# Patient Record
Sex: FEMALE | Race: WHITE | NOT HISPANIC OR LATINO | Employment: OTHER | URBAN - METROPOLITAN AREA
[De-identification: names, ages, dates, MRNs, and addresses within clinical notes are randomized per-mention and may not be internally consistent; named-entity substitution may affect disease eponyms.]

---

## 2017-01-11 ENCOUNTER — ALLSCRIPTS OFFICE VISIT (OUTPATIENT)
Dept: OTHER | Facility: OTHER | Age: 57
End: 2017-01-11

## 2017-02-08 ENCOUNTER — ALLSCRIPTS OFFICE VISIT (OUTPATIENT)
Dept: OTHER | Facility: OTHER | Age: 57
End: 2017-02-08

## 2017-02-24 ENCOUNTER — ALLSCRIPTS OFFICE VISIT (OUTPATIENT)
Dept: OTHER | Facility: OTHER | Age: 57
End: 2017-02-24

## 2017-02-24 DIAGNOSIS — K91.2 POSTSURGICAL MALABSORPTION, NOT ELSEWHERE CLASSIFIED: ICD-10-CM

## 2017-02-24 DIAGNOSIS — I10 ESSENTIAL (PRIMARY) HYPERTENSION: ICD-10-CM

## 2017-03-03 ENCOUNTER — GENERIC CONVERSION - ENCOUNTER (OUTPATIENT)
Dept: OTHER | Facility: OTHER | Age: 57
End: 2017-03-03

## 2017-03-10 ENCOUNTER — ALLSCRIPTS OFFICE VISIT (OUTPATIENT)
Dept: OTHER | Facility: OTHER | Age: 57
End: 2017-03-10

## 2017-04-07 ENCOUNTER — ALLSCRIPTS OFFICE VISIT (OUTPATIENT)
Dept: OTHER | Facility: OTHER | Age: 57
End: 2017-04-07

## 2017-04-28 ENCOUNTER — ALLSCRIPTS OFFICE VISIT (OUTPATIENT)
Dept: OTHER | Facility: OTHER | Age: 57
End: 2017-04-28

## 2017-04-28 ENCOUNTER — APPOINTMENT (OUTPATIENT)
Dept: LAB | Facility: HOSPITAL | Age: 57
End: 2017-04-28
Payer: COMMERCIAL

## 2017-04-28 DIAGNOSIS — K91.2 POSTSURGICAL MALABSORPTION, NOT ELSEWHERE CLASSIFIED: ICD-10-CM

## 2017-04-28 DIAGNOSIS — R31.9 HEMATURIA: ICD-10-CM

## 2017-04-28 DIAGNOSIS — I10 ESSENTIAL (PRIMARY) HYPERTENSION: ICD-10-CM

## 2017-04-28 LAB
BILIRUB UR QL STRIP: NEGATIVE
BILIRUB UR QL STRIP: NORMAL
CLARITY UR: ABNORMAL
CLARITY UR: NORMAL
COLOR UR: ABNORMAL
COLOR UR: NORMAL
GLUCOSE UR STRIP-MCNC: NEGATIVE MG/DL
HGB UR QL STRIP.AUTO: ABNORMAL
HGB UR QL STRIP.AUTO: NORMAL
KETONES UR STRIP-MCNC: ABNORMAL MG/DL
KETONES UR STRIP-MCNC: NORMAL MG/DL
LEUKOCYTE ESTERASE UR QL STRIP: NEGATIVE
LEUKOCYTE ESTERASE UR QL STRIP: NORMAL
NITRITE UR QL STRIP: NEGATIVE
NITRITE UR QL STRIP: NORMAL
PH UR STRIP.AUTO: 5 [PH]
PH UR STRIP.AUTO: 6 [PH] (ref 4.5–8)
PROT UR STRIP-MCNC: NEGATIVE MG/DL
PROT UR STRIP-MCNC: NORMAL MG/DL
SP GR UR STRIP.AUTO: 1.01
SP GR UR STRIP.AUTO: 1.03 (ref 1–1.03)
UROBILINOGEN UR QL STRIP.AUTO: 1 E.U./DL
UROBILINOGEN UR QL STRIP.AUTO: NORMAL

## 2017-04-28 PROCEDURE — 87086 URINE CULTURE/COLONY COUNT: CPT

## 2017-04-28 PROCEDURE — 81001 URINALYSIS AUTO W/SCOPE: CPT

## 2017-04-29 LAB
BACTERIA UR QL AUTO: ABNORMAL /HPF
MUCOUS THREADS UR QL AUTO: ABNORMAL
NON-SQ EPI CELLS URNS QL MICRO: ABNORMAL /HPF
RBC #/AREA URNS AUTO: ABNORMAL /HPF
WBC #/AREA URNS AUTO: ABNORMAL /HPF

## 2017-04-30 LAB — BACTERIA UR CULT: NORMAL

## 2017-05-01 ENCOUNTER — GENERIC CONVERSION - ENCOUNTER (OUTPATIENT)
Dept: OTHER | Facility: OTHER | Age: 57
End: 2017-05-01

## 2017-06-02 ENCOUNTER — ALLSCRIPTS OFFICE VISIT (OUTPATIENT)
Dept: OTHER | Facility: OTHER | Age: 57
End: 2017-06-02

## 2017-06-08 ENCOUNTER — ALLSCRIPTS OFFICE VISIT (OUTPATIENT)
Dept: RADIOLOGY | Facility: CLINIC | Age: 57
End: 2017-06-08
Payer: COMMERCIAL

## 2017-06-14 ENCOUNTER — GENERIC CONVERSION - ENCOUNTER (OUTPATIENT)
Dept: OTHER | Facility: OTHER | Age: 57
End: 2017-06-14

## 2017-06-20 ENCOUNTER — ALLSCRIPTS OFFICE VISIT (OUTPATIENT)
Dept: RADIOLOGY | Facility: CLINIC | Age: 57
End: 2017-06-20
Payer: COMMERCIAL

## 2017-06-23 ENCOUNTER — ALLSCRIPTS OFFICE VISIT (OUTPATIENT)
Dept: OTHER | Facility: OTHER | Age: 57
End: 2017-06-23

## 2017-06-23 DIAGNOSIS — Z12.11 ENCOUNTER FOR SCREENING FOR MALIGNANT NEOPLASM OF COLON: ICD-10-CM

## 2017-06-28 ENCOUNTER — GENERIC CONVERSION - ENCOUNTER (OUTPATIENT)
Dept: OTHER | Facility: OTHER | Age: 57
End: 2017-06-28

## 2017-07-27 ENCOUNTER — ALLSCRIPTS OFFICE VISIT (OUTPATIENT)
Dept: RADIOLOGY | Facility: CLINIC | Age: 57
End: 2017-07-27
Payer: COMMERCIAL

## 2017-07-28 ENCOUNTER — ALLSCRIPTS OFFICE VISIT (OUTPATIENT)
Dept: OTHER | Facility: OTHER | Age: 57
End: 2017-07-28

## 2017-08-01 ENCOUNTER — GENERIC CONVERSION - ENCOUNTER (OUTPATIENT)
Dept: OTHER | Facility: OTHER | Age: 57
End: 2017-08-01

## 2017-08-07 ENCOUNTER — ALLSCRIPTS OFFICE VISIT (OUTPATIENT)
Dept: OTHER | Facility: OTHER | Age: 57
End: 2017-08-07

## 2017-09-20 ENCOUNTER — ALLSCRIPTS OFFICE VISIT (OUTPATIENT)
Dept: OTHER | Facility: OTHER | Age: 57
End: 2017-09-20

## 2017-10-24 ENCOUNTER — ALLSCRIPTS OFFICE VISIT (OUTPATIENT)
Dept: OTHER | Facility: OTHER | Age: 57
End: 2017-10-24

## 2017-10-26 NOTE — PROGRESS NOTES
Assessment  1  Chronic prescription opiate use (V58 69) (Z79 891)   2  Chronic pain syndrome (338 4) (G89 4)    Discussion/Summary    This is a 68-year-old female who presents today for follow up of chronic lumbosacral pain, pudendal neuralgia, spondylolisthesis, left hip and groin pain  She recently had a left L3-L5 rhizotomy and she said flared up her pain symptoms  Patient has a permanent spinal cord stimulator which helps with her neuropathic symptoms  advised her that typically it takes at least 6-8 weeks to realize full benefit from Rhizotomy  I informed her that she should give the procedure another 2 weeks   I also advised her that to help with acute flare up of pain, we can do a medrol dose javier, taper dose  She is interested  I advised her that she should avoid the use of NSAIDS while on steroids  She is alos instructed to take the medication with food, and not on an empty stomach  She verbalizes understanding  manage her other pain symptoms, I will continue nucynta at 150mg po q12hrs ER and continue oxycodone 5mg po q8hrs for coverage of breakthrough pain  She was given 2 month supply  Opiates relieve her pain by greater than 70%  A urine drug screen was consistent  Opiate agreement was reviewed  [place] prescription drug monitoring database was checked and is appropriate  I will see her back in 8 weeks for re-assessment  I advised her to call us if the low back pain gets worse  adjuncts, I encouraged patient to continue with Lyrica 100 mg by mouth 3 times a day and Cymbalta 60 mg by mouth daily at bedtime  She verbalizes understanding  The patient has the current Goals: Treat acute flare of low back pain with medrol dose javier  Patient is able to Self-Care  Possible side effects of new medications were reviewed with the patient/guardian today  The treatment plan was reviewed with the patient/guardian   The patient/guardian understands and agrees with the treatment plan   The patient was counseled regarding instructions for management,-- risk factor reductions,-- prognosis,-- patient and family education,-- impressions,-- risks and benefits of treatment options-- and-- importance of compliance with treatment  There are risks associated with opiod medications, including dependence, addiction and tolerance  The patient understands and agrees to use these medications only as prescribed  Potential side effects of the medications include, but are not limited to, constipation, drowsiness, addiction, impaired judgment and risk of fatal overdose if not taken as prescribed  Sharing medications is a felony  At this point and time, the patient is showing no signs of addiction, abuse, diversion or suicidal ideation  Chief Complaint  1  Back Pain    History of Present Illness  Patient is a 49-year-old female who presents today for follow up visit for management of chronic low back pain and left sided hip and groin pain  She has a h/o SCS placement which she states she rarely uses, except during severe episodes of pain  Most recently, we performed a left L3-L5 lumbar radiofrequency ablation  Patient states that she is not doing so well  She states that she still her pain in the low back and she feels that the procedure might have aggravated her pudendal neuralgia  She states that she is in so much pain that she cannot sleep at night  She rates her pain 6 out of 10    her last visit, Vene 87 was continued at 150 mg by mouth ER every 12 hours and she was also continued on oxycodone 5 mg by mouth 3 times a day, #90 pills for breakthrough pain; and lyrica 100mg po 4x/day  Patient states that it this does not get her through the day  She also takes Cymbalta 60mg po qhs which helps with neuropathic pain  Other adjuncts include lidocaine ointments  She denies any side effects from the medications  Patient is here for medication refill        Referring physician is  Dr Danny Aaron physician is  Ritchie 77 presents with complaints of gradual onset of constant episodes of moderate left lower back pain, described as sharp, dull, aching, burning and throbbing, radiating to the left buttock and left thigh  On a scale of 1 to 10, the patient rates the pain as 6  Symptoms are improved by opioid analgesics  Symptoms are made worse by prolonged standing, prolonged sitting and bending  Symptoms are worsening  Review of Systems    Constitutional: no fever,-- no recent weight gain-- and-- no recent weight loss  Eyes: no double vision-- and-- no blurry vision  Cardiovascular: no chest pain,-- no palpitations-- and-- no lower extremity edema  Respiratory: no complaints of shortness of breath-- and-- no wheezing  Musculoskeletal: pain in extremity lt leg, but-- no difficulty walking,-- no muscle weakness,-- no joint stiffness,-- no joint swelling,-- no limb swelling-- and-- no decreased range of motion  Neurological: no dizziness,-- no difficulty swallowing,-- no memory loss,-- no loss of consciousness-- and-- no seizures  Gastrointestinal: no nausea,-- no vomiting,-- no constipation-- and-- no diarrhea  Genitourinary: no difficulty initiating urine stream,-- no genital pain-- and-- no frequent urination  Integumentary: no complaints of skin rash  Psychiatric: no depression  Endocrine: no excessive thirst,-- no adrenal disease,-- no hypothyroidism-- and-- no hyperthyroidism  Hematologic/Lymphatic: no tendency for easy bruising-- and-- no tendency for easy bleeding  ROS reviewed  Active Problems  1  Chronic pain syndrome (338 4) (G89 4)   2  Chronic prescription opiate use (V58 69) (Z79 891)   3  Dysesthesia of multiple sites (782 0) (R20 8)   4  Encounter for screening colonoscopy (V76 51) (Z12 11)   5  Encounter for screening mammogram for breast cancer (V76 12) (Z12 31)   6  Hematuria (599 70) (R31 9)   7  Hypertension (401 9) (I10)   8   Left shoulder pain (719 41) (M25 512)   9  Limb pain (729 5) (M79 609)   10  Lower back pain (724 2) (M54 5)   11  Lumbar facet arthropathy (721 3) (M12 88)   12  Memory difficulties (780 93) (R41 3)   13  Memory impairment (780 93) (R41 3)   14  Need for influenza vaccination (V04 81) (Z23)   15  Numbness (782 0) (R20 0)   16  Obstructive sleep apnea (327 23) (G47 33)   17  CATHRYN (obstructive sleep apnea) (327 23) (G47 33)   18  Postgastrectomy malabsorption (579 3) (K91 2,Z90 3)   19  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)   20  Rash (782 1) (R21)   21  Screening for malignant neoplasm of colon (V76 51) (Z12 11)   22  Sleep disturbances (780 50) (G47 9)   23  Snoring (786 09) (R06 83)   24  Twin birth (V27 9) (Z38 5)    Past Medical History  1  History of Anxiety (300 00) (F41 9)   2  History of Arthritis (V13 4)   3  History of Attention and concentration deficit (799 51) (R41 840)   4  History of Currently Wearing Eyeglasses   5  History of Depression (311) (F32 9)   6  History of Dyspepsia (536 8) (K30)   7  History of Gastric ulcer (531 90) (K25 9)   8  History of abdominal pain (V13 89) (Z87 898)   9  History of blurred vision (V12 49) (Z86 69)   10  History of diplopia (V12 49) (Z86 69)   11  History of fatigue (V13 89) (Z87 898)   12  History of gastritis (V12 79) (Z87 19)   13  History of heartburn (V12 79) (Z87 898)   14  History of nausea (V12 79) (Z87 898)   15  History of osteopenia (V13 59) (Z87 39)   16  History of shortness of breath (V13 89) (Z87 898)   17  History of urinary incontinence (V13 09) (Z87 898)   18  History of Memory Lapses Or Loss (780 93)   19  History of Urinary Stream Starts And Stops (788 61)    The active problems and past medical history were reviewed and updated today  Surgical History  1  History of  Section   2  History of Gallbladder Surgery   3  History of Gastric Surgery   4  History of Hysterectomy   5  History of Nerve Ablation   6   History of Spinal Neurostimulator Pulse Generator   7  History of Uterine Surgery   8  History of Wrist Arthroscopy With Internal Fixation    The surgical history was reviewed and updated today  Family History  Mother    1  Family history of Back disorder   2  Family history of Cirrhosis   3  Family history of Crohn's disease (V18 59) (Z83 79)   4  Family history of systemic lupus erythematosus (V19 4) (Z82 69)  Father    5  Family history of hypertension (V17 49) (Z82 49)   6  Family history of Heart Disease (V17 49)  Brother    7  Family history of Liver Transplant    The family history was reviewed and updated today  Social History   · Denied: History of Alcohol Use (History)   · Denied: History of Current Smoker   · Denied: History of Drug Use   · Marital History - Currently    · Never A Smoker   · Sexually Active   · Denied: History of Sexually Active With Persons At Risk For HIV-related Disease  The social history was reviewed and updated today  The social history was reviewed and is unchanged  Current Meds   1  D3-1000 CAPS; Take 1 capsule twice daily; Therapy: (Recorded:12Rji3663) to Recorded   2  DULoxetine HCl - 60 MG Oral Capsule Delayed Release Particles; take 1 capsule daily    Requested for: 02Jun2017; Last Rx:02Jun2017 Ordered   3  Iron TABS; TAKE TABLET Daily; Therapy: (Recorded:24Jun2016) to Recorded   4  Lidocaine 5 % External Ointment; apply to affected area BID PRN; Therapy: 60GTZ2101 to (Last Rx:20Jan2016) Ordered   5  Lisinopril 10 MG Oral Tablet; Take 1 tablet daily as directed; Therapy: 23Aug2017 to (Last Terrilee Curd)  Requested for: 23Aug2017 Ordered   6  Lyrica 100 MG Oral Capsule; take 1 capsule 4 times daily; Last Rx:02Jun2017 Ordered   7  Metoprolol Tartrate 25 MG Oral Tablet; TAKE ONE-HALF (1/2) TABLET TWICE A DAY; Therapy: 23Aug2017 to (Last Terrilee Curd)  Requested for: 23Aug2017 Ordered   8  Multi-Vitamin TABS; TAKE 1 TABLET DAILY; Therapy: (Recorded:07Apr2017) to Recorded   9  Nucynta  MG Oral Tablet Extended Release 12 Hour; take 1 tab twice a day; Therapy: 77DCL7796 to (Evaluate:08Oct2017); Last Rx:[id number] Ordered   10  OxyCODONE HCl - 5 MG Oral Tablet; take 1 tablet every 8 hours as needed for pain; Therapy: 64OYH0286 to (Evaluate:06Oct2017); Last Rx:[id number] Ordered   11  Pantoprazole Sodium 40 MG Oral Tablet Delayed Release; take 1 tablet twice a day; Therapy: 31PXV2624 to (Last Jimmy Baez)  Requested for: 62Uwc0318 Ordered    The medication list was reviewed and updated today  Allergies  1  Anesthesia IV Set MISC  2  Animal dander - Cats   3  Shellfish    Vitals  Vital Signs    Recorded: 17IOT4107 01:45PM   Heart Rate 92   Systolic 348   Diastolic 62   Height 5 ft 4 in   Weight 205 lb    BMI Calculated 35 19   BSA Calculated 1 98   Pain Scale 6     Physical Exam    Constitutional   General appearance: Well developed, well nourished, alert, in no distress, non-toxic and no overt pain behavior  Eyes   Sclera: anicteric   HEENT   Hearing grossly intact  Neck   Neck: Supple, symmetric, trachea midline, no masses  Pulmonary   Respiratory effort: Even and unlabored  Cardiovascular   Examination of extremities: No edema or pitting edema present  Skin   Skin and subcutaneous tissue: Normal without rashes or lesions, well hydrated  Psychiatric   Mood and affect: Mood and affect appropriate  Neurologic   Cranial nerves: Cranial nerves II-XII grossly intact  the muscle tone was normal   Musculoskeletal   Gait and station: Normal     Lumbar/Sacral Spine examination demonstrates Lumbosacral Spine:   Appearance: Normal  Spinal alignment exhibits normal lordosis  Tenderness: at lumbar spine,-- at the sacral spine,-- right paraspinal-- and-- left paraspinal  Palpatory Findings include left-sided muscle spasms     Lumbosacral Spine Sensory:     Lumbosacral sensory exam of the left side demonstrates L4 decreased sensation -- and-- L5 decreased sensation   ROM Lumbosacral Spine: Full  Flexion was restricted-- and-- was painful  Extension was restricted-- and-- was painful  ROM Hips: Full   Foot and ankle strength was normal bilaterally  Knee strength was normal bilaterally  Hip strength was normal bilaterally  Evaluation of Muscle Stretch Reflexes on the right side demonstrates muscle stretch reflexes equal and symmetric right lower limbs  Evaluation of Muscle Stretch Reflexes on the left side demonstrates muscle stretch reflexes equal and symmetric right lower limbs  Special Tests: Negative except as noted  Results/Data  Results Free Text Form [facility]:   Results     Other  last dose of oxycodone 2---26-17        Future Appointments    Date/Time Provider Specialty Site   12/19/2017 01:15 PM Daphney Lefort, [facility]     Signatures   Electronically signed by : Zoila Marinelli MD; Sep 20 2017  2:20PM EST                       (Author)

## 2017-10-30 ENCOUNTER — GENERIC CONVERSION - ENCOUNTER (OUTPATIENT)
Dept: OTHER | Facility: OTHER | Age: 57
End: 2017-10-30

## 2017-11-09 DIAGNOSIS — F11.20 UNCOMPLICATED OPIOID DEPENDENCE (HCC): ICD-10-CM

## 2017-11-09 DIAGNOSIS — Z79.899 OTHER LONG TERM (CURRENT) DRUG THERAPY: ICD-10-CM

## 2017-11-09 DIAGNOSIS — G89.4 CHRONIC PAIN SYNDROME: ICD-10-CM

## 2017-11-09 DIAGNOSIS — Z79.891 LONG TERM CURRENT USE OF OPIATE ANALGESIC: ICD-10-CM

## 2017-11-10 ENCOUNTER — APPOINTMENT (OUTPATIENT)
Dept: LAB | Facility: CLINIC | Age: 57
End: 2017-11-10
Payer: COMMERCIAL

## 2017-11-10 DIAGNOSIS — Z79.899 OTHER LONG TERM (CURRENT) DRUG THERAPY: ICD-10-CM

## 2017-11-10 LAB
ALBUMIN SERPL BCP-MCNC: 3.4 G/DL (ref 3.5–5)
ALP SERPL-CCNC: 114 U/L (ref 46–116)
ALT SERPL W P-5'-P-CCNC: 24 U/L (ref 12–78)
ANION GAP SERPL CALCULATED.3IONS-SCNC: 6 MMOL/L (ref 4–13)
AST SERPL W P-5'-P-CCNC: 21 U/L (ref 5–45)
BACTERIA UR QL AUTO: ABNORMAL /HPF
BILIRUB SERPL-MCNC: 0.29 MG/DL (ref 0.2–1)
BILIRUB UR QL STRIP: NEGATIVE
BUN SERPL-MCNC: 14 MG/DL (ref 5–25)
CALCIUM SERPL-MCNC: 9.1 MG/DL (ref 8.3–10.1)
CHLORIDE SERPL-SCNC: 102 MMOL/L (ref 100–108)
CLARITY UR: CLEAR
CO2 SERPL-SCNC: 31 MMOL/L (ref 21–32)
COLOR UR: YELLOW
CREAT SERPL-MCNC: 0.77 MG/DL (ref 0.6–1.3)
GFR SERPL CREATININE-BSD FRML MDRD: 85 ML/MIN/1.73SQ M
GLUCOSE P FAST SERPL-MCNC: 89 MG/DL (ref 65–99)
GLUCOSE UR STRIP-MCNC: NEGATIVE MG/DL
HGB UR QL STRIP.AUTO: ABNORMAL
HYALINE CASTS #/AREA URNS LPF: ABNORMAL /LPF
KETONES UR STRIP-MCNC: NEGATIVE MG/DL
LEUKOCYTE ESTERASE UR QL STRIP: ABNORMAL
NITRITE UR QL STRIP: POSITIVE
NON-SQ EPI CELLS URNS QL MICRO: ABNORMAL /HPF
PH UR STRIP.AUTO: 7.5 [PH] (ref 4.5–8)
POTASSIUM SERPL-SCNC: 4.6 MMOL/L (ref 3.5–5.3)
PROT SERPL-MCNC: 7.2 G/DL (ref 6.4–8.2)
PROT UR STRIP-MCNC: NEGATIVE MG/DL
RBC #/AREA URNS AUTO: ABNORMAL /HPF
SODIUM SERPL-SCNC: 139 MMOL/L (ref 136–145)
SP GR UR STRIP.AUTO: 1.01 (ref 1–1.03)
UROBILINOGEN UR QL STRIP.AUTO: 0.2 E.U./DL
WBC #/AREA URNS AUTO: ABNORMAL /HPF

## 2017-11-10 PROCEDURE — 87186 SC STD MICRODIL/AGAR DIL: CPT

## 2017-11-10 PROCEDURE — 81001 URINALYSIS AUTO W/SCOPE: CPT

## 2017-11-10 PROCEDURE — 87086 URINE CULTURE/COLONY COUNT: CPT

## 2017-11-10 PROCEDURE — 36415 COLL VENOUS BLD VENIPUNCTURE: CPT

## 2017-11-10 PROCEDURE — 87077 CULTURE AEROBIC IDENTIFY: CPT

## 2017-11-10 PROCEDURE — 80053 COMPREHEN METABOLIC PANEL: CPT

## 2017-11-12 ENCOUNTER — GENERIC CONVERSION - ENCOUNTER (OUTPATIENT)
Dept: OTHER | Facility: OTHER | Age: 57
End: 2017-11-12

## 2017-11-12 LAB — BACTERIA UR CULT: ABNORMAL

## 2017-11-13 ENCOUNTER — ALLSCRIPTS OFFICE VISIT (OUTPATIENT)
Dept: OTHER | Facility: OTHER | Age: 57
End: 2017-11-13

## 2017-11-14 NOTE — PROGRESS NOTES
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)    Plan  Chronic pain syndrome, Chronic prescription opiate use, Lumbar facet arthropathy    · Renew: OxyCODONE HCl - 5 MG Oral Tablet; take 1 tablet every 8 hours as needed forpain; Do Not Fill Before: [id number]  Rx By: Mary Snell; Dispense: 30 Days ; #:90 Tablet; Refill: 0;For: Chronic pain syndrome, Chronic prescription opiate use, Lumbar facet arthropathy; ELIAZAR = N; Print Rx  Lumbar facet arthropathy    · Renew: Nucynta  MG Oral Tablet Extended Release 12 Hour; take 1 tab twice aday; Do Not Fill Before: [id number]  Rx By: Mary Snell; Dispense: 30 Days ; #:60 Tablet Extended Release 12 Hour; Refill: 0;For: Lumbar facet arthropathy; ELIAZAR = N; Print Rx    Discussion/Summary    This is a 69-year-old female who presents today for follow up of chronic lumbosacral pain, pudendal neuralgia, spondylolisthesis, left hip and groin pain w/ SCS in place  She had a left L3-L5 rhizotomy and she said flared up some of her pain symptoms  manage her other pain symptoms, I will continue nucynta at 150mg po q12hrs ER and continue oxycodone 5mg po q8hrs for coverage of breakthrough pain  She was given 2 month supply  Opiates relieve her pain by greater than 50%  A urine drug screen will be collected today  Opiate agreement was reviewed  [place] prescription drug monitoring database was checked and is appropriate  I will see her back in 8 weeks for re-assessment  I advised her to call us if the low back pain gets worse  adjuncts, I encouraged patient to continue with Lyrica 100 mg by mouth 4 times a day and Cymbalta 60 mg by mouth daily at bedtime  She verbalizes understanding  urine drug screen was performed in the office today, as part of the medication management protocol   Drug screens are performed to evaluate for the presence or absence of described, non prescribed, and/or illicit substances  point of care results were appropriate for what is being prescribed  The specimen will be sent to the lab for confirmatory testing  The drug screen is medically necessary, because the patient is dependent upon, or being considered for opiate medication  1    The patient has the current Goals: Continue to manage pain using multimodal approach  The patent has the current Barriers: None  Patient is able to Self-Care  Possible side effects of new medications were reviewed with the patient/guardian today  The treatment plan was reviewed with the patient/guardian  The patient/guardian understands and agrees with the treatment plan   The patient was counseled regarding instructions for management,-- risk factor reductions,-- prognosis,-- patient and family education,-- impressions,-- risks and benefits of treatment options-- and-- importance of compliance with treatment  There are risks associated with opiod medications, including dependence, addiction and tolerance  The patient understands and agrees to use these medications only as prescribed  Potential side effects of the medications include, but are not limited to, constipation, drowsiness, addiction, impaired judgment and risk of fatal overdose if not taken as prescribed  Sharing medications is a felony  At this point and time, the patient is showing no signs of addiction, abuse, diversion or suicidal ideation  1 Amended By: Jose Lara; Nov 13 2017 3:19 PM EST    Chief Complaint    1  Back Pain    History of Present Illness  Patient is a 18-year-old female who presents today for follow up visit for management of chronic low back pain and left sided hip and groin pain  She has a h/o SCS placement which she states she rarely uses, except during severe episodes of pain  We performed a left L4-L5 lumbar radiofrequency ablation which helped somewhat  Patient was given a medrol dose javier at her last office visit   She states that after she took 2 doses of the javier, her urine changed color and had a fruity smell to it  She states that she was then diagnosed with a UTI and treated with anti-biotic  Other than that no changes in health  her last visit, Jessica Poster was continued at 150 mg by mouth ER every 12 hours and she was also continued on oxycodone 5 mg by mouth 3 times a day, #90 pills for breakthrough pain; and lyrica 100mg po 4x/day  Patient states that her pain seem to be worsened at night time  She also takes Cymbalta 60mg po qhs which helps with neuropathic pain  Other adjuncts include lidocaine ointments  She denies any side effects from the medications  Patient is here for medication refill  Referring physician is  Dr Alejo Gale physician is  Ritchie 77 presents with complaints of gradual onset of constant episodes of moderate left lower back pain, described as sharp, dull, aching, burning and throbbing, radiating to the left abdomen, left buttock and left thigh  On a scale of 1 to 10, the patient rates the pain as 5  Symptoms are unchanged  Review of Systems   Constitutional: no fever,-- no recent weight gain-- and-- no recent weight loss  Eyes: no double vision-- and-- no blurry vision  Cardiovascular: no chest pain,-- no palpitations-- and-- no lower extremity edema  Respiratory: no complaints of shortness of breath-- and-- no wheezing  Musculoskeletal: pain in extremity left thigh, back and abdomin, but-- no difficulty walking,-- no muscle weakness,-- no joint stiffness,-- no joint swelling,-- no limb swelling-- and-- no decreased range of motion  Neurological: no dizziness,-- no difficulty swallowing,-- no memory loss,-- no loss of consciousness-- and-- no seizures  Gastrointestinal: no nausea,-- no vomiting,-- no constipation-- and-- no diarrhea  Genitourinary: no difficulty initiating urine stream,-- no genital pain-- and-- no frequent urination  Integumentary: no complaints of skin rash  Psychiatric: no depression  Endocrine: no excessive thirst,-- no adrenal disease,-- no hypothyroidism-- and-- no hyperthyroidism  Hematologic/Lymphatic: no tendency for easy bruising-- and-- no tendency for easy bleeding  ROS reviewed  Active Problems  1  Acute UTI (599 0) (N39 0)  2  Chronic pain syndrome (338 4) (G89 4)  3  Chronic prescription opiate use (V58 69) (Z79 891)  4  Dysesthesia of multiple sites (782 0) (R20 8)  5  Encounter for screening colonoscopy (V76 51) (Z12 11)  6  Encounter for screening mammogram for breast cancer (V76 12) (Z12 31)  7  Hematuria (599 70) (R31 9)  8  High risk medications (not anticoagulants) long-term use (V58 69) (Z79 899)  9  Hypertension (401 9) (I10)  10  Left shoulder pain (719 41) (M25 512)  11  Limb pain (729 5) (M79 609)  12  Lower back pain (724 2) (M54 5)  13  Lumbar facet arthropathy (721 3) (M12 88)  14  Memory difficulties (780 93) (R41 3)  15  Memory impairment (780 93) (R41 3)  16  Need for influenza vaccination (V04 81) (Z23)  17  Numbness (782 0) (R20 0)  18  Obstructive sleep apnea (327 23) (G47 33)  19  CATHRYN (obstructive sleep apnea) (327 23) (G47 33)  20  Postgastrectomy malabsorption (579 3) (K91 2,Z90 3)  21  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)  22  Rash (782 1) (R21)  23  Screening for malignant neoplasm of colon (V76 51) (Z12 11)  24  Sleep disturbances (780 50) (G47 9)  25  Snoring (786 09) (R06 83)  26  Twin birth (V27 9) (Z38 5)    Past Medical History  1  History of Anxiety (300 00) (F41 9)  2  History of Arthritis (V13 4)  3  History of Attention and concentration deficit (799 51) (R41 840)  4  History of Currently Wearing Eyeglasses  5  History of Depression (311) (F32 9)  6  History of Dyspepsia (536 8) (K30)  7  History of Gastric ulcer (531 90) (K25 9)  8  History of abdominal pain (V13 89) (Z87 898)  9  History of blurred vision (V12 49) (Z86 69)  10   History of diplopia (V12 49) (Z86 69)  11  History of fatigue (V13 89) (Z87 898)  12  History of gastritis (V12 79) (Z87 19)  13  History of heartburn (V12 79) (Z87 898)  14  History of nausea (V12 79) (Z87 898)  15  History of osteopenia (V13 59) (Z87 39)  16  History of shortness of breath (V13 89) (Z87 898)  17  History of urinary incontinence (V13 09) (Z87 898)  18  History of Memory Lapses Or Loss (780 93)  19  History of Urinary Stream Starts And Stops (788 61)    The active problems and past medical history were reviewed and updated today  Surgical History  1  History of  Section  2  History of Gallbladder Surgery  3  History of Gastric Surgery  4  History of Hysterectomy  5  History of Nerve Ablation  6  History of Spinal Neurostimulator Pulse Generator  7  History of Uterine Surgery  8  History of Wrist Arthroscopy With Internal Fixation    Family History  Mother   1  Family history of Back disorder  2  Family history of Cirrhosis  3  Family history of Crohn's disease (V18 59) (Z83 79)  4  Family history of systemic lupus erythematosus (V19 4) (Z82 69)  Father   5  Family history of hypertension (V17 49) (Z82 49)  6  Family history of Heart Disease (V17 49)  Brother   7  Family history of Liver Transplant    Social History     · Denied: History of Alcohol Use (History)   · Denied: History of Current Smoker   · Denied: History of Drug Use   · Marital History - Currently    · Never A Smoker   · Sexually Active   · Denied: History of Sexually Active With Persons At Risk For HIV-related Disease  The social history was reviewed and updated today  Current Meds  1  D3-1000 CAPS; Take 1 capsule twice daily; Therapy: (Recorded:73Eqn2211) to Recorded  2  Diclofenac Sodium 1 % Transdermal Gel; APPLY SPARINGLY TO AFFECTED AREA(S) ONCE DAILY  Requested for: 2017; Last Rx:2017 Ordered  3   DULoxetine HCl - 60 MG Oral Capsule Delayed Release Particles; take 1 capsule daily  Requested for: 2017; Last Rx:2017 Ordered  4  Iron TABS; TAKE TABLET Daily; Therapy: (Recorded:24Jun2016) to Recorded  5  Lidocaine 5 % External Ointment; apply to affected area BID PRN; Therapy: 70HIM8514 to (Last Rx:24Oct2017)  Requested for: 24Oct2017 Ordered  6  Lisinopril 10 MG Oral Tablet; Take 1 tablet daily as directed; Therapy: 78Ljv9980 to (Last Lenetta Oyster)  Requested for: 23Aug2017 Ordered  7  Lyrica 100 MG Oral Capsule; take 1 capsule 4 times daily; Last Rx:02Jun2017 Ordered  8  Metoprolol Tartrate 25 MG Oral Tablet; TAKE ONE-HALF (1/2) TABLET TWICE A DAY; Therapy: 23Aug2017 to (Last Lenetta Oyster)  Requested for: 23Aug2017 Ordered  9  Multi-Vitamin TABS; TAKE 1 TABLET DAILY; Therapy: (Recorded:07Apr2017) to Recorded  10  Nitrofurantoin Monohyd Macro 100 MG Oral Capsule; TAKE 1 CAPSULE EVERY 12  HOURS DAILY; Therapy: 41YBT9439 to (Evaluate:70Ucs8345)  Requested for: 06UMV6426; Last  Rx:10Nov2017 Ordered  11  Nucynta  MG Oral Tablet Extended Release 12 Hour; take 1 tab twice a day; Therapy: 43BDV1131 to (Evaluate:75Fwn1961); Last Rx:21Kyd4344 Ordered  12  OxyCODONE HCl - 5 MG Oral Tablet; take 1 tablet every 8 hours as needed for pain; Therapy: 83WXF4953 to (Evaluate:22Dhk8815); Last Rx:80Xqc5799 Ordered  13  Pantoprazole Sodium 40 MG Oral Tablet Delayed Release; take 1 tablet twice a day; Therapy: 67RHI7316 to (Last Lenetta Oyster)  Requested for: 77Mow1698 Ordered    The medication list was reviewed and updated today  Allergies  1  Anesthesia IV Set MISC  2  Animal dander - Cats  3  Shellfish    Vitals  Vital Signs    Recorded: 09TKW7439 02:45PM   Heart Rate 64   Respiration 14   Systolic 148   Diastolic 86   Height 5 ft 4 in   Weight 211 lb    BMI Calculated 36 22   BSA Calculated 2   Pain Scale 5       Physical Exam   Constitutional  General appearance: Well developed, well nourished, alert, in no distress, non-toxic and no overt pain behavior  Eyes  Sclera: anicteric  HEENT  Hearing grossly intact     Neck  Neck: Supple, symmetric, trachea midline, no masses  Pulmonary  Respiratory effort: Even and unlabored  Cardiovascular  Examination of extremities: No edema or pitting edema present  Skin  Skin and subcutaneous tissue: Normal without rashes or lesions, well hydrated  Psychiatric  Mood and affect: Mood and affect appropriate  Neurologic  Cranial nerves: Cranial nerves II-XII grossly intact  the muscle tone was normal  Musculoskeletal  Gait and station: Normal    Lumbar/Sacral Spine examination demonstrates Lumbosacral Spine:  Appearance: Normal  Spinal alignment exhibits normal lordosis  Tenderness: at lumbar spine,-- at the sacral spine,-- right paraspinal-- and-- left paraspinal  Palpatory Findings include left-sided muscle spasms  Lumbosacral Spine Sensory:    Lumbosacral sensory exam of the left side demonstrates L4 decreased sensation -- and-- L5 decreased sensation   ROM Lumbosacral Spine: Full  Flexion was restricted-- and-- was painful  Extension was restricted-- and-- was painful  ROM Hips: Full  Foot and ankle strength was normal bilaterally  Knee strength was normal bilaterally  Hip strength was normal bilaterally  Evaluation of Muscle Stretch Reflexes on the right side demonstrates muscle stretch reflexes equal and symmetric right lower limbs  Evaluation of Muscle Stretch Reflexes on the left side demonstrates muscle stretch reflexes equal and symmetric right lower limbs  Special Tests: Negative except as noted  Results/Data  Results Free Text Form [facility]:   Results    Other   oxycodone last taken 11/12 PM nucynta last taken 11/13 AM       Future Appointments    Date/Time Provider Specialty Site   12/19/2017 01:15 PM [facility]       Signatures   Electronically signed by : Monalisa Galindo MD; Nov 13 2017  3:14PM EST                       (Author)    Electronically signed by : Monalisa Galindo MD; Nov 13 2017  3:19PM EST                       (Author)

## 2017-12-19 ENCOUNTER — ALLSCRIPTS OFFICE VISIT (OUTPATIENT)
Dept: OTHER | Facility: OTHER | Age: 57
End: 2017-12-19

## 2017-12-19 DIAGNOSIS — E78.5 HYPERLIPIDEMIA: ICD-10-CM

## 2017-12-19 DIAGNOSIS — I10 ESSENTIAL (PRIMARY) HYPERTENSION: ICD-10-CM

## 2017-12-20 NOTE — PROGRESS NOTES
Assessment  1  Chronic pain syndrome (338 4) (G89 4)   2  Hematuria (599 70) (R31 9)   3  Hypertension (401 9) (I10)   4  Obesity (BMI 30 0-34 9) (278 00) (E66 9)   5  Eczema, unspecified type (692 9) (L30 9)   6  Borderline hyperlipidemia (272 4) (E78 5)    Plan  Borderline hyperlipidemia, Hypertension    · (1) LIPID PANEL FASTING W DIRECT LDL REFLEX; Status:Active; Requestedfor:21Lkn6483;   Eczema, unspecified type    · Mometasone Furoate 0 1 % External Cream; APPLY TO AFFECTED AREA ONCEDAILY  Hypertension    · Lisinopril 10 MG Oral Tablet; Take 1 tablet daily as directed    Discussion/Summary    BP here today is perfect  Continue on your lisinopril and all other medications  Use the mometasone cream once daily to the left heel  Keep the follow-up with Dr Mary Soriano he  Have the cholesterol test done and I will call with results  Try to work on the weight  Cut back calories to 1200 daily over the course of the day  Eat 3 meals daily and try to cut back on carbs like bread, pasta, rice, cereal, potatoes, cakes and cookies  Drink at least 6 glasses of water daily  Download my fitness pal and learn to keep a food diary and record all food and liquids you consume  Start with 20 minutes of exercise twice weekly and increase this over time to 30 minutes every other day  The patient was counseled regarding diagnostic results,-- instructions for management,-- risk factor reductions,-- prognosis,-- patient and family education,-- impressions,-- risks and benefits of treatment options,-- importance of compliance with treatment  Possible side effects of new medications were reviewed with the patient/guardian today  The treatment plan was reviewed with the patient/guardian  The patient/guardian understands and agrees with the treatment plan      Chief Complaint  patient is here for 6 month f/u      History of Present Illness  Pt is here for routine f/u  Was given steroids several months ago and developed uti symptoms   Had ua done here and given script for abx for uti  Now feeling better  Does not have urges to urinate, but the urine smells better and she feels so much better since the abx  Went to [place] for 3 weeks  Flying was a nightmare for her r/t her back pains and her pudendal neuralgia  Needed to recuperate from the plane ride  Had a great time and doing ok since she is home  Still with chronic pains  Follows with Dr Melanie Lester  Having problems with her weight   is losing weight and she gained 10 lbs  Knows she is not active  Moans in pain if she tries to do anything  Rarely out of the house  Had her flu shot at [facility]  Thnks she needs lisinopril  Takes all meds as directed  No side effects noted  Never takes her bp's at home  Review of Systems   Constitutional: feeling tired, but-- no fever,-- not feeling poorly-- and-- no chills  Eyes: No complaints of eye pain, no red eyes, no eyesight problems, no discharge, no dry eyes, no itching of eyes  ENT: no complaints of earache, no loss of hearing, no nose bleeds, no nasal discharge, no sore throat, no hoarseness  Cardiovascular: no chest pain-- and-- no palpitations  Respiratory: no shortness of breath,-- no cough-- and-- no wheezing  Gastrointestinal: Still with heartburn , but-- no abdominal pain,-- no nausea,-- no vomiting,-- no constipation-- and-- no diarrhea  Genitourinary: incontinence, but-- no dysuria  Musculoskeletal: arthralgias, but-- no myalgias  Integumentary: a rash-- and-- Has itchy intermittent rash for 25 years on the left heel  Does not use anything for this  , but-- no skin lesions  Neurological: no headache,-- no dizziness-- and-- no fainting  Psychiatric: no anxiety-- and-- no depression  ROS reviewed  Active Problems  1  Acute UTI (599 0) (N39 0)   2  Chronic pain syndrome (338 4) (G89 4)   3  Chronic prescription opiate use (V58 69) (Z79 891)   4  Dysesthesia of multiple sites (782 0) (R20 8)   5   Encounter for screening colonoscopy (V76 51) (Z12 11)   6  Encounter for screening mammogram for breast cancer (V76 12) (Z12 31)   7  Hematuria (599 70) (R31 9)   8  High risk medications (not anticoagulants) long-term use (V58 69) (Z79 899)   9  Hypertension (401 9) (I10)   10  Left shoulder pain (719 41) (M25 512)   11  Limb pain (729 5) (M79 609)   12  Lower back pain (724 2) (M54 5)   13  Lumbar facet arthropathy (721 3) (M46 96)   14  Memory difficulties (780 93) (R41 3)   15  Memory impairment (780 93) (R41 3)   16  Need for influenza vaccination (V04 81) (Z23)   17  Numbness (782 0) (R20 0)   18  Obstructive sleep apnea (327 23) (G47 33)   19  CATHRYN (obstructive sleep apnea) (327 23) (G47 33)   20  Postgastrectomy malabsorption (579 3) (K91 2,Z90 3)   21  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)   22  Rash (782 1) (R21)   23  Screening for malignant neoplasm of colon (V76 51) (Z12 11)   24  Sleep disturbances (780 50) (G47 9)   25  Snoring (786 09) (R06 83)   26  Twin birth (V27 9) (Z38 5)   32  Uncomplicated opioid dependence (304 00) (F11 20)    Past Medical History  1  History of Anxiety (300 00) (F41 9)   2  History of Arthritis (V13 4)   3  History of Attention and concentration deficit (799 51) (R41 840)   4  History of Currently Wearing Eyeglasses   5  History of Depression (311) (F32 9)   6  History of Dyspepsia (536 8) (K30)   7  History of Gastric ulcer (531 90) (K25 9)   8  History of abdominal pain (V13 89) (Z87 898)   9  History of blurred vision (V12 49) (Z86 69)   10  History of diplopia (V12 49) (Z86 69)   11  History of fatigue (V13 89) (Z87 898)   12  History of gastritis (V12 79) (Z87 19)   13  History of heartburn (V12 79) (Z87 898)   14  History of nausea (V12 79) (Z87 898)   15  History of osteopenia (V13 59) (Z87 39)   16  History of shortness of breath (V13 89) (Z87 898)   17  History of urinary incontinence (V13 09) (Z87 898)   18  History of Memory Lapses Or Loss (780 93)   19   History of Urinary Stream Starts And Stops (479 75)    The active problems and past medical history were reviewed and updated today  Surgical History  1  History of  Section   2  History of Gallbladder Surgery   3  History of Gastric Surgery   4  History of Hysterectomy   5  History of Nerve Ablation   6  History of Spinal Neurostimulator Pulse Generator   7  History of Uterine Surgery   8  History of Wrist Arthroscopy With Internal Fixation    The surgical history was reviewed and updated today  Family History  Mother    1  Family history of Back disorder   2  Family history of Cirrhosis   3  Family history of Crohn's disease (V18 59) (Z83 79)   4  Family history of systemic lupus erythematosus (V19 4) (Z82 69)  Father    5  Family history of hypertension (V17 49) (Z82 49)   6  Family history of Heart Disease (V17 49)  Brother    7  Family history of Liver Transplant    The family history was reviewed and updated today  Social History   · Denied: History of Alcohol Use (History)   · Denied: History of Current Smoker   · Denied: History of Drug Use   · Marital History - Currently    · Never A Smoker   · Sexually Active   · Denied: History of Sexually Active With Persons At Risk For HIV-related Disease  The social history was reviewed and updated today  Current Meds   1  D3-1000 CAPS; Take 1 capsule twice daily; Therapy: (Recorded:79Sjr4799) to Recorded   2  Diclofenac Sodium 1 % Transdermal Gel; APPLY SPARINGLY TO AFFECTED AREA(S) ONCE DAILY  Requested for: 2017; Last Rx:2017 Ordered   3  DULoxetine HCl - 60 MG Oral Capsule Delayed Release Particles; take 1 capsule daily  Requested for: 2017; Last Rx:2017 Ordered   4  Iron TABS; TAKE TABLET Daily; Therapy: (Recorded:2016) to Recorded   5  Lidocaine 5 % External Ointment; apply to affected area BID PRN; Therapy: 35MWH2655 to (Last Rx:2017)  Requested for: 2017 Ordered   6  Lisinopril 10 MG Oral Tablet;  Take 1 tablet daily as directed; Therapy: 51Lqe0950 to (Last Kandis Asa)  Requested for: 23Aug2017 Ordered   7  Lyrica 100 MG Oral Capsule; take 1 capsule 4 times daily; Last Rx:02Jun2017 Ordered   8  Metoprolol Tartrate 25 MG Oral Tablet; TAKE ONE-HALF (1/2) TABLET TWICE A DAY; Therapy: 93Bao9929 to (Last Rx:21Nov2017)  Requested for: 21Nov2017 Ordered   9  Multi-Vitamin TABS; TAKE 1 TABLET DAILY; Therapy: (Recorded:07Apr2017) to Recorded   10  Nucynta  MG Oral Tablet Extended Release 12 Hour; take 1 tab twice a day; Therapy: 96RKW6508 to (Evaluate:51Hsn4810); Last Rx:13Nov2017 Ordered   11  OxyCODONE HCl - 5 MG Oral Tablet; take 1 tablet every 8 hours as needed for pain; Therapy: 79XIV4899 to (Evaluate:33Vdq0814); Last Rx:13Nov2017 Ordered   12  Pantoprazole Sodium 40 MG Oral Tablet Delayed Release; take 1 tablet twice a day; Therapy: 77CDQ1761 to (Last Braidwood Eulogio)  Requested for: 21Nov2017 Ordered    The medication list was reviewed and updated today  Allergies  1  Anesthesia IV Set MISC  2  Animal dander - Cats   3  Shellfish    Vitals  Vital Signs    Recorded: 54HWX1374 12:57PM   Temperature 99 6 F   Heart Rate 70   Respiration 16   Systolic 998   Diastolic 70   Height 5 ft 5 in   Weight 208 lb 2 oz   BMI Calculated 34 63   BSA Calculated 2 01   O2 Saturation 95     Physical Exam   Constitutional  General appearance: No acute distress, well appearing and well nourished  Eyes  Conjunctiva and lids: No swelling, erythema or discharge  Pupils and irises: Equal, round and reactive to light  Ears, Nose, Mouth, and Throat  External inspection of ears and nose: Normal    Otoscopic examination: Tympanic membranes translucent with normal light reflex  Canals patent without erythema  Nasal mucosa, septum, and turbinates: Normal without edema or erythema  Oropharynx: Normal with no erythema, edema, exudate or lesions  Pulmonary  Respiratory effort: No increased work of breathing or signs of respiratory distress  Auscultation of lungs: Clear to auscultation  Cardiovascular  Auscultation of heart: Normal rate and rhythm, normal S1 and S2, without murmurs  Lymphatic  Palpation of lymph nodes in neck: No lymphadenopathy  Musculoskeletal  Gait and station: Normal    Inspection/palpation of joints, bones, and muscles: Normal    Neurologic  Sensation: No sensory loss  Psychiatric  Orientation to person, place, and time: Normal    Mood and affect: Normal          Results/Data  PHQ-2 Adult Depression Screening 61DWC0038 01:00PM User, Hannah     Test Name Result Flag Reference   PHQ-2 Adult Depression Score 0     Over the last two weeks, how often have you been bothered by any of the following problems? Little interest or pleasure in doing things: Not at all - 0 Feeling down, depressed, or hopeless: Not at all - 0   PHQ-2 Adult Depression Screening Negative         Health Management  Encounter for screening colonoscopy   COLONOSCOPY; every 10 years; Last 73OMN0158; Next Due: 07Fls7307; Active    Attending Note  Collaborating Note: I supervised the Advanced Practitioner-- and-- I agree with the Advanced Practitioner note  Future Appointments    Date/Time Provider Specialty Site   01/09/2018 01:00 PM [facility] [address]     Signatures   Electronically signed by :  GWEN Dixon; Dec 19 2017  1:33PM EST                       (Author)    Electronically signed by : Gen Pitts DO; Dec 19 2017  1:53PM EST                       (Co-author)

## 2018-01-09 ENCOUNTER — GENERIC CONVERSION - ENCOUNTER (OUTPATIENT)
Dept: OTHER | Facility: OTHER | Age: 58
End: 2018-01-09

## 2018-01-10 NOTE — RESULT NOTES
Message   Recorded as Task   Date: 06/12/2017 03:37 PM, Created By: Najma Pope   Task Name: Follow Up   Assigned To: Curt Brasher   Regarding Patient: Nica Tenorio, Status: In Progress   Comment:    Gely Florez - 12 Jun 2017 3:37 PM     TASK CREATED  F/u procedure-L L3-L5 MBB on 6/8/17  LMOMTCB  Pain diary scanned  Gely Florez - 12 Jun 2017 3:37 PM     TASK IN PROGRESS   Gely Florez - 14 Jun 2017 10:01 AM     TASK REASSIGNED: Previously Assigned To Curt Brasher  F/u procedure-L L3-L5 MBB on 6/8/17  Pain diary reviewed  Pt reported a 50-80% improvement that lasted for 8 hrs following injection  Pain diary scanned  Has no f/u appoint  Edwige Garcia - 14 Jun 2017 1:39 PM     TASK REPLIED TO: Previously Assigned To Nima Carrillo pls [facility] mbb#2  she needs f/u appt ov med refill as well     Gely Florez - 14 Jun 2017 2:25 PM     TASK REASSIGNED: Previously Assigned To Phil Marrero - 14 Jun 2017 3:04 PM     TASK REPLIED TO: Previously Assigned To Ernestina Rushing  pt is scheduled for mbb #2        Signatures   Electronically signed by : Bronwyn Vásquez, ; Jun 14 2017  3:36PM EST                       (Author)

## 2018-01-10 NOTE — PROCEDURES
Results/Data  Procedure: Electroencephalography (EEG)   Indications for the procedure include Memory Difficulties  Were discussed with the patient  Written consent was obtained prior to the procedure and is detailed in the patient's record  Prior to the start of the procedure, a time out was taken and the identity of the patient was confirmed via name and date of birth with the patient  The correct site(s) and the procedure to be performed were confirmed and the site(s) were marked appropriately  The positioning of the patient and the availabilty of the correct equipment were verified  Certain medications (such as anticonvulsants and tranquilizers), stimulants, and alcohol were avoided for at least 24-48 hours prior to the procedure  Procedure Note:   Performed by: Sharon   Start Time: 3:30   End Time: 4:00   Electrode(s) Placement: Fp1, Fp2, F7, F3, Fz, F4, F8, T3, C3, CZ, C4, T4, T5, T6, P3, PZ, P4, O1, O2, A1 and A2  They were placed in a bipolar montage, referential montage, average reference montage, laplacian montage  The EEG was performed while the patient was exposed to of photic stimulation, hyperventilated and awake and drowsy, but not sedated  Findings: EEG    This is a routine 18 channel EEG recording performed on a 61-year-old woman with a history of memory difficulties   Background activities during wakefulness consist of mid amplitude cycle per second activities emanating from the posterior head region  These activities are reactive to eye opening  Intermingled with background activities are a moderate amount of lower amplitude beta activities emanating from the frontocentral head regions  Episodes of drowsiness and sleep are not seen    Photic stimulation was performed over a wide range of flash frequencies and produced little in the way of a driving response  Hyperventilation added no additional information  Concomitant EKG revealed a sinus rhythm      IMPRESSION: This is an abnormal study due to the dysrhythmic activities seen in a generalized fashion as described above  This type of abnormality suggestive of cortical and subcortical dysfunction as seen in toxic, metabolic and/or neurodegenerative processes    Brenna PresTOI mccarty  Impression:    Post-Procedure:   the patient tolerated the procedure well  Complications: There were no complications        Signatures   Electronically signed by : Alverto Mejia MD; Jul 8 2016  4:26PM EST                       (Author)

## 2018-01-11 NOTE — RESULT NOTES
Verified Results  (1) URINE MICROSCOPIC 57SGL8686 11:54AM Maddison Gutierrez Order Number: [id number]     Test Name Result Flag Reference   CLINICAL REPORT (Report) A    Test:        Urine culture  Specimen Type:   Urine  Specimen Date:   11/10/2017 11:54 AM  Result Date:    11/12/2017 1:25 PM  Result Status:   Final result  Abnormal:      Yes  Resulting Lab:   [facility] [address] [place] [address]            Tel: [phone]      CULTURE                                       ------------------                                   >100,000 cfu/ml Escherichia coli (Abnormal)      SUSCEPTIBILITY                                   ------------------                                                       Escherichia coli  METHOD                 GARY  -------------------------------------  -------------------------  AMPICILLIN ($$)             <=8 00 ug/ml Susceptible  AZTREONAM ($$$)             <=8 ug/ml   Susceptible  CEFAZOLIN ($)              <=8 00 ug/ml Susceptible  CIPROFLOXACIN ($)            <=1 00 ug/ml Susceptible  GENTAMICIN ($$)             <=4 ug/ml   Susceptible  LEVOFLOXACIN ($)            <=2 00 ug/ml Susceptible  NITROFURANTOIN             <=32 ug/ml  Susceptible  PIPERACILLIN + TAZOBACTAM ($$$)     <=16 ug/ml  Susceptible  TETRACYCLINE              <=4 ug/ml   Susceptible  TOBRAMYCIN ($)             <=4 ug/ml   Susceptible  TRIMETHOPRIM + SULFAMETHOXAZOLE ($$$)  <=2/38 ug/ml Susceptible

## 2018-01-12 VITALS
DIASTOLIC BLOOD PRESSURE: 80 MMHG | HEART RATE: 86 BPM | SYSTOLIC BLOOD PRESSURE: 120 MMHG | BODY MASS INDEX: 34.21 KG/M2 | WEIGHT: 200.38 LBS | HEIGHT: 64 IN | TEMPERATURE: 98.5 F | RESPIRATION RATE: 14 BRPM | OXYGEN SATURATION: 98 %

## 2018-01-12 VITALS
HEIGHT: 64 IN | BODY MASS INDEX: 36.02 KG/M2 | SYSTOLIC BLOOD PRESSURE: 124 MMHG | HEART RATE: 64 BPM | WEIGHT: 211 LBS | RESPIRATION RATE: 14 BRPM | DIASTOLIC BLOOD PRESSURE: 86 MMHG

## 2018-01-12 VITALS
OXYGEN SATURATION: 96 % | SYSTOLIC BLOOD PRESSURE: 112 MMHG | HEART RATE: 73 BPM | HEIGHT: 64 IN | BODY MASS INDEX: 33.81 KG/M2 | WEIGHT: 198.05 LBS | RESPIRATION RATE: 16 BRPM | TEMPERATURE: 98.7 F | DIASTOLIC BLOOD PRESSURE: 62 MMHG

## 2018-01-12 VITALS
BODY MASS INDEX: 35 KG/M2 | HEART RATE: 92 BPM | SYSTOLIC BLOOD PRESSURE: 104 MMHG | WEIGHT: 205 LBS | HEIGHT: 64 IN | DIASTOLIC BLOOD PRESSURE: 62 MMHG

## 2018-01-12 VITALS
DIASTOLIC BLOOD PRESSURE: 64 MMHG | SYSTOLIC BLOOD PRESSURE: 104 MMHG | HEIGHT: 64 IN | WEIGHT: 197 LBS | BODY MASS INDEX: 33.63 KG/M2 | HEART RATE: 68 BPM

## 2018-01-12 NOTE — MISCELLANEOUS
Plan  CATHRYN (obstructive sleep apnea)    · *Polysomnography, Sleep Study, CPAP/BiPAP titration; Status:Need Information -  Financial Authorization;  Requested for:12Oct2016;   there any other medical conditions or medications that would explain these      symptoms? : No  is the sleep disturbance affecting the patient's ability to function? : yes  History/Symptoms: : yes  a face-to-face visit, with sleep documentation, performed prior to the order for sleep      study? : Yes  Study Only or Consult : Sleep Study Only Follow up with Referring Physician    Signatures   Electronically signed by : Arnold Miranda MD; Oct 12 2016  4:51PM EST                       (Author)

## 2018-01-12 NOTE — RESULT NOTES
Verified Results  (1) URINALYSIS w URINE C/S REFLEX (will reflex a microscopy if leukocytes, occult blood, or nitrites are not within normal limits) 28Apr2017 10:26PM Raheem Santiagoette     Test Name Result Flag Reference   COLOR Dk Yellow     CLARITY Cloudy     PH UA 6 0  4 5-8 0   LEUKOCYTE ESTERASE UA Negative  Negative   NITRITE UA Negative  Negative   PROTEIN UA Negative mg/dl  Negative   GLUCOSE UA Negative mg/dl  Negative   KETONES UA Trace mg/dl A Negative   UROBILINOGEN UA 1 0 E U /dl  0 2, 1 0 E U /dl   BILIRUBIN UA Negative  Negative   BLOOD UA Moderate A Negative   SPECIFIC GRAVITY UA 1 027  1 003-1 030   BACTERIA None Seen /hpf  None Seen, Occasional   EPITHELIAL CELLS None Seen /hpf  None Seen, Occasional   RBC UA 2-4 /hpf A None Seen   WBC UA None Seen /hpf  None Seen   CLINICAL REPORT (Report)     Test:        Urine culture  Specimen Type:   Urine  Specimen Date:   4/28/2017 10:26 PM  Result Date:    4/30/2017 8:27 AM  Result Status:   Final result  Resulting Lab:   [place] [address]            Tel: [phone]      CULTURE                                       ------------------                                   <10,000 cfu/ml Gram Negative Stoney Enteric Like   MUCOUS THREADS Innumerable  Occasional, Moderate, Innumerable       Signatures   Electronically signed by :  GWEN Cervantes; May  3 2017  9:30PM EST                       (Author)

## 2018-01-12 NOTE — RESULT NOTES
Message   Recorded as Task   Date: 06/22/2017 03:38 PM, Created By: Don Hopson   Task Name: Miscellaneous   Assigned To: SPA es procedure,Team   Regarding Patient: Nina Kearney, Status: Active   [facility]ist [facility] - 22 Jun 2017 3:38 PM     TASK CREATED  please review pain diary mbb lt l3-l5 6-20-17   patient got greater than 50% pain relief   Sea - 22 Jun 2017 3:45 PM     TASK REPLIED TO: Previously Assigned To Sea  reviewed  pls schedule rfa   Lesia Matthews - 26 Jun 2017 8:16 AM     TASK REASSIGNED: Previously Assigned To Aleida Kumar - 28 Jun 2017 2:30 PM     TASK REPLIED TO: Previously Assigned To SPA es procedure,Team  Pt is scheduled for L L3-L5 RFA          Signatures   Electronically signed by : Mckenzie Burdick, ; Jun 28 2017  2:45PM EST                       (Author)

## 2018-01-12 NOTE — RESULT NOTES
Message   Recorded as Task   Date: 07/27/2017 04:08 PM, Created By: Shahnaz Louis   Task Name: Follow Up   Assigned To: SPA es clinical,Team   Regarding Patient: Corinne Chandler, Status: In Progress   CommentBeryle Dross - 27 Jul 2017 4:08 PM     TASK CREATED  Please call patient 7/28  Patient had L L3-L5 RFA 7/27  Patient has OV scheduled for 7/28  Gabrielle Cruz - 28 Jul 2017 3:45 PM     TASK EDITED  Lm for pt to cb, cb# provided   Veeip - 28 Jul 2017 3:45 PM     TASK IN PROGRESS   Shilpi Keller - 01 Aug 2017 9:18 AM     TASK EDITED    I spoke with pt, states she has some LBP, which she feels is different from the pain she had prior to the RFA  Continues with left buttock and leg pain due to nerve damage of the pudendal nerve  No S/S infection  Advised pt that it takes 4-6 weeks to see the full effect of the procedure  Pt will call with questions or concerns prior to OV 9/20/17  Pt verbalizes understanding  ****************   Saloni Craft - 01 Aug 2017 12:00 PM     TASK REPLIED TO: Previously Assigned To Saloni donis md aware   Shilpi Keller - 01 Aug 2017 12:57 PM     TASK IN PROGRESS        Signatures   Electronically signed by :  Robert Bolaños, ; Aug  1 2017 12:58PM EST                       (Author)

## 2018-01-12 NOTE — PROGRESS NOTES
Assessment    1  Chronic pain syndrome (338 4) (G89 4)   2  Chronic prescription opiate use (V58 69) (G28 158)    Discussion/Summary    This is a 47-year-old female who presents today for follow up of chronic lumbosacral pain, left hip and groin pain  Patient has a non-functional SCS system in place which she rarely uses  She is on medications to help manage her chronic pain symptoms  I will continue nucynta at 150mg po q12hrs ER and continue oxycodone 5mg po q8hrs for coverage of breakthrough pain  She was given 3 month supply  I will see her back in 3 months for re-assessment  I encouraged patient to continue with Lyrica 100 mg by mouth 3 times a day and Cymbalta 60 mg by mouth daily at bedtime  She verbalizes understanding  [place] and [place]  Patient is able to Self-Care  Possible side effects of new medications were reviewed with the patient/guardian today  The treatment plan was reviewed with the patient/guardian  The patient/guardian understands and agrees with the treatment plan   The patient was counseled regarding instructions for management, risk factor reductions, prognosis, patient and family education, impressions, risks and benefits of treatment options and importance of compliance with treatment  There are risks associated with opiod medications, including dependence, addiction and tolerance  The patient understands and agrees to use these medications only as prescribed  Potential side effects of the medications include, but are not limited to, constipation, drowsiness, addiction, impaired judgment and risk of fatal overdose if not taken as prescribed  Sharing medications is a felony  At this point and time, the patient is showing no signs of addiction, abuse, diversion or suicidal ideation  Chief Complaint    1   Back Pain    History of Present Illness  Patient is a 47-year-old female who presents today for follow up visit for management of chronic low back pain and left sided hip and groin pain  She has a h/o SCS placement which she states has not helped to relieve her pain at all despite re-programming  At her last visit, Vene 89 was increased to 150 mg by mouth every 12 hours  Patient reports good relief of pain  She states that she is more alert during the day and less drowsy  She also takes oxycodone 5mg po q8hrs for breakthrough pain  She also takes Cymbalta 60mg po qhs and lyrica 100mg po 4x/day which helps with neuropathic pain  Other adjuncts include lidocaine ointments  She denies any side effects from the medications  Patient is here for medication refill  Referring physician is  Dr Gin Shetty physician is  Ritchie Cordon presents with complaints of gradual onset of constant episodes of moderate left lower back pain, described as sharp, dull, aching, burning, throbbing and tingling, radiating to the left buttock and left thigh  On a scale of 1 to 10, the patient rates the pain as 4  Symptoms are improved by opioid analgesics  Symptoms are made worse by standing, sitting, prolonged standing, prolonged sitting, lifting, bending, twisting and walking down stairs  Symptoms are improving  Associated symptoms include leg numbness  The patient presents with complaints of gradual onset of constant episodes of moderate left lower quadrant abdominal pain, described as sharp, dull, aching and burning, radiating to the lower abdominal area and left lower quadrant  On a scale of 1 to 10, the patient rates the pain as 4  Symptoms are made worse by movement  Symptoms are improving  Review of Systems    Musculoskeletal: pain in extremity l thigh  ROS reviewed  Active Problems    1  Chronic pain syndrome (338 4) (G89 4)   2  Chronic prescription opiate use (V58 69) (Z79 891)   3  Dysesthesia of multiple sites (782 0) (R20 8)   4  Encounter for screening colonoscopy (V76 51) (Z12 11)   5  Hypertension (401 9) (I10)   6  Left shoulder pain (719 41) (M25 512)   7  Limb pain (729 5) (M79 609)   8  Lower back pain (724 2) (M54 5)   9  Lumbar facet arthropathy (721 3) (M12 88)   10  Memory difficulties (780 93) (R41 3)   11  Memory impairment (780 93) (R41 3)   12  Need for influenza vaccination (V04 81) (Z23)   13  Numbness (782 0) (R20 0)   14  Obstructive sleep apnea (327 23) (G47 33)   15  CATHRYN (obstructive sleep apnea) (327 23) (G47 33)   16  Postgastrectomy malabsorption (579 3) (K91 2,Z90 3)   17  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)   18  Rash (782 1) (R21)   19  Sleep disturbances (780 50) (G47 9)   20  Snoring (786 09) (R06 83)   21  Twin birth (V27 9) (Z38 5)    Past Medical History    1  History of Anxiety (300 00) (F41 9)   2  History of Arthritis (V13 4)   3  History of Attention and concentration deficit (799 51) (R41 840)   4  History of Currently Wearing Eyeglasses   5  History of Depression (311) (F32 9)   6  History of Dyspepsia (536 8) (K30)   7  History of Gastric ulcer (531 90) (K25 9)   8  History of abdominal pain (V13 89) (Z87 898)   9  History of blurred vision (V12 49) (Z86 69)   10  History of diplopia (V12 49) (Z86 69)   11  History of fatigue (V13 89) (Z87 898)   12  History of gastritis (V12 79) (Z87 19)   13  History of heartburn (V12 79) (Z87 898)   14  History of nausea (V12 79) (Z87 898)   15  History of osteopenia (V13 59) (Z87 39)   16  History of shortness of breath (V13 89) (Z87 898)   17  History of urinary incontinence (V13 09) (Z87 898)   18  History of Memory Lapses Or Loss (780 93)   19  History of Urinary Stream Starts And Stops (788 61)    The active problems and past medical history were reviewed and updated today  Surgical History    1  History of  Section   2  History of Gallbladder Surgery   3  History of Gastric Surgery   4  History of Hysterectomy   5  History of Spinal Neurostimulator Pulse Generator   6  History of Uterine Surgery   7  History of Wrist Arthroscopy With Internal Fixation    The surgical history was reviewed and updated today  Family History  Mother    1  Family history of Back disorder   2  Family history of Cirrhosis   3  Family history of Crohn's disease (V18 59) (Z83 79)   4  Family history of systemic lupus erythematosus (V19 4) (Z82 69)  Father    5  Family history of hypertension (V17 49) (Z82 49)   6  Family history of Heart Disease (V17 49)  Brother    7  Family history of Liver Transplant    The family history was reviewed and updated today  Social History    · Denied: History of Alcohol Use (History)   · Denied: History of Current Smoker   · Denied: History of Drug Use   · Marital History - Currently    · Never A Smoker   · Sexually Active   · Denied: History of Sexually Active With Persons At Risk For HIV-related Disease  The social history was reviewed and updated today  The social history was reviewed and is unchanged  Current Meds   1  D3-1000 CAPS; Take 1 capsule twice daily; Therapy: (Recorded:94Ewt3687) to Recorded   2  DULoxetine HCl - 60 MG Oral Capsule Delayed Release Particles; take 1 capsule daily    Requested for: 24Feb2017; Last Rx:24Feb2017 Ordered   3  Folic Acid TABS; TAKE TABLET Daily; Therapy: (Recorded:24Jun2016) to Recorded   4  Iron TABS; TAKE TABLET Daily; Therapy: (Recorded:24Jun2016) to Recorded   5  Lidocaine 5 % External Ointment; apply to affected area BID PRN; Therapy: 34LVN0832 to (Last Rx:20Jan2016) Ordered   6  Lisinopril 10 MG Oral Tablet; TAKE 1 TABLET DAILY AS DIRECTED  Requested for:   24Feb2017; Last Rx:24Feb2017 Ordered   7  Lyrica 100 MG Oral Capsule; take 1 capsule 4 times daily; Last Rx:11Jan2017 Ordered   8  Metoprolol Tartrate 25 MG Oral Tablet; TAKE TABLET  --1/2 tab twice a day  Requested for:   24Feb2017; Last Rx:24Feb2017 Ordered   9  Mometasone Furoate 0 1 % External Cream; APPLY TO AFFECTED AREA ONCE DAILY;    Therapy: 75VLZ7344 to (Last Rx: 57TRH7145)  Requested for: 31Oct2016 Ordered   10  Multi-Vitamin TABS; Therapy: (Recorded:10Jun2016) to Recorded   11  Nucynta  MG Oral Tablet Extended Release 12 Hour; take 1 tab twice a day; Therapy: 29VQW2112 to (Evaluate:12Mar2017); Last Rx:54Etv7696 Ordered   12  OxyCODONE HCl - 5 MG Oral Tablet; take 1 tablet every 6 hours as needed for pain; Therapy: 57XYH6347 to (Evaluate:19Feb2017); Last Rx:11Jan2017 Ordered   13  Pantoprazole Sodium 40 MG Oral Tablet Delayed Release; Take 1 tablet twice daily; Therapy: 02XAK3693 to (Evaluate:69Uvf7062)  Requested for: 98XFJ3575; Last    Rx:55Ptx0643 Ordered   14  Vitamin B12 TABS; TAKE TABLET  5000 mcg once a day; Therapy: (Recorded:16Nov2016) to Recorded    The medication list was reviewed and updated today  Allergies    1  Anesthesia IV Set MISC    2  Shellfish    Vitals  Vital Signs    Recorded: 46VLM2387 01:08PM   Heart Rate 68   Respiration 14   Systolic 507   Diastolic 88   Height 5 ft 4 in   Weight 201 lb 2 08 oz   BMI Calculated 34 52   BSA Calculated 1 97   Pain Scale 4     Physical Exam    Constitutional   General appearance: Well developed, well nourished, alert, in no distress, non-toxic and no overt pain behavior  Eyes   Sclera: anicteric   HEENT   Hearing grossly intact  Neck   Neck: Supple, symmetric, trachea midline, no masses  Pulmonary   Respiratory effort: Even and unlabored  Cardiovascular   Examination of extremities: No edema or pitting edema present  Skin   Skin and subcutaneous tissue: Normal without rashes or lesions, well hydrated  Psychiatric   Mood and affect: Mood and affect appropriate  Neurologic   Cranial nerves: Cranial nerves II-XII grossly intact  the muscle tone was normal   Musculoskeletal   Gait and station: Normal     Lumbar/Sacral Spine examination demonstrates Lumbosacral Spine:   Appearance: Normal  Spinal alignment exhibits normal lordosis     Tenderness: at lumbar spine, at the sacral spine, right paraspinal and left paraspinal  Palpatory Findings include left-sided muscle spasms  Lumbosacral Spine Sensory:     Lumbosacral sensory exam of the left side demonstrates L4 decreased sensation  and L5 decreased sensation   ROM Lumbosacral Spine: Full  Flexion was restricted and was painful  Extension was restricted and was painful  ROM Hips: Full   Foot and ankle strength was normal bilaterally  Knee strength was normal bilaterally  Hip strength was normal bilaterally  Evaluation of Muscle Stretch Reflexes on the right side demonstrates muscle stretch reflexes equal and symmetric right lower limbs  Evaluation of Muscle Stretch Reflexes on the left side demonstrates muscle stretch reflexes equal and symmetric right lower limbs  Special Tests: Negative except as noted  Results/Data  Procedure Flowsheet [id number] 11:01AM [facility]     Test Name Result Flag Reference   Opioid Contract Renewed [id number]       Results Free Text Form [facility]:   Results     Other  Last oxycodone dose-3/9/17  last nucynta dose-3/10/17        Future Appointments    Date/Time Provider Specialty Site   2017 10:20 AM Annice Apgar, MD Neurology [facility]   2017 10:00 AM Mirza Santizo, [address] [place] [address]     Signatures   Electronically signed by : Qi West MD; Mar 10 2017  1:47PM EST                       (Author)

## 2018-01-13 VITALS
BODY MASS INDEX: 34.34 KG/M2 | WEIGHT: 201.13 LBS | HEART RATE: 68 BPM | DIASTOLIC BLOOD PRESSURE: 88 MMHG | HEIGHT: 64 IN | RESPIRATION RATE: 14 BRPM | SYSTOLIC BLOOD PRESSURE: 126 MMHG

## 2018-01-13 VITALS
BODY MASS INDEX: 34.31 KG/M2 | DIASTOLIC BLOOD PRESSURE: 84 MMHG | WEIGHT: 201 LBS | HEART RATE: 72 BPM | SYSTOLIC BLOOD PRESSURE: 126 MMHG | HEIGHT: 64 IN

## 2018-01-13 VITALS
HEART RATE: 84 BPM | BODY MASS INDEX: 34.49 KG/M2 | WEIGHT: 202 LBS | SYSTOLIC BLOOD PRESSURE: 108 MMHG | DIASTOLIC BLOOD PRESSURE: 74 MMHG | HEIGHT: 64 IN

## 2018-01-14 VITALS
HEART RATE: 76 BPM | WEIGHT: 201 LBS | BODY MASS INDEX: 34.31 KG/M2 | SYSTOLIC BLOOD PRESSURE: 122 MMHG | DIASTOLIC BLOOD PRESSURE: 88 MMHG | HEIGHT: 64 IN

## 2018-01-14 VITALS
DIASTOLIC BLOOD PRESSURE: 82 MMHG | SYSTOLIC BLOOD PRESSURE: 120 MMHG | BODY MASS INDEX: 34.31 KG/M2 | WEIGHT: 201 LBS | HEART RATE: 76 BPM | HEIGHT: 64 IN

## 2018-01-14 VITALS
OXYGEN SATURATION: 96 % | HEART RATE: 74 BPM | HEIGHT: 64 IN | SYSTOLIC BLOOD PRESSURE: 110 MMHG | TEMPERATURE: 99.3 F | RESPIRATION RATE: 16 BRPM | WEIGHT: 199.13 LBS | BODY MASS INDEX: 33.99 KG/M2 | DIASTOLIC BLOOD PRESSURE: 80 MMHG

## 2018-01-15 VITALS
HEART RATE: 93 BPM | BODY MASS INDEX: 34.33 KG/M2 | WEIGHT: 201.06 LBS | SYSTOLIC BLOOD PRESSURE: 126 MMHG | DIASTOLIC BLOOD PRESSURE: 88 MMHG | HEIGHT: 64 IN

## 2018-01-16 NOTE — MISCELLANEOUS
Message   Recorded as Task   Date: 10/18/2017 03:06 PM, Created By: Cary Mello   Task Name: Med Renewal Request   Assigned To: [facility] es clinical,Team   Regarding Patient: Oleg Hayes, Status: Active   Comment:    Cary Mello - 18 Oct 2017 3:06 PM     TASK CREATED  Needs refills on her Diclofenac 1 5% (300ml) & Lidocaine 5% (240-250GM) Combo therapy creams  she had 2 refills left for [facility] but they told her they are no longer accepting her ins and needs to have them refilled some place else not sure where they should be sent into   [facility] - 19 Oct 2017 12:48 PM     TASK REPLIED TO: Previously Assigned To Karla Moreno  CAN WE TRY SENDING THIS TO [facility]  SEE IF THEY WILL COVER IT  Ken Mccord - 88 Oct 2017 1:01 PM     TASK REASSIGNED: Previously Assigned To Kaylee Marcano - 20 Oct 2017 3:40 PM     TASK IN PROGRESS   Leonor Penaloza - 24 Oct 2017 8:08 AM     TASK EDITED   Leonor Penaloza - 24 Oct 2017 11:30 AM     TASK EDITED  Addressed in today's ov with HA  Active Problems    1  Chronic pain syndrome (338 4) (G89 4)   2  Chronic prescription opiate use (V58 69) (Z79 891)   3  Dysesthesia of multiple sites (782 0) (R20 8)   4  Encounter for screening colonoscopy (V76 51) (Z12 11)   5  Encounter for screening mammogram for breast cancer (V76 12) (Z12 31)   6  Hematuria (599 70) (R31 9)   7  Hypertension (401 9) (I10)   8  Left shoulder pain (719 41) (M25 512)   9  Limb pain (729 5) (M79 609)   10  Lower back pain (724 2) (M54 5)   11  Lumbar facet arthropathy (721 3) (M12 88)   12  Memory difficulties (780 93) (R41 3)   13  Memory impairment (780 93) (R41 3)   14  Need for influenza vaccination (V04 81) (Z23)   15  Numbness (782 0) (R20 0)   16  Obstructive sleep apnea (327 23) (G47 33)   17  CATHRYN (obstructive sleep apnea) (327 23) (G47 33)   18  Postgastrectomy malabsorption (579 3) (K91 2,Z90 3)   19  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)   20   Rash (782 1) (R21)   21  Screening for malignant neoplasm of colon (V76 51) (Z12 11)   22  Sleep disturbances (780 50) (G47 9)   23  Snoring (786 09) (R06 83)   24  Twin birth (V27 9) (Z38 5)    Current Meds   1  D3-1000 CAPS; Take 1 capsule twice daily; Therapy: (Recorded:19Ult6815) to Recorded   2  DULoxetine HCl - 60 MG Oral Capsule Delayed Release Particles; take 1 capsule daily    Requested for: 02Jun2017; Last Rx:02Jun2017 Ordered   3  Iron TABS; TAKE TABLET Daily; Therapy: (Recorded:24Jun2016) to Recorded   4  Lidocaine 5 % External Ointment; apply to affected area BID PRN; Therapy: 98CGD7967 to (Last Rx:20Jan2016) Ordered   5  Lisinopril 10 MG Oral Tablet; Take 1 tablet daily as directed; Therapy: 64Rwy0681 to (Last Dareen Big)  Requested for: 23Aug2017 Ordered   6  Lyrica 100 MG Oral Capsule; take 1 capsule 4 times daily; Last Rx:02Jun2017 Ordered   7  Metoprolol Tartrate 25 MG Oral Tablet; TAKE ONE-HALF (1/2) TABLET TWICE A DAY; Therapy: 60Hiv4396 to (Last Dareen Big)  Requested for: 23Aug2017 Ordered   8  Multi-Vitamin TABS; TAKE 1 TABLET DAILY; Therapy: (Recorded:07Apr2017) to Recorded   9  Nucynta  MG Oral Tablet Extended Release 12 Hour; take 1 tab twice a day; Therapy: 52IGA9179 to (Evaluate:72Atp9420); Last Rx:20Sep2017 Ordered   10  OxyCODONE HCl - 5 MG Oral Tablet; take 1 tablet every 8 hours as needed for pain; Therapy: 64FIO3247 to (Evaluate:56Kxi7788); Last Rx:20Sep2017 Ordered   11  Pantoprazole Sodium 40 MG Oral Tablet Delayed Release; take 1 tablet twice a day; Therapy: 48RTJ7229 to (Last Dareen Big)  Requested for: 23Aug2017 Ordered    Allergies    1  Anesthesia IV Set MISC    2  Animal dander - Cats   3   Shellfish    Plan  Limb pain, Lower back pain    · Lidocaine 5 % External Ointment; apply to affected area BID PRN    Signatures   Electronically signed by : Tova Petty, ; Oct 24 2017 11:30AM EST                       (Author)

## 2018-01-17 NOTE — MISCELLANEOUS
Message   Recorded as Task   Date: 10/27/2017 12:56 PM, Created By: Barbie Valadez   Task Name: Med Renewal Request   Assigned To: [facility] es clinical,Team   Regarding Patient: Lashay Church, Status: In Progress   Comment:    CuevasGabrielle - 27 Oct 2017 12:56 PM     TASK CREATED  When she was in to see Haily Martinez Rx for Diclofenac Sodium was going to be sent to [facility] never went through can this please be sent pts cb# [phone] - 27 Oct 2017 2:04 PM     TASK EDITED    Looks like the diclofenac gel was recorded and not sent to the pharmacy  Can you please order? Thank you! Sana Isbell - 27 Oct 2017 4:20 PM     TASK REPLIED TO: Previously Assigned To Sana Isbell  Diclofenac gel  to be applied sparingly to affected areas once daily was ordered and sent to [facility] pharmacy  please advise patient   [facility] - 27 Oct 2017 4:49 PM     TASK EDITED    [facility] for pt that script was sent to pharmacy and to call with any questions or concerns  ************   Karla Moreno - 30 Oct 2017 9:25 AM     TASK EDITED  S/w pt and advised of same  Active Problems    1  Chronic pain syndrome (338 4) (G89 4)   2  Chronic prescription opiate use (V58 69) (Z79 891)   3  Dysesthesia of multiple sites (782 0) (R20 8)   4  Encounter for screening colonoscopy (V76 51) (Z12 11)   5  Encounter for screening mammogram for breast cancer (V76 12) (Z12 31)   6  Hematuria (599 70) (R31 9)   7  Hypertension (401 9) (I10)   8  Left shoulder pain (719 41) (M25 512)   9  Limb pain (729 5) (M79 609)   10  Lower back pain (724 2) (M54 5)   11  Lumbar facet arthropathy (721 3) (M12 88)   12  Memory difficulties (780 93) (R41 3)   13  Memory impairment (780 93) (R41 3)   14  Need for influenza vaccination (V04 81) (Z23)   15  Numbness (782 0) (R20 0)   16  Obstructive sleep apnea (327 23) (G47 33)   17  CATHRYN (obstructive sleep apnea) (327 23) (G47 33)   18  Postgastrectomy malabsorption (579 3) (K91 2,Z90 3)   19  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)   20  Rash (782 1) (R21)   21  Screening for malignant neoplasm of colon (V76 51) (Z12 11)   22  Sleep disturbances (780 50) (G47 9)   23  Snoring (786 09) (R06 83)   24  Twin birth (V27 9) (Z38 5)    Current Meds   1  D3-1000 CAPS; Take 1 capsule twice daily; Therapy: (Recorded:87Oxm0493) to Recorded   2  Diclofenac Sodium 1 % Transdermal Gel; APPLY SPARINGLY TO AFFECTED AREA(S)   ONCE DAILY  Requested for: 27Oct2017; Last Rx:27Oct2017 Ordered   3  DULoxetine HCl - 60 MG Oral Capsule Delayed Release Particles; take 1 capsule daily    Requested for: 02Jun2017; Last Rx:02Jun2017 Ordered   4  Iron TABS; TAKE TABLET Daily; Therapy: (Recorded:24Jun2016) to Recorded   5  Lidocaine 5 % External Ointment; apply to affected area BID PRN; Therapy: 86ZBA8838 to (Last Rx:24Oct2017)  Requested for: 24Oct2017 Ordered   6  Lisinopril 10 MG Oral Tablet; Take 1 tablet daily as directed; Therapy: 22Rvz1550 to (Last Erenest Mow)  Requested for: 57Soy2791 Ordered   7  Lyrica 100 MG Oral Capsule; take 1 capsule 4 times daily; Last Rx:02Jun2017 Ordered   8  Metoprolol Tartrate 25 MG Oral Tablet; TAKE ONE-HALF (1/2) TABLET TWICE A DAY; Therapy: 37Xlr2370 to (Last Erenest Mow)  Requested for: 97Bjw1686 Ordered   9  Multi-Vitamin TABS; TAKE 1 TABLET DAILY; Therapy: (Recorded:07Apr2017) to Recorded   10  Nucynta  MG Oral Tablet Extended Release 12 Hour; take 1 tab twice a day; Therapy: 47DPL8848 to (Evaluate:77Ufa3915); Last Rx:20Sep2017 Ordered   11  OxyCODONE HCl - 5 MG Oral Tablet; take 1 tablet every 8 hours as needed for pain; Therapy: 82WQZ8104 to (Evaluate:33Vbn6624); Last Rx:11Xaa0694 Ordered   12  Pantoprazole Sodium 40 MG Oral Tablet Delayed Release; take 1 tablet twice a day; Therapy: 27MBM4127 to (Last Erenest Mow)  Requested for: 73Axj7035 Ordered    Allergies    1  Anesthesia IV Set MISC    2  Animal dander - Cats   3   Shellfish    Signatures   Electronically signed by : Christine Lynn, ; Oct 30 2017  9:26AM EST                       (Author)

## 2018-01-22 VITALS
WEIGHT: 206 LBS | HEIGHT: 64 IN | DIASTOLIC BLOOD PRESSURE: 82 MMHG | BODY MASS INDEX: 35.17 KG/M2 | SYSTOLIC BLOOD PRESSURE: 124 MMHG | HEART RATE: 86 BPM | TEMPERATURE: 98.8 F

## 2018-01-23 VITALS
WEIGHT: 208.13 LBS | BODY MASS INDEX: 34.68 KG/M2 | HEART RATE: 70 BPM | SYSTOLIC BLOOD PRESSURE: 122 MMHG | TEMPERATURE: 99.6 F | RESPIRATION RATE: 16 BRPM | OXYGEN SATURATION: 95 % | DIASTOLIC BLOOD PRESSURE: 70 MMHG | HEIGHT: 65 IN

## 2018-01-24 VITALS
DIASTOLIC BLOOD PRESSURE: 86 MMHG | HEART RATE: 74 BPM | WEIGHT: 208 LBS | TEMPERATURE: 98.6 F | SYSTOLIC BLOOD PRESSURE: 130 MMHG | HEIGHT: 65 IN | BODY MASS INDEX: 34.66 KG/M2

## 2018-01-30 ENCOUNTER — HOSPITAL ENCOUNTER (EMERGENCY)
Facility: HOSPITAL | Age: 58
Discharge: HOME | End: 2018-01-30
Attending: EMERGENCY MEDICINE | Admitting: EMERGENCY MEDICINE
Payer: COMMERCIAL

## 2018-01-30 ENCOUNTER — APPOINTMENT (EMERGENCY)
Dept: CT IMAGING | Facility: HOSPITAL | Age: 58
End: 2018-01-30
Payer: COMMERCIAL

## 2018-01-30 VITALS
OXYGEN SATURATION: 96 % | WEIGHT: 209 LBS | RESPIRATION RATE: 17 BRPM | SYSTOLIC BLOOD PRESSURE: 116 MMHG | TEMPERATURE: 98.3 F | DIASTOLIC BLOOD PRESSURE: 60 MMHG | HEART RATE: 67 BPM | BODY MASS INDEX: 35.68 KG/M2 | HEIGHT: 64 IN

## 2018-01-30 DIAGNOSIS — S39.011A ABDOMINAL MUSCLE STRAIN, INITIAL ENCOUNTER: ICD-10-CM

## 2018-01-30 DIAGNOSIS — R10.12 LEFT UPPER QUADRANT PAIN: Primary | ICD-10-CM

## 2018-01-30 LAB
ALBUMIN SERPL BCP-MCNC: 3.5 G/DL (ref 3.5–5)
ALP SERPL-CCNC: 111 U/L (ref 46–116)
ALT SERPL W P-5'-P-CCNC: 17 U/L (ref 12–78)
ANION GAP SERPL CALCULATED.3IONS-SCNC: 5 MMOL/L (ref 4–13)
AST SERPL W P-5'-P-CCNC: 23 U/L (ref 5–45)
BACTERIA UR QL AUTO: ABNORMAL /HPF
BASOPHILS # BLD AUTO: 0.1 THOUSANDS/ΜL (ref 0–0.1)
BASOPHILS NFR BLD AUTO: 2 % (ref 0–1)
BILIRUB SERPL-MCNC: 0.4 MG/DL (ref 0.2–1)
BILIRUB UR QL STRIP: NEGATIVE
BUN SERPL-MCNC: 15 MG/DL (ref 5–25)
CALCIUM SERPL-MCNC: 9.1 MG/DL (ref 8.3–10.1)
CHLORIDE SERPL-SCNC: 105 MMOL/L (ref 100–108)
CLARITY UR: CLEAR
CO2 SERPL-SCNC: 26 MMOL/L (ref 21–32)
COLOR UR: YELLOW
CREAT SERPL-MCNC: 0.79 MG/DL (ref 0.6–1.3)
EOSINOPHIL # BLD AUTO: 0.13 THOUSAND/ΜL (ref 0–0.61)
EOSINOPHIL NFR BLD AUTO: 2 % (ref 0–6)
ERYTHROCYTE [DISTWIDTH] IN BLOOD BY AUTOMATED COUNT: 12.9 % (ref 11.6–15.1)
GFR SERPL CREATININE-BSD FRML MDRD: 82 ML/MIN/1.73SQ M
GLUCOSE SERPL-MCNC: 95 MG/DL (ref 65–140)
GLUCOSE UR STRIP-MCNC: NEGATIVE MG/DL
HCT VFR BLD AUTO: 44.3 % (ref 34.8–46.1)
HGB BLD-MCNC: 13.6 G/DL (ref 11.5–15.4)
HGB UR QL STRIP.AUTO: ABNORMAL
KETONES UR STRIP-MCNC: NEGATIVE MG/DL
LEUKOCYTE ESTERASE UR QL STRIP: NEGATIVE
LIPASE SERPL-CCNC: 145 U/L (ref 73–393)
LYMPHOCYTES # BLD AUTO: 2 THOUSANDS/ΜL (ref 0.6–4.47)
LYMPHOCYTES NFR BLD AUTO: 31 % (ref 14–44)
MCH RBC QN AUTO: 29.7 PG (ref 26.8–34.3)
MCHC RBC AUTO-ENTMCNC: 30.7 G/DL (ref 31.4–37.4)
MCV RBC AUTO: 97 FL (ref 82–98)
MONOCYTES # BLD AUTO: 0.42 THOUSAND/ΜL (ref 0.17–1.22)
MONOCYTES NFR BLD AUTO: 6 % (ref 4–12)
NEUTROPHILS # BLD AUTO: 3.85 THOUSANDS/ΜL (ref 1.85–7.62)
NEUTS SEG NFR BLD AUTO: 59 % (ref 43–75)
NITRITE UR QL STRIP: NEGATIVE
NON-SQ EPI CELLS URNS QL MICRO: ABNORMAL /HPF
NRBC BLD AUTO-RTO: 0 /100 WBCS
PH UR STRIP.AUTO: 6.5 [PH] (ref 4.5–8)
PLATELET # BLD AUTO: 203 THOUSANDS/UL (ref 149–390)
PMV BLD AUTO: 11 FL (ref 8.9–12.7)
POTASSIUM SERPL-SCNC: 5 MMOL/L (ref 3.5–5.3)
PROT SERPL-MCNC: 7.1 G/DL (ref 6.4–8.2)
PROT UR STRIP-MCNC: NEGATIVE MG/DL
RBC # BLD AUTO: 4.58 MILLION/UL (ref 3.81–5.12)
RBC #/AREA URNS AUTO: ABNORMAL /HPF
SODIUM SERPL-SCNC: 136 MMOL/L (ref 136–145)
SP GR UR STRIP.AUTO: 1.02 (ref 1–1.03)
UROBILINOGEN UR QL STRIP.AUTO: 1 E.U./DL
WBC # BLD AUTO: 6.52 THOUSAND/UL (ref 4.31–10.16)
WBC #/AREA URNS AUTO: ABNORMAL /HPF

## 2018-01-30 PROCEDURE — 99284 EMERGENCY DEPT VISIT MOD MDM: CPT

## 2018-01-30 PROCEDURE — 96374 THER/PROPH/DIAG INJ IV PUSH: CPT

## 2018-01-30 PROCEDURE — 96361 HYDRATE IV INFUSION ADD-ON: CPT

## 2018-01-30 PROCEDURE — 74177 CT ABD & PELVIS W/CONTRAST: CPT

## 2018-01-30 PROCEDURE — 83690 ASSAY OF LIPASE: CPT | Performed by: EMERGENCY MEDICINE

## 2018-01-30 PROCEDURE — 81001 URINALYSIS AUTO W/SCOPE: CPT | Performed by: EMERGENCY MEDICINE

## 2018-01-30 PROCEDURE — 36415 COLL VENOUS BLD VENIPUNCTURE: CPT

## 2018-01-30 PROCEDURE — 80053 COMPREHEN METABOLIC PANEL: CPT | Performed by: EMERGENCY MEDICINE

## 2018-01-30 PROCEDURE — 85025 COMPLETE CBC W/AUTO DIFF WBC: CPT | Performed by: EMERGENCY MEDICINE

## 2018-01-30 RX ORDER — TRAMADOL HYDROCHLORIDE 50 MG/1
50 TABLET ORAL EVERY 6 HOURS PRN
Qty: 10 TABLET | Refills: 0 | Status: SHIPPED | OUTPATIENT
Start: 2018-01-30 | End: 2018-02-09

## 2018-01-30 RX ORDER — NAPROXEN 500 MG/1
500 TABLET ORAL EVERY 12 HOURS PRN
Qty: 20 TABLET | Refills: 0 | Status: SHIPPED | OUTPATIENT
Start: 2018-01-30 | End: 2018-06-21

## 2018-01-30 RX ADMIN — IOHEXOL 50 ML: 240 INJECTION, SOLUTION INTRATHECAL; INTRAVASCULAR; INTRAVENOUS; ORAL at 20:44

## 2018-01-30 RX ADMIN — HYDROMORPHONE HYDROCHLORIDE 1 MG: 1 INJECTION, SOLUTION INTRAMUSCULAR; INTRAVENOUS; SUBCUTANEOUS at 18:49

## 2018-01-30 RX ADMIN — IOHEXOL 100 ML: 350 INJECTION, SOLUTION INTRAVENOUS at 20:43

## 2018-01-30 RX ADMIN — SODIUM CHLORIDE 1000 ML: 0.9 INJECTION, SOLUTION INTRAVENOUS at 18:49

## 2018-01-30 NOTE — ED PROVIDER NOTES
History  Chief Complaint   Patient presents with    Abdominal Injury     Pt states she bent down on Thursday while cleaning and when she stood up she felt a pinch in her LUQ/LLQ  Pt denies any N/V/D     Patient is a 57-year-old female with past medical and surgical history significant for chronic pain secondary to complications related to prior hysterectomy, opioid dependence, GERD, hypertension, cholecystectomy, laparoscopic Venessa-en-Y gastric bypass, presents to the emergency department complaining of left upper quadrant pain that started on Thursday when she was cleaning  Patient states she bent down to reach for something and felt an acute pain in her left upper quadrant  She states it felt like something popped  She reports the pain has been fairly constant since then and radiates to around to her left flank  She states movement exacerbates the pain and it is somewhat alleviated when she is lying still or holding her left upper quadrant  She denies any other associated symptoms  No fever, chills, headache, dizziness or near syncope, URI symptoms, cough, chest pain, palpitations, shortness of breath, diaphoresis, visual disturbance, nausea, vomiting, diarrhea, constipation, blood per rectum or melena, dysuria, frequency, hematuria, skin rash or color change, extremity weakness or paresthesia or other focal neurologic deficits  History provided by:  Patient   used: No        None       Past Medical History:   Diagnosis Date    Chronic pain        Past Surgical History:   Procedure Laterality Date    CHOLECYSTECTOMY      GASTRIC BYPASS      HYSTERECTOMY  2012       History reviewed  No pertinent family history  I have reviewed and agree with the history as documented      Social History   Substance Use Topics    Smoking status: Never Smoker    Smokeless tobacco: Never Used    Alcohol use No        Review of Systems   Constitutional: Negative for chills, diaphoresis and fever    HENT: Negative for congestion, ear pain, rhinorrhea and sore throat  Eyes: Negative for pain and visual disturbance  Respiratory: Negative for cough, chest tightness, shortness of breath and wheezing  Cardiovascular: Negative for chest pain and palpitations  Gastrointestinal: Positive for abdominal pain  Negative for abdominal distention, blood in stool, constipation, diarrhea, nausea and vomiting  Genitourinary: Positive for flank pain  Negative for difficulty urinating, dysuria, frequency and hematuria  Musculoskeletal: Negative for back pain, neck pain and neck stiffness  Skin: Negative for color change, pallor and rash  Allergic/Immunologic: Negative for immunocompromised state  Neurological: Negative for dizziness, syncope, weakness, light-headedness, numbness and headaches  Hematological: Negative for adenopathy  Psychiatric/Behavioral: Negative for confusion and decreased concentration  All other systems reviewed and are negative  Physical Exam  ED Triage Vitals   Temperature Pulse Respirations Blood Pressure SpO2   01/30/18 1522 01/30/18 1521 01/30/18 1521 01/30/18 1521 01/30/18 1521   98 3 °F (36 8 °C) 73 20 120/63 99 %      Temp Source Heart Rate Source Patient Position - Orthostatic VS BP Location FiO2 (%)   01/30/18 1522 01/30/18 1521 01/30/18 1521 01/30/18 1521 --   Oral Monitor Sitting Left arm       Pain Score       01/30/18 1521       Worst Possible Pain         Vitals:    01/30/18 1521 01/30/18 1522 01/30/18 1905   BP: 120/63  116/60   BP Location: Left arm  Left arm   Pulse: 73  67   Resp: 20  17   Temp:  98 3 °F (36 8 °C)    TempSrc:  Oral    SpO2: 99%  96%   Weight: 94 8 kg (209 lb)     Height: 5' 4" (1 626 m)       Physical Exam   Constitutional: She is oriented to person, place, and time  She appears well-developed and well-nourished  No distress  HENT:   Head: Normocephalic and atraumatic     Mouth/Throat: Oropharynx is clear and moist  No oropharyngeal exudate  Eyes: Conjunctivae and EOM are normal  Pupils are equal, round, and reactive to light  Neck: Normal range of motion  Neck supple  No JVD present  Cardiovascular: Normal rate, regular rhythm, normal heart sounds and intact distal pulses  Exam reveals no gallop and no friction rub  No murmur heard  Pulmonary/Chest: Effort normal and breath sounds normal  No respiratory distress  She has no wheezes  She has no rales  She exhibits no tenderness  Abdominal: Soft  Bowel sounds are normal  She exhibits no distension  There is tenderness  There is no rebound and no guarding  Tenderness noted in the left upper quadrant  No CVA tenderness  Musculoskeletal: Normal range of motion  She exhibits no edema or tenderness  Lymphadenopathy:     She has no cervical adenopathy  Neurological: She is alert and oriented to person, place, and time  No cranial nerve deficit  No gross motor or sensory deficits  Skin: Skin is warm and dry  No rash noted  She is not diaphoretic  No erythema  No pallor  Psychiatric: She has a normal mood and affect  Her behavior is normal    Nursing note and vitals reviewed        ED Medications  Medications   HYDROmorphone (DILAUDID) injection 1 mg (not administered)   sodium chloride 0 9 % bolus 1,000 mL (1,000 mL Intravenous New Bag 1/30/18 1849)   HYDROmorphone (DILAUDID) injection 1 mg (1 mg Intravenous Given 1/30/18 1849)   iohexol (OMNIPAQUE) 240 MG/ML solution 50 mL (50 mL Oral Given 1/30/18 2044)   iohexol (OMNIPAQUE) 350 MG/ML injection (MULTI-DOSE) 100 mL (100 mL Intravenous Given 1/30/18 2043)       Diagnostic Studies  Results Reviewed     Procedure Component Value Units Date/Time    Urine Microscopic [[id number]]  (Abnormal) Collected:  01/30/18 1903    Lab Status:  Final result Specimen:  Urine from Urine, Clean Catch Updated:  01/30/18 1932     RBC, UA 10-20 (A) /hpf      WBC, UA 0-1 (A) /hpf      Epithelial Cells Occasional /hpf      Bacteria, UA Occasional /hpf UA w Reflex to Microscopic [[id number]]  (Abnormal) Collected:  01/30/18 1903    Lab Status:  Final result Specimen:  Urine from Urine, Clean Catch Updated:  01/30/18 1921     Color, UA Yellow     Clarity, UA Clear     Specific [place], UA 1 020     pH, UA 6 5     Leukocytes, UA Negative     Nitrite, UA Negative     Protein, UA Negative mg/dl      Glucose, UA Negative mg/dl      Ketones, UA Negative mg/dl      Urobilinogen, UA 1 0 E U /dl      Bilirubin, UA Negative     Blood, UA Moderate (A)    Lipase [[id number]]  (Normal) Collected:  01/30/18 1804    Lab Status:  Final result Specimen:  Blood from Arm, Right Updated:  01/30/18 1849     Lipase 145 u/L     Comprehensive metabolic panel [[id number]] Collected:  01/30/18 1804    Lab Status:  Final result Specimen:  Blood from Arm, Right Updated:  01/30/18 1830     Sodium 136 mmol/L      Potassium 5 0 mmol/L      Chloride 105 mmol/L      CO2 26 mmol/L      Anion Gap 5 mmol/L      BUN 15 mg/dL      Creatinine 0 79 mg/dL      Glucose 95 mg/dL      Calcium 9 1 mg/dL      AST 23 U/L      ALT 17 U/L      Alkaline Phosphatase 111 U/L      Total Protein 7 1 g/dL      Albumin 3 5 g/dL      Total Bilirubin 0 40 mg/dL      eGFR 82 ml/min/1 73sq m     Narrative:         National Kidney Disease Education Program recommendations are as follows:  GFR calculation is accurate only with a steady state creatinine  Chronic Kidney disease less than 60 ml/min/1 73 sq  meters  Kidney failure less than 15 ml/min/1 73 sq  meters      CBC and differential [[id number]]  (Abnormal) Collected:  01/30/18 1804    Lab Status:  Final result Specimen:  Blood from Arm, Right Updated:  01/30/18 1816     WBC 6 52 Thousand/uL      RBC 4 58 Million/uL      Hemoglobin 13 6 g/dL      Hematocrit 44 3 %      MCV 97 fL      MCH 29 7 pg      MCHC 30 7 (L) g/dL      RDW 12 9 %      MPV 11 0 fL      Platelets 741 Thousands/uL      nRBC 0 /100 WBCs      Neutrophils Relative 59 %      Lymphocytes Relative 31 % Monocytes Relative 6 %      Eosinophils Relative 2 %      Basophils Relative 2 (H) %      Neutrophils Absolute 3 85 Thousands/µL      Lymphocytes Absolute 2 00 Thousands/µL      Monocytes Absolute 0 42 Thousand/µL      Eosinophils Absolute 0 13 Thousand/µL      Basophils Absolute 0 10 Thousands/µL                  CT abdomen pelvis with contrast   Final Result by Elmira Dubois MD (01/30 2102)         1  No evidence of bowel obstruction, colitis or diverticulitis  Moderate to large amount of stool throughout the colon may indicate constipation  2  No pyelonephritis or obstructive uropathy  Workstation performed: [id number]                    Procedures  Procedures       Phone Contacts  ED Phone Contact    ED Course  ED Course                                MDM  Number of Diagnoses or Management Options  Diagnosis management comments: Acute onset left upper quadrant pain after bending, worse with movement  Most likely patient has acute abdominal muscle strain  Differential also includes complication related to prior gastric bypass surgery such as tearing or gastric perforation, colitis or diverticulitis, pancreatitis, kidney stone or pyelonephritis  Will check urine, abdominal labs and obtain a CT scan of the abdomen and pelvis  If CT and workup unremarkable, most likely this is abdominal wall strain  Will give IV fluid bolus and Dilaudid for pain         Amount and/or Complexity of Data Reviewed  Clinical lab tests: ordered and reviewed  Tests in the radiology section of CPT®: ordered and reviewed  Independent visualization of images, tracings, or specimens: yes      CritCare Time    Disposition  Final diagnoses:   Left upper quadrant pain   Abdominal muscle strain, initial encounter     Time reflects when diagnosis was documented in both MDM as applicable and the Disposition within this note     Time User Action Codes Description Comment    1/30/2018  9:11 PM Kemar RUANO Add [R10 12] Left upper quadrant pain     1/30/2018  9:11 PM Elvis Kimbrough [S39 011A] Abdominal muscle strain, initial encounter       ED Disposition     ED Disposition Condition Comment    Discharge  Terrell 6 discharge to home/self care  Condition at discharge: Stable        Follow-up Information     Follow up With Specialties Details Why Contact Info Additional Information    Sylvia Putnam, [facility] Schedule an appointment as soon as possible for a visit  [address] Emergency Department Emergency Medicine Go to If symptoms worsen [address] [facility] [address]  [phone] [place] ED, [address], [place], [address]        Patient's Medications   Discharge Prescriptions    NAPROXEN (NAPROSYN) 500 MG TABLET    Take 1 tablet (500 mg total) by mouth every 12 (twelve) hours as needed for mild pain or moderate pain       Start Date: 1/30/2018 End Date: --       Order Dose: 500 mg       Quantity: 20 tablet    Refills: 0    TRAMADOL (ULTRAM) 50 MG TABLET    Take 1 tablet (50 mg total) by mouth every 6 (six) hours as needed for severe pain for up to 10 days       Start Date: 1/30/2018 End Date: 2/9/2018       Order Dose: 50 mg       Quantity: 10 tablet    Refills: 0     No discharge procedures on file      ED Provider  Electronically Signed by           Mervat Ling DO  01/30/18 8977

## 2018-01-31 NOTE — DISCHARGE INSTRUCTIONS
Abdominal Pain   WHAT YOU NEED TO KNOW:   Abdominal pain can be dull, achy, or sharp  You may have pain in one area of your abdomen, or in your entire abdomen  Your pain may be caused by a condition such as constipation, food sensitivity or poisoning, infection, or a blockage  Abdominal pain can also be from a hernia, appendicitis, or an ulcer  Liver, gallbladder, or kidney conditions can also cause abdominal pain  The cause of your abdominal pain may be unknown  DISCHARGE INSTRUCTIONS:   Return to the emergency department if:   · You have new chest pain or shortness of breath  · You have pulsing pain in your upper abdomen or lower back that suddenly becomes constant  · Your pain is in the right lower abdominal area and worsens with movement  · You have a fever over 100 4°F (38°C) or shaking chills  · You are vomiting and cannot keep food or liquids down  · Your pain does not improve or gets worse over the next 8 to 12 hours  · You see blood in your vomit or bowel movements, or they look black and tarry  · Your skin or the whites of your eyes turn yellow  · You are a woman and have a large amount of vaginal bleeding that is not your monthly period  Contact your healthcare provider if:   · You have pain in your lower back  · You are a man and have pain in your testicles  · You have pain when you urinate  · You have questions or concerns about your condition or care  Follow up with your healthcare provider within 24 hours or as directed:  Write down your questions so you remember to ask them during your visits  Medicines:   · Medicines  may be given to calm your stomach and prevent vomiting or to decrease pain  Ask how to take pain medicine safely  · Take your medicine as directed  Contact your healthcare provider if you think your medicine is not helping or if you have side effects  Tell him of her if you are allergic to any medicine   Keep a list of the medicines, vitamins, and herbs you take  Include the amounts, and when and why you take them  Bring the list or the pill bottles to follow-up visits  Carry your medicine list with you in case of an emergency  © 2017 [employer] Information is for End User's use only and may not be sold, redistributed or otherwise used for commercial purposes  All illustrations and images included in CareNotes® are the copyrighted property of [employer]  or [facility]  The above information is an  only  It is not intended as medical advice for individual conditions or treatments  Talk to your doctor, nurse or pharmacist before following any medical regimen to see if it is safe and effective for you  Muscle Strain   WHAT YOU NEED TO KNOW:   A muscle strain is a twist, pull, or tear of a muscle or tendon  A tendon is a strong elastic tissue that connects a muscle to a bone  Signs of a strained muscle include bruising and swelling over the area, pain with movement, and loss of strength  DISCHARGE INSTRUCTIONS:   Seek care immediately or call 911 if:   · You suddenly cannot feel or move your injured muscle  Contact your healthcare provider if:   · Your pain and swelling worsen or do not go away  · You have questions or concerns about your condition or care  Medicines:   · NSAIDs , such as ibuprofen, help decrease swelling, pain, and fever  This medicine is available with or without a doctor's order  NSAIDs can cause stomach bleeding or kidney problems in certain people  If you take blood thinner medicine, always ask your healthcare provider if NSAIDs are safe for you  Always read the medicine label and follow directions  · Muscle relaxers  help decrease pain and muscle spasms  · Take your medicine as directed  Contact your healthcare provider if you think your medicine is not helping or if you have side effects  Tell him or her if you are allergic to any medicine  Keep a list of the medicines, vitamins, and herbs you take  Include the amounts, and when and why you take them  Bring the list or the pill bottles to follow-up visits  Carry your medicine list with you in case of an emergency  Follow up with your healthcare provider as directed: Your healthcare provider may suggest that you have a follow-up visit before you go back to your usual activity  Write down your questions so you remember to ask them during your visits  Self-care:   · 3 to 7 days after the injury:  Use Rest, Ice, Compression, and Elevation (RICE) to help stop bruising and decrease pain and swelling  ¨ Rest:  Rest your muscle to allow your injury to heal  When the pain decreases, begin normal, slow movements  For mild and moderate muscle strains, you should rest your muscles for about 2 days  However, if you have a severe muscle strain, you should rest for 10 to 14 days  You may need to use crutches to walk if your muscle strain is in your legs or lower body  ¨ Ice:  Put an ice pack on the injured area  Put a towel between the ice pack and your skin  Do not put the ice pack directly on your skin  You can use a package of frozen peas instead of an ice pack  ¨ Compression:  You may need to wrap an elastic bandage around the area to decrease swelling  It should be tight enough for you to feel support  Do not wrap it too tightly  ¨ Elevation:  Keep the injured muscle raised above your heart if possible  For example, if you have a strain of your lower leg muscle, lie down and prop your leg up on pillows  This helps decrease pain and swelling  · 3 to 21 days after the injury:  Start to slowly and regularly exercise your strained muscle  This will help it heal  If you feel pain, decrease how hard you are exercising  · 1 to 6 weeks after the injury:  Stretch the injured muscle  Hold the stretch for about 30 seconds  Do this 4 times a day   You may stretch the muscle until you feel a slight pull  Stop stretching if you feel pain  · 2 weeks to 6 months after the injury:  The goal of this phase is to return to the activity you were doing before the injury happened, without hurting the muscle again  · 3 weeks to 6 months after the injury:  Keep stretching and strengthening your muscles to avoid injury  Slowly increase the time and distance that you exercise  You may still have signs and symptoms of muscle strain 6 months after the injury, even if you do things to help it heal  In this case, you may need surgery on the muscle  Prevent muscle strains:   · Always wear proper shoes when you play sports:  Replace your old running shoes with new ones often if you are a runner  Use special shoe inserts or arch supports to correct leg or foot problems  Ask your healthcare provider for more information on shoe supports  · Do warm up and cool down exercises:  Do stretching exercises before you work out or do sports activities  These exercises will help loosen and decrease stress on your muscles  Cool down and stretch after your workout  Do not stop and rest after a workout without cooling down first            · Keep your muscles strong with strength training exercises:  Exercises such as weight lifting and stretching exercises help keep your muscles flexible and strong  A physical therapist or  may help you with these exercises  · Slowly start your exercise or sports training program:  Follow your healthcare provider's advice on when to start exercising  Slowly increase time, distance, and how often you train  Sudden increases in how often you train may cause you to injure your muscle again  © 2017 [employer] Information is for End User's use only and may not be sold, redistributed or otherwise used for commercial purposes  All illustrations and images included in CareNotes® are the copyrighted property of [employer]  or [employer]    The above information is an  only  It is not intended as medical advice for individual conditions or treatments  Talk to your doctor, nurse or pharmacist before following any medical regimen to see if it is safe and effective for you

## 2018-02-01 ENCOUNTER — TELEPHONE (OUTPATIENT)
Dept: PAIN MEDICINE | Facility: CLINIC | Age: 58
End: 2018-02-01

## 2018-02-01 ENCOUNTER — TELEPHONE (OUTPATIENT)
Dept: FAMILY MEDICINE CLINIC | Facility: CLINIC | Age: 58
End: 2018-02-01

## 2018-02-01 ENCOUNTER — TRANSITIONAL CARE MANAGEMENT (OUTPATIENT)
Dept: FAMILY MEDICINE CLINIC | Facility: CLINIC | Age: 58
End: 2018-02-01

## 2018-02-01 NOTE — TELEPHONE ENCOUNTER
Lucero Easton from [facility] called for a pre auth for Diclofenac gel   It's not covered under the insurance Please call [phone]

## 2018-02-01 NOTE — TELEPHONE ENCOUNTER
----- Message from [address] [place] [address]  sent at 1/31/2018  1:02 PM EST -----  Needs indigo please

## 2018-02-06 ENCOUNTER — TELEPHONE (OUTPATIENT)
Dept: FAMILY MEDICINE CLINIC | Facility: CLINIC | Age: 58
End: 2018-02-06

## 2018-02-06 NOTE — TELEPHONE ENCOUNTER
Called patient and n/a still so I called her emergency contact her  and he n/a  I lmov for her to call us back     ----- Message from [address] [place] [address]  sent at 1/31/2018  1:02 PM EST -----  Needs indigo please

## 2018-02-14 ENCOUNTER — APPOINTMENT (OUTPATIENT)
Dept: LAB | Facility: CLINIC | Age: 58
End: 2018-02-14
Payer: COMMERCIAL

## 2018-02-14 DIAGNOSIS — I10 ESSENTIAL (PRIMARY) HYPERTENSION: ICD-10-CM

## 2018-02-14 DIAGNOSIS — E78.5 HYPERLIPIDEMIA: ICD-10-CM

## 2018-02-14 LAB
CHOLEST SERPL-MCNC: 227 MG/DL (ref 50–200)
HDLC SERPL-MCNC: 88 MG/DL (ref 40–60)
LDLC SERPL CALC-MCNC: 127 MG/DL (ref 0–100)
TRIGL SERPL-MCNC: 62 MG/DL

## 2018-02-14 PROCEDURE — 36415 COLL VENOUS BLD VENIPUNCTURE: CPT

## 2018-02-14 PROCEDURE — 80061 LIPID PANEL: CPT

## 2018-02-20 ENCOUNTER — OFFICE VISIT (OUTPATIENT)
Dept: FAMILY MEDICINE CLINIC | Facility: CLINIC | Age: 58
End: 2018-02-20
Payer: COMMERCIAL

## 2018-02-20 VITALS
HEART RATE: 70 BPM | OXYGEN SATURATION: 97 % | BODY MASS INDEX: 36.37 KG/M2 | RESPIRATION RATE: 16 BRPM | HEIGHT: 64 IN | DIASTOLIC BLOOD PRESSURE: 72 MMHG | SYSTOLIC BLOOD PRESSURE: 126 MMHG | WEIGHT: 213 LBS | TEMPERATURE: 99.6 F

## 2018-02-20 DIAGNOSIS — M79.2 NEURALGIA: ICD-10-CM

## 2018-02-20 DIAGNOSIS — M79.604 RIGHT LEG PAIN: Primary | ICD-10-CM

## 2018-02-20 PROBLEM — N39.45 CONTINUOUS LEAKAGE OF URINE: Status: ACTIVE | Noted: 2018-02-20

## 2018-02-20 PROCEDURE — 99214 OFFICE O/P EST MOD 30 MIN: CPT | Performed by: FAMILY MEDICINE

## 2018-02-20 RX ORDER — LISINOPRIL 10 MG/1
1 TABLET ORAL DAILY
COMMUNITY
Start: 2017-08-23 | End: 2018-03-12 | Stop reason: SDUPTHER

## 2018-02-20 RX ORDER — NALOXONE HYDROCHLORIDE 4 MG/.1ML
SPRAY NASAL
COMMUNITY
Start: 2018-01-09 | End: 2018-07-25

## 2018-02-20 RX ORDER — PANTOPRAZOLE SODIUM 40 MG/1
1 TABLET, DELAYED RELEASE ORAL 2 TIMES DAILY
COMMUNITY
Start: 2016-10-24 | End: 2018-03-12 | Stop reason: SDUPTHER

## 2018-02-20 RX ORDER — DULOXETIN HYDROCHLORIDE 60 MG/1
1 CAPSULE, DELAYED RELEASE ORAL DAILY
COMMUNITY
End: 2018-03-12 | Stop reason: SDUPTHER

## 2018-02-20 RX ORDER — PREGABALIN 100 MG/1
1 CAPSULE ORAL 4 TIMES DAILY
COMMUNITY
End: 2018-02-27

## 2018-02-20 RX ORDER — LIDOCAINE 50 MG/G
OINTMENT TOPICAL 2 TIMES DAILY PRN
COMMUNITY
Start: 2016-01-20 | End: 2022-01-14 | Stop reason: SDUPTHER

## 2018-02-20 RX ORDER — MOMETASONE FUROATE 1 MG/G
CREAM TOPICAL DAILY PRN
COMMUNITY
Start: 2017-12-19 | End: 2019-11-13 | Stop reason: ALTCHOICE

## 2018-02-20 RX ORDER — MULTIVITAMIN
1 TABLET ORAL DAILY
COMMUNITY

## 2018-02-20 RX ORDER — OXYCODONE HYDROCHLORIDE 5 MG/1
TABLET ORAL
COMMUNITY
Start: 2016-01-20 | End: 2018-02-27 | Stop reason: SDUPTHER

## 2018-02-20 RX ORDER — IRON,CARBONYL/ASCORBIC ACID 100-250 MG
TABLET ORAL DAILY
COMMUNITY

## 2018-02-20 NOTE — PATIENT INSTRUCTIONS
Speak with pain management regarding your concerns regarding the Lyrica and the muscular pain in the right leg  Continue your current medicines  At this point I would consider a trip to the 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

## 2018-02-20 NOTE — ASSESSMENT & PLAN NOTE
Speak with pain management regarding the pain in the right leg  Continue on your current medicines  Since this pain problem has been since your hysterectomy and has not gotten any better you may want to get a 2nd opinion with the [facility] or with the [facility]

## 2018-02-20 NOTE — PROGRESS NOTES
Assessment/Plan:     Chronic Problems:  Neuralgia   Speak with pain management regarding the pain in the right leg  Continue on your current medicines  Since this pain problem has been since your hysterectomy and has not gotten any better you may want to get a 2nd opinion with the [facility] or with the [facility]  Visit Diagnosis:  Diagnoses and all orders for this visit:    Right leg pain  Comments:    Keep the follow-up with your pain specialist   Continue the current medications your on  Try to find a position of comfort  Neuralgia    Other orders  -     Cholecalciferol (D3-1000) 1000 units capsule; Take 1 capsule by mouth 2 (two) times a day  -     diclofenac sodium (VOLTAREN) 1 %; Place on the skin daily  -     DULoxetine (CYMBALTA) 60 mg delayed release capsule; Take 1 capsule by mouth daily  -     Iron-Vitamin C (IRON 100/C) 100-250 MG TABS; Take by mouth daily  -     lidocaine (XYLOCAINE) 5 % ointment; Apply topically 2 (two) times a day as needed  -     lisinopril (ZESTRIL) 10 mg tablet; Take 1 tablet by mouth daily  -     pregabalin (LYRICA) 100 mg capsule; Take 1 capsule by mouth 4 (four) times a day  -     metoprolol tartrate (LOPRESSOR) 25 mg tablet; Take 0 5 tablets by mouth 2 (two) times a day  -     mometasone (ELOCON) 0 1 % cream; Apply topically daily  -     Multiple Vitamin (MULTI-VITAMIN DAILY) TABS; Take 1 tablet by mouth daily  -     Naloxone HCl (NARCAN) 4 MG/0 1ML LIQD; into each nostril  -     tapentadol hcl er (NUCYNTA ER) 12 hr tablet 150 mg; Take 1 tablet by mouth 2 (two) times a day  -     oxyCODONE (ROXICODONE) 5 mg immediate release tablet; Take by mouth  -     pantoprazole (PROTONIX) 40 mg tablet; Take 1 tablet by mouth 2 (two) times a day          Subjective:    Patient ID: Nikki Perera is a 59 y o  female  Pt is here for er f/u  Went to the er for excrutiating left sided abdominal pain  Bent over and felt her "flab" catch under her ribs  Could not move   Had a ct scan and told it was probably muscular  Pain is now resolved  Also was having pains in the muscles in the right leg, but she usually has pains on the left side  Could not put any weight on the right leg  Pain was excrutiating  Now has excrutiating pain in the right leg and now using a cane to help her stand  Pain goes from the lateral thigh to the right knee  Sees pain management next week  No pain when sitting  Hard to weight bear  Takes all other meds as directed  No side effects noted  The following portions of the patient's history were reviewed and updated as appropriate: allergies, current medications, past family history, past medical history, past social history, past surgical history and problem list     Review of Systems   Constitutional: Negative for chills and fever  HENT: Negative for ear pain, postnasal drip and sore throat  Eyes: Negative for pain and visual disturbance  Respiratory: Negative  Cardiovascular: Negative for chest pain, palpitations and leg swelling  Gastrointestinal: Negative for abdominal pain (  Feels the abdominal painHas completely resolved since the ER ), constipation, diarrhea, nausea and vomiting  Endocrine: Negative for cold intolerance, heat intolerance and polyuria  Genitourinary: Positive for frequency  Negative for dysuria and urgency  Patient feels she can stimulate urination with just water running in the room  Needs to wear a pad  Musculoskeletal: Positive for myalgias (No history of any trauma  has her usualPains in the left lateral thigh groin pain numbness in the in side of her left thigh and throbbing pain down to the knee  also still with her pudendal neuralgia )  Negative for arthralgias and gait problem  Skin: Negative for pallor and rash  Neurological: Negative for dizziness, tremors, light-headedness and numbness  Psychiatric/Behavioral: Negative for dysphoric mood and suicidal ideas  The patient is nervous/anxious  /72   Pulse 70   Temp 99 6 °F (37 6 °C)   Resp 16   Ht 5' 4" (1 626 m)   Wt 96 6 kg (213 lb)   SpO2 97%   BMI 36 56 kg/m²   Social History     Social History    Marital status: /Civil Union     Spouse name: N/A    Number of children: N/A    Years of education: N/A     Occupational History    Not on file  Social History Main Topics    Smoking status: Never Smoker    Smokeless tobacco: Never Used    Alcohol use No    Drug use: No    Sexual activity: Not on file     Other Topics Concern    Not on file     Social History Narrative    No narrative on file     Past Medical History:   Diagnosis Date    Chronic pain      No family history on file    Past Surgical History:   Procedure Laterality Date    CHOLECYSTECTOMY      GASTRIC BYPASS      HYSTERECTOMY  2012       Current Outpatient Prescriptions:     Cholecalciferol (D3-1000) 1000 units capsule, Take 1 capsule by mouth 2 (two) times a day, Disp: , Rfl:     diclofenac sodium (VOLTAREN) 1 %, Place on the skin daily, Disp: , Rfl:     DULoxetine (CYMBALTA) 60 mg delayed release capsule, Take 1 capsule by mouth daily, Disp: , Rfl:     Iron-Vitamin C (IRON 100/C) 100-250 MG TABS, Take by mouth daily, Disp: , Rfl:     lidocaine (XYLOCAINE) 5 % ointment, Apply topically 2 (two) times a day as needed, Disp: , Rfl:     lisinopril (ZESTRIL) 10 mg tablet, Take 1 tablet by mouth daily, Disp: , Rfl:     metoprolol tartrate (LOPRESSOR) 25 mg tablet, Take 0 5 tablets by mouth 2 (two) times a day, Disp: , Rfl:     mometasone (ELOCON) 0 1 % cream, Apply topically daily, Disp: , Rfl:     Multiple Vitamin (MULTI-VITAMIN DAILY) TABS, Take 1 tablet by mouth daily, Disp: , Rfl:     Naloxone HCl (NARCAN) 4 MG/0 1ML LIQD, into each nostril, Disp: , Rfl:     naproxen (NAPROSYN) 500 mg tablet, Take 1 tablet (500 mg total) by mouth every 12 (twelve) hours as needed for mild pain or moderate pain, Disp: 20 tablet, Rfl: 0    oxyCODONE (ROXICODONE) 5 mg immediate release tablet, Take by mouth, Disp: , Rfl:     pantoprazole (PROTONIX) 40 mg tablet, Take 1 tablet by mouth 2 (two) times a day, Disp: , Rfl:     pregabalin (LYRICA) 100 mg capsule, Take 1 capsule by mouth 4 (four) times a day, Disp: , Rfl:     tapentadol hcl er (NUCYNTA ER) 12 hr tablet 150 mg, Take 1 tablet by mouth 2 (two) times a day, Disp: , Rfl:     CBC:     Results from last 6 Months  Lab Units 01/30/18  1804   WBC Thousand/uL 6 52   RBC Million/uL 4 58   HEMOGLOBIN g/dL 13 6   HEMATOCRIT % 44 3   MCV fL 97   MCH pg 29 7   MCHC g/dL 30 7*   RDW % 12 9   MPV fL 11 0   PLATELETS Thousands/uL 203   NRBC AUTO /100 WBCs 0   NEUTROS PCT % 59   LYMPHS PCT % 31   MONOS PCT % 6   EOS PCT % 2   BASOS PCT % 2*   NEUTROS ABS Thousands/µL 3 85   LYMPHS ABS Thousands/µL 2 00   MONOS ABS Thousand/µL 0 42   EOS ABS Thousand/µL 0 13     Chemistry Profile:     Results from last 6 Months  Lab Units 01/30/18  1804 11/10/17  1154   SODIUM mmol/L 136 139   POTASSIUM mmol/L 5 0 4 6   CHLORIDE mmol/L 105 102   CO2 mmol/L 26 31   ANION GAP mmol/L 5 6   BUN mg/dL 15 14   CREATININE mg/dL 0 79 0 77   GLUCOSE FASTING mg/dL  --  89   GLUCOSE RANDOM mg/dL 95  --    CALCIUM mg/dL 9 1 9 1   AST U/L 23 21   ALT U/L 17 24   ALK PHOS U/L 111 114   TOTAL PROTEIN g/dL 7 1 7 2   BILIRUBIN TOTAL mg/dL 0 40 0 29   EGFR ml/min/1 73sq m 82 85     Coagulation Studies:     Endocrine Studies: @LABRCNTUrinalysis:   Lab Results   Component Value Date    COLORU Yellow 01/30/2018    COLORU Misty 04/28/2017    CLARITYU Clear 01/30/2018    CLARITYU Hazy 04/28/2017    SPECGRAV 1 020 01/30/2018    SPECGRAV 1 015 04/28/2017    PHUR 6 5 01/30/2018    PHUR 5 04/28/2017    LEUKOCYTESUR Negative 01/30/2018    LEUKOCYTESUR - 04/28/2017    NITRITE Negative 01/30/2018    NITRITE - 04/28/2017    PROTEINUA Negative 01/30/2018    PROTEINUA + 04/28/2017    GLUCOSEU Negative 01/30/2018    KETONESU Negative 01/30/2018    KETONESU + 04/28/2017 BILIRUBINUR Negative 01/30/2018    BILIRUBINUR - 04/28/2017    BLOODU Moderate (A) 01/30/2018    BLOODU +++ 04/28/2017    AMYLASE: No results found for: AMYLASEOP(HGBA1C,RXTYBHVHQ0B,XLE1OKPYCTCH,T3FREE,A6SRTDG,FREET4)@      Lab Review   Appointment on 02/14/2018   Component Date Value    Cholesterol 02/14/2018 227*    Triglycerides 02/14/2018 62     HDL, Direct 02/14/2018 88*    LDL Calculated 02/14/2018 127*   Admission on 01/30/2018, Discharged on 01/30/2018   Component Date Value    WBC 01/30/2018 6 52     RBC 01/30/2018 4 58     Hemoglobin 01/30/2018 13 6     Hematocrit 01/30/2018 44 3     MCV 01/30/2018 97     MCH 01/30/2018 29 7     MCHC 01/30/2018 30 7*    RDW 01/30/2018 12 9     MPV 01/30/2018 11 0     Platelets 24/75/3275 203     nRBC 01/30/2018 0     Neutrophils Relative 01/30/2018 59     Lymphocytes Relative 01/30/2018 31     Monocytes Relative 01/30/2018 6     Eosinophils Relative 01/30/2018 2     Basophils Relative 01/30/2018 2*    Neutrophils Absolute 01/30/2018 3 85     Lymphocytes Absolute 01/30/2018 2 00     Monocytes Absolute 01/30/2018 0 42     Eosinophils Absolute 01/30/2018 0 13     Basophils Absolute 01/30/2018 0 10     Sodium 01/30/2018 136     Potassium 01/30/2018 5 0     Chloride 01/30/2018 105     CO2 01/30/2018 26     Anion Gap 01/30/2018 5     BUN 01/30/2018 15     Creatinine 01/30/2018 0 79     Glucose 01/30/2018 95     Calcium 01/30/2018 9 1     AST 01/30/2018 23     ALT 01/30/2018 17     Alkaline Phosphatase 01/30/2018 111     Total Protein 01/30/2018 7 1     Albumin 01/30/2018 3 5     Total Bilirubin 01/30/2018 0 40     eGFR 01/30/2018 82     Lipase 01/30/2018 145     Color, UA 01/30/2018 Yellow     Clarity, UA 01/30/2018 Clear     Specific Gravity, UA 01/30/2018 1 020     pH, UA 01/30/2018 6 5     Leukocytes, UA 01/30/2018 Negative     Nitrite, UA 01/30/2018 Negative     Protein, UA 01/30/2018 Negative     Glucose, UA 01/30/2018 Negative     Ketones, UA 01/30/2018 Negative     Urobilinogen, UA 01/30/2018 1 0     Bilirubin, UA 01/30/2018 Negative     Blood, UA 01/30/2018 Moderate*    RBC, UA 01/30/2018 10-20*    WBC, UA 01/30/2018 0-1*    Epithelial Cells 01/30/2018 Occasional     Bacteria, UA 01/30/2018 Occasional    Appointment on 11/10/2017   Component Date Value    Color, UA 11/10/2017 Yellow     Clarity, UA 11/10/2017 Clear     Specific Gravity, UA 11/10/2017 1 014     pH, UA 11/10/2017 7 5     Leukocytes, UA 11/10/2017 Moderate*    Nitrite, UA 11/10/2017 Positive*    Protein, UA 11/10/2017 Negative     Glucose, UA 11/10/2017 Negative     Ketones, UA 11/10/2017 Negative     Urobilinogen, UA 11/10/2017 0 2     Bilirubin, UA 11/10/2017 Negative     Blood, UA 11/10/2017 Moderate*    Sodium 11/10/2017 139     Potassium 11/10/2017 4 6     Chloride 11/10/2017 102     CO2 11/10/2017 31     Anion Gap 11/10/2017 6     BUN 11/10/2017 14     Creatinine 11/10/2017 0 77     Glucose, Fasting 11/10/2017 89     Calcium 11/10/2017 9 1     AST 11/10/2017 21     ALT 11/10/2017 24     Alkaline Phosphatase 11/10/2017 114     Total Protein 11/10/2017 7 2     Albumin 11/10/2017 3 4*    Total Bilirubin 11/10/2017 0 29     eGFR 11/10/2017 85     RBC, UA 11/10/2017 30-50*    WBC, UA 11/10/2017 30-50*    Epithelial Cells 11/10/2017 None Seen     Bacteria, UA 11/10/2017 Innumerable*    Hyaline Casts, UA 11/10/2017 None Seen     Urine Culture 11/10/2017 >100,000 cfu/ml Escherichia coli*        Imaging: Ct Abdomen Pelvis With Contrast    Result Date: 1/30/2018  Narrative: CT ABDOMEN AND PELVIS WITH IV CONTRAST INDICATION:  Left upper quadrant pain  COMPARISON: 12/20/2016  TECHNIQUE:  CT examination of the abdomen and pelvis was performed  Reformatted images were created in axial, sagittal, and coronal planes  Radiation dose length product (DLP) for this visit:  675 mGy-cm     This examination, like all CT scans performed in the [facility], was performed utilizing techniques to minimize radiation dose exposure, including the use of iterative reconstruction and automated exposure control  IV Contrast:  100 mL of iohexol (OMNIPAQUE)      50 mL of iohexol (OMNIPAQUE) Enteric Contrast:  Enteric contrast was not administered  FINDINGS: ABDOMEN LOWER CHEST:  No significant abnormalities identified in the lower chest  LIVER/BILIARY TREE:  No biliary obstruction  GALLBLADDER:  Postsurgical change from cholecystectomy  SPLEEN:  Unremarkable  PANCREAS:  Unremarkable  ADRENAL GLANDS:  Unremarkable  KIDNEYS/URETERS:  Left renal cortical 1 cm cyst  No pyelonephritis or obstructive uropathy STOMACH AND BOWEL:  Postsurgical change from gastric bypass  No bowel obstruction, colitis or diverticulitis  Moderate to large amount of stool throughout the colon  APPENDIX:  No findings to suggest appendicitis  ABDOMINOPELVIC CAVITY:  No ascites or free intraperitoneal air  No lymphadenopathy  VESSELS:  Unremarkable for patient's age  PELVIS REPRODUCTIVE ORGANS:  Postsurgical change from hysterectomy  Socorro Nissen URINARY BLADDER:  Unremarkable  ABDOMINAL WALL/INGUINAL REGIONS:  Unremarkable  OSSEOUS STRUCTURES:  No acute fracture or destructive osseous lesion  Spinal stimulator device in the right flank  Leads in the dorsal epidural space of the lower thoracic spine  Impression: 1  No evidence of bowel obstruction, colitis or diverticulitis  Moderate to large amount of stool throughout the colon may indicate constipation  2  No pyelonephritis or obstructive uropathy  Workstation performed: [id number]       Objective:     Physical Exam   Constitutional: She is oriented to person, place, and time  Pt appears uncomfortable for exam  Clutching her right hip and thigh area  HENT:   Head: Normocephalic  Right Ear: External ear normal    Left Ear: External ear normal    Eyes: EOM are normal  Pupils are equal, round, and reactive to light   Right eye exhibits no discharge  Neck: Neck supple  No tracheal deviation present  No thyromegaly present  Cardiovascular: Normal rate and normal heart sounds  No murmur heard  Pulmonary/Chest: No respiratory distress  She has no wheezes  She has no rales  Abdominal: Soft  There is no tenderness  Musculoskeletal: Normal range of motion  She exhibits no edema ( no tenderness with palpationTo the right thigh  No discoloration  )  Lymphadenopathy:     She has no cervical adenopathy  Neurological: She is alert and oriented to person, place, and time  Skin: Skin is warm and dry  Psychiatric: She has a normal mood and affect  Patient Instructions     Speak with pain management regarding your concerns regarding the Lyrica and the muscular pain in the right leg  Continue your current medicines  At this point I would consider a trip to the [facility] or [facility] for the pudendal nerve pain that you have had for about 6 years now        Follow up here in 3 weeks    [address] [place], [place] [id number]

## 2018-02-20 NOTE — TELEPHONE ENCOUNTER
What  Was the patient's response to using the OTC  lidocaine or Aspercreme? The patient has been on this medication for a long while  What the conditions to why it was denied?

## 2018-02-23 NOTE — TELEPHONE ENCOUNTER
S/w pt  States using the OTC lidocaine cream but it is not as effective as the diclofenac cream     Pt also states will need a refill of her lyrica at next ov  Pt is scheduled with [facility] on 2/27

## 2018-02-27 ENCOUNTER — OFFICE VISIT (OUTPATIENT)
Dept: PAIN MEDICINE | Facility: CLINIC | Age: 58
End: 2018-02-27
Payer: COMMERCIAL

## 2018-02-27 VITALS
RESPIRATION RATE: 16 BRPM | BODY MASS INDEX: 35.71 KG/M2 | HEART RATE: 68 BPM | HEIGHT: 64 IN | SYSTOLIC BLOOD PRESSURE: 108 MMHG | WEIGHT: 209.2 LBS | DIASTOLIC BLOOD PRESSURE: 78 MMHG

## 2018-02-27 DIAGNOSIS — Z79.891 LONG-TERM CURRENT USE OF OPIATE ANALGESIC: ICD-10-CM

## 2018-02-27 DIAGNOSIS — M70.61 GREATER TROCHANTERIC BURSITIS OF RIGHT HIP: ICD-10-CM

## 2018-02-27 DIAGNOSIS — G89.4 CHRONIC PAIN SYNDROME: Primary | ICD-10-CM

## 2018-02-27 DIAGNOSIS — M47.816 LUMBAR SPONDYLOSIS: ICD-10-CM

## 2018-02-27 DIAGNOSIS — G58.8 PUDENDAL NEURALGIA: ICD-10-CM

## 2018-02-27 DIAGNOSIS — F11.20 UNCOMPLICATED OPIOID DEPENDENCE (HCC): ICD-10-CM

## 2018-02-27 PROBLEM — E66.811 OBESITY (BMI 30.0-34.9): Status: ACTIVE | Noted: 2017-12-19

## 2018-02-27 PROBLEM — E78.5 BORDERLINE HYPERLIPIDEMIA: Status: ACTIVE | Noted: 2017-12-19

## 2018-02-27 PROBLEM — E66.9 OBESITY (BMI 30.0-34.9): Status: ACTIVE | Noted: 2017-12-19

## 2018-02-27 PROBLEM — R31.9 HEMATURIA: Status: ACTIVE | Noted: 2017-04-28

## 2018-02-27 PROBLEM — L30.9 ECZEMA: Status: ACTIVE | Noted: 2017-12-19

## 2018-02-27 PROCEDURE — 99214 OFFICE O/P EST MOD 30 MIN: CPT | Performed by: NURSE PRACTITIONER

## 2018-02-27 RX ORDER — OXYCODONE HYDROCHLORIDE 5 MG/1
TABLET ORAL
Qty: 95 TABLET | Refills: 0 | Status: SHIPPED | OUTPATIENT
Start: 2018-04-01 | End: 2018-05-01 | Stop reason: SDUPTHER

## 2018-02-27 RX ORDER — PREGABALIN 100 MG/1
100 CAPSULE ORAL 4 TIMES DAILY
Qty: 360 CAPSULE | Refills: 0 | Status: SHIPPED | OUTPATIENT
Start: 2018-02-27 | End: 2018-06-21 | Stop reason: SDUPTHER

## 2018-02-27 RX ORDER — PREGABALIN 100 MG/1
100 CAPSULE ORAL 4 TIMES DAILY
Qty: 120 CAPSULE | Refills: 0 | Status: SHIPPED | OUTPATIENT
Start: 2018-02-27 | End: 2018-02-27 | Stop reason: SDUPTHER

## 2018-02-27 RX ORDER — OXYCODONE HYDROCHLORIDE 5 MG/1
TABLET ORAL
Qty: 95 TABLET | Refills: 0 | Status: SHIPPED | OUTPATIENT
Start: 2018-03-02 | End: 2018-02-27 | Stop reason: SDUPTHER

## 2018-02-27 NOTE — PROGRESS NOTES
Assessment:  1  Chronic pain syndrome    2  Pudendal neuralgia    3  Long-term current use of opiate analgesic    4  Uncomplicated opioid dependence ([address] )    5  Lumbar spondylosis    6  Greater trochanteric bursitis of right hip        Plan:  The patient is a 59 y o  female with history of Chronic pain syndrome secondary to chronic lumbosacral pain, Pudendal neuralgia, and lumbar spondylosis, and left hip and groin pain  Patient has a history of pudendal neuralgia after undergoing a histerectomy  03/14/2012  Patient's exam and symptoms are consistent with right-sided greater trochanteric bursitis  Therefore, at this time I discussed with the patient about moving forward with a right greater trochanteric bursa injection to help decrease inflammation, and reduce pain  Patient was educated on procedures most common risks, and was given a brochure on the procedure  Patient verbalized understanding and would like to proceed with the procedure  Therefore, the patient be scheduled for an upcoming Tuesday or Thursday  In the meantime, the patient reports that she is currently well managed on her current medication regimen for her ongoing low back, hip and the pudendal pain  Therefore, the patient be continued on Nucynta  mg 1 tablet 2 times daily  Patient was given one prescription  with do not fill date of 3/7/2018, and a prescription for the following month with do not fill date of 4/7/2018  Patient will continue oxycodone 5 mg  However will be decreased from oxycodone 3 to 4 times a day as needed for pain to oxycodone take 1 tablet 3 times a day as needed for pain, may take 1 additional tablet a day on days when pain is severe  She will be decreased from 105 tablets a month to 95 tablets month  Patient agrees with this plan  Patient was given a prescription for this month with do not fill date of 3/7/2018, and a prescription for the following month with do not fill date of 4/7/2018      The patient brought her prescription pill bottles for Nucynta  mg 1 tablet 2 times daily  The prescription was filled on 2/3/2018 for 60 tablets  The patient has 20 remaining tablets, which is appropriate  The patient brought her prescription pill bottle for oxycodone 5 mg 1 tablet 3 to 4 times a day as needed for pain  The prescription was filled on 2/2/2018 for 105 tablets  The patient has 36 remaining tablets, which is appropriate  Patient will meet with Marsha Felix after today's office visit for spinal cord stimulator reprogramming  Patient reports that she will follow-up with Dr Aiyana Fernández for a transvaginal injection for her ongoing pudendal pain  Patient reports that she was given tramadol 50 mg by the ER after being discharged for her left abdominal pain  Patient reports that she took 1 tablet at the Tramadol  Patient brought the remaining prescription into the office and showed me that she had 9 remaining tablets  I instructed the patient that this is an opioid, and she is not to obtain any additional opioids from any other providers expect our office  Patient reported that her  in her both asked at the ER if it was opioid and she was told it was not  Patient reported that she would not take this medication any longer, and wanted to discard of the medication our office  I instructed the patient that she must take the remaining tablets to the police department for disposal   Patient verbalized understanding  [place] Prescription Drug Monitoring Program report was reviewed and was appropriate     A urine drug screen was collected at today's office visit as part of our medication management protocol  The point of care testing results were appropriate for what was being prescribed  The specimen will be sent for confirmatory testing   The drug screen is medically necessary because the patient is either dependent on opioid medication or is being considered for opioid medication therapy and the results could impact ongoing or future treatment  The drug screen is to evaluate for the presences or absence of prescribed, non-prescribed, and/or illicit drugs/substances  An opioid contract was reviewed with the patient  The patient was made aware they are only to receive opioid medication from our office, and must take the medication as prescribed  If the medication is lost or stolen, it will not be replaced  We also do not condone the use of illegal substances as well as the use of alcohol with opioid medication  Random urine drug screens will also be performed at office visits  Lastly, he was informed that office visits are needed for refills  Patient was agreeable and signed the contract  There are risks associated with opioid medications, including dependence, addiction and tolerance  The patient understands and agrees to use these medications only as prescribed  Potential side effects of the medications include, but are not limited to, constipation, drowsiness, addiction, impaired judgment and risk of fatal overdose if not taken as prescribed  The patient was warned against driving while taking sedation medications  Sharing medications is a felony  At this point in time, the patient is showing no signs of addiction, abuse, diversion or suicidal ideation  The patient will follow-up in 8 weeks for medication prescription refill and reevaluation  The patient was advised to contact the office should their symptoms worsen in the interim  The patient was agreeable and verbalized an understanding  History of Present Illness: The patient is a 59 y o  female with history of Chronic pain syndrome secondary to chronic lumbosacral pain, Pudendal neuralgia, and lumbar spondylosis, and right hip and groin pain  Patient was last seen on 01/09/2018 who presents for a follow up office visit in regards to chronic pain secondary to ongoing groin and left abdomen pain    The patient currently reports ongoing left-sided right hip pain that radiates into her buttocks, and down the anterior aspect of her left leg  Patient continues with ongoing vaginal pain and groin pain  The patient reports that she went to the emergency room on 1/30/2018 for left sided abdominal pain and cramps  She reports that the ER discharge her with muscle spasms in her left abdomen  They provided her with a prescription of tramadol on discharge  She describes her pain as burning, dull aching, sharp throbbing, cramping, numbness pain  Patient reports that pain is constant nature occurring mostly during the evening and nighttime hours  Patient reports that her pain is worsened since last visit, she contributes to worsening of her pain due to her left abdominal pain  Patient reports that she will be following up with Dr Merry Earl for an injection for her ongoing vaginal pain  Current pain medications includes: Nucynta  Mg and Oxycodone 5 mg   The patient reports that this regimen is providing 50% pain relief  The patient is reporting no side effects from this pain medication regimen  Pain Contract Signed: 02/27/2018  Last Urine Drug Screen: 11/13/2017    I have personally reviewed and/or updated the patient's past medical history, past surgical history, family history, social history, current medications, allergies, and vital signs today  Review of Systems:    Review of Systems   Respiratory: Negative for shortness of breath  Cardiovascular: Negative for chest pain  Gastrointestinal: Negative for constipation, diarrhea, nausea and vomiting  Musculoskeletal: Positive for myalgias  Negative for arthralgias, gait problem and joint swelling  Skin: Negative for rash  Neurological: Negative for dizziness, seizures and weakness  All other systems reviewed and are negative          Past Medical History:   Diagnosis Date    Chronic pain        Past Surgical History:   Procedure Laterality Date    CHOLECYSTECTOMY      GASTRIC BYPASS      HYSTERECTOMY  2012       Family History   Problem Relation Age of Onset    Family history unknown: Yes       Social History     Occupational History    retired      Social History Main Topics    Smoking status: Never Smoker    Smokeless tobacco: Never Used    Alcohol use No    Drug use: No    Sexual activity: Not on file         Current Outpatient Prescriptions:     Cholecalciferol (D3-1000) 1000 units capsule, Take 1 capsule by mouth 2 (two) times a day, Disp: , Rfl:     diclofenac sodium (VOLTAREN) 1 %, Place on the skin daily, Disp: , Rfl:     DULoxetine (CYMBALTA) 60 mg delayed release capsule, Take 1 capsule by mouth daily, Disp: , Rfl:     Iron-Vitamin C (IRON 100/C) 100-250 MG TABS, Take by mouth daily, Disp: , Rfl:     lidocaine (XYLOCAINE) 5 % ointment, Apply topically 2 (two) times a day as needed, Disp: , Rfl:     lisinopril (ZESTRIL) 10 mg tablet, Take 1 tablet by mouth daily, Disp: , Rfl:     metoprolol tartrate (LOPRESSOR) 25 mg tablet, Take 0 5 tablets by mouth 2 (two) times a day, Disp: , Rfl:     mometasone (ELOCON) 0 1 % cream, Apply topically daily, Disp: , Rfl:     Multiple Vitamin (MULTI-VITAMIN DAILY) TABS, Take 1 tablet by mouth daily, Disp: , Rfl:     Naloxone HCl (NARCAN) 4 MG/0 1ML LIQD, into each nostril, Disp: , Rfl:     naproxen (NAPROSYN) 500 mg tablet, Take 1 tablet (500 mg total) by mouth every 12 (twelve) hours as needed for mild pain or moderate pain, Disp: 20 tablet, Rfl: 0    [START ON 4/1/2018] oxyCODONE (ROXICODONE) 5 mg immediate release tablet, Earliest Fill Date: 4/1/18 Take 1 tablet 3 times a day as needed for pain   May take 1 additional a day on days when pain is severe , Disp: 95 tablet, Rfl: 0    pantoprazole (PROTONIX) 40 mg tablet, Take 1 tablet by mouth 2 (two) times a day, Disp: , Rfl:     [START ON 4/1/2018] tapentadol hcl er (NUCYNTA ER) 12 hr tablet 150 mg, Take 1 tablet (150 mg total) by mouth 2 (two) times a day as needed (pain) for up to 30 days Earliest Fill Date: 4/1/18 Max Daily Amount: 300 mg, Disp: 60 tablet, Rfl: 0    pregabalin (LYRICA) 100 mg capsule, Take 1 capsule (100 mg total) by mouth 4 (four) times a day for 90 days, Disp: 360 capsule, Rfl: 0    Allergies   Allergen Reactions    Other Other (See Comments) and Sneezing     Dogs  Crab Meat    Cats Claw (Uncaria [place]) Sneezing       Physical Exam:    /78   Pulse 68   Resp 16   Ht 5' 4" (1 626 m)   Wt 94 9 kg (209 lb 3 2 oz)   BMI 35 91 kg/m²     Constitutional:normal, well developed, well nourished, alert, in no distress and non-toxic and no overt pain behavior  and overweight  Eyes:anicteric  HEENT:grossly intact  Neck:supple, symmetric, trachea midline and no masses   Pulmonary:even and unlabored  Cardiovascular:No edema or pitting edema present  Skin:Normal without rashes or lesions and well hydrated  Psychiatric:Mood and affect appropriate  Neurologic:Cranial Nerves II-XII grossly intact  Musculoskeletal:normal    Lumbar Spine Exam    Appearance:  Normal lordosis  Palpation/Tenderness:  Left hip pain   Sensory:  no sensory deficits noted  Range of Motion:  Flexion:  No limitation  with pain  Extension:  No limitation  with pain  Lateral Flexion - Left:  No limitation  with pain  Lateral Flexion - Right:  No limitation  with pain  Rotation - Left:  No limitation  with pain  Rotation - Right:  No limitation  with pain  Motor Strength:  Left hip flexion:  5/5  Right hip flexion:  5/5  Left knee extension:  5/5  Right knee extension:  5/5  Left foot dorsiflexion:  5/5  Left foot plantar flexion:  5/5  Right foot dorsiflexion:  5/5  Right foot plantar flexion:  5/5      Imaging  Oxycodone last taken 2/27 noon  Nucynta last taken 2/27 noon  New UDS today      MRI LUMBAR SPINE WITHOUT CONTRAST   INDICATION-  68-year-old female, worsening low back and left lower   extremity pain   COMPARISON-  7/22/2013 MRI   TECHNIQUE-  Sagittal T1, sagittal T2, sagittal inversion recovery,   axial T1 and axial T2, coronal T2      IMAGE QUALITY-  Diagnostic   FINDINGS-   ALIGNMENT-     Mild smooth dextroscoliosis   Grade 1 retrolisthesis L1-2, stable   Grade 1 anterolisthesis L4-5, stable   No compression fracture   MARROW SIGNAL-        Multiple vertebral body hemangiomas, largest of which at T12, unchanged   DISTAL CORD AND CONUS-  Normal size and signal within the distal cord   and conus  The conus ends at the L2 level  PARASPINAL SOFT TISSUES-  Paraspinal soft tissues are unremarkable  SACRUM-  Normal signal within the sacrum  No evidence of insufficiency   or stress fracture  LOWER THORACIC DISC SPACES-        Mild degenerative disc disease without stenosis lower thoracic   segments, unchanged   LUMBAR DISC SPACES-        There is transitional configuration of the lumbosacral junction   considered to represent sacralization of the L5 vertebral body   The numbering convention is documented on sagittal T2 weighted image 7,   Series 3   L1-L2-  Mild to moderate degenerative spondylosis, grade 1   retrolisthesis, bulging annulus, small facet effusions, mild central   canal and bilateral foraminal stenosis, stable   L2-L3-  Mild degenerative disc and facet disease, small bilateral facet   effusions, no stenosis, stable   L3-L4-  Mild degenerative spondylosis, no stenosis, stable   L4-L5-  Mild degenerative disc disease, moderate to severe degenerative   facet hypertrophy, grade 1 degenerative anterolisthesis, mild bulging   annulus, small central annular tear, mild to moderate central canal and   bilateral foraminal stenosis, stable  L5-S1-  Partial sacralization of L5  No stenosis  Stable     IMPRESSION-    Persistent multilevel degenerative spondylosis   Persisting grade 1 retrolisthesis, facet effusions, mild central canal   and bilateral foraminal stenosis L1-2   Persistent small bilateral facet effusions L2-3   Persisting grade 1 anterolisthesis, central annular tear, mild to   moderate central canal and bilateral foraminal stenosis L4-5   Transitional lumbosacral junction configuration   Transcribed on- [id number]   FL spine and pain procedure    (Results Pending)         Orders Placed This Encounter   Procedures    [place] spine and pain procedure    MM ALL_Prescribed Meds and Special Instructions

## 2018-03-12 ENCOUNTER — OFFICE VISIT (OUTPATIENT)
Dept: FAMILY MEDICINE CLINIC | Facility: CLINIC | Age: 58
End: 2018-03-12
Payer: COMMERCIAL

## 2018-03-12 VITALS
HEIGHT: 64 IN | BODY MASS INDEX: 36.26 KG/M2 | RESPIRATION RATE: 15 BRPM | SYSTOLIC BLOOD PRESSURE: 110 MMHG | DIASTOLIC BLOOD PRESSURE: 74 MMHG | WEIGHT: 212.4 LBS | OXYGEN SATURATION: 98 % | HEART RATE: 67 BPM

## 2018-03-12 DIAGNOSIS — G89.29 CHRONIC LEFT SHOULDER PAIN: ICD-10-CM

## 2018-03-12 DIAGNOSIS — K21.9 GASTROESOPHAGEAL REFLUX DISEASE WITHOUT ESOPHAGITIS: ICD-10-CM

## 2018-03-12 DIAGNOSIS — I10 ESSENTIAL HYPERTENSION: ICD-10-CM

## 2018-03-12 DIAGNOSIS — M25.512 CHRONIC LEFT SHOULDER PAIN: ICD-10-CM

## 2018-03-12 DIAGNOSIS — M79.10 MUSCULAR PAIN: ICD-10-CM

## 2018-03-12 DIAGNOSIS — E78.5 BORDERLINE HYPERLIPIDEMIA: Primary | ICD-10-CM

## 2018-03-12 DIAGNOSIS — G89.4 CHRONIC PAIN DISORDER: ICD-10-CM

## 2018-03-12 PROCEDURE — 20610 DRAIN/INJ JOINT/BURSA W/O US: CPT | Performed by: FAMILY MEDICINE

## 2018-03-12 PROCEDURE — 3074F SYST BP LT 130 MM HG: CPT | Performed by: FAMILY MEDICINE

## 2018-03-12 PROCEDURE — 99214 OFFICE O/P EST MOD 30 MIN: CPT | Performed by: FAMILY MEDICINE

## 2018-03-12 PROCEDURE — 3078F DIAST BP <80 MM HG: CPT | Performed by: FAMILY MEDICINE

## 2018-03-12 RX ORDER — TRIAMCINOLONE ACETONIDE 40 MG/ML
40 INJECTION, SUSPENSION INTRA-ARTICULAR; INTRAMUSCULAR
Status: COMPLETED | OUTPATIENT
Start: 2018-03-12 | End: 2018-03-12

## 2018-03-12 RX ORDER — LISINOPRIL 10 MG/1
10 TABLET ORAL DAILY
Qty: 90 TABLET | Refills: 1 | Status: SHIPPED | OUTPATIENT
Start: 2018-03-12 | End: 2018-09-08 | Stop reason: SDUPTHER

## 2018-03-12 RX ORDER — METHYLPREDNISOLONE ACETATE 40 MG/ML
1 INJECTION, SUSPENSION INTRA-ARTICULAR; INTRALESIONAL; INTRAMUSCULAR; SOFT TISSUE
Status: COMPLETED | OUTPATIENT
Start: 2018-03-12 | End: 2018-03-12

## 2018-03-12 RX ORDER — LIDOCAINE HYDROCHLORIDE 10 MG/ML
3 INJECTION, SOLUTION INFILTRATION; PERINEURAL
Status: COMPLETED | OUTPATIENT
Start: 2018-03-12 | End: 2018-03-12

## 2018-03-12 RX ORDER — PANTOPRAZOLE SODIUM 40 MG/1
40 TABLET, DELAYED RELEASE ORAL 2 TIMES DAILY
Qty: 180 TABLET | Refills: 1 | Status: SHIPPED | OUTPATIENT
Start: 2018-03-12 | End: 2018-09-08 | Stop reason: SDUPTHER

## 2018-03-12 RX ORDER — DULOXETIN HYDROCHLORIDE 60 MG/1
60 CAPSULE, DELAYED RELEASE ORAL DAILY
Qty: 90 CAPSULE | Refills: 1 | Status: SHIPPED | OUTPATIENT
Start: 2018-03-12 | End: 2018-09-08 | Stop reason: SDUPTHER

## 2018-03-12 RX ADMIN — LIDOCAINE HYDROCHLORIDE 3 ML: 10 INJECTION, SOLUTION INFILTRATION; PERINEURAL at 10:19

## 2018-03-12 RX ADMIN — TRIAMCINOLONE ACETONIDE 40 MG: 40 INJECTION, SUSPENSION INTRA-ARTICULAR; INTRAMUSCULAR at 10:19

## 2018-03-12 RX ADMIN — METHYLPREDNISOLONE ACETATE 1 ML: 40 INJECTION, SUSPENSION INTRA-ARTICULAR; INTRALESIONAL; INTRAMUSCULAR; SOFT TISSUE at 10:19

## 2018-03-12 NOTE — PROGRESS NOTES
Assessment/Plan:     Chronic Problems:  Borderline hyperlipidemia    Meds renewed  Hypertension    Blood pressure is at goal   Continue current medications  Visit Diagnosis:  Diagnoses and all orders for this visit:    Borderline hyperlipidemia    Essential hypertension  -     metoprolol tartrate (LOPRESSOR) 25 mg tablet; Take 0 5 tablets (12 5 mg total) by mouth 2 (two) times a day  -     lisinopril (ZESTRIL) 10 mg tablet; Take 1 tablet (10 mg total) by mouth daily    Chronic pain disorder    Chronic left shoulder pain  Comments:   injected with 311  Ice today to the area every 4-6 hours for 20 minutes  Orders:  -     DULoxetine (CYMBALTA) 60 mg delayed release capsule; Take 1 capsule (60 mg total) by mouth daily  -     Ambulatory referral to Physical Therapy; Future    Muscular pain  Comments: Will refer to physical therapy  Suggest over-the-counter sports cream to see if this will help  Orders:  -     Ambulatory referral to Physical Therapy; Future    Gastroesophageal reflux disease without esophagitis  -     pantoprazole (PROTONIX) 40 mg tablet; Take 1 tablet (40 mg total) by mouth 2 (two) times a day    Other orders  -     Large joint arthrocentesis          Subjective:    Patient ID: Rhoda Casillas is a 59 y o  female  Patient is here for follow-up appointment on the pain in the right thigh  Was seen by pain management and not given any new information  There was no history of trauma prior to this pain  Patient states this pain started abruptly as a quick pain  Now feels the pain is more over the lateral thigh radiating down to the right knee  Uses a cane at times if she has problems standing  Patient feels this is a muscular pain  This pain is relieved by sitting and worse when she stands and puts pressure on the leg  Her pain medications do not help this pain  The pains are not constant           The following portions of the patient's history were reviewed and updated as appropriate: allergies, current medications, past family history, past medical history, past social history, past surgical history and problem list     Review of Systems   Constitutional: Negative for chills and fever  HENT: Negative for ear pain, postnasal drip and sore throat  Eyes: Negative for pain and visual disturbance  Respiratory: Negative for cough, chest tightness, shortness of breath and wheezing  Cardiovascular: Negative for chest pain, palpitations and leg swelling  Gastrointestinal: Negative for abdominal pain, constipation, diarrhea, nausea and vomiting  Endocrine: Negative for cold intolerance, heat intolerance and polyuria  Genitourinary: Negative for dysuria, frequency and urgency  Musculoskeletal: Positive for arthralgias, back pain, gait problem and myalgias  Feels she is even more sedentary than she has been  Patient also complains of chronic left shoulder pain  Has had x-rays in the past and told she has a bone spur and would be a candidate for surgery  Has also had a cortisone shot in that shoulder over 5 years ago with relief  Requesting another cortisone  Skin: Negative for pallor and rash  Neurological: Negative for dizziness, tremors, light-headedness and numbness  Psychiatric/Behavioral: Negative for dysphoric mood and suicidal ideas  The patient is not nervous/anxious            /74   Pulse 67   Resp 15   Ht 5' 4" (1 626 m)   Wt 96 3 kg (212 lb 6 4 oz)   SpO2 98%   BMI 36 46 kg/m²   Social History     Social History    Marital status: /Civil Union     Spouse name: N/A    Number of children: N/A    Years of education: N/A     Occupational History    retired      Social History Main Topics    Smoking status: Never Smoker    Smokeless tobacco: Never Used    Alcohol use No    Drug use: No    Sexual activity: Yes     Other Topics Concern    Not on file     Social History Narrative    No narrative on file     Past Medical History: Diagnosis Date    Anxiety     Arthritis     Attention and concentration deficit     Blurred vision     Chronic pain     Depression     Diplopia     Dyspepsia     Gastric ulcer     Gastritis     Memory loss     Osteopenia     Starting and stopping of urinary stream during micturition     Urinary incontinence     Wears eyeglasses      Family History   Problem Relation Age of Onset    Other Mother      Back Disorder     Cirrhosis Mother     Crohn's disease Mother     Lupus Mother      Systemic Lupus Erythematous     Hypertension Father     Heart disease Father     Other Brother      Liver Transplant      Past Surgical History:   Procedure Laterality Date     SECTION  1988     [address] GASTRIC BYPASS  2004    HYSTERECTOMY  2012    INSERT / REPLACE PERIPHERAL NEUROSTIMULATOR PULSE GENERATOR /       OTHER SURGICAL HISTORY      Reimplantatin at [address] [place] [address] WRIST ARTHROSCOPY      with internal fixation        Current Outpatient Prescriptions:     Cholecalciferol (D3-1000) 1000 units capsule, Take 1 capsule by mouth 2 (two) times a day, Disp: , Rfl:     diclofenac sodium (VOLTAREN) 1 %, Place on the skin daily, Disp: , Rfl:     DULoxetine (CYMBALTA) 60 mg delayed release capsule, Take 1 capsule (60 mg total) by mouth daily, Disp: 90 capsule, Rfl: 1    Iron-Vitamin C (IRON 100/C) 100-250 MG TABS, Take by mouth daily, Disp: , Rfl:     lidocaine (XYLOCAINE) 5 % ointment, Apply topically 2 (two) times a day as needed, Disp: , Rfl:     lisinopril (ZESTRIL) 10 mg tablet, Take 1 tablet (10 mg total) by mouth daily, Disp: 90 tablet, Rfl: 1    metoprolol tartrate (LOPRESSOR) 25 mg tablet, Take 0 5 tablets (12 5 mg total) by mouth 2 (two) times a day, Disp: 90 tablet, Rfl: 1    mometasone (ELOCON) 0 1 % cream, Apply topically daily, Disp: , Rfl:     Multiple Vitamin (MULTI-VITAMIN DAILY) TABS, Take 1 tablet by mouth daily, Disp: , Rfl:     Naloxone HCl (NARCAN) 4 MG/0 1ML LIQD, into each nostril, Disp: , Rfl:     naproxen (NAPROSYN) 500 mg tablet, Take 1 tablet (500 mg total) by mouth every 12 (twelve) hours as needed for mild pain or moderate pain, Disp: 20 tablet, Rfl: 0    [START ON 4/1/2018] oxyCODONE (ROXICODONE) 5 mg immediate release tablet, Earliest Fill Date: 4/1/18 Take 1 tablet 3 times a day as needed for pain   May take 1 additional a day on days when pain is severe , Disp: 95 tablet, Rfl: 0    pantoprazole (PROTONIX) 40 mg tablet, Take 1 tablet (40 mg total) by mouth 2 (two) times a day, Disp: 180 tablet, Rfl: 1    pregabalin (LYRICA) 100 mg capsule, Take 1 capsule (100 mg total) by mouth 4 (four) times a day for 90 days, Disp: 360 capsule, Rfl: 0    [START ON 4/1/2018] tapentadol hcl er (NUCYNTA ER) 12 hr tablet 150 mg, Take 1 tablet (150 mg total) by mouth 2 (two) times a day as needed (pain) for up to 30 days Earliest Fill Date: 4/1/18 Max Daily Amount: 300 mg, Disp: 60 tablet, Rfl: 0  Allergies   Allergen Reactions    Other Other (See Comments) and Sneezing     Dogs  Crab Meat    Cats Claw (Uncaria Tomentosa) Sneezing          Lab Review   Appointment on 02/14/2018   Component Date Value    Cholesterol 02/14/2018 227*    Triglycerides 02/14/2018 62     HDL, Direct 02/14/2018 88*    LDL Calculated 02/14/2018 127*   Admission on 01/30/2018, Discharged on 01/30/2018   Component Date Value    WBC 01/30/2018 6 52     RBC 01/30/2018 4 58     Hemoglobin 01/30/2018 13 6     Hematocrit 01/30/2018 44 3     MCV 01/30/2018 97     MCH 01/30/2018 29 7     MCHC 01/30/2018 30 7*    RDW 01/30/2018 12 9     MPV 01/30/2018 11 0     Platelets 18/00/9273 203     nRBC 01/30/2018 0     Neutrophils Relative 01/30/2018 59     Lymphocytes Relative 01/30/2018 31     Monocytes Relative 01/30/2018 6     Eosinophils Relative 01/30/2018 2     Basophils Relative 01/30/2018 2*    Neutrophils Absolute 01/30/2018 3 85     Lymphocytes Absolute 01/30/2018 2 00     Monocytes Absolute 01/30/2018 0 42     Eosinophils Absolute 01/30/2018 0 13     Basophils Absolute 01/30/2018 0 10     Sodium 01/30/2018 136     Potassium 01/30/2018 5 0     Chloride 01/30/2018 105     CO2 01/30/2018 26     Anion Gap 01/30/2018 5     BUN 01/30/2018 15     Creatinine 01/30/2018 0 79     Glucose 01/30/2018 95     Calcium 01/30/2018 9 1     AST 01/30/2018 23     ALT 01/30/2018 17     Alkaline Phosphatase 01/30/2018 111     Total Protein 01/30/2018 7 1     Albumin 01/30/2018 3 5     Total Bilirubin 01/30/2018 0 40     eGFR 01/30/2018 82     Lipase 01/30/2018 145     Color, UA 01/30/2018 Yellow     Clarity, UA 01/30/2018 Clear     Specific Gravity, UA 01/30/2018 1 020     pH, UA 01/30/2018 6 5     Leukocytes, UA 01/30/2018 Negative     Nitrite, UA 01/30/2018 Negative     Protein, UA 01/30/2018 Negative     Glucose, UA 01/30/2018 Negative     Ketones, UA 01/30/2018 Negative     Urobilinogen, UA 01/30/2018 1 0     Bilirubin, UA 01/30/2018 Negative     Blood, UA 01/30/2018 Moderate*    RBC, UA 01/30/2018 10-20*    WBC, UA 01/30/2018 0-1*    Epithelial Cells 01/30/2018 Occasional     Bacteria, UA 01/30/2018 Occasional    Appointment on 11/10/2017   Component Date Value    Color, UA 11/10/2017 Yellow     Clarity, UA 11/10/2017 Clear     Specific Gravity, UA 11/10/2017 1 014     pH, UA 11/10/2017 7 5     Leukocytes, UA 11/10/2017 Moderate*    Nitrite, UA 11/10/2017 Positive*    Protein, UA 11/10/2017 Negative     Glucose, UA 11/10/2017 Negative     Ketones, UA 11/10/2017 Negative     Urobilinogen, UA 11/10/2017 0 2     Bilirubin, UA 11/10/2017 Negative     Blood, UA 11/10/2017 Moderate*    Sodium 11/10/2017 139     Potassium 11/10/2017 4 6     Chloride 11/10/2017 102     CO2 11/10/2017 31     Anion Gap 11/10/2017 6     BUN 11/10/2017 14     Creatinine 11/10/2017 0 77     Glucose, Fasting 11/10/2017 89     Calcium 11/10/2017 9 1     AST 11/10/2017 21     ALT 11/10/2017 24     Alkaline Phosphatase 11/10/2017 114     Total Protein 11/10/2017 7 2     Albumin 11/10/2017 3 4*    Total Bilirubin 11/10/2017 0 29     eGFR 11/10/2017 85     RBC, UA 11/10/2017 30-50*    WBC, UA 11/10/2017 30-50*    Epithelial Cells 11/10/2017 None Seen     Bacteria, UA 11/10/2017 Innumerable*    Hyaline Casts, UA 11/10/2017 None Seen     Urine Culture 11/10/2017 >100,000 cfu/ml Escherichia coli*        Imaging: No results found  Objective:     Physical Exam   Constitutional: She is oriented to person, place, and time  HENT:   Head: Normocephalic  Right Ear: External ear normal    Left Ear: External ear normal    Eyes: EOM are normal  Pupils are equal, round, and reactive to light  Right eye exhibits no discharge  Neck: Neck supple  No tracheal deviation present  No thyromegaly present  Cardiovascular: Normal rate and normal heart sounds  No murmur heard  Pulmonary/Chest: No respiratory distress  She has no wheezes  She has no rales  Abdominal: Soft  There is no tenderness  Musculoskeletal: Normal range of motion  Not currently walking with a limp  (States when she is having pains at that time she will walk with a limp ) Approx 100degrees rom to left shoulder  Tender over ac joint  Good  on that side  Lymphadenopathy:     She has no cervical adenopathy  Neurological: She is alert and oriented to person, place, and time  Skin: Skin is warm and dry  Psychiatric: She has a normal mood and affect         Large joint arthrocentesis  Date/Time: 3/12/2018 10:19 AM  Consent given by: patient  Site marked: site marked  Timeout: Immediately prior to procedure a time out was called to verify the correct patient, procedure, equipment, support staff and site/side marked as required   Supporting Documentation  Indications: pain   Procedure Details  Location: shoulder - L subacromial bursa  Needle size: 25 G  Ultrasound guidance: no  Approach: posterior  Medications administered: 3 mL lidocaine 1 %; 1 mL methylPREDNISolone acetate 40 mg/mL; 40 mg triamcinolone acetonide 40 mg/mL    Patient tolerance: patient tolerated the procedure well with no immediate complications          Patient Instructions     Discussed with patient the chronic pain in the left shoulder  Injected with 3-1-1 in the left shoulder without difficulty patient with better range of motion up to about 145°  Will refer to physical therapy for the new onset of right anterior thigh pain and the chronic pain in the left shoulder  Suggest an over-the-counter sports cream on the right thigh at this time  Speak with Yen Bender regarding renewing your diclofenac gel  Blood pressure is perfect today  Continue current meds  Meds renewed  Follow-up here in 3-4 months sooner if needed        Follow up here in 3-4 months    [address], [place]

## 2018-03-12 NOTE — PATIENT INSTRUCTIONS
Discussed with patient the chronic pain in the left shoulder  Injected with 3-1-1 in the left shoulder without difficulty patient with better range of motion up to about 145°  Will refer to physical therapy for the new onset of right anterior thigh pain and the chronic pain in the left shoulder  Suggest an over-the-counter sports cream on the right thigh at this time  Speak with Ancelmo Castillo regarding renewing your diclofenac gel  Blood pressure is perfect today  Continue current meds  Meds renewed  Follow-up here in 3-4 months sooner if needed

## 2018-04-09 ENCOUNTER — TRANSCRIBE ORDERS (OUTPATIENT)
Dept: ADMINISTRATIVE | Facility: HOSPITAL | Age: 58
End: 2018-04-09

## 2018-04-09 DIAGNOSIS — Z12.39 BREAST SCREENING: Primary | ICD-10-CM

## 2018-04-10 ENCOUNTER — TELEPHONE (OUTPATIENT)
Dept: PAIN MEDICINE | Facility: CLINIC | Age: 58
End: 2018-04-10

## 2018-04-10 DIAGNOSIS — M79.18 MYOFASCIAL PAIN SYNDROME: Primary | ICD-10-CM

## 2018-04-10 NOTE — TELEPHONE ENCOUNTER
Pt stopped in es office  She needs a refill of Diclofenac sodium gel 1 3% solution  She needs a preauth for it from [facility]  Pls call [facility] [phone]  She needs a 3 month supply asap  She is about out

## 2018-04-10 NOTE — TELEPHONE ENCOUNTER
Can you please clarify dosage for Diclofenac, looked in previous office visit not that you wrote for pt and it does not show that you prescribed diclofenac   Pt called and stated that she needs a PA started , I just want to clarify this with you before PA is started

## 2018-04-11 ENCOUNTER — EVALUATION (OUTPATIENT)
Dept: PHYSICAL THERAPY | Facility: CLINIC | Age: 58
End: 2018-04-11
Payer: COMMERCIAL

## 2018-04-11 DIAGNOSIS — M79.10 MUSCULAR PAIN: Primary | ICD-10-CM

## 2018-04-11 DIAGNOSIS — M25.512 CHRONIC LEFT SHOULDER PAIN: ICD-10-CM

## 2018-04-11 DIAGNOSIS — G89.29 CHRONIC LEFT SHOULDER PAIN: ICD-10-CM

## 2018-04-11 PROCEDURE — G8990 OTHER PT/OT CURRENT STATUS: HCPCS | Performed by: PHYSICAL THERAPIST

## 2018-04-11 PROCEDURE — G8991 OTHER PT/OT GOAL STATUS: HCPCS | Performed by: PHYSICAL THERAPIST

## 2018-04-11 PROCEDURE — 97162 PT EVAL MOD COMPLEX 30 MIN: CPT | Performed by: PHYSICAL THERAPIST

## 2018-04-11 NOTE — TELEPHONE ENCOUNTER
She was prescribed in January:  Diclofenac sodium 1% apply sparingly to the affected areas once daily  30 day supply #1x 100 gram tube with 2 refills
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
## 2018-04-12 ENCOUNTER — HOSPITAL ENCOUNTER (OUTPATIENT)
Dept: MAMMOGRAPHY | Facility: CLINIC | Age: 58
Discharge: HOME | End: 2018-04-12
Payer: COMMERCIAL

## 2018-04-12 DIAGNOSIS — Z12.39 BREAST SCREENING: ICD-10-CM

## 2018-04-12 PROCEDURE — 77067 SCR MAMMO BI INCL CAD: CPT

## 2018-04-16 ENCOUNTER — OFFICE VISIT (OUTPATIENT)
Dept: PHYSICAL THERAPY | Facility: CLINIC | Age: 58
End: 2018-04-16
Payer: COMMERCIAL

## 2018-04-16 DIAGNOSIS — M25.512 ACUTE PAIN OF LEFT SHOULDER: Primary | ICD-10-CM

## 2018-04-16 PROCEDURE — 97140 MANUAL THERAPY 1/> REGIONS: CPT | Performed by: PHYSICAL THERAPIST

## 2018-04-16 PROCEDURE — 97110 THERAPEUTIC EXERCISES: CPT | Performed by: PHYSICAL THERAPIST

## 2018-04-16 NOTE — PROGRESS NOTES
Daily Note     Today's date: 2018  Patient name: Kellee Mckeon  : 1960  MRN: [id number]  Referring provider: Alice Wynn, *  Dx:   Encounter Diagnosis     ICD-10-CM    1  Acute pain of left shoulder M25 512                   Subjective: Patient reports feeling good post session  -  Notes that she had some GI issues over the weekend giving her LBP which was occasionally severe  SHe states that she feels good post session/ mhp      Objective: See treatment diary below    Manual                       Left shoulder AAROM all planes  db                     Right LE stretching  Quad, hip flex in s/l   db                                                                                                    Exercise Diary                        Pulleys  5'                     ube  8'                     Back rolls/scap retraction  20x                     UT L stretch                       Wall slide L  10sx10                     Supine cane flexion/cp  20x                      standing cane ir/ext 20x each                      serratus/tricep                       supine AROM flex/ abd to 90                                                 hss with strap  20s x 3                     abd bracing                       S/l abd                       S/l clamshells                       bridging  20x                     HL t-band abd                         gs, qs  3s x20                                                                                                                           Modalities                        MHP LS/L shoulder  10'                                                                          Assessment: Tolerated treatment well  Patient exhibited good technique with therapeutic exercises and would benefit from continued PT  Patients left shoulder AA movement improving - mild ERP with flexion and ER  MHP to end with (+ ) response  Plan: Continue per plan of care  Progress treatment as tolerated  monitor response and progress accordingly

## 2018-04-20 ENCOUNTER — OFFICE VISIT (OUTPATIENT)
Dept: PHYSICAL THERAPY | Facility: CLINIC | Age: 58
End: 2018-04-20
Payer: COMMERCIAL

## 2018-04-20 DIAGNOSIS — M25.512 ACUTE PAIN OF LEFT SHOULDER: Primary | ICD-10-CM

## 2018-04-20 DIAGNOSIS — M25.512 CHRONIC LEFT SHOULDER PAIN: ICD-10-CM

## 2018-04-20 DIAGNOSIS — M79.10 MUSCULAR PAIN: ICD-10-CM

## 2018-04-20 DIAGNOSIS — G89.29 CHRONIC LEFT SHOULDER PAIN: ICD-10-CM

## 2018-04-20 PROCEDURE — 97140 MANUAL THERAPY 1/> REGIONS: CPT

## 2018-04-20 PROCEDURE — 97110 THERAPEUTIC EXERCISES: CPT

## 2018-04-20 NOTE — PROGRESS NOTES
Daily Note     Today's date: 2018  Patient name: Flakito Arciniega  : 1960  MRN: [id number]  Referring provider: Philip Pierce, *  Dx:   Encounter Diagnosis     ICD-10-CM    1  Acute pain of left shoulder M25 512    2  Chronic left shoulder pain M25 512     G89 29    3  Muscular pain M79 1                   Subjective: Patient stated she had a transvaginal block yesterday; patient stated she is hopeful for some pain relief  Objective: See treatment diary below  Manual                     Left shoulder AAROM all planes  db LA  PTA                   Right LE stretching  Quad, hip flex in s/l   db  LA PTA                                                                                                 Exercise Diary                      Pulleys  5' 5'                   ube  8' 8'                   Back rolls/scap retraction  20x 20x each                   UT L stretch                       Wall slide L  10sx10                     Supine cane flexion/cp  20x 20x                    standing cane ir/ext 20x each 20x                    serratus/tricep   20x   each                    supine AROM flex/ abd to 90                                                                       hss with strap  20s x 3                     abd bracing   :03  10x                   S/l abd   :03  10x                   S/l clamshells   :03  10x                   bridging  20x 20x                   HL t-band abd                         gs, qs  3s x20  :03  20x each                                                                                                                         Modalities                        MHP LS/L shoulder  10'                                                                             Assessment: Tolerated treatment without reports of exacerbation of pain symptoms    Patient exhibited good technique with therapeutic exercises      Plan: Continue per plan of care

## 2018-04-23 NOTE — TELEPHONE ENCOUNTER
PT stopped in and said she needs a 3 month script for this medication  [facility]  Thank you  Pls call pt when done [phone]    She wants Kevin Rodriguez to know that she picked up medical marijuana today  She was prescribed by Dr Lena Guillen  One is SF Tincture Hybrid 330 mg (OGK & CNDRLL) 15ml   The other one is CY LLR Cartridge 500mg Kosher Tangle [id number]

## 2018-04-24 ENCOUNTER — OFFICE VISIT (OUTPATIENT)
Dept: PHYSICAL THERAPY | Facility: CLINIC | Age: 58
End: 2018-04-24
Payer: COMMERCIAL

## 2018-04-24 DIAGNOSIS — M25.512 ACUTE PAIN OF LEFT SHOULDER: Primary | ICD-10-CM

## 2018-04-24 DIAGNOSIS — M79.10 MUSCULAR PAIN: ICD-10-CM

## 2018-04-24 PROCEDURE — 97140 MANUAL THERAPY 1/> REGIONS: CPT | Performed by: PHYSICAL THERAPIST

## 2018-04-24 PROCEDURE — 97110 THERAPEUTIC EXERCISES: CPT | Performed by: PHYSICAL THERAPIST

## 2018-04-24 NOTE — PROGRESS NOTES
Daily Note     Today's date: 2018  Patient name: Castro Boykin  : 1960  MRN: [id number]  Referring provider: Tiera Bueno, *  Dx:   Encounter Diagnosis     ICD-10-CM    1  Acute pain of left shoulder M25 512    2  Muscular pain M79 1                   Subjective: Patient reports that today is a "bad day"  She notes that she was out 2 days in a row and as it always happens, today is a painful day  Patient reports that her shoulder and leg are improving   She reports that she had to take a pain pill this am due to aggravated symptoms - by end of session , much less pain reported         Objective: See treatment diary below  Manual                   Left shoulder AAROM all planes  db LA  PTA  db                 Right LE stretching  Quad, hip flex in s/l   db  LA PTA                                                                                                 Exercise Diary                    Pulleys  5' 5'  5'                 ube  8' 8'  10'                 Back rolls/scap retraction  20x 20x each  20x                 UT L stretch                       Wall slide L  10sx10   10sx10                 Supine cane flexion/cp  20x 20x  1# x20                  standing cane ir/ext 20x each 20x  1# x20                   serratus/tricep   20x   each  20x                  supine AROM flex/ abd to 90      20x                                                                 hss with strap  20s x 3    20sx3                 abd bracing   :03  10x  3s x20                 S/l abd   :03  10x  x20                 S/l clamshells   :03  10x  x20                 bridging  20x 20x  x20                 HL t-band abd       gtbx20                  gs, qs  3s x20  :03  20x each  x20                                                                                                                       Modalities                        MHP LS/L shoulder  10'                                                                             Assessment: Tolerated treatment fair today secondary to flared underlying pain left buttock region - neuralgia  Left shoulder with clunk feeling elicited with manual work today - (+) grinding, non-painful produced with compression  Plan: Continue per plan of care

## 2018-04-26 NOTE — TELEPHONE ENCOUNTER
[facility] called today and states that a prior auth is needed for diclofenac gel  Please obtain auth

## 2018-04-27 ENCOUNTER — OFFICE VISIT (OUTPATIENT)
Dept: PHYSICAL THERAPY | Facility: CLINIC | Age: 58
End: 2018-04-27
Payer: COMMERCIAL

## 2018-04-27 ENCOUNTER — TELEPHONE (OUTPATIENT)
Dept: PAIN MEDICINE | Facility: CLINIC | Age: 58
End: 2018-04-27

## 2018-04-27 DIAGNOSIS — G89.29 CHRONIC LEFT SHOULDER PAIN: ICD-10-CM

## 2018-04-27 DIAGNOSIS — M79.10 MUSCULAR PAIN: ICD-10-CM

## 2018-04-27 DIAGNOSIS — M25.512 CHRONIC LEFT SHOULDER PAIN: ICD-10-CM

## 2018-04-27 DIAGNOSIS — M25.512 ACUTE PAIN OF LEFT SHOULDER: Primary | ICD-10-CM

## 2018-04-27 PROCEDURE — G8990 OTHER PT/OT CURRENT STATUS: HCPCS

## 2018-04-27 PROCEDURE — G8991 OTHER PT/OT GOAL STATUS: HCPCS

## 2018-04-27 PROCEDURE — 97140 MANUAL THERAPY 1/> REGIONS: CPT

## 2018-04-27 PROCEDURE — 97110 THERAPEUTIC EXERCISES: CPT

## 2018-04-27 NOTE — PROGRESS NOTES
Daily Note     Today's date: 2018  Patient name: Laura Redmond  : 1960  MRN: [id number]  Referring provider: Carlos A Sethi, *  Dx:   Encounter Diagnosis     ICD-10-CM    1  Acute pain of left shoulder M25 512    2  Muscular pain M79 1    3  Chronic left shoulder pain M25 512     G89 29                   Subjective: Patient noted she is tolerating more activity throughout her day; although she does report fatigue    SPR= 0/10      Objective: See treatment diary below  Manual                 Left shoulder AAROM all planes  db LA  PTA  db LA  PTA               Right LE stretching  Quad, hip flex in s/l   db  LA PTA   LA  PTA                                                                                             Exercise Diary                  Pulleys  5' 5'  5' 5'               ube  8' 8'  10' 10'               Back rolls/scap retraction  20x 20x each  20x 20x               UT L stretch                       Wall slide L  10sx10   10sx10 :10  10x               Supine cane flexion/cp  20x 20x  1# x20 1#  20x  each                standing cane ir/ext 20x each 20x  1# x20  1#  20x  each                serratus/tricep   20x   each  20x 1#  20x                supine AROM flex/ abd to 90      20x 1#  20x                                                               hss with strap  20s x 3    20sx3 :30  3x each               abd bracing   :03  10x  3s x20 :05  20x               S/l abd   :03  10x  x20 20x               S/l clamshells   :03  10x  x20 RTB  20x               bridging  20x 20x  x20 20x               HL t-band abd       gtbx20 RTB  20x                gs, qs  3s x20  :03  20x each  x20 D/C to HEP                                                                                                                     Modalities                        MHP LS/L shoulder  10'                                                                    Assessment: Tolerated treatment without limitations  Patient demonstrated fatigue post treatment  Patient denied exacerbation of symptoms post progression of present program today  Plan: Continue per plan of care

## 2018-04-27 NOTE — TELEPHONE ENCOUNTER
[facility] called again regarding prior auth for diclofenac gel  They state if using cover my meds, use key [id number]   Also provided cb # of [phone] and a reference # [id number]

## 2018-04-30 ENCOUNTER — DOCUMENTATION (OUTPATIENT)
Dept: PAIN MEDICINE | Facility: CLINIC | Age: 58
End: 2018-04-30

## 2018-04-30 NOTE — TELEPHONE ENCOUNTER
PA was done on Diclofenac gel it was approved Approvedtoday  CaseId:[id number];Status:Approved; Review Type:Prior Auth; Coverage Start Date:03/31/2018; Coverage End Date:04/30/2019; notified the patient

## 2018-05-01 ENCOUNTER — OFFICE VISIT (OUTPATIENT)
Dept: PHYSICAL THERAPY | Facility: CLINIC | Age: 58
End: 2018-05-01
Payer: COMMERCIAL

## 2018-05-01 ENCOUNTER — OFFICE VISIT (OUTPATIENT)
Dept: PAIN MEDICINE | Facility: CLINIC | Age: 58
End: 2018-05-01
Payer: COMMERCIAL

## 2018-05-01 ENCOUNTER — TELEPHONE (OUTPATIENT)
Dept: PAIN MEDICINE | Facility: MEDICAL CENTER | Age: 58
End: 2018-05-01

## 2018-05-01 VITALS
HEIGHT: 64 IN | SYSTOLIC BLOOD PRESSURE: 116 MMHG | BODY MASS INDEX: 36.19 KG/M2 | DIASTOLIC BLOOD PRESSURE: 66 MMHG | HEART RATE: 68 BPM | RESPIRATION RATE: 18 BRPM | WEIGHT: 212 LBS

## 2018-05-01 DIAGNOSIS — M47.816 LUMBAR SPONDYLOSIS: ICD-10-CM

## 2018-05-01 DIAGNOSIS — M25.512 CHRONIC LEFT SHOULDER PAIN: ICD-10-CM

## 2018-05-01 DIAGNOSIS — G89.4 CHRONIC PAIN SYNDROME: Primary | ICD-10-CM

## 2018-05-01 DIAGNOSIS — M79.10 MUSCULAR PAIN: Primary | ICD-10-CM

## 2018-05-01 DIAGNOSIS — M25.552 LEFT HIP PAIN: ICD-10-CM

## 2018-05-01 DIAGNOSIS — G58.8 PUDENDAL NEURALGIA: ICD-10-CM

## 2018-05-01 DIAGNOSIS — G89.29 CHRONIC LEFT SHOULDER PAIN: ICD-10-CM

## 2018-05-01 PROCEDURE — 97110 THERAPEUTIC EXERCISES: CPT | Performed by: PHYSICAL THERAPIST

## 2018-05-01 PROCEDURE — 99214 OFFICE O/P EST MOD 30 MIN: CPT | Performed by: NURSE PRACTITIONER

## 2018-05-01 RX ORDER — OXYCODONE HYDROCHLORIDE 5 MG/1
TABLET ORAL
Qty: 95 TABLET | Refills: 0 | Status: SHIPPED | OUTPATIENT
Start: 2018-05-04 | End: 2018-06-11 | Stop reason: ALTCHOICE

## 2018-05-01 NOTE — TELEPHONE ENCOUNTER
REED    S/w Harrison Wolf at pharmacy and advised OK to fill  Pt being weaned off nucynta and dosage was decreased today to 100mg

## 2018-05-01 NOTE — PROGRESS NOTES
Assessment:  1  Chronic pain syndrome    2  Pudendal neuralgia    3  Lumbar spondylosis    4  Left hip pain        Plan:  The patient is a 59 y o  female with history of Chronic pain syndrome secondary to chronic lumbosacral pain, Pudendal neuralgia, and lumbar spondylosis, and left hip and groin pain  The patient continues with ongoing   Vaginal, groin, left hip, left groin pain  The patient started medical marijuana last week, and would like to wean off her opioid medications  The patient reports that she has recently been more active, and her pain has improved  However, she is not sure if she contributes her decreased pain due to starting marijuana, or undergoing paracervical injections  Therefore the patient will be decreased from Nucynta  mg q 12 hours to Nucynta  mg 1 tablet q 12 hours  For 14 days, a 14 day prescription for this medication was sent electronically to the patient's pharmacy  After 2 weeks of being on Nucynta  mg q 12 hours, she will be wean down to Nucynta ER 50 mg 1 tablet q 12 hours for 14 days , a prescription for this medication was sent electronically to the patient's pharmacy  The patient will follow up with me in 4 weeks, for further weaning instructions  In the meantime we will keep the oxycodone prescription the same  The patient was provided a 1 month prescription of oxycodone 5 mg 1 tablet 3 times a day  The patient may take 1 additional tablet a day on days when pain is severe, 30 day supply, 95 tablets, no refills with a do not fill date of 5/4/2018  The patient brought in her prescription pill bottles, however she was not chosen for random pill count at this time  [place] Prescription Drug Monitoring Program report was reviewed and was appropriate     There are risks associated with opioid medications, including dependence, addiction and tolerance  The patient understands and agrees to use these medications only as prescribed   Potential side effects of the medications include, but are not limited to, constipation, drowsiness, addiction, impaired judgment and risk of fatal overdose if not taken as prescribed  The patient was warned against driving while taking sedation medications  Sharing medications is a felony  At this point in time, the patient is showing no signs of addiction, abuse, diversion or suicidal ideation  The patient will follow-up in 4 weeks for medication prescription refill and reevaluation  The patient was advised to contact the office should their symptoms worsen in the interim  The patient was agreeable and verbalized an understanding  History of Present Illness: The patient is a 59 y o  female with history of Chronic pain syndrome secondary to chronic lumbosacral pain, Pudendal neuralgia, and lumbar spondylosis, and left hip and groin pain  Patient has a history of pudendal neuralgia after undergoing a histerectomy  03/14/2012  The patient was last seen office on 2/27/2018 where she was continued on Nucynta  mg q 12 hours, and oxycodone 5 mg 1 tablet 3 to 4 times a day as needed for pain  Patient presents for a follow up office visit in regards to chronic pain secondary to vaginal, groin and left hip and leg pain  The patient currently reports ongoing left hip, leg, groin and vaginal pain  , which is unchanged since last visit  Patient denies bilateral leg weakness  She describes her pain as burning, dull aching, sharp, throbbing, shooting, numbness pain  Patient reports that her pain is constant with worsening of symptoms during the evening and nighttime hours  Patient reports that she has been undergoing paracervical nerve blocks by Dr Chapis Valle, which have been helping with her pain  Patient reports that her last injection provided her 2-3 days pain relief  Patient currently rates her pain a 5/10 numeric pain scale      Since last office visit the patient had started medical marijuana, and is currently using medical marijuana via vapor device every 6 hours as needed  Current pain medications includes:  Nucynta  mg 1 tablet q 12 hours, oxycodone 5 mg 1 tablet 3 to 4 times a day as needed for pain, and medical marijuana  The patient reports that this regimen is providing 50% pain relief  The patient is reporting no side effects from this pain medication regimen  Pain Contract Signed:  2018  Last Urine Drug Screen:  2018    I have personally reviewed and/or updated the patient's past medical history, past surgical history, family history, social history, current medications, allergies, and vital signs today  Review of Systems:    Review of Systems   Respiratory: Negative for shortness of breath  Cardiovascular: Negative for chest pain  Gastrointestinal: Negative for constipation, diarrhea, nausea and vomiting  Musculoskeletal: Negative for arthralgias, gait problem, joint swelling and myalgias  Skin: Negative for rash  Neurological: Negative for dizziness, seizures and weakness  All other systems reviewed and are negative          Past Medical History:   Diagnosis Date    Anxiety     Arthritis     Attention and concentration deficit     Blurred vision     Chronic pain     Depression     Diplopia     Dyspepsia     Gastric ulcer     Gastritis     Memory loss     Osteopenia     Starting and stopping of urinary stream during micturition     Urinary incontinence     Wears eyeglasses        Past Surgical History:   Procedure Laterality Date     SECTION       SECTION      CHOLECYSTECTOMY     [address] GASTRIC BYPASS  2004    HYSTERECTOMY  2012    INSERT / REPLACE PERIPHERAL NEUROSTIMULATOR PULSE GENERATOR /       OTHER SURGICAL HISTORY      Reimplantatin at [facility] ARTHROSCOPY      with internal fixation        Family History   Problem Relation Age of Onset    Other Mother      Back Disorder     Cirrhosis Mother     Crohn's disease Mother     Lupus Mother      Systemic Lupus Erythematous     Hypertension Father     Heart disease Father     Other Brother      Liver Transplant        Social History     Occupational History    retired      Social History Main Topics    Smoking status: Never Smoker    Smokeless tobacco: Never Used    Alcohol use No    Drug use: No    Sexual activity: Yes         Current Outpatient Prescriptions:     Cholecalciferol (D3-1000) 1000 units capsule, Take 1 capsule by mouth 2 (two) times a day, Disp: , Rfl:     diclofenac sodium (VOLTAREN) 1 %, Apply 2 g topically daily for 90 days, Disp: 180 g, Rfl: 0    DULoxetine (CYMBALTA) 60 mg delayed release capsule, Take 1 capsule (60 mg total) by mouth daily, Disp: 90 capsule, Rfl: 1    Iron-Vitamin C (IRON 100/C) 100-250 MG TABS, Take by mouth daily, Disp: , Rfl:     lidocaine (XYLOCAINE) 5 % ointment, Apply topically 2 (two) times a day as needed, Disp: , Rfl:     lisinopril (ZESTRIL) 10 mg tablet, Take 1 tablet (10 mg total) by mouth daily, Disp: 90 tablet, Rfl: 1    metoprolol tartrate (LOPRESSOR) 25 mg tablet, Take 0 5 tablets (12 5 mg total) by mouth 2 (two) times a day, Disp: 90 tablet, Rfl: 1    mometasone (ELOCON) 0 1 % cream, Apply topically daily, Disp: , Rfl:     Multiple Vitamin (MULTI-VITAMIN DAILY) TABS, Take 1 tablet by mouth daily, Disp: , Rfl:     Naloxone HCl (NARCAN) 4 MG/0 1ML LIQD, into each nostril, Disp: , Rfl:     naproxen (NAPROSYN) 500 mg tablet, Take 1 tablet (500 mg total) by mouth every 12 (twelve) hours as needed for mild pain or moderate pain, Disp: 20 tablet, Rfl: 0    [START ON 5/4/2018] oxyCODONE (ROXICODONE) 5 mg immediate release tablet, Take 1 tablet 3 times a day as needed for pain   May take 1 additional a day on days when pain is severe , Disp: 95 tablet, Rfl: 0    pantoprazole (PROTONIX) 40 mg tablet, Take 1 tablet (40 mg total) by mouth 2 (two) times a day, Disp: 180 tablet, Rfl: 1    pregabalin (LYRICA) 100 mg capsule, Take 1 capsule (100 mg total) by mouth 4 (four) times a day for 90 days, Disp: 360 capsule, Rfl: 0    Tapentadol HCl ER (NUCYNTA ER) 100 MG TB12, Take 1 tablet (100 mg total) by mouth 2 (two) times a day for 14 days Max Daily Amount: 200 mg, Disp: 28 tablet, Rfl: 0    [START ON 5/12/2018] Tapentadol HCl ER (NUCYNTA ER) 50 MG TB12, Take 1 tablet (50 mg total) by mouth 2 (two) times a day for 14 days Max Daily Amount: 100 mg, Disp: 28 tablet, Rfl: 0    Allergies   Allergen Reactions    Other Other (See Comments) and Sneezing     Dogs  Crab Meat    Cats Claw (Uncaria Tomentosa) Sneezing       Physical Exam:    /66   Pulse 68   Resp 18   Ht 5' 4" (1 626 m)   Wt 96 2 kg (212 lb)   BMI 36 39 kg/m²     Constitutional:normal, well developed, well nourished, alert, in no distress and non-toxic and no overt pain behavior  and overweight  Eyes:anicteric  HEENT:grossly intact  Neck:supple, symmetric, trachea midline and no masses   Pulmonary:even and unlabored  Cardiovascular:No edema or pitting edema present  Skin:Normal without rashes or lesions and well hydrated  Psychiatric:Mood and affect appropriate  Neurologic:Cranial Nerves II-XII grossly intact  Musculoskeletal:normal    Imaging  No orders to display         No orders of the defined types were placed in this encounter

## 2018-05-01 NOTE — TELEPHONE ENCOUNTER
Prince davis is calling from [facility] for confimation in the dosage change on the nuycnta   cb# [phone]

## 2018-05-01 NOTE — PROGRESS NOTES
Daily Note     Today's date: 2018  Patient name: Susana Maciel  : 1960  MRN: [id number]  Referring provider: Porter Christine, *  Dx:   Encounter Diagnosis     ICD-10-CM    1  Muscular pain M79 1    2  Chronic left shoulder pain M25 512     G89 29                   Subjective: Patient pleased to report that she continues to improve and is feeling better - notes that she was able to help her f-I-l over the weekend and felt good that she could help someone out and not have so much pain  Pleased with her progress  She states that she is fatigued with todays additions         Objective: See treatment diary below  Manual               Left shoulder AAROM all planes  db LA  PTA  db LA  PTA               Right LE stretching  Quad, hip flex in s/l   db  LA PTA   LA  PTA                                                                                             Exercise Diary                Pulleys  5' 5'  5' 5'  5'             ube  8' 8'  10' 10'  10'             Back rolls/scap retraction  20x 20x each  20x 20x 20x             Wall slide L  10sx10   10sx10 :10  10x  10s x 10             Supine cane flexion/cp  20x 20x  1# x20 1#  20x  each  2#x 20              standing cane ir/ext 20x each 20x  1# x20  1#  20x  each  2# x20              serratus/tricep   20x   each  20x 1#  20x  2# 20x              supine AROM flex/ abd to 90      20x 1#  20x   2#           TB rows/lpd/ext             Bicep curls     2#x20        BOR      2#x20                                                        hss with strap  20s x 3    20sx3 :30  3x each  20s x 5             abd bracing   :03  10x  3s x20 :05  20x  3s x 20             S/l abd, slr   :03  10x  x20 20x  1# x20             S/l clamshells   :03  10x  x20 RTB  20x               bridging  20x 20x  x20 20x  3s x 20             HL t-band abd       gtbx20 RTB  20x  btb x20                                                                                                                   Modalities   4/16                     MHP LS/L shoulder  10'                                                                            Assessment: Tolerated treatment without limitations  Patient demonstrated fatigue post treatment  Patient denied exacerbation of symptoms post progression of present program today  Plan: Continue per plan of care

## 2018-05-04 ENCOUNTER — OFFICE VISIT (OUTPATIENT)
Dept: PHYSICAL THERAPY | Facility: CLINIC | Age: 58
End: 2018-05-04
Payer: COMMERCIAL

## 2018-05-04 DIAGNOSIS — M25.512 CHRONIC LEFT SHOULDER PAIN: ICD-10-CM

## 2018-05-04 DIAGNOSIS — G89.29 CHRONIC LEFT SHOULDER PAIN: ICD-10-CM

## 2018-05-04 DIAGNOSIS — M25.512 ACUTE PAIN OF LEFT SHOULDER: ICD-10-CM

## 2018-05-04 DIAGNOSIS — M79.10 MUSCULAR PAIN: Primary | ICD-10-CM

## 2018-05-04 NOTE — PROGRESS NOTES
Daily Note     Today's date: 2018  Patient name: Laura Redmond  : 1960  MRN: [id number]  Referring provider: Carlos A Sethi, *  Dx:   Encounter Diagnosis     ICD-10-CM    1  Muscular pain M79 1    2  Chronic left shoulder pain M25 512     G89 29    3  Acute pain of left shoulder M25 512                   Subjective: Upon presentation, patient noted she had pelvic injection yesterday with resultant bleeding  Patient also noted she slept 15 hours yesterday  Patient noted increased lumbar pain today, she suspects secondary to decreasing pain medication as prescribed from 150 mg to 100mg  Patient was unable to participate in session; after 2 minutes on UBE, patient requested to leave secondary to pain  Objective: UBE x2 minutes    Assessment: Tolerated treatment poor  Patient unable to participate in today's session      Plan: Continue per plan of care

## 2018-05-08 ENCOUNTER — OFFICE VISIT (OUTPATIENT)
Dept: PHYSICAL THERAPY | Facility: CLINIC | Age: 58
End: 2018-05-08
Payer: COMMERCIAL

## 2018-05-08 DIAGNOSIS — M25.512 ACUTE PAIN OF LEFT SHOULDER: ICD-10-CM

## 2018-05-08 DIAGNOSIS — M79.10 MUSCULAR PAIN: Primary | ICD-10-CM

## 2018-05-08 DIAGNOSIS — M25.512 CHRONIC LEFT SHOULDER PAIN: ICD-10-CM

## 2018-05-08 DIAGNOSIS — G89.29 CHRONIC LEFT SHOULDER PAIN: ICD-10-CM

## 2018-05-08 PROCEDURE — 97140 MANUAL THERAPY 1/> REGIONS: CPT

## 2018-05-08 PROCEDURE — 97110 THERAPEUTIC EXERCISES: CPT

## 2018-05-08 NOTE — PROGRESS NOTES
Daily Note     Today's date: 2018  Patient name: Kai Wallace  : 1960  MRN: [id number]  Referring provider: Kirby Elizalde, *  Dx:   Encounter Diagnosis     ICD-10-CM    1  Muscular pain M79 1    2  Chronic left shoulder pain M25 512     G89 29    3  Acute pain of left shoulder M25 512                   Subjective: SPR = 3/10  Patient took pain medication prior to today's session        Objective: See treatment diary below  Manual             Left shoulder AAROM all planes  db LA  PTA  db LA  PTA   LA  PTA           Right LE stretching  Quad, hip flex in s/l   db  LA PTA   LA  PTA   LA  PTA                                                                                         Exercise Diary              Pulleys  5' 5'  5' 5'  5' 5'           ube  8' 8'  10' 10'  10' 10'           Back rolls/scap retraction  20x 20x each  20x 20x 20x 20x each           Wall slide L  10sx10   10sx10 :10  10x  10s x 10 :10   10x           Supine cane flexion/cp  20x 20x  1# x20 1#  20x  each  2#x 20 2#  20x            standing cane ir/ext 20x each 20x  1# x20  1#  20x  each  2# x20 2#  20x            serratus/tricep   20x   each  20x 1#  20x  2# 20x 2#  20x            supine AROM flex/ abd to 90      20x 1#  20x    2#  20x           TB rows/lpd/ext                       Bicep curls         2#x20 2#  20x           BOR          2#x20 2#  20x                                                           hss with strap  20s x 3    20sx3 :30  3x each  20s x 5 :20  3x           abd bracing   :03  10x  3s x20 :05  20x  3s x 20 :03  20x           S/l abd, slr   :03  10x  x20 20x  1# x20 1#  20x           S/l clamshells   :03  10x  x20 RTB  20x   RTB  20x           bridging  20x 20x  x20 20x  3s x 20 :05  20x           HL t-band abd       gtbx20 RTB  20x  btb x20 BlueTB  20x                                                                                                                 Modalities   4/16                     P LS/L shoulder  10'                                                                             Assessment: Tolerated treatment without limitations and able to tolerate progression withut reports of exacerbation of pain symptoms    Patient exhibited good technique with therapeutic exercises      Plan: Continue per plan of care

## 2018-05-11 ENCOUNTER — OFFICE VISIT (OUTPATIENT)
Dept: PHYSICAL THERAPY | Facility: CLINIC | Age: 58
End: 2018-05-11
Payer: COMMERCIAL

## 2018-05-11 DIAGNOSIS — G89.29 CHRONIC LEFT SHOULDER PAIN: ICD-10-CM

## 2018-05-11 DIAGNOSIS — M25.512 ACUTE PAIN OF LEFT SHOULDER: ICD-10-CM

## 2018-05-11 DIAGNOSIS — M79.10 MUSCULAR PAIN: Primary | ICD-10-CM

## 2018-05-11 DIAGNOSIS — M25.512 CHRONIC LEFT SHOULDER PAIN: ICD-10-CM

## 2018-05-11 PROCEDURE — G8991 OTHER PT/OT GOAL STATUS: HCPCS

## 2018-05-11 PROCEDURE — 97110 THERAPEUTIC EXERCISES: CPT

## 2018-05-11 PROCEDURE — G8990 OTHER PT/OT CURRENT STATUS: HCPCS

## 2018-05-11 NOTE — PROGRESS NOTES
Daily Note     Today's date: 2018  Patient name: Ellen Carr  : 1960  MRN: [id number]  Referring provider: Lyssa Frausto, *  Dx:   Encounter Diagnosis     ICD-10-CM    1  Muscular pain M79 1    2  Chronic left shoulder pain M25 512     G89 29    3  Acute pain of left shoulder M25 512                   Subjective: Patient reported her tolerance to therapy is better when she take pain medication prior to session  Patient plans on continuing with present HEP daily to her tolerance at home        Objective: See treatment diary below  Manual            Left shoulder AAROM all planes  db LA  PTA  db LA  PTA   LA  PTA           Right LE stretching  Quad, hip flex in s/l   db  LA PTA   LA  PTA   LA  PTA                                                                                         Exercise Diary            Pulleys  5' 5'  5' 5'  5' 5'  5'         ube  8' 8'  10' 10'  10' 10'  10'         Back rolls/scap retraction  20x 20x each  20x 20x 20x 20x each 20x each         Wall slide L  10sx10   10sx10 :10  10x  10s x 10 :10   10x :10  10x         Supine cane flexion/cp  20x 20x  1# x20 1#  20x  each  2#x 20 2#  20x 2#  20x          standing cane ir/ext 20x each 20x  1# x20  1#  20x  each  2# x20 2#  20x 2#  20x          serratus/tricep   20x   each  20x 1#  20x  2# 20x 2#  20x 2#  20x          supine AROM flex/ abd to 90      20x 1#  20x    2#  20x 2#  20x                                Bicep curls         2#x20 2#  20x 2#  20x         BOR          2#x20 2#  20x 2#  20x                                                         hss with strap  20s x 3    20sx3 :30  3x each  20s x 5 :20  3x :20  3x         abd bracing   :03  10x  3s x20 :05  20x  3s x 20 :03  20x :03  20x         S/l abd, slr   :03  10x  x20 20x  1# x20 1#  20x 1#  20x         S/l clamshells   :03  10x  x20 RTB  20x   RTB  20x Blue TB  20x         bridging  20x 20x  x20 20x  3s x 20 :05  20x :05  20x         HL t-band abd       gtbx20 RTB  20x  btb x20 BlueTB  20x Blue TB  20x                                                                                                               Modalities   4/16                     MHP LS/L shoulder  10'                                                                            Assessment: Tolerated treatment well  Patient exhibited good technique with therapeutic exercises  Issued red and blue TB for compliance with HEP  Plan: Discontinue therapy services as per plan of care    Patient plans to continue with present HEP

## 2018-05-14 ENCOUNTER — TELEPHONE (OUTPATIENT)
Dept: PAIN MEDICINE | Facility: CLINIC | Age: 58
End: 2018-05-14

## 2018-05-14 DIAGNOSIS — G58.8 PUDENDAL NEURALGIA: ICD-10-CM

## 2018-05-14 DIAGNOSIS — M47.816 LUMBAR SPONDYLOSIS: ICD-10-CM

## 2018-05-14 DIAGNOSIS — G89.4 CHRONIC PAIN SYNDROME: ICD-10-CM

## 2018-05-14 NOTE — TELEPHONE ENCOUNTER
See below  Please resend nucynta script  I updated pharmacy  I will call pt's pharmacy and cancel previous script

## 2018-05-14 NOTE — TELEPHONE ENCOUNTER
S/w pt  Per pt, her pharmacy is out of nucynta 50mg and script is not transferable to another [facility]  Pt will try to locate another pharmacy who has nucynta for it to be resubmitted electronically  If pt unable to locate a pharmacy, prior to 2PM , when provider available, will check with SI at that time to provide a paper script for patient  Pt agreeable

## 2018-05-14 NOTE — TELEPHONE ENCOUNTER
Pt called and stated her pharmacy has not received her script yet at the [facility] in [facility]   Please call [phone]

## 2018-05-14 NOTE — TELEPHONE ENCOUNTER
Pt stopped in and got a script for Nucinta 50 mg recently, but her pharmacy doesn't have it  She wants to know if she can have a paper script instead so she can find a pharmacy that has one  Pls call pt [phone]

## 2018-05-14 NOTE — TELEPHONE ENCOUNTER
I sent the prescription for Nucynta ER 50 milligrams 1 tablet q 12 hours , 2 week supply  28 tablets, to [facility] in [facility] in [place]  Please call and cancel the prescription that was sent to [facility] on [address]  Please advise the patient  [place] prescription monitoring drug program report was reviewed and was appropriate

## 2018-05-14 NOTE — TELEPHONE ENCOUNTER
Pt [facility] back stating that [facility] [place] has the Nucynta 50 mg  in stock  Asking if the rx can be sent there instead  Their  phone is [phone] and the fax # [phone]  Pt can be reached at [phone] with any other questions

## 2018-05-14 NOTE — TELEPHONE ENCOUNTER
S/w pt and advised the same  S/w [place] [id number] and canceled nucynta 50mg script  Pharmacy states they were out of stock

## 2018-05-24 ENCOUNTER — OFFICE VISIT (OUTPATIENT)
Dept: PAIN MEDICINE | Facility: CLINIC | Age: 58
End: 2018-05-24
Payer: COMMERCIAL

## 2018-05-24 VITALS
SYSTOLIC BLOOD PRESSURE: 122 MMHG | HEIGHT: 64 IN | WEIGHT: 212 LBS | HEART RATE: 72 BPM | RESPIRATION RATE: 16 BRPM | DIASTOLIC BLOOD PRESSURE: 80 MMHG | BODY MASS INDEX: 36.19 KG/M2

## 2018-05-24 DIAGNOSIS — G58.8 PUDENDAL NEURALGIA: ICD-10-CM

## 2018-05-24 DIAGNOSIS — M79.18 MYOFASCIAL PAIN SYNDROME: ICD-10-CM

## 2018-05-24 DIAGNOSIS — M47.816 LUMBAR FACET ARTHROPATHY: ICD-10-CM

## 2018-05-24 DIAGNOSIS — G89.4 CHRONIC PAIN DISORDER: Primary | ICD-10-CM

## 2018-05-24 PROCEDURE — 99213 OFFICE O/P EST LOW 20 MIN: CPT | Performed by: NURSE PRACTITIONER

## 2018-05-24 NOTE — PROGRESS NOTES
Assessment:  1  Pudendal neuralgia    2  Myofascial pain syndrome    3  Chronic pain disorder    4  Lumbar facet arthropathy (HCC)        Plan:  The patient is a 59 y o  female with history of Chronic pain syndrome secondary to chronic lumbosacral pain, Pudendal neuralgia, and lumbar spondylosis, and left hip and groin pain  the patient continues with ongoing vaginal, left thigh, left groin pain  Patient reports that she has been following up with her urogynecologist who has been providing her paracervical blocks which have been helpful  Patient reports that she is currently well managed for her pain with the use of medical marijuana  Therefore she will be weaning off opioids further  Patient reports that she stopped taking Nucynta ER 50 mg 4 days ago, and is noticing no withdrawal symptoms  Therefore, this time the patient will wean off of her oxycodone 5 mg 1 tablet 3-4 times daily  Patient reports that she at least has 39 remaining tablets at home  Therefore, the patient will wean down to right oxycodone 5 mg 1 tablet 3 times daily for 6 days, then wean down to oxycodone 5 mg 1 tablet 2 times a day for 6 days, and then wean down to oxycodone 5 mg 1 tablet daily, and then stop the medication  Patient was instructed to call the office with any withdrawal symptoms  The patient reports that next office visit she would like to start weaning off of Lyrica 100 mg 4 times daily  Patient reports that she currently has enough Lyrica at home and till next office visit, and is not requiring a refill of this medication at this time  [place] Prescription Drug Monitoring Program report was reviewed and was appropriate     There are risks associated with opioid medications, including dependence, addiction and tolerance  The patient understands and agrees to use these medications only as prescribed   Potential side effects of the medications include, but are not limited to, constipation, drowsiness, addiction, impaired judgment and risk of fatal overdose if not taken as prescribed  The patient was warned against driving while taking sedation medications  Sharing medications is a felony  At this point in time, the patient is showing no signs of addiction, abuse, diversion or suicidal ideation  The patient will follow-up in 4 weeks for medication prescription refill and reevaluation  The patient was advised to contact the office should their symptoms worsen in the interim  The patient was agreeable and verbalized an understanding  History of Present Illness: The patient is a 59 y o  female with history of Chronic pain syndrome secondary to chronic lumbosacral pain, Pudendal neuralgia, and lumbar spondylosis, and left hip and groin pain  the patient was last seen on 5/1/2018, where she was wean down on her opioid medications after starting medical marijuana  Since last office visit the patient underwent a paracervical block by Dr Lewis Benítez which provided her 1 week relief of symptoms  Patient presents for a follow up office visit in regards to chronic pain secondary to vaginal, and left hip, left groin pain, left knee  The patient currently reports the patient continues with ongoing vaginal, and left hip and groin pain  She describes left inner thigh pain as burning, sharp, numbness pain  Patient describes her left thigh pain as sharp, and her left knee pain as throbbing  Patient reports that her pain is constant and occurs mostly during the evening nighttime hours  Patient reports that her pain is symptoms are unchanged since last visit  Patient currently rates her pain as 3/10 numeric pain scale  Current pain medications includes:  Oxycodone 5 mg 1 tablet 3-4 times daily as needed for pain, and  medical marijuana  The patient reports that this regimen is providing 50% pain relief  The patient is reporting no side effects, right-sided muscle pain from this pain medication regimen    Patient reports that Lyrica is causing her to have right thigh muscle pain  Pain Contract Signed: 2018  Last Urine Drug Screen: 2018    I have personally reviewed and/or updated the patient's past medical history, past surgical history, family history, social history, current medications, allergies, and vital signs today  Review of Systems:    Review of Systems   Respiratory: Negative for shortness of breath  Cardiovascular: Negative for chest pain  Gastrointestinal: Negative for constipation, diarrhea, nausea and vomiting  Musculoskeletal: Negative for arthralgias, gait problem, joint swelling and myalgias  Skin: Negative for rash  Neurological: Negative for dizziness, seizures and weakness  All other systems reviewed and are negative          Past Medical History:   Diagnosis Date    Anxiety     Arthritis     Attention and concentration deficit     Blurred vision     Chronic pain     Depression     Diplopia     Dyspepsia     Gastric ulcer     Gastritis     Memory loss     Osteopenia     Starting and stopping of urinary stream during micturition     Urinary incontinence     Wears eyeglasses        Past Surgical History:   Procedure Laterality Date     SECTION     Patriciabury GASTRIC BYPASS  2004    HYSTERECTOMY  2012    INSERT / REPLACE PERIPHERAL NEUROSTIMULATOR PULSE GENERATOR /       OTHER SURGICAL HISTORY  2012    [facility]implantatin at [facility] ARTHROSCOPY      with internal fixation        Family History   Problem Relation Age of Onset    Other Mother      Back Disorder     Cirrhosis Mother     Crohn's disease Mother     Lupus Mother      Systemic Lupus Erythematous     Hypertension Father     Heart disease Father     Other Brother      Liver Transplant        Social History     Occupational History    retired      Social History Main Topics    Smoking status: Never Smoker    Smokeless tobacco: Never Used    Alcohol use No    Drug use: No    Sexual activity: Yes         Current Outpatient Prescriptions:     Cholecalciferol (D3-1000) 1000 units capsule, Take 1 capsule by mouth 2 (two) times a day, Disp: , Rfl:     diclofenac sodium (VOLTAREN) 1 %, Apply 2 g topically daily for 90 days, Disp: 180 g, Rfl: 0    DULoxetine (CYMBALTA) 60 mg delayed release capsule, Take 1 capsule (60 mg total) by mouth daily, Disp: 90 capsule, Rfl: 1    Iron-Vitamin C (IRON 100/C) 100-250 MG TABS, Take by mouth daily, Disp: , Rfl:     lidocaine (XYLOCAINE) 5 % ointment, Apply topically 2 (two) times a day as needed, Disp: , Rfl:     lisinopril (ZESTRIL) 10 mg tablet, Take 1 tablet (10 mg total) by mouth daily, Disp: 90 tablet, Rfl: 1    metoprolol tartrate (LOPRESSOR) 25 mg tablet, Take 0 5 tablets (12 5 mg total) by mouth 2 (two) times a day, Disp: 90 tablet, Rfl: 1    mometasone (ELOCON) 0 1 % cream, Apply topically daily, Disp: , Rfl:     Multiple Vitamin (MULTI-VITAMIN DAILY) TABS, Take 1 tablet by mouth daily, Disp: , Rfl:     Naloxone HCl (NARCAN) 4 MG/0 1ML LIQD, into each nostril, Disp: , Rfl:     naproxen (NAPROSYN) 500 mg tablet, Take 1 tablet (500 mg total) by mouth every 12 (twelve) hours as needed for mild pain or moderate pain, Disp: 20 tablet, Rfl: 0    oxyCODONE (ROXICODONE) 5 mg immediate release tablet, Take 1 tablet 3 times a day as needed for pain   May take 1 additional a day on days when pain is severe , Disp: 95 tablet, Rfl: 0    pantoprazole (PROTONIX) 40 mg tablet, Take 1 tablet (40 mg total) by mouth 2 (two) times a day, Disp: 180 tablet, Rfl: 1    pregabalin (LYRICA) 100 mg capsule, Take 1 capsule (100 mg total) by mouth 4 (four) times a day for 90 days, Disp: 360 capsule, Rfl: 0    Allergies   Allergen Reactions    Other Other (See Comments) and Sneezing     Dogs  Crab Meat    Cats Claw (Uncaria Tomentosa) Sneezing Physical Exam:    /80   Pulse 72   Resp 16   Ht 5' 4" (1 626 m)   Wt 96 2 kg (212 lb)   BMI 36 39 kg/m²     Constitutional:normal, well developed, well nourished, alert, in no distress and non-toxic and no overt pain behavior  and obese  Eyes:anicteric  HEENT:grossly intact  Neck:supple, symmetric, trachea midline and no masses   Pulmonary:even and unlabored  Cardiovascular:No edema or pitting edema present  Skin:Normal without rashes or lesions and well hydrated  Psychiatric:Mood and affect appropriate  Neurologic:Cranial Nerves II-XII grossly intact  Musculoskeletal:normal      Imaging  No orders to display         No orders of the defined types were placed in this encounter

## 2018-05-31 NOTE — PROGRESS NOTES
PT Discharge    Today's date: 2018  Patient name: Ed Brasher  : 1960  MRN: [id number]  Referring provider: Gunjan Black, *  Dx:   Encounter Diagnosis     ICD-10-CM    1  Muscular pain M79 1    2  Chronic left shoulder pain M25 512     G89 29    3  Acute pain of left shoulder M25 512        Start Time: 1015  Stop Time: 1105  Total time in clinic (min): 50 minutes    Assessment  Other impairment: Neurogenic pain left   Patient presents with symptom irritability no  Assessment details: Patient has done nicely with program overall  She is demonstrating improved self confidence, less guarded movements, full shoulder AROM  Goals  1  Reduce pain left shoulder/ right anterior thigh > 50%  4 weeks   MET  2  Return to full left shoulder AROM all planes 4 weeks   MET  3  Improve overall strength 1/2 grade  4 weeks   Partially MET  4  Improve postural awareness and self correction 4-6 weeks   MET  5  Abolish right LE pain  6 weeks  NOT MET  6  Independent and competent with HEp  4 weeks  MET    Plan  Planned therapy interventions: home exercise program  Plan details: Patient is independent and competent with HEP    Discharge skilled PT at this time    Thank you for this referral          Subjective Evaluation    History of Present Illness  Mechanism of injury:     Quality of life: fair ("My life has become very small- meaning her  socialization / going out is much less " )    Pain  At worst pain rating: 3  Quality: dull ache  Relieving factors: change in position  Progression: no change    Social Support  Lives with: spouse    Employment status: not working  Hand dominance: right    Treatments  Previous treatment: physical therapy and medication  Current treatment: injection treatment  Patient Goals  Patient goals for therapy: decreased pain, increased strength and independence with ADLs/IADLs          Objective     Special Questions  Positive for bladder dysfunction   Negative for night pain and disturbed sleep    Additional Special Questions  6 year hx left Pudendal neuralgia - severe at times, limiting  Patient unable to sit up straight or for long periods of time - she carries a soft cushion everywhere to use with little reduction in pain reported  Patient notes that due to this pain, her walking tolerance is low and that reclined is 'about the only" position with some reduction in pain  Postural Observations  Seated posture: fair  Standing posture: fair  Correction of posture: makes symptoms worse    Additional Postural Observation Details  Improved sitting posture make neurogenic pain worse  Improved sitting posture has no effect on left shoulder pain  Observations     Additional Observation Details  (+) audible click heard off and on from left shoulder - patient denies pain associated     Palpation   Left   Tenderness of the middle trapezius, posterior deltoid, rhomboids, supraspinatus and upper trapezius  Trigger point to rhomboids, teres major and upper trapezius  Right Tenderness of the upper trapezius  Tenderness     Left Shoulder   Tenderness in the coracoid process, lateral scapula, medial scapula, SC joint and supraspinatus tendon  Neurological Testing     Sensation     Lumbar   Left   Intact: light touch    Right   Intact: light touch    Active Range of Motion   Cervical/Thoracic Spine   Cervical  Subcranial protraction: WFL   Subcranial retraction: WFL   Flexion: WFL  Extension: WFL  Left lateral flexion: WFL  Right lateral flexion: WFL  Left rotation: Neck active rotation left: min loss  Right rotation: Neck active rotation right: min loss  Left Shoulder   Flexion: 155 degrees   Abduction: 160 degrees   External rotation 90°: 65 degrees   Internal rotation 90°: 55 degrees     Right Shoulder   Normal active range of motion    Lumbar   Flexion: Active lumbar flexion: mod loss,  stretch only end range (-) LBP     Extension: Active lumbar extension: Min Loss  NE  Additional Active Range of Motion Details  No end range pain reported with cervical movements  Repeated movements  - NE  (+) compensatory movements left to achieve above motions - shoulder elevation, slight trunk lean away  Supine repeated flexion - NE  Prone lying - NE  Prone repeated extension - NE      Passive Range of Motion   Left Shoulder   Flexion: 165 degrees with pain  Abduction: 170 degrees with pain  External rotation 90°: 80 degrees with pain  Internal rotation 90°: 70 degrees with pain    Right Shoulder   Normal passive range of motion    Strength/Myotome Testing   Cervical Spine     Left   Normal strength    Left Shoulder     Planes of Motion   Flexion: 4+   Extension: 5   Abduction: 4   Adduction: 5   External rotation at 0°: 4   Internal rotation at 0°: 4+     Right Shoulder   Normal muscle strength    Right Elbow   Extension: 4+    Left Hip   Planes of Motion   Flexion: 4+  Extension: 4+  Abduction: 4+  Adduction: 5    Right Hip   Planes of Motion   Flexion: 4+  Extension: 4+  Abduction: 4+  Adduction: 5    Left Knee   Flexion: 5  Extension: 5    Right Knee   Flexion: 5  Extension: 5    Left Ankle/Foot   Dorsiflexion: 5    Right Ankle/Foot   Dorsiflexion: 5    Additional Strength Details  Sartorius - fair strength right  Heel slide up shin R (-)  L (-) , weakness only L > R  Tests       Thoracic   Negative slump  Left Shoulder   Positive painful arc, Speed's and Yergason's  Negative clunk, crossover, drop arm and empty can  Lumbar   Negative repeated flexion, repeated extension and slump  Left   Negative femoral stretch and passive SLR  Right   Negative femoral stretch and passive SLR  Additional Tests Details  SLR = 56 degrees bilaterally with Low back pain reported as well as hamstring stretching  (-) neural pain reported  Quad flexion in prone = (+) quad tightness only bilaterally  (-) neural tension  - less on reassessment today       General Comments     Lumbar Comments  GAIT - non-antalgic, heel - toe progression      Flowsheet Rows      Most Recent Value   PT/OT G-Codes   Current Score  66   Projected Score  65   FOTO information reviewed  Yes   Assessment Type  Re-evaluation [FOTO only]   G code set  Other PT/OT Primary   Other PT Primary Current Status ()  CJ   Other PT Primary Goal Status ()  CJ

## 2018-06-06 ENCOUNTER — TELEPHONE (OUTPATIENT)
Dept: RADIOLOGY | Facility: CLINIC | Age: 58
End: 2018-06-06

## 2018-06-06 NOTE — TELEPHONE ENCOUNTER
Pt came into [place] office requesting to s/w nurse  Pt states having increased pain, 9/10, to lower back  States she is using medical marijuana  States completely off nucynta but had a hard time weaning off oxycodone  Patient states she was supposed to wean off but after a few days of bid oxycodone, pain was severe and she resumed her tid dosing  Pt upset stating maybe she weaned down too fast because of the amount of time she has been on meds  States it is very difficult to sit due to pain  She is using topical cream as well, without relief  States "I want to get off these pills" but unsure how to proceed  Pt states only a few more oxycodone tabs left at this time       Please advise

## 2018-06-07 DIAGNOSIS — G89.4 CHRONIC PAIN SYNDROME: Primary | ICD-10-CM

## 2018-06-07 RX ORDER — METHYLPREDNISOLONE 4 MG/1
TABLET ORAL
Qty: 21 TABLET | Refills: 0 | Status: SHIPPED | OUTPATIENT
Start: 2018-06-07 | End: 2018-06-21

## 2018-06-07 NOTE — TELEPHONE ENCOUNTER
I spoke with patient  She states that she has severe pain in her low back after cutting back on opiates  She states that she has a few oxycodone 5mg left over  I will send over a medrol dose javier to help with pain and inflammation  Her  will  the medication  She is instructed to continue oxycodone taper for now and let us know Monday Morning how she does  She verbalizes understanding

## 2018-06-11 ENCOUNTER — OFFICE VISIT (OUTPATIENT)
Dept: FAMILY MEDICINE CLINIC | Facility: CLINIC | Age: 58
End: 2018-06-11
Payer: COMMERCIAL

## 2018-06-11 VITALS
BODY MASS INDEX: 36.37 KG/M2 | HEIGHT: 64 IN | TEMPERATURE: 99 F | SYSTOLIC BLOOD PRESSURE: 124 MMHG | WEIGHT: 213 LBS | HEART RATE: 76 BPM | OXYGEN SATURATION: 98 % | DIASTOLIC BLOOD PRESSURE: 80 MMHG

## 2018-06-11 DIAGNOSIS — M79.10 MYALGIA: ICD-10-CM

## 2018-06-11 DIAGNOSIS — R29.898 MUSCULAR DECONDITIONING: ICD-10-CM

## 2018-06-11 DIAGNOSIS — G89.4 CHRONIC PAIN DISORDER: Primary | ICD-10-CM

## 2018-06-11 DIAGNOSIS — F41.9 ANXIETY: ICD-10-CM

## 2018-06-11 PROCEDURE — 99214 OFFICE O/P EST MOD 30 MIN: CPT | Performed by: FAMILY MEDICINE

## 2018-06-11 NOTE — PATIENT INSTRUCTIONS
You just made the best decision of your life by coming off the opioids  Continue on the medical marijuana as this seems to be at least making your pain tolerable  Keep your follow-up with Dr Sarah Givens  Speak with him re: lowering the dose of the lyrica  Have the labs done and I will call with results  You can also speak with him re: the shot in the left shoulder joint  If he does not give it to you and ok with him, I will give it to you  Will refer back to physical therapy for reconditioning

## 2018-06-11 NOTE — PROGRESS NOTES
Assessment/Plan:     Chronic Problems:  Chronic pain disorder  I am thrilled that patient is off opiods  Visit Diagnosis:  Diagnoses and all orders for this visit:    Chronic pain disorder  -     Ambulatory referral to Physical Therapy; Future    Anxiety  Comments:  Continue on the current meds and your medical marijuana    Myalgia  Comments: Will check labs and call with results  Orders:  -     CBC and differential; Future  -     Aldolase; Future  -     Creatine Kinase Isoenzyme Panel; Future  -     Sedimentation rate, automated; Future    Muscular deconditioning  -     Ambulatory referral to Physical Therapy; Future          Subjective:    Patient ID: Tommy Aly is a 59 y o  female  Pt is here for f/u on her chronic pain  Reports she is off opiods  Spoke with the PA at Dr Daily Crockett office and was advised medical marijuana  Spoke with urogynecologist and she does prescribe medical marijuana  Currently vaping marijuana 2:1 THC TO CBD  Also using Versie Burdette which is better for pain  Titrated down the nucynta and was able to titrate off over the last month  Did have some withdrawal when coming off the long acting  Was having a hard time titrating off the long acting  Able to visit her daughter  Can't describe her pain  Yesterday stopped her medical marijuana and found her baseline pain is painful  Pain is always in her low back, but she realized it is not all back pains  Uses antacids and the pain is better  Already on 2 pantoprazole  Currently still on 400 mg of lyrica  Has f/u with pain management next week and wants to decrease to 300 mg daily  Wants a test for muscle breakdown as she feels this is a side effect from lyrica and has muscle aches in the right thigh  Physical therapy helped and she loved it  Also helped her left shoulder  Thinks she wants another shot in the shoulder  Currently on medrol dose javier for breakthrough pain while coming off opiods  Takes all other meds as directed   No side effects noted  Pt is requesting another round of physical therapy for conditioning  She finds it hard to get off the floor and do steps  The following portions of the patient's history were reviewed and updated as appropriate: allergies, current medications, past family history, past medical history, past social history, past surgical history and problem list     Review of Systems   Constitutional: Negative for chills, fatigue (much less fatigue since off opiods  ) and fever  HENT: Negative for ear pain, postnasal drip and sore throat  Eyes: Negative for pain and visual disturbance  Respiratory: Negative for cough, chest tightness, shortness of breath and wheezing  Cardiovascular: Negative for chest pain, palpitations and leg swelling  Gastrointestinal: Negative for abdominal pain, constipation, diarrhea, nausea and vomiting  Endocrine: Negative for cold intolerance, heat intolerance and polyuria  Genitourinary: Positive for urgency  Negative for dysuria and frequency  Musculoskeletal: Positive for arthralgias and myalgias  Negative for gait problem  Skin: Negative for pallor and rash  Neurological: Negative for dizziness, tremors, light-headedness and numbness  Psychiatric/Behavioral: Positive for confusion  Negative for dysphoric mood and suicidal ideas  The patient is not nervous/anxious (feels the cbd in the vape has helped her anxiety)  Feels she is much clearer but still with issues of confusion            /80 (BP Location: Left arm, Patient Position: Sitting, Cuff Size: Adult)   Pulse 76   Temp 99 °F (37 2 °C) (Tympanic)   Ht 5' 4" (1 626 m)   Wt 96 6 kg (213 lb)   SpO2 98%   BMI 36 56 kg/m²   Social History     Social History    Marital status: /Civil Union     Spouse name: N/A    Number of children: N/A    Years of education: N/A     Occupational History    retired      Social History Main Topics    Smoking status: Never Smoker    Smokeless tobacco: Never Used    Alcohol use No    Drug use: No    Sexual activity: Yes     Other Topics Concern    Not on file     Social History Narrative    No narrative on file     Past Medical History:   Diagnosis Date    Anxiety     Arthritis     Attention and concentration deficit     Blurred vision     Chronic pain     Depression     Diplopia     Dyspepsia     Gastric ulcer     Gastritis     Memory loss     Osteopenia     Starting and stopping of urinary stream during micturition     Urinary incontinence     Wears eyeglasses      Family History   Problem Relation Age of Onset    Other Mother      Back Disorder     Cirrhosis Mother     Crohn's disease Mother     Lupus Mother      Systemic Lupus Erythematous     Hypertension Father     Heart disease Father     Other Brother      Liver Transplant      Past Surgical History:   Procedure Laterality Date     SECTION       SECTION      CHOLECYSTECTOMY     [address] GASTRIC BYPASS  2004    HYSTERECTOMY  2012    INSERT / REPLACE PERIPHERAL NEUROSTIMULATOR PULSE GENERATOR /       OTHER SURGICAL HISTORY      [facility]plantatin at [facility]      WRIST ARTHROSCOPY      with internal fixation        Current Outpatient Prescriptions:     Cholecalciferol (D3-1000) 1000 units capsule, Take 1 capsule by mouth 2 (two) times a day, Disp: , Rfl:     diclofenac sodium (VOLTAREN) 1 %, Apply 2 g topically daily for 90 days, Disp: 180 g, Rfl: 0    DULoxetine (CYMBALTA) 60 mg delayed release capsule, Take 1 capsule (60 mg total) by mouth daily, Disp: 90 capsule, Rfl: 1    Iron-Vitamin C (IRON 100/C) 100-250 MG TABS, Take by mouth daily, Disp: , Rfl:     lidocaine (XYLOCAINE) 5 % ointment, Apply topically 2 (two) times a day as needed, Disp: , Rfl:     lisinopril (ZESTRIL) 10 mg tablet, Take 1 tablet (10 mg total) by mouth daily, Disp: 90 tablet, Rfl: 1   Methylprednisolone 4 MG TBPK, Use as directed on package, Disp: 21 tablet, Rfl: 0    metoprolol tartrate (LOPRESSOR) 25 mg tablet, Take 0 5 tablets (12 5 mg total) by mouth 2 (two) times a day, Disp: 90 tablet, Rfl: 1    mometasone (ELOCON) 0 1 % cream, Apply topically daily, Disp: , Rfl:     Multiple Vitamin (MULTI-VITAMIN DAILY) TABS, Take 1 tablet by mouth daily, Disp: , Rfl:     Naloxone HCl (NARCAN) 4 MG/0 1ML LIQD, into each nostril, Disp: , Rfl:     naproxen (NAPROSYN) 500 mg tablet, Take 1 tablet (500 mg total) by mouth every 12 (twelve) hours as needed for mild pain or moderate pain, Disp: 20 tablet, Rfl: 0    pantoprazole (PROTONIX) 40 mg tablet, Take 1 tablet (40 mg total) by mouth 2 (two) times a day, Disp: 180 tablet, Rfl: 1    pregabalin (LYRICA) 100 mg capsule, Take 1 capsule (100 mg total) by mouth 4 (four) times a day for 90 days, Disp: 360 capsule, Rfl: 0    Allergies   Allergen Reactions    Other Other (See Comments) and Sneezing     Dogs  Crab Meat    Cats Claw (Uncaria Tomentosa) Sneezing          Lab Review   No visits with results within 2 Month(s) from this visit  Latest known visit with results is:   Appointment on 02/14/2018   Component Date Value    Cholesterol 02/14/2018 227*    Triglycerides 02/14/2018 62     HDL, Direct 02/14/2018 88*    LDL Calculated 02/14/2018 127*        Imaging: No results found  Objective:     Physical Exam   Constitutional: She is oriented to person, place, and time  HENT:   Head: Normocephalic  Right Ear: External ear normal    Left Ear: External ear normal    Eyes: EOM are normal  Pupils are equal, round, and reactive to light  Right eye exhibits no discharge  Neck: Neck supple  No tracheal deviation present  No thyromegaly present  Cardiovascular: Normal rate and normal heart sounds  No murmur heard  Pulmonary/Chest: No respiratory distress  She has no wheezes  She has no rales  Abdominal: Soft  There is no tenderness  Musculoskeletal: Normal range of motion  Full rom to the left shoulder with no tenderness at this visit  Lymphadenopathy:     She has no cervical adenopathy  Neurological: She is alert and oriented to person, place, and time  Skin: Skin is warm and dry  Psychiatric: She has a normal mood and affect  Her behavior is normal  Judgment and thought content normal          Patient Instructions    You just made the best decision of your life by coming off the opioids  Continue on the medical marijuana as this seems to be at least making your pain tolerable  Keep your follow-up with Dr Sarah Givens  Speak with him re: lowering the dose of the lyrica  Have the labs done and I will call with results  You can also speak with him re: the shot in the left shoulder joint  If he does not give it to you and ok with him, I will give it to you  Will refer back to physical therapy for reconditioning         GWEN Santiago

## 2018-06-13 ENCOUNTER — APPOINTMENT (OUTPATIENT)
Dept: LAB | Facility: CLINIC | Age: 58
End: 2018-06-13
Payer: COMMERCIAL

## 2018-06-13 ENCOUNTER — TRANSCRIBE ORDERS (OUTPATIENT)
Dept: LAB | Facility: CLINIC | Age: 58
End: 2018-06-13

## 2018-06-13 DIAGNOSIS — M79.10 MYALGIA: ICD-10-CM

## 2018-06-13 DIAGNOSIS — M79.10 MYALGIA: Primary | ICD-10-CM

## 2018-06-13 LAB
BASOPHILS # BLD AUTO: 0.08 THOUSANDS/ΜL (ref 0–0.1)
BASOPHILS NFR BLD AUTO: 1 % (ref 0–1)
EOSINOPHIL # BLD AUTO: 0.11 THOUSAND/ΜL (ref 0–0.61)
EOSINOPHIL NFR BLD AUTO: 2 % (ref 0–6)
ERYTHROCYTE [DISTWIDTH] IN BLOOD BY AUTOMATED COUNT: 12.9 % (ref 11.6–15.1)
ERYTHROCYTE [SEDIMENTATION RATE] IN BLOOD: 23 MM/HOUR (ref 0–20)
HCT VFR BLD AUTO: 41.9 % (ref 34.8–46.1)
HGB BLD-MCNC: 13.5 G/DL (ref 11.5–15.4)
IMM GRANULOCYTES # BLD AUTO: 0.02 THOUSAND/UL (ref 0–0.2)
IMM GRANULOCYTES NFR BLD AUTO: 0 % (ref 0–2)
LYMPHOCYTES # BLD AUTO: 2.48 THOUSANDS/ΜL (ref 0.6–4.47)
LYMPHOCYTES NFR BLD AUTO: 34 % (ref 14–44)
MCH RBC QN AUTO: 30.5 PG (ref 26.8–34.3)
MCHC RBC AUTO-ENTMCNC: 32.2 G/DL (ref 31.4–37.4)
MCV RBC AUTO: 95 FL (ref 82–98)
MONOCYTES # BLD AUTO: 0.47 THOUSAND/ΜL (ref 0.17–1.22)
MONOCYTES NFR BLD AUTO: 7 % (ref 4–12)
NEUTROPHILS # BLD AUTO: 4.04 THOUSANDS/ΜL (ref 1.85–7.62)
NEUTS SEG NFR BLD AUTO: 56 % (ref 43–75)
NRBC BLD AUTO-RTO: 0 /100 WBCS
PLATELET # BLD AUTO: 254 THOUSANDS/UL (ref 149–390)
PMV BLD AUTO: 10.9 FL (ref 8.9–12.7)
RBC # BLD AUTO: 4.43 MILLION/UL (ref 3.81–5.12)
WBC # BLD AUTO: 7.2 THOUSAND/UL (ref 4.31–10.16)

## 2018-06-13 PROCEDURE — 85652 RBC SED RATE AUTOMATED: CPT

## 2018-06-13 PROCEDURE — 82085 ASSAY OF ALDOLASE: CPT

## 2018-06-13 PROCEDURE — 82552 ASSAY OF CPK IN BLOOD: CPT

## 2018-06-13 PROCEDURE — 85025 COMPLETE CBC W/AUTO DIFF WBC: CPT

## 2018-06-13 PROCEDURE — 36415 COLL VENOUS BLD VENIPUNCTURE: CPT

## 2018-06-13 PROCEDURE — 82550 ASSAY OF CK (CPK): CPT

## 2018-06-14 LAB — ALDOLASE SERPL-CCNC: 4 U/L (ref 3.3–10.3)

## 2018-06-18 LAB — MISCELLANEOUS LAB TEST RESULT: NORMAL

## 2018-06-20 ENCOUNTER — EVALUATION (OUTPATIENT)
Dept: PHYSICAL THERAPY | Facility: CLINIC | Age: 58
End: 2018-06-20
Payer: COMMERCIAL

## 2018-06-20 DIAGNOSIS — G89.4 CHRONIC PAIN DISORDER: Primary | ICD-10-CM

## 2018-06-20 DIAGNOSIS — R29.898 MUSCULAR DECONDITIONING: ICD-10-CM

## 2018-06-20 PROCEDURE — G8990 OTHER PT/OT CURRENT STATUS: HCPCS | Performed by: PHYSICAL THERAPIST

## 2018-06-20 PROCEDURE — 97162 PT EVAL MOD COMPLEX 30 MIN: CPT | Performed by: PHYSICAL THERAPIST

## 2018-06-20 PROCEDURE — G8991 OTHER PT/OT GOAL STATUS: HCPCS | Performed by: PHYSICAL THERAPIST

## 2018-06-20 NOTE — PROGRESS NOTES
PT Evaluation     Today's date: 2018  Patient name: Mariam Swanson  : 1960  MRN: [id number]  Referring provider: Ursula Main, *  Dx:   Encounter Diagnosis     ICD-10-CM    1  Chronic pain disorder G89 4 Ambulatory referral to Physical Therapy   2  Muscular deconditioning R29 898 Ambulatory referral to Physical Therapy                  Assessment  Impairments: abnormal or restricted ROM, activity intolerance, impaired physical strength, lacks appropriate home exercise program and pain with function    Assessment details: Patient was provided a home exercise program and demonstrated an understanding of exercises  Patient was advised to stop performing home exercise program if symptoms increase or new complaints developed  Verbal understanding demonstrated regarding home exercise program instructions  Patient would benefit from skilled physical therapy services for prescribed exercises, manual interventions, neuromuscular re-education, education, and modalities as deemed appropriate to assist patient in achieving their maximum level of function  Patient returns to resume PT with dx:  Chronic pain  She presents with decreased postural management, c/o lbp, bilateral hip weakness and R > L le lacking AROM  Patient presents in good standing to participate in PT at this time  Understanding of Dx/Px/POC: good   Prognosis: good    Goals  STG  1  Decrease pain by at least two subjective ratings in 4 weeks  2  Increase hip AROM to [facility] [employer] all planes 4-6 weeks  3  Improve motor function 4 weeks  LTG  1  Independent with HEP  2  Patient will make plans to join fitness center either at our facility or public gym 4-6 weeks  3  Patient will report reduced LBP > 50%  6 weeks  4  Improved flexibility bilateral LE 6 weeks  5  Improve postural awareness and self correction 6 weeks         Plan  Patient would benefit from: skilled PT  Planned therapy interventions: flexibility, graded exercise, home exercise program, therapeutic exercise, strengthening, stretching, patient education, neuromuscular re-education, manual therapy, joint mobilization and postural training  Frequency: 2x week  Duration in weeks: 4  Treatment plan discussed with: patient  Plan details: Patient response to treatment will be monitored each session and progressed accordingly    Thank you for this referral          Subjective Evaluation    History of Present Illness  Mechanism of injury: Patient presents back to our department for assessment for Chronic pain and deconditioning  She was seen in our department as recently as May 11, 2018 with similar diagnosis as well as left  Shoulder and LBP  She is pleased to report that she has weaned herself off all Opiods and has been off > 2 weeks now  She reports that her symptoms are essentially "the same" making her realize that all of that medication wasn't really changing anything  She is however, much clearer, sleeping less and functioning more  She is using Medical Marijuana prn with good results and satisfactory pain relief  Today, she presents with a primary goal of being able to "get off the floor" from a squat position, climb up/ down the steps without as much LE "stress" due to weakness  She also c/o back pain central low thoracic/ upper lumbar region  She has a stimulator in place which does help to control her pain, but it does not change the pain  She has noted that if she takes an antacid, her symptoms can reduce significantly  She will be talking to her MD / pain doctor about this tomorrow at follow up as well  Pain  Current pain ratin  At best pain ratin  At worst pain ratin  Location: central LB  Left shoulder painful with movement      Relieving factors: medications  Progression: improved    Treatments  Previous treatment: physical therapy and medication  Current treatment: medication  Patient Goals  Patient goals for therapy: decreased pain, increased motion, independence with ADLs/IADLs, increased strength and return to sport/leisure activities  Patient goal: "get off the floor without so much difficulty"        Objective     Special Questions  Negative for night pain, disturbed sleep, bladder dysfunction and bowel dysfunction    Additional Special Questions  (+) history left Pudendal neuralgia  Postural Observations  Seated posture: fair  Standing posture: fair  Correction of posture: has no consistent effect    Additional Postural Observation Details  Patient with (+) fhp, (+) thoracic kyphosis, reduced lordosis  She tends to stand with shoulder down/ protracted forward  Patient with feet in slight ER posturing  Palpation   Left   Tenderness of the lumbar paraspinals  Right Tenderness of the lumbar paraspinals  Tenderness     Lumbar Spine  Tenderness in the spinous process  Additional Tenderness Details  L4-5, S1 with (+) tenderness during PA mob  Neurological Testing     Sensation   Cervical/Thoracic   Left   Intact: light touch    Right   Intact: light touch    Lumbar   Left   Intact: light touch    Right   Intact: light touch    Active Range of Motion   Cervical/Thoracic Spine   Cervical  Subcranial protraction: WFL   Subcranial retraction: WFL   Flexion: WFL  Extension: WFL  Left rotation: Neck active rotation left: min loss  Right rotation: Neck active rotation right: min loss  Lumbar   Flexion: Active lumbar flexion: min loss  Extension: Active lumbar extension: min loss  Additional Active Range of Motion Details  Repeated movements - FF - P, NW  (+) tightness reported bilateral hs/ gastroc region   KASSANDRA - NE  RFIL - NE  REIL - NE, B   No pain reported with this movement      Strength/Myotome Testing   Cervical Spine     Right etr  Normal strength    Left Shoulder     Planes of Motion   Flexion: 4   Extension: 4+   Abduction: 4   Adduction: 5   External rotation at 0°: 4+   Internal rotation at 0°: 5 Left Elbow   Flexion: 5  Extension: 4+    Left Hip   Planes of Motion   Flexion: 4  Extension: 4+  Abduction: 4  Adduction: 4+  External rotation: 4+  Internal rotation: 4+    Right Hip   Planes of Motion   Flexion: 4  Extension: 4+  Abduction: 4  Adduction: 4+  External rotation: 4+  Internal rotation: 4+    Left Knee   Flexion: 5  Extension: 4+    Right Knee   Flexion: 5  Extension: 4+    Left Ankle/Foot   Dorsiflexion: 5    Right Ankle/Foot   Dorsiflexion: 5    Tests   Cervical   Negative repeated extension and repeated flexion  Thoracic   Negative slump  Lumbar   Negative slump  Left   Negative passive SLR  Right   Negative passive SLR       General Comments     Lumbar Comments  GAIT - slowed tee, heel - toe, diminished arm swing bilaterally, feet in slight ER posturing, lackadaisical                Precautions: PUDENDAL neuralgia Left, Spinal Stimulator, CHRONIC PAIN    Daily Treatment Diary     Manual              Hip stretching bilaterlly                          Lumbar PA mobs                                           Exercise Diary              BIKE             UBE             Eliptical (eventually)                          Hss/ quad stretch             pect stretch             Glut stretch             Cervical retract             scap retraction                          Prone lying             Prop on elbows             Press up             Loc and sag                          Prone 45,90 x 2                                                    abd bracing w/ball             bridging             TB bridge w/abd             sidelying abd                                                                                               Modalities

## 2018-06-21 ENCOUNTER — OFFICE VISIT (OUTPATIENT)
Dept: PAIN MEDICINE | Facility: CLINIC | Age: 58
End: 2018-06-21
Payer: COMMERCIAL

## 2018-06-21 VITALS
WEIGHT: 213 LBS | SYSTOLIC BLOOD PRESSURE: 128 MMHG | HEART RATE: 77 BPM | DIASTOLIC BLOOD PRESSURE: 82 MMHG | BODY MASS INDEX: 36.37 KG/M2 | HEIGHT: 64 IN | RESPIRATION RATE: 16 BRPM

## 2018-06-21 DIAGNOSIS — M47.816 LUMBAR SPONDYLOSIS: ICD-10-CM

## 2018-06-21 DIAGNOSIS — M19.012 OSTEOARTHRITIS OF LEFT SHOULDER, UNSPECIFIED OSTEOARTHRITIS TYPE: Primary | ICD-10-CM

## 2018-06-21 DIAGNOSIS — G58.8 PUDENDAL NEURALGIA: ICD-10-CM

## 2018-06-21 DIAGNOSIS — G89.4 CHRONIC PAIN SYNDROME: ICD-10-CM

## 2018-06-21 PROCEDURE — 99214 OFFICE O/P EST MOD 30 MIN: CPT | Performed by: NURSE PRACTITIONER

## 2018-06-21 RX ORDER — PREGABALIN 75 MG/1
75 CAPSULE ORAL 4 TIMES DAILY
Qty: 360 CAPSULE | Refills: 0 | Status: SHIPPED | OUTPATIENT
Start: 2018-06-21 | End: 2018-08-20

## 2018-06-21 NOTE — PROGRESS NOTES
Assessment:  1  Osteoarthritis of left shoulder, unspecified osteoarthritis type    2  Chronic pain syndrome    3  Pudendal neuralgia    4  Lumbar spondylosis        Plan:  Laura Redmond is a 59 y o  female with history of Chronic pain syndrome secondary to chronic lumbosacral pain, Pudendal neuralgia, and lumbar spondylosis, and left hip and groin pain  The patient continues today with ongoing pudental, left hip, and left shoulder pain  The patient has a history of left shoulder osteoarthritis, and underwent left shoulder injections in the past with positive results  The patient reports that her left shoulder pain is the same pain that she experienced prior to her last left shoulder injection  Therefore at this time the patient would like to move forward with a left subacromial shoulder injection  The patient was educated on the procedures most common risks  She verbalized understanding, and would like to proceed with the procedure  Therefore the patient will be scheduled for an upcoming Tuesday or Thursday  In the meantime the patient reports that she would like to start weaning down on her Lyrica dose  She is currently taking Lyrica 100 mg 4 times daily  Therefore, I will wean her down to Lyrica 75 mg 1 tablet 4 times daily  A prescription for this medication was sent electronically to the patient's pharmacy on file for 90 day supply  360 tablets with no refills  The patient was instructed to call the office if she developed any adverse medication side effects with decreasing her Lyrica dose  The patient will follow up in 4 weeks with Dr Melanie Lester, or sooner with the worsening of symptoms  My impressions and treatment recommendations were discussed in detail with the patient who verbalized understanding and had no further questions  Discharge instructions were provided  I personally saw and examined the patient and I agree with the above discussed plan of care      Orders Placed This Encounter Procedures    FL spine and pain procedure     Standing Status:   Future     Standing Expiration Date:   6/21/2022     Order Specific Question:   Reason for Exam:     Answer:   Left subacromial shoulder injection     Order Specific Question:   Is the patient pregnant? Answer:   No     Order Specific Question:   Anticoagulant hold needed? Answer:   no     New Medications Ordered This Visit   Medications    pregabalin (LYRICA) 75 mg capsule     Sig: Take 1 capsule (75 mg total) by mouth 4 (four) times a day for 90 days     Dispense:  360 capsule     Refill:  0       History of Present Illness:  Kenan Stearns is a 59 y o  female with history of Chronic pain syndrome secondary to chronic lumbosacral pain, Pudendal neuralgia, and lumbar spondylosis, and left hip and groin pain  The patient was last seen office on 5/24/2018 where she was provided a weaning schedule to wean off of her opioid medications  Since last office visit the patient has been opioid free for the last 2 weeks, without side effects    The patient  who presents for a follow up office visit in regards to Back Pain and Leg Pain and left shoulder  The patients current symptoms include left hip pain and pudendal pain and left shoulder  She reports that her left shoulder pain is the most severe pain she is experiencing  She denies left arm weakness, or decreased range of motion  She describes her pain as dull aching, sharp, throbbing and constant in nature occurring mostly during the evening and nighttime hours  She reports that her pain is symptoms are unchanged since last visit  She currently rates her pain a 4/10 numeric pain scale  Current pain medications includes:  Medical Marijuana   The patient reports that this regimen is providing 90% pain relief  The patient is reporting no side effects from this pain medication regimen        I have personally reviewed and/or updated the patient's past medical history, past surgical history, family history, social history, current medications, allergies, and vital signs today  Review of Systems   Musculoskeletal:        Decreased ROM  Muscle weakness     All other systems reviewed and are negative  Patient Active Problem List   Diagnosis    Neuralgia    Continuous leakage of urine    Borderline hyperlipidemia    Chronic pain disorder    Dysesthesia of multiple sites    Eczema    Hematuria    Hypertension    Left shoulder pain    Limb pain    Lower back pain    Lumbar facet arthropathy (HCC)    Memory difficulties    Memory impairment    Numbness    Obesity (BMI 30 0-34  9)    Obstructive sleep apnea    CATHRYN (obstructive sleep apnea)    Postgastrectomy malabsorption    Pudendal neuralgia    Rash    Sleep disturbances    Snoring    Uncomplicated opioid dependence (HCC)    Gastroesophageal reflux disease    Iron deficiency anemia    Uterine leiomyoma       Past Medical History:   Diagnosis Date    Anxiety     Arthritis     Attention and concentration deficit     Blurred vision     Chronic pain     Depression     Diplopia     Dyspepsia     Gastric ulcer     Gastritis     Memory loss     Osteopenia     Starting and stopping of urinary stream during micturition     Urinary incontinence     Wears eyeglasses        Past Surgical History:   Procedure Laterality Date     SECTION       SECTION      CHOLECYSTECTOMY     Lester 35 GASTRIC BYPASS  2004    HYSTERECTOMY  2012    INSERT / REPLACE PERIPHERAL NEUROSTIMULATOR PULSE GENERATOR /       OTHER SURGICAL HISTORY      [facility]      WRIST ARTHROSCOPY      with internal fixation        Family History   Problem Relation Age of Onset    Other Mother         Back Disorder     Cirrhosis Mother     Crohn's disease Mother     Lupus Mother         Systemic Lupus Erythematous     Hypertension Father    Nancy Sousa Heart disease Father     Other Brother         Liver Transplant        Social History     Occupational History    retired      Social History Main Topics    Smoking status: Never Smoker    Smokeless tobacco: Never Used    Alcohol use No    Drug use: No    Sexual activity: Yes       Current Outpatient Prescriptions on File Prior to Visit   Medication Sig    Cholecalciferol (D3-1000) 1000 units capsule Take 1 capsule by mouth 2 (two) times a day    diclofenac sodium (VOLTAREN) 1 % Apply 2 g topically daily for 90 days    DULoxetine (CYMBALTA) 60 mg delayed release capsule Take 1 capsule (60 mg total) by mouth daily    Iron-Vitamin C (IRON 100/C) 100-250 MG TABS Take by mouth daily    lidocaine (XYLOCAINE) 5 % ointment Apply topically 2 (two) times a day as needed    lisinopril (ZESTRIL) 10 mg tablet Take 1 tablet (10 mg total) by mouth daily    metoprolol tartrate (LOPRESSOR) 25 mg tablet Take 0 5 tablets (12 5 mg total) by mouth 2 (two) times a day    mometasone (ELOCON) 0 1 % cream Apply topically daily    Multiple Vitamin (MULTI-VITAMIN DAILY) TABS Take 1 tablet by mouth daily    Naloxone HCl (NARCAN) 4 MG/0 1ML LIQD into each nostril    pantoprazole (PROTONIX) 40 mg tablet Take 1 tablet (40 mg total) by mouth 2 (two) times a day    [DISCONTINUED] Methylprednisolone 4 MG TBPK Use as directed on package    [DISCONTINUED] naproxen (NAPROSYN) 500 mg tablet Take 1 tablet (500 mg total) by mouth every 12 (twelve) hours as needed for mild pain or moderate pain    [DISCONTINUED] pregabalin (LYRICA) 100 mg capsule Take 1 capsule (100 mg total) by mouth 4 (four) times a day for 90 days     No current facility-administered medications on file prior to visit          Allergies   Allergen Reactions    Other Other (See Comments) and Sneezing     Dogs  Crab Meat    Cats Claw (Uncaria Tomentosa) Sneezing       Physical Exam:    /82   Pulse 77   Resp 16   Ht 5' 4" (1 626 m)   Wt 96 6 kg (213 lb) BMI 36 56 kg/m²     Constitutional:normal, well developed, well nourished, alert, in no distress and non-toxic and no overt pain behavior  and obese  Eyes:anicteric  HEENT:grossly intact  Neck:supple, symmetric, trachea midline and no masses   Pulmonary:even and unlabored  Cardiovascular:No edema or pitting edema present  Skin:Normal without rashes or lesions and well hydrated  Psychiatric:Mood and affect appropriate  Neurologic:Cranial Nerves II-XII grossly intact  Musculoskeletal:normal     Shoulder Exam    Appearance:  Normal with no swelling or bruising and no obvious joint deformity  Palpation/Tenderness:  Tenderness in left shoulder joint   Active Range of Motion:  Flexion: No limitation, Extension: No limitation, Abduction: No limitation, External Rotation: No limitation and Internal Rotation: No limitation    Imaging    LEFT SHOULDER     INDICATION:  Left shoulder pain      COMPARISON: None     VIEWS:  3; 3 images     FINDINGS:     There is no acute fracture or dislocation      Moderate glenohumeral degenerative osteoarthritis with a large inferior spur arising from the humeral head      No lytic or blastic lesions are seen      Soft tissues are unremarkable      IMPRESSION:     Glenohumeral degenerative osteoarthritis

## 2018-06-22 ENCOUNTER — OFFICE VISIT (OUTPATIENT)
Dept: PHYSICAL THERAPY | Facility: CLINIC | Age: 58
End: 2018-06-22
Payer: COMMERCIAL

## 2018-06-22 DIAGNOSIS — R29.898 MUSCULAR DECONDITIONING: ICD-10-CM

## 2018-06-22 DIAGNOSIS — G89.4 CHRONIC PAIN DISORDER: Primary | ICD-10-CM

## 2018-06-22 PROCEDURE — 97110 THERAPEUTIC EXERCISES: CPT

## 2018-06-22 PROCEDURE — 97140 MANUAL THERAPY 1/> REGIONS: CPT

## 2018-06-22 NOTE — PROGRESS NOTES
Daily Note     Today's date: 2018  Patient name: Lashonda Orr  : 1960  MRN: [id number]  Referring provider: Marck Ashby, *  Dx:   Encounter Diagnosis     ICD-10-CM    1  Chronic pain disorder G89 4    2  Muscular deconditioning R29 426                   Subjective: Patient reported she has attempted some of the exercises issued at initial evaluation  Objective: See treatment diary below  Manual                       Hip stretching bilaterlly LA PTA                                             Lumbar PA mobs                                                                             Exercise Diary                       BIKE 10'                     UBE                       Eliptical (eventually)                                               Hss/ quad stretch :20  5x each                     pect stretch :10  10x                     Glut stretch                       Cervical retract                       scap retraction                                               Prone lying 3'                     Prop on elbows 3'                     Press up 10x2                     Loc and sag 10x                                             Prone 45,90 x 2 10x each                                                                                             abd bracing w/ball                       bridging                       TB bridge w/abd                       sidelying abd                                                                                                                                                                             Modalities                                                                                                       Assessment: Tolerated treatment without reports of exacerbation of pain symptoms or limitations in exercise performance    Patient demonstrated fatigue post treatment      Plan: Continue per plan of care

## 2018-06-25 ENCOUNTER — OFFICE VISIT (OUTPATIENT)
Dept: PHYSICAL THERAPY | Facility: CLINIC | Age: 58
End: 2018-06-25
Payer: COMMERCIAL

## 2018-06-25 DIAGNOSIS — G89.4 CHRONIC PAIN DISORDER: ICD-10-CM

## 2018-06-25 DIAGNOSIS — M79.10 MYALGIA: Primary | ICD-10-CM

## 2018-06-25 DIAGNOSIS — R29.898 MUSCULAR DECONDITIONING: Primary | ICD-10-CM

## 2018-06-25 PROCEDURE — 97110 THERAPEUTIC EXERCISES: CPT | Performed by: PHYSICAL THERAPIST

## 2018-06-25 PROCEDURE — 97112 NEUROMUSCULAR REEDUCATION: CPT | Performed by: PHYSICAL THERAPIST

## 2018-06-25 NOTE — PROGRESS NOTES
Daily Note     Today's date: 2018  Patient name: Linda Horan  : 1960  MRN: [id number]  Referring provider: Glenis Montiel, *  Dx:   Encounter Diagnosis     ICD-10-CM    1  Muscular deconditioning R29 898    2  Chronic pain disorder G89 4                   Subjective: Patient reports that she took a 4 hour nap after last session - notes that she is sore - still today mildly -  Reports that now that she is opioid free, she is feeling things differently  C/o left shoulder pain with some of the activities    Reports that MHP to end was beneficial        Objective: See treatment diary below  Manual                     Hip stretching bilaterlly LA PTA  nt                                           Lumbar PA mobs                                                                             Exercise Diary                     BIKE 10'  nt                   UBE    10'                   Eliptical (eventually)                                               Hss/ quad stretch :20  5x each  20s x 5 ea                   pect stretch :10  10x  20s x 5                   Glut stretch                       Cervical retract   2s x 20                   scap retraction    3s x 20                                           Prone lying 3'  w/stretch                   Prop on elbows 3'  2'                   Press up 10x2 nt                   Loc and sag 10x  nt                                           Prone 45,90 x 2 10x each  20x ea                                                                                           abd bracing w/ball    10s x 20                   bridging    3s x 20                   TB bridge w/abd                       sidelying abd    20x ea                    TB bilat bkfo    rtbx 20                                                                                                                                                 Modalities                         seated LS/ dona UT    10'                                                                          Assessment: Tolerated treatment fair overall - changes as noted  Patient without further exacerbation of symptoms  Endurance remains a factor  Dc recumbent bike due to discomfort of the chair  Plan: Continue per plan of care

## 2018-06-27 ENCOUNTER — OFFICE VISIT (OUTPATIENT)
Dept: PHYSICAL THERAPY | Facility: CLINIC | Age: 58
End: 2018-06-27
Payer: COMMERCIAL

## 2018-06-27 DIAGNOSIS — R29.898 MUSCULAR DECONDITIONING: ICD-10-CM

## 2018-06-27 DIAGNOSIS — G89.4 CHRONIC PAIN DISORDER: Primary | ICD-10-CM

## 2018-06-27 PROCEDURE — 97110 THERAPEUTIC EXERCISES: CPT | Performed by: PHYSICAL THERAPIST

## 2018-06-27 PROCEDURE — 97112 NEUROMUSCULAR REEDUCATION: CPT | Performed by: PHYSICAL THERAPIST

## 2018-06-27 NOTE — PROGRESS NOTES
Daily Note     Today's date: 2018  Patient name: Lashonda Orr  : 1960  MRN: [id number]  Referring provider: Marck Ashby, *  Dx:   Encounter Diagnosis     ICD-10-CM    1  Chronic pain disorder G89 4    2  Muscular deconditioning R29 863                   Subjective: Patient is doing better today - not nearly as sore after last session   Notes that she is gaining some weight now after being off the Opiods - concerned about this  Pleased to be moving a little easier though       Objective: See treatment diary below  Manual                     Hip stretching bilaterlly LA PTA  nt                                           Lumbar PA mobs                                                                             Exercise Diary                   BIKE 10'  nt                   UBE    10'  10'                 Eliptical (eventually)                                               Hss/ quad stretch :20  5x each  20s x 5 ea  20s x 5                 pect stretch :10  10x  20s x 5  20s x 5                 Glut stretch                       Cervical retract   2s x 20  2s x20                 scap retraction    3s x 20  3s x 20                                          Prone lying 3'  w/stretch                   Prop on elbows 3'  2'                   Press up 10x2 nt                   Loc and sag 10x  nt                                           Prone 45,90 x 2 10x each  20x ea  20x                                                                                          abd bracing w/ball    10s x 20  10s x 10                 bridging    3s x 20  3s x 20                 TB bridge w/abd      rtb x 20                 sidelying abd    20x ea  20x                   TB bilat bkfo    rtbx 20  rtb x 20                                                                                                                                               Modalities                         seated LS/ dona UT    10'                                                                          Assessment: Tolerated treatment better today-  Deferred mhp today-      Plan: Continue per plan of care  continue to progress each session within patient tolerance

## 2018-07-03 ENCOUNTER — HOSPITAL ENCOUNTER (OUTPATIENT)
Dept: RADIOLOGY | Facility: CLINIC | Age: 58
Discharge: HOME | End: 2018-07-03
Attending: ANESTHESIOLOGY
Payer: COMMERCIAL

## 2018-07-03 ENCOUNTER — OFFICE VISIT (OUTPATIENT)
Dept: PHYSICAL THERAPY | Facility: CLINIC | Age: 58
End: 2018-07-03
Payer: COMMERCIAL

## 2018-07-03 VITALS
OXYGEN SATURATION: 95 % | HEART RATE: 73 BPM | TEMPERATURE: 98.6 F | RESPIRATION RATE: 16 BRPM | DIASTOLIC BLOOD PRESSURE: 70 MMHG | SYSTOLIC BLOOD PRESSURE: 108 MMHG

## 2018-07-03 DIAGNOSIS — G57.91 NEUROPATHY OF RIGHT LOWER EXTREMITY: ICD-10-CM

## 2018-07-03 DIAGNOSIS — M19.012 OSTEOARTHRITIS OF LEFT SHOULDER, UNSPECIFIED OSTEOARTHRITIS TYPE: ICD-10-CM

## 2018-07-03 DIAGNOSIS — G89.4 CHRONIC PAIN DISORDER: Primary | ICD-10-CM

## 2018-07-03 DIAGNOSIS — M60.861 OTHER MYOSITIS OF RIGHT LOWER LEG: ICD-10-CM

## 2018-07-03 DIAGNOSIS — M79.18 MYOFASCIAL PAIN SYNDROME: Primary | ICD-10-CM

## 2018-07-03 DIAGNOSIS — R29.898 MUSCULAR DECONDITIONING: ICD-10-CM

## 2018-07-03 PROCEDURE — G8990 OTHER PT/OT CURRENT STATUS: HCPCS

## 2018-07-03 PROCEDURE — 20610 DRAIN/INJ JOINT/BURSA W/O US: CPT | Performed by: ANESTHESIOLOGY

## 2018-07-03 PROCEDURE — 77002 NEEDLE LOCALIZATION BY XRAY: CPT | Performed by: ANESTHESIOLOGY

## 2018-07-03 PROCEDURE — 77002 NEEDLE LOCALIZATION BY XRAY: CPT

## 2018-07-03 PROCEDURE — 97112 NEUROMUSCULAR REEDUCATION: CPT

## 2018-07-03 PROCEDURE — G8991 OTHER PT/OT GOAL STATUS: HCPCS

## 2018-07-03 PROCEDURE — 97110 THERAPEUTIC EXERCISES: CPT

## 2018-07-03 RX ORDER — BUPIVACAINE HCL/PF 2.5 MG/ML
10 VIAL (ML) INJECTION ONCE
Status: COMPLETED | OUTPATIENT
Start: 2018-07-03 | End: 2018-07-03

## 2018-07-03 RX ORDER — METHYLPREDNISOLONE ACETATE 80 MG/ML
80 INJECTION, SUSPENSION INTRA-ARTICULAR; INTRALESIONAL; INTRAMUSCULAR; PARENTERAL; SOFT TISSUE ONCE
Status: COMPLETED | OUTPATIENT
Start: 2018-07-03 | End: 2018-07-03

## 2018-07-03 RX ORDER — LIDOCAINE HYDROCHLORIDE 10 MG/ML
30 INJECTION, SOLUTION EPIDURAL; INFILTRATION; INTRACAUDAL; PERINEURAL ONCE
Status: COMPLETED | OUTPATIENT
Start: 2018-07-03 | End: 2018-07-03

## 2018-07-03 RX ADMIN — METHYLPREDNISOLONE ACETATE 80 MG: 80 INJECTION, SUSPENSION INTRA-ARTICULAR; INTRALESIONAL; INTRAMUSCULAR; SOFT TISSUE at 15:03

## 2018-07-03 RX ADMIN — BUPIVACAINE HYDROCHLORIDE 10 ML: 2.5 INJECTION, SOLUTION EPIDURAL; INFILTRATION; INTRACAUDAL at 15:03

## 2018-07-03 RX ADMIN — LIDOCAINE HYDROCHLORIDE 30 ML: 10 INJECTION, SOLUTION EPIDURAL; INFILTRATION; INTRACAUDAL; PERINEURAL at 15:02

## 2018-07-03 RX ADMIN — IOHEXOL 1 ML: 300 INJECTION, SOLUTION INTRAVENOUS at 15:03

## 2018-07-03 NOTE — PROGRESS NOTES
Daily Note     Today's date: 7/3/2018  Patient name: Laura Redmond  : 1960  MRN: [id number]  Referring provider: Carlos A Sethi, *  Dx:   Encounter Diagnosis     ICD-10-CM    1  Chronic pain disorder G89 4    2  Muscular deconditioning R29 898                   Subjective: Patient continues to report right LE weakness> left LE  Patient also noted left hand muscular "tightening/spasm" at random with inability to move thumb during these episodes  Patient stated she is scheduled for EMG; checking use of Lyrica and side affects        Objective: See treatment diary below  Manual                     Hip stretching bilaterlly LA PTA  nt                                           Lumbar PA mobs                                                                             Exercise Diary  6/22  6/25  6/27 7/3               BIKE 10'  nt                   UBE    10'  10' 10'               Eliptical (eventually)                                               Hss/ quad stretch :20  5x each  20s x 5 ea  20s x 5 :20  5x  each               pect stretch :10  10x  20s x 5  20s x 5 :20  5x               Glut stretch                       Cervical retract   2s x 20  2s x20 :02  20x               scap retraction    3s x 20  3s x 20  :05  20x                                       Prone lying 3'  w/stretch                   Prop on elbows 3'  2'                   Press up 10x2 nt                   Loc and sag 10x  nt                                           Prone 45,90 x 2 10x each  20x ea  20x  B  20x each                                                                                       abd bracing w/ball    10s x 20  10s x 10 :10  10x               bridging    3s x 20  3s x 20 with isometric  hip [id number]               TB bridge w/abd      rtb x 20 RTB  20x               sidelying abd    20x ea  20x  20x                TB bilat bkfo    rtbx 20  rtb x 20 RTB  20x               Treadmill       10'                                                                                                                     Modalities     6/25                    seated LS/ bilat UT    10'                                                                          Assessment: Tolerated treatment without limitations in exercise performance    Patient exhibited good technique with therapeutic exercises  Patient would benefit form continued therapeutic intervention to increase strength and functional mobility  Plan: Continue per plan of care  Progress program to tolerance

## 2018-07-03 NOTE — INTERVAL H&P NOTE
Update: (This section must be completed if the H&P was completed greater than 24 hrs to procedure or admission)    H&P reviewed  After examining the patient, I find no changed to the H&P since it had been written  Patient re-evaluated   Accept as history and physical     Igor Self MD/July 3, 2018/3:43 PM

## 2018-07-03 NOTE — H&P (VIEW-ONLY)
Assessment:  1  Osteoarthritis of left shoulder, unspecified osteoarthritis type    2  Chronic pain syndrome    3  Pudendal neuralgia    4  Lumbar spondylosis        Plan:  Leyla Soares is a 59 y o  female with history of Chronic pain syndrome secondary to chronic lumbosacral pain, Pudendal neuralgia, and lumbar spondylosis, and left hip and groin pain  The patient continues today with ongoing pudental, left hip, and left shoulder pain  The patient has a history of left shoulder osteoarthritis, and underwent left shoulder injections in the past with positive results  The patient reports that her left shoulder pain is the same pain that she experienced prior to her last left shoulder injection  Therefore at this time the patient would like to move forward with a left subacromial shoulder injection  The patient was educated on the procedures most common risks  She verbalized understanding, and would like to proceed with the procedure  Therefore the patient will be scheduled for an upcoming Tuesday or Thursday  In the meantime the patient reports that she would like to start weaning down on her Lyrica dose  She is currently taking Lyrica 100 mg 4 times daily  Therefore, I will wean her down to Lyrica 75 mg 1 tablet 4 times daily  A prescription for this medication was sent electronically to the patient's pharmacy on file for 90 day supply  360 tablets with no refills  The patient was instructed to call the office if she developed any adverse medication side effects with decreasing her Lyrica dose  The patient will follow up in 4 weeks with Dr Annalee Goel, or sooner with the worsening of symptoms  My impressions and treatment recommendations were discussed in detail with the patient who verbalized understanding and had no further questions  Discharge instructions were provided  I personally saw and examined the patient and I agree with the above discussed plan of care      Orders Placed This Encounter Procedures    [place] spine and pain procedure     Standing Status:   Future     Standing Expiration Date:   6/21/2022     Order Specific Question:   Reason for Exam:     Answer:   Left subacromial shoulder injection     Order Specific Question:   Is the patient pregnant? Answer:   No     Order Specific Question:   Anticoagulant hold needed? Answer:   no     New Medications Ordered This Visit   Medications    pregabalin (LYRICA) 75 mg capsule     Sig: Take 1 capsule (75 mg total) by mouth 4 (four) times a day for 90 days     Dispense:  360 capsule     Refill:  0       History of Present Illness:  Stas Rawls is a 59 y o  female with history of Chronic pain syndrome secondary to chronic lumbosacral pain, Pudendal neuralgia, and lumbar spondylosis, and left hip and groin pain  The patient was last seen office on 5/24/2018 where she was provided a weaning schedule to wean off of her opioid medications  Since last office visit the patient has been opioid free for the last 2 weeks, without side effects    The patient  who presents for a follow up office visit in regards to Back Pain and Leg Pain and left shoulder  The patients current symptoms include left hip pain and pudendal pain and left shoulder  She reports that her left shoulder pain is the most severe pain she is experiencing  She denies left arm weakness, or decreased range of motion  She describes her pain as dull aching, sharp, throbbing and constant in nature occurring mostly during the evening and nighttime hours  She reports that her pain is symptoms are unchanged since last visit  She currently rates her pain a 4/10 numeric pain scale  Current pain medications includes:  Medical Marijuana   The patient reports that this regimen is providing 90% pain relief  The patient is reporting no side effects from this pain medication regimen        I have personally reviewed and/or updated the patient's past medical history, past surgical history, family history, social history, current medications, allergies, and vital signs today  Review of Systems   Musculoskeletal:        Decreased ROM  Muscle weakness     All other systems reviewed and are negative  Patient Active Problem List   Diagnosis    Neuralgia    Continuous leakage of urine    Borderline hyperlipidemia    Chronic pain disorder    Dysesthesia of multiple sites    Eczema    Hematuria    Hypertension    Left shoulder pain    Limb pain    Lower back pain    Lumbar facet arthropathy (HCC)    Memory difficulties    Memory impairment    Numbness    Obesity (BMI 30 0-34  9)    Obstructive sleep apnea    CATHRYN (obstructive sleep apnea)    Postgastrectomy malabsorption    Pudendal neuralgia    Rash    Sleep disturbances    Snoring    Uncomplicated opioid dependence (HCC)    Gastroesophageal reflux disease    Iron deficiency anemia    Uterine leiomyoma       Past Medical History:   Diagnosis Date    Anxiety     Arthritis     Attention and concentration deficit     Blurred vision     Chronic pain     Depression     Diplopia     Dyspepsia     Gastric ulcer     Gastritis     Memory loss     Osteopenia     Starting and stopping of urinary stream during micturition     Urinary incontinence     Wears eyeglasses        Past Surgical History:   Procedure Laterality Date     SECTION       SECTION      CHOLECYSTECTOMY     [address] GASTRIC BYPASS  2004    HYSTERECTOMY  2012    INSERT / REPLACE PERIPHERAL NEUROSTIMULATOR PULSE GENERATOR /       OTHER SURGICAL HISTORY      [facility]      WRIST ARTHROSCOPY      with internal fixation        Family History   Problem Relation Age of Onset    Other Mother         Back Disorder     Cirrhosis Mother     Crohn's disease Mother     Lupus Mother         Systemic Lupus Erythematous     Hypertension Father    tar Heart disease Father     Other Brother         Liver Transplant        Social History     Occupational History    retired      Social History Main Topics    Smoking status: Never Smoker    Smokeless tobacco: Never Used    Alcohol use No    Drug use: No    Sexual activity: Yes       Current Outpatient Prescriptions on File Prior to Visit   Medication Sig    Cholecalciferol (D3-1000) 1000 units capsule Take 1 capsule by mouth 2 (two) times a day    diclofenac sodium (VOLTAREN) 1 % Apply 2 g topically daily for 90 days    DULoxetine (CYMBALTA) 60 mg delayed release capsule Take 1 capsule (60 mg total) by mouth daily    Iron-Vitamin C (IRON 100/C) 100-250 MG TABS Take by mouth daily    lidocaine (XYLOCAINE) 5 % ointment Apply topically 2 (two) times a day as needed    lisinopril (ZESTRIL) 10 mg tablet Take 1 tablet (10 mg total) by mouth daily    metoprolol tartrate (LOPRESSOR) 25 mg tablet Take 0 5 tablets (12 5 mg total) by mouth 2 (two) times a day    mometasone (ELOCON) 0 1 % cream Apply topically daily    Multiple Vitamin (MULTI-VITAMIN DAILY) TABS Take 1 tablet by mouth daily    Naloxone HCl (NARCAN) 4 MG/0 1ML LIQD into each nostril    pantoprazole (PROTONIX) 40 mg tablet Take 1 tablet (40 mg total) by mouth 2 (two) times a day    [DISCONTINUED] Methylprednisolone 4 MG TBPK Use as directed on package    [DISCONTINUED] naproxen (NAPROSYN) 500 mg tablet Take 1 tablet (500 mg total) by mouth every 12 (twelve) hours as needed for mild pain or moderate pain    [DISCONTINUED] pregabalin (LYRICA) 100 mg capsule Take 1 capsule (100 mg total) by mouth 4 (four) times a day for 90 days     No current facility-administered medications on file prior to visit          Allergies   Allergen Reactions    Other Other (See Comments) and Sneezing     Dogs  Crab Meat    Cats Claw (Uncaria Tomentosa) Sneezing       Physical Exam:    /82   Pulse 77   Resp 16   Ht 5' 4" (1 626 m)   Wt 96 6 kg (213 lb) BMI 36 56 kg/m²     Constitutional:normal, well developed, well nourished, alert, in no distress and non-toxic and no overt pain behavior  and obese  Eyes:anicteric  HEENT:grossly intact  Neck:supple, symmetric, trachea midline and no masses   Pulmonary:even and unlabored  Cardiovascular:No edema or pitting edema present  Skin:Normal without rashes or lesions and well hydrated  Psychiatric:Mood and affect appropriate  Neurologic:Cranial Nerves II-XII grossly intact  Musculoskeletal:normal     Shoulder Exam    Appearance:  Normal with no swelling or bruising and no obvious joint deformity  Palpation/Tenderness:  Tenderness in left shoulder joint   Active Range of Motion:  Flexion: No limitation, Extension: No limitation, Abduction: No limitation, External Rotation: No limitation and Internal Rotation: No limitation    Imaging    LEFT SHOULDER     INDICATION:  Left shoulder pain      COMPARISON: None     VIEWS:  3; 3 images     FINDINGS:     There is no acute fracture or dislocation      Moderate glenohumeral degenerative osteoarthritis with a large inferior spur arising from the humeral head      No lytic or blastic lesions are seen      Soft tissues are unremarkable      IMPRESSION:     Glenohumeral degenerative osteoarthritis

## 2018-07-03 NOTE — DISCHARGE INSTR - LAB
Steroid Joint Injection   WHAT YOU NEED TO KNOW:   A steroid joint injection is a procedure to inject steroid medicine into a joint  Steroid medicine decreases pain and inflammation  The injection may also contain an anesthetic (numbing medicine) to decrease pain  It may be done to treat conditions such as arthritis, gout, or carpal tunnel syndrome  The injections may be given in your knee, ankle, shoulder, elbow, wrist, ankle or sacroiliac joint  1  Do not apply heat to any area that is numb  If you have discomfort or soreness at the injection site, you may apply ice today, 20 minutes on and 20 minutes off  Tomorrow you may use ice or warm, moist heat  Do not apply ice or heat directly to the skin  2  You may have an increase or change in the discomfort for 36-48 hours after your treatment  Apply ice and continue with any pain medicine you have been prescribed  3  Do not do anything strenuous today  You may shower, but no tub baths or hot tubs today  You may resume your normal activities tomorrow, but do not overdo it  Resume normal activities slowly when you are feeling better  4  If you experience redness, drainage or swelling at the injection site, or if you develop a fever above 100 degrees, please call [facility] at [phone] or go to the Emergency Room  5  Continue to take all routine medicines prescribed by your primary care physician unless otherwise instructed by our staff  Most blood thinners should be started again according to your regularly scheduled dosing  If you have any questions, please give our office a call  If you have a problem specifically related to your procedure, please call our office at [phone]  Problems not related to your procedure should be directed to your primary care physician

## 2018-07-05 ENCOUNTER — APPOINTMENT (OUTPATIENT)
Dept: LAB | Facility: CLINIC | Age: 58
End: 2018-07-05
Payer: COMMERCIAL

## 2018-07-05 ENCOUNTER — OFFICE VISIT (OUTPATIENT)
Dept: PHYSICAL THERAPY | Facility: CLINIC | Age: 58
End: 2018-07-05
Payer: COMMERCIAL

## 2018-07-05 DIAGNOSIS — M60.861 OTHER MYOSITIS OF RIGHT LOWER LEG: ICD-10-CM

## 2018-07-05 DIAGNOSIS — M79.10 MYALGIA: ICD-10-CM

## 2018-07-05 DIAGNOSIS — R29.898 MUSCULAR DECONDITIONING: ICD-10-CM

## 2018-07-05 DIAGNOSIS — G89.4 CHRONIC PAIN DISORDER: Primary | ICD-10-CM

## 2018-07-05 PROCEDURE — 82552 ASSAY OF CPK IN BLOOD: CPT

## 2018-07-05 PROCEDURE — 97110 THERAPEUTIC EXERCISES: CPT

## 2018-07-05 PROCEDURE — 97112 NEUROMUSCULAR REEDUCATION: CPT

## 2018-07-05 PROCEDURE — 82550 ASSAY OF CK (CPK): CPT

## 2018-07-05 NOTE — PROGRESS NOTES
Daily Note     Today's date: 2018  Patient name: Angie Ramesh  : 1960  MRN: [id number]  Referring provider: Roselyn Chu, *  Dx:   Encounter Diagnosis     ICD-10-CM    1  Chronic pain disorder G89 4    2  Muscular deconditioning R29 572                   Subjective: Patient stated she had injection to left shoulder 2 days prior with no change reported to date, only reported "soreness" at injection site    Patient reported improved tolerance to daily activities; walking the dog, etc       Objective: See treatment diary below  Manual                     Hip stretching bilaterlly LA PTA  nt                                           Lumbar PA mobs                                                                             Exercise Diary  6/22  6/25  6/27 7/3 7/5             BIKE 10'  nt                   UBE    10'  10' 10' 10'             Eliptical (eventually)                                               Hss/ quad stretch :20  5x each  20s x 5 ea  20s x 5 :20  5x  each :20  5x   each             pect stretch :10  10x  20s x 5  20s x 5 :20  5x :20  5x              Glut stretch                       Cervical retract   2s x 20  2s x20 :02  20x :03  20x             scap retraction    3s x 20  3s x 20  :05  20x :05  20x each                                      Prone lying 3'  w/stretch                   Prop on elbows 3'  2'                   Press up 10x2 nt                   Loc and sag 10x  nt                                           Prone 45,90 x 2 10x each  20x ea  20x  B  20x each                                                                                       abd bracing w/ball    10s x 20  10s x 10 :10  10x :10   10x             bridging    3s x 20  3s x 20 with isometric  hip [id number] (w/ iso hip add)  :05 20x             TB bridge w/abd      rtb x 20 RTB  20x RTB  20x             sidelying abd    20x ea  20x  20x 1#  20x each             TB bilat bkfo    rtbx 20  rtb x 20 RTB  20x RTB  20x             Treadmill       10' 10'             B Delano         RTB  20x each                                                                                           Modalities     6/25                    seated LS/ bilat UT    10'                                                                         Assessment: Tolerated treatment well  Patient exhibited good technique with therapeutic exercises      Plan: Continue per plan of care

## 2018-07-09 ENCOUNTER — APPOINTMENT (OUTPATIENT)
Dept: PHYSICAL THERAPY | Facility: CLINIC | Age: 58
End: 2018-07-09
Payer: COMMERCIAL

## 2018-07-10 ENCOUNTER — TELEPHONE (OUTPATIENT)
Dept: RADIOLOGY | Facility: CLINIC | Age: 58
End: 2018-07-10

## 2018-07-10 ENCOUNTER — TELEPHONE (OUTPATIENT)
Dept: PAIN MEDICINE | Facility: CLINIC | Age: 58
End: 2018-07-10

## 2018-07-10 LAB — MISCELLANEOUS LAB TEST RESULT: NORMAL

## 2018-07-10 NOTE — TELEPHONE ENCOUNTER
S/P Left Subacromial shoulder inj by SI on 7/3/18  F/U OV 7/25  FYI: S/W pt who states the first few days she was in a great deal of pain  Today she states she feels no pain unless she rotates shoulder  Then it is a 1-2  Advised she could have continued improvement  Pt agreeable  Will see SI on 7/25

## 2018-07-10 NOTE — TELEPHONE ENCOUNTER
PT stopped in and asked if Nighat Barnard filled out the paperwork for her disability pension  She said she was going to give it to Dr Allen Ibrahim  Pls call pt [phone]

## 2018-07-11 ENCOUNTER — OFFICE VISIT (OUTPATIENT)
Dept: PHYSICAL THERAPY | Facility: CLINIC | Age: 58
End: 2018-07-11
Payer: COMMERCIAL

## 2018-07-11 DIAGNOSIS — G89.4 CHRONIC PAIN DISORDER: Primary | ICD-10-CM

## 2018-07-11 DIAGNOSIS — R29.898 MUSCULAR DECONDITIONING: ICD-10-CM

## 2018-07-11 PROCEDURE — 97110 THERAPEUTIC EXERCISES: CPT

## 2018-07-11 PROCEDURE — 97112 NEUROMUSCULAR REEDUCATION: CPT

## 2018-07-11 NOTE — PROGRESS NOTES
Daily Note     Today's date: 2018  Patient name: Kenan Stearns  : 1960  MRN: [id number]  Referring provider: Jesus Michaud, *  Dx:   Encounter Diagnosis     ICD-10-CM    1  Chronic pain disorder G89 4    2  Muscular deconditioning R29 898                   Subjective: Patient reported that she had to cancel last visit due to having a full body reaction  Notes that she was at [facility] on Monday and started sweating all over and her legs gave out on her  Notes that she is currently seeing her pain and family doctor, and has an appointment for her neurologist on Monday next week  She also notes that she got a CK test done and the results shown that her muscles are breaking down  She recently got a 2nd CK test done and is waiting to hear about the results       Objective: See treatment diary below  Manual                     Hip stretching bilaterlly LA PTA  nt                                           Lumbar PA mobs                                                                             Exercise Diary  6/22  6/25  6/27 7/3 7/5  7/11           BIKE 10'  nt                   UBE    10'  10' 10' 10'  10'           Eliptical (eventually)                                               Hss/ quad stretch :20  5x each  20s x 5 ea  20s x 5 :20  5x  each :20  5x   each  :20 5x ea           pect stretch :10  10x  20s x 5  20s x 5 :20  5x :20  5x   :20x5           Glut stretch                       Cervical retract   2s x 20  2s x20 :02  20x :03  20x  :03 20x           scap retraction    3s x 20  3s x 20  :05  20x :05  20x each   :05 20x ea                                   Prone lying 3'  w/stretch                   Prop on elbows 3'  2'                   Press up 10x2 nt                   Loc and sag 10x  nt                                           Prone 45,90 x 2 10x each  20x ea  20x  B  20x each                                                                                       abd bracing w/ball    10s x 20  10s x 10 :10  10x :10   10x  :10x10           bridging    3s x 20  3s x 20 with isometric  hip [id number] (w/ iso hip add)  :05 20x  (w/ iso hip add :05x20           TB bridge w/abd      rtb x 20 RTB  20x RTB  20x  20x no band           sidelying abd    20x ea  20x  20x 1#  20x each  20x ea           TB bilat bkfo    rtbx 20  rtb x 20 RTB  20x RTB  20x             Treadmill       10' 10'             B Clamshells         RTB  20x each  20x ea                                                                                         Modalities     6/25                    seated LS/ bilat UT    10'                                                                         Assessment: Modified pts treatment session today to avoid overworking the LE's  Focused mainly on stretching and UE/core strengthening  Tolerated treatment well having no increased symptoms  Patient exhibited good technique with therapeutic exercises with minimal fatigue noted to end  Plan: Continue per plan of care

## 2018-07-16 ENCOUNTER — PROCEDURE VISIT (OUTPATIENT)
Dept: NEUROLOGY | Facility: CLINIC | Age: 58
End: 2018-07-16
Payer: COMMERCIAL

## 2018-07-16 DIAGNOSIS — G57.91 NEUROPATHY OF RIGHT LOWER EXTREMITY: ICD-10-CM

## 2018-07-16 PROCEDURE — 95909 NRV CNDJ TST 5-6 STUDIES: CPT | Performed by: PHYSICAL MEDICINE & REHABILITATION

## 2018-07-16 PROCEDURE — 95886 MUSC TEST DONE W/N TEST COMP: CPT | Performed by: PHYSICAL MEDICINE & REHABILITATION

## 2018-07-16 NOTE — PROGRESS NOTES
The procedure was discussed with the patient  Verbal consent was obtained after discussing risks and benefits  A sterile concentric needle electrode was used  The patient tolerated the procedure well  There were no complications  EMG RIGHT LOWER EXTREMITY    Motor and sensory conduction studies were performed on the right peroneal tibial and sural nerves  The distal motor latencies were normal  Motor action potential amplitude of the tibial nerve was normal   Motor action potential amplitude of the peroneal nerve was low  Motor conduction studies were normal including conduction of the peroneal nerve across the fibular head  Right peroneal and tibial F waves were normal     The right sural distal sensory latency was normal with normal sensory action potential amplitudes  H  reflexes were normal symmetrically    Concentric needle EMG was performed in the right EDB, tibialis anterior, medial gastrocnemius, vastus lateralis, AH and the lumbar paraspinal region  There was no evidence of spontaneous activity seen  Mild decreased recruitment of giant motor units was noted in the tibialis anterior,vastus lateralis and EDB  The compound motor unit potentials were of normal configuration and interference patterns were full were full for effort in the remaining muscles tested  IMPRESSION: There is electrophysiologic evidence of a:    1  Mild chronic L4-5 radiculopathy as evidenced by the low peroneal motor amplitude and chronic denervation changes in the above-mentioned muscles  Clinical and imaging correlation of the lumbosacral spine is suggested      TOI Clement

## 2018-07-17 ENCOUNTER — OFFICE VISIT (OUTPATIENT)
Dept: PHYSICAL THERAPY | Facility: CLINIC | Age: 58
End: 2018-07-17
Payer: COMMERCIAL

## 2018-07-17 DIAGNOSIS — G89.4 CHRONIC PAIN DISORDER: Primary | ICD-10-CM

## 2018-07-17 DIAGNOSIS — R29.898 MUSCULAR DECONDITIONING: ICD-10-CM

## 2018-07-17 PROCEDURE — 97112 NEUROMUSCULAR REEDUCATION: CPT | Performed by: PHYSICAL THERAPIST

## 2018-07-17 PROCEDURE — 97110 THERAPEUTIC EXERCISES: CPT | Performed by: PHYSICAL THERAPIST

## 2018-07-17 NOTE — PROGRESS NOTES
Daily Note     Today's date: 2018  Patient name: Angie Ramesh  : 1960  MRN: [id number]  Referring provider: Roselyn Chu, *  Dx:   Encounter Diagnosis     ICD-10-CM    1  Chronic pain disorder G89 4    2  Muscular deconditioning R29 898                   Subjective: patient reports that she is feeling much better today-  She notes that she had an EMG done and her "muscles are fine, but she has a pinched nerve in her back"  She notes that she is challenged by her exercise progression today- but pleased to be able to do more without pain produced       Objective: See treatment diary below  Manual                     Hip stretching bilaterlly LA PTA  nt                                           Lumbar PA mobs                                                                             Exercise Diary  6/22  6/25  6/27 7/3 7/5  7/11  7/17         BIKE 10'  nt                   UBE    10'  10' 10' 10'  10'  10'         Eliptical (eventually)                                               Hss/ quad stretch :20  5x each  20s x 5 ea  20s x 5 :20  5x  each :20  5x   each  :20 5x ea  20s x 5          pect stretch :10  10x  20s x 5  20s x 5 :20  5x :20  5x   :20x5 20s x 5         Glut stretch                       Cervical retract   2s x 20  2s x20 :02  20x :03  20x  :03 20x  3s  x20         scap retraction/ back rolls    3s x 20  3s x 20  :05  20x :05  20x each   :05 20x ea  x 20                                 Prone lying 3'  w/stretch                   Prop on elbows 3'  2'                   Press up 10x2 nt                   Loc and sag 10x  nt                                           Prone 45,90 x 2 10x each  20x ea  20x  B  20x each                                                                                       abd bracing w/ball    10s x 20  10s x 10 :10  10x :10   10x  :10x10  10s x 20         bridging    3s x 20  3s x 20 with isometric  hip [id number] (w/ iso hip add)  :05 20x  (w/ iso hip add :05x20           TB bridge w/abd      rtb x 20 RTB  20x RTB  20x  20x no band  rtbx 20         sidelying abd    20x ea  20x  20x 1#  20x each  20x ea 1 5# x 20          TB bilat bkfo/sing     rtbx 20  rtb x 20 RTB  20x RTB  20x    rtb x 20         Treadmill       10' 10'    10'         B Clamshells         RTB  20x each  20x ea rtb x 20                                  Stand abd, hs, ext       1 5 x 20 ea      Stand squats 1/4       X 20      Stand hr        x20                                                                                       Modalities     6/25                    seated LS/ bilat UT    10'                                                                         Assessment: session tolerated much better today- patient able to progress weights without issue  Plan: Continue per plan of care  LTG remains for patient to join our fitness center upon discharge - she is excited at the possibility

## 2018-07-19 ENCOUNTER — APPOINTMENT (OUTPATIENT)
Dept: PHYSICAL THERAPY | Facility: CLINIC | Age: 58
End: 2018-07-19
Payer: COMMERCIAL

## 2018-07-24 ENCOUNTER — OFFICE VISIT (OUTPATIENT)
Dept: PHYSICAL THERAPY | Facility: CLINIC | Age: 58
End: 2018-07-24
Payer: COMMERCIAL

## 2018-07-24 DIAGNOSIS — G89.4 CHRONIC PAIN DISORDER: Primary | ICD-10-CM

## 2018-07-24 DIAGNOSIS — R29.898 MUSCULAR DECONDITIONING: ICD-10-CM

## 2018-07-24 PROCEDURE — 97110 THERAPEUTIC EXERCISES: CPT

## 2018-07-24 PROCEDURE — 97112 NEUROMUSCULAR REEDUCATION: CPT

## 2018-07-24 NOTE — PROGRESS NOTES
Daily Note     Today's date: 2018  Patient name: Lashonda Orr  : 1960  MRN: [id number]  Referring provider: Marck Ashby, *  Dx:   Encounter Diagnosis     ICD-10-CM    1  Chronic pain disorder G89 4    2  Muscular deconditioning R29 898                   Subjective: Pt returns to PT after trip to [place]  Pt reports experiencing pain down R anterior thigh  Pt reports having tests performed which demonstrated a "pinched nerve"      Objective: See treatment diary below  Manual                     Hip stretching bilaterlly LA PTA  nt                                           Lumbar PA mobs                                                                             Exercise Diary   7/3 7/5  7/11  7/17  7/23       BIKE 10'  nt                   UBE    10'  10' 10' 10'  10'  10'  10'       Eliptical (eventually)                                               Hss/ quad stretch :20  5x each  20s x 5 ea  20s x 5 :20  5x  each :20  5x   each  :20 5x ea  20s x 5   20"x5       pect stretch :10  10x  20s x 5  20s x 5 :20  5x :20  5x   :20x5 20s x 5  5"x20       Glut stretch                       Cervical retract   2s x 20  2s x20 :02  20x :03  20x  :03 20x  3s  x20  3" x20       scap retraction/ back rolls    3s x 20  3s x 20  :05  20x :05  20x each   :05 20x ea  x 20  x20                               Prone lying 3'  w/stretch                   Prop on elbows 3'  2'                   Press up 10x2 nt                   Loc and sag 10x  nt                                           Prone 45,90 x 2 10x each  20x ea  20x  B  20x each                                                                                       abd bracing w/ball    10s x 20  10s x 10 :10  10x :10   10x  :10x10  10s x 20  10"x20       bridging    3s x 20  3s x 20 with isometric  hip [id number] (w/ iso hip add)  :05 20x  (w/ iso hip add :05x20    iso hip add 5"x20       TB bridge w/abd      rtb x 20 RTB  20x RTB  20x  20x no band  rtbx 20  gtb x20       sidelying abd    20x ea  20x  20x 1#  20x each  20x ea 1 5# x 20   & slr flx 1 5# x20       TB bilat bkfo/sing     rtbx 20  rtb x 20 RTB  20x RTB  20x    rtb x 20  gtb x20       Treadmill       10' 10'    10'  10'       B Clamshells         RTB  20x each  20x ea rtb x 20   gtb x20                               Stand abd, hs, ext       1 5 x 20 ea 1 5# x20     Stand squats 1/4       X 20 2x10     Stand hr        x20  x20                                                                                     Modalities     6/25                    seated LS/ bilat UT    10'                                                                         Assessment: Patient demonstrated fatigue in BLE post exercise  Able to complete all TE without reports of increased discomfort or pain throughout  R anterior thigh pain remained post session  Plan: Continue per plan of care

## 2018-07-25 ENCOUNTER — OFFICE VISIT (OUTPATIENT)
Dept: PAIN MEDICINE | Facility: CLINIC | Age: 58
End: 2018-07-25
Payer: COMMERCIAL

## 2018-07-25 VITALS
WEIGHT: 213 LBS | HEART RATE: 67 BPM | BODY MASS INDEX: 36.37 KG/M2 | RESPIRATION RATE: 18 BRPM | SYSTOLIC BLOOD PRESSURE: 102 MMHG | HEIGHT: 64 IN | DIASTOLIC BLOOD PRESSURE: 68 MMHG

## 2018-07-25 DIAGNOSIS — M46.1 SACROILIITIS (HCC): Primary | ICD-10-CM

## 2018-07-25 DIAGNOSIS — G89.4 CHRONIC PAIN SYNDROME: ICD-10-CM

## 2018-07-25 PROCEDURE — 99214 OFFICE O/P EST MOD 30 MIN: CPT | Performed by: ANESTHESIOLOGY

## 2018-07-25 NOTE — PROGRESS NOTES
Assessment:  1  Sacroiliitis (HCC)  FL spine and pain procedure   2  Chronic pain syndrome         Plan: This is a 49-year-old female who presents today for follow-up office visit for management of chronic pain syndrome, but under neuralgia and lumbar spondylosis, right sacroiliitis, and left shoulder arthralgia  The patient currently has a neural stimulator in place  Today she continues to complain of right-sided low back tenderness which radiates to the knee  On physical examination, there was tenderness at the right sacroiliac joint  I did have an in depth conversation with the patient today  Clearly, the patient's pain has persisted despite time, relative rest, activity modification as well as therapy  The patient's examination and symptoms would suggest an underlying sacroiliac joint arthropathy  I would recommend proceeding with [right] intra-articular sacroiliac joint injection under fluoroscopic guidance  The injection will serve both diagnostic and hopefully therapeutic roles for the patient  Complete risks and benefits including bleeding, infection, tissue reaction, allergic reaction were reviewed and verbal and written consent was obtained  I may consider a LESI depending on how she does following right SI Joint injection  Regarding her chronic pain issues, patient is currently on Lyrica 75 mg p o  T i d   We are currently weaning off Lyrica  At this time, I recommended she cut back to twice a day for approximately 2 weeks and then subsequently 1 a day until prescription is out completely  She has enough lyrica left over  Patient verbalized understanding  If she has any issues she is advised to give us a call  [facility] Prescription Drug Monitoring Program report was reviewed and was appropriate     My impressions and treatment recommendations were discussed in detail with the patient who verbalized understanding and had no further questions  Discharge instructions were provided  I personally saw and examined the patient and I agree with the above discussed plan of care  History of Present Illness:  Josefa Copeland is a 59 y o  female who presents for a follow up office visit in regards to Back Pain and Leg Pain  She has a history of Chronic pain syndrome secondary to chronic lumbosacral pain, Pudendal neuralgia, and lumbar spondylosis, and left hip and groin pain  The patient was last seen office on 7/03/2018 where she was cut back on lyrica 75mg po TID  She states that she has not noticed any worsening in her pain  She states that she is of opiates completely and that she has tried medical marijuana which she states helped with her pain  She states that she more energy and that she is able to do more  She states that she does not sleep more than 8 hrs at night, unlike before she was sleeping 16hrs a day and it affected her quality of life  The patients current symptoms include left hip pain and pudendal pain and left shoulder  She reports that her left shoulder pain is a lot better after left shoulder injection  She states that she has right sided leg pain which is shooting in nature  She denies leg weakness  She currently rates her pain a 4/10 numeric pain scale  She continues to Undergo PT and perform home exercises  She states that she would like to gradually taper off lyrica completely - perhaps cut back to lyrica 75mg po BID for 2 weeks and then 1 capsule daily moving forward until the prescription runs out  I have personally reviewed and/or updated the patient's past medical history, past surgical history, family history, social history, current medications, allergies, and vital signs today  Review of Systems   Respiratory: Negative for shortness of breath  Cardiovascular: Negative for chest pain  Gastrointestinal: Negative for constipation, diarrhea, nausea and vomiting  Musculoskeletal: Negative for arthralgias, gait problem, joint swelling and myalgias  Skin: Negative for rash  Neurological: Positive for weakness  Negative for dizziness and seizures  All other systems reviewed and are negative  Patient Active Problem List   Diagnosis    Neuralgia    Continuous leakage of urine    Borderline hyperlipidemia    Chronic pain disorder    Dysesthesia of multiple sites    Eczema    Hematuria    Hypertension    Left shoulder pain    Limb pain    Lower back pain    Lumbar facet arthropathy (HCC)    Memory difficulties    Memory impairment    Numbness    Obesity (BMI 30 0-34  9)    Obstructive sleep apnea    CATHRYN (obstructive sleep apnea)    Postgastrectomy malabsorption    Pudendal neuralgia    Rash    Sleep disturbances    Snoring    Uncomplicated opioid dependence (HCC)    Gastroesophageal reflux disease    Iron deficiency anemia    Uterine leiomyoma    Osteoarthritis of left shoulder       Past Medical History:   Diagnosis Date    Anxiety     Arthritis     Attention and concentration deficit     Blurred vision     Chronic pain     Depression     Diplopia     Dyspepsia     Gastric ulcer     Gastritis     Memory loss     Osteopenia     Starting and stopping of urinary stream during micturition     Urinary incontinence     Wears eyeglasses        Past Surgical History:   Procedure Laterality Date     SECTION       SECTION      CHOLECYSTECTOMY     [address] GASTRIC BYPASS  2004    HYSTERECTOMY  2012    INSERT / REPLACE PERIPHERAL NEUROSTIMULATOR PULSE GENERATOR /       OTHER SURGICAL HISTORY      [facility]      WRIST ARTHROSCOPY      with internal fixation        Family History   Problem Relation Age of Onset    Other Mother         Back Disorder     Cirrhosis Mother     Crohn's disease Mother     Lupus Mother         Systemic Lupus Erythematous     Hypertension Father     Heart disease Father    Hemalatha Conner Other Brother         Liver Transplant        Social History     Occupational History    retired      Social History Main Topics    Smoking status: Never Smoker    Smokeless tobacco: Never Used    Alcohol use No    Drug use: No    Sexual activity: Yes       Current Outpatient Prescriptions on File Prior to Visit   Medication Sig    Cholecalciferol (D3-1000) 1000 units capsule Take 1 capsule by mouth 2 (two) times a day    diclofenac sodium (VOLTAREN) 1 % Apply 2 g topically daily for 90 days    DULoxetine (CYMBALTA) 60 mg delayed release capsule Take 1 capsule (60 mg total) by mouth daily    Iron-Vitamin C (IRON 100/C) 100-250 MG TABS Take by mouth daily    lidocaine (XYLOCAINE) 5 % ointment Apply topically 2 (two) times a day as needed    lisinopril (ZESTRIL) 10 mg tablet Take 1 tablet (10 mg total) by mouth daily    metoprolol tartrate (LOPRESSOR) 25 mg tablet Take 0 5 tablets (12 5 mg total) by mouth 2 (two) times a day    mometasone (ELOCON) 0 1 % cream Apply topically daily    Multiple Vitamin (MULTI-VITAMIN DAILY) TABS Take 1 tablet by mouth daily    Naloxone HCl (NARCAN) 4 MG/0 1ML LIQD into each nostril    pantoprazole (PROTONIX) 40 mg tablet Take 1 tablet (40 mg total) by mouth 2 (two) times a day    pregabalin (LYRICA) 75 mg capsule Take 1 capsule (75 mg total) by mouth 4 (four) times a day for 90 days (Patient taking differently: Take 75 mg by mouth 3 (three) times a day  )     No current facility-administered medications on file prior to visit  Allergies   Allergen Reactions    Other Other (See Comments) and Sneezing     Dogs  Crab Meat    Cats Claw (Uncaria Tomentosa) Sneezing       Physical Exam:    /68   Pulse 67   Resp 18   Ht 5' 4" (1 626 m)   Wt 96 6 kg (213 lb)   BMI 36 56 kg/m²     Constitutional:normal, well developed, well nourished, alert, in no distress and non-toxic and no overt pain behavior    Eyes:anicteric  HEENT:grossly intact  Neck:supple, symmetric, trachea midline and no masses   Pulmonary:even and unlabored  Cardiovascular:No edema or pitting edema present  Skin:Normal without rashes or lesions and well hydrated  Psychiatric:Mood and affect appropriate  Neurologic:Cranial Nerves II-XII grossly intact  Musculoskeletal:normal    Lumbar Spine Exam    Appearance:  Normal lordosis  Palpation/Tenderness:  right lumbar paraspinal tenderness  Sensory:  no sensory deficits noted  Range of Motion:  Full range of motion with no pain or limitations in flexion, extension, lateral flexion and rotation  Motor Strength:  Left foot dorsiflexion:  5/5  Left foot plantar flexion:  5/5  Right foot dorsiflexion:  5/5  Right foot plantar flexion:  5/5  Reflexes:  Left Patellar:  2+   Right Patellar:  2+   Special Tests:  Right Milton's Maneuver:  positive  Right Gaenslen's Test:  positive   Right SI joint tenderness    Imaging

## 2018-07-27 ENCOUNTER — OFFICE VISIT (OUTPATIENT)
Dept: PHYSICAL THERAPY | Facility: CLINIC | Age: 58
End: 2018-07-27
Payer: COMMERCIAL

## 2018-07-27 DIAGNOSIS — R29.898 MUSCULAR DECONDITIONING: ICD-10-CM

## 2018-07-27 DIAGNOSIS — G89.4 CHRONIC PAIN DISORDER: Primary | ICD-10-CM

## 2018-07-27 PROCEDURE — 97110 THERAPEUTIC EXERCISES: CPT | Performed by: PHYSICAL THERAPIST

## 2018-07-27 PROCEDURE — 97112 NEUROMUSCULAR REEDUCATION: CPT | Performed by: PHYSICAL THERAPIST

## 2018-07-27 NOTE — PROGRESS NOTES
PT Discharge    Today's date: 2018  Patient name: Flakito Arciniega  : 1960  MRN: [id number]  Referring provider: Philip Pierce, *  Dx:   Encounter Diagnosis     ICD-10-CM    1  Chronic pain disorder G89 4    2  Muscular deconditioning R29 898                   Assessment  Impairments: impaired physical strength and pain with function    Assessment details: Patient reports that she was injected by her neurogynocologist and this helped to abolish her right leg pain   However, her left buttock/ SI discomfort is more  She notes  That after last session she was a bit more uncomfortable  Requesting to reduce t-band to red again  Patient pleased with her progress over this past month - she is moving better, has improved functional ability  She is also feeling stronger  Understanding of Dx/Px/POC: good   Prognosis: good    Goals  STG  1  Decrease pain by at least two subjective ratings in 4 weeks  MET  2  Increase hip AROM to [facility] all planes 4-6 weeks  Partially MET  3  Improve motor function 4 weeks  MET  LTG  1  Independent with HEP   MET  2  Patient will make plans to join fitness center either at our facility or public gym 4-6 weeks  MET  3  Patient will report reduced LBP > 50%  6 weeks   Partially MET  4  Improved flexibility bilateral LE 6 weeks   MET  5  Improve postural awareness and self correction 6 weeks  Partially MET      Plan  Planned therapy interventions: home exercise program  Treatment plan discussed with: patient  Plan details: Patient has done nicely and is ready for discharge as she is independent and competent with HEP at this time     Her plan is to join our fitness center here and will be scheduling 1st appointment for that today before she leaves  THank you for this referral   Patient appears to have achieved maximal benefit from skilled PT at this time           Subjective Evaluation    Pain  No pain reported  Current pain ratin  At best pain ratin  At worst pain ratin  Relieving factors: medications  Progression: improved    Treatments  Previous treatment: physical therapy and medication  Current treatment: medication  Patient Goals  Patient goals for therapy: decreased pain, increased motion, independence with ADLs/IADLs, increased strength and return to sport/leisure activities  Patient goal: "get off the floor without so much difficulty"        Objective     Special Questions  Negative for night pain, disturbed sleep, bladder dysfunction and bowel dysfunction    Additional Special Questions  (+) history left Pudendal neuralgia  Postural Observations  Seated posture: fair  Standing posture: fair  Correction of posture: has no consistent effect    Additional Postural Observation Details  Patient is demonstrating improvements with postural awareness and self correction  Palpation   Left   Tenderness of the lumbar paraspinals  Right Tenderness of the lumbar paraspinals  Tenderness     Lumbar Spine  No tenderness in the spinous process  Neurological Testing     Sensation   Cervical/Thoracic   Left   Intact: light touch    Right   Intact: light touch    Lumbar   Left   Intact: light touch    Right   Intact: light touch    Active Range of Motion   Cervical/Thoracic Spine   Cervical  Subcranial protraction: WFL   Subcranial retraction: WFL   Flexion: WFL  Extension: WFL  Left rotation: [facility]  Right rotation: [facility]    Lumbar   Flexion: Active lumbar flexion: min loss  Extension: Active lumbar extension: min loss  Additional Active Range of Motion Details  Repeated movements - FF - P, NW  (+) tightness reported bilateral hs/ gastroc region   ( less on reassessment 18)  KASSANDRA - NE  RFIL - NE  REIL - NE, B   No pain reported with this movement      Strength/Myotome Testing   Cervical Spine     Right etr  Normal strength    Left Shoulder     Planes of Motion   Flexion: 4+   Extension: 4+   Abduction: 4+   Adduction: 5   External rotation at 0°: 4+   Internal rotation at 0°: 5     Left Elbow   Flexion: 5  Extension: 4+    Left Hip   Planes of Motion   Flexion: 4+  Extension: 4+  Abduction: 4+  Adduction: 4+  External rotation: 4+  Internal rotation: 4+    Right Hip   Planes of Motion   Flexion: 4+  Extension: 4+  Abduction: 4+  Adduction: 4+  External rotation: 4+  Internal rotation: 4+    Left Knee   Flexion: 5  Extension: 4+    Right Knee   Flexion: 5  Extension: 4+    Left Ankle/Foot   Dorsiflexion: 5    Right Ankle/Foot   Dorsiflexion: 5    Tests   Cervical   Negative repeated extension and repeated flexion  Thoracic   Negative slump  Lumbar   Negative slump  Left   Negative passive SLR  Right   Negative passive SLR  General Comments     Lumbar Comments  GAIT -improved tee, improved heel - toe progression  Non-antalgic, much less guarding now on discharge             Precautions: PUDENDAL neuralgia Left, Spinal Stimulator, CHRONIC PAIN    Daily Treatment Diary     Manual  6/22 6/25                   Hip stretching bilaterlly LA PTA  nt                                           Lumbar PA mobs                                                                             Exercise Diary  6/22  6/25  6/27 7/3 7/5  7/11  7/17  7/23  7/27     BIKE 10'  nt                   UBE    10'  10' 10' 10'  10'  10'  10'  10'     Eliptical (eventually)                                               Hss/ quad stretch :20  5x each  20s x 5 ea  20s x 5 :20  5x  each :20  5x   each  :20 5x ea  20s x 5   20"x5  20s x 5     pect stretch :10  10x  20s x 5  20s x 5 :20  5x :20  5x   :20x5 20s x 5  5"x20  5s x 20     Glut stretch                       Cervical retract   2s x 20  2s x20 :02  20x :03  20x  :03 20x  3s  x20  3" x20  3s x 20     scap retraction/ back rolls    3s x 20  3s x 20  :05  20x :05  20x each   :05 20x ea  x 20  x20  20x                              Prone lying 3'  w/stretch                   Prop on elbows 3'  2'                 Press up 10x2 nt                   Loc and sag 10x  nt                                           Prone 45,90 x 2 10x each  20x ea  20x  B  20x each                                                                                       abd bracing w/ball    10s x 20  10s x 10 :10  10x :10   10x  :10x10  10s x 20  10"x20  10s x 20     bridging    3s x 20  3s x 20 with isometric  hip [id number] (w/ iso hip add)  :05 20x  (w/ iso hip add :05x20    iso hip add 5"x20  20x     TB bridge w/abd      rtb x 20 RTB  20x RTB  20x  20x no band  rtbx 20  gtb x20  rtb x 20     sidelying abd    20x ea  20x  20x 1#  20x each  20x ea 1 5# x 20   & slr flx 1 5# x20  1 5# x 20     TB bilat bkfo/sing     rtbx 20  rtb x 20 RTB  20x RTB  20x    rtb x 20  gtb x20  rtb x 20     Treadmill       10' 10'    10'  10'       B Clamshells         RTB  20x each  20x ea rtb x 20   gtb x20  rtb x 20                             Stand abd, hs, ext             1 5 x 20 ea 1 5# x20  1 5# x 20     Stand squats 1/4             X 20 2x10  20x     Stand hr              x20  x20  20x                                                                                                           Modalities     6/25                    seated LS/ bilat UT    10'

## 2018-08-02 ENCOUNTER — TELEPHONE (OUTPATIENT)
Dept: PAIN MEDICINE | Facility: CLINIC | Age: 58
End: 2018-08-02

## 2018-08-02 ENCOUNTER — HOSPITAL ENCOUNTER (OUTPATIENT)
Dept: RADIOLOGY | Facility: CLINIC | Age: 58
Discharge: HOME | End: 2018-08-02
Attending: ANESTHESIOLOGY | Admitting: ANESTHESIOLOGY
Payer: COMMERCIAL

## 2018-08-02 VITALS
HEART RATE: 70 BPM | OXYGEN SATURATION: 96 % | DIASTOLIC BLOOD PRESSURE: 72 MMHG | TEMPERATURE: 97.7 F | SYSTOLIC BLOOD PRESSURE: 121 MMHG | RESPIRATION RATE: 18 BRPM

## 2018-08-02 DIAGNOSIS — M46.1 SACROILIITIS (HCC): ICD-10-CM

## 2018-08-02 PROCEDURE — 27096 INJECT SACROILIAC JOINT: CPT | Performed by: ANESTHESIOLOGY

## 2018-08-02 RX ORDER — METHYLPREDNISOLONE ACETATE 80 MG/ML
80 INJECTION, SUSPENSION INTRA-ARTICULAR; INTRALESIONAL; INTRAMUSCULAR; PARENTERAL; SOFT TISSUE ONCE
Status: COMPLETED | OUTPATIENT
Start: 2018-08-02 | End: 2018-08-02

## 2018-08-02 RX ORDER — LIDOCAINE HYDROCHLORIDE 10 MG/ML
5 INJECTION, SOLUTION EPIDURAL; INFILTRATION; INTRACAUDAL; PERINEURAL ONCE
Status: COMPLETED | OUTPATIENT
Start: 2018-08-02 | End: 2018-08-02

## 2018-08-02 RX ORDER — BUPIVACAINE HCL/PF 2.5 MG/ML
10 VIAL (ML) INJECTION ONCE
Status: COMPLETED | OUTPATIENT
Start: 2018-08-02 | End: 2018-08-02

## 2018-08-02 RX ADMIN — BUPIVACAINE HYDROCHLORIDE 10 ML: 2.5 INJECTION, SOLUTION EPIDURAL; INFILTRATION; INTRACAUDAL at 14:18

## 2018-08-02 RX ADMIN — LIDOCAINE HYDROCHLORIDE 5 ML: 10 INJECTION, SOLUTION EPIDURAL; INFILTRATION; INTRACAUDAL; PERINEURAL at 14:17

## 2018-08-02 RX ADMIN — METHYLPREDNISOLONE ACETATE 80 MG: 80 INJECTION, SUSPENSION INTRA-ARTICULAR; INTRALESIONAL; INTRAMUSCULAR; PARENTERAL; SOFT TISSUE at 14:18

## 2018-08-02 RX ADMIN — IOHEXOL 1 ML: 300 INJECTION, SOLUTION INTRAVENOUS at 14:18

## 2018-08-02 NOTE — DISCHARGE INSTR - LAB
Steroid Joint Injection   WHAT YOU NEED TO KNOW:   A steroid joint injection is a procedure to inject steroid medicine into a joint  Steroid medicine decreases pain and inflammation  The injection may also contain an anesthetic (numbing medicine) to decrease pain  It may be done to treat conditions such as arthritis, gout, or carpal tunnel syndrome  The injections may be given in your knee, ankle, shoulder, elbow, wrist, ankle or sacroiliac joint  1  Do not apply heat to any area that is numb  If you have discomfort or soreness at the injection site, you may apply ice today, 20 minutes on and 20 minutes off  Tomorrow you may use ice or warm, moist heat  Do not apply ice or heat directly to the skin  2  You may have an increase or change in the discomfort for 36-48 hours after your treatment  Apply ice and continue with any pain medicine you have been prescribed  3  Do not do anything strenuous today  You may shower, but no tub baths or hot tubs today  You may resume your normal activities tomorrow, but do not overdo it  Resume normal activities slowly when you are feeling better  4  If you experience redness, drainage or swelling at the injection site, or if you develop a fever above 100 degrees, please call [facility] at [phone] or go to the Emergency Room  5  Continue to take all routine medicines prescribed by your primary care physician unless otherwise instructed by our staff  Most blood thinners should be started again according to your regularly scheduled dosing  If you have any questions, please give our office a call  If you have a problem specifically related to your procedure, please call our office at [phone]  Problems not related to your procedure should be directed to your primary care physician

## 2018-08-02 NOTE — H&P (VIEW-ONLY)
Assessment:  1  Sacroiliitis (HCC)  FL spine and pain procedure   2  Chronic pain syndrome         Plan: This is a 57-year-old female who presents today for follow-up office visit for management of chronic pain syndrome, but under neuralgia and lumbar spondylosis, right sacroiliitis, and left shoulder arthralgia  The patient currently has a neural stimulator in place  Today she continues to complain of right-sided low back tenderness which radiates to the knee  On physical examination, there was tenderness at the right sacroiliac joint  I did have an in depth conversation with the patient today  Clearly, the patient's pain has persisted despite time, relative rest, activity modification as well as therapy  The patient's examination and symptoms would suggest an underlying sacroiliac joint arthropathy  I would recommend proceeding with [right] intra-articular sacroiliac joint injection under fluoroscopic guidance  The injection will serve both diagnostic and hopefully therapeutic roles for the patient  Complete risks and benefits including bleeding, infection, tissue reaction, allergic reaction were reviewed and verbal and written consent was obtained  I may consider a LESI depending on how she does following right SI Joint injection  Regarding her chronic pain issues, patient is currently on Lyrica 75 mg p o  T i d   We are currently weaning off Lyrica  At this time, I recommended she cut back to twice a day for approximately 2 weeks and then subsequently 1 a day until prescription is out completely  She has enough lyrica left over  Patient verbalized understanding  If she has any issues she is advised to give us a call  [place] Prescription Drug Monitoring Program report was reviewed and was appropriate     My impressions and treatment recommendations were discussed in detail with the patient who verbalized understanding and had no further questions  Discharge instructions were provided  I personally saw and examined the patient and I agree with the above discussed plan of care  History of Present Illness:  Rolando Villavicencio is a 59 y o  female who presents for a follow up office visit in regards to Back Pain and Leg Pain  She has a history of Chronic pain syndrome secondary to chronic lumbosacral pain, Pudendal neuralgia, and lumbar spondylosis, and left hip and groin pain  The patient was last seen office on 7/03/2018 where she was cut back on lyrica 75mg po TID  She states that she has not noticed any worsening in her pain  She states that she is of opiates completely and that she has tried medical marijuana which she states helped with her pain  She states that she more energy and that she is able to do more  She states that she does not sleep more than 8 hrs at night, unlike before she was sleeping 16hrs a day and it affected her quality of life  The patients current symptoms include left hip pain and pudendal pain and left shoulder  She reports that her left shoulder pain is a lot better after left shoulder injection  She states that she has right sided leg pain which is shooting in nature  She denies leg weakness  She currently rates her pain a 4/10 numeric pain scale  She continues to Undergo PT and perform home exercises  She states that she would like to gradually taper off lyrica completely - perhaps cut back to lyrica 75mg po BID for 2 weeks and then 1 capsule daily moving forward until the prescription runs out  I have personally reviewed and/or updated the patient's past medical history, past surgical history, family history, social history, current medications, allergies, and vital signs today  Review of Systems   Respiratory: Negative for shortness of breath  Cardiovascular: Negative for chest pain  Gastrointestinal: Negative for constipation, diarrhea, nausea and vomiting  Musculoskeletal: Negative for arthralgias, gait problem, joint swelling and myalgias  Skin: Negative for rash  Neurological: Positive for weakness  Negative for dizziness and seizures  All other systems reviewed and are negative  Patient Active Problem List   Diagnosis    Neuralgia    Continuous leakage of urine    Borderline hyperlipidemia    Chronic pain disorder    Dysesthesia of multiple sites    Eczema    Hematuria    Hypertension    Left shoulder pain    Limb pain    Lower back pain    Lumbar facet arthropathy (HCC)    Memory difficulties    Memory impairment    Numbness    Obesity (BMI 30 0-34  9)    Obstructive sleep apnea    CATHRYN (obstructive sleep apnea)    Postgastrectomy malabsorption    Pudendal neuralgia    Rash    Sleep disturbances    Snoring    Uncomplicated opioid dependence (HCC)    Gastroesophageal reflux disease    Iron deficiency anemia    Uterine leiomyoma    Osteoarthritis of left shoulder       Past Medical History:   Diagnosis Date    Anxiety     Arthritis     Attention and concentration deficit     Blurred vision     Chronic pain     Depression     Diplopia     Dyspepsia     Gastric ulcer     Gastritis     Memory loss     Osteopenia     Starting and stopping of urinary stream during micturition     Urinary incontinence     Wears eyeglasses        Past Surgical History:   Procedure Laterality Date     SECTION       SECTION      CHOLECYSTECTOMY     [address] GASTRIC BYPASS  2004    HYSTERECTOMY  2012    INSERT / REPLACE PERIPHERAL NEUROSTIMULATOR PULSE GENERATOR /       OTHER SURGICAL HISTORY      [facility] at [facility]      WRIST ARTHROSCOPY      with internal fixation        Family History   Problem Relation Age of Onset    Other Mother         Back Disorder     Cirrhosis Mother     Crohn's disease Mother     Lupus Mother         Systemic Lupus Erythematous     Hypertension Father     Heart disease Father    Ezequiel Correa Other Brother         Liver Transplant        Social History     Occupational History    retired      Social History Main Topics    Smoking status: Never Smoker    Smokeless tobacco: Never Used    Alcohol use No    Drug use: No    Sexual activity: Yes       Current Outpatient Prescriptions on File Prior to Visit   Medication Sig    Cholecalciferol (D3-1000) 1000 units capsule Take 1 capsule by mouth 2 (two) times a day    diclofenac sodium (VOLTAREN) 1 % Apply 2 g topically daily for 90 days    DULoxetine (CYMBALTA) 60 mg delayed release capsule Take 1 capsule (60 mg total) by mouth daily    Iron-Vitamin C (IRON 100/C) 100-250 MG TABS Take by mouth daily    lidocaine (XYLOCAINE) 5 % ointment Apply topically 2 (two) times a day as needed    lisinopril (ZESTRIL) 10 mg tablet Take 1 tablet (10 mg total) by mouth daily    metoprolol tartrate (LOPRESSOR) 25 mg tablet Take 0 5 tablets (12 5 mg total) by mouth 2 (two) times a day    mometasone (ELOCON) 0 1 % cream Apply topically daily    Multiple Vitamin (MULTI-VITAMIN DAILY) TABS Take 1 tablet by mouth daily    Naloxone HCl (NARCAN) 4 MG/0 1ML LIQD into each nostril    pantoprazole (PROTONIX) 40 mg tablet Take 1 tablet (40 mg total) by mouth 2 (two) times a day    pregabalin (LYRICA) 75 mg capsule Take 1 capsule (75 mg total) by mouth 4 (four) times a day for 90 days (Patient taking differently: Take 75 mg by mouth 3 (three) times a day  )     No current facility-administered medications on file prior to visit  Allergies   Allergen Reactions    Other Other (See Comments) and Sneezing     Dogs  Crab Meat    Cats Claw (Uncaria Tomentosa) Sneezing       Physical Exam:    /68   Pulse 67   Resp 18   Ht 5' 4" (1 626 m)   Wt 96 6 kg (213 lb)   BMI 36 56 kg/m²     Constitutional:normal, well developed, well nourished, alert, in no distress and non-toxic and no overt pain behavior    Eyes:anicteric  HEENT:grossly intact  Neck:supple, symmetric, trachea midline and no masses   Pulmonary:even and unlabored  Cardiovascular:No edema or pitting edema present  Skin:Normal without rashes or lesions and well hydrated  Psychiatric:Mood and affect appropriate  Neurologic:Cranial Nerves II-XII grossly intact  Musculoskeletal:normal    Lumbar Spine Exam    Appearance:  Normal lordosis  Palpation/Tenderness:  right lumbar paraspinal tenderness  Sensory:  no sensory deficits noted  Range of Motion:  Full range of motion with no pain or limitations in flexion, extension, lateral flexion and rotation  Motor Strength:  Left foot dorsiflexion:  5/5  Left foot plantar flexion:  5/5  Right foot dorsiflexion:  5/5  Right foot plantar flexion:  5/5  Reflexes:  Left Patellar:  2+   Right Patellar:  2+   Special Tests:  Right Milton's Maneuver:  positive  Right Gaenslen's Test:  positive   Right SI joint tenderness    Imaging

## 2018-08-02 NOTE — INTERVAL H&P NOTE
Update: (This section must be completed if the H&P was completed greater than 24 hrs to procedure or admission)    H&P reviewed  After examining the patient, I find no changed to the H&P since it had been written  Patient re-evaluated   Accept as history and physical     Jossy Alvarez MD/August 2, 2018/2:10 PM

## 2018-08-02 NOTE — TELEPHONE ENCOUNTER
Pt stopped in after her procedure and wanted to know if she needed a fup for the procedure or her appt that was last week  Pls advise

## 2018-08-08 ENCOUNTER — OFFICE VISIT (OUTPATIENT)
Dept: PHYSICAL THERAPY | Facility: CLINIC | Age: 58
End: 2018-08-08

## 2018-08-09 ENCOUNTER — TELEPHONE (OUTPATIENT)
Dept: RADIOLOGY | Facility: CLINIC | Age: 58
End: 2018-08-09

## 2018-08-09 NOTE — TELEPHONE ENCOUNTER
S/w pt  States no relief in right thigh pain but has noticed some improvement in back pain  Advised pt to give injection full 2 weeks and to call [facility] back if no improvement

## 2018-08-20 ENCOUNTER — OFFICE VISIT (OUTPATIENT)
Dept: FAMILY MEDICINE CLINIC | Facility: CLINIC | Age: 58
End: 2018-08-20
Payer: COMMERCIAL

## 2018-08-20 VITALS
SYSTOLIC BLOOD PRESSURE: 112 MMHG | WEIGHT: 213.6 LBS | HEART RATE: 72 BPM | DIASTOLIC BLOOD PRESSURE: 80 MMHG | HEIGHT: 64 IN | BODY MASS INDEX: 36.47 KG/M2 | RESPIRATION RATE: 16 BRPM | OXYGEN SATURATION: 98 % | TEMPERATURE: 99.6 F

## 2018-08-20 DIAGNOSIS — Z90.3 POSTGASTRECTOMY MALABSORPTION: ICD-10-CM

## 2018-08-20 DIAGNOSIS — M54.16 LUMBAR RADICULOPATHY: Primary | ICD-10-CM

## 2018-08-20 DIAGNOSIS — K91.2 POSTGASTRECTOMY MALABSORPTION: ICD-10-CM

## 2018-08-20 DIAGNOSIS — Z23 NEED FOR VACCINATION: ICD-10-CM

## 2018-08-20 DIAGNOSIS — G89.29 OTHER CHRONIC PAIN: ICD-10-CM

## 2018-08-20 DIAGNOSIS — E78.5 BORDERLINE HYPERLIPIDEMIA: ICD-10-CM

## 2018-08-20 DIAGNOSIS — G89.4 CHRONIC PAIN SYNDROME: ICD-10-CM

## 2018-08-20 PROCEDURE — 99214 OFFICE O/P EST MOD 30 MIN: CPT | Performed by: FAMILY MEDICINE

## 2018-08-20 PROCEDURE — 90682 RIV4 VACC RECOMBINANT DNA IM: CPT

## 2018-08-20 PROCEDURE — 3008F BODY MASS INDEX DOCD: CPT | Performed by: FAMILY MEDICINE

## 2018-08-20 NOTE — PATIENT INSTRUCTIONS
Reviewed all with patient  CK has come down from 299 to 103  The shot given by Pain Management has helped somewhat with the pain in the right thigh but continue your follow-up with Dr Beatriz Mcdermott  Speak to you your gynecologist as far as getting your CBD oil out [place]   It seems read [place] to me that they will let you carry narcotics on the plane but not CBD oil with a prescription  Have the additional labs done and I will call with results  Flu shot today

## 2018-08-20 NOTE — PROGRESS NOTES
Assessment/Plan:     Chronic Problems:  Postgastrectomy malabsorption  Will get labs and call with results    Borderline hyperlipidemia  Will get labs and call with results  Lumbar radiculopathy  Since the shot remy Andrade has helped, continue your f/u there and no more narcotics    Chronic pain syndrome  Keep the f/u with Dr Peter Leary  Visit Diagnosis:  Diagnoses and all orders for this visit:    Lumbar radiculopathy    Other chronic pain    Postgastrectomy malabsorption  -     Vitamin B12; Future  -     Folate; Future  -     Magnesium; Future  -     Phosphorus; Future  -     Vitamin D 25 hydroxy; Future  -     Iron Panel; Future    Borderline hyperlipidemia  -     Lipid panel; Future  -     Comprehensive metabolic panel; Future    Need for vaccination  -     influenza vaccine, [id number], quadrivalent, recombinant, PF, 0 5 mL, for patients 18 yr+ (FLUBLOK)    Chronic pain syndrome          Subjective:    Patient ID: Castro Boykin is a 59 y o  female  Pt is here for routine f/u  Off all of the opiods, and on cbd oil which is helping with her chronic pain  Wants to go on a plane, but has terrible problems with pain while on planes and was told she can't bring her cbd oil on the plane  Worrying about taking the meds on the plane  This was ordered by her urogynecologist  Reshma Leonardo she is able to get this in [place] now as it is now legal to do use this there  She plans on getting a note from her urogynecologist  Had labs done and here to discuss results  Takes all other meds as directed  No side effects noted  The following portions of the patient's history were reviewed and updated as appropriate: allergies, current medications, past family history, past medical history, past social history, past surgical history and problem list     Review of Systems   Constitutional: Negative for chills, diaphoresis, fatigue and fever  HENT: Negative  Eyes: Negative      Respiratory: Negative for cough, shortness of breath and wheezing  Cardiovascular: Negative for chest pain and palpitations  Gastrointestinal: Positive for abdominal distention and abdominal pain (after eating foods and liquids containing lactose  )  Negative for constipation, diarrhea, nausea and vomiting  Worries that she may be lactose intolerant  Twin sister is lactose intolerant  Using lactaid milk which really helps  Genitourinary: Negative  Musculoskeletal:        Still has the muscular pain in the right anterior thigh  Given an injection by Dr Jazlyn Ly which did help with standing but still with pain in the leg, which makes it difficult to stand long  Now totally off the lyrica  Feels her appetite came back when she got off the narcotics  Still with pain in the left groin daily under the abdomen and also pain in the left anterior and lateral thigh area  Also will get pain over left sij if she sits too long  Uses diclofenac gel, but like the liquids better  Neurological: Negative for dizziness, light-headedness and numbness  Had her emg done of the right lower extremity  Psychiatric/Behavioral: Negative for dysphoric mood  The patient is not nervous/anxious (doing much better, as she thinks when she took her break through meds she was worried about pain that was out of control  )            /80   Pulse 72   Temp 99 6 °F (37 6 °C) (Tympanic)   Resp 16   Ht 5' 4" (1 626 m)   Wt 96 9 kg (213 lb 9 6 oz)   SpO2 98%   BMI 36 66 kg/m²   Social History     Social History    Marital status: /Civil Union     Spouse name: N/A    Number of children: N/A    Years of education: N/A     Occupational History    retired      Social History Main Topics    Smoking status: Never Smoker    Smokeless tobacco: Never Used    Alcohol use No    Drug use: No    Sexual activity: Yes     Other Topics Concern    Not on file     Social History Narrative    No narrative on file     Past Medical History:   Diagnosis Date    Anxiety     Arthritis     Attention and concentration deficit     Blurred vision     Chronic pain     Depression     Diplopia     Dyspepsia     Gastric ulcer     Gastritis     Memory loss     Osteopenia     Starting and stopping of urinary stream during micturition     Urinary incontinence     Wears eyeglasses      Family History   Problem Relation Age of Onset    Other Mother         Back Disorder     Cirrhosis Mother     Crohn's disease Mother     Lupus Mother         Systemic Lupus Erythematous     Hypertension Father     Heart disease Father     Other Brother         Liver Transplant      Past Surgical History:   Procedure Laterality Date     SECTION  1988     [facility] GASTRIC BYPASS  2004    HYSTERECTOMY  2012    INSERT / REPLACE PERIPHERAL NEUROSTIMULATOR PULSE GENERATOR /       OTHER SURGICAL HISTORY      Reimplantatin at [address] [place] [address] WRIST ARTHROSCOPY      with internal fixation        Current Outpatient Prescriptions:     Cholecalciferol (D3-1000) 1000 units capsule, Take 1 capsule by mouth 2 (two) times a day, Disp: , Rfl:     diclofenac sodium (VOLTAREN) 1 %, Apply 2 g topically daily for 90 days, Disp: 180 g, Rfl: 0    DULoxetine (CYMBALTA) 60 mg delayed release capsule, Take 1 capsule (60 mg total) by mouth daily, Disp: 90 capsule, Rfl: 1    Iron-Vitamin C (IRON 100/C) 100-250 MG TABS, Take by mouth daily, Disp: , Rfl:     lidocaine (XYLOCAINE) 5 % ointment, Apply topically 2 (two) times a day as needed, Disp: , Rfl:     lisinopril (ZESTRIL) 10 mg tablet, Take 1 tablet (10 mg total) by mouth daily, Disp: 90 tablet, Rfl: 1    metoprolol tartrate (LOPRESSOR) 25 mg tablet, Take 0 5 tablets (12 5 mg total) by mouth 2 (two) times a day, Disp: 90 tablet, Rfl: 1    Multiple Vitamin (MULTI-VITAMIN DAILY) TABS, Take 1 tablet by mouth daily, Disp: , Rfl:   pantoprazole (PROTONIX) 40 mg tablet, Take 1 tablet (40 mg total) by mouth 2 (two) times a day, Disp: 180 tablet, Rfl: 1    mometasone (ELOCON) 0 1 % cream, Apply topically daily, Disp: , Rfl:     Allergies   Allergen Reactions    Other Other (See Comments) and Sneezing     Dogs  Crab Meat    Cats Claw (Uncaria Tomentosa) Sneezing          Lab Review   Appointment on 07/05/2018   Component Date Value    Miscellaneous Lab Test R* 07/05/2018 SEE WRITTEN REPORT FROM [facility]         Imaging: Fl Spine And Pain Procedure    Result Date: 8/2/2018  Narrative: Indication: Low back/buttock pain Preoperative Diagnosis: 1  Sacroiliac Joint Pain                                            2  Sacroiliitis Postoperative Diagnosis: 1  Sacroiliac Joint Pain                                            2  Sacroiliitis Procedure:  Fluoroscopically-guided injection of the RIGHT sacroiliac joint(s) After discussing the risks, benefits, and alternatives to the procedure, the patient expressed understanding and wished to proceed  The patient was brought to the fluoroscopy suite and placed in the prone position  Procedural pause conducted to verify: correct patient identity, procedure to be performed and as applicable, correct side and site, correct patient position, and availability of implants, special equipment and special requirements  Using fluoroscopy, the inferior portion of the sacroiliac joint was identified  The skin was sterilely prepped and draped in the usual fashion using Chloraprep skin prep  The skin and subcutaneous tissue were anesthetized with 1% lidocaine  Using fluoroscopic guidance, a 3 5 inch 22 gauge spinal needle was advanced into joint  Proper needle positioning was confirmed using multiple fluoroscopic views  After negative aspiration, Omnipaque 300 contrast was injected, showing intraarticular spread of contrast without any evidence of intravascular uptake   A 2 mL solution consisting of 80 mg of Depo-Medrol in 1cc of 0 25% bupivacaine was injected slowly and incrementally into the joint  Following the injection, the needle was withdrawn slightly and flushed with lidocaine as it was fully extracted  The patient tolerated the procedure well and there were no apparent complications  After appropriate observation, the patient was dismissed from the clinic in good condition under their own power  Objective:     Physical Exam   Constitutional: She is oriented to person, place, and time  She appears well-developed and well-nourished  No distress  HENT:   Head: Normocephalic and atraumatic  Right Ear: External ear normal    Left Ear: External ear normal    Mouth/Throat: Oropharynx is clear and moist    Eyes: Conjunctivae and EOM are normal  Pupils are equal, round, and reactive to light  Right eye exhibits no discharge  Left eye exhibits no discharge  Neck: Normal range of motion  Neck supple  No thyromegaly present  Cardiovascular: Normal rate, regular rhythm and normal heart sounds  Exam reveals no friction rub  No murmur heard  Pulmonary/Chest: Effort normal and breath sounds normal  No respiratory distress  She has no wheezes  She has no rales  She exhibits no tenderness  Abdominal: Soft  Bowel sounds are normal  There is no tenderness  Musculoskeletal: Normal range of motion  She exhibits no edema, tenderness or deformity  Lymphadenopathy:     She has no cervical adenopathy  Neurological: She is alert and oriented to person, place, and time  No cranial nerve deficit  Skin: Skin is warm and dry  No rash noted  She is not diaphoretic  Psychiatric: She has a normal mood and affect  Her behavior is normal  Judgment and thought content normal          Patient Instructions    Reviewed all with patient  CK has come down from 299 to 103  The shot given by Pain Management has helped somewhat with the pain in the right thigh but continue your follow-up with Dr Beatriz Mcdermott    Speak to you your gynecologist as far as getting your CBD oil out [place]   It seems read [place] to me that they will let you carry narcotics on the plane but not CBD oil with a prescription  Have the additional labs done and I will call with results  Flu shot today         GWEN Loo

## 2018-08-27 ENCOUNTER — APPOINTMENT (OUTPATIENT)
Dept: LAB | Facility: CLINIC | Age: 58
End: 2018-08-27
Payer: COMMERCIAL

## 2018-08-27 DIAGNOSIS — Z90.3 POSTGASTRECTOMY MALABSORPTION: ICD-10-CM

## 2018-08-27 DIAGNOSIS — K91.2 POSTGASTRECTOMY MALABSORPTION: ICD-10-CM

## 2018-08-27 DIAGNOSIS — E78.5 BORDERLINE HYPERLIPIDEMIA: ICD-10-CM

## 2018-08-27 LAB
25(OH)D3 SERPL-MCNC: 49.1 NG/ML (ref 30–100)
ALBUMIN SERPL BCP-MCNC: 3.3 G/DL (ref 3.5–5)
ALP SERPL-CCNC: 102 U/L (ref 46–116)
ALT SERPL W P-5'-P-CCNC: 19 U/L (ref 12–78)
ANION GAP SERPL CALCULATED.3IONS-SCNC: 12 MMOL/L (ref 4–13)
AST SERPL W P-5'-P-CCNC: 16 U/L (ref 5–45)
BILIRUB SERPL-MCNC: 0.43 MG/DL (ref 0.2–1)
BUN SERPL-MCNC: 15 MG/DL (ref 5–25)
CALCIUM SERPL-MCNC: 8.5 MG/DL (ref 8.3–10.1)
CHLORIDE SERPL-SCNC: 104 MMOL/L (ref 100–108)
CHOLEST SERPL-MCNC: 228 MG/DL (ref 50–200)
CO2 SERPL-SCNC: 23 MMOL/L (ref 21–32)
CREAT SERPL-MCNC: 0.77 MG/DL (ref 0.6–1.3)
FERRITIN SERPL-MCNC: 33 NG/ML (ref 8–388)
FOLATE SERPL-MCNC: >20 NG/ML (ref 3.1–17.5)
GFR SERPL CREATININE-BSD FRML MDRD: 85 ML/MIN/1.73SQ M
GLUCOSE P FAST SERPL-MCNC: 104 MG/DL (ref 65–99)
HDLC SERPL-MCNC: 88 MG/DL (ref 40–60)
IRON SATN MFR SERPL: 37 %
IRON SERPL-MCNC: 112 UG/DL (ref 50–170)
LDLC SERPL CALC-MCNC: 126 MG/DL (ref 0–100)
MAGNESIUM SERPL-MCNC: 2.2 MG/DL (ref 1.6–2.6)
NONHDLC SERPL-MCNC: 140 MG/DL
PHOSPHATE SERPL-MCNC: 3.3 MG/DL (ref 2.7–4.5)
POTASSIUM SERPL-SCNC: 3.9 MMOL/L (ref 3.5–5.3)
PROT SERPL-MCNC: 7.3 G/DL (ref 6.4–8.2)
SODIUM SERPL-SCNC: 139 MMOL/L (ref 136–145)
TIBC SERPL-MCNC: 306 UG/DL (ref 250–450)
TRIGL SERPL-MCNC: 71 MG/DL
VIT B12 SERPL-MCNC: 915 PG/ML (ref 100–900)

## 2018-08-27 PROCEDURE — 83550 IRON BINDING TEST: CPT

## 2018-08-27 PROCEDURE — 36415 COLL VENOUS BLD VENIPUNCTURE: CPT

## 2018-08-27 PROCEDURE — 80061 LIPID PANEL: CPT

## 2018-08-27 PROCEDURE — 82728 ASSAY OF FERRITIN: CPT

## 2018-08-27 PROCEDURE — 84100 ASSAY OF PHOSPHORUS: CPT

## 2018-08-27 PROCEDURE — 83540 ASSAY OF IRON: CPT

## 2018-08-27 PROCEDURE — 82607 VITAMIN B-12: CPT

## 2018-08-27 PROCEDURE — 82746 ASSAY OF FOLIC ACID SERUM: CPT

## 2018-08-27 PROCEDURE — 82306 VITAMIN D 25 HYDROXY: CPT

## 2018-08-27 PROCEDURE — 83735 ASSAY OF MAGNESIUM: CPT

## 2018-08-27 PROCEDURE — 80053 COMPREHEN METABOLIC PANEL: CPT

## 2018-08-28 ENCOUNTER — TELEPHONE (OUTPATIENT)
Dept: FAMILY MEDICINE CLINIC | Facility: CLINIC | Age: 58
End: 2018-08-28

## 2018-08-28 NOTE — TELEPHONE ENCOUNTER
----- Message from [address] [place] [address]  sent at 8/28/2018 12:57 PM EDT -----  Labs look pretty good  Cholesterol is elevated, but so is the hdl which is cardioprotective  BS is minimally elevated otherwise ok

## 2018-09-08 DIAGNOSIS — K21.9 GASTROESOPHAGEAL REFLUX DISEASE WITHOUT ESOPHAGITIS: ICD-10-CM

## 2018-09-08 DIAGNOSIS — G89.29 CHRONIC LEFT SHOULDER PAIN: ICD-10-CM

## 2018-09-08 DIAGNOSIS — I10 ESSENTIAL HYPERTENSION: ICD-10-CM

## 2018-09-08 DIAGNOSIS — M25.512 CHRONIC LEFT SHOULDER PAIN: ICD-10-CM

## 2018-09-09 RX ORDER — PANTOPRAZOLE SODIUM 40 MG/1
TABLET, DELAYED RELEASE ORAL
Qty: 180 TABLET | Refills: 1 | Status: SHIPPED | OUTPATIENT
Start: 2018-09-09 | End: 2019-03-08 | Stop reason: SDUPTHER

## 2018-09-09 RX ORDER — DULOXETIN HYDROCHLORIDE 60 MG/1
CAPSULE, DELAYED RELEASE ORAL
Qty: 90 CAPSULE | Refills: 1 | Status: SHIPPED | OUTPATIENT
Start: 2018-09-09 | End: 2019-02-28 | Stop reason: SDUPTHER

## 2018-09-09 RX ORDER — LISINOPRIL 10 MG/1
TABLET ORAL
Qty: 90 TABLET | Refills: 1 | Status: SHIPPED | OUTPATIENT
Start: 2018-09-09 | End: 2019-03-08 | Stop reason: SDUPTHER

## 2018-09-13 ENCOUNTER — TRANSCRIBE ORDERS (OUTPATIENT)
Dept: RADIOLOGY | Facility: CLINIC | Age: 58
End: 2018-09-13

## 2018-09-13 ENCOUNTER — OFFICE VISIT (OUTPATIENT)
Dept: PAIN MEDICINE | Facility: CLINIC | Age: 58
End: 2018-09-13
Payer: COMMERCIAL

## 2018-09-13 ENCOUNTER — APPOINTMENT (OUTPATIENT)
Dept: RADIOLOGY | Facility: CLINIC | Age: 58
End: 2018-09-13
Payer: COMMERCIAL

## 2018-09-13 VITALS
RESPIRATION RATE: 18 BRPM | WEIGHT: 213 LBS | HEART RATE: 68 BPM | SYSTOLIC BLOOD PRESSURE: 118 MMHG | DIASTOLIC BLOOD PRESSURE: 84 MMHG | HEIGHT: 64 IN | BODY MASS INDEX: 36.37 KG/M2

## 2018-09-13 DIAGNOSIS — M16.11 PRIMARY OSTEOARTHRITIS OF RIGHT HIP: ICD-10-CM

## 2018-09-13 DIAGNOSIS — M54.16 LUMBAR RADICULITIS: ICD-10-CM

## 2018-09-13 DIAGNOSIS — M16.11 PRIMARY OSTEOARTHRITIS OF RIGHT HIP: Primary | ICD-10-CM

## 2018-09-13 DIAGNOSIS — M79.604 MUSCULOSKELETAL LEG PAIN, RIGHT: Primary | ICD-10-CM

## 2018-09-13 DIAGNOSIS — M79.18 MYOFASCIAL PAIN SYNDROME: ICD-10-CM

## 2018-09-13 PROCEDURE — 73502 X-RAY EXAM HIP UNI 2-3 VIEWS: CPT

## 2018-09-13 PROCEDURE — 99213 OFFICE O/P EST LOW 20 MIN: CPT | Performed by: ANESTHESIOLOGY

## 2018-09-13 NOTE — PROGRESS NOTES
Assessment:  1  Musculoskeletal leg pain, right  Diclofenac Sodium 2 % SOLN   2  Myofascial pain syndrome     3  Lumbar radiculitis  MRI lumbar spine without contrast   4  Primary osteoarthritis of right hip  XR hip/pelv 2-3 vws right if performed     Plan: This is a 53-year-old female who presents today with chronic pain syndrome  Patient has chronic lumbar radiculitis as well as bilateral left and right hip pain  We recently performed a right sacroiliac joint injection which did not provide significant relief of pain  She continues to experience pain and tenderness at the right greater trochanteric bursa radiating to the knee  On physical examination, disc worsening pain with internal rotation of the right hip  This also tenderness at the greater trochanteric bursae  At this time, I will order imaging modality, x-ray of the right hip  In addition, I will also order a repeat MRI of the lumbosacral spine without contrast   Upon completion of the studies, I will leave patient a call to review results accordingly  She verbalized understanding  I may consider a right greater trochanteric bursa injection to alleviate her pain  Patient will continue medical marijuana  Refill for diclofenac topical transdermal trans[facility]mal [facility]  My impressions and treatment recommendations were discussed in detail with the patient who verbalized understanding and had no further questions  Discharge instructions were provided  I personally saw and examined the patient and I agree with the above discussed plan of care  History of Present Illness:  Julio Mchugh is a 59 y o  female who presents for a follow up office visit in regards to Back Pain (low)  The patients current symptoms include low back pain and right hip pain  She had a right SI joint injection and states that she had somewhat pain relief   She states that her pain is constant, Dull / achy in nature, sharp, throbbing with occasional numbness and pins and needles sensation  Patient currently uses medical marijuana which helps control the majority of her pain symptoms  She states that she has been off lyrica since 8/20/18  She states that she is doing okay  She states that she is still sleeping 10hrs a day  She states that she is going to visit family in [place]  Pain is rated 5/10  She states that she is still on cymbalta 60mg po qhs  She uses diclofenac topical prn for her hip pain  I have personally reviewed and/or updated the patient's past medical history, past surgical history, family history, social history, current medications, allergies, and vital signs today  Review of Systems   Respiratory: Negative for shortness of breath  Cardiovascular: Negative for chest pain  Gastrointestinal: Negative for constipation, diarrhea, nausea and vomiting  Musculoskeletal: Positive for myalgias  Negative for arthralgias, gait problem and joint swelling  Skin: Negative for rash  Neurological: Positive for weakness  Negative for dizziness and seizures  All other systems reviewed and are negative  Patient Active Problem List   Diagnosis    Neuralgia    Continuous leakage of urine    Borderline hyperlipidemia    Chronic pain syndrome    Dysesthesia of multiple sites    Eczema    Hematuria    Hypertension    Left shoulder pain    Limb pain    Lower back pain    Lumbar facet arthropathy (HCC)    Memory difficulties    Memory impairment    Numbness    Obesity (BMI 30 0-34  9)    Obstructive sleep apnea    CATHRYN (obstructive sleep apnea)    Postgastrectomy malabsorption    Pudendal neuralgia    Rash    Sleep disturbances    Snoring    Uncomplicated opioid dependence (HCC)    Gastroesophageal reflux disease    Iron deficiency anemia    Uterine leiomyoma    Osteoarthritis of left shoulder    Sacroiliitis (HCC)    Lumbar radiculopathy       Past Medical History:   Diagnosis Date    Anxiety     Arthritis     Attention and concentration deficit     Blurred vision     Chronic pain     Depression     Diplopia     Dyspepsia     Gastric ulcer     Gastritis     Memory loss     Osteopenia     Starting and stopping of urinary stream during micturition     Urinary incontinence     Wears eyeglasses        Past Surgical History:   Procedure Laterality Date     SECTION     [facility]    GASTRIC BYPASS  2004    HYSTERECTOMY  2012    INSERT / REPLACE PERIPHERAL NEUROSTIMULATOR PULSE GENERATOR /       OTHER SURGICAL HISTORY      [facility]implantatin at [facility] WRIST ARTHROSCOPY      with internal fixation        Family History   Problem Relation Age of Onset    Other Mother         Back Disorder     Cirrhosis Mother     Crohn's disease Mother     Lupus Mother         Systemic Lupus Erythematous     Hypertension Father     Heart disease Father     Other Brother         Liver Transplant        Social History     Occupational History    retired      Social History Main Topics    Smoking status: Never Smoker    Smokeless tobacco: Never Used    Alcohol use No    Drug use: No    Sexual activity: Yes       Current Outpatient Prescriptions on File Prior to Visit   Medication Sig    Cholecalciferol (D3-1000) 1000 units capsule Take 1 capsule by mouth 2 (two) times a day    diclofenac sodium (VOLTAREN) 1 % Apply 2 g topically daily for 90 days    DULoxetine (CYMBALTA) 60 mg delayed release capsule TAKE 1 CAPSULE DAILY    Iron-Vitamin C (IRON 100/C) 100-250 MG TABS Take by mouth daily    lidocaine (XYLOCAINE) 5 % ointment Apply topically 2 (two) times a day as needed    lisinopril (ZESTRIL) 10 mg tablet TAKE 1 TABLET DAILY    metoprolol tartrate (LOPRESSOR) 25 mg tablet TAKE ONE-HALF (1/2) TABLET TWICE A DAY    mometasone (ELOCON) 0 1 % cream Apply topically daily    Multiple Vitamin (MULTI-VITAMIN DAILY) TABS Take 1 tablet by mouth daily    pantoprazole (PROTONIX) 40 mg tablet TAKE 1 TABLET TWICE A DAY     No current facility-administered medications on file prior to visit  Allergies   Allergen Reactions    Other Other (See Comments) and Sneezing     Dogs  Crab Meat    Cats Claw (Uncaria Tomentosa) Sneezing     CATS       Physical Exam:    /84   Pulse 68   Resp 18   Ht 5' 4" (1 626 m)   Wt 96 6 kg (213 lb)   BMI 36 56 kg/m²     Constitutional:normal, well developed, well nourished, alert, in no distress and non-toxic and no overt pain behavior    Eyes:anicteric  HEENT:grossly intact  Neck:supple, symmetric, trachea midline and no masses   Pulmonary:even and unlabored  Cardiovascular:No edema or pitting edema present  Skin:Normal without rashes or lesions and well hydrated  Psychiatric:Mood and affect appropriate  Neurologic:Cranial Nerves II-XII grossly intact  Musculoskeletal:  Pain with internal rotation of the hip    Lumbar Spine Exam    Appearance:  Normal lordosis  Palpation/Tenderness:  left lumbar paraspinal tenderness  right lumbar paraspinal tenderness  Sensory:  no sensory deficits noted  Range of Motion:  Extension:  Moderately limited  with pain  Motor Strength:  Left foot dorsiflexion:  5/5  Left foot plantar flexion:  5/5  Right foot dorsiflexion:  5/5  Right foot plantar flexion:  5/5  Reflexes:  Left Patellar:  2+   Right Patellar:  2+     Imaging

## 2018-09-19 ENCOUNTER — TELEPHONE (OUTPATIENT)
Dept: PAIN MEDICINE | Facility: MEDICAL CENTER | Age: 58
End: 2018-09-19

## 2018-09-19 DIAGNOSIS — G89.4 CHRONIC PAIN SYNDROME: Primary | ICD-10-CM

## 2018-09-19 NOTE — TELEPHONE ENCOUNTER
[facility]/w [facility], Jane Arriaga, pharmacist has questions regarding current rx rec'd (states she can not disclose rx to me- pharmacist will speak w/nurse only)  Please call pharmacist on [phone] ref # [id number], ASAP to discuss issue

## 2018-09-19 NOTE — TELEPHONE ENCOUNTER
S/w pharmacy  Diclofenac script not available as written, 2% solution  Pharmacy states can do 1%gel or 1 5% topical solution  Please advise          For nurse: Pharmacy left a cb # [phone] with ref# [id number]

## 2018-09-20 DIAGNOSIS — G89.4 CHRONIC PAIN SYNDROME: ICD-10-CM

## 2018-09-20 NOTE — TELEPHONE ENCOUNTER
Pharmacy states the solution is measured in drops so dosage needs to be adjusted, unless SI wants 1%gel and then they will keep 2g dosage  Please advise

## 2018-09-21 NOTE — TELEPHONE ENCOUNTER
S/w [facility] and advised of correct dosage  [facility], so I called [facility] and canceled it

## 2018-10-23 ENCOUNTER — TELEPHONE (OUTPATIENT)
Dept: FAMILY MEDICINE CLINIC | Facility: CLINIC | Age: 58
End: 2018-10-23

## 2018-10-23 NOTE — TELEPHONE ENCOUNTER
Patient called she just got back from [place] with being with her sister  She said she doesn't feel right  She is having worse problems  She has brain fog and sob and chest tightness like it takes all her energy to breath  She said she had an ekg done in [facility] and her heart was ok  She said she couldn't really go t the store 6 miles from her house  Her  had to come pick her up  She said she has been on medical marijuana and has been off for two weeks because she hasn't been able to pick it up  She also cant sleep at night as well  Very fatigue extreme  She is getting cramps in her legs on her L side  She also is coughing and sob       Alistair Alaniz is speaking with patient now and she is going to advise her

## 2018-10-25 ENCOUNTER — TELEPHONE (OUTPATIENT)
Dept: PAIN MEDICINE | Facility: CLINIC | Age: 58
End: 2018-10-25

## 2018-10-25 ENCOUNTER — OFFICE VISIT (OUTPATIENT)
Dept: FAMILY MEDICINE CLINIC | Facility: CLINIC | Age: 58
End: 2018-10-25
Payer: COMMERCIAL

## 2018-10-25 VITALS
HEIGHT: 64 IN | BODY MASS INDEX: 36.09 KG/M2 | SYSTOLIC BLOOD PRESSURE: 118 MMHG | HEART RATE: 73 BPM | WEIGHT: 211.4 LBS | DIASTOLIC BLOOD PRESSURE: 70 MMHG | OXYGEN SATURATION: 97 %

## 2018-10-25 DIAGNOSIS — F41.8 ANXIETY WITH DEPRESSION: ICD-10-CM

## 2018-10-25 DIAGNOSIS — G60.9 IDIOPATHIC NEUROPATHY: Primary | ICD-10-CM

## 2018-10-25 DIAGNOSIS — G31.84 MILD COGNITIVE IMPAIRMENT WITH MEMORY LOSS: Primary | ICD-10-CM

## 2018-10-25 DIAGNOSIS — G47.00 INSOMNIA, UNSPECIFIED TYPE: Primary | ICD-10-CM

## 2018-10-25 DIAGNOSIS — M54.5 ACUTE LEFT-SIDED LOW BACK PAIN, WITH SCIATICA PRESENCE UNSPECIFIED: ICD-10-CM

## 2018-10-25 DIAGNOSIS — G89.4 CHRONIC PAIN SYNDROME: ICD-10-CM

## 2018-10-25 PROCEDURE — 99214 OFFICE O/P EST MOD 30 MIN: CPT | Performed by: FAMILY MEDICINE

## 2018-10-25 RX ORDER — DRONABINOL 5 MG/1
5 CAPSULE ORAL
Qty: 60 CAPSULE | Refills: 0
Start: 2018-10-25

## 2018-10-25 RX ORDER — MIRTAZAPINE 15 MG/1
15 TABLET, FILM COATED ORAL
Qty: 30 TABLET | Refills: 0 | Status: SHIPPED | OUTPATIENT
Start: 2018-10-25 | End: 2018-11-21 | Stop reason: SDUPTHER

## 2018-10-25 NOTE — PATIENT INSTRUCTIONS
Discussed all with patient  You need to keep your follow-up with Pain Management as many of your issues are coming from your chronic pain  For your insomnia and your anxiety I want to start some mirtazapine at night about a half an hour before bed  Take your do Loxitane in the morning take the mirtazapine at night  Keep the appointment for the MRI as some of your symptoms may be coming from your low back  Continue your other medications  Had her flu shot already  Follow-up here in 2 weeks but call if you have any problems on the new medications

## 2018-10-25 NOTE — TELEPHONE ENCOUNTER
S/w patient referral to Dr Sierra Garza can not state neuropathy, or she will be be scheduled w/another provider   Call back # is [phone] to discuss and re-write order/referral

## 2018-10-25 NOTE — TELEPHONE ENCOUNTER
Pt stopped in office to make an appt  She is having left sided pain with neurologic issues  She can't get an appt until 11/23, but would like Dr Giselle Samuel to call her back asap

## 2018-10-25 NOTE — PROGRESS NOTES
Assessment/Plan:     Chronic Problems:  Chronic pain syndrome  Need to keep the f/u appt with Dr Serena Ramirez  Visit Diagnosis:  Diagnoses and all orders for this visit:    Insomnia, unspecified type  Comments:  Suspect this has something to do with memory issues and your anxiety  Will add mirtazapine at night  Acute left-sided low back pain, with sciatica presence unspecified  Comments:  have the mri done as planned    Anxiety with depression  -     mirtazapine (REMERON) 15 mg tablet; Take 1 tablet (15 mg total) by mouth daily at bedtime    Chronic pain syndrome  -     dronabinol (MARINOL) 5 MG capsule; Take 1 capsule (5 mg total) by mouth 2 (two) times a day before meals By Dr Serena Ramirez          Subjective:    Patient ID: Carlie Morales is a 59 y o  female  Pt is here for problems with her chronic pain  Had come off her opiods and cbd oil and medical marijuana pills when she was in [place]  Did pretty well for the first week off her meds  Also came off her lyrica  The second week she flew to [facility] and then went into a dispensary for marijuana and felt that helped the pain  Her  was taking her around quite a bit which was more exercise than she has done in a while  Was able to keep up modified for 6 days and then could not function anymore  Flew back to [place] for 2 weeks and could not get anything else for her pain  Saved the medical marijuana for the flight home  Feels she overdid it while away  Came off the opiods because she was having difficulty with her brain  Still can't use a computer since the opiods  Feels like she developed adult adhd  Having headaches behind the left eye, sometimes the right eye  Not using cpap anymore as she feels she can't breathe  Last night had tingling on the left side of her face  Also had some numbness on the left sij which radiated to the toes  Also has pains in the left shoulder and can't lie on that arm  Sleeps with brace on the left hand  Hands would shake and ezekiel when she was on opiods  Was having problems driving as her left thumb gets stuck in a bad position  Also gets cramp in the left lower extremity  Feels she still can't think clearly  Not sleeping at night  Tried melatonin and tylenol pm and the first night was great, but after that could not sleep at all  Still sees Dr Dereck Gongora for pain management  Was due to have an mri of the lower back by Dr Dereck Gongora  H/O neurostimulator on the left side  Told she could not have it done as no one from [place] was there to turn off the stimulator  Rescheduled for November 15th  Not sure when the f/u with Dr Dereck Gongora is  The following portions of the patient's history were reviewed and updated as appropriate: allergies, current medications, past family history, past medical history, past social history, past surgical history and problem list     Review of Systems   Constitutional: Negative for chills and fever  HENT: Negative for ear pain, postnasal drip and sore throat  Eyes: Negative for pain and visual disturbance  Respiratory: Negative for cough, chest tightness, shortness of breath and wheezing  Cardiovascular: Negative for chest pain and palpitations  Gastrointestinal: Negative  Negative for vomiting  Endocrine: Negative for cold intolerance, heat intolerance and polyuria  Genitourinary: Negative for dysuria, frequency and urgency  Musculoskeletal: Positive for back pain and myalgias  Negative for arthralgias and gait problem  Always has burning pain in the left groin but recently developed pain in the mid to low back that is burning  Skin: Negative for pallor and rash  Neurological: Positive for tremors (still off the lyrica), numbness (and tingling of the left lower extremity and left upper arm  ) and headaches  Negative for dizziness and light-headedness  Psychiatric/Behavioral: Positive for dysphoric mood (and feels very emotional  )  Negative for suicidal ideas   The patient is nervous/anxious            /70   Pulse 73   Ht 5' 4" (1 626 m)   Wt 95 9 kg (211 lb 6 4 oz)   SpO2 97%   BMI 36 29 kg/m²   Social History     Social History    Marital status: /Civil Union     Spouse name: N/A    Number of children: N/A    Years of education: N/A     Occupational History    retired      Social History Main Topics    Smoking status: Never Smoker    Smokeless tobacco: Never Used    Alcohol use No    Drug use: No    Sexual activity: Yes     Other Topics Concern    Not on file     Social History Narrative    No narrative on file     Past Medical History:   Diagnosis Date    Anxiety     Arthritis     Attention and concentration deficit     Blurred vision     Chronic pain     Depression     Diplopia     Dyspepsia     Gastric ulcer     Gastritis     Memory loss     Osteopenia     Starting and stopping of urinary stream during micturition     Urinary incontinence     Wears eyeglasses      Family History   Problem Relation Age of Onset    Other Mother         Back Disorder     Cirrhosis Mother     Crohn's disease Mother     Lupus Mother         Systemic Lupus Erythematous     Hypertension Father     Heart disease Father     Other Brother         Liver Transplant      Past Surgical History:   Procedure Laterality Date     SECTION  1988     [address] GASTRIC BYPASS  2004    HYSTERECTOMY  2012    INSERT / REPLACE PERIPHERAL NEUROSTIMULATOR PULSE GENERATOR /       OTHER SURGICAL HISTORY      Reimplantatin at [facility]      WRIST ARTHROSCOPY      with internal fixation        Current Outpatient Prescriptions:     Cholecalciferol (D3-1000) 1000 units capsule, Take 1 capsule by mouth 2 (two) times a day, Disp: , Rfl:     diclofenac sodium (VOLTAREN) 1 %, Apply 2 g topically daily for 90 days, Disp: 180 g, Rfl: 0    Diclofenac Sodium 1 5 % SOLN, Apply 2 drops to affected area 3x/day, Disp: 1 Bottle, Rfl: 2    dronabinol (MARINOL) 5 MG capsule, Take 1 capsule (5 mg total) by mouth 2 (two) times a day before meals By Dr Giselle Samuel, Disp: 60 capsule, Rfl: 0    DULoxetine (CYMBALTA) 60 mg delayed release capsule, TAKE 1 CAPSULE DAILY, Disp: 90 capsule, Rfl: 1    Iron-Vitamin C (IRON 100/C) 100-250 MG TABS, Take by mouth daily, Disp: , Rfl:     lidocaine (XYLOCAINE) 5 % ointment, Apply topically 2 (two) times a day as needed, Disp: , Rfl:     lisinopril (ZESTRIL) 10 mg tablet, TAKE 1 TABLET DAILY, Disp: 90 tablet, Rfl: 1    metoprolol tartrate (LOPRESSOR) 25 mg tablet, TAKE ONE-HALF (1/2) TABLET TWICE A DAY, Disp: 90 tablet, Rfl: 1    mirtazapine (REMERON) 15 mg tablet, Take 1 tablet (15 mg total) by mouth daily at bedtime, Disp: 30 tablet, Rfl: 0    mometasone (ELOCON) 0 1 % cream, Apply topically daily, Disp: , Rfl:     Multiple Vitamin (MULTI-VITAMIN DAILY) TABS, Take 1 tablet by mouth daily, Disp: , Rfl:     pantoprazole (PROTONIX) 40 mg tablet, TAKE 1 TABLET TWICE A DAY, Disp: 180 tablet, Rfl: 1    Allergies   Allergen Reactions    Dog Epithelium     Other Other (See Comments) and Sneezing     Dogs  Crab Meat    Shellfish-Derived Products      CRAB MEAT ONLY    Cats Claw (Uncaria Tomentosa) Sneezing     CATS          Lab Review   Appointment on 08/27/2018   Component Date Value    Cholesterol 08/27/2018 228*    Triglycerides 08/27/2018 71     HDL, Direct 08/27/2018 88*    LDL Calculated 08/27/2018 126*    Non-HDL-Chol (CHOL-HDL) 08/27/2018 140     Sodium 08/27/2018 139     Potassium 08/27/2018 3 9     Chloride 08/27/2018 104     CO2 08/27/2018 23     ANION GAP 08/27/2018 12     BUN 08/27/2018 15     Creatinine 08/27/2018 0 77     Glucose, Fasting 08/27/2018 104*    Calcium 08/27/2018 8 5     AST 08/27/2018 16     ALT 08/27/2018 19     Alkaline Phosphatase 08/27/2018 102     Total Protein 08/27/2018 7 3     Albumin 08/27/2018 3 3*    Total Bilirubin 08/27/2018 0 43     eGFR 08/27/2018 85     Vitamin B-12 08/27/2018 915*    Folate 08/27/2018 >20 0*    Magnesium 08/27/2018 2 2     Phosphorus 08/27/2018 3 3     Vit D, 25-Hydroxy 08/27/2018 49 1     Iron Saturation 08/27/2018 37     TIBC 08/27/2018 306     Iron 08/27/2018 112     Ferritin 08/27/2018 33         Imaging: No results found  Objective:     Physical Exam   Constitutional: She is oriented to person, place, and time  She appears well-developed and well-nourished  No distress  HENT:   Head: Normocephalic and atraumatic  Right Ear: External ear normal    Left Ear: External ear normal    Mouth/Throat: Oropharynx is clear and moist    Eyes: Pupils are equal, round, and reactive to light  Conjunctivae and EOM are normal  Right eye exhibits no discharge  Left eye exhibits no discharge  Neck: Normal range of motion  Neck supple  No thyromegaly present  Cardiovascular: Normal rate, regular rhythm and normal heart sounds  Exam reveals no friction rub  No murmur heard  Pulmonary/Chest: Effort normal and breath sounds normal  No respiratory distress  She has no wheezes  She has no rales  Musculoskeletal: Normal range of motion  She exhibits tenderness (to mid to lower lumbar spine  )  She exhibits no edema or deformity  Negative slr bilaterally  Lymphadenopathy:     She has no cervical adenopathy  Neurological: She is alert and oriented to person, place, and time  No cranial nerve deficit  Negative tinnels and phalens on the left  Skin: Skin is warm and dry  No rash noted  She is not diaphoretic  Psychiatric: She has a normal mood and affect  Her behavior is normal  Judgment and thought content normal    No tears at this visit  Patient Instructions   Discussed all with patient  You need to keep your follow-up with Pain Management as many of your issues are coming from your chronic pain    For your insomnia and your anxiety I want to start some mirtazapine at night about a half an hour before bed  Take your do Loxitane in the morning take the mirtazapine at night  Keep the appointment for the MRI as some of your symptoms may be coming from your low back  Continue your other medications  Had her flu shot already  Follow-up here in 2 weeks but call if you have any problems on the new medications        GWEN Jama

## 2018-10-25 NOTE — TELEPHONE ENCOUNTER
I spoke to patient on the phone and she states that she has been having a lot of neurological issues  She states that her neurological issues are the same as that of her twin sister and she is concerned that it could be possibly inherited  I advised her that she will need to follow up her Neurologist  She states that she will be open to a second opinion from a different Neurologist  Referral for Dr Taz Smith was placed in Epic      Do not double book

## 2018-10-25 NOTE — TELEPHONE ENCOUNTER
Please see todays ov note with PCP  Do you want to double book patient or any other recommendations until 11/23?

## 2018-11-08 ENCOUNTER — OFFICE VISIT (OUTPATIENT)
Dept: FAMILY MEDICINE CLINIC | Facility: CLINIC | Age: 58
End: 2018-11-08
Payer: COMMERCIAL

## 2018-11-08 VITALS
HEART RATE: 76 BPM | DIASTOLIC BLOOD PRESSURE: 92 MMHG | OXYGEN SATURATION: 97 % | TEMPERATURE: 98.3 F | HEIGHT: 64 IN | RESPIRATION RATE: 18 BRPM | BODY MASS INDEX: 36.88 KG/M2 | SYSTOLIC BLOOD PRESSURE: 136 MMHG | WEIGHT: 216 LBS

## 2018-11-08 DIAGNOSIS — R41.3 MEMORY DIFFICULTIES: Primary | ICD-10-CM

## 2018-11-08 DIAGNOSIS — F41.8 ANXIETY WITH DEPRESSION: ICD-10-CM

## 2018-11-08 DIAGNOSIS — M25.50 ARTHRALGIA, UNSPECIFIED JOINT: ICD-10-CM

## 2018-11-08 DIAGNOSIS — N39.46 MIXED STRESS AND URGE URINARY INCONTINENCE: ICD-10-CM

## 2018-11-08 DIAGNOSIS — G47.9 SLEEP DISTURBANCES: ICD-10-CM

## 2018-11-08 DIAGNOSIS — H53.9 VISUAL DISTURBANCE: ICD-10-CM

## 2018-11-08 PROBLEM — F11.20 UNCOMPLICATED OPIOID DEPENDENCE (HCC): Status: RESOLVED | Noted: 2017-11-13 | Resolved: 2018-11-08

## 2018-11-08 PROCEDURE — 1036F TOBACCO NON-USER: CPT | Performed by: FAMILY MEDICINE

## 2018-11-08 PROCEDURE — 99214 OFFICE O/P EST MOD 30 MIN: CPT | Performed by: FAMILY MEDICINE

## 2018-11-08 PROCEDURE — 3008F BODY MASS INDEX DOCD: CPT | Performed by: FAMILY MEDICINE

## 2018-11-08 RX ORDER — MIRTAZAPINE 30 MG/1
TABLET, FILM COATED ORAL
Qty: 30 TABLET | Refills: 1 | Status: SHIPPED | OUTPATIENT
Start: 2018-11-08 | End: 2019-01-03 | Stop reason: SDUPTHER

## 2018-11-08 NOTE — PROGRESS NOTES
Assessment/Plan:     Chronic Problems:  Chronic pain syndrome  Keep the f/u with your pain specialist  Let him know about the pains you have in the legs and in the left upper arm  Anxiety with depression  Will increase the mirtazapine to 30 mg hs  Memory difficulties  Pt was given a referral to neuro to r/o ms on the basis of her numbness, visual changes and memory issues  She will call and make the appt  CATHRYN (obstructive sleep apnea)  Suspect not using her cpap is r/t some of her sleep problems  Advised to stop at [employer] on the way home to discuss her concerns  Sleep disturbances  Suspect multifactorial  Drinking caffeine late at night, not using cpap, recently coming off all narcotics  Will increase the remeron  Visit Diagnosis:  Diagnoses and all orders for this visit:    Memory difficulties    Anxiety with depression  -     mirtazapine (REMERON) 30 mg tablet; Take 1 po qhs    Sleep disturbances    Visual disturbance  -     Ambulatory referral to Ophthalmology; Future    Mixed stress and urge urinary incontinence  Comments:  Make your appt with Dr Guru Velez  Orders:  -     Ambulatory referral to Urogynecology; Future    Arthralgia, unspecified joint  -     DELROY Screen w/ Reflex to Titer/Pattern; Future  -     RF Screen w/ Reflex to Titer; Future  -     Sedimentation rate, automated; Future  -     Lyme Antibody Profile with reflex to WB; Future          Subjective:    Patient ID: Marianne Hong is a 59 y o  female  Pt is here for routine f/u on her meds for sleep  First 3 nights on the meds she slept 14 to 16 hours  The next 3 1/2 days she did not sleep at all  Feels her mind won't stop  Not crying all the time  Trying to limit the caffeine at night  Drank iced coffee and did not sleep at all that night and found out that cbd oil can bind with caffeine and enhance the effect of caffeine  Slept 8 hours last night  Feels she is having spasms in the fingers and her hands hurt   Stil llwith spasms in the left leg and tingling in the right knee that radiates to her spine  Did not restart her cpap yet  Wants to buy a  for it  Will stop at [facility] on the way home  Feels anxious that there may be something else going on with her  Wonders if she has autoimmune problems  Mom had lupus and she is worried about muscle spasms  Gets a pain behind her left eye on occasion  Feels she has worsening vision on the left  Takes all other meds as directed  No side effects noted  The following portions of the patient's history were reviewed and updated as appropriate: allergies, current medications, past family history, past medical history, past social history, past surgical history and problem list     Review of Systems   Constitutional: Positive for fatigue  Negative for chills, diaphoresis and fever  Has been off all narcotics since the end of May  HENT: Negative  Eyes: Positive for visual disturbance  Negative for pain (but has pain behind the eyes  )  + photophobia  Respiratory: Negative for cough, shortness of breath and wheezing  Cardiovascular: Negative for chest pain and palpitations  Gastrointestinal: Negative for abdominal pain (but feels a tightness in her diaphragm for the last 6 years  )  Genitourinary:        Still with overactive bladder and decreased urge to urinate for the last 6 years  Still follows with urogyn but has not seen her since she is back from [place]  Still with some incontinence  Musculoskeletal: Positive for arthralgias, back pain and gait problem  Feels she is having spasms in the hands and the left leg  Also having pains in the left shoulder and over the left deltoid area  Neurological: Positive for numbness  Psychiatric/Behavioral: Positive for decreased concentration, dysphoric mood and sleep disturbance  The patient is nervous/anxious  Pt thought this would get better when she was off narcotics but it did not get better  Not able to use a computer            /92   Pulse 76   Temp 98 3 °F (36 8 °C)   Resp 18   Ht 5' 4" (1 626 m)   Wt 98 kg (216 lb)   SpO2 97%   BMI 37 08 kg/m²   Social History     Social History    Marital status: /Civil Union     Spouse name: N/A    Number of children: N/A    Years of education: N/A     Occupational History    retired      Social History Main Topics    Smoking status: Never Smoker    Smokeless tobacco: Never Used    Alcohol use No    Drug use: No    Sexual activity: Yes     Other Topics Concern    Not on file     Social History Narrative    No narrative on file     Past Medical History:   Diagnosis Date    Anxiety     Arthritis     Attention and concentration deficit     Blurred vision     Chronic pain     Depression     Diplopia     Dyspepsia     Gastric ulcer     Gastritis     Memory loss     Osteopenia     Starting and stopping of urinary stream during micturition     Urinary incontinence     Wears eyeglasses      Family History   Problem Relation Age of Onset    Other Mother         Back Disorder     Cirrhosis Mother     Crohn's disease Mother     Lupus Mother         Systemic Lupus Erythematous     Hypertension Father     Heart disease Father     Other Brother         Liver Transplant      Past Surgical History:   Procedure Laterality Date     SECTION       SECTION      CHOLECYSTECTOMY     [address] GASTRIC BYPASS  2004    HYSTERECTOMY  2012    INSERT / REPLACE PERIPHERAL NEUROSTIMULATOR PULSE GENERATOR /       OTHER SURGICAL HISTORY      [facility]plantatin at [facility]      WRIST ARTHROSCOPY      with internal fixation        Current Outpatient Prescriptions:     Cholecalciferol (D3-1000) 1000 units capsule, Take 1 capsule by mouth 2 (two) times a day, Disp: , Rfl:     Diclofenac Sodium 1 5 % SOLN, Apply 2 drops to affected area 3x/day, Disp: 1 Bottle, Rfl: 2    dronabinol (MARINOL) 5 MG capsule, Take 1 capsule (5 mg total) by mouth 2 (two) times a day before meals By Dr Tammy Hernandez, Disp: 60 capsule, Rfl: 0    DULoxetine (CYMBALTA) 60 mg delayed release capsule, TAKE 1 CAPSULE DAILY, Disp: 90 capsule, Rfl: 1    Iron-Vitamin C (IRON 100/C) 100-250 MG TABS, Take by mouth daily, Disp: , Rfl:     lidocaine (XYLOCAINE) 5 % ointment, Apply topically 2 (two) times a day as needed, Disp: , Rfl:     lisinopril (ZESTRIL) 10 mg tablet, TAKE 1 TABLET DAILY, Disp: 90 tablet, Rfl: 1    metoprolol tartrate (LOPRESSOR) 25 mg tablet, TAKE ONE-HALF (1/2) TABLET TWICE A DAY, Disp: 90 tablet, Rfl: 1    mirtazapine (REMERON) 30 mg tablet, Take 1 po qhs, Disp: 30 tablet, Rfl: 1    mometasone (ELOCON) 0 1 % cream, Apply topically daily, Disp: , Rfl:     Multiple Vitamin (MULTI-VITAMIN DAILY) TABS, Take 1 tablet by mouth daily, Disp: , Rfl:     pantoprazole (PROTONIX) 40 mg tablet, TAKE 1 TABLET TWICE A DAY, Disp: 180 tablet, Rfl: 1    diclofenac sodium (VOLTAREN) 1 %, Apply 2 g topically daily for 90 days, Disp: 180 g, Rfl: 0    Allergies   Allergen Reactions    Dog Epithelium     Other Other (See Comments) and Sneezing     Dogs  Crab Meat    Shellfish-Derived Products      CRAB MEAT ONLY    Cats Claw (Uncaria Tomentosa) Sneezing     CATS          Lab Review   No visits with results within 2 Month(s) from this visit     Latest known visit with results is:   Appointment on 08/27/2018   Component Date Value    Cholesterol 08/27/2018 228*    Triglycerides 08/27/2018 71     HDL, Direct 08/27/2018 88*    LDL Calculated 08/27/2018 126*    Non-HDL-Chol (CHOL-HDL) 08/27/2018 140     Sodium 08/27/2018 139     Potassium 08/27/2018 3 9     Chloride 08/27/2018 104     CO2 08/27/2018 23     ANION GAP 08/27/2018 12     BUN 08/27/2018 15     Creatinine 08/27/2018 0 77     Glucose, Fasting 08/27/2018 104*    Calcium 08/27/2018 8 5     AST 08/27/2018 16     ALT 08/27/2018 19     Alkaline Phosphatase 08/27/2018 102     Total Protein 08/27/2018 7 3     Albumin 08/27/2018 3 3*    Total Bilirubin 08/27/2018 0 43     eGFR 08/27/2018 85     Vitamin B-12 08/27/2018 915*    Folate 08/27/2018 >20 0*    Magnesium 08/27/2018 2 2     Phosphorus 08/27/2018 3 3     Vit D, 25-Hydroxy 08/27/2018 49 1     Iron Saturation 08/27/2018 37     TIBC 08/27/2018 306     Iron 08/27/2018 112     Ferritin 08/27/2018 33         Imaging: No results found  Objective:     Physical Exam      Patient Instructions   Suspect some of the sleep problems are related to not using your CPAP at night  Stop [facility] on the way home and discuss with them your concerns  I feel once you get back on your CPAP and increase the dose to Remeron your sleep will be more regular  However you have to realize you had sleep issues for a while  Make the appointment with the neurologist as per Pain Management  Make the appointment with Dr Jeff Marshall as previously discussed  Also make the appointment with [address] [place] [address]  Increase the mirtazapine to 30 mg at night  Have the labs done and I will call with results         GWEN Lucas

## 2018-11-08 NOTE — PATIENT INSTRUCTIONS
Suspect some of the sleep problems are related to not using your CPAP at night  Stop [facility] on the way home and discuss with them your concerns  I feel once you get back on your CPAP and increase the dose to Remeron your sleep will be more regular  However you have to realize you had sleep issues for a while  Make the appointment with the neurologist as per Pain Management  Make the appointment with Dr Laureano Longoria as previously discussed  Also make the appointment with [address] [place] [address]  Increase the mirtazapine to 30 mg at night  Have the labs done and I will call with results

## 2018-11-08 NOTE — ASSESSMENT & PLAN NOTE
Keep the f/u with your pain specialist  Let him know about the pains you have in the legs and in the left upper arm

## 2018-11-08 NOTE — ASSESSMENT & PLAN NOTE
Suspect not using her cpap is r/t some of her sleep problems  Advised to stop at [place] on the way home to discuss her concerns

## 2018-11-08 NOTE — ASSESSMENT & PLAN NOTE
Pt was given a referral to neuro to r/o ms on the basis of her numbness, visual changes and memory issues  She will call and make the appt

## 2018-11-08 NOTE — ASSESSMENT & PLAN NOTE
Suspect multifactorial  Drinking caffeine late at night, not using cpap, recently coming off all narcotics  Will increase the remeron

## 2018-11-15 ENCOUNTER — HOSPITAL ENCOUNTER (OUTPATIENT)
Dept: RADIOLOGY | Facility: HOSPITAL | Age: 58
Discharge: HOME | End: 2018-11-15
Payer: COMMERCIAL

## 2018-11-15 PROCEDURE — 72148 MRI LUMBAR SPINE W/O DYE: CPT

## 2018-11-21 DIAGNOSIS — F41.8 ANXIETY WITH DEPRESSION: ICD-10-CM

## 2018-11-21 RX ORDER — MIRTAZAPINE 15 MG/1
15 TABLET, FILM COATED ORAL
Qty: 30 TABLET | Refills: 0 | Status: SHIPPED | OUTPATIENT
Start: 2018-11-21 | End: 2018-12-20

## 2018-11-29 ENCOUNTER — TELEPHONE (OUTPATIENT)
Dept: NEUROLOGY | Facility: CLINIC | Age: 58
End: 2018-11-29

## 2018-12-06 ENCOUNTER — OFFICE VISIT (OUTPATIENT)
Dept: PAIN MEDICINE | Facility: CLINIC | Age: 58
End: 2018-12-06
Payer: COMMERCIAL

## 2018-12-06 VITALS
WEIGHT: 216 LBS | DIASTOLIC BLOOD PRESSURE: 66 MMHG | BODY MASS INDEX: 36.88 KG/M2 | SYSTOLIC BLOOD PRESSURE: 112 MMHG | HEART RATE: 72 BPM | HEIGHT: 64 IN | RESPIRATION RATE: 16 BRPM

## 2018-12-06 DIAGNOSIS — M54.16 LUMBAR RADICULOPATHY: ICD-10-CM

## 2018-12-06 DIAGNOSIS — G89.4 CHRONIC PAIN SYNDROME: ICD-10-CM

## 2018-12-06 DIAGNOSIS — M47.816 LUMBAR FACET ARTHROPATHY: Primary | ICD-10-CM

## 2018-12-06 DIAGNOSIS — M54.16 LUMBAR RADICULITIS: ICD-10-CM

## 2018-12-06 PROCEDURE — 99214 OFFICE O/P EST MOD 30 MIN: CPT | Performed by: ANESTHESIOLOGY

## 2018-12-06 NOTE — PROGRESS NOTES
Assessment:  1  Lumbar facet arthropathy     2  Lumbar radiculopathy  FL spine and pain procedure   3  Chronic pain syndrome     4  Lumbar radiculitis       Plan: This is a 70-year-old female who presents today for follow-up office visit for management of chronic pain syndrome  Today she reports constant throbbing low back and left-sided hip pain  Pain occasionally radiates down her thighs  Patient is off opiate therapy and lyrica and is currently on medical marijuana  Patient will follow up with Dr Kerry Goldberg  She is encouraged to continue with home exercises  Continue with medical marijuana  I will schedule patient for a Right L4, L5 TFESI to help with low back pain and radicular symptoms  Complete risks and benefits including bleeding, infection, tissue reaction, nerve injury and allergic reaction were discussed  The approach was demonstrated using models and literature was provided  Verbal and written consent was obtained  My impressions and treatment recommendations were discussed in detail with the patient who verbalized understanding and had no further questions  Discharge instructions were provided  I personally saw and examined the patient and I agree with the above discussed plan of care  History of Present Illness:  Santhosh Henry is a 59 y o  female who presents for a follow up office visit in regards to Shoulder Pain; Arm Pain; Back Pain; and Leg Pain  The patients current symptoms include constant, burning, dull/achy pain which is throbbing in nature with pins and needles sensation in the right leg  Patient is off opiates  She is off lyrica  Pain is rated 7/10 today  No recent changes in health  Patient is currently on medical marijuana  She states that she has a hard time standing and that her right leg is getting worse  She states that her hands cramps up and her feet cramps up with tremors  She also complains of pain in her neck and behind her left eye   She states that she has an appointment with Dr Carlota Villegas  I have personally reviewed and/or updated the patient's past medical history, past surgical history, family history, social history, current medications, allergies, and vital signs today  Review of Systems   Respiratory: Negative for shortness of breath  Cardiovascular: Negative for chest pain  Gastrointestinal: Negative for constipation, diarrhea, nausea and vomiting  Musculoskeletal: Negative for arthralgias, gait problem, joint swelling and myalgias  Decreased ROM   Skin: Negative for rash  Neurological: Positive for weakness  Negative for dizziness and seizures  All other systems reviewed and are negative  Patient Active Problem List   Diagnosis    Neuralgia    Continuous leakage of urine    Borderline hyperlipidemia    Chronic pain syndrome    Dysesthesia of multiple sites    Eczema    Hematuria    Hypertension    Left shoulder pain    Limb pain    Lower back pain    Lumbar facet arthropathy    Memory difficulties    Memory impairment    Numbness    Obesity (BMI 30 0-34  9)    CATHRYN (obstructive sleep apnea)    Postgastrectomy malabsorption    Pudendal neuralgia    Rash    Sleep disturbances    Snoring    Gastroesophageal reflux disease    Iron deficiency anemia    Uterine leiomyoma    Osteoarthritis of left shoulder    Sacroiliitis (HCC)    Lumbar radiculopathy    Anxiety with depression       Past Medical History:   Diagnosis Date    Anxiety     Arthritis     Attention and concentration deficit     Blurred vision     Chronic pain     Depression     Diplopia     Dyspepsia     Gastric ulcer     Gastritis     Memory loss     Osteopenia     Starting and stopping of urinary stream during micturition     Urinary incontinence     Wears eyeglasses        Past Surgical History:   Procedure Laterality Date     SECTION       SECTION      CHOLECYSTECTOMY      GALLBLADDER SURGERY  1990    GASTRIC BYPASS  2004    HYSTERECTOMY  03/14/2012    INSERT / REPLACE PERIPHERAL NEUROSTIMULATOR PULSE GENERATOR /       OTHER SURGICAL HISTORY  2012    [address]im[address]atin at [address] WRIST ARTHROSCOPY      with internal fixation        Family History   Problem Relation Age of Onset    Other Mother         Back Disorder     Cirrhosis Mother     Crohn's disease Mother     Lupus Mother         Systemic Lupus Erythematous     Hypertension Father     Heart disease Father     Other Brother         Liver Transplant        Social History     Occupational History    retired      Social History Main Topics    Smoking status: Never Smoker    Smokeless tobacco: Never Used    Alcohol use No    Drug use: No    Sexual activity: Yes       Current Outpatient Prescriptions on File Prior to Visit   Medication Sig    Cholecalciferol (D3-1000) 1000 units capsule Take 1 capsule by mouth 2 (two) times a day    Diclofenac Sodium 1 5 % SOLN Apply 2 drops to affected area 3x/day    dronabinol (MARINOL) 5 MG capsule Take 1 capsule (5 mg total) by mouth 2 (two) times a day before meals By Dr Sheri Copeland DULoxetine (CYMBALTA) 60 mg delayed release capsule TAKE 1 CAPSULE DAILY    Iron-Vitamin C (IRON 100/C) 100-250 MG TABS Take by mouth daily    lidocaine (XYLOCAINE) 5 % ointment Apply topically 2 (two) times a day as needed    lisinopril (ZESTRIL) 10 mg tablet TAKE 1 TABLET DAILY    metoprolol tartrate (LOPRESSOR) 25 mg tablet TAKE ONE-HALF (1/2) TABLET TWICE A DAY    mirtazapine (REMERON) 15 mg tablet TAKE 1 TABLET (15 MG TOTAL) BY MOUTH DAILY AT BEDTIME    mirtazapine (REMERON) 30 mg tablet Take 1 po qhs    mometasone (ELOCON) 0 1 % cream Apply topically daily    Multiple Vitamin (MULTI-VITAMIN DAILY) TABS Take 1 tablet by mouth daily    pantoprazole (PROTONIX) 40 mg tablet TAKE 1 TABLET TWICE A DAY    diclofenac sodium (VOLTAREN) 1 % Apply 2 g topically daily for 90 days     No current facility-administered medications on file prior to visit  Allergies   Allergen Reactions    Dog Epithelium     Other Other (See Comments) and Sneezing     Dogs  Crab Meat    Shellfish-Derived Products      CRAB MEAT ONLY    Cats Claw (Uncaria Parasosa) Sneezing     CATS       Physical Exam:    /66   Pulse 72   Resp 16   Ht 5' 4" (1 626 m)   Wt 98 kg (216 lb)   BMI 37 08 kg/m²     Constitutional:normal, well developed, well nourished, alert, in no distress and non-toxic and no overt pain behavior    Eyes:anicteric  HEENT:grossly intact  Neck:supple, symmetric, trachea midline and no masses   Pulmonary:even and unlabored  Cardiovascular:No edema or pitting edema present  Skin:Normal without rashes or lesions and well hydrated  Psychiatric:Mood and affect appropriate  Neurologic:Cranial Nerves II-XII grossly intact  Musculoskeletal:normal    Lumbar Spine Exam    Appearance:  Normal lordosis  Palpation/Tenderness:  left lumbar paraspinal tenderness  right lumbar paraspinal tenderness  Sensory:  diminished pin prick sensation, location: right LE numbness   Range of Motion:  Extension:  Minimally limited  with pain  Motor Strength:  Left foot dorsiflexion:  5/5  Left foot plantar flexion:  5/5  Right foot dorsiflexion:  5/5  Right foot plantar flexion:  5/5  Reflexes:  Left Patellar:  2+   Right Patellar:  2+   Imaging

## 2018-12-07 ENCOUNTER — HOSPITAL ENCOUNTER (EMERGENCY)
Facility: HOSPITAL | Age: 58
Discharge: LEFT BEFORE DISCHARGE | End: 2018-12-07
Attending: EMERGENCY MEDICINE
Payer: COMMERCIAL

## 2018-12-07 ENCOUNTER — APPOINTMENT (EMERGENCY)
Dept: CT IMAGING | Facility: HOSPITAL | Age: 58
End: 2018-12-07
Payer: COMMERCIAL

## 2018-12-07 ENCOUNTER — APPOINTMENT (EMERGENCY)
Dept: RADIOLOGY | Facility: HOSPITAL | Age: 58
End: 2018-12-07
Payer: COMMERCIAL

## 2018-12-07 VITALS
SYSTOLIC BLOOD PRESSURE: 134 MMHG | OXYGEN SATURATION: 95 % | HEART RATE: 80 BPM | TEMPERATURE: 97.8 F | DIASTOLIC BLOOD PRESSURE: 81 MMHG | RESPIRATION RATE: 18 BRPM | BODY MASS INDEX: 38.11 KG/M2 | WEIGHT: 222 LBS
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
## 2018-12-07 DIAGNOSIS — R10.12 ABDOMINAL PAIN, ACUTE, LEFT UPPER QUADRANT: Primary | ICD-10-CM

## 2018-12-07 LAB
ANION GAP SERPL CALCULATED.3IONS-SCNC: 6 MMOL/L (ref 4–13)
BASOPHILS # BLD AUTO: 0.09 THOUSANDS/ΜL (ref 0–0.1)
BASOPHILS NFR BLD AUTO: 1 % (ref 0–1)
BUN SERPL-MCNC: 18 MG/DL (ref 5–25)
CALCIUM SERPL-MCNC: 8.8 MG/DL (ref 8.3–10.1)
CHLORIDE SERPL-SCNC: 104 MMOL/L (ref 100–108)
CO2 SERPL-SCNC: 30 MMOL/L (ref 21–32)
CREAT SERPL-MCNC: 1.05 MG/DL (ref 0.6–1.3)
EOSINOPHIL # BLD AUTO: 0.23 THOUSAND/ΜL (ref 0–0.61)
EOSINOPHIL NFR BLD AUTO: 3 % (ref 0–6)
ERYTHROCYTE [DISTWIDTH] IN BLOOD BY AUTOMATED COUNT: 12.9 % (ref 11.6–15.1)
GFR SERPL CREATININE-BSD FRML MDRD: 58 ML/MIN/1.73SQ M
GLUCOSE SERPL-MCNC: 95 MG/DL (ref 65–140)
HCT VFR BLD AUTO: 39.2 % (ref 34.8–46.1)
HGB BLD-MCNC: 12.4 G/DL (ref 11.5–15.4)
IMM GRANULOCYTES # BLD AUTO: 0.02 THOUSAND/UL (ref 0–0.2)
IMM GRANULOCYTES NFR BLD AUTO: 0 % (ref 0–2)
LYMPHOCYTES # BLD AUTO: 2.11 THOUSANDS/ΜL (ref 0.6–4.47)
LYMPHOCYTES NFR BLD AUTO: 25 % (ref 14–44)
MCH RBC QN AUTO: 30.3 PG (ref 26.8–34.3)
MCHC RBC AUTO-ENTMCNC: 31.6 G/DL (ref 31.4–37.4)
MCV RBC AUTO: 96 FL (ref 82–98)
MONOCYTES # BLD AUTO: 0.55 THOUSAND/ΜL (ref 0.17–1.22)
MONOCYTES NFR BLD AUTO: 7 % (ref 4–12)
NEUTROPHILS # BLD AUTO: 5.35 THOUSANDS/ΜL (ref 1.85–7.62)
NEUTS SEG NFR BLD AUTO: 64 % (ref 43–75)
NRBC BLD AUTO-RTO: 0 /100 WBCS
PLATELET # BLD AUTO: 248 THOUSANDS/UL (ref 149–390)
PMV BLD AUTO: 10.6 FL (ref 8.9–12.7)
POTASSIUM SERPL-SCNC: 4.4 MMOL/L (ref 3.5–5.3)
RBC # BLD AUTO: 4.09 MILLION/UL (ref 3.81–5.12)
SODIUM SERPL-SCNC: 140 MMOL/L (ref 136–145)
WBC # BLD AUTO: 8.35 THOUSAND/UL (ref 4.31–10.16)

## 2018-12-07 PROCEDURE — 85025 COMPLETE CBC W/AUTO DIFF WBC: CPT | Performed by: EMERGENCY MEDICINE

## 2018-12-07 PROCEDURE — 36415 COLL VENOUS BLD VENIPUNCTURE: CPT | Performed by: EMERGENCY MEDICINE

## 2018-12-07 PROCEDURE — 96361 HYDRATE IV INFUSION ADD-ON: CPT

## 2018-12-07 PROCEDURE — 74177 CT ABD & PELVIS W/CONTRAST: CPT

## 2018-12-07 PROCEDURE — 96374 THER/PROPH/DIAG INJ IV PUSH: CPT

## 2018-12-07 PROCEDURE — 80048 BASIC METABOLIC PNL TOTAL CA: CPT | Performed by: EMERGENCY MEDICINE

## 2018-12-07 PROCEDURE — 96375 TX/PRO/DX INJ NEW DRUG ADDON: CPT

## 2018-12-07 PROCEDURE — 99285 EMERGENCY DEPT VISIT HI MDM: CPT

## 2018-12-07 PROCEDURE — 71046 X-RAY EXAM CHEST 2 VIEWS: CPT

## 2018-12-07 RX ORDER — MAGNESIUM HYDROXIDE/ALUMINUM HYDROXICE/SIMETHICONE 120; 1200; 1200 MG/30ML; MG/30ML; MG/30ML
30 SUSPENSION ORAL ONCE
Status: COMPLETED | OUTPATIENT
Start: 2018-12-07 | End: 2018-12-07

## 2018-12-07 RX ORDER — MORPHINE SULFATE 15 MG/1
15 TABLET ORAL EVERY 4 HOURS PRN
Qty: 10 TABLET | Refills: 0 | Status: SHIPPED | OUTPATIENT
Start: 2018-12-07 | End: 2018-12-17

## 2018-12-07 RX ORDER — ONDANSETRON 2 MG/ML
4 INJECTION INTRAMUSCULAR; INTRAVENOUS ONCE
Status: COMPLETED | OUTPATIENT
Start: 2018-12-07 | End: 2018-12-07

## 2018-12-07 RX ORDER — MORPHINE SULFATE 4 MG/ML
4 INJECTION, SOLUTION INTRAMUSCULAR; INTRAVENOUS ONCE
Status: COMPLETED | OUTPATIENT
Start: 2018-12-07 | End: 2018-12-07

## 2018-12-07 RX ADMIN — IOHEXOL 100 ML: 350 INJECTION, SOLUTION INTRAVENOUS at 16:43

## 2018-12-07 RX ADMIN — MORPHINE SULFATE 4 MG: 4 INJECTION INTRAVENOUS at 16:55

## 2018-12-07 RX ADMIN — ONDANSETRON 4 MG: 2 INJECTION INTRAMUSCULAR; INTRAVENOUS at 16:54

## 2018-12-07 RX ADMIN — SODIUM CHLORIDE 1000 ML: 0.9 INJECTION, SOLUTION INTRAVENOUS at 16:54

## 2018-12-07 RX ADMIN — ALUMINUM HYDROXIDE, MAGNESIUM HYDROXIDE, AND SIMETHICONE 30 ML: 200; 200; 20 SUSPENSION ORAL at 17:41

## 2018-12-07 RX ADMIN — LIDOCAINE HYDROCHLORIDE 15 ML: 20 SOLUTION ORAL; TOPICAL at 17:41

## 2018-12-20 ENCOUNTER — HOSPITAL ENCOUNTER (OUTPATIENT)
Dept: RADIOLOGY | Facility: CLINIC | Age: 58
Discharge: HOME | End: 2018-12-20
Attending: ANESTHESIOLOGY | Admitting: ANESTHESIOLOGY
Payer: COMMERCIAL

## 2018-12-20 ENCOUNTER — OFFICE VISIT (OUTPATIENT)
Dept: FAMILY MEDICINE CLINIC | Facility: CLINIC | Age: 58
End: 2018-12-20
Payer: COMMERCIAL

## 2018-12-20 VITALS
SYSTOLIC BLOOD PRESSURE: 118 MMHG | RESPIRATION RATE: 16 BRPM | HEIGHT: 64 IN | OXYGEN SATURATION: 97 % | DIASTOLIC BLOOD PRESSURE: 70 MMHG | TEMPERATURE: 99 F | BODY MASS INDEX: 38.07 KG/M2 | WEIGHT: 223 LBS | HEART RATE: 70 BPM

## 2018-12-20 VITALS
TEMPERATURE: 97.7 F | OXYGEN SATURATION: 96 % | HEART RATE: 58 BPM | RESPIRATION RATE: 20 BRPM | SYSTOLIC BLOOD PRESSURE: 104 MMHG | DIASTOLIC BLOOD PRESSURE: 72 MMHG

## 2018-12-20 DIAGNOSIS — M54.50 ACUTE MIDLINE LOW BACK PAIN WITHOUT SCIATICA: ICD-10-CM

## 2018-12-20 DIAGNOSIS — E66.9 OBESITY (BMI 35.0-39.9 WITHOUT COMORBIDITY): ICD-10-CM

## 2018-12-20 DIAGNOSIS — R53.81 PHYSICAL DECONDITIONING: ICD-10-CM

## 2018-12-20 DIAGNOSIS — K92.1 BLACK TARRY STOOLS: ICD-10-CM

## 2018-12-20 DIAGNOSIS — F41.8 ANXIETY WITH DEPRESSION: Primary | ICD-10-CM

## 2018-12-20 DIAGNOSIS — R63.5 WEIGHT GAIN: ICD-10-CM

## 2018-12-20 DIAGNOSIS — M54.16 LUMBAR RADICULOPATHY: ICD-10-CM

## 2018-12-20 PROCEDURE — 64483 NJX AA&/STRD TFRM EPI L/S 1: CPT | Performed by: ANESTHESIOLOGY

## 2018-12-20 PROCEDURE — 99214 OFFICE O/P EST MOD 30 MIN: CPT | Performed by: FAMILY MEDICINE

## 2018-12-20 PROCEDURE — 64484 NJX AA&/STRD TFRM EPI L/S EA: CPT | Performed by: ANESTHESIOLOGY

## 2018-12-20 RX ORDER — PAPAVERINE HCL 150 MG
20 CAPSULE, EXTENDED RELEASE ORAL ONCE
Status: COMPLETED | OUTPATIENT
Start: 2018-12-20 | End: 2018-12-20

## 2018-12-20 RX ORDER — LIDOCAINE HYDROCHLORIDE 10 MG/ML
5 INJECTION, SOLUTION EPIDURAL; INFILTRATION; INTRACAUDAL; PERINEURAL ONCE
Status: COMPLETED | OUTPATIENT
Start: 2018-12-20 | End: 2018-12-20

## 2018-12-20 RX ADMIN — LIDOCAINE HYDROCHLORIDE 5 ML: 10 INJECTION, SOLUTION EPIDURAL; INFILTRATION; INTRACAUDAL; PERINEURAL at 15:09

## 2018-12-20 RX ADMIN — IOHEXOL 2 ML: 300 INJECTION, SOLUTION INTRAVENOUS at 15:09

## 2018-12-20 RX ADMIN — Medication 5 ML: at 15:10

## 2018-12-20 RX ADMIN — DEXAMETHASONE SODIUM PHOSPHATE 20 MG: 10 INJECTION, SOLUTION INTRAMUSCULAR; INTRAVENOUS at 15:10

## 2018-12-20 NOTE — H&P (VIEW-ONLY)
Assessment:  1  Lumbar facet arthropathy     2  Lumbar radiculopathy  FL spine and pain procedure   3  Chronic pain syndrome     4  Lumbar radiculitis       Plan: This is a 59-year-old female who presents today for follow-up office visit for management of chronic pain syndrome  Today she reports constant throbbing low back and left-sided hip pain  Pain occasionally radiates down her thighs  Patient is off opiate therapy and lyrica and is currently on medical marijuana  Patient will follow up with Dr Enid Patel  She is encouraged to continue with home exercises  Continue with medical marijuana  I will schedule patient for a Right L4, L5 TFESI to help with low back pain and radicular symptoms  Complete risks and benefits including bleeding, infection, tissue reaction, nerve injury and allergic reaction were discussed  The approach was demonstrated using models and literature was provided  Verbal and written consent was obtained  My impressions and treatment recommendations were discussed in detail with the patient who verbalized understanding and had no further questions  Discharge instructions were provided  I personally saw and examined the patient and I agree with the above discussed plan of care  History of Present Illness:  Luis Angel Solano is a 59 y o  female who presents for a follow up office visit in regards to Shoulder Pain; Arm Pain; Back Pain; and Leg Pain  The patients current symptoms include constant, burning, dull/achy pain which is throbbing in nature with pins and needles sensation in the right leg  Patient is off opiates  She is off lyrica  Pain is rated 7/10 today  No recent changes in health  Patient is currently on medical marijuana  She states that she has a hard time standing and that her right leg is getting worse  She states that her hands cramps up and her feet cramps up with tremors  She also complains of pain in her neck and behind her left eye   She states that she has an appointment with Dr Carmen Tillman  I have personally reviewed and/or updated the patient's past medical history, past surgical history, family history, social history, current medications, allergies, and vital signs today  Review of Systems   Respiratory: Negative for shortness of breath  Cardiovascular: Negative for chest pain  Gastrointestinal: Negative for constipation, diarrhea, nausea and vomiting  Musculoskeletal: Negative for arthralgias, gait problem, joint swelling and myalgias  Decreased ROM   Skin: Negative for rash  Neurological: Positive for weakness  Negative for dizziness and seizures  All other systems reviewed and are negative  Patient Active Problem List   Diagnosis    Neuralgia    Continuous leakage of urine    Borderline hyperlipidemia    Chronic pain syndrome    Dysesthesia of multiple sites    Eczema    Hematuria    Hypertension    Left shoulder pain    Limb pain    Lower back pain    Lumbar facet arthropathy    Memory difficulties    Memory impairment    Numbness    Obesity (BMI 30 0-34  9)    CATHRYN (obstructive sleep apnea)    Postgastrectomy malabsorption    Pudendal neuralgia    Rash    Sleep disturbances    Snoring    Gastroesophageal reflux disease    Iron deficiency anemia    Uterine leiomyoma    Osteoarthritis of left shoulder    Sacroiliitis (HCC)    Lumbar radiculopathy    Anxiety with depression       Past Medical History:   Diagnosis Date    Anxiety     Arthritis     Attention and concentration deficit     Blurred vision     Chronic pain     Depression     Diplopia     Dyspepsia     Gastric ulcer     Gastritis     Memory loss     Osteopenia     Starting and stopping of urinary stream during micturition     Urinary incontinence     Wears eyeglasses        Past Surgical History:   Procedure Laterality Date     SECTION       SECTION      CHOLECYSTECTOMY      GALLBLADDER SURGERY  1990    GASTRIC BYPASS  2004    HYSTERECTOMY  03/14/2012    INSERT / REPLACE PERIPHERAL NEUROSTIMULATOR PULSE GENERATOR /       OTHER SURGICAL HISTORY  2012    Reim[address]atin at [address] WRIST ARTHROSCOPY      with internal fixation        Family History   Problem Relation Age of Onset    Other Mother         Back Disorder     Cirrhosis Mother     Crohn's disease Mother     Lupus Mother         Systemic Lupus Erythematous     Hypertension Father     Heart disease Father     Other Brother         Liver Transplant        Social History     Occupational History    retired      Social History Main Topics    Smoking status: Never Smoker    Smokeless tobacco: Never Used    Alcohol use No    Drug use: No    Sexual activity: Yes       Current Outpatient Prescriptions on File Prior to Visit   Medication Sig    Cholecalciferol (D3-1000) 1000 units capsule Take 1 capsule by mouth 2 (two) times a day    Diclofenac Sodium 1 5 % SOLN Apply 2 drops to affected area 3x/day    dronabinol (MARINOL) 5 MG capsule Take 1 capsule (5 mg total) by mouth 2 (two) times a day before meals By Dr Christian Gutierrez DULoxetine (CYMBALTA) 60 mg delayed release capsule TAKE 1 CAPSULE DAILY    Iron-Vitamin C (IRON 100/C) 100-250 MG TABS Take by mouth daily    lidocaine (XYLOCAINE) 5 % ointment Apply topically 2 (two) times a day as needed    lisinopril (ZESTRIL) 10 mg tablet TAKE 1 TABLET DAILY    metoprolol tartrate (LOPRESSOR) 25 mg tablet TAKE ONE-HALF (1/2) TABLET TWICE A DAY    mirtazapine (REMERON) 15 mg tablet TAKE 1 TABLET (15 MG TOTAL) BY MOUTH DAILY AT BEDTIME    mirtazapine (REMERON) 30 mg tablet Take 1 po qhs    mometasone (ELOCON) 0 1 % cream Apply topically daily    Multiple Vitamin (MULTI-VITAMIN DAILY) TABS Take 1 tablet by mouth daily    pantoprazole (PROTONIX) 40 mg tablet TAKE 1 TABLET TWICE A DAY    diclofenac sodium (VOLTAREN) 1 % Apply 2 g topically daily for 90 days     No current facility-administered medications on file prior to visit  Allergies   Allergen Reactions    Dog Epithelium     Other Other (See Comments) and Sneezing     Dogs  Crab Meat    Shellfish-Derived Products      CRAB MEAT ONLY    Cats Claw (Uncaria Paras[place]) Sneezing     CATS       Physical Exam:    /66   Pulse 72   Resp 16   Ht 5' 4" (1 626 m)   Wt 98 kg (216 lb)   BMI 37 08 kg/m²     Constitutional:normal, well developed, well nourished, alert, in no distress and non-toxic and no overt pain behavior    Eyes:anicteric  HEENT:grossly intact  Neck:supple, symmetric, trachea midline and no masses   Pulmonary:even and unlabored  Cardiovascular:No edema or pitting edema present  Skin:Normal without rashes or lesions and well hydrated  Psychiatric:Mood and affect appropriate  Neurologic:Cranial Nerves II-XII grossly intact  Musculoskeletal:normal    Lumbar Spine Exam    Appearance:  Normal lordosis  Palpation/Tenderness:  left lumbar paraspinal tenderness  right lumbar paraspinal tenderness  Sensory:  diminished pin prick sensation, location: right LE numbness   Range of Motion:  Extension:  Minimally limited  with pain  Motor Strength:  Left foot dorsiflexion:  5/5  Left foot plantar flexion:  5/5  Right foot dorsiflexion:  5/5  Right foot plantar flexion:  5/5  Reflexes:  Left Patellar:  2+   Right Patellar:  2+   Imaging

## 2018-12-20 NOTE — INTERVAL H&P NOTE
Update: (This section must be completed if the H&P was completed greater than 24 hrs to procedure or admission)    H&P reviewed  After examining the patient, I find no changed to the H&P since it had been written  Patient re-evaluated   Accept as history and physical     Miles Faye MD/December 20, 2018/2:50 PM

## 2018-12-20 NOTE — DISCHARGE INSTR - LAB
Epidural Steroid Injection   WHAT YOU NEED TO KNOW:   An epidural steroid injection (TREVIN) is a procedure to inject steroid medicine into the epidural space  The epidural space is between your spinal cord and vertebrae  Steroids reduce inflammation and fluid buildup in your spine that may be causing pain  You may be given pain medicine along with the steroids  ACTIVITY  · Do not drive or operate machinery today  · No strenuous activity today - bending, lifting, etc   · You may resume normal activites starting tomorrow - start slowly and as tolerated  · You may shower today, but no tub baths or hot tubs  · You may have numbness for several hours from the local anesthetic  Please use caution and common sense, especially with weight-bearing activities  CARE OF THE INJECTION SITE  · If you have soreness or pain, apply ice to the area today (20 minutes on/20 minutes off)  · Starting tomorrow, you may use warm, moist heat or ice if needed  · You may have an increase or change in your discomfort for 36-48 hours after your treatment  · Apply ice and continue with any pain medication you have been prescribed  · Notify [facility] if you have any of the following: redness, drainage, swelling, headache, stiff neck or fever above 100°F     SPECIAL INSTRUCTIONS  · Our office will contact you in approximately 7 days for a progress report  MEDICATIONS  · Continue to take all routine medications  · Our office may have instructed you to hold some medications  If you have a problem specifically related to your procedure, please call our office at [phone]  Problems not related to your procedure should be directed to your primary care physician

## 2018-12-20 NOTE — PATIENT INSTRUCTIONS
Discussed all with patient  Will get stool samples as you have repeat falls  If there is blood in the stool you will be seeing GI if you test positive for H pylori I will be treating this  Continue current medications for now but read over the information given as you need to lose 4 lb by the next appointment  Mirtazapine is a great drug but it can give you carbohydrate craving and we do not need to add any weight here  Keep your appointment with pain management  I am referring to physical therapy for conditioning  Follow-up here in 1 month  Obesity   AMBULATORY CARE:   Obesity  is when your body mass index (BMI) is greater than 30  Your healthcare provider will use your height and weight to measure your BMI  The risks of obesity include  many health problems, such as injuries or physical disability  You may need tests to check for the following:  · Diabetes     · High blood pressure or high cholesterol     · Heart disease     · Gallbladder or liver disease     · Cancer of the colon, breast, prostate, liver, or kidney     · Sleep apnea     · Arthritis or gout  Seek care immediately if:   · You have a severe headache, confusion, or difficulty speaking  · You have weakness on one side of your body  · You have chest pain, sweating, or shortness of breath  Contact your healthcare provider if:   · You have symptoms of gallbladder or liver disease, such as pain in your upper abdomen  · You have knee or hip pain and discomfort while walking  · You have symptoms of diabetes, such as intense hunger and thirst, and frequent urination  · You have symptoms of sleep apnea, such as snoring or daytime sleepiness  · You have questions or concerns about your condition or care  Treatment for obesity  focuses on helping you lose weight to improve your health  Even a small decrease in BMI can reduce the risk for many health problems   Your healthcare provider will help you set a weight-loss goal   · Lifestyle changes  are the first step in treating obesity  These include making healthy food choices and getting regular physical activity  Your healthcare provider may suggest a weight-loss program that involves coaching, education, and therapy  · Medicine  may help you lose weight when it is used with a healthy diet and physical activity  · Surgery  can help you lose weight if you are very obese and have other health problems  There are several types of weight-loss surgery  Ask your healthcare provider for more information  Be successful losing weight:   · Set small, realistic goals  An example of a small goal is to walk for 20 minutes 5 days a week  Kristin goal is to lose 5% of your body weight  · Tell friends, family members, and coworkers about your goals  and ask for their support  Ask a friend to lose weight with you, or join a weight-loss support group  · Identify foods or triggers that may cause you to overeat , and find ways to avoid them  Remove tempting high-calorie foods from your home and workplace  Place a bowl of fresh fruit on your kitchen counter  If stress causes you to eat, then find other ways to cope with stress  · Keep a diary to track what you eat and drink  Also write down how many minutes of physical activity you do each day  Weigh yourself once a week and record it in your diary  Eating changes: You will need to eat 500 to 1,000 fewer calories each day than you currently eat to lose 1 to 2 pounds a week  The following changes will help you cut calories:  · Eat smaller portions  Use small plates, no larger than 9 inches in diameter  Fill your plate half full of fruits and vegetables  Measure your food using measuring cups until you know what a serving size looks like  · Eat 3 meals and 1 or 2 snacks each day  Plan your meals in advance  Natalie Hdz and eat at home most of the time  Eat slowly       · Eat fruits and vegetables at every meal   They are low in calories and high in fiber, which makes you feel full  Do not add butter, margarine, or cream sauce to vegetables  Use herbs to season steamed vegetables  · Eat less fat and fewer fried foods  Eat more baked or grilled chicken and fish  These protein sources are lower in calories and fat than red meat  Limit fast food  Dress your salads with olive oil and vinegar instead of bottled dressing  · Limit the amount of sugar you eat  Do not drink sugary beverages  Limit alcohol  Activity changes:  Physical activity is good for your body in many ways  It helps you burn calories and build strong muscles  It decreases stress and depression, and improves your mood  It can also help you sleep better  Talk to your healthcare provider before you begin an exercise program   · Exercise for at least 30 minutes 5 days a week  Start slowly  Set aside time each day for physical activity that you enjoy and that is convenient for you  It is best to do both weight training and an activity that increases your heart rate, such as walking, bicycling, or swimming  · Find ways to be more active  Do yard work and housecleaning  Walk up the stairs instead of using elevators  Spend your leisure time going to events that require walking, such as outdoor festivals or fairs  This extra physical activity can help you lose weight and keep it off  Follow up with your healthcare provider as directed: You may need to meet with a dietitian  Write down your questions so you remember to ask them during your visits  © 2017 [facility] Information is for End User's use only and may not be sold, redistributed or otherwise used for commercial purposes  All illustrations and images included in CareNotes® are the copyrighted property of [facility] , [employer]  or Dayo Monge  The above information is an  only  It is not intended as medical advice for individual conditions or treatments   Talk to your doctor, nurse or pharmacist before following any medical regimen to see if it is safe and effective for you  Weight Management   AMBULATORY CARE:   Why it is important to manage your weight:  Being overweight increases your risk of health conditions such as heart disease, high blood pressure, type 2 diabetes, and certain types of cancer  It can also increase your risk for osteoarthritis, sleep apnea, and other respiratory problems  Aim for a slow, steady weight loss  Even a small amount of weight loss can lower your risk of health problems  How to lose weight safely:  A safe and healthy way to lose weight is to eat fewer calories and get regular exercise  You can lose up about 1 pound a week by decreasing the number of calories you eat by 500 calories each day  You can decrease calories by eating smaller portion sizes or by cutting out high-calorie foods  Read labels to find out how many calories are in the foods you eat  You can also burn calories with exercise such as walking, swimming, or biking  You will be more likely to keep weight off if you make these changes part of your lifestyle  Healthy meal plan for weight management:  A healthy meal plan includes a variety of foods, contains fewer calories, and helps you stay healthy  A healthy meal plan includes the following:  · Eat whole-grain foods more often  A healthy meal plan should contain fiber  Fiber is the part of grains, fruits, and vegetables that is not broken down by your body  Whole-grain foods are healthy and provide extra fiber in your diet  Some examples of whole-grain foods are whole-wheat breads and pastas, oatmeal, brown rice, and bulgur  · Eat a variety of vegetables every day  Include dark, leafy greens such as spinach, kale, marie greens, and mustard greens  Eat yellow and orange vegetables such as carrots, sweet potatoes, and winter squash  · Eat a variety of fruits every day    Choose fresh or canned fruit (canned in its own juice or light syrup) instead of juice  Fruit juice has very little or no fiber  · Eat low-fat dairy foods  Drink fat-free (skim) milk or 1% milk  Eat fat-free yogurt and low-fat cottage cheese  Try low-fat cheeses such as mozzarella and other reduced-fat cheeses  · Choose meat and other protein foods that are low in fat  Choose beans or other legumes such as split peas or lentils  Choose fish, skinless poultry (chicken or turkey), or lean cuts of red meat (beef or pork)  Before you cook meat or poultry, cut off any visible fat  · Use less fat and oil  Try baking foods instead of frying them  Add less fat, such as margarine, sour cream, regular salad dressing and mayonnaise to foods  Eat fewer high-fat foods  Some examples of high-fat foods include french fries, doughnuts, ice cream, and cakes  · Eat fewer sweets  Limit foods and drinks that are high in sugar  This includes candy, cookies, regular soda, and sweetened drinks  Ways to decrease calories:   · Eat smaller portions  ¨ Use a small plate with smaller servings  ¨ Do not eat second helpings  ¨ When you eat at a restaurant, ask for a box and place half of your meal in the box before you eat  ¨ Share an entrée with someone else  · Replace high-calorie snacks with healthy, low-calorie snacks  ¨ Choose fresh fruit, vegetables, fat-free rice cakes, or air-popped popcorn instead of potato chips, nuts, or chocolate  ¨ Choose water or calorie-free drinks instead of soda or sweetened drinks  · Eat regular meals  Skipping meals can lead to overeating later in the day  Eat a healthy snack in place of a meal if you do not have time to eat a regular meal      · Do not shop for groceries when you are hungry  You may be more likely to make unhealthy food choices  Take a grocery list of healthy foods and shop after you have eaten  Exercise:  Exercise at least 30 minutes per day on most days of the week   Some examples of exercise include walking, biking, dancing, and swimming  You can also fit in more physical activity by taking the stairs instead of the elevator or parking farther away from stores  Ask your healthcare provider about the best exercise plan for you  Other things to consider as you try to lose weight:   · Be aware of situations that may give you the urge to overeat, such as eating while watching television  Find ways to avoid these situations  For example, read a book, go for a walk, or do crafts  · Meet with a weight loss support group or friends who are also trying to lose weight  This may help you stay motivated to continue working on your weight loss goals  © 2017 [employer]  Information is for End User's use only and may not be sold, redistributed or otherwise used for commercial purposes  All illustrations and images included in CareNotes® are the copyrighted property of [employer] , [employer]  or [employer]o Monge  The above information is an  only  It is not intended as medical advice for individual conditions or treatments  Talk to your doctor, nurse or pharmacist before following any medical regimen to see if it is safe and effective for you  Low Fat Diet   AMBULATORY CARE:   A low-fat diet  is an eating plan that is low in total fat, unhealthy fat, and cholesterol  You may need to follow a low-fat diet if you have trouble digesting or absorbing fat  You may also need to follow this diet if you have high cholesterol  You can also lower your cholesterol by increasing the amount of fiber in your diet  Soluble fiber is a type of fiber that helps to decrease cholesterol levels  Different types of fat in food:   · Limit unhealthy fats  A diet that is high in cholesterol, saturated fat, and trans fat may cause unhealthy cholesterol levels  Unhealthy cholesterol levels increase your risk of heart disease  ¨ Cholesterol:  Limit intake of cholesterol to less than 200 mg per day   Cholesterol is found in meat, eggs, and dairy  ¨ Saturated fat:  Limit saturated fat to less than 7% of your total daily calories  Ask your dietitian how many calories you need each day  Saturated fat is found in butter, cheese, ice cream, whole milk, and palm oil  Saturated fat is also found in meat, such as beef, pork, chicken skin, and processed meats  Processed meats include sausage, hot dogs, and bologna  ¨ Trans fat:  Avoid trans fat as much as possible  Trans fat is used in fried and baked foods  Foods that say trans fat free on the label may still have up to 0 5 grams of trans fat per serving  · Include healthy fats  Replace foods that are high in saturated and trans fat with foods high in healthy fats  This may help to decrease high cholesterol levels  ¨ Monounsaturated fats: These are found in avocados, nuts, and vegetable oils, such as olive, canola, and sunflower oil  ¨ Polyunsaturated fats: These can be found in vegetable oils, such as soybean or corn oil  Omega-3 fats can help to decrease the risk of heart disease  Omega-3 fats are found in fish, such as salmon, herring, trout, and tuna  Omega-3 fats can also be found in plant foods, such as walnuts, flaxseed, soybeans, and canola oil    Foods to limit or avoid:   · Grains:      ¨ Snacks that are made with partially hydrogenated oils, such as chips, regular crackers, and butter-flavored popcorn    ¨ High-fat baked goods, such as biscuits, croissants, doughnuts, pies, cookies, and pastries    · Dairy:      ¨ Whole milk, 2% milk, and yogurt and ice cream made with whole milk    ¨ Half and half creamer, heavy cream, and whipping cream    ¨ Cheese, cream cheese, and sour cream    · Meats and proteins:      ¨ High-fat cuts of meat (T-bone steak, regular hamburger, and ribs)    ¨ Fried meat, poultry (turkey and chicken), and fish    ¨ Poultry (chicken and turkey) with skin    ¨ Cold cuts (salami or bologna), hot dogs, lema, and sausage    ¨ Whole eggs and egg yolks    · Vegetables and fruits with added fat:      ¨ Fried vegetables or vegetables in butter or high-fat sauces, such as cream or cheese sauces    ¨ Fried fruit or fruit served with butter or cream    · Fats:      ¨ Butter, stick margarine, and shortening    ¨ Coconut, palm oil, and palm kernel oil  Foods to include:   · Grains:      ¨ Whole-grain breads, cereals, pasta, and brown rice    ¨ Low-fat crackers and pretzels    · Vegetables and fruits:      ¨ Fresh, frozen, or canned vegetables (no salt or low-sodium)    ¨ Fresh, frozen, dried, or canned fruit (canned in light syrup or fruit juice)    ¨ Avocado    · Low-fat dairy products:      ¨ Nonfat (skim) or 1% milk    ¨ Nonfat or low-fat cheese, yogurt, and cottage cheese    · Meats and proteins:      ¨ Chicken or turkey with no skin    ¨ Baked or broiled fish    ¨ Lean beef and pork (loin, round, extra lean hamburger)    ¨ Beans and peas, unsalted nuts, soy products    ¨ Egg whites and substitutes    ¨ Seeds and nuts    · Fats:      ¨ Unsaturated oil, such as canola, olive, peanut, soybean, or sunflower oil    ¨ Soft or liquid margarine and vegetable oil spread    ¨ Low-fat salad dressing  Other ways to decrease fat:   · Read food labels before you buy foods  Choose foods that have less than 30% of calories from fat  Choose low-fat or fat-free dairy products  Remember that fat free does not mean calorie free  These foods still contain calories, and too many calories can lead to weight gain  · Trim fat from meat and avoid fried food  Trim all visible fat from meat before you cook it  Remove the skin from poultry  Do not recinos meat, fish, or poultry  Bake, roast, boil, or broil these foods instead  Avoid fried foods  Eat a baked potato instead of [place] fries  Steam vegetables instead of sautéing them in butter  · Add less fat to foods  Use imitation lema bits on salads and baked potatoes instead of regular lema bits   Use fat-free or low-fat salad dressings instead of regular dressings  Use low-fat or nonfat butter-flavored topping instead of regular butter or margarine on popcorn and other foods  Ways to decrease fat in recipes:  Replace high-fat ingredients with low-fat or nonfat ones  This may cause baked goods to be drier than usual  You may need to use nonfat cooking spray on pans to prevent food from sticking  You also may need to change the amount of other ingredients, such as water, in the recipe  Try the following:  · Use low-fat or light margarine instead of regular margarine or shortening  · Use lean ground turkey breast or chicken, or lean ground beef (less than 5% fat) instead of hamburger  · Add 1 teaspoon of canola oil to 8 ounces of skim milk instead of using cream or half and half  · Use grated zucchini, carrots, or apples in breads instead of coconut  · Use blenderized, low-fat cottage cheese, plain tofu, or low-fat ricotta cheese instead of cream cheese  · Use 1 egg white and 1 teaspoon of canola oil, or use ¼ cup (2 ounces) of fat-free egg substitute instead of a whole egg  · Replace half of the oil that is called for in a recipe with applesauce when you bake  Use 3 tablespoons of cocoa powder and 1 tablespoon of canola oil instead of a square of baking chocolate  How to increase fiber:  Eat enough high-fiber foods to get 20 to 30 grams of fiber every day  Slowly increase your fiber intake to avoid stomach cramps, gas, and other problems  · Eat 3 ounces of whole-grain foods each day  An ounce is about 1 slice of bread  Eat whole-grain breads, such as whole-wheat bread  Whole wheat, whole-wheat flour, or other whole grains should be listed as the first ingredient on the food label  Replace white flour with whole-grain flour or use half of each in recipes  Whole-grain flour is heavier than white flour, so you may have to add more yeast or baking powder       · Eat a high-fiber cereal for breakfast  Oatmeal is a good source of soluble fiber  Look for cereals that have bran or fiber in the name  Choose whole-grain products, such as brown rice, barley, and whole-wheat pasta  · Eat more beans, peas, and lentils  For example, add beans to soups or salads  Eat at least 5 cups of fruits and vegetables each day  Eat fruits and vegetables with the peel because the peel is high in fiber  © 2017 [employer] Information is for End User's use only and may not be sold, redistributed or otherwise used for commercial purposes  All illustrations and images included in CareNotes® are the copyrighted property of [employer] A [employer]  or [employer]o Monge  The above information is an  only  It is not intended as medical advice for individual conditions or treatments  Talk to your doctor, nurse or pharmacist before following any medical regimen to see if it is safe and effective for you  Heart Healthy Diet   AMBULATORY CARE:   A heart healthy diet  is an eating plan low in total fat, unhealthy fats, and sodium (salt)  A heart healthy diet helps decrease your risk for heart disease and stroke  Limit the amount of fat you eat to 25% to 35% of your total daily calories  Limit sodium to less than 2,300 mg each day  Healthy fats:  Healthy fats can help improve cholesterol levels  The risk for heart disease is decreased when cholesterol levels are normal  Choose healthy fats, such as the following:  · Unsaturated fat  is found in foods such as soybean, canola, olive, corn, and safflower oils  It is also found in soft tub margarine that is made with liquid vegetable oil  · Omega-3 fat  is found in certain fish, such as salmon, tuna, and trout, and in walnuts and flaxseed  Unhealthy fats:  Unhealthy fats can cause unhealthy cholesterol levels in your blood and increase your risk of heart disease   Limit unhealthy fats, such as the following:  · Cholesterol  is found in animal foods, such as eggs and lobster, and in dairy products made from whole milk  Limit cholesterol to less than 300 milligrams (mg) each day  You may need to limit cholesterol to 200 mg each day if you have heart disease  · Saturated fat  is found in meats, such as lema and hamburger  It is also found in chicken or turkey skin, whole milk, and butter  Limit saturated fat to less than 7% of your total daily calories  Limit saturated fat to less than 6% if you have heart disease or are at increased risk for it  · Trans fat  is found in packaged foods, such as potato chips and cookies  It is also in hard margarine, some fried foods, and shortening  Avoid trans fats as much as possible    Heart healthy foods and drinks to include:  Ask your dietitian or healthcare provider how many servings to have from each of the following food groups:  · Grains:      ¨ Whole-wheat breads, cereals, and pastas, and brown rice    ¨ Low-fat, low-sodium crackers and chips    · Vegetables:      ¨ Broccoli, green beans, green peas, and spinach    ¨ Collards, kale, and lima beans    ¨ Carrots, sweet potatoes, tomatoes, and peppers    ¨ Canned vegetables with no salt added    · Fruits:      ¨ Bananas, peaches, pears, and pineapple    ¨ Grapes, raisins, and dates    ¨ Oranges, tangerines, grapefruit, orange juice, and grapefruit juice    ¨ Apricots, mangoes, melons, and papaya    ¨ Raspberries and strawberries    ¨ Canned fruit with no added sugar    · Low-fat dairy products:      ¨ Nonfat (skim) milk, 1% milk, and low-fat almond, cashew, or soy milks fortified with calcium    ¨ Low-fat cheese, regular or frozen yogurt, and cottage cheese    · Meats and proteins , such as lean cuts of beef and pork (loin, leg, round), skinless chicken and turkey, legumes, soy products, egg whites, and nuts  Foods and drinks to limit or avoid:  Ask your dietitian or healthcare provider about these and other foods that are high in unhealthy fat, sodium, and sugar:  · Snack or packaged foods , such as frozen dinners, cookies, macaroni and cheese, and cereals with more than 300 mg of sodium per serving    · Canned or dry mixes  for cakes, soups, sauces, or gravies    · Vegetables with added sodium , such as instant potatoes, vegetables with added sauces, or regular canned vegetables    · Other foods high in sodium , such as ketchup, barbecue sauce, salad dressing, pickles, olives, soy sauce, and miso    · High-fat dairy foods  such as whole or 2% milk, cream cheese, or sour cream, and cheeses     · High-fat protein foods  such as high-fat cuts of beef (T-bone steaks, ribs), chicken or turkey with skin, and organ meats, such as liver    · Cured or smoked meats , such as hot dogs, lema, and sausage    · Unhealthy fats and oils , such as butter, stick margarine, shortening, and cooking oils such as coconut or palm oil    · Food and drinks high in sugar , such as soft drinks (soda), sports drinks, sweetened tea, candy, cake, cookies, pies, and doughnuts  Other diet guidelines to follow:   · Eat more foods containing omega-3 fats  Eat fish high in omega-3 fats at least 2 times a week  · Limit alcohol  Too much alcohol can damage your heart and raise your blood pressure  Women should limit alcohol to 1 drink a day  Men should limit alcohol to 2 drinks a day  A drink of alcohol is 12 ounces of beer, 5 ounces of wine, or 1½ ounces of liquor  · Choose low-sodium foods  High-sodium foods can lead to high blood pressure  Add little or no salt to food you prepare  Use herbs and spices in place of salt  · Eat more fiber  to help lower cholesterol levels  Eat at least 5 servings of fruits and vegetables each day  Eat 3 ounces of whole-grain foods each day  Legumes (beans) are also a good source of fiber  Lifestyle guidelines:   · Do not smoke  Nicotine and other chemicals in cigarettes and cigars can cause lung and heart damage   Ask your healthcare provider for information if you currently smoke and need help to quit  E-cigarettes or smokeless tobacco still contain nicotine  Talk to your healthcare provider before you use these products  · Exercise regularly  to help you maintain a healthy weight and improve your blood pressure and cholesterol levels  Ask your healthcare provider about the best exercise plan for you  Do not start an exercise program without asking your healthcare provider  Follow up with your healthcare provider as directed:  Write down your questions so you remember to ask them during your visits  © 2017 [employer]  Information is for End User's use only and may not be sold, redistributed or otherwise used for commercial purposes  All illustrations and images included in CareNotes® are the copyrighted property of [employer]  or [employer]o Mariposa  The above information is an  only  It is not intended as medical advice for individual conditions or treatments  Talk to your doctor, nurse or pharmacist before following any medical regimen to see if it is safe and effective for you  Calorie Counting Diet   WHAT YOU NEED TO KNOW:   What is a calorie counting diet? It is a meal plan based on counting calories each day to reach a healthy body weight  You will need to eat fewer calories if you are trying to lose weight  Weight loss may decrease your risk for certain health problems or improve your health if you have health problems  Some of these health problems include heart disease, high blood pressure, and diabetes  What foods should I avoid? Your dietitian will tell you if you need to avoid certain foods based on your body weight and health condition  You may need to avoid high-fat foods if you are at risk for or have heart disease  You may need to eat fewer foods from the breads and starches food group if you have diabetes  How many calories are in foods?   The following is a list of foods and drinks with the approximate number of calories in each  Check the food label to find the exact number of calories  A dietitian can tell you how many calories you should have from each food group each day    · Carbohydrate:      ¨ ½ of a 3-inch bagel, 1 slice of bread, or ½ of a hamburger bun or hot dog bun (80)    ¨ 1 (8-inch) flour tortilla or ½ cup of cooked rice (100)    ¨ 1 (6-inch) corn tortilla (80)    ¨ 1 (6-inch) pancake or 1 cup of bran flakes cereal (110)    ¨ ½ cup of cooked cereal (80)    ¨ ½ cup of cooked pasta (85)    ¨ 1 ounce of pretzels (100)    ¨ 3 cups of air-popped popcorn without butter or oil (80)    · Dairy:      ¨ 1 cup of skim or 1% milk (90)    ¨ 1 cup of 2% milk (120)    ¨ 1 cup of whole milk (160)    ¨ 1 cup of 2% chocolate milk (220)    ¨ 1 ounce of low-fat cheese with 3 grams of fat per ounce (70)    ¨ 1 ounce of cheddar cheese (114)    ¨ ½ cup of 1% fat cottage cheese (80)    ¨ 1 cup of plain or sugar-free, fat-free yogurt (90)    · Protein foods:      ¨ 3 ounces of fish (not breaded or fried) (95)    ¨ 3 ounces of breaded, fried fish (195)    ¨ ¾ cup of tuna canned in water (105)    ¨ 3 ounces of chicken breast without skin (105)    ¨ 1 fried chicken breast with skin (350)    ¨ ¼ cup of fat free egg substitute (40)    ¨ 1 large egg (75)    ¨ 3 ounces of lean beef or pork (165)    ¨ 3 ounces of fried pork chop or ham (185)    ¨ ½ cup of cooked dried beans, such as kidney, rogers, lentils, or navy (115)    ¨ 3 ounces of bologna or lunch meat (225)    ¨ 2 links of breakfast sausage (140)    · Vegetables:      ¨ ½ cup of sliced mushrooms (10)    ¨ 1 cup of salad greens, such as lettuce, spinach, or deborah (15)    ¨ ½ cup of steamed asparagus (20)    ¨ ½ cup of cooked summer squash, zucchini squash, or green or wax beans (25)    ¨ 1 cup of broccoli or cauliflower florets, or 1 medium tomato (25)    ¨ 1 large raw carrot or ½ cup of cooked carrots (40)    ¨ ? of a medium cucumber or 1 stalk of celery (5)    ¨ 1 small baked potato (160)    ¨ 1 cup of breaded, fried vegetables (230)    · Fruit:      ¨ 1 (6-inch) banana (55)     ¨ ½ of a 4-inch grapefruit (55)    ¨ 15 grapes (60)    ¨ 1 medium orange or apple (70)    ¨ 1 large peach (65)    ¨ 1 cup of fresh pineapple chunks (75)    ¨ 1 cup of melon cubes (50)    ¨ 1¼ cups of whole strawberries (45)    ¨ ½ cup of fruit canned in juice (55)    ¨ ½ cup of fruit canned in heavy syrup (110)    ¨ ?  cup of raisins (130)    ¨ ½ cup of unsweetened fruit juice (60)    ¨ ½ cup of grape, cranberry, or prune juice (90)    · Fat:      ¨ 10 peanuts or 2 teaspoons of peanut butter (55)    ¨ 2 tablespoons of avocado or 1 tablespoon of regular salad dressing (45)    ¨ 2 slices of lema (90)    ¨ 1 teaspoon of oil, such as safflower, canola, corn, or olive oil (45)    ¨ 2 teaspoons of low-fat margarine, or 1 tablespoon of low-fat mayonnaise (50)    ¨ 1 teaspoon of regular margarine (40)    ¨ 1 tablespoon of regular mayonnaise (135)    ¨ 1 tablespoon of cream cheese or 2 tablespoons of low-fat cream cheese (45)    ¨ 2 tablespoons of vegetable shortening (215)    · Dessert and sweets:      ¨ 8 animal crackers or 5 vanilla wafers (80)    ¨ 1 frozen fruit juice bar (80)    ¨ ½ cup of ice milk or low-fat frozen yogurt (90)    ¨ ½ cup of sherbet or sorbet (125)    ¨ ½ cup of sugar-free pudding or custard (60)    ¨ ½ cup of ice cream (140)    ¨ ½ cup of pudding or custard (175)    ¨ 1 (2-inch) square chocolate brownie (185)    · Combination foods:      ¨ Bean burrito made with an 8-inch tortilla, without cheese (275)    ¨ Chicken breast sandwich with lettuce and tomato (325)    ¨ 1 cup of chicken noodle soup (60)    ¨ 1 beef taco (175)    ¨ Regular hamburger with lettuce and tomato (310)    ¨ Regular cheeseburger with lettuce and tomato (410)     ¨ ¼ of a 12-inch cheese pizza (280)    ¨ Fried fish sandwich with lettuce and tomato (425)    ¨ Hot dog and bun (275)    ¨ 1½ cups of macaroni and cheese (310)    ¨ Taco salad with a fried tortilla shell (870)    · Low-calorie foods:      ¨ 1 tablespoon of ketchup or 1 tablespoon of fat free sour cream (15)    ¨ 1 teaspoon of mustard (5)    ¨ ¼ cup of salsa (20)    ¨ 1 large dill pickle (15)    ¨ 1 tablespoon of fat free salad dressing (10)    ¨ 2 teaspoons of low-sugar, light jam or jelly, or 1 tablespoon of sugar-free syrup (15)    ¨ 1 sugar-free popsicle (15)    ¨ 1 cup of club soda, seltzer water, or diet soda (0)  CARE AGREEMENT:   You have the right to help plan your care  Discuss treatment options with your caregivers to decide what care you want to receive  You always have the right to refuse treatment  The above information is an  only  It is not intended as medical advice for individual conditions or treatments  Talk to your doctor, nurse or pharmacist before following any medical regimen to see if it is safe and effective for you  © 2017 [employer]  Information is for End User's use only and may not be sold, redistributed or otherwise used for commercial purposes  All illustrations and images included in CareNotes® are the copyrighted property of [employer]  or [employer]

## 2018-12-20 NOTE — ASSESSMENT & PLAN NOTE
Stay on the current dose of meds  Needs to work on the weight or you will be taken off this drug  Need to have 4 lb weight loss by next visit

## 2018-12-20 NOTE — INTERVAL H&P NOTE
Update: (This section must be completed if the H&P was completed greater than 24 hrs to procedure or admission)    H&P reviewed  After examining the patient, I find no changed to the H&P since it had been written  Patient re-evaluated   Accept as history and physical     Irene Domingo MD/December 20, 2018/2:53 PM

## 2018-12-20 NOTE — PROGRESS NOTES
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 and sausage    ¨ Whole eggs and egg yolks    · Vegetables and fruits with added fat:      ¨ Fried vegetables or vegetables in butter or high-fat sauces, such as cream or cheese sauces    ¨ Fried fruit or fruit served with butter or cream    · Fats:      ¨ Butter, stick margarine, and shortening    ¨ Coconut, palm oil, and palm kernel oil  Foods to include:   · Grains:      ¨ Whole-grain breads, cereals, pasta, and brown rice    ¨ Low-fat crackers and pretzels    · Vegetables and fruits:      ¨ Fresh, frozen, or canned vegetables (no salt or low-sodium)    ¨ Fresh, frozen, dried, or canned fruit (canned in light syrup or fruit juice)    ¨ Avocado    · Low-fat dairy products:      ¨ Nonfat (skim) or 1% milk    ¨ Nonfat or low-fat cheese, yogurt, and cottage cheese    · Meats and proteins:      ¨ Chicken or turkey with no skin    ¨ Baked or broiled fish    ¨ Lean beef and pork (loin, round, extra lean hamburger)    ¨ Beans and peas, unsalted nuts, soy products    ¨ Egg whites and substitutes    ¨ Seeds and nuts    · Fats:      ¨ Unsaturated oil, such as canola, olive, peanut, soybean, or sunflower oil    ¨ Soft or liquid margarine and vegetable oil spread    ¨ Low-fat salad dressing  Other ways to decrease fat:   · Read food labels before you buy foods  Choose foods that have less than 30% of calories from fat  Choose low-fat or fat-free dairy products  Remember that fat free does not mean calorie free  These foods still contain calories, and too many calories can lead to weight gain  · Trim fat from meat and avoid fried food  Trim all visible fat from meat before you cook it  Remove the skin from poultry  Do not recinos meat, fish, or poultry  Bake, roast, boil, or broil these foods instead  Avoid fried foods  Eat a baked potato instead of Western Wanda fries  Steam vegetables instead of sautéing them in butter  · Add less fat to foods  Use imitation lema bits on salads and baked potatoes instead of regular lema bits  Use fat-free or low-fat salad dressings instead of regular dressings  Use low-fat or nonfat butter-flavored topping instead of regular butter or margarine on popcorn and other foods  Ways to decrease fat in recipes:  Replace high-fat ingredients with low-fat or nonfat ones  This may cause baked goods to be drier than usual  You may need to use nonfat cooking spray on pans to prevent food from sticking  You also may need to change the amount of other ingredients, such as water, in the recipe  Try the following:  · Use low-fat or light margarine instead of regular margarine or shortening  · Use lean ground turkey breast or chicken, or lean ground beef (less than 5% fat) instead of hamburger  · Add 1 teaspoon of canola oil to 8 ounces of skim milk instead of using cream or half and half       · Use grated zucchini, carrots, or apples in breads instead of coconut  · Use blenderized, low-fat cottage cheese, plain tofu, or low-fat ricotta cheese instead of cream cheese  · Use 1 egg white and 1 teaspoon of canola oil, or use ¼ cup (2 ounces) of fat-free egg substitute instead of a whole egg  · Replace half of the oil that is called for in a recipe with applesauce when you bake  Use 3 tablespoons of cocoa powder and 1 tablespoon of canola oil instead of a square of baking chocolate  How to increase fiber:  Eat enough high-fiber foods to get 20 to 30 grams of fiber every day  Slowly increase your fiber intake to avoid stomach cramps, gas, and other problems  · Eat 3 ounces of whole-grain foods each day  An ounce is about 1 slice of bread  Eat whole-grain breads, such as whole-wheat bread  Whole wheat, whole-wheat flour, or other whole grains should be listed as the first ingredient on the food label  Replace white flour with whole-grain flour or use half of each in recipes  Whole-grain flour is heavier than white flour, so you may have to add more yeast or baking powder  · Eat a high-fiber cereal for breakfast   Oatmeal is a good source of soluble fiber  Look for cereals that have bran or fiber in the name  Choose whole-grain products, such as brown rice, barley, and whole-wheat pasta  · Eat more beans, peas, and lentils  For example, add beans to soups or salads  Eat at least 5 cups of fruits and vegetables each day  Eat fruits and vegetables with the peel because the peel is high in fiber  © 2017 [employer]  Information is for End User's use only and may not be sold, redistributed or otherwise used for commercial purposes  All illustrations and images included in CareNotes® are the copyrighted property of [employer] M [employer]  or [employer]  The above information is an  only  It is not intended as medical advice for individual conditions or treatments   Talk to your doctor, nurse or pharmacist before following any medical regimen to see if it is safe and effective for you  Heart Healthy Diet   AMBULATORY CARE:   A heart healthy diet  is an eating plan low in total fat, unhealthy fats, and sodium (salt)  A heart healthy diet helps decrease your risk for heart disease and stroke  Limit the amount of fat you eat to 25% to 35% of your total daily calories  Limit sodium to less than 2,300 mg each day  Healthy fats:  Healthy fats can help improve cholesterol levels  The risk for heart disease is decreased when cholesterol levels are normal  Choose healthy fats, such as the following:  · Unsaturated fat  is found in foods such as soybean, canola, olive, corn, and safflower oils  It is also found in soft tub margarine that is made with liquid vegetable oil  · Omega-3 fat  is found in certain fish, such as salmon, tuna, and trout, and in walnuts and flaxseed  Unhealthy fats:  Unhealthy fats can cause unhealthy cholesterol levels in your blood and increase your risk of heart disease  Limit unhealthy fats, such as the following:  · Cholesterol  is found in animal foods, such as eggs and lobster, and in dairy products made from whole milk  Limit cholesterol to less than 300 milligrams (mg) each day  You may need to limit cholesterol to 200 mg each day if you have heart disease  · Saturated fat  is found in meats, such as lema and hamburger  It is also found in chicken or turkey skin, whole milk, and butter  Limit saturated fat to less than 7% of your total daily calories  Limit saturated fat to less than 6% if you have heart disease or are at increased risk for it  · Trans fat  is found in packaged foods, such as potato chips and cookies  It is also in hard margarine, some fried foods, and shortening  Avoid trans fats as much as possible    Heart healthy foods and drinks to include:  Ask your dietitian or healthcare provider how many servings to have from each of the following food groups:  · Grains:      ¨ Whole-wheat breads, cereals, and pastas, and brown rice    ¨ Low-fat, low-sodium crackers and chips    · Vegetables:      ¨ Broccoli, green beans, green peas, and spinach    ¨ Collards, kale, and lima beans    ¨ Carrots, sweet potatoes, tomatoes, and peppers    ¨ Canned vegetables with no salt added    · Fruits:      ¨ Bananas, peaches, pears, and pineapple    ¨ Grapes, raisins, and dates    ¨ Oranges, tangerines, grapefruit, orange juice, and grapefruit juice    ¨ Apricots, mangoes, melons, and papaya    ¨ Raspberries and strawberries    ¨ Canned fruit with no added sugar    · Low-fat dairy products:      ¨ Nonfat (skim) milk, 1% milk, and low-fat almond, cashew, or soy milks fortified with calcium    ¨ Low-fat cheese, regular or frozen yogurt, and cottage cheese    · Meats and proteins , such as lean cuts of beef and pork (loin, leg, round), skinless chicken and turkey, legumes, soy products, egg whites, and nuts  Foods and drinks to limit or avoid:  Ask your dietitian or healthcare provider about these and other foods that are high in unhealthy fat, sodium, and sugar:  · Snack or packaged foods , such as frozen dinners, cookies, macaroni and cheese, and cereals with more than 300 mg of sodium per serving    · Canned or dry mixes  for cakes, soups, sauces, or gravies    · Vegetables with added sodium , such as instant potatoes, vegetables with added sauces, or regular canned vegetables    · Other foods high in sodium , such as ketchup, barbecue sauce, salad dressing, pickles, olives, soy sauce, and miso    · High-fat dairy foods  such as whole or 2% milk, cream cheese, or sour cream, and cheeses     · High-fat protein foods  such as high-fat cuts of beef (T-bone steaks, ribs), chicken or turkey with skin, and organ meats, such as liver    · Cured or smoked meats , such as hot dogs, lema, and sausage    · Unhealthy fats and oils , such as butter, stick margarine, shortening, and cooking oils such as coconut or palm oil    · Food and drinks high in sugar , such as soft drinks (soda), sports drinks, sweetened tea, candy, cake, cookies, pies, and doughnuts  Other diet guidelines to follow:   · Eat more foods containing omega-3 fats  Eat fish high in omega-3 fats at least 2 times a week  · Limit alcohol  Too much alcohol can damage your heart and raise your blood pressure  Women should limit alcohol to 1 drink a day  Men should limit alcohol to 2 drinks a day  A drink of alcohol is 12 ounces of beer, 5 ounces of wine, or 1½ ounces of liquor  · Choose low-sodium foods  High-sodium foods can lead to high blood pressure  Add little or no salt to food you prepare  Use herbs and spices in place of salt  · Eat more fiber  to help lower cholesterol levels  Eat at least 5 servings of fruits and vegetables each day  Eat 3 ounces of whole-grain foods each day  Legumes (beans) are also a good source of fiber  Lifestyle guidelines:   · Do not smoke  Nicotine and other chemicals in cigarettes and cigars can cause lung and heart damage  Ask your healthcare provider for information if you currently smoke and need help to quit  E-cigarettes or smokeless tobacco still contain nicotine  Talk to your healthcare provider before you use these products  · Exercise regularly  to help you maintain a healthy weight and improve your blood pressure and cholesterol levels  Ask your healthcare provider about the best exercise plan for you  Do not start an exercise program without asking your healthcare provider  Follow up with your healthcare provider as directed:  Write down your questions so you remember to ask them during your visits  © 2017 [employer] Information is for End User's use only and may not be sold, redistributed or otherwise used for commercial purposes   All illustrations and images included in CareNotes® are the copyrighted property of [employer]  or [employer]  The above information is an  only  It is not intended as medical advice for individual conditions or treatments  Talk to your doctor, nurse or pharmacist before following any medical regimen to see if it is safe and effective for you  Calorie Counting Diet   WHAT YOU NEED TO KNOW:   What is a calorie counting diet? It is a meal plan based on counting calories each day to reach a healthy body weight  You will need to eat fewer calories if you are trying to lose weight  Weight loss may decrease your risk for certain health problems or improve your health if you have health problems  Some of these health problems include heart disease, high blood pressure, and diabetes  What foods should I avoid? Your dietitian will tell you if you need to avoid certain foods based on your body weight and health condition  You may need to avoid high-fat foods if you are at risk for or have heart disease  You may need to eat fewer foods from the breads and starches food group if you have diabetes  How many calories are in foods? The following is a list of foods and drinks with the approximate number of calories in each  Check the food label to find the exact number of calories  A dietitian can tell you how many calories you should have from each food group each day    · Carbohydrate:      ¨ ½ of a 3-inch bagel, 1 slice of bread, or ½ of a hamburger bun or hot dog bun (80)    ¨ 1 (8-inch) flour tortilla or ½ cup of cooked rice (100)    ¨ 1 (6-inch) corn tortilla (80)    ¨ 1 (6-inch) pancake or 1 cup of bran flakes cereal (110)    ¨ ½ cup of cooked cereal (80)    ¨ ½ cup of cooked pasta (85)    ¨ 1 ounce of pretzels (100)    ¨ 3 cups of air-popped popcorn without butter or oil (80)    · Dairy:      ¨ 1 cup of skim or 1% milk (90)    ¨ 1 cup of 2% milk (120)    ¨ 1 cup of whole milk (160)    ¨ 1 cup of 2% chocolate milk (220)    ¨ 1 ounce of low-fat cheese with 3 grams of fat per ounce (70)    ¨ 1 ounce of cheddar cheese (114)    ¨ ½ cup of 1% fat cottage cheese (80)    ¨ 1 cup of plain or sugar-free, fat-free yogurt (90)    · Protein foods:      ¨ 3 ounces of fish (not breaded or fried) (95)    ¨ 3 ounces of breaded, fried fish (195)    ¨ ¾ cup of tuna canned in water (105)    ¨ 3 ounces of chicken breast without skin (105)    ¨ 1 fried chicken breast with skin (350)    ¨ ¼ cup of fat free egg substitute (40)    ¨ 1 large egg (75)    ¨ 3 ounces of lean beef or pork (165)    ¨ 3 ounces of fried pork chop or ham (185)    ¨ ½ cup of cooked dried beans, such as kidney, rogers, lentils, or navy (115)    ¨ 3 ounces of bologna or lunch meat (225)    ¨ 2 links of breakfast sausage (140)    · Vegetables:      ¨ ½ cup of sliced mushrooms (10)    ¨ 1 cup of salad greens, such as lettuce, spinach, or deborah (15)    ¨ ½ cup of steamed asparagus (20)    ¨ ½ cup of cooked summer squash, zucchini squash, or green or wax beans (25)    ¨ 1 cup of broccoli or cauliflower florets, or 1 medium tomato (25)    ¨ 1 large raw carrot or ½ cup of cooked carrots (40)    ¨ ? of a medium cucumber or 1 stalk of celery (5)    ¨ 1 small baked potato (160)    ¨ 1 cup of breaded, fried vegetables (230)    · Fruit:      ¨ 1 (6-inch) banana (55)     ¨ ½ of a 4-inch grapefruit (55)    ¨ 15 grapes (60)    ¨ 1 medium orange or apple (70)    ¨ 1 large peach (65)    ¨ 1 cup of fresh pineapple chunks (75)    ¨ 1 cup of melon cubes (50)    ¨ 1¼ cups of whole strawberries (45)    ¨ ½ cup of fruit canned in juice (55)    ¨ ½ cup of fruit canned in heavy syrup (110)    ¨ ?  cup of raisins (130)    ¨ ½ cup of unsweetened fruit juice (60)    ¨ ½ cup of grape, cranberry, or prune juice (90)    · Fat:      ¨ 10 peanuts or 2 teaspoons of peanut butter (55)    ¨ 2 tablespoons of avocado or 1 tablespoon of regular salad dressing (45)    ¨ 2 slices of lema (90)    ¨ 1 teaspoon of oil, such as safflower, canola, corn, or olive oil (45)    ¨ 2 teaspoons of low-fat margarine, or 1 tablespoon of low-fat mayonnaise (50)    ¨ 1 teaspoon of regular margarine (40)    ¨ 1 tablespoon of regular mayonnaise (135)    ¨ 1 tablespoon of cream cheese or 2 tablespoons of low-fat cream cheese (45)    ¨ 2 tablespoons of vegetable shortening (215)    · Dessert and sweets:      ¨ 8 animal crackers or 5 vanilla wafers (80)    ¨ 1 frozen fruit juice bar (80)    ¨ ½ cup of ice milk or low-fat frozen yogurt (90)    ¨ ½ cup of sherbet or sorbet (125)    ¨ ½ cup of sugar-free pudding or custard (60)    ¨ ½ cup of ice cream (140)    ¨ ½ cup of pudding or custard (175)    ¨ 1 (2-inch) square chocolate brownie (185)    · Combination foods:      ¨ Bean burrito made with an 8-inch tortilla, without cheese (275)    ¨ Chicken breast sandwich with lettuce and tomato (325)    ¨ 1 cup of chicken noodle soup (60)    ¨ 1 beef taco (175)    ¨ Regular hamburger with lettuce and tomato (310)    ¨ Regular cheeseburger with lettuce and tomato (410)     ¨ ¼ of a 12-inch cheese pizza (280)    ¨ Fried fish sandwich with lettuce and tomato (425)    ¨ Hot dog and bun (275)    ¨ 1½ cups of macaroni and cheese (310)    ¨ Taco salad with a fried tortilla shell (870)    · Low-calorie foods:      ¨ 1 tablespoon of ketchup or 1 tablespoon of fat free sour cream (15)    ¨ 1 teaspoon of mustard (5)    ¨ ¼ cup of salsa (20)    ¨ 1 large dill pickle (15)    ¨ 1 tablespoon of fat free salad dressing (10)    ¨ 2 teaspoons of low-sugar, light jam or jelly, or 1 tablespoon of sugar-free syrup (15)    ¨ 1 sugar-free popsicle (15)    ¨ 1 cup of club soda, seltzer water, or diet soda (0)  CARE AGREEMENT:   You have the right to help plan your care  Discuss treatment options with your caregivers to decide what care you want to receive  You always have the right to refuse treatment  The above information is an  only  It is not intended as medical advice for individual conditions or treatments   Talk to your doctor, nurse or pharmacist before following any medical regimen to see if it is safe and effective for you  © 2017 [employer] Information is for End User's use only and may not be sold, redistributed or otherwise used for commercial purposes  All illustrations and images included in CareNotes® are the copyrighted property of [employer]  or [employer]    Portions of the record may have been created with voice recognition software   Occasional wrong word or "sound a like" substitutions may have occurred due to the inherent limitations of voice recognition software   Read the chart carefully and recognize, using context, where substitutions have occurred  BMI Counseling: Body mass index is 38 28 kg/m²  Discussed the patient's BMI with her  The BMI is above average  BMI counseling and education was provided to the patient  Nutrition recommendations include reducing portion sizes, decreasing overall calorie intake, 3-5 servings of fruits/vegetables daily, reducing fast food intake, consuming healthier snacks, decreasing soda and/or juice intake, moderation in carbohydrate intake, increasing intake of lean protein, reducing intake of saturated fat and trans fat and reducing intake of cholesterol  Exercise recommendations include exercising 3-5 times per week

## 2018-12-20 NOTE — INTERVAL H&P NOTE
Update: (This section must be completed if the H&P was completed greater than 24 hrs to procedure or admission)    H&P reviewed  After examining the patient, I find no changed to the H&P since it had been written  Patient re-evaluated   Accept as history and physical     Rashida Hernandez MD/December 20, 2018/2:58 PM

## 2018-12-21 ENCOUNTER — OFFICE VISIT (OUTPATIENT)
Dept: NEUROLOGY | Facility: CLINIC | Age: 58
End: 2018-12-21
Payer: COMMERCIAL

## 2018-12-21 VITALS
SYSTOLIC BLOOD PRESSURE: 132 MMHG | DIASTOLIC BLOOD PRESSURE: 90 MMHG | HEART RATE: 77 BPM | BODY MASS INDEX: 37.83 KG/M2 | HEIGHT: 64 IN | WEIGHT: 221.6 LBS

## 2018-12-21 DIAGNOSIS — G47.33 OSA (OBSTRUCTIVE SLEEP APNEA): Primary | ICD-10-CM

## 2018-12-21 DIAGNOSIS — G31.84 MILD COGNITIVE IMPAIRMENT WITH MEMORY LOSS: ICD-10-CM

## 2018-12-21 PROCEDURE — 99214 OFFICE O/P EST MOD 30 MIN: CPT | Performed by: PSYCHIATRY & NEUROLOGY

## 2018-12-21 NOTE — PROGRESS NOTES
Dae Womack is a 59 y o  female  Chief Complaint   Patient presents with    Follow-up     Lumbar radiculopathy, neurological problems & Memory Loss  Assessment:  1  CATHRYN (obstructive sleep apnea)    2  Mild cognitive impairment with memory loss        Plan:    Discussion:  Discussed with patient differential diagnosis, I do not think patient has a true cognitive impairment, but patient is concerned and hence will recommend MRI scan of the brain, and EEG also would recommend a detailed neuropsych testing, and blood work, patient to call me after the test to discuss the results, depending on that will decide further management, her recent CPAP download was reviewed she was advised to continue with current CPAP at 6 cm of water pressure and was advised to be compliant, Patient was advised to continue with current CPAP pressure, he was also advised to maintain ideal body weight, avoid alcohol, muscle relaxers, pain killers and sleeping aids, he was advised to preferably sleep in lateral recumbent position, avoid driving and operating machinery if feeling drowsy or tired, clean the CPAP machine as per the [employer] company instructions, go to the hospital if has any worsening symptoms and call me  And to follow up with his other physicians  Subjective:    HPI   Patient is here in follow-up for history of sleep apnea and a new problem of memory difficulty, she usually goes to sleep by 10:00 p m  and gets up in the morning at 10:00 a m  she feels that she is having difficulty in concentration and forgetting things, also she feels tired, she was recently weaned off the opioids, she has low back pain radiating to the legs for which she is in follow up with the pain specialist, no other complaints      Vitals:    12/21/18 1221   BP: 132/90   BP Location: Left arm   Patient Position: Sitting   Cuff Size: Standard   Pulse: 77   Weight: 101 kg (221 lb 9 6 oz)   Height: 5' 4" (1 626 m)       Current Medications    Current Outpatient Prescriptions:     Cholecalciferol (D3-1000) 1000 units capsule, Take 1 capsule by mouth daily  , Disp: , Rfl:     diclofenac sodium (VOLTAREN) 1 %, Apply 2 g topically daily for 90 days, Disp: 180 g, Rfl: 0    Diclofenac Sodium 1 5 % SOLN, Apply 2 drops to affected area 3x/day, Disp: 1 Bottle, Rfl: 2    dronabinol (MARINOL) 5 MG capsule, Take 1 capsule (5 mg total) by mouth 2 (two) times a day before meals By Dr Camille Shah, Disp: 60 capsule, Rfl: 0    DULoxetine (CYMBALTA) 60 mg delayed release capsule, TAKE 1 CAPSULE DAILY, Disp: 90 capsule, Rfl: 1    Iron-Vitamin C (IRON 100/C) 100-250 MG TABS, Take by mouth daily, Disp: , Rfl:     lidocaine (XYLOCAINE) 5 % ointment, Apply topically 2 (two) times a day as needed, Disp: , Rfl:     lisinopril (ZESTRIL) 10 mg tablet, TAKE 1 TABLET DAILY, Disp: 90 tablet, Rfl: 1    metoprolol tartrate (LOPRESSOR) 25 mg tablet, TAKE ONE-HALF (1/2) TABLET TWICE A DAY (Patient taking differently: take 1/2 tablet by mouth twice daily), Disp: 90 tablet, Rfl: 1    mirtazapine (REMERON) 30 mg tablet, Take 1 po qhs, Disp: 30 tablet, Rfl: 1    mometasone (ELOCON) 0 1 % cream, Apply topically daily, Disp: , Rfl:     Multiple Vitamin (MULTI-VITAMIN DAILY) TABS, Take 1 tablet by mouth daily, Disp: , Rfl:     pantoprazole (PROTONIX) 40 mg tablet, TAKE 1 TABLET TWICE A DAY, Disp: 180 tablet, Rfl: 1  No current facility-administered medications for this visit  Facility-Administered Medications Ordered in Other Visits:     gadopentetate dimeglumine (MAGNEVIST) injection 1 mL, 1 mL, Perineural, Once in imaging, Sergei Newby MD      Allergies  Cat hair extract; Dog epithelium;  Other; and Shellfish-derived products    Past Medical History  Past Medical History:   Diagnosis Date    Anxiety     Arthritis     Attention and concentration deficit     Blurred vision     Chronic pain     Depression     Diplopia     Dyspepsia     Gastric ulcer     Gastritis     Memory loss  Osteopenia     Starting and stopping of urinary stream during micturition     Urinary incontinence     Wears eyeglasses          Past Surgical History:  Past Surgical History:   Procedure Laterality Date     SECTION       SECTION      CHOLECYSTECTOMY      GALLBLADDER SURGERY      GASTRIC BYPASS  2004    HYSTERECTOMY  2012    INSERT / REPLACE PERIPHERAL NEUROSTIMULATOR PULSE GENERATOR /       OTHER SURGICAL HISTORY      Reimplantatin at [address] [facility] WRIST ARTHROSCOPY      with internal fixation          Family History:  Family History   Problem Relation Age of Onset    Other Mother         Back Disorder     Cirrhosis Mother     Crohn's disease Mother     Lupus Mother         Systemic Lupus Erythematous     Hypertension Father     Heart disease Father     Other Brother         Liver Transplant        Social History:   reports that she has never smoked  She has never used smokeless tobacco  She reports that she does not drink alcohol or use drugs  I have reviewed the past medical history, surgical history, social and family history, current medications, allergies vitals, review of systems, and updated this information as appropriate today  Objective:    Physical Exam    Neurological Exam    GENERAL:  Cooperative in no acute distress  Well-developed and well-nourished    HEAD and NECK   Head is atraumatic normocephalic with no lesions or masses  Neck is supple with full range of motion    CARDIOVASCULAR  Carotid Arteries-no carotid bruits  NEUROLOGIC:  Mental Status-the patient is awake alert and oriented without aphasia or apraxia, Haralson is 29/30  Cranial Nerves: Visual fields are full to confrontation  Extraocular movements are full without nystagmus  Pupils are 2-1/2 mm and reactive  Face is symmetrical to light touch  Movements of facial expression move symmetrically   Hearing is normal to finger rub bilaterally  Soft palate lifts symmetrically  Shoulder shrug is symmetrical  Tongue is midline without atrophy  Motor: No drift is noted on arm extension  Strength is full in the upper and lower extremities with normal bulk and tone  Sensory: Intact to temperature and vibratory sensation in the upper and lower extremities bilaterally  Cortical function is intact  Coordination: Finger to nose testing is performed accurately  Gait reveals a normal base with symmetrical arm swing  Reflexes:       1+ and symmetrical   Toes are downgoing  Oropharynx classification is 2/3  Chest is clear to auscultate bilaterally          ROS:  Review of Systems   Constitutional: Positive for appetite change (Increased hunger) and fatigue  HENT: Negative  Eyes: Positive for photophobia and pain (behind left eye)  Respiratory: Negative  Cardiovascular: Negative  Gastrointestinal: Positive for nausea (with pain )  Endocrine: Positive for heat intolerance  Genitourinary:        Unable to feel urine urgency when bladder full  Musculoskeletal: Positive for back pain (lower back ), neck pain and neck stiffness  Skin: Positive for rash (facial redness)  Allergic/Immunologic: Negative  Neurological: Positive for dizziness, tremors, numbness (Numbness and tingling in extremities ) and headaches  Hematological: Negative  Psychiatric/Behavioral: Positive for decreased concentration

## 2018-12-27 ENCOUNTER — TELEPHONE (OUTPATIENT)
Dept: PAIN MEDICINE | Facility: CLINIC | Age: 58
End: 2018-12-27

## 2019-01-02 ENCOUNTER — APPOINTMENT (OUTPATIENT)
Dept: LAB | Facility: CLINIC | Age: 59
End: 2019-01-02
Payer: COMMERCIAL

## 2019-01-02 DIAGNOSIS — G31.84 MILD COGNITIVE IMPAIRMENT WITH MEMORY LOSS: ICD-10-CM

## 2019-01-02 LAB — VIT B12 SERPL-MCNC: 616 PG/ML (ref 100–900)

## 2019-01-02 PROCEDURE — 86618 LYME DISEASE ANTIBODY: CPT

## 2019-01-02 PROCEDURE — 86592 SYPHILIS TEST NON-TREP QUAL: CPT

## 2019-01-02 PROCEDURE — 82607 VITAMIN B-12: CPT

## 2019-01-02 PROCEDURE — 36415 COLL VENOUS BLD VENIPUNCTURE: CPT

## 2019-01-03 DIAGNOSIS — F41.8 ANXIETY WITH DEPRESSION: ICD-10-CM

## 2019-01-03 LAB
B BURGDOR IGG SER IA-ACNC: 0.04
B BURGDOR IGM SER IA-ACNC: 0.28
RPR SER QL: NORMAL

## 2019-01-03 RX ORDER — MIRTAZAPINE 30 MG/1
TABLET, FILM COATED ORAL
Qty: 30 TABLET | Refills: 1 | Status: SHIPPED | OUTPATIENT
Start: 2019-01-03 | End: 2019-03-05 | Stop reason: SDUPTHER

## 2019-01-11 ENCOUNTER — HOSPITAL ENCOUNTER (OUTPATIENT)
Dept: NEUROLOGY | Facility: HOSPITAL | Age: 59
Discharge: HOME | End: 2019-01-11
Attending: PSYCHIATRY & NEUROLOGY
Payer: COMMERCIAL

## 2019-01-11 DIAGNOSIS — G31.84 MILD COGNITIVE IMPAIRMENT WITH MEMORY LOSS: ICD-10-CM

## 2019-01-11 PROCEDURE — 95816 EEG AWAKE AND DROWSY: CPT | Performed by: PSYCHIATRY & NEUROLOGY

## 2019-01-11 PROCEDURE — 95816 EEG AWAKE AND DROWSY: CPT

## 2019-01-14 ENCOUNTER — TELEPHONE (OUTPATIENT)
Dept: NEUROLOGY | Facility: CLINIC | Age: 59
End: 2019-01-14

## 2019-01-14 DIAGNOSIS — R94.01 ABNORMAL EEG: Primary | ICD-10-CM

## 2019-01-14 NOTE — TELEPHONE ENCOUNTER
Patient called stating she is feeling okay  She states she can stand a little longer than before  She still has pain in the right leg though  Patient has had about a 20% relief

## 2019-01-17 ENCOUNTER — OFFICE VISIT (OUTPATIENT)
Dept: FAMILY MEDICINE CLINIC | Facility: CLINIC | Age: 59
End: 2019-01-17
Payer: COMMERCIAL

## 2019-01-17 VITALS
HEIGHT: 64 IN | WEIGHT: 217 LBS | BODY MASS INDEX: 37.05 KG/M2 | TEMPERATURE: 99 F | SYSTOLIC BLOOD PRESSURE: 108 MMHG | OXYGEN SATURATION: 98 % | DIASTOLIC BLOOD PRESSURE: 78 MMHG | HEART RATE: 82 BPM

## 2019-01-17 DIAGNOSIS — R41.89 COGNITIVE IMPAIRMENT: Primary | ICD-10-CM

## 2019-01-17 DIAGNOSIS — R94.01 ABNORMAL EEG: ICD-10-CM

## 2019-01-17 DIAGNOSIS — R63.4 WEIGHT LOSS: ICD-10-CM

## 2019-01-17 DIAGNOSIS — G89.29 OTHER CHRONIC PAIN: ICD-10-CM

## 2019-01-17 PROCEDURE — 99214 OFFICE O/P EST MOD 30 MIN: CPT | Performed by: FAMILY MEDICINE

## 2019-01-17 NOTE — PROGRESS NOTES
Assessment/Plan:     Chronic Problems:  No problem-specific Assessment & Plan notes found for this encounter  Visit Diagnosis:  Diagnoses and all orders for this visit:    Cognitive impairment by patient history  Comments: Will get second opinion with Dr Sushma Mccarthy  have the mri done and repeat the sleep deprived eeg  Continue the current meds for now  Orders:  -     Ambulatory referral to Neurology; Future    Abnormal EEG  -     Ambulatory referral to Neurology; Future    Other chronic pain  Comments:  Doing much better since off narcotics  Start physical therapy when you can  Orders:  -     Ambulatory referral to Neurology; Future    Weight loss  Comments:  Great job with the 5 lbs weight loss  continue to work on the weight  Subjective:    Patient ID: Adia Tom is a 59 y o  female  Pt is here with c/o being able to function in life  If she does something around the house it takes 3 days for her to recuperate and can't walk r/t the chronic pains  Pt has been working on her weight by cutting out cream in her coffee and threw out [employer] cookies  Lost another 5 lbs  Pt had a recent eeg done by neuro and told it was abnormal  Waiting for neuropsych testing  Was informed by Dr Shila Gaytan that she should not drive as he suspected seizures and she reported that when she was driving in the past recently, she did not know where she was  Pt has f/u appt with Dr Shila Gaytan end of the summer, but wants a second opinion  Also made a wrong turn and took her over 3 hours to get home  Also due for a sleep deprived eeg which is scheduled in February and the mri of the brain is next Friday  Not sure if she noticed this change when she started medical marijuana  Takes all other meds as directed  No side effects noted           The following portions of the patient's history were reviewed and updated as appropriate: allergies, current medications, past family history, past medical history, past social history, past surgical history and problem list     Review of Systems   Constitutional: Negative for chills, diaphoresis, fatigue and fever  HENT: Negative for ear pain, postnasal drip and sore throat  Eyes: Negative for pain and visual disturbance  Respiratory: Negative for cough, chest tightness and shortness of breath  Cardiovascular: Negative for chest pain and palpitations  Gastrointestinal: Negative for abdominal pain, constipation, diarrhea, nausea and vomiting  Endocrine: Negative for cold intolerance, heat intolerance and polyuria  Genitourinary: Negative for dysuria, frequency and urgency  Musculoskeletal:        Still follows with Dr Cassius Church for pain management  Skin: Negative for pallor and rash  Neurological: Positive for tremors  Negative for dizziness, light-headedness and numbness  Psychiatric/Behavioral: Positive for dysphoric mood  Negative for suicidal ideas  The patient is nervous/anxious  Was on lyrica for 6 years and was d/c in May of 2018 at the same time she came off the opiods  Feels she gets confused easily for the last several years            /78   Pulse 82   Temp 99 °F (37 2 °C)   Ht 5' 4" (1 626 m)   Wt 98 4 kg (217 lb)   SpO2 98%   BMI 37 25 kg/m²   Social History     Social History    Marital status: /Civil Union     Spouse name: N/A    Number of children: N/A    Years of education: N/A     Occupational History    retired      Social History Main Topics    Smoking status: Never Smoker    Smokeless tobacco: Never Used    Alcohol use No    Drug use: No    Sexual activity: Yes     Other Topics Concern    Not on file     Social History Narrative    No narrative on file     Past Medical History:   Diagnosis Date    Anxiety     Arthritis     Attention and concentration deficit     Blurred vision     Chronic pain     Depression     Diplopia     Dyspepsia     Gastric ulcer     Gastritis     Memory loss     Osteopenia     Starting and stopping of urinary stream during micturition     Urinary incontinence     Wears eyeglasses      Family History   Problem Relation Age of Onset    Other Mother         Back Disorder     Cirrhosis Mother     Crohn's disease Mother     Lupus Mother         Systemic Lupus Erythematous     Hypertension Father     Heart disease Father     Other Brother         Liver Transplant      Past Surgical History:   Procedure Laterality Date     SECTION       SECTION     [address] GASTRIC BYPASS  2004    HYSTERECTOMY  2012    INSERT / REPLACE PERIPHERAL NEUROSTIMULATOR PULSE GENERATOR /       OTHER SURGICAL HISTORY      Reimplantatin at [address] [facility] WRIST ARTHROSCOPY      with internal fixation        Current Outpatient Prescriptions:     Cholecalciferol (D3-1000) 1000 units capsule, Take 1 capsule by mouth daily  , Disp: , Rfl:     diclofenac sodium (VOLTAREN) 1 %, Apply 2 g topically daily for 90 days, Disp: 180 g, Rfl: 0    dronabinol (MARINOL) 5 MG capsule, Take 1 capsule (5 mg total) by mouth 2 (two) times a day before meals By Dr Mickey Marcelino, Disp: 60 capsule, Rfl: 0    DULoxetine (CYMBALTA) 60 mg delayed release capsule, TAKE 1 CAPSULE DAILY, Disp: 90 capsule, Rfl: 1    Iron-Vitamin C (IRON 100/C) 100-250 MG TABS, Take by mouth daily, Disp: , Rfl:     lidocaine (XYLOCAINE) 5 % ointment, Apply topically 2 (two) times a day as needed, Disp: , Rfl:     lisinopril (ZESTRIL) 10 mg tablet, TAKE 1 TABLET DAILY, Disp: 90 tablet, Rfl: 1    metoprolol tartrate (LOPRESSOR) 25 mg tablet, TAKE ONE-HALF (1/2) TABLET TWICE A DAY (Patient taking differently: take 1/2 tablet by mouth twice daily), Disp: 90 tablet, Rfl: 1    mirtazapine (REMERON) 30 mg tablet, TAKE 1 TABLET DAILY AT BEDTIME, Disp: 30 tablet, Rfl: 1    Multiple Vitamin (MULTI-VITAMIN DAILY) TABS, Take 1 tablet by mouth daily, Disp: , Rfl:     pantoprazole (PROTONIX) 40 mg tablet, TAKE 1 TABLET TWICE A DAY, Disp: 180 tablet, Rfl: 1    Diclofenac Sodium 1 5 % SOLN, Apply 2 drops to affected area 3x/day (Patient not taking: Reported on 1/17/2019 ), Disp: 1 Bottle, Rfl: 2    mometasone (ELOCON) 0 1 % cream, Apply topically daily, Disp: , Rfl:     Allergies   Allergen Reactions    Cat Hair Extract      Cat Dander    Dog Epithelium     Other Other (See Comments) and Sneezing     Dogs  Crab Meat    Shellfish-Derived Products      CRAB MEAT ONLY          Lab Review   Appointment on 01/02/2019   Component Date Value    RPR 01/02/2019 Non-Reactive     Vitamin B-12 01/02/2019 616     LYME AB IGG 01/02/2019 0 04     LYME AB IGM 01/02/2019 0 28    Admission on 12/07/2018, Discharged on 12/07/2018   Component Date Value    WBC 12/07/2018 8 35     RBC 12/07/2018 4 09     Hemoglobin 12/07/2018 12 4     Hematocrit 12/07/2018 39 2     MCV 12/07/2018 96     MCH 12/07/2018 30 3     MCHC 12/07/2018 31 6     RDW 12/07/2018 12 9     MPV 12/07/2018 10 6     Platelets 33/49/9677 248     nRBC 12/07/2018 0     Neutrophils Relative 12/07/2018 64     Immat GRANS % 12/07/2018 0     Lymphocytes Relative 12/07/2018 25     Monocytes Relative 12/07/2018 7     Eosinophils Relative 12/07/2018 3     Basophils Relative 12/07/2018 1     Neutrophils Absolute 12/07/2018 5 35     Immature Grans Absolute 12/07/2018 0 02     Lymphocytes Absolute 12/07/2018 2 11     Monocytes Absolute 12/07/2018 0 55     Eosinophils Absolute 12/07/2018 0 23     Basophils Absolute 12/07/2018 0 09     Sodium 12/07/2018 140     Potassium 12/07/2018 4 4     Chloride 12/07/2018 104     CO2 12/07/2018 30     ANION GAP 12/07/2018 6     BUN 12/07/2018 18     Creatinine 12/07/2018 1 05     Glucose 12/07/2018 95     Calcium 12/07/2018 8 8     eGFR 12/07/2018 58         Imaging: [facility] Procedure    Result Date: 12/20/2018  Narrative: Pre-procedure Diagnosis: 1  Lumbar Spinal Stenosis 2  Lumbar Radiculopathy Post-procedure Diagnosis: 1  Lumbar Spinal Stenosis                                                 2  RIGHT L4, L5 Transforaminal epidural steroid injection Attending Surgeon:   Garett Murray MD Anesthesia:   Local The patients history and physical exam were reviewed  I explained the details of the procedure to the patient including the risks, benefits and other alternatives  We had an informed discussion and the patient verbalized understanding and signed the consent form  All questions were answered appropriately  Procedure Note: RIGHT L4 & L5 Transforaminal Epidural Steroid Injection A procedural pause was conducted to verify: correct patient identity, procedure to be performed and as applicable, correct side and site, correct patient position, and availability of implants, special equipment and special requirements  After identifying the RIGHT L4 pedicle fluoroscopically with an oblique view, the skin was sterilely prepped and draped in the usual fashion using Chloraprep skin prep  The skin and subcutaneous tissues were anesthetized with 1cc of 1% lidocaine  A 5 in 22 gauge spinal needle was then advanced under fluoroscopic guidance to the neural foramen  Appropriate foraminal depth was determined with a lateral fluoroscopic view, and AP visualization confirmed needle positioning at approximately the 6 o'clock position relative to the pedicle  After negative aspiration, 1cc Omnipaque 300 contrast was injected using live fluoroscopy confirming appropriate transforaminal spread without evidence of intravascular or intrathecal uptake  Next, a solution containing 1cc of 2% Lidocaine and 10mg of dexamethasone was injected slowly and incrementally into the epidural space  Following the injection the needle was withdrawn slightly and flushed with lidocaine as it was fully extracted   The procedure was then repeated in the exact same way at the RIGHT L5 pedicle with a 5 inch 22 gauge spinal needle  The patient tolerated the procedure well and there were no apparent complications  The patient did not develop any new neurologic deficits  After appropriate observation, the patient was dismissed from the clinic in good condition under their own power  Objective:     Physical Exam   Constitutional: She is oriented to person, place, and time  She appears well-developed and well-nourished  No distress  HENT:   Head: Normocephalic and atraumatic  Right Ear: External ear normal    Left Ear: External ear normal    Mouth/Throat: Oropharynx is clear and moist    Eyes: Pupils are equal, round, and reactive to light  Conjunctivae and EOM are normal  Right eye exhibits no discharge  Left eye exhibits no discharge  Neck: Normal range of motion  Neck supple  Cardiovascular: Normal rate, regular rhythm and normal heart sounds  Exam reveals no friction rub  No murmur heard  Pulmonary/Chest: Effort normal and breath sounds normal  No respiratory distress  She has no wheezes  She has no rales  She exhibits no tenderness  Musculoskeletal: Normal range of motion  She exhibits no edema, tenderness or deformity  Neurological: She is alert and oriented to person, place, and time  No cranial nerve deficit  Skin: Skin is warm and dry  No rash noted  She is not diaphoretic  Psychiatric: She has a normal mood and affect  Her behavior is normal  Judgment and thought content normal          Patient Instructions   Discussed all with patient  Since the EEG was abnormal and you feel you would prefer to have a 2nd opinion regarding starting a medication for a potential seizure, will refer to Dr Ashley Howell for 2nd opinion  No driving until cleared by Dr Ashley Howell  Have the eeg and mri done  Continue on the meds for now  You have a good point with coming off the lyrica  Could it possibly be that the seizure threshold was lowered after years of high dose lyrica and gabapentin? Continue with the weight loss  Schedule your physical therapy  Try to start reading books or at least a newspaper  GWEN Ascencio    Portions of the record may have been created with voice recognition software   Occasional wrong word or "sound a like" substitutions may have occurred due to the inherent limitations of voice recognition software   Read the chart carefully and recognize, using context, where substitutions have occurred

## 2019-01-17 NOTE — PATIENT INSTRUCTIONS
Discussed all with patient  Since the EEG was abnormal and you feel you would prefer to have a 2nd opinion regarding starting a medication for a potential seizure, will refer to Dr Ronny Lewis for 2nd opinion  No driving until cleared by Dr Ronny Lewis  Have the eeg and mri done  Continue on the meds for now  You have a good point with coming off the lyrica  Could it possibly be that the seizure threshold was lowered after years of high dose lyrica and gabapentin? Continue with the weight loss  Schedule your physical therapy  Try to start reading books or at least a newspaper

## 2019-01-25 ENCOUNTER — HOSPITAL ENCOUNTER (OUTPATIENT)
Dept: RADIOLOGY | Facility: HOSPITAL | Age: 59
Discharge: HOME | End: 2019-01-25
Payer: COMMERCIAL

## 2019-01-25 DIAGNOSIS — G31.84 MILD COGNITIVE IMPAIRMENT WITH MEMORY LOSS: ICD-10-CM

## 2019-01-25 PROCEDURE — 70551 MRI BRAIN STEM W/O DYE: CPT

## 2019-02-20 ENCOUNTER — OFFICE VISIT (OUTPATIENT)
Dept: PAIN MEDICINE | Facility: CLINIC | Age: 59
End: 2019-02-20
Payer: COMMERCIAL

## 2019-02-20 VITALS
RESPIRATION RATE: 16 BRPM | WEIGHT: 217 LBS | SYSTOLIC BLOOD PRESSURE: 110 MMHG | HEART RATE: 80 BPM | BODY MASS INDEX: 37.05 KG/M2 | DIASTOLIC BLOOD PRESSURE: 76 MMHG | HEIGHT: 64 IN

## 2019-02-20 DIAGNOSIS — G58.8 PUDENDAL NEURALGIA: ICD-10-CM

## 2019-02-20 DIAGNOSIS — M54.16 LUMBAR RADICULOPATHY: Primary | ICD-10-CM

## 2019-02-20 DIAGNOSIS — G89.4 CHRONIC PAIN SYNDROME: ICD-10-CM

## 2019-02-20 PROCEDURE — 99213 OFFICE O/P EST LOW 20 MIN: CPT | Performed by: ANESTHESIOLOGY

## 2019-02-20 NOTE — PROGRESS NOTES
Assessment:  1  Lumbar radiculopathy     2  Pudendal neuralgia     3  Chronic pain syndrome         Plan: This is a 59-year-old female who presents today for follow-up office visit for management of low back pain which is multifactorial in origin  Patient recently had a lumbar epidural steroid injection and has ongoing pain relief  She is doing better  She will continue using medical marijuana as needed  Home exercise regimen encouraged  I may consider repeat TREVIN if low back and leg pain returns  Patient verbalized understanding  My impressions and treatment recommendations were discussed in detail with the patient who verbalized understanding and had no further questions  Discharge instructions were provided  I personally saw and examined the patient and I agree with the above discussed plan of care  History of Present Illness:  Santhosh Henry is a 59 y o  female who presents for a follow up office visit in regards to Back Pain and Leg Pain  The patients current symptoms includes constant, burning, sharp, throbbing pain  Of note, patient recently had a lumbar epidural steroid injection approximately 2 months ago  She reports ongoing pain relief  Pain is rated 3 out 10  She continues use medical marijuana for pain relief  No recent changes in health  I have personally reviewed and/or updated the patient's past medical history, past surgical history, family history, social history, current medications, allergies, and vital signs today  Review of Systems   Respiratory: Negative for shortness of breath  Cardiovascular: Negative for chest pain  Gastrointestinal: Negative for constipation, diarrhea, nausea and vomiting  Musculoskeletal: Negative for arthralgias, gait problem, joint swelling and myalgias  Skin: Negative for rash  Neurological: Positive for weakness  Negative for dizziness and seizures  All other systems reviewed and are negative        Patient Active Problem List Diagnosis    Neuralgia    Continuous leakage of urine    Borderline hyperlipidemia    Chronic pain syndrome    Dysesthesia of multiple sites    Eczema    Hematuria    Hypertension    Left shoulder pain    Limb pain    Lower back pain    Lumbar facet arthropathy    Memory difficulties    Memory impairment    Numbness    Obesity (BMI 30 0-34  9)    CATHRYN (obstructive sleep apnea)    Postgastrectomy malabsorption    Pudendal neuralgia    Rash    Sleep disturbances    Snoring    Gastroesophageal reflux disease    Iron deficiency anemia    Uterine leiomyoma    Osteoarthritis of left shoulder    Sacroiliitis (HCC)    Lumbar radiculopathy    Anxiety with depression    Mild cognitive impairment with memory loss       Past Medical History:   Diagnosis Date    Anxiety     Arthritis     Attention and concentration deficit     Blurred vision     Chronic pain     Depression     Diplopia     Dyspepsia     Gastric ulcer     Gastritis     Memory loss     Osteopenia     Starting and stopping of urinary stream during micturition     Urinary incontinence     Wears eyeglasses        Past Surgical History:   Procedure Laterality Date     SECTION       SECTION      CHOLECYSTECTOMY     [facility]    GASTRIC BYPASS  2004    HYSTERECTOMY  2012    INSERT / REPLACE PERIPHERAL NEUROSTIMULATOR PULSE GENERATOR /       OTHER SURGICAL HISTORY      Reimplantatin at [address] [facility] WRIST ARTHROSCOPY      with internal fixation        Family History   Problem Relation Age of Onset    Other Mother         Back Disorder     Cirrhosis Mother     Crohn's disease Mother     Lupus Mother         Systemic Lupus Erythematous     Hypertension Father     Heart disease Father     Other Brother         Liver Transplant        Social History     Occupational History    Occupation: retired   Tobacco Use    Smoking status: Never Smoker    Smokeless tobacco: Never Used   Substance and Sexual Activity    Alcohol use: No    Drug use: No    Sexual activity: Yes       Current Outpatient Medications on File Prior to Visit   Medication Sig    Cholecalciferol (D3-1000) 1000 units capsule Take 1 capsule by mouth daily      Diclofenac Sodium 1 5 % SOLN Apply 2 drops to affected area 3x/day    dronabinol (MARINOL) 5 MG capsule Take 1 capsule (5 mg total) by mouth 2 (two) times a day before meals By Dr Christian Gutierrez DULoxetine (CYMBALTA) 60 mg delayed release capsule TAKE 1 CAPSULE DAILY    Iron-Vitamin C (IRON 100/C) 100-250 MG TABS Take by mouth daily    lidocaine (XYLOCAINE) 5 % ointment Apply topically 2 (two) times a day as needed    lisinopril (ZESTRIL) 10 mg tablet TAKE 1 TABLET DAILY    metoprolol tartrate (LOPRESSOR) 25 mg tablet TAKE ONE-HALF (1/2) TABLET TWICE A DAY (Patient taking differently: take 1/2 tablet by mouth twice daily)    mirtazapine (REMERON) 30 mg tablet TAKE 1 TABLET DAILY AT BEDTIME    mometasone (ELOCON) 0 1 % cream Apply topically daily    Multiple Vitamin (MULTI-VITAMIN DAILY) TABS Take 1 tablet by mouth daily    pantoprazole (PROTONIX) 40 mg tablet TAKE 1 TABLET TWICE A DAY    diclofenac sodium (VOLTAREN) 1 % Apply 2 g topically daily for 90 days     No current facility-administered medications on file prior to visit  Allergies   Allergen Reactions    Cat Hair Extract      Cat Dander    Dog Epithelium     Other Other (See Comments) and Sneezing     Dogs  Crab Meat    Shellfish-Derived Products      CRAB MEAT ONLY       Physical Exam:    /76   Pulse 80   Resp 16   Ht 5' 4" (1 626 m)   Wt 98 4 kg (217 lb)   BMI 37 25 kg/m²     Constitutional:normal, well developed, well nourished, alert, in no distress and non-toxic and no overt pain behavior    Eyes:anicteric  HEENT:grossly intact  Neck:supple, symmetric, trachea midline and no masses   Pulmonary:even and unlabored  Cardiovascular:No edema or pitting edema present  Skin:Normal without rashes or lesions and well hydrated  Psychiatric:Mood and affect appropriate  Neurologic:Cranial Nerves II-XII grossly intact  Musculoskeletal:normal    Imaging

## 2019-02-28 ENCOUNTER — OFFICE VISIT (OUTPATIENT)
Dept: FAMILY MEDICINE CLINIC | Facility: CLINIC | Age: 59
End: 2019-02-28
Payer: COMMERCIAL

## 2019-02-28 VITALS
HEIGHT: 64 IN | WEIGHT: 220 LBS | OXYGEN SATURATION: 96 % | BODY MASS INDEX: 37.56 KG/M2 | DIASTOLIC BLOOD PRESSURE: 74 MMHG | SYSTOLIC BLOOD PRESSURE: 114 MMHG | HEART RATE: 83 BPM

## 2019-02-28 DIAGNOSIS — G31.84 MILD COGNITIVE IMPAIRMENT WITH MEMORY LOSS: ICD-10-CM

## 2019-02-28 DIAGNOSIS — F41.8 ANXIETY WITH DEPRESSION: ICD-10-CM

## 2019-02-28 DIAGNOSIS — M54.16 LUMBAR RADICULOPATHY: ICD-10-CM

## 2019-02-28 DIAGNOSIS — M25.512 CHRONIC LEFT SHOULDER PAIN: ICD-10-CM

## 2019-02-28 DIAGNOSIS — G89.29 CHRONIC LEFT SHOULDER PAIN: ICD-10-CM

## 2019-02-28 DIAGNOSIS — I10 ESSENTIAL HYPERTENSION: Primary | ICD-10-CM

## 2019-02-28 PROCEDURE — 3078F DIAST BP <80 MM HG: CPT | Performed by: FAMILY MEDICINE

## 2019-02-28 PROCEDURE — 3074F SYST BP LT 130 MM HG: CPT | Performed by: FAMILY MEDICINE

## 2019-02-28 PROCEDURE — 1036F TOBACCO NON-USER: CPT | Performed by: FAMILY MEDICINE

## 2019-02-28 PROCEDURE — 99214 OFFICE O/P EST MOD 30 MIN: CPT | Performed by: FAMILY MEDICINE

## 2019-02-28 RX ORDER — DULOXETIN HYDROCHLORIDE 30 MG/1
30 CAPSULE, DELAYED RELEASE ORAL DAILY
Qty: 90 CAPSULE | Refills: 0 | Status: SHIPPED | OUTPATIENT
Start: 2019-02-28 | End: 2019-06-05 | Stop reason: SDUPTHER

## 2019-02-28 NOTE — PATIENT INSTRUCTIONS
Discussed all with patient  Clinically you look a lot more awake alert and active  Make your appointment with Dr Prashant Carty for the sleep deprived EEG  Keep your appt with DR Jersey Arriaga  Great job staying off the narcotics  Cut back on the Cymbalta to 30 mg daily  Need to try to work on the weight  Cut back on portion sizes and when you are ready may be physical therapy can increase her exercise tolerance  Try to avoid carbohydrates we do not want her weight to get away from us  Keep all your appointment with all your specialist   Would consider help with weight management if you can't do this alone  Came too far to let this go now

## 2019-02-28 NOTE — PROGRESS NOTES
Assessment/Plan:     Chronic Problems:  Hypertension  Blood pressure is perfectly at goal   Continue current medications  Mild cognitive impairment with memory loss  Clinically better  Keep your f/u appt with Dr Enid Patel for sleep deprived eeg  Lumbar radiculopathy  Keep the appt with Dr Berlin Moyer  Anxiety with depression  The patient feels she is doing well on her medications  Would prefer to try to decrease the dose of Duloxetine to 30 mg  Advised to call here if we have any problems at this dose  Stay on the mirtazapine as this has helped her sleep  Visit Diagnosis:  Diagnoses and all orders for this visit:    Essential hypertension    Lumbar radiculopathy    Mild cognitive impairment with memory loss    Anxiety with depression    Chronic left shoulder pain  Comments:   injected with 311  Ice today to the area every 4-6 hours for 20 minutes  Orders:  -     DULoxetine (CYMBALTA) 30 mg delayed release capsule; Take 1 capsule (30 mg total) by mouth daily          Subjective:    Patient ID: Luis Angel Solano is a 59 y o  female  Pt Is here for routine f/u appt  Pt feels she is doing a lot better and now able to accomplish some things around the home  Able to paint her kitchen cabinets  Was due to have sleep deprived eeg but had to cancel r/t the snow  Pt is holding on the second opinion until the sleep deprived study as she had an eeg with abnormal frontal activity  Trying to work on the weight, but now that she is off narcotics she is always hungry  Also now only sleeping around 12 hours  Working on her schedule and starting to feel accomplished  Wants to try to lower the dose of her medical marijuana  Takes all other meds as directed   No side effects noted      The following portions of the patient's history were reviewed and updated as appropriate: allergies, current medications, past family history, past medical history, past social history, past surgical history and problem list     Review of Systems   Constitutional: Negative for chills, diaphoresis, fatigue and fever  HENT: Negative  Eyes: Negative  Respiratory: Negative for cough and shortness of breath (from inactivity and did not start her physical therapy as her driving was restricted r/t abnormal eeg  )  Cardiovascular: Negative for chest pain and palpitations  Genitourinary: Negative  But urine looks dark  Not sure that she is drinking enough  Musculoskeletal: Positive for arthralgias and gait problem  Still follows with Dr Angeles Ahumada for chronic pain  Feels her pain in the low back and left groin is better  Got an injection from Dr Angeles Ahumada and now thinks the pain in the right leg is better  Still with difficulty walking up stairs  Neurological: Negative for dizziness, light-headedness and headaches  Still having some issues with cognitive impairment but thinks it is slightly better  Psychiatric/Behavioral: Negative for dysphoric mood  The patient is not nervous/anxious  Feels stable            /74   Pulse 83   Ht 5' 4" (1 626 m)   Wt 99 8 kg (220 lb)   SpO2 96%   BMI 37 76 kg/m²   Social History     Socioeconomic History    Marital status: /Civil Union     Spouse name: Not on file    Number of children: Not on file    Years of education: Not on file    Highest education level: Not on file   Occupational History    Occupation: retired   Social Needs    Financial resource strain: Not on file    Food insecurity:     Worry: Not on file     Inability: Not on file   Payoff needs:     Medical: Not on file     Non-medical: Not on file   Tobacco Use    Smoking status: Never Smoker    Smokeless tobacco: Never Used   Substance and Sexual Activity    Alcohol use: No    Drug use: No    Sexual activity: Yes   Lifestyle    Physical activity:     Days per week: Not on file     Minutes per session: Not on file    Stress: Not on file   Relationships    Social connections: Talks on phone: Not on file     Gets together: Not on file     Attends Taoism service: Not on file     Active member of club or organization: Not on file     Attends meetings of clubs or organizations: Not on file     Relationship status: Not on file    Intimate partner violence:     Fear of current or ex partner: Not on file     Emotionally abused: Not on file     Physically abused: Not on file     Forced sexual activity: Not on file   Other Topics Concern    Not on file   Social History Narrative    Not on file     Past Medical History:   Diagnosis Date    Anxiety     Arthritis     Attention and concentration deficit     Blurred vision     Chronic pain     Depression     Diplopia     Dyspepsia     Gastric ulcer     Gastritis     Memory loss     Osteopenia     Starting and stopping of urinary stream during micturition     Urinary incontinence     Wears eyeglasses      Family History   Problem Relation Age of Onset    Other Mother         Back Disorder     Cirrhosis Mother     Crohn's disease Mother     Lupus Mother         Systemic Lupus Erythematous     Hypertension Father     Heart disease Father     Other Brother         Liver Transplant      Past Surgical History:   Procedure Laterality Date     [address]PASS  2004    HYSTERECTOMY  2012    INSERT / REPLACE PERIPHERAL NEUROSTIMULATOR PULSE GENERATOR /       OTHER SURGICAL HISTORY      Reimplantatin at [facility] ARTHROSCOPY      with internal fixation        Current Outpatient Medications:     Cholecalciferol (D3-1000) 1000 units capsule, Take 1 capsule by mouth daily  , Disp: , Rfl:     diclofenac sodium (VOLTAREN) 1 %, Apply 2 g topically daily for 90 days, Disp: 180 g, Rfl: 0    Diclofenac Sodium 1 5 % SOLN, Apply 2 drops to affected area 3x/day, Disp: 1 Bottle, Rfl: 2    dronabinol (MARINOL) 5 MG capsule, Take 1 capsule (5 mg total) by mouth 2 (two) times a day before meals By Dr Anita Hays, Disp: 60 capsule, Rfl: 0    DULoxetine (CYMBALTA) 30 mg delayed release capsule, Take 1 capsule (30 mg total) by mouth daily, Disp: 90 capsule, Rfl: 0    Iron-Vitamin C (IRON 100/C) 100-250 MG TABS, Take by mouth daily, Disp: , Rfl:     lidocaine (XYLOCAINE) 5 % ointment, Apply topically 2 (two) times a day as needed, Disp: , Rfl:     lisinopril (ZESTRIL) 10 mg tablet, TAKE 1 TABLET DAILY, Disp: 90 tablet, Rfl: 1    metoprolol tartrate (LOPRESSOR) 25 mg tablet, TAKE ONE-HALF (1/2) TABLET TWICE A DAY (Patient taking differently: take 1/2 tablet by mouth twice daily), Disp: 90 tablet, Rfl: 1    mirtazapine (REMERON) 30 mg tablet, TAKE 1 TABLET DAILY AT BEDTIME, Disp: 30 tablet, Rfl: 1    mometasone (ELOCON) 0 1 % cream, Apply topically daily, Disp: , Rfl:     Multiple Vitamin (MULTI-VITAMIN DAILY) TABS, Take 1 tablet by mouth daily, Disp: , Rfl:     pantoprazole (PROTONIX) 40 mg tablet, TAKE 1 TABLET TWICE A DAY, Disp: 180 tablet, Rfl: 1    Allergies   Allergen Reactions    Cat Hair Extract      Cat Dander    Dog Epithelium     Other Other (See Comments) and Sneezing     Dogs  Crab Meat    Shellfish-Derived Products      CRAB MEAT ONLY          Lab Review   Appointment on 01/02/2019   Component Date Value    RPR 01/02/2019 Non-Reactive     Vitamin B-12 01/02/2019 616     LYME AB IGG 01/02/2019 0 04     LYME AB IGM 01/02/2019 0 28         Imaging: No results found  Objective:     Physical Exam   Constitutional: She is oriented to person, place, and time  She appears well-developed and well-nourished  No distress  HENT:   Head: Normocephalic and atraumatic  Right Ear: External ear normal    Left Ear: External ear normal    Mouth/Throat: Oropharynx is clear and moist    Eyes: Pupils are equal, round, and reactive to light   Conjunctivae and EOM are normal  Right eye exhibits no discharge  Left eye exhibits no discharge  Neck: Normal range of motion  Neck supple  Cardiovascular: Normal rate, regular rhythm and normal heart sounds  Exam reveals no friction rub  No murmur heard  Pulmonary/Chest: Effort normal and breath sounds normal  No respiratory distress  She has no wheezes  Abdominal: Soft  Bowel sounds are normal    Abdomen is obese  Musculoskeletal: Normal range of motion  She exhibits no edema or deformity  Brings her pillow to sit on  Lymphadenopathy:     She has no cervical adenopathy  Neurological: She is alert and oriented to person, place, and time  No cranial nerve deficit  Skin: Skin is warm and dry  No rash noted  She is not diaphoretic  Psychiatric: She has a normal mood and affect  Her behavior is normal  Judgment and thought content normal    Pt is much more clear today  Patient Instructions   Discussed all with patient  Clinically you look a lot more awake alert and active  Make your appointment with Dr Morgan Beltrán for the sleep deprived EEG  Keep your appt with DR Menard Para  Great job staying off the narcotics  Cut back on the Cymbalta to 30 mg daily  Need to try to work on the weight  Cut back on portion sizes and when you are ready may be physical therapy can increase her exercise tolerance  Try to avoid carbohydrates we do not want her weight to get away from us  Keep all your appointment with all your specialist   Would consider help with weight management if you can't do this alone  Came too far to let this go now  GWEN Ascencio    Portions of the record may have been created with voice recognition software   Occasional wrong word or "sound a like" substitutions may have occurred due to the inherent limitations of voice recognition software   Read the chart carefully and recognize, using context, where substitutions have occurred

## 2019-02-28 NOTE — ASSESSMENT & PLAN NOTE
The patient feels she is doing well on her medications  Would prefer to try to decrease the dose of Duloxetine to 30 mg  Advised to call here if we have any problems at this dose  Stay on the mirtazapine as this has helped her sleep

## 2019-03-05 DIAGNOSIS — F41.8 ANXIETY WITH DEPRESSION: ICD-10-CM

## 2019-03-05 RX ORDER — MIRTAZAPINE 30 MG/1
TABLET, FILM COATED ORAL
Qty: 30 TABLET | Refills: 1 | Status: SHIPPED | OUTPATIENT
Start: 2019-03-05 | End: 2019-05-04 | Stop reason: SDUPTHER

## 2019-03-08 DIAGNOSIS — I10 ESSENTIAL HYPERTENSION: ICD-10-CM

## 2019-03-08 DIAGNOSIS — K21.9 GASTROESOPHAGEAL REFLUX DISEASE WITHOUT ESOPHAGITIS: ICD-10-CM

## 2019-03-08 RX ORDER — PANTOPRAZOLE SODIUM 40 MG/1
TABLET, DELAYED RELEASE ORAL
Qty: 180 TABLET | Refills: 1 | Status: SHIPPED | OUTPATIENT
Start: 2019-03-08 | End: 2019-09-04 | Stop reason: SDUPTHER

## 2019-03-08 RX ORDER — LISINOPRIL 10 MG/1
TABLET ORAL
Qty: 90 TABLET | Refills: 1 | Status: SHIPPED | OUTPATIENT
Start: 2019-03-08 | End: 2019-09-04 | Stop reason: SDUPTHER

## 2019-04-02 ENCOUNTER — HOSPITAL ENCOUNTER (OUTPATIENT)
Dept: NEUROLOGY | Facility: HOSPITAL | Age: 59
Discharge: HOME | End: 2019-04-02
Attending: PSYCHIATRY & NEUROLOGY
Payer: COMMERCIAL

## 2019-04-02 DIAGNOSIS — R94.01 ABNORMAL EEG: ICD-10-CM

## 2019-04-02 DIAGNOSIS — R94.01 ABNORMAL EEG: Primary | ICD-10-CM

## 2019-04-02 PROCEDURE — 95819 EEG AWAKE AND ASLEEP: CPT | Performed by: PSYCHIATRY & NEUROLOGY

## 2019-04-02 PROCEDURE — 95819 EEG AWAKE AND ASLEEP: CPT

## 2019-04-04 ENCOUNTER — OFFICE VISIT (OUTPATIENT)
Dept: NEUROLOGY | Facility: CLINIC | Age: 59
End: 2019-04-04
Payer: COMMERCIAL

## 2019-04-04 VITALS
SYSTOLIC BLOOD PRESSURE: 118 MMHG | HEIGHT: 64 IN | BODY MASS INDEX: 37.73 KG/M2 | DIASTOLIC BLOOD PRESSURE: 80 MMHG | WEIGHT: 221 LBS | HEART RATE: 66 BPM

## 2019-04-04 DIAGNOSIS — F41.8 ANXIETY WITH DEPRESSION: ICD-10-CM

## 2019-04-04 DIAGNOSIS — R40.4 TRANSIENT ALTERATION OF AWARENESS: Primary | ICD-10-CM

## 2019-04-04 DIAGNOSIS — G31.84 MILD COGNITIVE IMPAIRMENT WITH MEMORY LOSS: ICD-10-CM

## 2019-04-04 DIAGNOSIS — R94.01 ABNORMAL EEG: ICD-10-CM

## 2019-04-04 PROCEDURE — 99215 OFFICE O/P EST HI 40 MIN: CPT | Performed by: PSYCHIATRY & NEUROLOGY

## 2019-04-17 ENCOUNTER — TELEPHONE (OUTPATIENT)
Dept: PAIN MEDICINE | Facility: CLINIC | Age: 59
End: 2019-04-17

## 2019-04-30 ENCOUNTER — HOSPITAL ENCOUNTER (OUTPATIENT)
Dept: RADIOLOGY | Facility: CLINIC | Age: 59
Discharge: HOME | End: 2019-04-30
Attending: ANESTHESIOLOGY
Payer: COMMERCIAL

## 2019-04-30 VITALS
OXYGEN SATURATION: 96 % | TEMPERATURE: 99.6 F | RESPIRATION RATE: 20 BRPM | SYSTOLIC BLOOD PRESSURE: 107 MMHG | DIASTOLIC BLOOD PRESSURE: 74 MMHG | HEART RATE: 76 BPM

## 2019-04-30 DIAGNOSIS — M54.16 LUMBAR RADICULOPATHY: ICD-10-CM

## 2019-04-30 PROCEDURE — 64483 NJX AA&/STRD TFRM EPI L/S 1: CPT | Performed by: ANESTHESIOLOGY

## 2019-04-30 PROCEDURE — 64484 NJX AA&/STRD TFRM EPI L/S EA: CPT | Performed by: ANESTHESIOLOGY

## 2019-04-30 RX ORDER — LIDOCAINE HYDROCHLORIDE 10 MG/ML
5 INJECTION, SOLUTION EPIDURAL; INFILTRATION; INTRACAUDAL; PERINEURAL ONCE
Status: COMPLETED | OUTPATIENT
Start: 2019-04-30 | End: 2019-04-30

## 2019-04-30 RX ORDER — BUPIVACAINE HCL/PF 2.5 MG/ML
5 VIAL (ML) INJECTION ONCE
Status: COMPLETED | OUTPATIENT
Start: 2019-04-30 | End: 2019-04-30

## 2019-04-30 RX ORDER — PAPAVERINE HCL 150 MG
20 CAPSULE, EXTENDED RELEASE ORAL ONCE
Status: COMPLETED | OUTPATIENT
Start: 2019-04-30 | End: 2019-04-30

## 2019-04-30 RX ADMIN — DEXAMETHASONE SODIUM PHOSPHATE 20 MG: 10 INJECTION, SOLUTION INTRAMUSCULAR; INTRAVENOUS at 15:16

## 2019-04-30 RX ADMIN — LIDOCAINE HYDROCHLORIDE 5 ML: 10 INJECTION, SOLUTION EPIDURAL; INFILTRATION; INTRACAUDAL; PERINEURAL at 15:13

## 2019-04-30 RX ADMIN — IOHEXOL 2 ML: 300 INJECTION, SOLUTION INTRAVENOUS at 15:15

## 2019-04-30 RX ADMIN — Medication 5 ML: at 15:16

## 2019-05-04 DIAGNOSIS — F41.8 ANXIETY WITH DEPRESSION: ICD-10-CM

## 2019-05-04 RX ORDER — MIRTAZAPINE 30 MG/1
TABLET, FILM COATED ORAL
Qty: 30 TABLET | Refills: 1 | Status: SHIPPED | OUTPATIENT
Start: 2019-05-04 | End: 2019-06-29 | Stop reason: SDUPTHER

## 2019-05-07 ENCOUNTER — TELEPHONE (OUTPATIENT)
Dept: PAIN MEDICINE | Facility: CLINIC | Age: 59
End: 2019-05-07

## 2019-05-20 ENCOUNTER — APPOINTMENT (OUTPATIENT)
Dept: LAB | Facility: CLINIC | Age: 59
End: 2019-05-20
Payer: COMMERCIAL

## 2019-05-20 DIAGNOSIS — G31.84 MILD COGNITIVE IMPAIRMENT WITH MEMORY LOSS: ICD-10-CM

## 2019-05-20 DIAGNOSIS — R40.4 TRANSIENT ALTERATION OF AWARENESS: ICD-10-CM

## 2019-05-20 LAB — TSH SERPL DL<=0.05 MIU/L-ACNC: 1.37 UIU/ML (ref 0.36–3.74)

## 2019-05-20 PROCEDURE — 84443 ASSAY THYROID STIM HORMONE: CPT

## 2019-05-20 PROCEDURE — 36415 COLL VENOUS BLD VENIPUNCTURE: CPT

## 2019-05-23 ENCOUNTER — OFFICE VISIT (OUTPATIENT)
Dept: NEUROLOGY | Facility: CLINIC | Age: 59
End: 2019-05-23
Payer: COMMERCIAL

## 2019-05-23 VITALS
HEIGHT: 64 IN | SYSTOLIC BLOOD PRESSURE: 122 MMHG | BODY MASS INDEX: 38.55 KG/M2 | WEIGHT: 225.8 LBS | DIASTOLIC BLOOD PRESSURE: 88 MMHG | HEART RATE: 68 BPM

## 2019-05-23 DIAGNOSIS — R94.01 ABNORMAL EEG: ICD-10-CM

## 2019-05-23 DIAGNOSIS — R41.3 MEMORY DIFFICULTIES: Primary | ICD-10-CM

## 2019-05-23 DIAGNOSIS — G47.33 OSA (OBSTRUCTIVE SLEEP APNEA): ICD-10-CM

## 2019-05-23 PROCEDURE — 99214 OFFICE O/P EST MOD 30 MIN: CPT | Performed by: PSYCHIATRY & NEUROLOGY

## 2019-05-28 ENCOUNTER — OFFICE VISIT (OUTPATIENT)
Dept: FAMILY MEDICINE CLINIC | Facility: CLINIC | Age: 59
End: 2019-05-28
Payer: COMMERCIAL

## 2019-05-28 VITALS
OXYGEN SATURATION: 93 % | HEIGHT: 64 IN | BODY MASS INDEX: 38.93 KG/M2 | DIASTOLIC BLOOD PRESSURE: 70 MMHG | HEART RATE: 68 BPM | SYSTOLIC BLOOD PRESSURE: 118 MMHG | WEIGHT: 228 LBS

## 2019-05-28 DIAGNOSIS — R53.81 PHYSICAL DECONDITIONING: Primary | ICD-10-CM

## 2019-05-28 DIAGNOSIS — G31.84 MILD COGNITIVE IMPAIRMENT WITH MEMORY LOSS: Primary | ICD-10-CM

## 2019-05-28 DIAGNOSIS — R63.5 WEIGHT GAIN: ICD-10-CM

## 2019-05-28 DIAGNOSIS — R94.01 ABNORMAL EEG: ICD-10-CM

## 2019-05-28 PROCEDURE — 99214 OFFICE O/P EST MOD 30 MIN: CPT | Performed by: FAMILY MEDICINE

## 2019-05-29 ENCOUNTER — TELEPHONE (OUTPATIENT)
Dept: FAMILY MEDICINE CLINIC | Facility: CLINIC | Age: 59
End: 2019-05-29

## 2019-06-05 DIAGNOSIS — M25.512 CHRONIC LEFT SHOULDER PAIN: ICD-10-CM

## 2019-06-05 DIAGNOSIS — G89.29 CHRONIC LEFT SHOULDER PAIN: ICD-10-CM

## 2019-06-05 DIAGNOSIS — M79.604 MUSCULOSKELETAL LEG PAIN, RIGHT: ICD-10-CM

## 2019-06-05 RX ORDER — DULOXETIN HYDROCHLORIDE 30 MG/1
CAPSULE, DELAYED RELEASE ORAL
Qty: 90 CAPSULE | Refills: 0 | Status: SHIPPED | OUTPATIENT
Start: 2019-06-05 | End: 2019-09-09 | Stop reason: SDUPTHER

## 2019-06-10 ENCOUNTER — OFFICE VISIT (OUTPATIENT)
Dept: PAIN MEDICINE | Facility: CLINIC | Age: 59
End: 2019-06-10
Payer: COMMERCIAL

## 2019-06-10 VITALS
HEART RATE: 76 BPM | WEIGHT: 228 LBS | SYSTOLIC BLOOD PRESSURE: 80 MMHG | HEIGHT: 64 IN | RESPIRATION RATE: 18 BRPM | DIASTOLIC BLOOD PRESSURE: 60 MMHG | BODY MASS INDEX: 38.93 KG/M2

## 2019-06-10 DIAGNOSIS — M54.16 LUMBAR RADICULOPATHY: ICD-10-CM

## 2019-06-10 DIAGNOSIS — M25.512 ARTHRALGIA OF SHOULDER REGION, LEFT: ICD-10-CM

## 2019-06-10 DIAGNOSIS — G89.4 CHRONIC PAIN SYNDROME: Primary | ICD-10-CM

## 2019-06-10 PROCEDURE — 99213 OFFICE O/P EST LOW 20 MIN: CPT | Performed by: ANESTHESIOLOGY

## 2019-06-25 ENCOUNTER — HOSPITAL ENCOUNTER (OUTPATIENT)
Dept: RADIOLOGY | Facility: CLINIC | Age: 59
Discharge: HOME | End: 2019-06-25
Attending: ANESTHESIOLOGY | Admitting: ANESTHESIOLOGY
Payer: COMMERCIAL

## 2019-06-25 VITALS
SYSTOLIC BLOOD PRESSURE: 101 MMHG | OXYGEN SATURATION: 95 % | DIASTOLIC BLOOD PRESSURE: 63 MMHG | HEART RATE: 69 BPM | TEMPERATURE: 98.3 F | RESPIRATION RATE: 18 BRPM

## 2019-06-25 DIAGNOSIS — M25.512 ARTHRALGIA OF SHOULDER REGION, LEFT: ICD-10-CM

## 2019-06-25 PROCEDURE — 77002 NEEDLE LOCALIZATION BY XRAY: CPT

## 2019-06-25 PROCEDURE — 77002 NEEDLE LOCALIZATION BY XRAY: CPT | Performed by: ANESTHESIOLOGY

## 2019-06-25 PROCEDURE — 20610 DRAIN/INJ JOINT/BURSA W/O US: CPT | Performed by: ANESTHESIOLOGY

## 2019-06-25 RX ORDER — METHYLPREDNISOLONE ACETATE 80 MG/ML
80 INJECTION, SUSPENSION INTRA-ARTICULAR; INTRALESIONAL; INTRAMUSCULAR; PARENTERAL; SOFT TISSUE ONCE
Status: COMPLETED | OUTPATIENT
Start: 2019-06-25 | End: 2019-06-25

## 2019-06-25 RX ORDER — LIDOCAINE HYDROCHLORIDE 10 MG/ML
5 INJECTION, SOLUTION EPIDURAL; INFILTRATION; INTRACAUDAL; PERINEURAL ONCE
Status: COMPLETED | OUTPATIENT
Start: 2019-06-25 | End: 2019-06-25

## 2019-06-25 RX ORDER — BUPIVACAINE HCL/PF 2.5 MG/ML
5 VIAL (ML) INJECTION ONCE
Status: COMPLETED | OUTPATIENT
Start: 2019-06-25 | End: 2019-06-25

## 2019-06-25 RX ADMIN — Medication 5 ML: at 11:21

## 2019-06-25 RX ADMIN — METHYLPREDNISOLONE ACETATE 80 MG: 80 INJECTION, SUSPENSION INTRA-ARTICULAR; INTRALESIONAL; INTRAMUSCULAR; PARENTERAL; SOFT TISSUE at 11:20

## 2019-06-25 RX ADMIN — LIDOCAINE HYDROCHLORIDE 5 ML: 10 INJECTION, SOLUTION EPIDURAL; INFILTRATION; INTRACAUDAL; PERINEURAL at 11:19

## 2019-06-25 RX ADMIN — IOHEXOL 1 ML: 300 INJECTION, SOLUTION INTRAVENOUS at 11:21

## 2019-06-27 ENCOUNTER — TELEPHONE (OUTPATIENT)
Dept: NEUROLOGY | Facility: CLINIC | Age: 59
End: 2019-06-27

## 2019-06-27 NOTE — TELEPHONE ENCOUNTER
Pt called to cancel appt for 7/2/19   Explained Dr Trish Hightower is booked through [address] [place] [address], at this time I can put her on a wait list     Pt opted to be put on the wait list   Appt cancelled

## 2019-06-29 DIAGNOSIS — F41.8 ANXIETY WITH DEPRESSION: ICD-10-CM

## 2019-07-01 RX ORDER — MIRTAZAPINE 30 MG/1
TABLET, FILM COATED ORAL
Qty: 30 TABLET | Refills: 1 | Status: SHIPPED | OUTPATIENT
Start: 2019-07-01 | End: 2019-07-19 | Stop reason: SDUPTHER

## 2019-07-02 ENCOUNTER — TELEPHONE (OUTPATIENT)
Dept: PAIN MEDICINE | Facility: CLINIC | Age: 59
End: 2019-07-02

## 2019-07-08 ENCOUNTER — EVALUATION (OUTPATIENT)
Dept: PHYSICAL THERAPY | Facility: CLINIC | Age: 59
End: 2019-07-08
Payer: COMMERCIAL

## 2019-07-08 DIAGNOSIS — R53.81 PHYSICAL DECONDITIONING: Primary | ICD-10-CM

## 2019-07-08 PROCEDURE — 97161 PT EVAL LOW COMPLEX 20 MIN: CPT | Performed by: PHYSICAL THERAPIST

## 2019-07-08 NOTE — PROGRESS NOTES
PT Evaluation     Today's date: 2019  Patient name: Tommy Aly  : 1960  MRN: [id number]  Referring provider: Eleno Closs, *  Dx:   Encounter Diagnosis     ICD-10-CM    1  Physical deconditioning R53 [id number]                   Assessment  Assessment details: Patient was provided a home exercise program and demonstrated an understanding of exercises  Patient was advised to stop performing home exercise program if symptoms increase or new complaints developed  Verbal understanding demonstrated regarding home exercise program instructions  Patient would benefit from skilled physical therapy services for prescribed exercises, manual interventions, neuromuscular re-education, education, and modalities as deemed appropriate to assist patient in achieving their maximum level of function  Patient was seen in our department 1 year ago with similar diagnosis and c/o  Since that time, she has been managing her symptoms primarily with medical marijuana  She has become progressively more sedentary, has increased weight and reports that she is slowly losing her endurance and energy  She presents with L > R extremity weakness, deconditioning  Impairments: activity intolerance, impaired physical strength, lacks appropriate home exercise program, pain with function and poor posture   Understanding of Dx/Px/POC: good  Goals  STG   1  Patient will demonstrate independence and competence with HEP 2 -4 weeks  2  Patient will report > 25% reduced pain 2-4 weeks    LTG   1  Patient will report improvements with both functional and recreational abilities  4-6 weeks  2  Patient will demonstrate improved motor function  4-6 weeks  3   Patient will demonstrate improvements with endurance, increased tolerance to aerobic activity  4-6 weeks  4   Patient will report improvements with household chores/ activities  4 weeks       Plan  Plan details: Patient response to treatment will be monitored each session and progressed accordingly    Thank you for this referral    Patient would benefit from: skilled physical therapy  Planned modality interventions: thermotherapy: hydrocollator packs  Planned therapy interventions: IADL retraining, manual therapy, patient education, postural training, strengthening, stretching, therapeutic exercise, flexibility, home exercise program, balance and neuromuscular re-education  Frequency: 2-3x/ week  Duration in weeks: 4  Treatment plan discussed with: patient        Subjective Evaluation    History of Present Illness  Mechanism of injury: Patient reports  "I am just really out of shape "   She notes that walking up the steps is very difficult at home  She reports difficulty standing > 5-10 min  She recently received an injection to left shoulder due to chronic pain -  She notes significant improvements and ability to sleep now without pain  Patient reports ongoing left LE neuralgia, intermittent LBP and a recent right LE pinched nerve for which she received an epidural L4-5 - she states that this offered significant relief/ reduction of right LE pain  She continues to use a pillow to sit on due to neuralgia   " I will do that forever"  Patient also reports that she has gained " quite a bit of weight" due to being sedentary  Pain  Current pain rating: 3  At best pain rating: 3  At worst pain ratin  Location: :My back pain is the worst pain i have"      Relieving factors: change in position, support and rest  Aggravating factors: sitting, standing and stair climbing  Progression: worsening    Social Support  Lives in: multiple-level home  Lives with: spouse    Treatments  Previous treatment: physical therapy  Current treatment: injection treatment and medication  Patient Goals  Patient goals for therapy: increased strength, increased motion, independence with ADLs/IADLs and return to sport/leisure activities  Patient goal: "to get into a little better shape, handle the stairs and strengthen my back"  Objective     Strength/Myotome Testing     Left Shoulder     Planes of Motion   Flexion: 4-   Abduction: 4-     Right Shoulder     Planes of Motion   Flexion: 4   Abduction: 4     Left Elbow   Flexion: 4+  Extension: 4+    Right Elbow   Flexion: 4+  Extension: 4+    Left Hip   Planes of Motion   Flexion: 4  Extension: 4  Abduction: 4    Right Hip   Planes of Motion   Flexion: 4+  Extension: 4+  Abduction: 4+    Left Knee   Flexion: 4+  Extension: 4+    Right Knee   Flexion: 4+  Extension: 4+    Left Ankle/Foot   Dorsiflexion: 5    Right Ankle/Foot   Dorsiflexion: 5  Neuro Exam:     Sensation   Light touch LE: left WNL and right WNL    Functional outcomes   Functional outcome comment: Composite biodex = 1 66      Balance assessments   MCTSIB   Eyes open level surface: 1 0  Eyes open foam surface: 1 54  Eyes closed level surface: 1 09  Eyes closed foam surface: 3 14  Single leg stance   Left eyes open firm surface: 9 sec  Right eyes open firm surface: 4 sec      Flowsheet Rows      Most Recent Value   PT/OT G-Codes   Current Score  54   Projected Score  58             Precautions: chronic pain, lbp, L > R LE pain, pudendal neuralgia, L > R shoulder pain         Manual                                                                                   Exercise Diary              treadmill             bike             UBE                          bridging             HL TB Abd             S/l TB clamshells             S/L abd             HL walk outs                          physioball marching, opp hand/ knee                          Stand slr x 3, hs curls, hr                          biodex LOS                           SLB on foam                                                         Modalities

## 2019-07-09 ENCOUNTER — TELEPHONE (OUTPATIENT)
Dept: RADIOLOGY | Facility: CLINIC | Age: 59
End: 2019-07-09

## 2019-07-09 NOTE — TELEPHONE ENCOUNTER
Pt dropped off form for Renewal of Disability Benefits for Dr Brock Matthew to complete, put on Dr Jersey Fisher desk  Please call when completed   [phone]

## 2019-07-11 NOTE — TELEPHONE ENCOUNTER
S/W  patient gave her the massage about the paperwork  She verbalized the understanding and will be picking up the paperwork

## 2019-07-12 ENCOUNTER — OFFICE VISIT (OUTPATIENT)
Dept: PHYSICAL THERAPY | Facility: CLINIC | Age: 59
End: 2019-07-12
Payer: COMMERCIAL

## 2019-07-12 DIAGNOSIS — R53.81 PHYSICAL DECONDITIONING: Primary | ICD-10-CM

## 2019-07-12 PROCEDURE — 97112 NEUROMUSCULAR REEDUCATION: CPT

## 2019-07-12 PROCEDURE — 97110 THERAPEUTIC EXERCISES: CPT

## 2019-07-12 NOTE — PROGRESS NOTES
Daily Note     Today's date: 2019  Patient name: Ang Kent  : 1960  MRN: [id number]  Referring provider: Tavares Hernandez, *  Dx:   Encounter Diagnosis     ICD-10-CM    1  Physical deconditioning R53 81                   Subjective:  Upon presentation, patient denied pain or limitations in abiity to perform exercises today  Patient's chief complaint is inability to sit for extended duration  Objective: See treatment diary below     Manual                                                                                                                                                      Exercise Diary                       treadmill                       bike Self pace 8'                     UBE 5'/5'                                             bridging :03  10x2                     HL TB Abd RTB  10x2 each bilateral/alt                     S/l TB clamshells :03  10x2                     S/L abd 10x2                     HL walk outs 10x                                             physioball marching, opp hand/ knee                                               Stand slr x 3, hs curls, hr B:  10x2 each                                             biodex LOS                                                SLB on foam  :10  5x each                                                                                                   Modalities                                                                                                      Other Notes   All notes       Assessment: Tolerated treatment well  Patient exhibited good technique with therapeutic exercises and would benefit from continued PT      Plan: Continue per plan of care  Progress treatment as tolerated  Precautions: chronic pain, lbp, L > R LE pain, pudendal neuralgia, L > R shoulder pain

## 2019-07-16 ENCOUNTER — OFFICE VISIT (OUTPATIENT)
Dept: PHYSICAL THERAPY | Facility: CLINIC | Age: 59
End: 2019-07-16
Payer: COMMERCIAL

## 2019-07-16 DIAGNOSIS — R53.81 PHYSICAL DECONDITIONING: Primary | ICD-10-CM

## 2019-07-16 PROCEDURE — 97110 THERAPEUTIC EXERCISES: CPT | Performed by: PHYSICAL THERAPIST

## 2019-07-16 PROCEDURE — 97112 NEUROMUSCULAR REEDUCATION: CPT | Performed by: PHYSICAL THERAPIST

## 2019-07-16 NOTE — PROGRESS NOTES
Daily Note     Today's date: 2019  Patient name: Cora Holcomb  : 1960  MRN: [id number]  Referring provider: Lyla Nation, *  Dx:   Encounter Diagnosis     ICD-10-CM    1  Physical deconditioning R53 81                   Subjective:  Patient reports feeling that PT is beneficial   She reports that she is very fatigued today with increases - no pain, just fatigue  Objective: See treatment diary below     Manual                                                                                                                                                      Exercise Diary                     treadmill                       bike Self pace 8'  10'                    UBE 5'/5'  5'/5'                    TB rows, lpd, ext, no monies  gtb x 20 ea                    TB Multifidus  gtb x 20            bridging :03  10x2  3s x 20                   HL TB Abd RTB  10x2 each bilateral/alt  gtb x 20 ea                   S/l TB clamshells :03  10x2  gtb x 20                    S/L abd 10x2  1# x 20                   HL walk outs 10x  1# x 20                                            physioball marching, opp hand/ knee    sing/alt x 20 ea    Assist for alt                                           Stand slr x 3, hs curls, hr B:  10x2 each  1# x 20 ea on foam                                            biodex LOS                                                SLB on foam  :10  5x each  5x                    bicep curls    3# x 20                     punch outs, oh press stand    3# x 20                   Stand abd, flex  3# x 20 ea           BOR, kickbacks                                                                     Modalities                                                                                                      Other Notes   All notes        Assessment: Tolerated treatment well   Rest allowed as needed -  Patient tolerated challenges well, full reps challenging, core weak with seated balance on physioball  Plan: Continue per plan of care  Progress treatment as tolerated  monitor fatigue from this session next treatment  Precautions: chronic pain, lbp, L > R LE pain, pudendal neuralgia, L > R shoulder pain

## 2019-07-19 ENCOUNTER — OFFICE VISIT (OUTPATIENT)
Dept: PHYSICAL THERAPY | Facility: CLINIC | Age: 59
End: 2019-07-19
Payer: COMMERCIAL

## 2019-07-19 ENCOUNTER — OFFICE VISIT (OUTPATIENT)
Dept: FAMILY MEDICINE CLINIC | Facility: CLINIC | Age: 59
End: 2019-07-19
Payer: COMMERCIAL

## 2019-07-19 VITALS
RESPIRATION RATE: 16 BRPM | SYSTOLIC BLOOD PRESSURE: 106 MMHG | WEIGHT: 227 LBS | BODY MASS INDEX: 38.76 KG/M2 | HEART RATE: 96 BPM | HEIGHT: 64 IN | OXYGEN SATURATION: 93 % | DIASTOLIC BLOOD PRESSURE: 60 MMHG | TEMPERATURE: 99.4 F

## 2019-07-19 DIAGNOSIS — Z12.39 SCREENING FOR MALIGNANT NEOPLASM OF BREAST: ICD-10-CM

## 2019-07-19 DIAGNOSIS — M54.5 ACUTE LEFT-SIDED LOW BACK PAIN, WITH SCIATICA PRESENCE UNSPECIFIED: Primary | ICD-10-CM

## 2019-07-19 DIAGNOSIS — R53.81 PHYSICAL DECONDITIONING: Primary | ICD-10-CM

## 2019-07-19 DIAGNOSIS — G89.4 CHRONIC PAIN SYNDROME: ICD-10-CM

## 2019-07-19 DIAGNOSIS — F41.8 ANXIETY WITH DEPRESSION: ICD-10-CM

## 2019-07-19 PROCEDURE — 99214 OFFICE O/P EST MOD 30 MIN: CPT | Performed by: FAMILY MEDICINE

## 2019-07-19 PROCEDURE — 97110 THERAPEUTIC EXERCISES: CPT

## 2019-07-19 PROCEDURE — G8991 OTHER PT/OT GOAL STATUS: HCPCS

## 2019-07-19 PROCEDURE — G8990 OTHER PT/OT CURRENT STATUS: HCPCS

## 2019-07-19 PROCEDURE — 97112 NEUROMUSCULAR REEDUCATION: CPT

## 2019-07-19 RX ORDER — MIRTAZAPINE 30 MG/1
15 TABLET, FILM COATED ORAL
Qty: 30 TABLET | Refills: 0 | Status: SHIPPED | OUTPATIENT
Start: 2019-07-19 | End: 2019-09-18 | Stop reason: ALTCHOICE

## 2019-07-19 NOTE — PROGRESS NOTES
Assessment/Plan:     Chronic Problems:  Obesity (BMI 35 0-39 9 without comorbidity)  Given info on saxenda  Pt is to call her insurance company and find out if this is covered  If so, will prescribe and follow monthly  Lower back pain  Keep the f/u with Dr Faith Vazquez  Chronic pain syndrome  Keep the appt with Dr Faith Vazquez  Anxiety with depression  Cut back the mirtazapine to 15 mg after the cruise  Visit Diagnosis:  Diagnoses and all orders for this visit:    Acute left-sided low back pain, with sciatica presence unspecified    Chronic pain syndrome    Anxiety with depression  -     mirtazapine (REMERON) 30 mg tablet; Take 0 5 tablets (15 mg total) by mouth daily at bedtime    Screening for malignant neoplasm of breast  -     Mammo screening bilateral w 3d & cad; Future          Subjective:    Patient ID: Flakito Arciniega is a 59 y o  female  Pt is here for routine f/u appt  Started physical therapy and thinks that is a good thing  Still with problems traveling  Going on a cruise  Pt called to Dr Imelda Gooden to see if he wants her on seizure meds, but no response yet  Still follows with Dr Faith Vazquez  Recently told they would no longer fill out disability paperwork  Pt gave the paperwork to her uro/gyn as she goes for intravaginal block every 3 to 4 weeks  Still on mirtazapine and not sure if she still needs it  Sleep is back to normal  Takes all other meds as directed  No side effects noted  The following portions of the patient's history were reviewed and updated as appropriate: allergies, current medications, past family history, past medical history, past social history, past surgical history and problem list     Review of Systems   Constitutional: Negative for chills, diaphoresis, fatigue and fever  HENT: Negative  Eyes: Negative  Respiratory: Negative for cough, shortness of breath and wheezing  Cardiovascular: Negative for chest pain and palpitations  Gastrointestinal: Negative  Genitourinary: Negative  Follows with Dr Connor Loera  Musculoskeletal: Positive for arthralgias (Normal left leg issues and her lower abdomen still  )  Neurological: Negative for dizziness, light-headedness and headaches  Psychiatric/Behavioral: Negative for dysphoric mood  The patient is not nervous/anxious  /60   Pulse 96   Temp 99 4 °F (37 4 °C)   Resp 16   Ht 5' 4" (1 626 m)   Wt 103 kg (227 lb)   SpO2 93%   BMI 38 96 kg/m²   Social History     Socioeconomic History    Marital status: /Civil Union     Spouse name: Not on file    Number of children: Not on file    Years of education: Not on file    Highest education level: Not on file   Occupational History    Occupation: retired   Social Needs    Financial resource strain: Not on file    Food insecurity:     Worry: Not on file     Inability: Not on file   zerved needs:     Medical: Not on file     Non-medical: Not on file   Tobacco Use    Smoking status: Never Smoker    Smokeless tobacco: Never Used   Substance and Sexual Activity    Alcohol use: No    Drug use: No    Sexual activity: Yes   Lifestyle    Physical activity:     Days per week: Not on file     Minutes per session: Not on file    Stress: Not on file   Relationships    Social connections:     Talks on phone: Not on file     Gets together: Not on file     Attends Je[employer]'s witness service: Not on file     Active member of club or organization: Not on file     Attends meetings of clubs or organizations: Not on file     Relationship status: Not on file    Intimate partner violence:     Fear of current or ex partner: Not on file     Emotionally abused: Not on file     Physically abused: Not on file     Forced sexual activity: Not on file   Other Topics Concern    Not on file   Social History Narrative    Lives in [place], with   Previously worked as a teacher        Past Medical History:   Diagnosis Date    Anxiety     Arthritis  Attention and concentration deficit     Blurred vision     Chronic pain     Depression     Diplopia     Dyspepsia     Gastric ulcer     Gastritis     Memory loss     Osteopenia     Starting and stopping of urinary stream during micturition     Urinary incontinence     Wears eyeglasses      Family History   Problem Relation Age of Onset    Other Mother         Back Disorder     Cirrhosis Mother     Crohn's disease Mother     Lupus Mother         Systemic Lupus Erythematous     Hypertension Father     Heart disease Father     Other Brother         Liver Transplant     Crohn's disease Brother     Crohn's disease Brother     Seizures Neg Hx      Past Surgical History:   Procedure Laterality Date     SECTION       SECTION      CHOLECYSTECTOMY      [address]  2004    HYSTERECTOMY  2012    INSERT / REPLACE PERIPHERAL NEUROSTIMULATOR PULSE GENERATOR /       OTHER SURGICAL HISTORY      Reimplantatin at [address] [place] [address] WRIST ARTHROSCOPY      with internal fixation        Current Outpatient Medications:     Cholecalciferol (D3-1000) 1000 units capsule, Take 1 capsule by mouth daily  , Disp: , Rfl:     Diclofenac Sodium 1 5 % SOLN, Apply 2 drops to affected area 3x/day, Disp: 1 Bottle, Rfl: 2    dronabinol (MARINOL) 5 MG capsule, Take 1 capsule (5 mg total) by mouth 2 (two) times a day before meals By Dr Ava Yoon, Disp: 60 capsule, Rfl: 0    DULoxetine (CYMBALTA) 30 mg delayed release capsule, TAKE 1 CAPSULE DAILY, Disp: 90 capsule, Rfl: 0    Iron-Vitamin C (IRON 100/C) 100-250 MG TABS, Take by mouth daily, Disp: , Rfl:     lidocaine (XYLOCAINE) 5 % ointment, Apply topically 2 (two) times a day as needed, Disp: , Rfl:     lisinopril (ZESTRIL) 10 mg tablet, TAKE 1 TABLET DAILY, Disp: 90 tablet, Rfl: 1    metoprolol tartrate (LOPRESSOR) 25 mg tablet, TAKE ONE-HALF (1/2) TABLET TWICE A DAY, Disp: 90 tablet, Rfl: 1    mirtazapine (REMERON) 30 mg tablet, Take 0 5 tablets (15 mg total) by mouth daily at bedtime, Disp: 30 tablet, Rfl: 0    mometasone (ELOCON) 0 1 % cream, Apply topically daily, Disp: , Rfl:     Multiple Vitamin (MULTI-VITAMIN DAILY) TABS, Take 1 tablet by mouth daily, Disp: , Rfl:     pantoprazole (PROTONIX) 40 mg tablet, TAKE 1 TABLET TWICE A DAY, Disp: 180 tablet, Rfl: 1    Allergies   Allergen Reactions    Cat Hair Extract      Cat Dander    Dog Epithelium     Other Other (See Comments) and Sneezing     Dogs  Crab Meat    Shellfish-Derived Products      CRAB MEAT ONLY          Lab Review   Appointment on 05/20/2019   Component Date Value    TSH 3RD GENERATON 05/20/2019 1 370         Imaging: [facility] Procedure    Result Date: 6/25/2019  Narrative: Pre-procedure Diagnosis: 1  LEFT Shoulder Arthralgia   Post-procedure Diagnosis: 1  LEFT Shoulder Arthralgia Procedure Title(s):  1  Intra-articular Injection of Shoulder Joint - LEFT     2  Intraoperative Fluoroscopy Attending Surgeon:   David Carlos MD Anesthesia:   Local Procedure Note: The patients history and physical exam were reviewed  I explained the details of the procedure to the patient including the risks, benefits and other alternatives  We had an informed discussion and the patient verbalized understanding and signed the consent form  All questions were answered appropriately  The patient was taken to the Fluoroscopy Suite and placed supine on the table with support and arms down at the sides  A small towel was placed posterior to the gleno-humeral joint  Prior to the procedure, a "time out" was performed  The anterior and lateral aspect of the LEFT shoulder, including the skin overlying the subacromial region and joint space was prepped with chloroprep x3   Under fluoroscopic guidance, the skin was infiltrated with 3ccs of 1% lidocaine and a 22G 3 5inch spinal needle was advanced into the glenohumeral joint space  Subsequently, 1cc of omnipaque 300 was injected showing good spread of dye along joint capsule  A sterile 5ml syringe containing 80MG of depomedrol, 2cc of 1% lidocaine and 2cc of 0 25% marcaine was attached to extension tubing and contents injected slowly without difficulty  The needle was removed and pressure was placed at the site of injection to decrease the incidence of eccyhmosis or hematoma formation  A sterile bandage was applied  Disposition: The patient tolerated the procedure well, and there were no apparent complications  The patient was taken to the recovery area where written discharge instructions for the procedure were given  Objective:     Physical Exam   Constitutional: She is oriented to person, place, and time  She appears well-developed and well-nourished  No distress  HENT:   Head: Normocephalic and atraumatic  Right Ear: External ear normal    Eyes: Pupils are equal, round, and reactive to light  Conjunctivae and EOM are normal  Right eye exhibits no discharge  Left eye exhibits no discharge  Neck: Normal range of motion  Neck supple  Cardiovascular: Normal rate, regular rhythm and normal heart sounds  Exam reveals no friction rub  No murmur heard  Pulmonary/Chest: Effort normal and breath sounds normal  No respiratory distress  She has no wheezes  She has no rales  She exhibits no tenderness  Abdominal:   Abdomen is obese  Musculoskeletal: Normal range of motion  She exhibits no edema, tenderness or deformity  Lymphadenopathy:     She has no cervical adenopathy  Neurological: She is alert and oriented to person, place, and time  No cranial nerve deficit  Skin: Skin is warm and dry  No rash noted  She is not diaphoretic  Psychiatric: She has a normal mood and affect  Her behavior is normal  Judgment and thought content normal          Patient Instructions   Discussed all with patient    Advised to go home and check and see if her insurance coverage will pay for saxsenda  Info given and pt was advised to f/u here if she wishes to work on weight reduction here  Cut back the mirtazapine to 15 mg nightly and hopefully we will be able to discontinue this medication as it certainly caused you to gain weight  Keep the f/u with all your specialists  Have the mammo done  [facility] Ascencio    Portions of the record may have been created with voice recognition software   Occasional wrong word or "sound a like" substitutions may have occurred due to the inherent limitations of voice recognition software   Read the chart carefully and recognize, using context, where substitutions have occurred

## 2019-07-19 NOTE — PROGRESS NOTES
Daily Note     Today's date: 2019  Patient name: Margarita Ayon  : 1960  MRN: [id number]  Referring provider: Stephanie Crowe, *  Dx:   Encounter Diagnosis     ICD-10-CM    1  Physical deconditioning R53 81                   Subjective: Upon presentation patient noted increased muscular fatigue after last session  Objective: See treatment diary below  Manual                                                                                                                                                      Exercise Diary                   treadmill                       bike Self pace 8'  10'  10'                 UBE 5'/5'  5'/5' 5'/5'                  TB rows, lpd, ext, no monies   gtb x 20 ea  NP                 TB Multifidus   gtb x 20  NP                  bridging :03  10x2  3s x 20 :05  20x                 HL TB Abd RTB  10x2 each bilateral/alt  gtb x 20 ea GTB  20x each                 S/l TB clamshells :03  10x2  gtb x 20  GTB  20x each                 S/L abd 10x2  1# x 20 B  1#  20x each                 HL walk outs 10x  1# x 20  1#  20x                                         physioball marching, opp hand/ knee    sing/alt x 20 ea    Assist for alt NP                                         Stand slr x 3, hs curls, hr B:  10x2 each  1# x 20 ea on foam  !#  20x each on foam                                         biodex LOS                                                SLB on foam  :10  5x each  5x :10  5x                  bicep curls    3# x 20  2#  20x                   punch outs, oh press stand    3# x 20 2#  20x each                 Stand abd, flex   3# x 20 ea 2#  20x each                 BOR, kickbacks                                                                                                     Modalities                                                                                                      Assessment: Tolerated treatment well  Modified treatment to tolerance  Patient demonstrated fatigue post treatment, exhibited good technique with therapeutic exercises and would benefit from continued PT      Plan: Continue per plan of care  Progress treatment as tolerated  Precautions: chronic pain, lbp, L > R LE pain, pudendal neuralgia, L > R shoulder pain

## 2019-07-19 NOTE — ASSESSMENT & PLAN NOTE
Given info on saxenda  Pt is to call her insurance company and find out if this is covered  If so, will prescribe and follow monthly

## 2019-07-19 NOTE — PATIENT INSTRUCTIONS
Discussed all with patient  Advised to go home and check and see if her insurance coverage will pay for saxsenda  Info given and pt was advised to f/u here if she wishes to work on weight reduction here  Cut back the mirtazapine to 15 mg nightly and hopefully we will be able to discontinue this medication as it certainly caused you to gain weight  Keep the f/u with all your specialists  Have the mammo done

## 2019-07-22 ENCOUNTER — OFFICE VISIT (OUTPATIENT)
Dept: PHYSICAL THERAPY | Facility: CLINIC | Age: 59
End: 2019-07-22
Payer: COMMERCIAL

## 2019-07-22 DIAGNOSIS — R53.81 PHYSICAL DECONDITIONING: Primary | ICD-10-CM

## 2019-07-22 PROCEDURE — 97112 NEUROMUSCULAR REEDUCATION: CPT | Performed by: PHYSICAL THERAPIST

## 2019-07-22 PROCEDURE — 97110 THERAPEUTIC EXERCISES: CPT | Performed by: PHYSICAL THERAPIST

## 2019-07-22 PROCEDURE — 97530 THERAPEUTIC ACTIVITIES: CPT | Performed by: PHYSICAL THERAPIST

## 2019-07-22 NOTE — PROGRESS NOTES
Daily Note     Today's date: 2019  Patient name: Alva Payton  : 1960  MRN: [id number]  Referring provider: Adal Wolf, *  Dx:   Encounter Diagnosis     ICD-10-CM    1  Physical deconditioning R53 81                   Subjective: Patient reports that she is feeling good today- ready to go on her cruise with the intent to working out at least 3x in the week       Objective: See treatment diary below  Manual                                                                                                                                                      Exercise Diary                 treadmill                       bike Self pace 8'  10'  10'  10'                UBE 5'/5'  5'/5' 5'/5'  5'/5'                TB rows, lpd, ext, no monies   gtb x 20 ea  NP  gtb x 20 ea                TB Multifidus   gtb x 20  NP   20x ea gtb                bridging :03  10x2  3s x 20 :05  20x  3s x 20               HL TB Abd RTB  10x2 each bilateral/alt  gtb x 20 ea GTB  20x each  gtb x 20               S/l TB clamshells :03  10x2  gtb x 20  GTB  20x each  gtb x 20                S/L abd 10x2  1# x 20 B  1#  20x each  1# x 29                HL walk outs 10x  1# x 20  1#  20x  1# x 20                                        physioball marching, opp hand/ knee    sing/alt x 20 ea    Assist for alt NP                                         Stand slr x 3, hs curls, hr B:  10x2 each  1# x 20 ea on foam  !#  20x each on foam  1# x 20 each on foam                                       biodex LOS                                                SLB on foam  :10  5x each  5x :10  5x  5x                bicep curls    3# x 20  2#  20x   2# x 20                punch outs, oh press stand    3# x 20 2#  20x each  2# x 20               Stand abd, flex   3# x 20 ea 2#  20x each  2# x 20               BOR, kickbacks        2#  20 BOR                                                                                             Modalities                                                                                                      Assessment: Tolerated treatment well  Patient demonstrating good knowledge of HEP  Plan: Continue per plan of care  Progress treatment as tolerated  1 more visit prior to vacation  Precautions: chronic pain, lbp, L > R LE pain, pudendal neuralgia, L > R shoulder pain

## 2019-07-24 ENCOUNTER — OFFICE VISIT (OUTPATIENT)
Dept: PHYSICAL THERAPY | Facility: CLINIC | Age: 59
End: 2019-07-24
Payer: COMMERCIAL

## 2019-07-24 DIAGNOSIS — R53.81 PHYSICAL DECONDITIONING: Primary | ICD-10-CM

## 2019-07-24 PROCEDURE — 97112 NEUROMUSCULAR REEDUCATION: CPT | Performed by: PHYSICAL THERAPIST

## 2019-07-24 PROCEDURE — 97110 THERAPEUTIC EXERCISES: CPT | Performed by: PHYSICAL THERAPIST

## 2019-07-24 NOTE — PROGRESS NOTES
Daily Note     Today's date: 2019  Patient name: Ellen Carr  : 1960  MRN: [id number]  Referring provider: Lyssa Frausto, *  Dx:   Encounter Diagnosis     ICD-10-CM    1  Physical deconditioning R53 81                   Subjective: Patient reports that she is appropriately sore after her PT sessions - She feels ready to go on her cruise tomorrow      Objective: See treatment diary below  Manual                                                                                                                                                      Exercise Diary               treadmill                       bike Self pace 8'  10'  10'  10'   10'             UBE 5'/5'  5'/5' 5'/5'  5'/5'  5'/5'              TB rows, lpd, ext, no monies   gtb x 20 ea  NP  gtb x 20 ea   gtb x 20              TB Multifidus   gtb x 20  NP   20x ea gtb   gtb x 20              bridging :03  10x2  3s x 20 :05  20x  3s x 20  3s x 20             HL TB Abd RTB  10x2 each bilateral/alt  gtb x 20 ea GTB  20x each  gtb x 20  gtb x 20              S/l TB clamshells :03  10x2  gtb x 20  GTB  20x each  gtb x 20   gtb x 20              S/L abd 10x2  1# x 20 B  1#  20x each  1# x 29   1 5# x 20              HL walk outs 10x  1# x 20  1#  20x  1# x 20   1 5# x 20                                      physioball marching, opp hand/ knee    sing/alt x 20 ea    Assist for alt NP                                         Stand slr x 3, hs curls, hr B:  10x2 each  1# x 20 ea on foam  !#  20x each on foam  1# x 20 each on foam  1 5# x 20 on foam                                      biodex LOS                                                SLB on foam  :10  5x each  5x :10  5x  5x  5x              bicep curls    3# x 20  2#  20x   2# x 20  2# x20              punch outs, oh press stand    3# x 20 2#  20x each  2# x 20  2# x 20             Stand abd, flex   3# x 20 ea 2#  20x each  2# x 20  2# x 20              BOR, kickbacks        2#  20 BOR  2# x 20 ea                                                                                           Modalities                                                                                                      Assessment: Tolerated treatment well  Patient is showing improved endurance to activity    Plan: Continue per plan of care  Progress treatment as tolerated  assess response to exercising while away and tolerance to travel - resume pull       Precautions: chronic pain, lbp, L > R LE pain, pudendal neuralgia, L > R shoulder pain

## 2019-08-05 ENCOUNTER — OFFICE VISIT (OUTPATIENT)
Dept: PHYSICAL THERAPY | Facility: CLINIC | Age: 59
End: 2019-08-05
Payer: COMMERCIAL

## 2019-08-05 DIAGNOSIS — R53.81 PHYSICAL DECONDITIONING: Primary | ICD-10-CM

## 2019-08-05 PROCEDURE — 97110 THERAPEUTIC EXERCISES: CPT | Performed by: PHYSICAL THERAPIST

## 2019-08-05 PROCEDURE — 97112 NEUROMUSCULAR REEDUCATION: CPT | Performed by: PHYSICAL THERAPIST

## 2019-08-05 NOTE — PROGRESS NOTES
Daily Note     Today's date: 2019  Patient name: Kellee Mckeon  : 1960  MRN: [id number]  Referring provider: Alice Wynn, *  Dx:   Encounter Diagnosis     ICD-10-CM    1  Physical deconditioning R53 81                   Subjective: Patient states she was on a cruise for 9 days and over the past 5 days she was unable to get off the ship to the [facility]  She states her nerve pain was so bad the past 2 days that she has been unable to get out of bed  Patient states she was not compliant with her exercises while away       Objective: See treatment diary below  Manual                                                                                                                                                      Exercise Diary             treadmill                       bike Self pace 8'  10'  10'  10'   10'  10'           UBE 5'/5'  5'/5' 5'/5'  5'/5'  5'/5'  5'/5'            TB rows, lpd, ext, no monies   gtb x 20 ea  NP  gtb x 20 ea   gtb x 20  gtb x20           TB Multifidus   gtb x 20  NP   20x ea gtb   gtb x 20   gtb x20           bridging :03  10x2  3s x 20 :05  20x  3s x 20  3s x 20  3s x20           HL TB Abd RTB  10x2 each bilateral/alt  gtb x 20 ea GTB  20x each  gtb x 20  gtb x 20   gtb x20           S/l TB clamshells :03  10x2  gtb x 20  GTB  20x each  gtb x 20   gtb x 20   gtb x20           S/L abd 10x2  1# x 20 B  1#  20x each  1# x 29   1 5# x 20   1 5# x20           HL walk outs 10x  1# x 20  1#  20x  1# x 20   1 5# x 20   1 5# x20                                   physioball marching, opp hand/ knee    sing/alt x 20 ea    Assist for alt NP                                         Stand slr x 3, hs curls, hr B:  10x2 each  1# x 20 ea on foam  !#  20x each on foam  1# x 20 each on foam  1 5# x 20 on foam   1 5# x20 on foam                                   biodex LOS                                                SLB on foam  :10  5x each  5x :10  5x  5x  5x  10" x5 ea            bicep curls    3# x 20  2#  20x   2# x 20  2# x20  2# x20            punch outs, oh press stand    3# x 20 2#  20x each  2# x 20  2# x 20  2# x20           Stand abd, flex   3# x 20 ea 2#  20x each  2# x 20  2# x 20   2# x20           BOR, kickbacks        2#  20 BOR  2# x 20 ea  2# x20 ea                                                                                         Modalities                                                                                                      Assessment: Preethi Judd tolerated treatment session well today  She did require rest breaks due to fatigue  Patient fatigued post treatment session and declined any further exercise  Patient encouraged to be consistent with HEP to maximize benefits of physical therapy services  Patient will benefit from continued PT to maximize compliance with HEP  Plan: Continue per plan of care  Progress treatment as tolerated  Precautions: chronic pain, lbp, L > R LE pain, pudendal neuralgia, L > R shoulder pain

## 2019-08-07 ENCOUNTER — APPOINTMENT (OUTPATIENT)
Dept: PHYSICAL THERAPY | Facility: CLINIC | Age: 59
End: 2019-08-07
Payer: COMMERCIAL

## 2019-08-09 ENCOUNTER — OFFICE VISIT (OUTPATIENT)
Dept: PAIN MEDICINE | Facility: CLINIC | Age: 59
End: 2019-08-09
Payer: COMMERCIAL

## 2019-08-09 VITALS
HEART RATE: 80 BPM | WEIGHT: 227 LBS | DIASTOLIC BLOOD PRESSURE: 84 MMHG | SYSTOLIC BLOOD PRESSURE: 136 MMHG | RESPIRATION RATE: 18 BRPM | BODY MASS INDEX: 38.76 KG/M2 | HEIGHT: 64 IN

## 2019-08-09 DIAGNOSIS — M54.16 LUMBAR RADICULOPATHY: Primary | ICD-10-CM

## 2019-08-09 PROCEDURE — 99214 OFFICE O/P EST MOD 30 MIN: CPT | Performed by: ANESTHESIOLOGY

## 2019-08-09 NOTE — PROGRESS NOTES
Assessment:  1  Lumbar radiculopathy  FL spine and pain procedure    CANCELED: FL spine and pain procedure         Plan: This is a 68-year-old female who presents today with low back pain which is multifactorial in origin  Low back pain radiates down the thighs bilaterally  Prior epidural steroid injection has provided decent pain relief for 3-4 months  Low back and bilateral thigh and leg pain has gradually returned  At this time, will proceed forward with repeat lumbar epidural steroid injection  I will schedule her for a bilateral L3 transforaminal epidural steroid injection  Complete risks and benefits including bleeding, infection, tissue reaction, nerve injury and allergic reaction were discussed  The approach was demonstrated using models and literature was provided  Verbal and written consent was obtained  My impressions and treatment recommendations were discussed in detail with the patient who verbalized understanding and had no further questions  Discharge instructions were provided  I personally saw and examined the patient and I agree with the above discussed plan of care  History of Present Illness:  Mariam Swanson is a 59 y o  female who presents for a follow up office visit in regards to Back Pain and Leg Pain  The patients current symptoms includes burning, dull achy, sharp and throbbing pain  Of note, patient had a right L4, L5 transforaminal epidural steroid injection back in April 2019  Patient recently returned back from vacation and reported worsening low back and bilateral thigh and leg and thigh pain  Prior epidural provided relief of pain for approximately 3-4 months  Pain is rated 7/10  I have personally reviewed and/or updated the patient's past medical history, past surgical history, family history, social history, current medications, allergies, and vital signs today  Review of Systems   Respiratory: Negative for shortness of breath      Cardiovascular: Negative for chest pain  Gastrointestinal: Negative for constipation, diarrhea, nausea and vomiting  Musculoskeletal: Negative for arthralgias, gait problem, joint swelling and myalgias  Skin: Negative for rash  Neurological: Negative for dizziness, seizures and weakness  All other systems reviewed and are negative        Patient Active Problem List   Diagnosis    Neuralgia    Continuous leakage of urine    Borderline hyperlipidemia    Chronic pain syndrome    Dysesthesia of multiple sites    Eczema    Hematuria    Hypertension    Arthralgia of shoulder region, left    Limb pain    Lower back pain    Lumbar facet arthropathy    Memory difficulties    Memory impairment    Numbness    Obesity (BMI 35 0-39 9 without comorbidity)    CATHRYN (obstructive sleep apnea)    Postgastrectomy malabsorption    Pudendal neuralgia    Snoring    Gastroesophageal reflux disease    Iron deficiency anemia    Osteoarthritis of left shoulder    Sacroiliitis (HCC)    Lumbar radiculopathy    Anxiety with depression    Mild cognitive impairment with memory loss    Abnormal EEG    Transient alteration of awareness       Past Medical History:   Diagnosis Date    Anxiety     Arthritis     Attention and concentration deficit     Blurred vision     Chronic pain     Depression     Diplopia     Dyspepsia     Gastric ulcer     Gastritis     Memory loss     Osteopenia     Starting and stopping of urinary stream during micturition     Urinary incontinence     Wears eyeglasses        Past Surgical History:   Procedure Laterality Date     SECTION       SECTION      CHOLECYSTECTOMY      GALLBLADDER SURGERY      GASTRIC BYPASS  2004    HYSTERECTOMY  2012    INSERT / REPLACE PERIPHERAL NEUROSTIMULATOR PULSE GENERATOR /       OTHER SURGICAL HISTORY  2012    Reimplantatin at [facility] WRIST ARTHROSCOPY      with internal fixation        Family History   Problem Relation Age of Onset    Other Mother         Back Disorder     Cirrhosis Mother     Crohn's disease Mother     Lupus Mother         Systemic Lupus Erythematous     Hypertension Father     Heart disease Father     Other Brother         Liver Transplant     Crohn's disease Brother     Crohn's disease Brother     Seizures Neg Hx        Social History     Occupational History    Occupation: retired   Tobacco Use    Smoking status: Never Smoker    Smokeless tobacco: Never Used   Substance and Sexual Activity    Alcohol use: No    Drug use: No    Sexual activity: Yes       Current Outpatient Medications on File Prior to Visit   Medication Sig    Cholecalciferol (D3-1000) 1000 units capsule Take 1 capsule by mouth daily      Diclofenac Sodium 1 5 % SOLN Apply 2 drops to affected area 3x/day    dronabinol (MARINOL) 5 MG capsule Take 1 capsule (5 mg total) by mouth 2 (two) times a day before meals By Dr Hunter Encarnacion DULoxetine (CYMBALTA) 30 mg delayed release capsule TAKE 1 CAPSULE DAILY    Iron-Vitamin C (IRON 100/C) 100-250 MG TABS Take by mouth daily    lidocaine (XYLOCAINE) 5 % ointment Apply topically 2 (two) times a day as needed    lisinopril (ZESTRIL) 10 mg tablet TAKE 1 TABLET DAILY    metoprolol tartrate (LOPRESSOR) 25 mg tablet TAKE ONE-HALF (1/2) TABLET TWICE A DAY    mirtazapine (REMERON) 30 mg tablet Take 0 5 tablets (15 mg total) by mouth daily at bedtime    mometasone (ELOCON) 0 1 % cream Apply topically daily    Multiple Vitamin (MULTI-VITAMIN DAILY) TABS Take 1 tablet by mouth daily    pantoprazole (PROTONIX) 40 mg tablet TAKE 1 TABLET TWICE A DAY     No current facility-administered medications on file prior to visit          Allergies   Allergen Reactions    Cat Hair Extract      Cat Dander    Dog Epithelium     Other Other (See Comments) and Sneezing     Dogs  Crab Meat    Shellfish-Derived Products      CRAB MEAT ONLY       Physical Exam:    /84   Pulse 80   Resp 18   Ht 5' 4" (1 626 m)   Wt 103 kg (227 lb)   BMI 38 96 kg/m²     Constitutional:normal, well developed, well nourished, alert, in no distress and non-toxic and no overt pain behavior    Eyes:anicteric  HEENT:grossly intact  Neck:supple, symmetric, trachea midline and no masses   Pulmonary:even and unlabored  Cardiovascular:No edema or pitting edema present  Skin:Normal without rashes or lesions and well hydrated  Psychiatric:Mood and affect appropriate  Neurologic:Cranial Nerves II-XII grossly intact  Musculoskeletal:normal    Imaging

## 2019-08-15 ENCOUNTER — HOSPITAL ENCOUNTER (OUTPATIENT)
Dept: RADIOLOGY | Facility: CLINIC | Age: 59
Discharge: HOME | End: 2019-08-15
Attending: ANESTHESIOLOGY | Admitting: ANESTHESIOLOGY
Payer: COMMERCIAL

## 2019-08-15 VITALS
HEART RATE: 74 BPM | SYSTOLIC BLOOD PRESSURE: 117 MMHG | DIASTOLIC BLOOD PRESSURE: 73 MMHG | TEMPERATURE: 98.9 F | OXYGEN SATURATION: 96 % | RESPIRATION RATE: 20 BRPM

## 2019-08-15 DIAGNOSIS — M54.16 LUMBAR RADICULOPATHY: ICD-10-CM

## 2019-08-15 PROCEDURE — 64483 NJX AA&/STRD TFRM EPI L/S 1: CPT | Performed by: ANESTHESIOLOGY

## 2019-08-15 RX ORDER — LIDOCAINE HYDROCHLORIDE 10 MG/ML
5 INJECTION, SOLUTION EPIDURAL; INFILTRATION; INTRACAUDAL; PERINEURAL ONCE
Status: COMPLETED | OUTPATIENT
Start: 2019-08-15 | End: 2019-08-15

## 2019-08-15 RX ORDER — BUPIVACAINE HCL/PF 2.5 MG/ML
5 VIAL (ML) INJECTION ONCE
Status: COMPLETED | OUTPATIENT
Start: 2019-08-15 | End: 2019-08-15

## 2019-08-15 RX ORDER — PAPAVERINE HCL 150 MG
20 CAPSULE, EXTENDED RELEASE ORAL ONCE
Status: COMPLETED | OUTPATIENT
Start: 2019-08-15 | End: 2019-08-15

## 2019-08-15 RX ADMIN — LIDOCAINE HYDROCHLORIDE 3 ML: 10 INJECTION, SOLUTION EPIDURAL; INFILTRATION; INTRACAUDAL; PERINEURAL at 15:44

## 2019-08-15 RX ADMIN — DEXAMETHASONE SODIUM PHOSPHATE 20 MG: 10 INJECTION, SOLUTION INTRAMUSCULAR; INTRAVENOUS at 15:44

## 2019-08-15 RX ADMIN — Medication 2 ML: at 15:44

## 2019-08-15 NOTE — DISCHARGE INSTR - LAB
Epidural Steroid Injection   WHAT YOU NEED TO KNOW:   An epidural steroid injection (TREVIN) is a procedure to inject steroid medicine into the epidural space  The epidural space is between your spinal cord and vertebrae  Steroids reduce inflammation and fluid buildup in your spine that may be causing pain  You may be given pain medicine along with the steroids  ACTIVITY  · Do not drive or operate machinery today  · No strenuous activity today - bending, lifting, etc   · You may resume normal activites starting tomorrow - start slowly and as tolerated  · You may shower today, but no tub baths or hot tubs  · You may have numbness for several hours from the local anesthetic  Please use caution and common sense, especially with weight-bearing activities  CARE OF THE INJECTION SITE  · If you have soreness or pain, apply ice to the area today (20 minutes on/20 minutes off)  · Starting tomorrow, you may use warm, moist heat or ice if needed  · You may have an increase or change in your discomfort for 36-48 hours after your treatment  · Apply ice and continue with any pain medication you have been prescribed  · [facility]ify [facility] if you have any of the following: redness, drainage, swelling, headache, stiff neck or fever above 100°F     SPECIAL INSTRUCTIONS  · Our office will contact you in approximately 7 days for a progress report  MEDICATIONS  · Continue to take all routine medications  · Our office may have instructed you to hold some medications  If you have a problem specifically related to your procedure, please call our office at [phone]  Problems not related to your procedure should be directed to your primary care physician

## 2019-08-22 ENCOUNTER — TELEPHONE (OUTPATIENT)
Dept: PAIN MEDICINE | Facility: CLINIC | Age: 59
End: 2019-08-22

## 2019-08-22 NOTE — PROGRESS NOTES
PT Discharge    Today's date: 2019  Patient name: Bernabe Roberts  : 1960  MRN: [id number]  Referring provider: Chaparrita Suggs, *  Dx:   Encounter Diagnosis     ICD-10-CM    1  Physical deconditioning R53 81        Assessment  Assessment details: Patient did not attend appointments after 19 thus final measurements are not available  Patient was away on vacation and had no follow up after that   Understanding of Dx/Px/POC: good  Goals  STG     NOT MET  1  Patient will demonstrate independence and competence with HEP 2 -4 weeks  2  Patient will report > 25% reduced pain 2-4 weeks    LTG    NOT MET  1  Patient will report improvements with both functional and recreational abilities  4-6 weeks  2  Patient will demonstrate improved motor function  4-6 weeks  3   Patient will demonstrate improvements with endurance, increased tolerance to aerobic activity  4-6 weeks  4   Patient will report improvements with household chores/ activities  4 weeks  Plan  Plan details: Discharge skilled PT at this time  Thank you for this referral    Planned therapy interventions: home exercise program        Subjective Evaluation    Pain  Current pain rating: 3  At best pain rating: 3  At worst pain ratin  Location: :My back pain is the worst pain i have"  Relieving factors: change in position, support and rest  Aggravating factors: sitting, standing and stair climbing  Progression: worsening    Social Support  Lives in: multiple-level home  Lives with: spouse    Treatments  Previous treatment: physical therapy  Current treatment: injection treatment and medication  Patient Goals  Patient goals for therapy: increased strength, increased motion, independence with ADLs/IADLs and return to sport/leisure activities  Patient goal: "to get into a little better shape, handle the stairs and strengthen my back"          Objective     Strength/Myotome Testing     Left Shoulder     Planes of Motion Flexion: 4-   Abduction: 4-     Right Shoulder     Planes of Motion   Flexion: 4   Abduction: 4     Left Elbow   Flexion: 4+  Extension: 4+    Right Elbow   Flexion: 4+  Extension: 4+    Left Hip   Planes of Motion   Flexion: 4  Extension: 4  Abduction: 4    Right Hip   Planes of Motion   Flexion: 4+  Extension: 4+  Abduction: 4+    Left Knee   Flexion: 4+  Extension: 4+    Right Knee   Flexion: 4+  Extension: 4+    Left Ankle/Foot   Dorsiflexion: 5    Right Ankle/Foot   Dorsiflexion: 5  Neuro Exam:     Sensation   Light touch LE: left WNL and right WNL    Functional outcomes   Functional outcome comment: Composite biodex = 1 66      Balance assessments   MCTSIB   Eyes open level surface: 1 0  Eyes open foam surface: 1 54  Eyes closed level surface: 1 09  Eyes closed foam surface: 3 14  Single leg stance   Left eyes open firm surface: 9 sec  Right eyes open firm surface: 4 sec

## 2019-09-04 DIAGNOSIS — I10 ESSENTIAL HYPERTENSION: ICD-10-CM

## 2019-09-04 DIAGNOSIS — K21.9 GASTROESOPHAGEAL REFLUX DISEASE WITHOUT ESOPHAGITIS: ICD-10-CM

## 2019-09-04 RX ORDER — LISINOPRIL 10 MG/1
TABLET ORAL
Qty: 90 TABLET | Refills: 4 | Status: SHIPPED | OUTPATIENT
Start: 2019-09-04 | End: 2020-11-27

## 2019-09-04 RX ORDER — PANTOPRAZOLE SODIUM 40 MG/1
TABLET, DELAYED RELEASE ORAL
Qty: 180 TABLET | Refills: 4 | Status: SHIPPED | OUTPATIENT
Start: 2019-09-04 | End: 2020-11-27

## 2019-09-09 ENCOUNTER — OFFICE VISIT (OUTPATIENT)
Dept: FAMILY MEDICINE CLINIC | Facility: CLINIC | Age: 59
End: 2019-09-09
Payer: COMMERCIAL

## 2019-09-09 VITALS
RESPIRATION RATE: 14 BRPM | DIASTOLIC BLOOD PRESSURE: 60 MMHG | TEMPERATURE: 99.2 F | SYSTOLIC BLOOD PRESSURE: 108 MMHG | HEART RATE: 70 BPM | BODY MASS INDEX: 39.5 KG/M2 | OXYGEN SATURATION: 97 % | WEIGHT: 231.4 LBS | HEIGHT: 64 IN

## 2019-09-09 DIAGNOSIS — E66.9 CLASS 2 OBESITY WITHOUT SERIOUS COMORBIDITY IN ADULT, UNSPECIFIED BMI, UNSPECIFIED OBESITY TYPE: ICD-10-CM

## 2019-09-09 DIAGNOSIS — Z23 NEED FOR INFLUENZA VACCINATION: Primary | ICD-10-CM

## 2019-09-09 DIAGNOSIS — M25.512 CHRONIC LEFT SHOULDER PAIN: ICD-10-CM

## 2019-09-09 DIAGNOSIS — G89.29 CHRONIC LEFT SHOULDER PAIN: ICD-10-CM

## 2019-09-09 DIAGNOSIS — G89.4 CHRONIC PAIN SYNDROME: ICD-10-CM

## 2019-09-09 PROCEDURE — 99214 OFFICE O/P EST MOD 30 MIN: CPT | Performed by: FAMILY MEDICINE

## 2019-09-09 PROCEDURE — 90682 RIV4 VACC RECOMBINANT DNA IM: CPT

## 2019-09-09 PROCEDURE — 90471 IMMUNIZATION ADMIN: CPT

## 2019-09-09 RX ORDER — DULOXETIN HYDROCHLORIDE 30 MG/1
CAPSULE, DELAYED RELEASE ORAL
Qty: 90 CAPSULE | Refills: 2 | Status: SHIPPED | OUTPATIENT
Start: 2019-09-09 | End: 2020-05-02

## 2019-09-09 RX ORDER — PHENTERMINE HYDROCHLORIDE 15 MG/1
15 CAPSULE ORAL EVERY MORNING
Qty: 30 CAPSULE | Refills: 0 | Status: SHIPPED | OUTPATIENT
Start: 2019-09-09 | End: 2019-09-19 | Stop reason: SINTOL

## 2019-09-09 NOTE — PATIENT INSTRUCTIONS
Given info on weight loss  Suggest you keep a food diary like My Fitness Pal or the Lose it [place]  Calories should be less than 1200 daily spread out during the course of the day  Cut back on carbs and eat more proteins  Make better food choices  At least 6 to 8 glasses of water daily  Slowly increase the exercise and try to get up to 30 minutes at least 5 times weekly  The changes you make now are for life  Will look for 3 to 8 lb weight loss the first month  Think about a realistic goal weight  Pt would like to get below 200 lbs  Schedule mammo by next appt  Absolutely no more lattes  Water

## 2019-09-09 NOTE — ASSESSMENT & PLAN NOTE
Pt is currently off all narcotics and still follows with pain management  Advised to let her pain management doc know about the phentermine

## 2019-09-09 NOTE — PROGRESS NOTES
Assessment/Plan:     Chronic Problems:  Chronic pain syndrome  Pt is currently off all narcotics and still follows with pain management  Advised to let her pain management doc know about the phentermine  Visit Diagnosis:  Diagnoses and all orders for this visit:    Class 2 obesity without serious comorbidity in adult, unspecified BMI, unspecified obesity type  Comments: Will start phentermine and instructions given  Will follow monthly  Orders:  -     phentermine 15 MG capsule; Take 1 capsule (15 mg total) by mouth every morning    Chronic pain syndrome          Subjective:    Patient ID: Kortney Engel is a 59 y o  female  Pt is here for f/u on her weight  Went on a cruise and had such terrible pain that she had to be taken off in a wheelchair and was in bed for 10 days until an injection  Feels a lot better now, but standing is very difficult  Has not used any meds for her weight  Pt would like to work on her weight  Has not started journaling yet  Drinks iced lattes  Feels she is ready to be committed to work on the weight  H/O gastric bypass approx 2008  Takes all other meds as directed  No side effects noted  The following portions of the patient's history were reviewed and updated as appropriate: allergies, current medications, past family history, past medical history, past social history, past surgical history and problem list     Review of Systems   Constitutional: Negative for chills, diaphoresis, fatigue and fever  HENT: Negative  Eyes: Negative  Respiratory: Negative for cough, shortness of breath and wheezing  Cardiovascular: Negative for chest pain and palpitations  Gastrointestinal: Negative  Musculoskeletal: Positive for back pain  Skin:        Overdue for her mammo  Neurological: Negative for dizziness, light-headedness and headaches  Psychiatric/Behavioral: Negative for sleep disturbance (and is successfully off her mirtazapine  )          Feels her anxiety and depression is stable  /60   Pulse 70   Temp 99 2 °F (37 3 °C)   Resp 14   Ht 5' 4" (1 626 m)   Wt 105 kg (231 lb 6 4 oz)   SpO2 97%   BMI 39 72 kg/m²   Social History     Socioeconomic History    Marital status: /Civil Union     Spouse name: Not on file    Number of children: Not on file    Years of education: Not on file    Highest education level: Not on file   Occupational History    Occupation: retired   Social Needs    Financial resource strain: Not on file    Food insecurity:     Worry: Not on file     Inability: Not on file   [employer] needs:     Medical: Not on file     Non-medical: Not on file   Tobacco Use    Smoking status: Never Smoker    Smokeless tobacco: Never Used   Substance and Sexual Activity    Alcohol use: No    Drug use: No    Sexual activity: Yes   Lifestyle    Physical activity:     Days per week: Not on file     Minutes per session: Not on file    Stress: Not on file   Relationships    Social connections:     Talks on phone: Not on file     Gets together: Not on file     Attends Rastafari service: Not on file     Active member of club or organization: Not on file     Attends meetings of clubs or organizations: Not on file     Relationship status: Not on file    Intimate partner violence:     Fear of current or ex partner: Not on file     Emotionally abused: Not on file     Physically abused: Not on file     Forced sexual activity: Not on file   Other Topics Concern    Not on file   Social History Narrative    Lives in [place], with   Previously worked as a teacher        Past Medical History:   Diagnosis Date    Anxiety     Arthritis     Attention and concentration deficit     Blurred vision     Chronic pain     Depression     Diplopia     Dyspepsia     Gastric ulcer     Gastritis     Memory loss     Osteopenia     Starting and stopping of urinary stream during micturition     Urinary incontinence     Wears eyeglasses      Family History   Problem Relation Age of Onset    Other Mother         Back Disorder     Cirrhosis Mother     Crohn's disease Mother     Lupus Mother         Systemic Lupus Erythematous     Hypertension Father     Heart disease Father     Other Brother         Liver Transplant     Crohn's disease Brother     Crohn's disease Brother     Seizures Neg Hx      Past Surgical History:   Procedure Laterality Date     SECTION       SECTION     [address] GASTRIC BYPASS  2004    HYSTERECTOMY  2012    INSERT / REPLACE PERIPHERAL NEUROSTIMULATOR PULSE GENERATOR /       OTHER SURGICAL HISTORY      Reimplantatin at [address] [facility] WRIST ARTHROSCOPY      with internal fixation        Current Outpatient Medications:     Cholecalciferol (D3-1000) 1000 units capsule, Take 1 capsule by mouth daily  , Disp: , Rfl:     dronabinol (MARINOL) 5 MG capsule, Take 1 capsule (5 mg total) by mouth 2 (two) times a day before meals By Dr David Jay, Disp: 60 capsule, Rfl: 0    DULoxetine (CYMBALTA) 30 mg delayed release capsule, TAKE 1 CAPSULE DAILY, Disp: 90 capsule, Rfl: 0    Iron-Vitamin C (IRON 100/C) 100-250 MG TABS, Take by mouth daily, Disp: , Rfl:     lisinopril (ZESTRIL) 10 mg tablet, TAKE 1 TABLET DAILY, Disp: 90 tablet, Rfl: 4    metoprolol tartrate (LOPRESSOR) 25 mg tablet, TAKE ONE-HALF (1/2) TABLET TWICE A DAY, Disp: 90 tablet, Rfl: 4    mirtazapine (REMERON) 30 mg tablet, Take 0 5 tablets (15 mg total) by mouth daily at bedtime, Disp: 30 tablet, Rfl: 0    Multiple Vitamin (MULTI-VITAMIN DAILY) TABS, Take 1 tablet by mouth daily, Disp: , Rfl:     pantoprazole (PROTONIX) 40 mg tablet, TAKE 1 TABLET TWICE A DAY, Disp: 180 tablet, Rfl: 4    Diclofenac Sodium 1 5 % SOLN, Apply 2 drops to affected area 3x/day, Disp: 1 Bottle, Rfl: 2    lidocaine (XYLOCAINE) 5 % ointment, Apply topically 2 (two) times a day as needed, Disp: , Rfl:     mometasone (ELOCON) 0 1 % cream, Apply topically daily, Disp: , Rfl:     phentermine 15 MG capsule, Take 1 capsule (15 mg total) by mouth every morning, Disp: 30 capsule, Rfl: 0    Allergies   Allergen Reactions    Cat Hair Extract      Cat Dander    Dog Epithelium     Other Other (See Comments) and Sneezing     Dogs  Crab Meat    Shellfish-Derived Products      CRAB MEAT ONLY          Lab Review   No visits with results within 2 Month(s) from this visit  Latest known visit with results is:   Appointment on 05/20/2019   Component Date Value    TSH 3RD GENERATON 05/20/2019 1 370         Imaging: Fl Spine And Pain Procedure    Result Date: 8/15/2019  Narrative: Pre-procedure Diagnosis: 1  Lumbar Spinal Stenosis 2  Lumbar Radiculopathy Post-procedure Diagnosis: 1  Lumbar Spinal Stenosis 2  Lumbar Radiculopathy Procedure Title(s):  1  Bilateral L3 Transforaminal epidural steroid injection Attending Surgeon:   Dioni Grewal MD Anesthesia:   Local The patients history and physical exam were reviewed  I explained the details of the procedure to the patient including the risks, benefits and other alternatives  We had an informed discussion and the patient verbalized understanding and signed the consent form  All questions were answered appropriately  Procedure Note:  Right L3 and Left L3 Transforaminal Epidural Steroid Injection A procedural pause was conducted to verify: correct patient identity, procedure to be performed and as applicable, correct side and site, correct patient position, and availability of implants, special equipment and special requirements  After identifying the right L3 pedicle fluoroscopically with an oblique view, the skin was sterilely prepped and draped in the usual fashion using Chloraprep skin prep  The skin and subcutaneous tissues were anesthetized with 1cc of 1% lidocaine   A 3 5 in 22 gauge spinal needle was then advanced under fluoroscopic guidance to the neural foramen  Appropriate foraminal depth was determined with a lateral fluoroscopic view, and AP visualization confirmed needle positioning at approximately the 6 o'clock position relative to the pedicle  After negative aspiration, 1cc Omnipaque 300 contrast was injected using live fluoroscopy confirming appropriate transforaminal spread without evidence of intravascular or intrathecal uptake  Next, a solution containing 1cc of 0 25% bupivacaine and 10mg of dexamethasone was injected slowly and incrementally into the epidural space  Following the injection the needle was withdrawn slightly and flushed with lidocaine as it was fully extracted  The procedure was then repeated in the exact same way at the LEFT L3 pedicle with a 3 5 inch 22 gauge spinal needle  The patient tolerated the procedure well and there were no apparent complications  The patient did not develop any new neurologic deficits  After appropriate observation, the patient was dismissed from the clinic in good condition under their own power  Objective:     Physical Exam   Constitutional: She is oriented to person, place, and time  She appears well-developed and well-nourished  No distress  HENT:   Head: Normocephalic and atraumatic  Right Ear: External ear normal    Left Ear: External ear normal    Mouth/Throat: Oropharynx is clear and moist    Eyes: Pupils are equal, round, and reactive to light  Conjunctivae and EOM are normal  Right eye exhibits no discharge  Left eye exhibits no discharge  Neck: Normal range of motion  Neck supple  Cardiovascular: Normal rate, regular rhythm and normal heart sounds  Exam reveals no friction rub  No murmur heard  Pulmonary/Chest: Effort normal and breath sounds normal  No respiratory distress  She has no wheezes  Musculoskeletal: Normal range of motion  She exhibits no edema, tenderness or deformity  Lymphadenopathy:     She has no cervical adenopathy  Neurological: She is alert and oriented to person, place, and time  No cranial nerve deficit  Skin: Skin is warm and dry  No rash noted  She is not diaphoretic  Psychiatric: She has a normal mood and affect  Her behavior is normal  Judgment and thought content normal          Patient Instructions   Given info on weight loss  Suggest you keep a food diary like My Fitness Pal or the Lose it [place]  Calories should be less than 1200 daily spread out during the course of the day  Cut back on carbs and eat more proteins  Make better food choices  At least 6 to 8 glasses of water daily  Slowly increase the exercise and try to get up to 30 minutes at least 5 times weekly  The changes you make now are for life  Will look for 3 to 8 lb weight loss the first month  Think about a realistic goal weight  Pt would like to get below 200 lbs  Schedule mammo by next appt  Absolutely no more GWEN Ji    Portions of the record may have been created with voice recognition software   Occasional wrong word or "sound a like" substitutions may have occurred due to the inherent limitations of voice recognition software   Read the chart carefully and recognize, using context, where substitutions have occurred

## 2019-09-18 ENCOUNTER — OFFICE VISIT (OUTPATIENT)
Dept: NEUROLOGY | Facility: CLINIC | Age: 59
End: 2019-09-18
Payer: COMMERCIAL

## 2019-09-18 ENCOUNTER — TELEPHONE (OUTPATIENT)
Dept: FAMILY MEDICINE CLINIC | Facility: CLINIC | Age: 59
End: 2019-09-18

## 2019-09-18 VITALS
DIASTOLIC BLOOD PRESSURE: 80 MMHG | HEART RATE: 64 BPM | BODY MASS INDEX: 38.89 KG/M2 | WEIGHT: 227.8 LBS | HEIGHT: 64 IN | SYSTOLIC BLOOD PRESSURE: 118 MMHG

## 2019-09-18 DIAGNOSIS — G47.33 OSA (OBSTRUCTIVE SLEEP APNEA): Primary | ICD-10-CM

## 2019-09-18 DIAGNOSIS — R41.3 MEMORY IMPAIRMENT: ICD-10-CM

## 2019-09-18 DIAGNOSIS — R40.4 TRANSIENT ALTERATION OF AWARENESS: ICD-10-CM

## 2019-09-18 DIAGNOSIS — G58.8 PUDENDAL NEURALGIA: ICD-10-CM

## 2019-09-18 DIAGNOSIS — E66.9 OBESITY (BMI 35.0-39.9 WITHOUT COMORBIDITY): ICD-10-CM

## 2019-09-18 PROCEDURE — 99214 OFFICE O/P EST MOD 30 MIN: CPT | Performed by: PSYCHIATRY & NEUROLOGY

## 2019-09-18 NOTE — PROGRESS NOTES
Susana Maciel is a 59 y o  female  Chief Complaint   Patient presents with    Follow-up     CATHRYN and memory difficulties , patient states that she is still having difficulties with the mask , has been snoring with the mask on       Assessment:  1  CATHRYN (obstructive sleep apnea)    2  Memory impairment    3  Obesity (BMI 35 0-39 9 without comorbidity)    4  Transient alteration of awareness    5  Pudendal neuralgia          Discussion:  Patient's recent CPAP download was reviewed, she is on CPAP of 6 cm of water pressure with an RDI of 5 5 and compliance greater than 4 hours at 75 6% and average use is 6 hours and 17 minutes, she was given a prescription for supply of the mask in tubing for her CPAP, she is going to follow up with [facility]is[facility] regarding that, she was also advised to continue with mentally stimulating exercises and to continue taking fall safety precautions, she is in follow up with her pain specialist regarding her pudendal neurologia and was also advised to continue with seizure precautions, Patient was advised to continue with current CPAP pressure, he was also advised to maintain ideal body weight, avoid alcohol, muscle relaxers, pain killers and sleeping aids, he was advised to preferably sleep in lateral recumbent position, avoid driving and operating machinery if feeling drowsy or tired, clean the CPAP machine as per the [employer] company instructions, go to the hospital if has any worsening symptoms and call me  And to follow up with his other physicians  Subjective:    HPI   Patient is here in follow-up for her sleep apnea and memory difficulty, and an abnormal EEG, since her last visit, she went on a cruise and her pudendal Neurologia got worse as she was sitting on hard chairs and because of that her sleep is slightly disturbed, she is in follow up with her pain specialist regarding that, she usually goes to sleep by 10:00 p m  And gets up in the morning between 9 and 9:30 a m   And has been having slight difficulty in going to sleep but overall the she feels rested in the morning as her ESS is 4, she has been having some issues with her mask, she is also trying to lose weight, no drowsiness while driving, her memory is stable, she has not had any episodes of confusion, no other complaints  Vitals:    09/18/19 1422   BP: 118/80   BP Location: Left arm   Patient Position: Sitting   Cuff Size: Adult   Pulse: 64   Weight: 103 kg (227 lb 12 8 oz)   Height: 5' 4" (1 626 m)       Current Medications    Current Outpatient Medications:     Cholecalciferol (D3-1000) 1000 units capsule, Take 1 capsule by mouth daily  , Disp: , Rfl:     Diclofenac Sodium 1 5 % SOLN, Apply 2 drops to affected area 3x/day, Disp: 1 Bottle, Rfl: 2    dronabinol (MARINOL) 5 MG capsule, Take 1 capsule (5 mg total) by mouth 2 (two) times a day before meals By Dr Evita Sena, Disp: 60 capsule, Rfl: 0    DULoxetine (CYMBALTA) 30 mg delayed release capsule, TAKE 1 CAPSULE DAILY, Disp: 90 capsule, Rfl: 2    Iron-Vitamin C (IRON 100/C) 100-250 MG TABS, Take by mouth daily, Disp: , Rfl:     lidocaine (XYLOCAINE) 5 % ointment, Apply topically 2 (two) times a day as needed, Disp: , Rfl:     lisinopril (ZESTRIL) 10 mg tablet, TAKE 1 TABLET DAILY, Disp: 90 tablet, Rfl: 4    metoprolol tartrate (LOPRESSOR) 25 mg tablet, TAKE ONE-HALF (1/2) TABLET TWICE A DAY, Disp: 90 tablet, Rfl: 4    mometasone (ELOCON) 0 1 % cream, Apply topically daily as needed , Disp: , Rfl:     Multiple Vitamin (MULTI-VITAMIN DAILY) TABS, Take 1 tablet by mouth daily, Disp: , Rfl:     pantoprazole (PROTONIX) 40 mg tablet, TAKE 1 TABLET TWICE A DAY, Disp: 180 tablet, Rfl: 4    phentermine 15 MG capsule, Take 1 capsule (15 mg total) by mouth every morning, Disp: 30 capsule, Rfl: 0      Allergies  Cat hair extract; Dog epithelium;  Other; and Shellfish-derived products    Past Medical History  Past Medical History:   Diagnosis Date    Anxiety     Arthritis     Attention and concentration deficit     Blurred vision     Chronic pain     Depression     Diplopia     Dyspepsia     Gastric ulcer     Gastritis     Memory loss     Osteopenia     Starting and stopping of urinary stream during micturition     Urinary incontinence     Wears eyeglasses          Past Surgical History:  Past Surgical History:   Procedure Laterality Date     SECTION       SECTION      CHOLECYSTECTOMY      GALLBLADDER SURGERY  1990    GASTRIC BYPASS  2004    HYSTERECTOMY  2012    INSERT / REPLACE PERIPHERAL NEUROSTIMULATOR PULSE GENERATOR /       OTHER SURGICAL HISTORY      Reimplantatin at [address] [facility] WRIST ARTHROSCOPY      with internal fixation          Family History:  Family History   Problem Relation Age of Onset    Other Mother         Back Disorder     Cirrhosis Mother     Crohn's disease Mother     Lupus Mother         Systemic Lupus Erythematous     Hypertension Father     Heart disease Father     Other Brother         Liver Transplant     Crohn's disease Brother     Crohn's disease Brother     Seizures Neg Hx        Social History:   reports that she has never smoked  She has never used smokeless tobacco  She reports that she does not drink alcohol or use drugs  I have reviewed the past medical history, surgical history, social and family history, current medications, allergies vitals, review of systems, and updated this information as appropriate today  Objective:    Physical Exam    Neurological Exam    GENERAL:  Cooperative in no acute distress  Well-developed and well-nourished    HEAD and NECK   Head is atraumatic normocephalic with no lesions or masses  Neck is supple with full range of motion    CARDIOVASCULAR  Carotid Arteries-no carotid bruits      NEUROLOGIC:  Mental Status-the patient is awake alert and oriented without aphasia or apraxia, [place] is 29/30  Cranial Nerves: Visual fields are full to confrontation  Extraocular movements are full without nystagmus  Pupils are 2-1/2 mm and reactive  Face is symmetrical to light touch  Movements of facial expression move symmetrically  Hearing is normal to finger rub bilaterally  Soft palate lifts symmetrically  Shoulder shrug is symmetrical  Tongue is midline without atrophy  Motor: No drift is noted on arm extension  Strength is full in the upper and lower extremities with normal bulk and tone  Gait is unremarkable  Oropharynx classification is 2/3  Chest is clear to auscultate bilaterally  ROS:  Review of Systems   Constitutional: Negative  Negative for appetite change, chills, fatigue and fever  HENT: Negative  Negative for hearing loss, tinnitus, trouble swallowing and voice change  Eyes: Negative  Negative for photophobia, pain and visual disturbance  Respiratory: Negative  Negative for shortness of breath and wheezing  Cardiovascular: Negative  Negative for chest pain and palpitations  Gastrointestinal: Negative  Negative for nausea and vomiting  Endocrine: Negative  Negative for cold intolerance and heat intolerance  Genitourinary: Negative  Negative for dysuria, frequency and urgency  Musculoskeletal: Positive for arthralgias (legs) and back pain (lower)  Negative for gait problem, myalgias, neck pain and neck stiffness  Skin: Negative  Negative for rash  Allergic/Immunologic: Negative  Neurological: Negative  Negative for dizziness, tremors, seizures, syncope, facial asymmetry, speech difficulty, weakness, light-headedness, numbness and headaches  Hematological: Negative  Does not bruise/bleed easily  Psychiatric/Behavioral: Positive for confusion, decreased concentration (memory) and sleep disturbance (snoring)  Negative for hallucinations

## 2019-09-19 DIAGNOSIS — M54.50 ACUTE MIDLINE LOW BACK PAIN WITHOUT SCIATICA: ICD-10-CM

## 2019-09-19 DIAGNOSIS — G89.4 CHRONIC PAIN SYNDROME: ICD-10-CM

## 2019-09-19 DIAGNOSIS — M25.512 CHRONIC LEFT SHOULDER PAIN: Primary | ICD-10-CM

## 2019-09-19 DIAGNOSIS — G89.29 CHRONIC LEFT SHOULDER PAIN: Primary | ICD-10-CM

## 2019-09-19 DIAGNOSIS — R53.81 PHYSICAL DECONDITIONING: ICD-10-CM

## 2019-09-19 NOTE — TELEPHONE ENCOUNTER
Spoke with patient   She said it is for both her legs and back and L shoulder and it is for reconditioning

## 2019-09-23 ENCOUNTER — HOSPITAL ENCOUNTER (OUTPATIENT)
Dept: MAMMOGRAPHY | Facility: CLINIC | Age: 59
Discharge: HOME | End: 2019-09-23
Payer: COMMERCIAL

## 2019-09-23 VITALS — HEIGHT: 64 IN | WEIGHT: 227 LBS | BODY MASS INDEX: 38.76 KG/M2

## 2019-09-23 DIAGNOSIS — Z12.39 SCREENING FOR MALIGNANT NEOPLASM OF BREAST: ICD-10-CM

## 2019-09-23 PROCEDURE — 77063 BREAST TOMOSYNTHESIS BI: CPT

## 2019-09-23 PROCEDURE — 77067 SCR MAMMO BI INCL CAD: CPT

## 2019-10-02 ENCOUNTER — EVALUATION (OUTPATIENT)
Dept: PHYSICAL THERAPY | Facility: CLINIC | Age: 59
End: 2019-10-02
Payer: COMMERCIAL

## 2019-10-02 DIAGNOSIS — M54.50 ACUTE MIDLINE LOW BACK PAIN WITHOUT SCIATICA: ICD-10-CM

## 2019-10-02 DIAGNOSIS — M54.16 LUMBAR RADICULOPATHY: ICD-10-CM

## 2019-10-02 DIAGNOSIS — G89.29 CHRONIC LEFT SHOULDER PAIN: ICD-10-CM

## 2019-10-02 DIAGNOSIS — R53.81 PHYSICAL DECONDITIONING: Primary | ICD-10-CM

## 2019-10-02 DIAGNOSIS — G89.4 CHRONIC PAIN SYNDROME: ICD-10-CM

## 2019-10-02 DIAGNOSIS — M25.512 CHRONIC LEFT SHOULDER PAIN: ICD-10-CM

## 2019-10-02 PROCEDURE — 97110 THERAPEUTIC EXERCISES: CPT | Performed by: PHYSICAL THERAPIST

## 2019-10-02 PROCEDURE — 97161 PT EVAL LOW COMPLEX 20 MIN: CPT | Performed by: PHYSICAL THERAPIST

## 2019-10-02 NOTE — PROGRESS NOTES
PT Evaluation     Today's date: 10/2/2019  Patient name: Rajeev Alves  : 1960  MRN: [id number]  Referring provider: Aracelis Tai, *  Dx:   Encounter Diagnosis     ICD-10-CM    1  Physical deconditioning R53 81 Ambulatory referral to Physical Therapy   2  Chronic left shoulder pain M25 512 Ambulatory referral to Physical Therapy    G89 29    3  Chronic pain syndrome G89 4 Ambulatory referral to Physical Therapy   4  Acute midline low back pain without sciatica M54 5 Ambulatory referral to Physical Therapy   5  Lumbar radiculopathy M54 16                   Assessment  Assessment details: Patient was provided a home exercise program and demonstrated an understanding of exercises  Patient was advised to stop performing home exercise program if symptoms increase or new complaints developed  Verbal understanding demonstrated regarding home exercise program instructions  Patient would benefit from skilled physical therapy services for prescribed exercises, manual interventions, neuromuscular re-education, education, and modalities as deemed appropriate to assist patient in achieving their maximum level of function  Patient presents after 2 month hiatus from PT due to increased pain/ weakness and function following a cruise in July where she progressively worsened due to long walking and long sitting periods  Patient underwent 2 injections to LS region in August which have allowed her to walk better and function more  Patient presents with general deconditioning, decreased tolerance to standing / walking / ascending steps  Patient demonstrates an extension bias ( in unweighted posturing - prone ) with ability to abolish her radicular symptoms  Impairments: activity intolerance, impaired physical strength, lacks appropriate home exercise program, pain with function and poor posture   Understanding of Dx/Px/POC: good  Goals  STG   1    Patient will demonstrate independence and competence with HEP 2 -4 weeks  2  Patient will report > 25% reduced pain 2-4 weeks    LTG   1  Patient will report improvements with both functional and recreational abilities  4-6 weeks  2  Patient will demonstrate improved motor function  4-6 weeks  3   Patient will demonstrate improvements with endurance, increased tolerance to aerobic activity  4-6 weeks  4   Patient will report improvements with household chores/ activities  4 weeks  5   Patient will demonstrate improved flexibility bilateral hs 4 weeks  6  Patient will report abolished right LE radicular symptoms  4-6 weeks  Plan  Plan details: Patient response to treatment will be monitored each session and progressed accordingly    Thank you for this referral    Patient would benefit from: skilled physical therapy  Planned modality interventions: thermotherapy: hydrocollator packs  Planned therapy interventions: IADL retraining, manual therapy, patient education, postural training, strengthening, stretching, therapeutic exercise, flexibility, home exercise program, balance, neuromuscular re-education and joint mobilization  Frequency: 2x week  Duration in weeks: 8  Treatment plan discussed with: patient        Subjective Evaluation    History of Present Illness  Mechanism of injury: Patient reports that she went on a cruise in 2019 and literally had to be taken off the boat in a wheelchair  This was due to pain in bilateral LE's  She has underlying pudendal neuralgia on left and has started with right LE radicular / nerve pain  After return, she underwent 2 injection (mid-august) which have "allowed me to walk"  Patient reports that she is unable to stand without LS pain and also c/o return of right LE radicular pain  Denies bowel/bladder changes  She c/o difficulty ascending steps  "my legs feels so heavy"  She denies knees buckling     Pain  Current pain ratin  At best pain ratin  At worst pain ratin  Relieving factors: change in position, support and rest  Aggravating factors: standing and stair climbing  Progression: improved    Social Support  Lives in: multiple-level home  Lives with: spouse    Treatments  Previous treatment: physical therapy  Current treatment: injection treatment and medication  Patient Goals  Patient goals for therapy: increased strength, increased motion, independence with ADLs/IADLs and return to sport/leisure activities  Patient goal: "be able to climb steps"  Objective     Tenderness     Lumbar Spine  Tenderness in the spinous process  Left Hip   Tenderness in the greater trochanter  No tenderness in the PSIS  Right Hip   Tenderness in the greater trochanter  No tenderness in the PSIS  Additional Tenderness Details  Tenderness F6-0-9, S1 elicited - localized to spine  Strength/Myotome Testing     Left Shoulder     Planes of Motion   Flexion: 4-   Abduction: 4-     Right Shoulder     Planes of Motion   Flexion: 4   Abduction: 4     Left Elbow   Flexion: 4+  Extension: 4+    Right Elbow   Flexion: 4+  Extension: 4+    Left Hip   Planes of Motion   Flexion: 4  Extension: 4  Abduction: 4  Adduction: 5  External rotation: 4+  Internal rotation: 4+    Right Hip   Planes of Motion   Flexion: 4+  Extension: 4+  Abduction: 4+  Adduction: 5  External rotation: 4+  Internal rotation: 4+    Left Knee   Flexion: 4+  Extension: 4+    Right Knee   Flexion: 4+  Extension: 4+    Left Ankle/Foot   Dorsiflexion: 5    Right Ankle/Foot   Dorsiflexion: 5    Tests     Lumbar     Left   Negative crossed SLR, femoral stretch, passive SLR and slump test      Right   Negative crossed SLR, femoral stretch, passive SLR and slump test      Left Hip   Negative MANASA  Right Hip   Negative MANASA     Neuro Exam:     Sensation   Light touch LE: left WNL and right WNL    Functional outcomes   Functional outcome comment: Composite biodex = 1 66             Precautions: chronic pain, lbp, L > R LE pain, pudendal neuralgia, L > R shoulder pain         Manual              Hs stretch bilaterally                                                                      Exercise Diary  10/2            treadmill             bike             UBE                          bridging             HL TB Abd             S/l TB clamshells             S/L abd             HL walk outs                          Prop on elbows 2 min            Press ups 10x 2            Loc and sag 5x                          physioball marching, opp hand/ knee                          Stand slr x 3, hs curls, hr                          biodex LOS                           SLB on foam                           hss w/ strap 20s x 5                             Modalities

## 2019-10-09 ENCOUNTER — OFFICE VISIT (OUTPATIENT)
Dept: PHYSICAL THERAPY | Facility: CLINIC | Age: 59
End: 2019-10-09
Payer: COMMERCIAL

## 2019-10-09 DIAGNOSIS — R53.81 PHYSICAL DECONDITIONING: ICD-10-CM

## 2019-10-09 DIAGNOSIS — M25.512 CHRONIC LEFT SHOULDER PAIN: Primary | ICD-10-CM

## 2019-10-09 DIAGNOSIS — G89.29 CHRONIC LEFT SHOULDER PAIN: Primary | ICD-10-CM

## 2019-10-09 DIAGNOSIS — M54.50 ACUTE MIDLINE LOW BACK PAIN WITHOUT SCIATICA: ICD-10-CM

## 2019-10-09 DIAGNOSIS — M54.16 LUMBAR RADICULOPATHY: ICD-10-CM

## 2019-10-09 DIAGNOSIS — G89.4 CHRONIC PAIN SYNDROME: ICD-10-CM

## 2019-10-09 PROCEDURE — 97112 NEUROMUSCULAR REEDUCATION: CPT

## 2019-10-09 PROCEDURE — 97140 MANUAL THERAPY 1/> REGIONS: CPT

## 2019-10-09 PROCEDURE — 97110 THERAPEUTIC EXERCISES: CPT

## 2019-10-09 NOTE — PROGRESS NOTES
Daily Note     Today's date: 10/9/2019  Patient name: Enma Rojas  : 1960  MRN: [id number]  Referring provider: Kat Angel, *  Dx:   Encounter Diagnosis     ICD-10-CM    1  Chronic left shoulder pain M25 512     G89 29    2  Chronic pain syndrome G89 4    3  Physical deconditioning R53 81    4  Acute midline low back pain without sciatica M54 5    5  Lumbar radiculopathy M54 16                   Subjective: Upon presentation, patient explained she will be taking care of her in laws over a 4 day weekend-noting potential stressful situation  Patient reported she started weight watchers and already "sees a difference"        Objective: See treatment diary below  Manual    10/9                   Hs stretch bilaterally    LA  PTA  8'                                                                                                                         Exercise Diary  10/2 10/9                   treadmill   6' self pace                   bike                       UBE   6' alt  Self pace                                           bridging   :05  10x2                   HL TB Abd   B/BKFO  RTB  20x each                   S/l TB clamshells   B:  RTB  20x each                   S/L abd   :03  20x                   HL walk outs   20x                                           Prop on elbows 2 min 2min                   Press ups 10x 2 10x2                   Loc and sag 5x  10x                                           physioball marching, opp hand/ knee                                               Stand slr x 3, hs curls, hr   B:  10x each                                           biodex LOS                                                SLB on foam                                                hss w/ strap 20s x 5 :20  5x                                                 Modalities                                                                                                      Assessment: Tolerated treatment and progression of program without limitations or exacerbation of pain symptoms    Patient exhibited good technique with therapeutic exercises and would benefit from continued PT to improve functional mobility and overall strength  Plan: Continue per plan of care  Progress treatment as tolerated  Precautions: chronic pain, lbp, L > R LE pain, pudendal neuralgia, L > R shoulder pain

## 2019-10-10 ENCOUNTER — OFFICE VISIT (OUTPATIENT)
Dept: PHYSICAL THERAPY | Facility: CLINIC | Age: 59
End: 2019-10-10
Payer: COMMERCIAL

## 2019-10-10 DIAGNOSIS — M54.16 LUMBAR RADICULOPATHY: ICD-10-CM

## 2019-10-10 DIAGNOSIS — G89.29 CHRONIC LEFT SHOULDER PAIN: Primary | ICD-10-CM

## 2019-10-10 DIAGNOSIS — R53.81 PHYSICAL DECONDITIONING: ICD-10-CM

## 2019-10-10 DIAGNOSIS — M25.512 CHRONIC LEFT SHOULDER PAIN: Primary | ICD-10-CM

## 2019-10-10 DIAGNOSIS — G89.4 CHRONIC PAIN SYNDROME: ICD-10-CM

## 2019-10-10 DIAGNOSIS — M54.50 ACUTE MIDLINE LOW BACK PAIN WITHOUT SCIATICA: ICD-10-CM

## 2019-10-10 PROCEDURE — 97112 NEUROMUSCULAR REEDUCATION: CPT

## 2019-10-10 PROCEDURE — 97110 THERAPEUTIC EXERCISES: CPT

## 2019-10-10 PROCEDURE — 97140 MANUAL THERAPY 1/> REGIONS: CPT

## 2019-10-10 NOTE — PROGRESS NOTES
Daily Note     Today's date: 10/10/2019  Patient name: Elberta Sicard  : 1960  MRN: [id number]  Referring provider: Evie Patel, *  Dx:   Encounter Diagnosis     ICD-10-CM    1  Chronic left shoulder pain M25 512     G89 29    2  Chronic pain syndrome G89 4    3  Physical deconditioning R53 81    4  Acute midline low back pain without sciatica M54 5    5  Lumbar radiculopathy M54 16        Start Time: 955          Subjective: Upon presentation, SPR=0/10  Patient noted good tolerance to program yesterday without excessive fatigue or limitations in the rest of day activities        Objective: See treatment diary below  [facility]   10/9 10/10                 Hs stretch bilaterally    LA  PTA  8' LA  PTA  8'                                                                                                                       Exercise Diary  10/2 10/9 10/10                 treadmill   6' self pace 6' self pace                 bike                       UBE   6' alt  Self pace 6' alt self pace                                         bridging   :05  10x2 :05  10x2                 HL TB Abd   B/BKFO  RTB  20x each B/BKFO  RTB  25x each                 S/l TB clamshells   B:  RTB  20x each B:  RTB  25x each                 S/L abd   :03  20x :03  25x                 HL walk outs   20x 20x                                         Prop on elbows 2 min 2min 3'                 Press ups 10x 2 10x2 10x2                 Loc and sag 5x  10x 10x                                         physioball marching, opp hand/ knee                                               Stand slr x 3, hs curls, hr   B:  10x each B:  10x2 each                                         biodex LOS                                                SLB on foam                                                hss w/ strap 20s x 5 :20  5x B:  :20  5x each                                               Modalities                                                                                                     Assessment: Tolerated treatment well  Patient exhibited good technique with therapeutic exercises and would benefit from continued PT      Plan: Continue per plan of care  Progress treatment as tolerated  Precautions: chronic pain, lbp, L > R LE pain, pudendal neuralgia, L > R shoulder pain

## 2019-10-16 ENCOUNTER — APPOINTMENT (OUTPATIENT)
Dept: PHYSICAL THERAPY | Facility: CLINIC | Age: 59
End: 2019-10-16
Payer: COMMERCIAL

## 2019-10-18 ENCOUNTER — APPOINTMENT (OUTPATIENT)
Dept: PHYSICAL THERAPY | Facility: CLINIC | Age: 59
End: 2019-10-18
Payer: COMMERCIAL

## 2019-10-21 ENCOUNTER — APPOINTMENT (OUTPATIENT)
Dept: PHYSICAL THERAPY | Facility: CLINIC | Age: 59
End: 2019-10-21
Payer: COMMERCIAL

## 2019-10-22 ENCOUNTER — OFFICE VISIT (OUTPATIENT)
Dept: FAMILY MEDICINE CLINIC | Facility: CLINIC | Age: 59
End: 2019-10-22
Payer: COMMERCIAL

## 2019-10-22 VITALS
SYSTOLIC BLOOD PRESSURE: 126 MMHG | DIASTOLIC BLOOD PRESSURE: 78 MMHG | WEIGHT: 222 LBS | HEIGHT: 64 IN | OXYGEN SATURATION: 98 % | TEMPERATURE: 97.7 F | HEART RATE: 73 BPM | BODY MASS INDEX: 37.9 KG/M2

## 2019-10-22 DIAGNOSIS — R63.4 WEIGHT LOSS: ICD-10-CM

## 2019-10-22 DIAGNOSIS — R10.9 FLANK PAIN: ICD-10-CM

## 2019-10-22 DIAGNOSIS — N39.0 URINARY TRACT INFECTION WITH HEMATURIA, SITE UNSPECIFIED: Primary | ICD-10-CM

## 2019-10-22 DIAGNOSIS — R31.9 URINARY TRACT INFECTION WITH HEMATURIA, SITE UNSPECIFIED: Primary | ICD-10-CM

## 2019-10-22 LAB
BACTERIA UR QL AUTO: ABNORMAL /HPF
BILIRUB UR QL STRIP: ABNORMAL
CLARITY UR: CLEAR
COLOR UR: ABNORMAL
GLUCOSE UR STRIP-MCNC: NEGATIVE MG/DL
HGB UR QL STRIP.AUTO: ABNORMAL
HYALINE CASTS #/AREA URNS LPF: ABNORMAL /LPF
KETONES UR STRIP-MCNC: NEGATIVE MG/DL
LEUKOCYTE ESTERASE UR QL STRIP: NEGATIVE
NITRITE UR QL STRIP: NEGATIVE
NON-SQ EPI CELLS URNS QL MICRO: ABNORMAL /HPF
PH UR STRIP.AUTO: 6 [PH]
PROT UR STRIP-MCNC: NEGATIVE MG/DL
RBC #/AREA URNS AUTO: ABNORMAL /HPF
SP GR UR STRIP.AUTO: 1.02 (ref 1–1.03)
UROBILINOGEN UR QL STRIP.AUTO: 0.2 E.U./DL
WBC #/AREA URNS AUTO: ABNORMAL /HPF

## 2019-10-22 PROCEDURE — 81001 URINALYSIS AUTO W/SCOPE: CPT | Performed by: FAMILY MEDICINE

## 2019-10-22 PROCEDURE — 99214 OFFICE O/P EST MOD 30 MIN: CPT | Performed by: FAMILY MEDICINE

## 2019-10-22 PROCEDURE — 87086 URINE CULTURE/COLONY COUNT: CPT | Performed by: FAMILY MEDICINE

## 2019-10-22 PROCEDURE — 3008F BODY MASS INDEX DOCD: CPT | Performed by: FAMILY MEDICINE

## 2019-10-22 RX ORDER — PHENAZOPYRIDINE HYDROCHLORIDE 200 MG/1
200 TABLET, FILM COATED ORAL
Qty: 6 TABLET | Refills: 0 | Status: SHIPPED | OUTPATIENT
Start: 2019-10-22 | End: 2019-11-13 | Stop reason: ALTCHOICE

## 2019-10-22 RX ORDER — CYCLOBENZAPRINE HCL 10 MG
10 TABLET ORAL 2 TIMES DAILY PRN
Qty: 30 TABLET | Refills: 0 | Status: SHIPPED | OUTPATIENT
Start: 2019-10-22 | End: 2019-11-13 | Stop reason: ALTCHOICE

## 2019-10-22 RX ORDER — SULFAMETHOXAZOLE AND TRIMETHOPRIM 800; 160 MG/1; MG/1
TABLET ORAL
Refills: 0 | COMMUNITY
Start: 2019-10-17 | End: 2019-11-13 | Stop reason: ALTCHOICE

## 2019-10-22 NOTE — PROGRESS NOTES
Assessment/Plan:     Chronic Problems:  No problem-specific Assessment & Plan notes found for this encounter  Visit Diagnosis:  Diagnoses and all orders for this visit:    Urinary tract infection with hematuria, site unspecified  Comments:  Currently treated with bactrim bid  Will get repeat ua and c&s now  Plenty of liquids  Will treat with pyridium  Will avoid narcotics  Orders:  -     phenazopyridine (PYRIDIUM) 200 mg tablet; Take 1 tablet (200 mg total) by mouth 3 (three) times a day with meals  -     Urinalysis with microscopic  -     Urine culture; Future    Flank pain  Comments: Will treat with cyclobenzaprine bid prn  Orders:  -     cyclobenzaprine (FLEXERIL) 10 mg tablet; Take 1 tablet (10 mg total) by mouth 2 (two) times a day as needed for muscle spasms    Weight loss  Comments:  Great job with 8 lb weight loss  Continue the same  Other orders  -     sulfamethoxazole-trimethoprim (BACTRIM DS) 800-160 mg per tablet; TAKE 1 TABLET BY MOUTH EVERY 12 HOURS FOR 10 DAYS          Subjective:    Patient ID: Milvia Jin is a 59 y o  female  Pt is here for routine f/u appt  Was in the er with someone else and developed right flank pain and then seen in the er there  They suspected it was a kidney stone, but ct was wnl  Treated for uti on bactrim now  Urine was red and cloudy, now clear and yellow  Still can't lie on that side  Using cbd oils now but not helping that pain  Given morphine in the hospital but had severe abdominal burning  Pt is drinking plenty of water  Nauseated yesterday  Yesterday had back pains radiating to the right groin  Takes all other meds as directed  No side effects noted  Working on her weight and keeping a food diary  Also joined weight watchers on line  Stopped the phentermine r/t insomnia         The following portions of the patient's history were reviewed and updated as appropriate: allergies, current medications, past family history, past medical history, past social history, past surgical history and problem list     Review of Systems   Constitutional: Negative for chills, diaphoresis, fatigue and fever  HENT: Negative for ear pain, postnasal drip and sore throat  Eyes: Negative for pain  Respiratory: Positive for chest tightness (bilaterally)  Negative for cough, shortness of breath and wheezing  Cardiovascular: Negative for chest pain and palpitations  Gastrointestinal: Positive for constipation and nausea  Negative for abdominal pain, diarrhea and vomiting  Endocrine: Negative for heat intolerance and polyuria  Genitourinary: Negative for dysuria, frequency and urgency (Has not had urgency with urination since her urethral stents  )  Musculoskeletal: Negative for arthralgias, gait problem and myalgias  Skin: Negative for pallor and rash  Psychiatric/Behavioral: Negative for dysphoric mood  The patient is not nervous/anxious            /78   Pulse 73   Temp 97 7 °F (36 5 °C)   Ht 5' 4" (1 626 m)   Wt 101 kg (222 lb)   SpO2 98%   BMI 38 11 kg/m²   Social History     Socioeconomic History    Marital status: /Civil Union     Spouse name: Not on file    Number of children: Not on file    Years of education: Not on file    Highest education level: Not on file   Occupational History    Occupation: retired   Social Needs    Financial resource strain: Not on file    Food insecurity:     Worry: Not on file     Inability: Not on file   [employer] needs:     Medical: Not on file     Non-medical: Not on file   Tobacco Use    Smoking status: Never Smoker    Smokeless tobacco: Never Used   Substance and Sexual Activity    Alcohol use: No    Drug use: No    Sexual activity: Yes   Lifestyle    Physical activity:     Days per week: Not on file     Minutes per session: Not on file    Stress: Not on file   Relationships    Social connections:     Talks on phone: Not on file     Gets together: Not on file     Attends Jain service: Not on file     Active member of club or organization: Not on file     Attends meetings of clubs or organizations: Not on file     Relationship status: Not on file    Intimate partner violence:     Fear of current or ex partner: Not on file     Emotionally abused: Not on file     Physically abused: Not on file     Forced sexual activity: Not on file   Other Topics Concern    Not on file   Social History Narrative    Lives in [place], with   Previously worked as a teacher        Past Medical History:   Diagnosis Date    Anxiety     Arthritis     Attention and concentration deficit     Blurred vision     Chronic pain     Depression     Diplopia     Dyspepsia     Gastric ulcer     Gastritis     Memory loss     Osteopenia     Starting and stopping of urinary stream during micturition     Urinary incontinence     Wears eyeglasses      Family History   Problem Relation Age of Onset    Other Mother         Back Disorder     Cirrhosis Mother     Crohn's disease Mother     Lupus Mother         Systemic Lupus Erythematous     Hypertension Father     Heart disease Father     Other Brother         Liver Transplant     Crohn's disease Brother     Crohn's disease Brother     No Known Problems Sister     No Known Problems Daughter     No Known Problems Maternal Aunt     No Known Problems Cousin     No Known Problems Cousin     No Known Problems Cousin     No Known Problems Cousin     No Known Problems Cousin     Seizures Neg Hx      Past Surgical History:   Procedure Laterality Date     SECTION       SECTION      CHOLECYSTECTOMY      [facility] GASTRIC BYPASS  2004    HYSTERECTOMY  2012    INSERT / REPLACE PERIPHERAL NEUROSTIMULATOR PULSE GENERATOR /       OTHER SURGICAL HISTORY  2012    [facility] at [facility]      WRIST ARTHROSCOPY      with internal fixation        Current Outpatient Medications:     Cholecalciferol (D3-1000) 1000 units capsule, Take 1 capsule by mouth daily  , Disp: , Rfl:     cyclobenzaprine (FLEXERIL) 10 mg tablet, Take 1 tablet (10 mg total) by mouth 2 (two) times a day as needed for muscle spasms, Disp: 30 tablet, Rfl: 0    Diclofenac Sodium 1 5 % SOLN, Apply 2 drops to affected area 3x/day, Disp: 1 Bottle, Rfl: 2    dronabinol (MARINOL) 5 MG capsule, Take 1 capsule (5 mg total) by mouth 2 (two) times a day before meals By Dr Alina Larkin, Disp: 60 capsule, Rfl: 0    DULoxetine (CYMBALTA) 30 mg delayed release capsule, TAKE 1 CAPSULE DAILY, Disp: 90 capsule, Rfl: 2    Iron-Vitamin C (IRON 100/C) 100-250 MG TABS, Take by mouth daily, Disp: , Rfl:     lidocaine (XYLOCAINE) 5 % ointment, Apply topically 2 (two) times a day as needed, Disp: , Rfl:     lisinopril (ZESTRIL) 10 mg tablet, TAKE 1 TABLET DAILY, Disp: 90 tablet, Rfl: 4    metoprolol tartrate (LOPRESSOR) 25 mg tablet, TAKE ONE-HALF (1/2) TABLET TWICE A DAY, Disp: 90 tablet, Rfl: 4    mometasone (ELOCON) 0 1 % cream, Apply topically daily as needed , Disp: , Rfl:     Multiple Vitamin (MULTI-VITAMIN DAILY) TABS, Take 1 tablet by mouth daily, Disp: , Rfl:     pantoprazole (PROTONIX) 40 mg tablet, TAKE 1 TABLET TWICE A DAY, Disp: 180 tablet, Rfl: 4    phenazopyridine (PYRIDIUM) 200 mg tablet, Take 1 tablet (200 mg total) by mouth 3 (three) times a day with meals, Disp: 6 tablet, Rfl: 0    sulfamethoxazole-trimethoprim (BACTRIM DS) 800-160 mg per tablet, TAKE 1 TABLET BY MOUTH EVERY 12 HOURS FOR 10 DAYS, Disp: , Rfl: 0    Allergies   Allergen Reactions    Cat Hair Extract      Cat Dander    Dog Epithelium     Morphine Abdominal Pain    Other Other (See Comments) and Sneezing     Dogs  Crab Meat    Shellfish-Derived Products      CRAB MEAT ONLY          Lab Review   No visits with results within 2 Month(s) from this visit     Latest known visit with results is:   Appointment on 05/20/2019   Component Date Value    TSH 3RD [facility] 05/20/2019 1 370         Imaging: Mammo Screening Bilateral W 3d & Cad    Result Date: 9/24/2019  Narrative: DIAGNOSIS: Screening for malignant neoplasm of breast TECHNIQUE: Digital screening mammography was performed  Computer Aided Detection (CAD) analyzed all applicable images  COMPARISONS: Prior breast imaging dated: 04/12/2018, 04/21/2015, 01/23/2014, 01/21/2013, 01/16/2012, and 01/12/2011 RELEVANT HISTORY: Family Breast Cancer History: No known family history of breast cancer  Family Medical History: No known relevant family medical history  Personal History: No known relevant hormone history  Surgical history includes hysterectomy  No known relevant medical history  The patient is scheduled in a reminder system for screening mammography  8-10% of cancers will be missed on mammography  Management of a palpable abnormality must be based on clinical grounds  Patients will be notified of their results via letter from our facility  Accredited by [employer] of [employer] and FDA  RISK ASSESSMENT: 5 Year Tyrer-Cuzick: 1 07 % 10 Year Tyrer-Cuzick: 2 24 % Lifetime Tyrer-Cuzick: 5 64 % TISSUE DENSITY: The breasts are almost entirely fatty  INDICATION: Db Villanueva is a 59 y o  female presenting for screening mammography  FINDINGS: There are no suspicious masses, grouped microcalcifications or areas of architectural distortion  The skin and nipple areolar complex are unremarkable  Impression: No mammographic evidence of malignancy  ASSESSMENT/BI-RADS CATEGORY: Left: 1 - Negative Right: 1 - Negative Overall: 1 - Negative RECOMMENDATION:      - Routine screening mammogram in 1 year for both breasts  Workstation ID: [id number]       Objective:     Physical Exam   Constitutional: She is oriented to person, place, and time  She appears well-developed and well-nourished  No distress  HENT:   Head: Normocephalic and atraumatic     Right Ear: External ear normal    Left Ear: External ear normal    Mouth/Throat: Oropharynx is clear and moist    Eyes: Pupils are equal, round, and reactive to light  Conjunctivae and EOM are normal  Right eye exhibits no discharge  Left eye exhibits no discharge  Neck: Normal range of motion  Neck supple  Cardiovascular: Normal rate, regular rhythm and normal heart sounds  Exam reveals no friction rub  No murmur heard  Pulmonary/Chest: Effort normal and breath sounds normal  No respiratory distress  Abdominal: Soft  Bowel sounds are normal  There is tenderness (over the right flank  )  Musculoskeletal: Normal range of motion  She exhibits no edema, tenderness or deformity  Lymphadenopathy:     She has no cervical adenopathy  Neurological: She is alert and oriented to person, place, and time  No cranial nerve deficit  Skin: Skin is warm and dry  No rash noted  She is not diaphoretic  Psychiatric: She has a normal mood and affect  Her behavior is normal  Judgment and thought content normal          Patient Instructions   Discussed all with patient  Will repeat UA and C&S today as patient is still having significant right flank pain  Take the Pyridium 1 pill 3 times daily for the next 2 days  Continue to drink plenty of liquids cranberry juice  Great job with the weight loss keep working at it  I will call with results of the urinalysis  Follow up here if still with pain by Friday  Use the Flexeril to relax the muscles  This will make you drowsy  If flank pain persists will refer back to urology  GWEN Ascencio    Portions of the record may have been created with voice recognition software  Occasional wrong word or "sound a like" substitutions may have occurred due to the inherent limitations of voice recognition software  Read the chart carefully and recognize, using context, where substitutions have occurred

## 2019-10-22 NOTE — PATIENT INSTRUCTIONS
Discussed all with patient  Will repeat UA and C&S today as patient is still having significant right flank pain  Take the Pyridium 1 pill 3 times daily for the next 2 days  Continue to drink plenty of liquids cranberry juice  Great job with the weight loss keep working at it  I will call with results of the urinalysis  Follow up here if still with pain by Friday  Use the Flexeril to relax the muscles  This will make you drowsy  If flank pain persists will refer back to urology

## 2019-10-23 ENCOUNTER — APPOINTMENT (OUTPATIENT)
Dept: PHYSICAL THERAPY | Facility: CLINIC | Age: 59
End: 2019-10-23
Payer: COMMERCIAL

## 2019-10-23 LAB — BACTERIA UR CULT: NORMAL

## 2019-10-24 ENCOUNTER — TELEPHONE (OUTPATIENT)
Dept: FAMILY MEDICINE CLINIC | Facility: CLINIC | Age: 59
End: 2019-10-24

## 2019-10-24 NOTE — TELEPHONE ENCOUNTER
----- Message from [address] [place] [address]  sent at 10/23/2019  8:34 AM EDT -----  Still with significant amount of blood in the urine   If she is still having flank pain I would like her to fu with her urologist

## 2019-10-24 NOTE — TELEPHONE ENCOUNTER
----- Message from [address] [place] [address]  sent at 10/23/2019  3:15 PM EDT -----  Urine culture is negative but I still want her to see urology

## 2019-10-28 ENCOUNTER — OFFICE VISIT (OUTPATIENT)
Dept: PHYSICAL THERAPY | Facility: CLINIC | Age: 59
End: 2019-10-28
Payer: COMMERCIAL

## 2019-10-28 DIAGNOSIS — M54.50 ACUTE MIDLINE LOW BACK PAIN WITHOUT SCIATICA: ICD-10-CM

## 2019-10-28 DIAGNOSIS — R53.81 PHYSICAL DECONDITIONING: ICD-10-CM

## 2019-10-28 DIAGNOSIS — M25.512 CHRONIC LEFT SHOULDER PAIN: Primary | ICD-10-CM

## 2019-10-28 DIAGNOSIS — M54.16 LUMBAR RADICULOPATHY: ICD-10-CM

## 2019-10-28 DIAGNOSIS — G89.4 CHRONIC PAIN SYNDROME: ICD-10-CM

## 2019-10-28 DIAGNOSIS — G89.29 CHRONIC LEFT SHOULDER PAIN: Primary | ICD-10-CM

## 2019-10-28 PROCEDURE — 97112 NEUROMUSCULAR REEDUCATION: CPT

## 2019-10-28 PROCEDURE — 97140 MANUAL THERAPY 1/> REGIONS: CPT

## 2019-10-28 PROCEDURE — 97110 THERAPEUTIC EXERCISES: CPT

## 2019-10-28 NOTE — PROGRESS NOTES
Daily Note     Today's date: 10/28/2019  Patient name: Elberta Sicard  : 1960  MRN: [id number]  Referring provider: Evie Patel, *  Dx:   Encounter Diagnosis     ICD-10-CM    1  Chronic left shoulder pain M25 512     G89 29    2  Chronic pain syndrome G89 4    3  Physical deconditioning R53 81    4  Acute midline low back pain without sciatica M54 5    5  Lumbar radiculopathy M54 16        Start Time: 1230  Stop Time: 1310  Total time in clinic (min): 40 minutes    Subjective: Patient 15 minutes late and was accommodated with no issue  Patient reports, "My shoulder feels okay today  I completed the arm bike with no pain  My back is moderate pain today"       Objective: See treatment diary below  [facility]   10/9 10/10  10/28               Hs stretch bilaterally    LA  PTA  8' LA  PTA  8'  JM                                                                                                                   Exercise Diary  10/2 10/9 10/10  10/28               treadmill   6' self pace 6' self pace                 bike                       UBE   6' alt  Self pace 6' alt self pace 6' alt self pace                                       bridging   :05  10x2 :05  10x2  :05  10x2               HL TB Abd   B/BKFO  RTB  20x each B/BKFO  RTB  25x each  B/BKFO  RTB  25x each               S/l TB clamshells   B:  RTB  20x each B:  RTB  25x each  B:  RTB  25x each               S/L abd   :03  20x :03  25x  :03  25x               HL walk outs   20x 20x 20x                                       Prop on elbows 2 min 2min 3'  3'               Press ups 10x 2 10x2 10x2  10x2               Loc and sag 5x  10x 10x  10x                                       physioball marching, opp hand/ knee                                               Stand slr x 3, hs curls, hr   B:  10x each B:  10x2 each                                         biodex LOS                                                SLB on foam                                                hss w/ strap 20s x 5 :20  5x B:  :20  5x each                                             Modalities                                                                                                   Assessment: Tolerated treatment well  Patient exhibited good technique with therapeutic exercises and would benefit from continued PT  Left shoulder pain with prone press ups  Min cueing for proper form  Decreased LBP post prone exercises  Plan: Continue per plan of care  Progress treatment as tolerated  Precautions: chronic pain, lbp, L > R LE pain, pudendal neuralgia, L > R shoulder pain

## 2019-10-30 ENCOUNTER — OFFICE VISIT (OUTPATIENT)
Dept: PAIN MEDICINE | Facility: CLINIC | Age: 59
End: 2019-10-30
Payer: COMMERCIAL

## 2019-10-30 ENCOUNTER — OFFICE VISIT (OUTPATIENT)
Dept: PHYSICAL THERAPY | Facility: CLINIC | Age: 59
End: 2019-10-30
Payer: COMMERCIAL

## 2019-10-30 ENCOUNTER — TELEPHONE (OUTPATIENT)
Dept: UROLOGY | Facility: MEDICAL CENTER | Age: 59
End: 2019-10-30

## 2019-10-30 VITALS
DIASTOLIC BLOOD PRESSURE: 84 MMHG | RESPIRATION RATE: 18 BRPM | SYSTOLIC BLOOD PRESSURE: 120 MMHG | WEIGHT: 227 LBS | BODY MASS INDEX: 38.76 KG/M2 | HEART RATE: 74 BPM | HEIGHT: 64 IN

## 2019-10-30 DIAGNOSIS — M54.16 LUMBAR RADICULOPATHY: Primary | ICD-10-CM

## 2019-10-30 DIAGNOSIS — G89.4 CHRONIC PAIN SYNDROME: Primary | ICD-10-CM

## 2019-10-30 DIAGNOSIS — G89.4 CHRONIC PAIN SYNDROME: ICD-10-CM

## 2019-10-30 DIAGNOSIS — R53.81 PHYSICAL DECONDITIONING: ICD-10-CM

## 2019-10-30 PROCEDURE — 97112 NEUROMUSCULAR REEDUCATION: CPT | Performed by: PHYSICAL THERAPIST

## 2019-10-30 PROCEDURE — 97110 THERAPEUTIC EXERCISES: CPT | Performed by: PHYSICAL THERAPIST

## 2019-10-30 PROCEDURE — 97530 THERAPEUTIC ACTIVITIES: CPT | Performed by: PHYSICAL THERAPIST

## 2019-10-30 PROCEDURE — 99214 OFFICE O/P EST MOD 30 MIN: CPT | Performed by: ANESTHESIOLOGY

## 2019-10-30 NOTE — TELEPHONE ENCOUNTER
She said not in the last couple of days, she said she did realize that she has a significant amount of blood  She is willing to go to urology  She is going to go back to Dr Lane that she had seen before, he also knows his history   She will call to make an apt

## 2019-10-30 NOTE — TELEPHONE ENCOUNTER
Please Triage - ER Follow Up  New Patient - Mario     What is the reason for the patients appointment? Hematuria, seen at [facility] in [place] on 10/16     Do we accept the patient's insurance or is the patient Self-Pay? [facility]  Member# [id number]  Group# [id number]  No Address on Card  [phone]  Subscriber: Lupillo Yuan     Has the patient had any previous urologist(s)? Dr Dg Mora     Has the patients records been requested? Yes     Has the patient had any outside testing done? Yes, can be located under Media in [address] [place] [address] Rd 10/22     What is the patients location preference for an office visit? [place]- would like to see Dr Dg Mora     Does the patient have a personal history of cancer?   No     Patient can be reached at [phone]

## 2019-10-30 NOTE — PROGRESS NOTES
Daily Note     Today's date: 10/30/2019  Patient name: Karol Padron  : 1960  MRN: [id number]  Referring provider: Kiersten Carreon, *  Dx:   Encounter Diagnosis     ICD-10-CM    1  Chronic pain syndrome G89 4    2  Physical deconditioning R53 81                   Subjective: Patient notes that she was under a great deal of stress in her absence from PT this past month (with family health issues)  She notes that she did attempt to exercise while she was out, but does note that all the activities feel a little more challenging today  She c/o fatigue post session - no pain      Objective: See treatment diary below  [facility]   10/9 10/10  10/28               Hs stretch bilaterally    LA  PTA  8' LA  PTA  8'  JM                                                                                                                   Exercise Diary  10/2 10/9 10/10  10/28  10/30             treadmill   6' self pace 6' self pace    6'             bike                       UBE   6' alt  Self pace 6' alt self pace 6' alt self pace  6'                                     bridging   :05  10x2 :05  10x2  :05  10x2  3s x 20              HL TB Abd   B/BKFO  RTB  20x each B/BKFO  RTB  25x each  B/BKFO  RTB  25x each  gtb x 20             S/l TB clamshells   B:  RTB  20x each B:  RTB  25x each  B:  RTB  25x each  gtb x 20              S/L abd   :03  20x :03  25x  :03  25x  25             HL walk outs   20x 20x 20x  20x                                      Prop on elbows 2 min 2min 3'  3'  2'             Press ups 10x 2 10x2 10x2  10x2  20x             Loc and sag 5x  10x 10x  10x  10x                                     physioball marching, opp hand/ knee                        squats @ bar         20x              Stand slr x 3, hs curls, hr   B:  10x each B:  10x2 each    20x ea              TG   L10                       Side step w/ TB          25' x 5 rtb                                      SLB on foam                                                hss w/ strap 20s x 5 :20  5x B:  :20  5x each    20s x 5                                         Modalities                                                                                                   Assessment: Tolerated treatment well  Able to increase as noted without issue  Plan: Continue per plan of care  Progress treatment as tolerated  FOTO next session  Precautions: chronic pain, lbp, L > R LE pain, pudendal neuralgia, L > R shoulder pain

## 2019-10-30 NOTE — TELEPHONE ENCOUNTER
Called and spoke to patient she had for testing for stones and the UA showed blood and was advised to make a follow up with [facility] urology    microhematuria and grosshematuria     Scheduled patient for 1:15 on Nov 13th     Pt was give address and confirmed time

## 2019-10-30 NOTE — PROGRESS NOTES
Assessment:  1  Lumbar radiculopathy     2  Chronic pain syndrome       Plan: This is a 25-year-old female who presents today for follow-up office visit for management of chronic pain syndrome, chronic low back pain, lumbar radiculitis  Patient recently had a lumbar TREVIN with ongoing pain relief 80%  Her other chronic pain issues is managed with medical marijuana, Flexeril 10 mg twice a day, topical diclofenac  Patient is currently stable  Follow-up p r n       My impressions and treatment recommendations were discussed in detail with the patient who verbalized understanding and had no further questions  Discharge instructions were provided  I personally saw and examined the patient and I agree with the above discussed plan of care  History of Present Illness:  Rajeev Alves is a 59 y o  female who presents for a follow up office visit in regards to Back Pain  The patients current symptoms include constant dull achy throbbing low back and buttock pain  Patient had a bilateral L3 transforaminal epidural steroid injection  Patient reports ongoing pain relief - greater than 80%  Today, she rates her pain 5-6/10 on the pain scale  Patient continues to utilize medical marijuana  She is off opiate therapy  She is currently in PT  I have personally reviewed and/or updated the patient's past medical history, past surgical history, family history, social history, current medications, allergies, and vital signs today  Review of Systems   Respiratory: Negative for shortness of breath  Cardiovascular: Negative for chest pain  Gastrointestinal: Negative for constipation, diarrhea, nausea and vomiting  Musculoskeletal: Negative for arthralgias, gait problem, joint swelling and myalgias  Skin: Negative for rash  Neurological: Positive for weakness  Negative for dizziness and seizures  All other systems reviewed and are negative        Patient Active Problem List   Diagnosis    Neuralgia    Continuous leakage of urine    Borderline hyperlipidemia    Chronic pain syndrome    Dysesthesia of multiple sites    Eczema    Hematuria    Hypertension    Arthralgia of shoulder region, left    Limb pain    Lower back pain    Lumbar facet arthropathy    Memory difficulties    Memory impairment    Numbness    Obesity (BMI 35 0-39 9 without comorbidity)    CATHRYN (obstructive sleep apnea)    Postgastrectomy malabsorption    Pudendal neuralgia    Snoring    Gastroesophageal reflux disease    Iron deficiency anemia    Osteoarthritis of left shoulder    Sacroiliitis (HCC)    Lumbar radiculopathy    Anxiety with depression    Mild cognitive impairment with memory loss    Abnormal EEG    Transient alteration of awareness       Past Medical History:   Diagnosis Date    Anxiety     Arthritis     Attention and concentration deficit     Blurred vision     Chronic pain     Depression     Diplopia     Dyspepsia     Gastric ulcer     Gastritis     Memory loss     Osteopenia     Starting and stopping of urinary stream during micturition     Urinary incontinence     Wears eyeglasses        Past Surgical History:   Procedure Laterality Date     SECTION       SECTION      CHOLECYSTECTOMY     [facility]    GASTRIC BYPASS  2004    HYSTERECTOMY  2012    INSERT / REPLACE PERIPHERAL NEUROSTIMULATOR PULSE GENERATOR /       OTHER SURGICAL HISTORY  2012    Reimplantatin at [facility] WRIST ARTHROSCOPY      with internal fixation        Family History   Problem Relation Age of Onset    Other Mother         Back Disorder     Cirrhosis Mother     Crohn's disease Mother     Lupus Mother         Systemic Lupus Erythematous     Hypertension Father     Heart disease Father     Other Brother         Liver Transplant     Crohn's disease Brother     Crohn's disease Brother     No Known Problems Sister     No Known Problems Daughter     No Known Problems Maternal Aunt     No Known Problems Cousin     No Known Problems Cousin     No Known Problems Cousin     No Known Problems Cousin     No Known Problems Cousin     Seizures Neg Hx        Social History     Occupational History    Occupation: retired   Tobacco Use    Smoking status: Never Smoker    Smokeless tobacco: Never Used   Substance and Sexual Activity    Alcohol use: No    Drug use: No    Sexual activity: Yes       Current Outpatient Medications on File Prior to Visit   Medication Sig    Cholecalciferol (D3-1000) 1000 units capsule Take 1 capsule by mouth daily      cyclobenzaprine (FLEXERIL) 10 mg tablet Take 1 tablet (10 mg total) by mouth 2 (two) times a day as needed for muscle spasms    Diclofenac Sodium 1 5 % SOLN Apply 2 drops to affected area 3x/day    dronabinol (MARINOL) 5 MG capsule Take 1 capsule (5 mg total) by mouth 2 (two) times a day before meals By Dr Jose Lundberg DULoxetine (CYMBALTA) 30 mg delayed release capsule TAKE 1 CAPSULE DAILY    Iron-Vitamin C (IRON 100/C) 100-250 MG TABS Take by mouth daily    lidocaine (XYLOCAINE) 5 % ointment Apply topically 2 (two) times a day as needed    lisinopril (ZESTRIL) 10 mg tablet TAKE 1 TABLET DAILY    metoprolol tartrate (LOPRESSOR) 25 mg tablet TAKE ONE-HALF (1/2) TABLET TWICE A DAY    mometasone (ELOCON) 0 1 % cream Apply topically daily as needed     Multiple Vitamin (MULTI-VITAMIN DAILY) TABS Take 1 tablet by mouth daily    pantoprazole (PROTONIX) 40 mg tablet TAKE 1 TABLET TWICE A DAY    phenazopyridine (PYRIDIUM) 200 mg tablet Take 1 tablet (200 mg total) by mouth 3 (three) times a day with meals    sulfamethoxazole-trimethoprim (BACTRIM DS) 800-160 mg per tablet TAKE 1 TABLET BY MOUTH EVERY 12 HOURS FOR 10 DAYS     No current facility-administered medications on file prior to visit          Allergies   Allergen Reactions    Cat Hair Extract      Cat Dander    Dog Epithelium     Morphine Abdominal Pain    Other Other (See Comments) and Sneezing     Dogs  Crab Meat    Shellfish-Derived Products      CRAB MEAT ONLY       Physical Exam:    /84   Pulse 74   Resp 18   Ht 5' 4" (1 626 m)   Wt 103 kg (227 lb)   BMI 38 96 kg/m²     Constitutional:normal, well developed, well nourished, alert, in no distress and non-toxic and no overt pain behavior    Eyes:anicteric  HEENT:grossly intact  Neck:supple, symmetric, trachea midline and no masses   Pulmonary:even and unlabored  Cardiovascular:No edema or pitting edema present  Skin:Normal without rashes or lesions and well hydrated  Psychiatric:Mood and affect appropriate  Neurologic:Cranial Nerves II-XII grossly intact  Musculoskeletal:normal    Imaging

## 2019-11-04 ENCOUNTER — OFFICE VISIT (OUTPATIENT)
Dept: PHYSICAL THERAPY | Facility: CLINIC | Age: 59
End: 2019-11-04
Payer: COMMERCIAL

## 2019-11-04 DIAGNOSIS — G89.4 CHRONIC PAIN SYNDROME: Primary | ICD-10-CM

## 2019-11-04 DIAGNOSIS — R53.81 PHYSICAL DECONDITIONING: ICD-10-CM

## 2019-11-04 PROCEDURE — 97112 NEUROMUSCULAR REEDUCATION: CPT | Performed by: PHYSICAL THERAPIST

## 2019-11-04 PROCEDURE — 97530 THERAPEUTIC ACTIVITIES: CPT | Performed by: PHYSICAL THERAPIST

## 2019-11-04 PROCEDURE — 97110 THERAPEUTIC EXERCISES: CPT | Performed by: PHYSICAL THERAPIST

## 2019-11-04 NOTE — PROGRESS NOTES
Daily Note     Today's date: 2019  Patient name: Nirmal Nieto  : 1960  MRN: [id number]  Referring provider: Zaira Samuel, *  Dx:   Encounter Diagnosis     ICD-10-CM    1  Chronic pain syndrome G89 4    2  Physical deconditioning R53 81                   Subjective: patient reports that she is "doing better today"  Feels that she is doing more at home, has a little more energy  Notes that her shoulder is feeling "Much better "  "I can sleep with my arm under my head and I havent' been able to do that in years"  Patient notes that addition of weight is challenging  "it's harder to get thru them "         Objective: See treatment diary below  [facility]   10/9 10/10  10/28               Hs stretch bilaterally    LA  PTA  8' LA  PTA  8'  JM                                                                                                                   Exercise Diary  10/2 10/9 10/10  10/28  10/30  11/4           treadmill   6' self pace 6' self pace    6'             bike            7'           UBE   6' alt  Self pace 6' alt self pace 6' alt self pace  6'  7'                                   bridging   :05  10x2 :05  10x2  :05  10x2  3s x 20   3s x 25           HL TB Abd   B/BKFO  RTB  20x each B/BKFO  RTB  25x each  B/BKFO  RTB  25x each  gtb x 20  gtb x 25           S/l TB clamshells   B:  RTB  20x each B:  RTB  25x each  B:  RTB  25x each  gtb x 20   gtb x 25            S/L abd   :03  20x :03  25x  :03  25x  25  1 5# x 25           HL walk outs   20x 20x 20x  20x   1 5# x 25                                   Prop on elbows 2 min 2min 3'  3'  2'  2'           Press ups 10x 2 10x2 10x2  10x2  20x  20x            Loc and sag 5x  10x 10x  10x  10x  10x            TB lpd, rows,  multifidus           gtb x 20 ea            physioball marching, opp hand/ knee                        squats @ bar         20x   20 x           Stand slr x 3, hs curls, hr   B:  10x each B:  10x2 each    20x ea  1 5# x 20            TG   L10            1 5# x 20            Side step w/ TB          25' x 3 rtb   25' x 3 gtb            Step ups  F/l       20x ea            SLB on foam                                                hss w/ strap 20s x 5 :20  5x B:  :20  5x each    20s x 5  20s x 5                                       Modalities                                                                                                   Assessment: Tolerated treatment well  Able to increase as noted without issue   Verbal comments are all positive with reported improving function and less pain - however, FOTO score is slightly decreased  Recheck FOTO next week     Plan: Continue per plan of care  Progress treatment as tolerated  Precautions: chronic pain, lbp, L > R LE pain, pudendal neuralgia, L > R shoulder pain

## 2019-11-06 ENCOUNTER — OFFICE VISIT (OUTPATIENT)
Dept: PHYSICAL THERAPY | Facility: CLINIC | Age: 59
End: 2019-11-06
Payer: COMMERCIAL

## 2019-11-06 DIAGNOSIS — M54.50 ACUTE MIDLINE LOW BACK PAIN WITHOUT SCIATICA: ICD-10-CM

## 2019-11-06 DIAGNOSIS — G89.29 CHRONIC LEFT SHOULDER PAIN: ICD-10-CM

## 2019-11-06 DIAGNOSIS — M54.16 LUMBAR RADICULOPATHY: ICD-10-CM

## 2019-11-06 DIAGNOSIS — R53.81 PHYSICAL DECONDITIONING: ICD-10-CM

## 2019-11-06 DIAGNOSIS — M25.512 CHRONIC LEFT SHOULDER PAIN: ICD-10-CM

## 2019-11-06 DIAGNOSIS — G89.4 CHRONIC PAIN SYNDROME: Primary | ICD-10-CM

## 2019-11-06 PROCEDURE — 97112 NEUROMUSCULAR REEDUCATION: CPT

## 2019-11-06 PROCEDURE — 97530 THERAPEUTIC ACTIVITIES: CPT

## 2019-11-06 PROCEDURE — 97110 THERAPEUTIC EXERCISES: CPT

## 2019-11-06 NOTE — PROGRESS NOTES
Daily Note     Today's date: 2019  Patient name: Cristina Newell  : 1960  MRN: [id number]  Referring provider: Ben Hinkle, *  Dx:   Encounter Diagnosis     ICD-10-CM    1  Chronic pain syndrome G89 4    2  Physical deconditioning R53 81    3  Chronic left shoulder pain M25 512     G89 29    4  Acute midline low back pain without sciatica M54 5    5   Lumbar radiculopathy M54 16                   Subjective: "I'm good, I can feel a little soreness form Monday's exercises, but it's good-good workout"      Objective: See treatment diary below  [facility]   10/9 10/10  10/28               Hs stretch bilaterally    LA  PTA  8' LA  PTA  8'  JM                                                                                                                   Exercise Diary  10/2 10/9 10/10  10/28  10/30  11/4 11/6         treadmill   6' self pace 6' self pace    6'             bike            7' 7'         UBE   6' alt  Self pace 6' alt self pace 6' alt self pace  6'  7' 7'                                 bridging   :05  10x2 :05  10x2  :05  10x2  3s x 20   3s x 25 :03  25x         HL TB Abd   B/BKFO  RTB  20x each B/BKFO  RTB  25x each  B/BKFO  RTB  25x each  gtb x 20  gtb x 25 GTB  25x         S/l TB clamshells   B:  RTB  20x each B:  RTB  25x each  B:  RTB  25x each  gtb x 20   gtb x 25  GTB  25x         S/L abd   :03  20x :03  25x  :03  25x  25  1 5# x 25 1 5#  25x         HL walk outs   20x 20x 20x  20x   1 5# x 25 1 5#  25x                                 Prop on elbows 2 min 2min 3'  3'  2'  2' 2'         Press ups 10x 2 10x2 10x2  10x2  20x  20x   20x         Loc and sag 5x  10x 10x  10x  10x  10x 10x          TB lpd, rows,  multifidus           gtb x 20 ea  GTB  20x each         physioball marching, opp hand/ knee                        squats @ bar         20x   20 x 20x         Stand slr x 3, hs curls, hr   B:  10x each B:  10x2 each    20x ea  1 5# x 20  B:  1 5#  20x          TG   L10            1 5# x 20  20x         Side step w/ TB          25' x 3 rtb   25' x 3 gtb  GTB  25'x3         Step ups  F/l            20x ea  20x each         SLB on foam                                                hss w/ strap 20s x 5 :20  5x B:  :20  5x each    20s x 5  20s x 5 :20  5x                                     Modalities                                                                                                      Assessment: Tolerated treatment well with good tolerance to present program and good understanding/technique with exercise performance; minimal vc's required for program today    Patient would benefit from continued PT      Plan: Continue per plan of care  Progress treatment as tolerated  Precautions: chronic pain, lbp, L > R LE pain, pudendal neuralgia, L > R shoulder pain

## 2019-11-11 ENCOUNTER — OFFICE VISIT (OUTPATIENT)
Dept: PHYSICAL THERAPY | Facility: CLINIC | Age: 59
End: 2019-11-11
Payer: COMMERCIAL

## 2019-11-11 DIAGNOSIS — G89.4 CHRONIC PAIN SYNDROME: Primary | ICD-10-CM

## 2019-11-11 PROCEDURE — 97110 THERAPEUTIC EXERCISES: CPT | Performed by: PHYSICAL THERAPIST

## 2019-11-11 PROCEDURE — 97112 NEUROMUSCULAR REEDUCATION: CPT | Performed by: PHYSICAL THERAPIST

## 2019-11-11 PROCEDURE — 97530 THERAPEUTIC ACTIVITIES: CPT | Performed by: PHYSICAL THERAPIST

## 2019-11-11 NOTE — PROGRESS NOTES
Daily Note     Today's date: 2019  Patient name: Charlene Alexander  : 1960  MRN: [id number]  Referring provider: Edwige Sanchez, *  Dx:   Encounter Diagnosis     ICD-10-CM    1  Chronic pain syndrome G89 4                   Subjective: Patient notes that she did some light work around the home this week, making her feel good that she can accomplish some tasks  Reports that today, she is feeling good  She does note that last week on the days that she came for PT - she did not have much energy for the rest of the day to "do much of anything" - so , for today - she increased her aerobic work, but would like to keep the weights on extremities the same  Patient reports that she feels challenged with some of the exercises - she notes that her deconditioning will take some time to improve        Objective: See treatment diary below  [facility]   10/9 10/10  10/28               Hs stretch bilaterally    LA  PTA  8' LA  PTA  8'  JM  dc                                                                                                                 Exercise Diary  10/2 10/9 10/10  10/28  10/30  11/4 11/6  11/ 11       treadmill   6' self pace 6' self pace    6'             bike            7' 7'  8'       UBE   6' alt  Self pace 6' alt self pace 6' alt self pace  6'  7' 7'  8'                               bridging   :05  10x2 :05  10x2  :05  10x2  3s x 20   3s x 25 :03  25x  3s x 25       HL TB Abd   B/BKFO  RTB  20x each B/BKFO  RTB  25x each  B/BKFO  RTB  25x each  gtb x 20  gtb x 25 GTB  25x  gtb x 25       S/l TB clamshells   B:  RTB  20x each B:  RTB  25x each  B:  RTB  25x each  gtb x 20   gtb x 25  GTB  25x  gtb x 25       S/L abd   :03  20x :03  25x  :03  25x  25  1 5# x 25 1 5#  25x  1 5# x 25       HL walk outs   20x 20x 20x  20x   1 5# x 25 1 5#  25x  1 5# x 25                               Prop on elbows 2 min 2min 3'  3'  2'  2' 2'  2'       Press ups 10x 2 10x2 10x2  10x2  20x  20x   20x  25x      Loc and sag 5x  10x 10x  10x  10x  10x 10x  10x         TB lpd, rows,  multifidus           gtb x 20 ea  GTB  20x each  gtb x 25        physioball marching, opp hand/ knee                        squats @ bar         20x   20 x 20x  25x        Stand slr x 3, hs curls, hr   B:  10x each B:  10x2 each    20x ea  1 5# x 20  B:  1 5#  20x 1 5# x 25 ea         TG   L10            1 5# x 20  20x  25x        Side step w/ TB          25' x 3 rtb   25' x 3 gtb  GTB  25'x3 gtb x 3 25'       Step ups  F/l            20x ea  20x each  25x ea                                                       hss w/ strap 20s x 5 :20  5x B:  :20  5x each    20s x 5  20s x 5 :20  5x  25s x 4                     Bicep curls             Bent over rows             Stand abd, flexion to 90             Body blade                                                                    Modalities                                                                                                      Assessment: Tolerated treatment fair today - she appeared more fatigued overall, but managed complete program with little - no rest in between  Plan: Continue per plan of care  Progress treatment as tolerated  add upper body dumbbells  Precautions: chronic pain, lbp, L > R LE pain, pudendal neuralgia, L > R shoulder pain

## 2019-11-11 NOTE — PROGRESS NOTES
11/13/2019      Chief Complaint   Patient presents with   Naina Hallman Hematuria       Assessment and Plan    59 y o  female    1  Gross hematuria in the setting of right flank pain   - urine dip in the office reveals blood, will send this out for culture and urine cytology  - discussed with the patient full hematuria workup consistent of BMP, CT urogram, and cystoscopy in addition to the above-mentioned urine cytology, the patient is agreeable to all the testing  2  History of left ureteral injury s/p ureteral reimplantation 8/2012  - will evaluate on CT urogram    3  History of nephrolithiasis  - will evaluate on CT urogram    Obtain BMP and CT urogram, will follow up for cystoscopy and to review these testings      History of Present Illness  Ayala Villatoro is a 59 y o  female here for initial evaluation of gross hematuria  The patient had right-sided flank pain and passed some sandlike material as well as blood  The blood in her urine has now cleared  She states that she had a CT scan in the hospital    CT report scanned into EPIC reveals no kidney abnormalities including hydronephrosis or renal calculi  She was treated for urinary tract infection and has completed all antibiotics  Her urine dip in the office reveals continued microscopic blood  The patient does have a complicated urologic history  She underwent hysterectomy and at this time she had a left ureteral injury and she is status post ureteral reimplantation 8/2012  She states that since her hysterectomy she does not get the urge to use the restroom so she timed voids  She occasionally has urinary tract infections because of this  She also has a history of nephrolithiasis  Otherwise, she presents today doing well  Her past medical history is positive for chronic pain syndrome  Review of Systems   Constitutional: Negative for activity change, chills and fever  Gastrointestinal: Negative for abdominal distention and abdominal pain  Musculoskeletal: Negative for back pain and gait problem  Psychiatric/Behavioral: Negative for behavioral problems and confusion         Past Medical History  Past Medical History:   Diagnosis Date    Anxiety     Arthritis     Attention and concentration deficit     Blurred vision     Chronic pain     Depression     Diplopia     Dyspepsia     Gastric ulcer     Gastritis     Memory loss     Osteopenia     Starting and stopping of urinary stream during micturition     Urinary incontinence     Wears eyeglasses        Past Social History  Past Surgical History:   Procedure Laterality Date     SECTION  1988     SECTION      CHOLECYSTECTOMY      GALLBLADDER SURGERY      GASTRIC BYPASS  2004    HYSTERECTOMY  2012    INSERT / REPLACE PERIPHERAL NEUROSTIMULATOR PULSE GENERATOR /       OTHER SURGICAL HISTORY      Reimplantatin at [facility] WRIST ARTHROSCOPY      with internal fixation      Social History     Tobacco Use   Smoking Status Never Smoker   Smokeless Tobacco Never Used       Past Family History  Family History   Problem Relation Age of Onset    Other Mother         Back Disorder     Cirrhosis Mother     Crohn's disease Mother     Lupus Mother         Systemic Lupus Erythematous     Hypertension Father     Heart disease Father     Other Brother         Liver Transplant     Crohn's disease Brother     Crohn's disease Brother     No Known Problems Sister     No Known Problems Daughter     No Known Problems Maternal Aunt     No Known Problems Cousin     No Known Problems Cousin     No Known Problems Cousin     No Known Problems Cousin     No Known Problems Cousin     Seizures Neg Hx        Past Social history  Social History     Socioeconomic History    Marital status: /Civil Union     Spouse name: Not on file    Number of children: Not on file    Years of education: Not on file   Shah Highest education level: Not on file   Occupational History    Occupation: retired   Social Needs    Financial resource strain: Not on file    Food insecurity:     Worry: Not on file     Inability: Not on file   [employer] needs:     Medical: Not on file     Non-medical: Not on file   Tobacco Use    Smoking status: Never Smoker    Smokeless tobacco: Never Used   Substance and Sexual Activity    Alcohol use: No    Drug use: No    Sexual activity: Yes   Lifestyle    Physical activity:     Days per week: Not on file     Minutes per session: Not on file    Stress: Not on file   Relationships    Social connections:     Talks on phone: Not on file     Gets together: Not on file     Attends [employer] service: Not on file     Active member of club or organization: Not on file     Attends meetings of clubs or organizations: Not on file     Relationship status: Not on file    Intimate partner violence:     Fear of current or ex partner: Not on file     Emotionally abused: Not on file     Physically abused: Not on file     Forced sexual activity: Not on file   Other Topics Concern    Not on file   Social History Narrative    Lives in [place], with   Previously worked as a teacher          Current Medications  Current Outpatient Medications   Medication Sig Dispense Refill    Cholecalciferol (D3-1000) 1000 units capsule Take 1 capsule by mouth daily        Diclofenac Sodium 1 5 % SOLN Apply 2 drops to affected area 3x/day 1 Bottle 2    dronabinol (MARINOL) 5 MG capsule Take 1 capsule (5 mg total) by mouth 2 (two) times a day before meals By Dr Lea Iverson 60 capsule 0    DULoxetine (CYMBALTA) 30 mg delayed release capsule TAKE 1 CAPSULE DAILY 90 capsule 2    Iron-Vitamin C (IRON 100/C) 100-250 MG TABS Take by mouth daily      lidocaine (XYLOCAINE) 5 % ointment Apply topically 2 (two) times a day as needed      lisinopril (ZESTRIL) 10 mg tablet TAKE 1 TABLET DAILY 90 tablet 4    metoprolol tartrate (LOPRESSOR) 25 mg tablet TAKE ONE-HALF (1/2) TABLET TWICE A DAY 90 tablet 4    Multiple Vitamin (MULTI-VITAMIN DAILY) TABS Take 1 tablet by mouth daily      pantoprazole (PROTONIX) 40 mg tablet TAKE 1 TABLET TWICE A  tablet 4     No current facility-administered medications for this visit  Allergies  Allergies   Allergen Reactions    Cat Hair Extract      Cat Dander    Dog Epithelium     Morphine Abdominal Pain    Other Other (See Comments) and Sneezing     Dogs  Crab Meat    Shellfish-Derived Products      CRAB MEAT ONLY         The following portions of the patient's history were reviewed and updated as appropriate: allergies, current medications, past medical history, past social history, past surgical history and problem list       Vitals  Vitals:    11/13/19 1315   BP: 120/70   Pulse: 82   Weight: 102 kg (224 lb)   Height: 5' 4" (1 626 m)         Physical Exam  Constitutional   General appearance: Patient is seated and in no acute distress, well appearing and well nourished  Head and Face   Head and face: Normal     Eyes   Conjunctiva and lids: No erythema, swelling or discharge  Ears, Nose, Mouth, and Throat   Hearing: Normal     Pulmonary   Respiratory effort: No increased work of breathing or signs of respiratory distress  Cardiovascular   Examination of extremities for edema and/or varicosities: Normal     Abdomen   Abdomen: Non-tender, no masses  Musculoskeletal   Gait and station: Normal     Skin   Skin and subcutaneous tissue: Warm, dry, and intact  No visible lesions or rashes    Psychiatric   Judgment and insight: Normal  Recent and remote memory:  Normal  Mood and affect: Normal      Results  Recent Results (from the past 1 hour(s))   POCT urine dip auto non-scope    Collection Time: 11/13/19  1:25 PM   Result Value Ref Range     COLOR,UA yellow     CLARITY,UA clear     SPECIFIC GRAVITY,UA 1 015      PH,UA 5 5     LEUKOCYTE ESTERASE,UA neg     NITRITE,UA neg GLUCOSE, UA neg     KETONES,UA neg     BILIRUBIN,UA neg     BLOOD,UA large     POCT URINE PROTEIN neg     SL AMB POCT UROBILINOGEN 0 2    ]  No results found for: PSA  Lab Results   Component Value Date    CALCIUM 8 8 12/07/2018    K 4 4 12/07/2018    CO2 30 12/07/2018     12/07/2018    BUN 18 12/07/2018    CREATININE 1 05 12/07/2018     Lab Results   Component Value Date    WBC 8 35 12/07/2018    HGB 12 4 12/07/2018    HCT 39 2 12/07/2018    MCV 96 12/07/2018     12/07/2018       Orders  Orders Placed This Encounter   Procedures    Urine culture    CT renal protocol     Standing Status:   Future     Standing Expiration Date:   11/13/2023     Scheduling Instructions:      Nothing to eat 3 hours prior to your test   Clear liquids are also permitted up until the time of the scan  Clear liquids include water, black coffee or tea, apple juice or clear broth  If possible wear clothing without any metal in the abdomen area  Sweat suit, shorts, sports bra or bra without underwire may eliminate the need to change  Please bring your insurance cards, a form of photo ID and a list of your medications with you  Arrive 15 minutes prior to your appointment time in order to register  On the day of your test, please bring any prior CT or MRI studies of this area with you that were not performed at a [facility]  To schedule this appointment, please contact Central Scheduling at [phone]  Order Specific Question:   Is the patient pregnant? Answer:   Unknown     Order Specific Question:   What is the patient's sedation requirement? Answer:   Unknown     Order Specific Question:   Contrast information:     Answer:   IV     Order Specific Question:   Did the patient ever have a reaction to x-ray dye? If yes, please verify the type of allergy and order the contrast allergy prep       Answer:   Unknown    Basic metabolic panel     This is a patient instruction: Patient fasting for 8 hours or longer recommended       Standing Status:   Future     Standing Expiration Date:   11/13/2020    POCT urine dip auto non-scope    Cystoscopy     Standing Status:   Future     Standing Expiration Date:   11/13/2020

## 2019-11-13 ENCOUNTER — OFFICE VISIT (OUTPATIENT)
Dept: UROLOGY | Facility: MEDICAL CENTER | Age: 59
End: 2019-11-13
Payer: COMMERCIAL

## 2019-11-13 ENCOUNTER — OFFICE VISIT (OUTPATIENT)
Dept: PHYSICAL THERAPY | Facility: CLINIC | Age: 59
End: 2019-11-13
Payer: COMMERCIAL

## 2019-11-13 VITALS
BODY MASS INDEX: 38.24 KG/M2 | HEART RATE: 82 BPM | HEIGHT: 64 IN | SYSTOLIC BLOOD PRESSURE: 120 MMHG | WEIGHT: 224 LBS | DIASTOLIC BLOOD PRESSURE: 70 MMHG

## 2019-11-13 DIAGNOSIS — G89.29 CHRONIC LEFT SHOULDER PAIN: ICD-10-CM

## 2019-11-13 DIAGNOSIS — R31.0 GROSS HEMATURIA: Primary | ICD-10-CM

## 2019-11-13 DIAGNOSIS — G89.4 CHRONIC PAIN SYNDROME: Primary | ICD-10-CM

## 2019-11-13 DIAGNOSIS — M25.512 CHRONIC LEFT SHOULDER PAIN: ICD-10-CM

## 2019-11-13 DIAGNOSIS — M54.50 ACUTE MIDLINE LOW BACK PAIN WITHOUT SCIATICA: ICD-10-CM

## 2019-11-13 DIAGNOSIS — R53.81 PHYSICAL DECONDITIONING: ICD-10-CM

## 2019-11-13 DIAGNOSIS — M54.16 LUMBAR RADICULOPATHY: ICD-10-CM

## 2019-11-13 LAB
SL AMB  POCT GLUCOSE, UA: ABNORMAL
SL AMB LEUKOCYTE ESTERASE,UA: ABNORMAL
SL AMB POCT BILIRUBIN,UA: ABNORMAL
SL AMB POCT BLOOD,UA: ABNORMAL
SL AMB POCT CLARITY,UA: CLEAR
SL AMB POCT COLOR,UA: YELLOW
SL AMB POCT KETONES,UA: ABNORMAL
SL AMB POCT NITRITE,UA: ABNORMAL
SL AMB POCT PH,UA: 5.5
SL AMB POCT SPECIFIC GRAVITY,UA: 1.01
SL AMB POCT URINE PROTEIN: ABNORMAL
SL AMB POCT UROBILINOGEN: 0.2

## 2019-11-13 PROCEDURE — 97110 THERAPEUTIC EXERCISES: CPT

## 2019-11-13 PROCEDURE — 99213 OFFICE O/P EST LOW 20 MIN: CPT | Performed by: PHYSICIAN ASSISTANT

## 2019-11-13 PROCEDURE — 97112 NEUROMUSCULAR REEDUCATION: CPT

## 2019-11-13 PROCEDURE — 81003 URINALYSIS AUTO W/O SCOPE: CPT | Performed by: PHYSICIAN ASSISTANT

## 2019-11-13 PROCEDURE — 97530 THERAPEUTIC ACTIVITIES: CPT

## 2019-11-13 NOTE — PROGRESS NOTES
Daily Note     Today's date: 2019  Patient name: Saud Barba  : 1960  MRN: [id number]  Referring provider: Jose Alfredo Uribe, *  Dx:   Encounter Diagnosis     ICD-10-CM    1  Chronic pain syndrome G89 4    2  Physical deconditioning R53 81    3  Chronic left shoulder pain M25 512     G89 29    4  Acute midline low back pain without sciatica M54 5    5  Lumbar radiculopathy M54 16                   Subjective: Patient reported daily compliance with present HEP  Patient denied any changes in her lumbar /shoulder pain; but noted improved tolerance to activity and functional mobility      Objective: See treatment diary below  [facility]   10/9 10/10  10/28               Hs stretchlumbar pain o bilaterally    LA  PTA  8' LA  PTA  8'  JM  dc                                                                                                                 Exercise Diary  10/2 10/9 10/10  10/28  10/30  11/4 11/6  11/ 11 11/13     treadmill   6' self pace 6' self pace    6'       8'     bike            7' 7'  8'      UBE   6' alt  Self pace 6' alt self pace 6' alt self pace  6'  7' 7'  8' 8                             bridging   :05  10x2 :05  10x2  :05  10x2  3s x 20   3s x 25 :03  25x  3s x 25 :03  25x     HL TB Abd   B/BKFO  RTB  20x each B/BKFO  RTB  25x each  B/BKFO  RTB  25x each  gtb x 20  gtb x 25 GTB  25x  gtb x 25 GTB  25x     S/l TB clamshells   B:  RTB  20x each B:  RTB  25x each  B:  RTB  25x each  gtb x 20   gtb x 25  GTB  25x  gtb x 25 GTB  25x     S/L abd   :03  20x :03  25x  :03  25x  25  1 5# x 25 1 5#  25x  1 5# x 25 1 5#  25x     HL walk outs   20x 20x 20x  20x   1 5# x 25 1 5#  25x  1 5# x 25 1 5#  25x                             Prop on elbows 2 min 2min 3'  3'  2'  2' 2'  2' 2'     Press ups 10x 2 10x2 10x2  10x2  20x  20x   20x  25x  25x     Loc and sag 5x  10x 10x  10x  10x  10x 10x  10x  10x      TB lpd, rows,  multifidus           gtb x 20 ea  GTB  20x each  gtb x 25  GTB  25x  each   physioball marching, opp hand/ knee                        squats @ bar         20x   20 x 20x  25x  25x     Stand slr x 3, hs curls, hr   B:  10x each B:  10x2 each    20x ea  1 5# x 20  B:  1 5#  20x 1 5# x 25 ea  1 5#  25x each      TG   L10            1 5# x 20  20x  25x  25x     Side step w/ TB          25' x 3 rtb   25' x 3 gtb  GTB  25'x3 gtb x 3 25' GTB  25'x3     Step ups  F/l            20x ea  20x each  25x ea  6"  25x                                                      hss w/ strap 20s x 5 :20  5x B:  :20  5x each    20s x 5  20s x 5 :20  5x  25s x 4  :25  5x                             Bicep curls                       Bent over rows                       Stand abd, flexion to 90                       Body blade                                                                                                    Modalities                                                                                                       Assessment: Tolerated treatment well  Patient demonstrated fatigue post treatment, exhibited good technique with therapeutic exercises and would benefit from continued PT      Plan: Continue per plan of care  Progress treatment as tolerated  Precautions: chronic pain, lbp, L > R LE pain, pudendal neuralgia, L > R shoulder pain

## 2019-11-14 ENCOUNTER — TELEPHONE (OUTPATIENT)
Dept: NEUROLOGY | Facility: CLINIC | Age: 59
End: 2019-11-14

## 2019-11-14 NOTE — TELEPHONE ENCOUNTER
[facility]  Please fax to [phone] along with original letter    Faxed to [facility] on 11/14/2019

## 2019-11-15 ENCOUNTER — APPOINTMENT (OUTPATIENT)
Dept: LAB | Facility: CLINIC | Age: 59
End: 2019-11-15
Payer: COMMERCIAL

## 2019-11-15 DIAGNOSIS — R31.0 GROSS HEMATURIA: ICD-10-CM

## 2019-11-15 LAB
ANION GAP SERPL CALCULATED.3IONS-SCNC: 7 MMOL/L (ref 4–13)
BUN SERPL-MCNC: 15 MG/DL (ref 5–25)
CALCIUM SERPL-MCNC: 9.1 MG/DL (ref 8.3–10.1)
CHLORIDE SERPL-SCNC: 107 MMOL/L (ref 100–108)
CO2 SERPL-SCNC: 25 MMOL/L (ref 21–32)
CREAT SERPL-MCNC: 0.79 MG/DL (ref 0.6–1.3)
GFR SERPL CREATININE-BSD FRML MDRD: 82 ML/MIN/1.73SQ M
GLUCOSE P FAST SERPL-MCNC: 97 MG/DL (ref 65–99)
POTASSIUM SERPL-SCNC: 4.1 MMOL/L (ref 3.5–5.3)
SODIUM SERPL-SCNC: 139 MMOL/L (ref 136–145)

## 2019-11-15 PROCEDURE — 80048 BASIC METABOLIC PNL TOTAL CA: CPT

## 2019-11-15 PROCEDURE — 36415 COLL VENOUS BLD VENIPUNCTURE: CPT

## 2019-11-18 ENCOUNTER — APPOINTMENT (OUTPATIENT)
Dept: PHYSICAL THERAPY | Facility: CLINIC | Age: 59
End: 2019-11-18
Payer: COMMERCIAL

## 2019-11-20 ENCOUNTER — APPOINTMENT (OUTPATIENT)
Dept: PHYSICAL THERAPY | Facility: CLINIC | Age: 59
End: 2019-11-20
Payer: COMMERCIAL

## 2019-11-25 ENCOUNTER — OFFICE VISIT (OUTPATIENT)
Dept: PHYSICAL THERAPY | Facility: CLINIC | Age: 59
End: 2019-11-25
Payer: COMMERCIAL

## 2019-11-25 DIAGNOSIS — G89.4 CHRONIC PAIN SYNDROME: Primary | ICD-10-CM

## 2019-11-25 DIAGNOSIS — R53.81 PHYSICAL DECONDITIONING: ICD-10-CM

## 2019-11-25 PROCEDURE — 97110 THERAPEUTIC EXERCISES: CPT | Performed by: PHYSICAL THERAPIST

## 2019-11-25 PROCEDURE — 97112 NEUROMUSCULAR REEDUCATION: CPT | Performed by: PHYSICAL THERAPIST

## 2019-11-25 NOTE — PROGRESS NOTES
Daily Note     Today's date: 2019  Patient name: Concha Barrera  : 1960  MRN: [id number]  Referring provider: Joe Diane, *  Dx:   Encounter Diagnosis     ICD-10-CM    1  Chronic pain syndrome G89 4    2  Physical deconditioning R53 81                   Subjective: Patient reports that she is sore still, "not feeling great"  Notes some right knee pain after helping her  hold some boards that he was working on  Notes no more kidney stone pain but unsure if they have passed  Notes that she feels better after program and moving about       Objective: See treatment diary below  [facility]   10/9 10/10  10/28               Hs stretchlumbar pain o bilaterally    LA  PTA  8' LA  PTA  8'    dc                                                                                                                 Exercise Diary  10/2 10/9 10/10  10/28  10/30  11/4 11/6  11/ 11 11/13  11/25   treadmill   6' self pace 6' self pace    6'       8'     bike            7' 7'  8'   8'   UBE   6' alt  Self pace 6' alt self pace 6' alt self pace  6'  7' 7'  8' 8  8'                           bridging   :05  10x2 :05  10x2  :05  10x2  3s x 20   3s x 25 :03  25x  3s x 25 :03  25x  3s x 25   HL TB Abd   B/BKFO  RTB  20x each B/BKFO  RTB  25x each  B/BKFO  RTB  25x each  gtb x 20  gtb x 25 GTB  25x  gtb x 25 GTB  25x  gtb x 25   S/l TB clamshells   B:  RTB  20x each B:  RTB  25x each  B:  RTB  25x each  gtb x 20   gtb x 25  GTB  25x  gtb x 25 GTB  25x  gtb  x25   S/L abd   :03  20x :03  25x  :03  25x  25  1 5# x 25 1 5#  25x  1 5# x 25 1 5#  25x  1# x 25   HL walk outs   20x 20x 20x  20x   1 5# x 25 1 5#  25x  1 5# x 25 1 5#  25x  1# x 25                            Prop on elbows 2 min 2min 3'  3'  2'  2' 2'  2' 2'  2'   Press ups 10x 2 10x2 10x2  10x2  20x  20x   20x  25x  25x  25x   Loc and sag 5x  10x 10x  10x  10x  10x 10x  10x  10x  10x     TB lpd, rows,  multifidus           gtb x 20 ea  GTB  20x each  gtb x 25  GTB  25x  each  gtb x 25 ea    physioball marching, opp hand/ knee                        squats @ bar         20x   20 x 20x  25x  25x  25x    Stand slr x 3, hs curls, hr   B:  10x each B:  10x2 each    20x ea  1 5# x 20  B:  1 5#  20x 1 5# x 25 ea  1 5#  25x each  1#  25x ea     TG   L10            1 5# x 20  20x  25x  25x  25x   Side step w/ TB          25' x 3 rtb   25' x 3 gtb  GTB  25'x3 gtb x 3 25' GTB  25'x3  rtb 25' x 3    Step ups  F/l            20x ea  20x each  25x ea  6"  25x   6" x 25                                                    hss w/ strap 20s x 5 :20  5x B:  :20  5x each    20s x 5  20s x 5 :20  5x  25s x 4  :25  5x  25s x  5                           Bicep curls                       Bent over rows                       Stand abd, flexion to 90                       Body blade                                                                                                    Modalities                                                                                                       Assessment: Tolerated treatment well  Much less guarding demonstrated with transitional movements and gait after session , after patient is warmed up  Plan: Continue per plan of care  Progress treatment as tolerated  schedule thru next week with reassessment planned  Precautions: chronic pain, lbp, L > R LE pain, pudendal neuralgia, L > R shoulder pain

## 2019-11-27 ENCOUNTER — OFFICE VISIT (OUTPATIENT)
Dept: PHYSICAL THERAPY | Facility: CLINIC | Age: 59
End: 2019-11-27
Payer: COMMERCIAL

## 2019-11-27 ENCOUNTER — HOSPITAL ENCOUNTER (OUTPATIENT)
Dept: CT IMAGING | Facility: HOSPITAL | Age: 59
Discharge: HOME | End: 2019-11-27
Payer: COMMERCIAL

## 2019-11-27 DIAGNOSIS — G89.4 CHRONIC PAIN SYNDROME: Primary | ICD-10-CM

## 2019-11-27 DIAGNOSIS — R31.0 GROSS HEMATURIA: ICD-10-CM

## 2019-11-27 DIAGNOSIS — M54.50 ACUTE MIDLINE LOW BACK PAIN WITHOUT SCIATICA: ICD-10-CM

## 2019-11-27 DIAGNOSIS — M25.512 CHRONIC LEFT SHOULDER PAIN: ICD-10-CM

## 2019-11-27 DIAGNOSIS — R53.81 PHYSICAL DECONDITIONING: ICD-10-CM

## 2019-11-27 DIAGNOSIS — M54.16 LUMBAR RADICULOPATHY: ICD-10-CM

## 2019-11-27 DIAGNOSIS — G89.29 CHRONIC LEFT SHOULDER PAIN: ICD-10-CM

## 2019-11-27 PROCEDURE — 97530 THERAPEUTIC ACTIVITIES: CPT

## 2019-11-27 PROCEDURE — 97112 NEUROMUSCULAR REEDUCATION: CPT

## 2019-11-27 PROCEDURE — 97110 THERAPEUTIC EXERCISES: CPT

## 2019-11-27 PROCEDURE — 74178 CT ABD&PLV WO CNTR FLWD CNTR: CPT

## 2019-11-27 RX ADMIN — IOHEXOL 100 ML: 350 INJECTION, SOLUTION INTRAVENOUS at 12:47

## 2019-11-27 NOTE — PROGRESS NOTES
Daily Note     Today's date: 2019  Patient name: Dorothy Hassan  : 1960  MRN: [id number]  Referring provider: Keon Boucher, *  Dx:   Encounter Diagnosis     ICD-10-CM    1  Chronic pain syndrome G89 4    2  Physical deconditioning R53 81    3  Chronic left shoulder pain M25 512     G89 29    4  Acute midline low back pain without sciatica M54 5    5  Lumbar radiculopathy M54 16                   Subjective: Patient stated she had an transvaginal block yesterday, with resultant blood vessel rupture and patient reported symptoms of difficulty swallowing, SOB and everything "slowing down"  Patient stated "it took 2 hours"  to recover in MD office before she was able to return home  Today patient denied any before mentioned symptoms  SPR=2/10; worse with activity        Objective: See treatment diary below  [facility]   10/9 10/10  10/28               Hs stretchlumbar pain o bilaterally    LA  PTA  8' LA  PTA  8'  JM  dc                                                                                                                 Exercise Diary  11/27  10/10  10/28  10/30  11/4 11/6  11/ 11 11/13  11/25   treadmill    6' self pace    6'       8'     bike 6'         7' 7'  8'    8'   UBE 8'  6' alt self pace 6' alt self pace  6'  7' 7'  8' 8  8'                          bridging :03  25x  :05  10x2  :05  10x2  3s x 20   3s x 25 :03  25x  3s x 25 :03  25x  3s x 25   HL TB Abd GTB  25x  B/BKFO  RTB  25x each  B/BKFO  RTB  25x each  gtb x 20  gtb x 25 GTB  25x  gtb x 25 GTB  25x  gtb x 25   S/l TB clamshells GTB  25x  B:  RTB  25x each  B:  RTB  25x each  gtb x 20   gtb x 25  GTB  25x  gtb x 25 GTB  25x  gtb  x25   S/L abd 1#  25x  :03  25x  :03  25x  25  1 5# x 25 1 5#  25x  1 5# x 25 1 5#  25x  1# x 25   HL walk outs 1#  25x  20x 20x  20x   1 5# x 25 1 5#  25x  1 5# x 25 1 5#  25x  1# x 25                           Prop on elbows 2 min  3'  3'  2'  2' 2'  2' 2'  2'   Press ups 25x  10x2  10x2  20x  20x   20x  25x  25x  25x   Loc and sag 10x  10x  10x  10x  10x 10x  10x  10x  10x     TB lpd, rows,  multifidus GTB  25x         gtb x 20 ea  GTB  20x each  gtb x 25  GTB  25x  each  gtb x 25 ea    physioball marching, opp hand/ knee                        squats @ bar On foam 25x       20x   20 x 20x  25x  25x  25x    Stand slr x 3, hs curls, hr 1#  25x each  B:  10x2 each    20x ea  1 5# x 20  B:  1 5#  20x 1 5# x 25 ea  1 5#  25x each  1#  25x ea     TG   L10 25x         1 5# x 20  20x  25x  25x  25x   Side step w/ TB GTB  25'x3       25' x 3 rtb   25' x 3 gtb  GTB  25'x3 gtb x 3 25' GTB  25'x3  rtb 25' x 3    Step ups  F/l  6"  25x        20x ea  20x each  25x ea  6"  25x   6" x 25                                                  hss w/ strap 20s x 5  B:  :20  5x each    20s x 5  20s x 5 :20  5x  25s x 4  :25  5x  25s x  5                           Bicep curls                       Bent over rows                       Stand abd, flexion to 90                       Body blade                                                                                                    Modalities                                                                                                       Assessment: Tolerated treatment with marked fatigue  Patient demonstrated good technique and pace with most TE~ minimal vc' required  Mild SOB and muscular fatigue reported/noted post therapeutic intervention  Patient would benefit from continued PT to decrease pain and improve functional mobility and ease of ADL performance  Plan: Continue per plan of care  Progress treatment as tolerated  Precautions: chronic pain, lbp, L > R LE pain, pudendal neuralgia, L > R shoulder pain

## 2019-12-03 ENCOUNTER — PROCEDURE VISIT (OUTPATIENT)
Dept: UROLOGY | Facility: MEDICAL CENTER | Age: 59
End: 2019-12-03
Payer: COMMERCIAL

## 2019-12-03 VITALS
WEIGHT: 224 LBS | BODY MASS INDEX: 38.24 KG/M2 | HEIGHT: 64 IN | SYSTOLIC BLOOD PRESSURE: 120 MMHG | DIASTOLIC BLOOD PRESSURE: 80 MMHG | HEART RATE: 72 BPM

## 2019-12-03 DIAGNOSIS — R31.0 GROSS HEMATURIA: Primary | ICD-10-CM

## 2019-12-03 LAB
SL AMB  POCT GLUCOSE, UA: ABNORMAL
SL AMB LEUKOCYTE ESTERASE,UA: ABNORMAL
SL AMB POCT BILIRUBIN,UA: ABNORMAL
SL AMB POCT BLOOD,UA: ABNORMAL
SL AMB POCT CLARITY,UA: CLEAR
SL AMB POCT COLOR,UA: YELLOW
SL AMB POCT KETONES,UA: ABNORMAL
SL AMB POCT NITRITE,UA: ABNORMAL
SL AMB POCT PH,UA: 7
SL AMB POCT SPECIFIC GRAVITY,UA: 1.02
SL AMB POCT URINE PROTEIN: ABNORMAL
SL AMB POCT UROBILINOGEN: 0.2

## 2019-12-03 PROCEDURE — 81003 URINALYSIS AUTO W/O SCOPE: CPT | Performed by: UROLOGY

## 2019-12-03 PROCEDURE — 52000 CYSTOURETHROSCOPY: CPT | Performed by: UROLOGY

## 2019-12-03 NOTE — LETTER
December 3, 2019     Elle Barnard, [address] [place]    Patient: Everet Sandifer   YOB: 1960   Date of Visit: 12/3/2019       Dear Dr Serjio Espinal:    Thank you for referring Nichelle Garcia to me for evaluation  Below are my notes for this consultation  If you have questions, please do not hesitate to call me  I look forward to following your patient along with you  Sincerely,        Haider Corral MD        CC: No Recipients  Haider Corral MD  12/3/2019  1:12 PM  Incomplete  Procedures  Gross hematuria:  CT scan is normal   I have personally reviewed the images and the report  The ureteroneocystostomy I performed 7 years ago following a gynecologic ureteral injury is functioning well and the left kidney appears normal       Procedures  FEMALE RIGID CYSTOSCOPY    Preoperative diagnosis:  Gross hematuria    Postoperative diagnosis:  Same, with negative CT renogram and cystoscopy      Operation:  Rigid cystoscopy    Surgeon:  Joshua Maldonado MD, FACS    Anesthesia:  Local    Procedure:  Patient was brought to the cystoscopy room and positively identified  The patient was prepped and draped in sterile fashion  1% xylocaine jelly was instilled into the urethra  The cystoscope was inserted  Findings are as follows:    Urethra:normal   The bladder neck is normal  Ureteral orifices are in normal  position  The mucosa is normal    There is no trabeculation  The left ureteroneocystostomy orifice in the dome of the bladder appears normal     The cystoscope was removed  The patient tolerated the procedure well  Following additional consultation to review the findings with the patient,s he was discharged from the office in satisfactory condition  Impression:  Normal exam    Plan:  No further diagnostic studies indicated now  Return p r n  Dearl Holter, MD  12/3/2019  1:12 PM  Sign at close encounter  Assessment/Plan:    No problem-specific Assessment & Plan notes found for this encounter  Diagnoses and all orders for this visit:    Gross hematuria  -     POCT urine dip auto non-scope          Subjective:      Patient ID: Fe Kimble is a 62 y o  female  HPI  Gross hematuria:  CT scan is normal   The ureteroneocystostomy I performed 7 years ago following a gynecologic ureteral injury is functioning well and the left kidney appears normal       Procedures  FEMALE RIGID CYSTOSCOPY    Preoperative diagnosis:  Gross hematuria    Postoperative diagnosis:  Same, with negative CT renogram and cystoscopy      Operation:  Rigid cystoscopy    Surgeon:  Flaquita Carcamo MD, FACS    Anesthesia:  Local    Procedure:  Patient was brought to the cystoscopy room and positively identified  The patient was prepped and draped in sterile fashion  1% xylocaine jelly was instilled into the urethra  The cystoscope was inserted  Findings are as follows:    Urethra:normal   The bladder neck is normal  Ureteral orifices are in normal  position  The mucosa is normal    There is no trabeculation  The left ureteroneocystostomy orifice in the dome of the bladder appears normal     The cystoscope was removed  The patient tolerated the procedure well  Following additional consultation to review the findings with the patient,s he was discharged from the office in satisfactory condition  Impression:  Normal exam    Plan:  No further diagnostic studies indicated now  Return p r n  Keon Hendrickson           The following portions of the patient's history were reviewed and updated as appropriate: allergies, current medications, past family history, past medical history, past social history, past surgical history and problem list     Review of Systems      Objective:      /80   Pulse 72   Ht 5' 4" (1 626 m)   Wt 102 kg (224 lb)   BMI 38 45 kg/m²           Physical Exam

## 2019-12-03 NOTE — PROGRESS NOTES
Assessment/Plan:    No problem-specific Assessment & Plan notes found for this encounter  Diagnoses and all orders for this visit:    Gross hematuria  -     POCT urine dip auto non-scope          Subjective:      Patient ID: Nikos Joe is a 62 y o  female  HPI  Gross hematuria:  CT scan is normal   The ureteroneocystostomy I performed 7 years ago following a gynecologic ureteral injury is functioning well and the left kidney appears normal       Procedures  FEMALE RIGID CYSTOSCOPY    Preoperative diagnosis:  Gross hematuria    Postoperative diagnosis:  Same, with negative CT renogram and cystoscopy      Operation:  Rigid cystoscopy    Surgeon:  Kalyani Jose MD, FACS    Anesthesia:  Local    Procedure:  Patient was brought to the cystoscopy room and positively identified  The patient was prepped and draped in sterile fashion  1% xylocaine jelly was instilled into the urethra  The cystoscope was inserted  Findings are as follows:    Urethra:normal   The bladder neck is normal  Ureteral orifices are in normal  position  The mucosa is normal    There is no trabeculation  The left ureteroneocystostomy orifice in the dome of the bladder appears normal     The cystoscope was removed  The patient tolerated the procedure well  Following additional consultation to review the findings with the patient,s he was discharged from the office in satisfactory condition  Impression:  Normal exam    Plan:  No further diagnostic studies indicated now  Return p r n  Unk Isauro           The following portions of the patient's history were reviewed and updated as appropriate: allergies, current medications, past family history, past medical history, past social history, past surgical history and problem list     Review of Systems      Objective:      /80   Pulse 72   Ht 5' 4" (1 626 m)   Wt 102 kg (224 lb)   BMI 38 45 kg/m²          Physical Exam

## 2019-12-03 NOTE — PROGRESS NOTES
Procedures  Gross hematuria:  CT scan is normal   I have personally reviewed the images and the report  The ureteroneocystostomy I performed 7 years ago following a gynecologic ureteral injury is functioning well and the left kidney appears normal       Procedures  FEMALE RIGID CYSTOSCOPY    Preoperative diagnosis:  Gross hematuria    Postoperative diagnosis:  Same, with negative CT renogram and cystoscopy      Operation:  Rigid cystoscopy    Surgeon:  Agusto Holt MD, FACS    Anesthesia:  Local    Procedure:  Patient was brought to the cystoscopy room and positively identified  The patient was prepped and draped in sterile fashion  1% xylocaine jelly was instilled into the urethra  The cystoscope was inserted  Findings are as follows:    Urethra:normal   The bladder neck is normal  Ureteral orifices are in normal  position  The mucosa is normal    There is no trabeculation  The left ureteroneocystostomy orifice in the dome of the bladder appears normal     The cystoscope was removed  The patient tolerated the procedure well  Following additional consultation to review the findings with the patient,s he was discharged from the office in satisfactory condition  Impression:  Normal exam    Plan:  No further diagnostic studies indicated now  Return p r n  Darletta Pac

## 2019-12-04 ENCOUNTER — OFFICE VISIT (OUTPATIENT)
Dept: PHYSICAL THERAPY | Facility: CLINIC | Age: 59
End: 2019-12-04
Payer: COMMERCIAL

## 2019-12-04 DIAGNOSIS — M25.512 CHRONIC LEFT SHOULDER PAIN: ICD-10-CM

## 2019-12-04 DIAGNOSIS — G89.29 CHRONIC LEFT SHOULDER PAIN: ICD-10-CM

## 2019-12-04 DIAGNOSIS — M54.16 LUMBAR RADICULOPATHY: ICD-10-CM

## 2019-12-04 DIAGNOSIS — G89.4 CHRONIC PAIN SYNDROME: Primary | ICD-10-CM

## 2019-12-04 DIAGNOSIS — M54.50 ACUTE MIDLINE LOW BACK PAIN WITHOUT SCIATICA: ICD-10-CM

## 2019-12-04 DIAGNOSIS — R53.81 PHYSICAL DECONDITIONING: ICD-10-CM

## 2019-12-04 PROCEDURE — 97530 THERAPEUTIC ACTIVITIES: CPT

## 2019-12-04 PROCEDURE — 97112 NEUROMUSCULAR REEDUCATION: CPT

## 2019-12-04 PROCEDURE — 97110 THERAPEUTIC EXERCISES: CPT

## 2019-12-04 NOTE — PROGRESS NOTES
Daily Note     Today's date: 2019  Patient name: Gauri Torres  : 1960  MRN: [id number]  Referring provider: Elvira Barakat, *  Dx:   Encounter Diagnosis     ICD-10-CM    1  Chronic pain syndrome G89 4    2  Physical deconditioning R53 81    3  Chronic left shoulder pain M25 512     G89 29    4  Acute midline low back pain without sciatica M54 5    5  Lumbar radiculopathy M54 16        Start Time: 946          Subjective: "I feel pretty good"    Chuyita Wen Patient noted improved overall strength and improved functional mobility    Chuyita Bettye "My exercises are getting easier and I am sweating more than I was before!"      Objective: See treatment diary below  [facility]   10/9 10/10  10/28               Hs stretchlumbar pain o bilaterally    LA  PTA  8' LA  PTA  8'  JM  dc                                                                                                                 Exercise Diary  11/27 12/4    10/30  11/4 11/6  11/ 11 11/13  11/25   treadmill        6'       8'     bike 8' 8'      7' 7'  8'    8'   UBE 8' 8'    6'  7' 7'  8' 8  8'                         bridging :03  25x :03  25x    3s x 20   3s x 25 :03  25x  3s x 25 :03  25x  3s x 25   HL TB Abd GTB  25x GTB  25x    gtb x 20  gtb x 25 GTB  25x  gtb x 25 GTB  25x  gtb x 25   S/l TB clamshells GTB  25x GTB  25x    gtb x 20   gtb x 25  GTB  25x  gtb x 25 GTB  25x  gtb  x25   S/L abd 1#  25x 1#  25x    25  1 5# x 25 1 5#  25x  1 5# x 25 1 5#  25x  1# x 25   HL walk outs 1#  25x 1#  25x    20x   1 5# x 25 1 5#  25x  1 5# x 25 1 5#  25x  1# x 25                          Prop on elbows 2 min 2 min    2'  2' 2'  2' 2'  2'   Press ups 25x 25x    20x  20x   20x  25x  25x  25x   Loc and sag 10x 10x    10x  10x 10x  10x  10x  10x     TB lpd, rows,  multifidus GTB  25x GTB  25x each     gtb x 20 ea  GTB  20x each  gtb x 25  GTB  25x  each  gtb x 25 ea    physioball marching, opp hand/ knee                      squats @ bar On foam 25x On foam  25x   20x   20 x 20x  25x  25x  25x    Stand slr x 3, hs curls, hr 1#  25x each B:  1#  25x each    20x ea  1 5# x 20  B:  1 5#  20x 1 5# x 25 ea  1 5#  25x each  1#  25x ea     TG   L10 25x 25x      1 5# x 20  20x  25x  25x  25x   Side step w/ TB GTB  25'x3 GTB  1#  25'x3    25' x 3 rtb   25' x 3 gtb  GTB  25'x3 gtb x 3 25' GTB  25'x3  rtb 25' x 3    Step ups  F/l  6"  25x 6"  25x each     20x ea  20x each  25x ea  6"  25x   6" x 25                                                hss w/ strap 20s x 5 :20  5x    20s x 5  20s x 5 :20  5x  25s x 4  :25  5x  25s x  5                         Bicep curls   B:  3#  20x  each                 Bent over rows   B:  3#  20x  each                 Stand abd, flexion to 90   B:  1#  10x each                 Body blade    :20  1x  vc's                                                                                         Modalities                                                                                                       Assessment: Tolerated treatment and progression of program without restrictions except body blade; patient experienced difficulty maintaining rhythm with performance    VC's required for side stepping performance to decrease compensatory strategies; right hip ER with right side stepping  Patient also demonstrated B (R>L) hip ER during gait with knee genu recurvatum  Patient demonstrated fatigue post treatment, exhibited good technique with therapeutic exercises and would benefit from continued PT      Plan: Continue per plan of care  Progress treatment as tolerated  Precautions: chronic pain, lbp, L > R LE pain, pudendal neuralgia, L > R shoulder pain

## 2019-12-06 ENCOUNTER — OFFICE VISIT (OUTPATIENT)
Dept: PHYSICAL THERAPY | Facility: CLINIC | Age: 59
End: 2019-12-06
Payer: COMMERCIAL

## 2019-12-06 DIAGNOSIS — M25.512 CHRONIC LEFT SHOULDER PAIN: ICD-10-CM

## 2019-12-06 DIAGNOSIS — R53.81 PHYSICAL DECONDITIONING: ICD-10-CM

## 2019-12-06 DIAGNOSIS — M54.16 LUMBAR RADICULOPATHY: ICD-10-CM

## 2019-12-06 DIAGNOSIS — M54.50 ACUTE MIDLINE LOW BACK PAIN WITHOUT SCIATICA: ICD-10-CM

## 2019-12-06 DIAGNOSIS — G89.4 CHRONIC PAIN SYNDROME: Primary | ICD-10-CM

## 2019-12-06 DIAGNOSIS — G89.29 CHRONIC LEFT SHOULDER PAIN: ICD-10-CM

## 2019-12-06 PROCEDURE — 97110 THERAPEUTIC EXERCISES: CPT

## 2019-12-06 PROCEDURE — 97112 NEUROMUSCULAR REEDUCATION: CPT

## 2019-12-06 PROCEDURE — 97530 THERAPEUTIC ACTIVITIES: CPT

## 2019-12-06 NOTE — PROGRESS NOTES
Daily Note     Today's date: 2019  Patient name: Mary Cheng  : 1960  MRN: [id number]  Referring provider: Merry Castillo, *  Dx:   Encounter Diagnosis     ICD-10-CM    1  Chronic pain syndrome G89 4    2  Physical deconditioning R53 81    3  Chronic left shoulder pain M25 512     G89 29    4  Acute midline low back pain without sciatica M54 5    5  Lumbar radiculopathy M54 16        Start Time: 09  Stop Time: 1115  Total time in clinic (min): 91 minutes    Subjective: Patient stated significant improvement since initiating therapy services, "I think I'm stronger than I have been for years!"  Patient noted marked improved strength, mobility and overall functional mobility; patient stated all ADL's are met with greater ease; patient continued to report challenges, but Mrs Velasquez stated she is managing and adjusting her schedule to her tolerance with effectiveness        Objective: See treatment diary below  Manual    10/9 10/10  10/28               Hs stretchlumbar pain o bilaterally    LA  PTA  8' LA  PTA  8'  JM  dc                                                                                                                 Exercise Diary  11/27 12/4 12/6    10/30  11/4 11/6  11/ 11 11/13  11/25   treadmill          6'       8'     bike 8' 8' 8'      7' 7'  8'    8'   UBE 8' 8' 8'     6'  7' 7'  8' 8  8'                           bridging :03  25x :03  25x :03  25x    3s x 20   3s x 25 :03  25x  3s x 25 :03  25x  3s x 25   HL TB Abd GTB  25x GTB  25x GTB  25x    gtb x 20  gtb x 25 GTB  25x  gtb x 25 GTB  25x  gtb x 25   S/l TB clamshells GTB  25x GTB  25x B:  GTB  25x each    gtb x 20   gtb x 25  GTB  25x  gtb x 25 GTB  25x  gtb  x25   S/L abd 1#  25x 1#  25x 1#  25x    25  1 5# x 25 1 5#  25x  1 5# x 25 1 5#  25x  1# x 25   HL walk outs 1#  25x 1#  25x 1#  25x    20x   1 5# x 25 1 5#  25x  1 5# x 25 1 5#  25x  1# x 25                            Prop on elbows 2 min 2 min 2'    2'  2' 2'  2' 2'  2'   Press ups 25x 25x 25x    20x  20x   20x  25x  25x  25x   Loc and sag 10x 10x 10x    10x  10x 10x  10x  10x  10x     TB lpd, rows,  multifidus GTB  25x GTB  25x each GTB  25x each     gtb x 20 ea  GTB  20x each  gtb x 25  GTB  25x  each  gtb x 25 ea    physioball marching, opp hand/ knee                        squats @ bar On foam 25x On foam  25x  on foam  25x   20x   20 x 20x  25x  25x  25x    Stand slr x 3, hs curls, hr 1#  25x each B:  1#  25x each B:  1#  25x each    20x ea  1 5# x 20  B:  1 5#  20x 1 5# x 25 ea  1 5#  25x each  1#  25x ea     TG   L10 25x 25x 25x      1 5# x 20  20x  25x  25x  25x   Side step w/ TB GTB  25'x3 GTB  1#  25'x3 GTB  1#  25'x3    25' x 3 rtb   25' x 3 gtb  GTB  25'x3 gtb x 3 25' GTB  25'x3  rtb 25' x 3    Step ups  F/l  6"  25x 6"  25x each 6"  25x each     20x ea  20x each  25x ea  6"  25x   6" x 25                                                    hss w/ strap 20s x 5 :20  5x :20  5x    20s x 5  20s x 5 :20  5x  25s x 4  :25  5x  25s x  5                           Bicep curls   B:  3#  20x  each B:  3#  20x each                 Bent over rows   B:  3#  20x  each B:  3#  20x each                 Stand abd, flexion to 90   B:  1#  10x each B:  1#  10x each                 Body blade    :20  1x  vc's NP                                                                                             Modalities                                                                                                       Assessment: Tolerated treatment well  Patient demonstrated fatigue post treatment and exhibited good technique with therapeutic exercises      Plan: Discontinue physical therapy services at this time as per plan of care  Patient is to continue with present HEP at home independently  Precautions: chronic pain, lbp, L > R LE pain, pudendal neuralgia, L > R shoulder pain

## 2019-12-23 NOTE — PROGRESS NOTES
PT Discharge    Today's date: 2019  Patient name: Karol Padron  : 1960  MRN: [id number]  Referring provider: Kiersten Carreon, *  Dx:   Encounter Diagnosis     ICD-10-CM    1  Chronic pain syndrome G89 4    2  Physical deconditioning R53 81    3  Chronic left shoulder pain M25 512     G89 29    4  Acute midline low back pain without sciatica M54 5    5  Lumbar radiculopathy M54 16        Start Time: 9610  Stop Time: 1115  Total time in clinic (min): 91 minutes    Assessment  Assessment details: Patient has done well overall in PT - she is demonstrating improvements with endurance, reporting less pain and improved functional abilities around the home  She is competent with HEp and has benefited maximally from PT at this time  Patient is considering joining a fitness center to continue with her exercise regimen  Impairments: impaired physical strength  Understanding of Dx/Px/POC: good  Goals  STG   MET  1  Patient will demonstrate independence and competence with HEP 2 -4 weeks  2  Patient will report > 25% reduced pain 2-4 weeks    LTG   1  Patient will report improvements with both functional and recreational abilities  4-6 weeks  MET  2  Patient will demonstrate improved motor function  4-6 weeks  MET  3  Patient will demonstrate improvements with endurance, increased tolerance to aerobic activity  4-6 weeks  MET  4  Patient will report improvements with household chores/ activities  4 weeks  MET  5  Patient will demonstrate improved flexibility bilateral hs 4 weeks  MET  6  Patient will report abolished right LE radicular symptoms  4-6 weeks   Partially MET    Plan  Plan details: Discharge skilled PT at this time    Thank you for this referral    Planned therapy interventions: home exercise program  Treatment plan discussed with: patient        Subjective Evaluation    Pain  Current pain ratin  At best pain ratin  At worst pain rating: 3  Relieving factors: change in position, support and rest  Aggravating factors: standing and stair climbing  Progression: improved    Social Support  Lives in: multiple-level home  Lives with: spouse    Treatments  Previous treatment: physical therapy  Current treatment: injection treatment and medication  Patient Goals  Patient goals for therapy: increased strength, increased motion, independence with ADLs/IADLs and return to sport/leisure activities  Patient goal: "be able to climb steps"  Objective     Tenderness     Left Hip   No tenderness in the PSIS and greater trochanter  Right Hip   No tenderness in the PSIS and greater trochanter  Strength/Myotome Testing     Left Shoulder     Planes of Motion   Flexion: 4   Abduction: 4     Right Shoulder     Planes of Motion   Flexion: 4   Abduction: 4     Left Elbow   Flexion: 5  Extension: 4+    Right Elbow   Flexion: 5  Extension: 4+    Left Hip   Planes of Motion   Flexion: 4+  Extension: 4  Abduction: 4  Adduction: 5  External rotation: 4+  Internal rotation: 4+    Right Hip   Planes of Motion   Flexion: 4+  Extension: 4+  Abduction: 4+  Adduction: 5  External rotation: 4+  Internal rotation: 4+    Left Knee   Flexion: 4+  Extension: 4+    Right Knee   Flexion: 4+  Extension: 4+    Left Ankle/Foot   Dorsiflexion: 5    Right Ankle/Foot   Dorsiflexion: 5    Tests     Lumbar     Left   Negative crossed SLR, femoral stretch, passive SLR and slump test      Right   Negative crossed SLR, femoral stretch, passive SLR and slump test      Left Hip   Negative MANASA  Right Hip   Negative MANASA  Neuro Exam:     Sensation   Light touch LE: left WNL and right WNL             Precautions: chronic pain, lbp, L > R LE pain, pudendal neuralgia, L > R shoulder pain         Manual              Hs stretch bilaterally                                                                      Exercise Diary  10/2            treadmill             bike             UBE                          bridging HL TB Abd             S/l TB clamshells             S/L abd             HL walk outs                          Prop on elbows 2 min            Press ups 10x 2            Loc and sag 5x                          physioball marching, opp hand/ knee                          Stand slr x 3, hs curls, hr                          biodex LOS                           SLB on foam                           hss w/ strap 20s x 5                             Modalities

## 2020-03-02 ENCOUNTER — OFFICE VISIT (OUTPATIENT)
Dept: PAIN MEDICINE | Facility: CLINIC | Age: 60
End: 2020-03-02
Payer: COMMERCIAL

## 2020-03-02 ENCOUNTER — TELEPHONE (OUTPATIENT)
Dept: PAIN MEDICINE | Facility: CLINIC | Age: 60
End: 2020-03-02

## 2020-03-02 VITALS
SYSTOLIC BLOOD PRESSURE: 130 MMHG | DIASTOLIC BLOOD PRESSURE: 86 MMHG | RESPIRATION RATE: 20 BRPM | BODY MASS INDEX: 37.42 KG/M2 | HEART RATE: 78 BPM | HEIGHT: 64 IN | WEIGHT: 219.2 LBS

## 2020-03-02 DIAGNOSIS — M54.16 LUMBAR RADICULOPATHY: Primary | ICD-10-CM

## 2020-03-02 DIAGNOSIS — G89.4 CHRONIC PAIN SYNDROME: ICD-10-CM

## 2020-03-02 DIAGNOSIS — Z45.42 BATTERY END OF LIFE OF SPINAL CORD STIMULATOR: ICD-10-CM

## 2020-03-02 PROCEDURE — 3079F DIAST BP 80-89 MM HG: CPT | Performed by: ANESTHESIOLOGY

## 2020-03-02 PROCEDURE — 99214 OFFICE O/P EST MOD 30 MIN: CPT | Performed by: ANESTHESIOLOGY

## 2020-03-02 PROCEDURE — 3008F BODY MASS INDEX DOCD: CPT | Performed by: ANESTHESIOLOGY

## 2020-03-02 PROCEDURE — 1036F TOBACCO NON-USER: CPT | Performed by: ANESTHESIOLOGY

## 2020-03-02 PROCEDURE — 3075F SYST BP GE 130 - 139MM HG: CPT | Performed by: ANESTHESIOLOGY

## 2020-03-02 NOTE — TELEPHONE ENCOUNTER
REED    S/w pt  States she received a message on her stimulator to "call doctor" twice starting 1 month ago  Pt also feel some pain near battery pack  Advised pt to keep appt today with SI for eval and messaged Eulogio Ferrara with [employer] to reach out to pt regarding error message

## 2020-03-02 NOTE — TELEPHONE ENCOUNTER
Patient called in regard to the Stimulator giving a message to call the dr and she is experiencing annoying pain in her battery for the stimulator is   Please advise thank you      Patient contact @ [phone]

## 2020-03-02 NOTE — PROGRESS NOTES
Assessment:  1  Lumbar radiculopathy  FL spine and pain procedure   2  Chronic pain syndrome     3  Battery end of life of spinal cord stimulator  Ambulatory referral to Neurosurgery       Plan:  Patient is a 62 y o female who presents today for follow up office visit for l;ow back pain and bilateral leg pain  Prior bilateral L3 TFESI has provided greater than 50% pain relief  At this time, I will schedule her for a repeat b/l [id number]  Complete risks and benefits including bleeding, infection, tissue reaction, nerve injury and allergic reaction were discussed  The approach was demonstrated using models and literature was provided  Verbal and written consent was obtained  I will refer her to Dr Dylan Camejo regarding replacing her SCS battery  My impressions and treatment recommendations were discussed in detail with the patient who verbalized understanding and had no further questions  Discharge instructions were provided  I personally saw and examined the patient and I agree with the above discussed plan of care  History of Present Illness:  Jackie Casillas is a 62 y o  female who presents for a follow up office visit in regards to Back Pain and Leg Pain (bilateral)  The patients current symptoms include intermittent burning, dull achy pain  Pain is rated 5/10  Patient had a bilateral L3 TFESI and reported greater than 50% pain relief  She states that her pain has gradually returned and she is interested in repeat injection  She uses medical marijuana daily and is on cymbalta 30mg po daily  She has a stimulator which was placed 6 years ago  She states that her battery might be low as well as she got an alert while she was in [place]  I have personally reviewed and/or updated the patient's past medical history, past surgical history, family history, social history, current medications, allergies, and vital signs today       Review of Systems    Patient Active Problem List   Diagnosis    Neuralgia    Continuous leakage of urine    Borderline hyperlipidemia    Chronic pain syndrome    Dysesthesia of multiple sites    Eczema    Hematuria    Hypertension    Arthralgia of shoulder region, left    Limb pain    Lower back pain    Lumbar facet arthropathy    Memory difficulties    Memory impairment    Numbness    Obesity (BMI 35 0-39 9 without comorbidity)    CATHRYN (obstructive sleep apnea)    Postgastrectomy malabsorption    Pudendal neuralgia    Snoring    Gastroesophageal reflux disease    Iron deficiency anemia    Osteoarthritis of left shoulder    Sacroiliitis (HCC)    Lumbar radiculopathy    Anxiety with depression    Mild cognitive impairment with memory loss    Abnormal EEG    Transient alteration of awareness       Past Medical History:   Diagnosis Date    Anxiety     Arthritis     Attention and concentration deficit     Blurred vision     Chronic pain     Depression     Diplopia     Dyspepsia     Gastric ulcer     Gastritis     Memory loss     Osteopenia     Starting and stopping of urinary stream during micturition     Urinary incontinence     Wears eyeglasses        Past Surgical History:   Procedure Laterality Date     SECTION       SECTION      CHOLECYSTECTOMY     [facility]    GASTRIC BYPASS  2004    HYSTERECTOMY  2012    INSERT / REPLACE PERIPHERAL NEUROSTIMULATOR PULSE GENERATOR /       OTHER SURGICAL HISTORY  2012    Reimplantatin at [address] WRIST ARTHROSCOPY      with internal fixation        Family History   Problem Relation Age of Onset    Other Mother         Back Disorder     Cirrhosis Mother     Crohn's disease Mother     Lupus Mother         Systemic Lupus Erythematous     Hypertension Father     Heart disease Father     Other Brother         Liver Transplant     Crohn's disease Brother     Crohn's disease Brother     No Known Problems Sister     No Known Problems Daughter     No Known Problems Maternal Aunt     No Known Problems Cousin     No Known Problems Cousin     No Known Problems Cousin     No Known Problems Cousin     No Known Problems Cousin     Seizures Neg Hx        Social History     Occupational History    Occupation: retired   Tobacco Use    Smoking status: Never Smoker    Smokeless tobacco: Never Used   Substance and Sexual Activity    Alcohol use: No    Drug use: No    Sexual activity: Yes       Current Outpatient Medications on File Prior to Visit   Medication Sig    Cholecalciferol (D3-1000) 1000 units capsule Take 1 capsule by mouth daily      Diclofenac Sodium 1 5 % SOLN Apply 2 drops to affected area 3x/day    dronabinol (MARINOL) 5 MG capsule Take 1 capsule (5 mg total) by mouth 2 (two) times a day before meals By Dr Jose Lundberg DULoxetine (CYMBALTA) 30 mg delayed release capsule TAKE 1 CAPSULE DAILY    Iron-Vitamin C (IRON 100/C) 100-250 MG TABS Take by mouth daily    lidocaine (XYLOCAINE) 5 % ointment Apply topically 2 (two) times a day as needed    lisinopril (ZESTRIL) 10 mg tablet TAKE 1 TABLET DAILY    metoprolol tartrate (LOPRESSOR) 25 mg tablet TAKE ONE-HALF (1/2) TABLET TWICE A DAY    Multiple Vitamin (MULTI-VITAMIN DAILY) TABS Take 1 tablet by mouth daily    pantoprazole (PROTONIX) 40 mg tablet TAKE 1 TABLET TWICE A DAY     No current facility-administered medications on file prior to visit  Allergies   Allergen Reactions    Cat Hair Extract      Cat Dander    Dog Epithelium     Morphine Abdominal Pain    Other Other (See Comments) and Sneezing     Dogs  Crab Meat    Shellfish-Derived Products      CRAB MEAT ONLY       Physical Exam:    /86   Pulse 78   Resp 20   Ht 5' 4" (1 626 m)   Wt 99 4 kg (219 lb 3 2 oz)   BMI 37 63 kg/m²     Constitutional:normal, well developed, well nourished, alert, in no distress and non-toxic and no overt pain behavior    Eyes:anicteric  HEENT:grossly intact  Neck:supple, symmetric, trachea midline and no masses   Pulmonary:even and unlabored  Cardiovascular:No edema or pitting edema present  Skin:Normal without rashes or lesions and well hydrated  Psychiatric:Mood and affect appropriate  Neurologic:Cranial Nerves II-XII grossly intact  Musculoskeletal:normal    Imaging

## 2020-03-03 ENCOUNTER — OFFICE VISIT (OUTPATIENT)
Dept: FAMILY MEDICINE CLINIC | Facility: CLINIC | Age: 60
End: 2020-03-03
Payer: COMMERCIAL

## 2020-03-03 ENCOUNTER — TELEPHONE (OUTPATIENT)
Dept: ADMINISTRATIVE | Facility: OTHER | Age: 60
End: 2020-03-03

## 2020-03-03 VITALS
TEMPERATURE: 99.2 F | OXYGEN SATURATION: 96 % | BODY MASS INDEX: 37.52 KG/M2 | HEART RATE: 70 BPM | WEIGHT: 219.8 LBS | HEIGHT: 64 IN | SYSTOLIC BLOOD PRESSURE: 118 MMHG | RESPIRATION RATE: 16 BRPM | DIASTOLIC BLOOD PRESSURE: 80 MMHG

## 2020-03-03 DIAGNOSIS — F41.8 ANXIETY WITH DEPRESSION: ICD-10-CM

## 2020-03-03 DIAGNOSIS — I10 ESSENTIAL HYPERTENSION: ICD-10-CM

## 2020-03-03 DIAGNOSIS — R10.9 FLANK PAIN: Primary | ICD-10-CM

## 2020-03-03 DIAGNOSIS — E66.9 OBESITY (BMI 35.0-39.9 WITHOUT COMORBIDITY): ICD-10-CM

## 2020-03-03 LAB
SL AMB  POCT GLUCOSE, UA: ABNORMAL
SL AMB LEUKOCYTE ESTERASE,UA: 125
SL AMB POCT BILIRUBIN,UA: ABNORMAL
SL AMB POCT BLOOD,UA: ABNORMAL
SL AMB POCT CLARITY,UA: ABNORMAL
SL AMB POCT COLOR,UA: ABNORMAL
SL AMB POCT KETONES,UA: ABNORMAL
SL AMB POCT NITRITE,UA: ABNORMAL
SL AMB POCT PH,UA: 6
SL AMB POCT SPECIFIC GRAVITY,UA: 1.02
SL AMB POCT URINE PROTEIN: 2
SL AMB POCT UROBILINOGEN: 0

## 2020-03-03 PROCEDURE — 99214 OFFICE O/P EST MOD 30 MIN: CPT | Performed by: FAMILY MEDICINE

## 2020-03-03 PROCEDURE — 1036F TOBACCO NON-USER: CPT | Performed by: FAMILY MEDICINE

## 2020-03-03 PROCEDURE — 3079F DIAST BP 80-89 MM HG: CPT | Performed by: FAMILY MEDICINE

## 2020-03-03 PROCEDURE — 3074F SYST BP LT 130 MM HG: CPT | Performed by: FAMILY MEDICINE

## 2020-03-03 PROCEDURE — 87086 URINE CULTURE/COLONY COUNT: CPT | Performed by: FAMILY MEDICINE

## 2020-03-03 PROCEDURE — 3008F BODY MASS INDEX DOCD: CPT | Performed by: FAMILY MEDICINE

## 2020-03-03 PROCEDURE — 81002 URINALYSIS NONAUTO W/O SCOPE: CPT | Performed by: FAMILY MEDICINE

## 2020-03-03 RX ORDER — SULFAMETHOXAZOLE AND TRIMETHOPRIM 800; 160 MG/1; MG/1
1 TABLET ORAL 2 TIMES DAILY
Qty: 14 TABLET | Refills: 0 | Status: SHIPPED | OUTPATIENT
Start: 2020-03-03 | End: 2020-03-06

## 2020-03-03 RX ORDER — OXYCODONE AND ACETAMINOPHEN 10; 325 MG/1; MG/1
TABLET ORAL
COMMUNITY
Start: 2020-01-21 | End: 2020-03-03

## 2020-03-03 RX ORDER — ZALEPLON 5 MG/1
CAPSULE ORAL
COMMUNITY
Start: 2020-01-21 | End: 2020-03-03

## 2020-03-03 NOTE — TELEPHONE ENCOUNTER
Upon review of the request/inquiry, we are reaching out to you to ask for assistance  We have reviewed all Chart Review tabs, Care Everywhere (CE), completed a chart search and were unable to locate requested item(s)  If you feel this was an oversight, please respond with with location and date of service of the document(s)  To respond with requested information, open this Encounter, navigate to the Routing section, add your response to the routing comments, add my name to the Recipient field, and select Send and Close Workspace      Thank you  Glenn Mensah, [address] [place] [address] Mavis Alvarez

## 2020-03-03 NOTE — PATIENT INSTRUCTIONS
Discussed all with patient  She has been doing great since she has been weaned off narcotics  The mood is better a specially since the recent trip  Since she does not usually have pain in the flank I am concerned that she may have a kidney infection  Plenty of liquids  Take the antibiotic 1 pill twice daily for the next 7 days  I will call with the urine culture  You did lose another lb and a half since the last visit  Continue to work on the weight as this will also help  Blood pressure is perfect today  Obesity   AMBULATORY CARE:   Obesity  is when your body mass index (BMI) is greater than 30  Your healthcare provider will use your height and weight to measure your BMI  The risks of obesity include  many health problems, such as injuries or physical disability  You may need tests to check for the following:  · Diabetes     · High blood pressure or high cholesterol     · Heart disease     · Gallbladder or liver disease     · Cancer of the colon, breast, prostate, liver, or kidney     · Sleep apnea     · Arthritis or gout  Seek care immediately if:   · You have a severe headache, confusion, or difficulty speaking  · You have weakness on one side of your body  · You have chest pain, sweating, or shortness of breath  Contact your healthcare provider if:   · You have symptoms of gallbladder or liver disease, such as pain in your upper abdomen  · You have knee or hip pain and discomfort while walking  · You have symptoms of diabetes, such as intense hunger and thirst, and frequent urination  · You have symptoms of sleep apnea, such as snoring or daytime sleepiness  · You have questions or concerns about your condition or care  Treatment for obesity  focuses on helping you lose weight to improve your health  Even a small decrease in BMI can reduce the risk for many health problems   Your healthcare provider will help you set a weight-loss goal   · Lifestyle changes  are the first step in treating obesity  These include making healthy food choices and getting regular physical activity  Your healthcare provider may suggest a weight-loss program that involves coaching, education, and therapy  · Medicine  may help you lose weight when it is used with a healthy diet and physical activity  · Surgery  can help you lose weight if you are very obese and have other health problems  There are several types of weight-loss surgery  Ask your healthcare provider for more information  Be successful losing weight:   · Set small, realistic goals  An example of a small goal is to walk for 20 minutes 5 days a week  Anther goal is to lose 5% of your body weight  · Tell friends, family members, and coworkers about your goals  and ask for their support  Ask a friend to lose weight with you, or join a weight-loss support group  · Identify foods or triggers that may cause you to overeat , and find ways to avoid them  Remove tempting high-calorie foods from your home and workplace  Place a bowl of fresh fruit on your kitchen counter  If stress causes you to eat, then find other ways to cope with stress  · Keep a diary to track what you eat and drink  Also write down how many minutes of physical activity you do each day  Weigh yourself once a week and record it in your diary  Eating changes: You will need to eat 500 to 1,000 fewer calories each day than you currently eat to lose 1 to 2 pounds a week  The following changes will help you cut calories:  · Eat smaller portions  Use small plates, no larger than 9 inches in diameter  Fill your plate half full of fruits and vegetables  Measure your food using measuring cups until you know what a serving size looks like  · Eat 3 meals and 1 or 2 snacks each day  Plan your meals in advance  Minesh Vargas and eat at home most of the time  Eat slowly       · Eat fruits and vegetables at every meal   They are low in calories and high in fiber, which makes you feel full  Do not add butter, margarine, or cream sauce to vegetables  Use herbs to season steamed vegetables  · Eat less fat and fewer fried foods  Eat more baked or grilled chicken and fish  These protein sources are lower in calories and fat than red meat  Limit fast food  Dress your salads with olive oil and vinegar instead of bottled dressing  · Limit the amount of sugar you eat  Do not drink sugary beverages  Limit alcohol  Activity changes:  Physical activity is good for your body in many ways  It helps you burn calories and build strong muscles  It decreases stress and depression, and improves your mood  It can also help you sleep better  Talk to your healthcare provider before you begin an exercise program   · Exercise for at least 30 minutes 5 days a week  Start slowly  Set aside time each day for physical activity that you enjoy and that is convenient for you  It is best to do both weight training and an activity that increases your heart rate, such as walking, bicycling, or swimming  · Find ways to be more active  Do yard work and housecleaning  Walk up the stairs instead of using elevators  Spend your leisure time going to events that require walking, such as outdoor festivals or fairs  This extra physical activity can help you lose weight and keep it off  Follow up with your healthcare provider as directed: You may need to meet with a dietitian  Write down your questions so you remember to ask them during your visits  © 2017 [employer] [employer] Information is for End User's use only and may not be sold, redistributed or otherwise used for commercial purposes  All illustrations and images included in CareNotes® are the copyrighted property of A D A M [employer]  or Dayo Monge  The above information is an  only  It is not intended as medical advice for individual conditions or treatments   Talk to your doctor, nurse or pharmacist before following any medical regimen to see if it is safe and effective for you  Weight Management   AMBULATORY CARE:   Why it is important to manage your weight:  Being overweight increases your risk of health conditions such as heart disease, high blood pressure, type 2 diabetes, and certain types of cancer  It can also increase your risk for osteoarthritis, sleep apnea, and other respiratory problems  Aim for a slow, steady weight loss  Even a small amount of weight loss can lower your risk of health problems  How to lose weight safely:  A safe and healthy way to lose weight is to eat fewer calories and get regular exercise  You can lose up about 1 pound a week by decreasing the number of calories you eat by 500 calories each day  You can decrease calories by eating smaller portion sizes or by cutting out high-calorie foods  Read labels to find out how many calories are in the foods you eat  You can also burn calories with exercise such as walking, swimming, or biking  You will be more likely to keep weight off if you make these changes part of your lifestyle  Healthy meal plan for weight management:  A healthy meal plan includes a variety of foods, contains fewer calories, and helps you stay healthy  A healthy meal plan includes the following:  · Eat whole-grain foods more often  A healthy meal plan should contain fiber  Fiber is the part of grains, fruits, and vegetables that is not broken down by your body  Whole-grain foods are healthy and provide extra fiber in your diet  Some examples of whole-grain foods are whole-wheat breads and pastas, oatmeal, brown rice, and bulgur  · Eat a variety of vegetables every day  Include dark, leafy greens such as spinach, kale, marie greens, and mustard greens  Eat yellow and orange vegetables such as carrots, sweet potatoes, and winter squash  · Eat a variety of fruits every day  Choose fresh or canned fruit (canned in its own juice or light syrup) instead of juice   Fruit juice has very little or no fiber  · Eat low-fat dairy foods  Drink fat-free (skim) milk or 1% milk  Eat fat-free yogurt and low-fat cottage cheese  Try low-fat cheeses such as mozzarella and other reduced-fat cheeses  · Choose meat and other protein foods that are low in fat  Choose beans or other legumes such as split peas or lentils  Choose fish, skinless poultry (chicken or turkey), or lean cuts of red meat (beef or pork)  Before you cook meat or poultry, cut off any visible fat  · Use less fat and oil  Try baking foods instead of frying them  Add less fat, such as margarine, sour cream, regular salad dressing and mayonnaise to foods  Eat fewer high-fat foods  Some examples of high-fat foods include french fries, doughnuts, ice cream, and cakes  · Eat fewer sweets  Limit foods and drinks that are high in sugar  This includes candy, cookies, regular soda, and sweetened drinks  Ways to decrease calories:   · Eat smaller portions  ¨ Use a small plate with smaller servings  ¨ Do not eat second helpings  ¨ When you eat at a restaurant, ask for a box and place half of your meal in the box before you eat  ¨ Share an entrée with someone else  · Replace high-calorie snacks with healthy, low-calorie snacks  ¨ Choose fresh fruit, vegetables, fat-free rice cakes, or air-popped popcorn instead of potato chips, nuts, or chocolate  ¨ Choose water or calorie-free drinks instead of soda or sweetened drinks  · Eat regular meals  Skipping meals can lead to overeating later in the day  Eat a healthy snack in place of a meal if you do not have time to eat a regular meal      · Do not shop for groceries when you are hungry  You may be more likely to make unhealthy food choices  Take a grocery list of healthy foods and shop after you have eaten  Exercise:  Exercise at least 30 minutes per day on most days of the week   Some examples of exercise include walking, biking, dancing, and swimming  You can also fit in more physical activity by taking the stairs instead of the elevator or parking farther away from stores  Ask your healthcare provider about the best exercise plan for you  Other things to consider as you try to lose weight:   · Be aware of situations that may give you the urge to overeat, such as eating while watching television  Find ways to avoid these situations  For example, read a book, go for a walk, or do crafts  · Meet with a weight loss support group or friends who are also trying to lose weight  This may help you stay motivated to continue working on your weight loss goals  © 2017 [employer] Information is for End User's use only and may not be sold, redistributed or otherwise used for commercial purposes  All illustrations and images included in CareNotes® are the copyrighted property of [employer]  or [employer] Monge  The above information is an  only  It is not intended as medical advice for individual conditions or treatments  Talk to your doctor, nurse or pharmacist before following any medical regimen to see if it is safe and effective for you  Low Fat Diet   AMBULATORY CARE:   A low-fat diet  is an eating plan that is low in total fat, unhealthy fat, and cholesterol  You may need to follow a low-fat diet if you have trouble digesting or absorbing fat  You may also need to follow this diet if you have high cholesterol  You can also lower your cholesterol by increasing the amount of fiber in your diet  Soluble fiber is a type of fiber that helps to decrease cholesterol levels  Different types of fat in food:   · Limit unhealthy fats  A diet that is high in cholesterol, saturated fat, and trans fat may cause unhealthy cholesterol levels  Unhealthy cholesterol levels increase your risk of heart disease  ¨ Cholesterol:  Limit intake of cholesterol to less than 200 mg per day   Cholesterol is found in meat, eggs, and dairy     ¨ Saturated fat:  Limit saturated fat to less than 7% of your total daily calories  Ask your dietitian how many calories you need each day  Saturated fat is found in butter, cheese, ice cream, whole milk, and palm oil  Saturated fat is also found in meat, such as beef, pork, chicken skin, and processed meats  Processed meats include sausage, hot dogs, and bologna  ¨ Trans fat:  Avoid trans fat as much as possible  Trans fat is used in fried and baked foods  Foods that say trans fat free on the label may still have up to 0 5 grams of trans fat per serving  · Include healthy fats  Replace foods that are high in saturated and trans fat with foods high in healthy fats  This may help to decrease high cholesterol levels  ¨ Monounsaturated fats: These are found in avocados, nuts, and vegetable oils, such as olive, canola, and sunflower oil  ¨ Polyunsaturated fats: These can be found in vegetable oils, such as soybean or corn oil  Omega-3 fats can help to decrease the risk of heart disease  Omega-3 fats are found in fish, such as salmon, herring, trout, and tuna  Omega-3 fats can also be found in plant foods, such as walnuts, flaxseed, soybeans, and canola oil    Foods to limit or avoid:   · Grains:      ¨ Snacks that are made with partially hydrogenated oils, such as chips, regular crackers, and butter-flavored popcorn    ¨ High-fat baked goods, such as biscuits, croissants, doughnuts, pies, cookies, and pastries    · Dairy:      ¨ Whole milk, 2% milk, and yogurt and ice cream made with whole milk    ¨ Half and half creamer, heavy cream, and whipping cream    ¨ Cheese, cream cheese, and sour cream    · Meats and proteins:      ¨ High-fat cuts of meat (T-bone steak, regular hamburger, and ribs)    ¨ Fried meat, poultry (turkey and chicken), and fish    ¨ Poultry (chicken and turkey) with skin    ¨ Cold cuts (salami or bologna), hot dogs, lmea, and sausage    ¨ Whole eggs and egg yolks    · Vegetables and fruits with added fat:      ¨ Fried vegetables or vegetables in butter or high-fat sauces, such as cream or cheese sauces    ¨ Fried fruit or fruit served with butter or cream    · Fats:      ¨ Butter, stick margarine, and shortening    ¨ Coconut, palm oil, and palm kernel oil  Foods to include:   · Grains:      ¨ Whole-grain breads, cereals, pasta, and brown rice    ¨ Low-fat crackers and pretzels    · Vegetables and fruits:      ¨ Fresh, frozen, or canned vegetables (no salt or low-sodium)    ¨ Fresh, frozen, dried, or canned fruit (canned in light syrup or fruit juice)    ¨ Avocado    · Low-fat dairy products:      ¨ Nonfat (skim) or 1% milk    ¨ Nonfat or low-fat cheese, yogurt, and cottage cheese    · Meats and proteins:      ¨ Chicken or turkey with no skin    ¨ Baked or broiled fish    ¨ Lean beef and pork (loin, round, extra lean hamburger)    ¨ Beans and peas, unsalted nuts, soy products    ¨ Egg whites and substitutes    ¨ Seeds and nuts    · Fats:      ¨ Unsaturated oil, such as canola, olive, peanut, soybean, or sunflower oil    ¨ Soft or liquid margarine and vegetable oil spread    ¨ Low-fat salad dressing  Other ways to decrease fat:   · Read food labels before you buy foods  Choose foods that have less than 30% of calories from fat  Choose low-fat or fat-free dairy products  Remember that fat free does not mean calorie free  These foods still contain calories, and too many calories can lead to weight gain  · Trim fat from meat and avoid fried food  Trim all visible fat from meat before you cook it  Remove the skin from poultry  Do not recinos meat, fish, or poultry  Bake, roast, boil, or broil these foods instead  Avoid fried foods  Eat a baked potato instead of [place] fries  Steam vegetables instead of sautéing them in butter  · Add less fat to foods  Use imitation lema bits on salads and baked potatoes instead of regular lema bits   Use fat-free or low-fat salad dressings instead of regular dressings  Use low-fat or nonfat butter-flavored topping instead of regular butter or margarine on popcorn and other foods  Ways to decrease fat in recipes:  Replace high-fat ingredients with low-fat or nonfat ones  This may cause baked goods to be drier than usual  You may need to use nonfat cooking spray on pans to prevent food from sticking  You also may need to change the amount of other ingredients, such as water, in the recipe  Try the following:  · Use low-fat or light margarine instead of regular margarine or shortening  · Use lean ground turkey breast or chicken, or lean ground beef (less than 5% fat) instead of hamburger  · Add 1 teaspoon of canola oil to 8 ounces of skim milk instead of using cream or half and half  · Use grated zucchini, carrots, or apples in breads instead of coconut  · Use blenderized, low-fat cottage cheese, plain tofu, or low-fat ricotta cheese instead of cream cheese  · Use 1 egg white and 1 teaspoon of canola oil, or use ¼ cup (2 ounces) of fat-free egg substitute instead of a whole egg  · Replace half of the oil that is called for in a recipe with applesauce when you bake  Use 3 tablespoons of cocoa powder and 1 tablespoon of canola oil instead of a square of baking chocolate  How to increase fiber:  Eat enough high-fiber foods to get 20 to 30 grams of fiber every day  Slowly increase your fiber intake to avoid stomach cramps, gas, and other problems  · Eat 3 ounces of whole-grain foods each day  An ounce is about 1 slice of bread  Eat whole-grain breads, such as whole-wheat bread  Whole wheat, whole-wheat flour, or other whole grains should be listed as the first ingredient on the food label  Replace white flour with whole-grain flour or use half of each in recipes  Whole-grain flour is heavier than white flour, so you may have to add more yeast or baking powder       · Eat a high-fiber cereal for breakfast   Yudith motta good source of soluble fiber  Look for cereals that have bran or fiber in the name  Choose whole-grain products, such as brown rice, barley, and whole-wheat pasta  · Eat more beans, peas, and lentils  For example, add beans to soups or salads  Eat at least 5 cups of fruits and vegetables each day  Eat fruits and vegetables with the peel because the peel is high in fiber  © 2017 [address] [facility] Information is for End User's use only and may not be sold, redistributed or otherwise used for commercial purposes  All illustrations and images included in CareNotes® are the copyrighted property of A D [employer] M [employer] Inc  or [employer] Mon[employer]  The above information is an  only  It is not intended as medical advice for individual conditions or treatments  Talk to your doctor, nurse or pharmacist before following any medical regimen to see if it is safe and effective for you  Heart Healthy Diet   AMBULATORY CARE:   A heart healthy diet  is an eating plan low in total fat, unhealthy fats, and sodium (salt)  A heart healthy diet helps decrease your risk for heart disease and stroke  Limit the amount of fat you eat to 25% to 35% of your total daily calories  Limit sodium to less than 2,300 mg each day  Healthy fats:  Healthy fats can help improve cholesterol levels  The risk for heart disease is decreased when cholesterol levels are normal  Choose healthy fats, such as the following:  · Unsaturated fat  is found in foods such as soybean, canola, olive, corn, and safflower oils  It is also found in soft tub margarine that is made with liquid vegetable oil  · Omega-3 fat  is found in certain fish, such as salmon, tuna, and trout, and in walnuts and flaxseed  Unhealthy fats:  Unhealthy fats can cause unhealthy cholesterol levels in your blood and increase your risk of heart disease   Limit unhealthy fats, such as the following:  · Cholesterol  is found in animal foods, such as eggs and lobster, and in dairy products made from whole milk  Limit cholesterol to less than 300 milligrams (mg) each day  You may need to limit cholesterol to 200 mg each day if you have heart disease  · Saturated fat  is found in meats, such as lema and hamburger  It is also found in chicken or turkey skin, whole milk, and butter  Limit saturated fat to less than 7% of your total daily calories  Limit saturated fat to less than 6% if you have heart disease or are at increased risk for it  · Trans fat  is found in packaged foods, such as potato chips and cookies  It is also in hard margarine, some fried foods, and shortening  Avoid trans fats as much as possible    Heart healthy foods and drinks to include:  Ask your dietitian or healthcare provider how many servings to have from each of the following food groups:  · Grains:      ¨ Whole-wheat breads, cereals, and pastas, and brown rice    ¨ Low-fat, low-sodium crackers and chips    · Vegetables:      ¨ Broccoli, green beans, green peas, and spinach    ¨ Collards, kale, and lima beans    ¨ Carrots, sweet potatoes, tomatoes, and peppers    ¨ Canned vegetables with no salt added    · Fruits:      ¨ Bananas, peaches, pears, and pineapple    ¨ Grapes, raisins, and dates    ¨ Oranges, tangerines, grapefruit, orange juice, and grapefruit juice    ¨ Apricots, mangoes, melons, and papaya    ¨ Raspberries and strawberries    ¨ Canned fruit with no added sugar    · Low-fat dairy products:      ¨ Nonfat (skim) milk, 1% milk, and low-fat almond, cashew, or soy milks fortified with calcium    ¨ Low-fat cheese, regular or frozen yogurt, and cottage cheese    · Meats and proteins , such as lean cuts of beef and pork (loin, leg, round), skinless chicken and turkey, legumes, soy products, egg whites, and nuts  Foods and drinks to limit or avoid:  Ask your dietitian or healthcare provider about these and other foods that are high in unhealthy fat, sodium, and sugar:  · Snack or packaged foods , such as frozen dinners, cookies, macaroni and cheese, and cereals with more than 300 mg of sodium per serving    · Canned or dry mixes  for cakes, soups, sauces, or gravies    · Vegetables with added sodium , such as instant potatoes, vegetables with added sauces, or regular canned vegetables    · Other foods high in sodium , such as ketchup, barbecue sauce, salad dressing, pickles, olives, soy sauce, and miso    · High-fat dairy foods  such as whole or 2% milk, cream cheese, or sour cream, and cheeses     · High-fat protein foods  such as high-fat cuts of beef (T-bone steaks, ribs), chicken or turkey with skin, and organ meats, such as liver    · Cured or smoked meats , such as hot dogs, lema, and sausage    · Unhealthy fats and oils , such as butter, stick margarine, shortening, and cooking oils such as coconut or palm oil    · Food and drinks high in sugar , such as soft drinks (soda), sports drinks, sweetened tea, candy, cake, cookies, pies, and doughnuts  Other diet guidelines to follow:   · Eat more foods containing omega-3 fats  Eat fish high in omega-3 fats at least 2 times a week  · Limit alcohol  Too much alcohol can damage your heart and raise your blood pressure  Women should limit alcohol to 1 drink a day  Men should limit alcohol to 2 drinks a day  A drink of alcohol is 12 ounces of beer, 5 ounces of wine, or 1½ ounces of liquor  · Choose low-sodium foods  High-sodium foods can lead to high blood pressure  Add little or no salt to food you prepare  Use herbs and spices in place of salt  · Eat more fiber  to help lower cholesterol levels  Eat at least 5 servings of fruits and vegetables each day  Eat 3 ounces of whole-grain foods each day  Legumes (beans) are also a good source of fiber  Lifestyle guidelines:   · Do not smoke  Nicotine and other chemicals in cigarettes and cigars can cause lung and heart damage   Ask your healthcare provider for information if you currently smoke and need help to quit  E-cigarettes or smokeless tobacco still contain nicotine  Talk to your healthcare provider before you use these products  · Exercise regularly  to help you maintain a healthy weight and improve your blood pressure and cholesterol levels  Ask your healthcare provider about the best exercise plan for you  Do not start an exercise program without asking your healthcare provider  Follow up with your healthcare provider as directed:  Write down your questions so you remember to ask them during your visits  © 2017 [employer]  Information is for End User's use only and may not be sold, redistributed or otherwise used for commercial purposes  All illustrations and images included in CareNotes® are the copyrighted property of A [employer] M [employer]  or Dayo Mariposa  The above information is an  only  It is not intended as medical advice for individual conditions or treatments  Talk to your doctor, nurse or pharmacist before following any medical regimen to see if it is safe and effective for you  Calorie Counting Diet   WHAT YOU NEED TO KNOW:   What is a calorie counting diet? It is a meal plan based on counting calories each day to reach a healthy body weight  You will need to eat fewer calories if you are trying to lose weight  Weight loss may decrease your risk for certain health problems or improve your health if you have health problems  Some of these health problems include heart disease, high blood pressure, and diabetes  What foods should I avoid? Your dietitian will tell you if you need to avoid certain foods based on your body weight and health condition  You may need to avoid high-fat foods if you are at risk for or have heart disease  You may need to eat fewer foods from the breads and starches food group if you have diabetes  How many calories are in foods?   The following is a list of foods and drinks with the approximate number of calories in each  Check the food label to find the exact number of calories  A dietitian can tell you how many calories you should have from each food group each day    · Carbohydrate:      ¨ ½ of a 3-inch bagel, 1 slice of bread, or ½ of a hamburger bun or hot dog bun (80)    ¨ 1 (8-inch) flour tortilla or ½ cup of cooked rice (100)    ¨ 1 (6-inch) corn tortilla (80)    ¨ 1 (6-inch) pancake or 1 cup of bran flakes cereal (110)    ¨ ½ cup of cooked cereal (80)    ¨ ½ cup of cooked pasta (85)    ¨ 1 ounce of pretzels (100)    ¨ 3 cups of air-popped popcorn without butter or oil (80)    · Dairy:      ¨ 1 cup of skim or 1% milk (90)    ¨ 1 cup of 2% milk (120)    ¨ 1 cup of whole milk (160)    ¨ 1 cup of 2% chocolate milk (220)    ¨ 1 ounce of low-fat cheese with 3 grams of fat per ounce (70)    ¨ 1 ounce of cheddar cheese (114)    ¨ ½ cup of 1% fat cottage cheese (80)    ¨ 1 cup of plain or sugar-free, fat-free yogurt (90)    · Protein foods:      ¨ 3 ounces of fish (not breaded or fried) (95)    ¨ 3 ounces of breaded, fried fish (195)    ¨ ¾ cup of tuna canned in water (105)    ¨ 3 ounces of chicken breast without skin (105)    ¨ 1 fried chicken breast with skin (350)    ¨ ¼ cup of fat free egg substitute (40)    ¨ 1 large egg (75)    ¨ 3 ounces of lean beef or pork (165)    ¨ 3 ounces of fried pork chop or ham (185)    ¨ ½ cup of cooked dried beans, such as kidney, rogers, lentils, or navy (115)    ¨ 3 ounces of bologna or lunch meat (225)    ¨ 2 links of breakfast sausage (140)    · Vegetables:      ¨ ½ cup of sliced mushrooms (10)    ¨ 1 cup of salad greens, such as lettuce, spinach, or deborah (15)    ¨ ½ cup of steamed asparagus (20)    ¨ ½ cup of cooked summer squash, zucchini squash, or green or wax beans (25)    ¨ 1 cup of broccoli or cauliflower florets, or 1 medium tomato (25)    ¨ 1 large raw carrot or ½ cup of cooked carrots (40)    ¨ ? of a medium cucumber or 1 stalk of celery (5)    ¨ 1 small baked potato (160)    ¨ 1 cup of breaded, fried vegetables (230)    · Fruit:      ¨ 1 (6-inch) banana (55)     ¨ ½ of a 4-inch grapefruit (55)    ¨ 15 grapes (60)    ¨ 1 medium orange or apple (70)    ¨ 1 large peach (65)    ¨ 1 cup of fresh pineapple chunks (75)    ¨ 1 cup of melon cubes (50)    ¨ 1¼ cups of whole strawberries (45)    ¨ ½ cup of fruit canned in juice (55)    ¨ ½ cup of fruit canned in heavy syrup (110)    ¨ ?  cup of raisins (130)    ¨ ½ cup of unsweetened fruit juice (60)    ¨ ½ cup of grape, cranberry, or prune juice (90)    · Fat:      ¨ 10 peanuts or 2 teaspoons of peanut butter (55)    ¨ 2 tablespoons of avocado or 1 tablespoon of regular salad dressing (45)    ¨ 2 slices of lema (90)    ¨ 1 teaspoon of oil, such as safflower, canola, corn, or olive oil (45)    ¨ 2 teaspoons of low-fat margarine, or 1 tablespoon of low-fat mayonnaise (50)    ¨ 1 teaspoon of regular margarine (40)    ¨ 1 tablespoon of regular mayonnaise (135)    ¨ 1 tablespoon of cream cheese or 2 tablespoons of low-fat cream cheese (45)    ¨ 2 tablespoons of vegetable shortening (215)    · Dessert and sweets:      ¨ 8 animal crackers or 5 vanilla wafers (80)    ¨ 1 frozen fruit juice bar (80)    ¨ ½ cup of ice milk or low-fat frozen yogurt (90)    ¨ ½ cup of sherbet or sorbet (125)    ¨ ½ cup of sugar-free pudding or custard (60)    ¨ ½ cup of ice cream (140)    ¨ ½ cup of pudding or custard (175)    ¨ 1 (2-inch) square chocolate brownie (185)    · Combination foods:      ¨ Bean burrito made with an 8-inch tortilla, without cheese (275)    ¨ Chicken breast sandwich with lettuce and tomato (325)    ¨ 1 cup of chicken noodle soup (60)    ¨ 1 beef taco (175)    ¨ Regular hamburger with lettuce and tomato (310)    ¨ Regular cheeseburger with lettuce and tomato (410)     ¨ ¼ of a 12-inch cheese pizza (280)    ¨ Fried fish sandwich with lettuce and tomato (425)    ¨ Hot dog and bun (275)    ¨ 1½ cups of macaroni and cheese (310)    ¨ Taco salad with a fried tortilla shell (870)    · Low-calorie foods:      ¨ 1 tablespoon of ketchup or 1 tablespoon of fat free sour cream (15)    ¨ 1 teaspoon of mustard (5)    ¨ ¼ cup of salsa (20)    ¨ 1 large dill pickle (15)    ¨ 1 tablespoon of fat free salad dressing (10)    ¨ 2 teaspoons of low-sugar, light jam or jelly, or 1 tablespoon of sugar-free syrup (15)    ¨ 1 sugar-free popsicle (15)    ¨ 1 cup of club soda, seltzer water, or diet soda (0)  CARE AGREEMENT:   You have the right to help plan your care  Discuss treatment options with your caregivers to decide what care you want to receive  You always have the right to refuse treatment  The above information is an  only  It is not intended as medical advice for individual conditions or treatments  Talk to your doctor, nurse or pharmacist before following any medical regimen to see if it is safe and effective for you  © 2017 [employer]  Information is for End User's use only and may not be sold, redistributed or otherwise used for commercial purposes  All illustrations and images included in CareNotes® are the copyrighted property of [employer]  or [employer]ge

## 2020-03-03 NOTE — TELEPHONE ENCOUNTER
----- Message from Tomas Houston sent at 3/3/2020  1:25 PM EST -----  Regarding: hm on hiv   03/03/20 1:25 PM    Kareem, our patient Enma Rojas has had HIV completed/performed  Please assist in updating the patient chart by pulling the Care Everywhere (CE) document  The date of service is 03/02/2013       Thank you,  Alfie Boyer, RYAN LEACH [id number] [facility]

## 2020-03-03 NOTE — PROGRESS NOTES
Assessment/Plan:     Chronic Problems:  Anxiety with depression  Pt is doing well on current meds  Seems much better at this trip  Obesity (BMI 35 0-39 9 without comorbidity)  Advised to continue to work on the weight  Hypertension  BP is at goal  Continue current meds  Visit Diagnosis:  Diagnoses and all orders for this visit:    Flank pain cannot r/o pyelo  -     POCT urine dip  -     Urine culture; Future  -     Urine culture  -     sulfamethoxazole-trimethoprim (BACTRIM DS) 800-160 mg per tablet; Take 1 tablet by mouth 2 (two) times a day for 3 days    BMI 37 0-37 9, adult    Anxiety with depression    Obesity (BMI 35 0-39 9 without comorbidity)    Essential hypertension    Other orders  -     Discontinue: zaleplon (SONATA) 5 MG capsule  -     Discontinue: oxyCODONE-acetaminophen (PERCOCET)  mg per tablet          Subjective:    Patient ID: Delmy Early is a 62 y o  female  Pt is here for routine f/u appt   is leaving for [place] and she is concerned about Coronavirus  Identical twin sister has serious heart problems  Just got back from a 4 week trip to [place]  Had less of a problem traveling  Was able to walk a mile every day  Returned home and felt fine, but developed low grade fever for at least a week  Then developed right flank pain, no dysuria  Usually has no feeling there  Still feels crappy per pt  Had injections by Dr Waldo Fox last year for 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  [place] that really helped her a lot  Still off all narcotics but used meds on the plane and felt fine  All meds reviewed with pt and current  Takes all other meds as directed  No side effects noted        The following portions of the patient's history were reviewed and updated as appropriate: allergies, current medications, past family history, past medical history, past social history, past surgical history and problem list     Review of Systems   Constitutional: Positive for diaphoresis and fever  Negative for chills and fatigue  HENT: Positive for rhinorrhea  Negative for congestion, ear pain, postnasal drip, sinus pressure, sinus pain, sneezing and sore throat  Eyes: Negative  Respiratory: Positive for cough  Negative for shortness of breath and wheezing  Cardiovascular: Negative for chest pain and palpitations  Gastrointestinal: Negative  Genitourinary: Negative for dysuria (but pt has no feeling in the area s/p multiple surgeries  ), frequency and urgency (never has an urge to urinate  )  Neurological: Negative for dizziness, light-headedness and headaches  Psychiatric/Behavioral: Negative for dysphoric mood  The patient is not nervous/anxious  Uses cpap at night            /80 (BP Location: Right arm)   Pulse 70   Temp 99 2 °F (37 3 °C)   Resp 16   Ht 5' 4" (1 626 m)   Wt 99 7 kg (219 lb 12 8 oz)   SpO2 96%   BMI 37 73 kg/m²   Social History     Socioeconomic History    Marital status: /Civil Union     Spouse name: Not on file    Number of children: Not on file    Years of education: Not on file    Highest education level: Not on file   Occupational History    Occupation: retired   Social Needs    Financial resource strain: Not on file    Food insecurity:     Worry: Not on file     Inability: Not on file   Contrib needs:     Medical: Not on file     Non-medical: Not on file   Tobacco Use    Smoking status: Never Smoker    Smokeless tobacco: Never Used   Substance and Sexual Activity    Alcohol use: No    Drug use: Yes     Types: Marijuana     Comment: medical marijuana 2x a day capsules    Sexual activity: Yes   Lifestyle    Physical activity:     Days per week: Not on file     Minutes per session: Not on file    Stress: Not on file   Relationships    Social connections:     Talks on phone: Not on file     Gets together: Not on file     Attends [employer] service: Not on file     Active member of club or organization: Not on file     Attends meetings of clubs or organizations: Not on file     Relationship status: Not on file    Intimate partner violence:     Fear of current or ex partner: Not on file     Emotionally abused: Not on file     Physically abused: Not on file     Forced sexual activity: Not on file   Other Topics Concern    Not on file   Social History Narrative    Lives in [place], with   Previously worked as a teacher        Past Medical History:   Diagnosis Date    Anxiety     Arthritis     Attention and concentration deficit     Blurred vision     Chronic pain     Depression     Diplopia     Dyspepsia     Gastric ulcer     Gastritis     Memory loss     Osteopenia     Starting and stopping of urinary stream during micturition     Urinary incontinence     Wears eyeglasses      Family History   Problem Relation Age of Onset    Other Mother         Back Disorder     Cirrhosis Mother     Crohn's disease Mother     Lupus Mother         Systemic Lupus Erythematous     Hypertension Father     Heart disease Father     Other Brother         Liver Transplant     Crohn's disease Brother     Crohn's disease Brother     No Known Problems Sister     No Known Problems Daughter     No Known Problems Maternal Aunt     No Known Problems Cousin     No Known Problems Cousin     No Known Problems Cousin     No Known Problems Cousin     No Known Problems Cousin     Seizures Neg Hx      Past Surgical History:   Procedure Laterality Date     SECTION       SECTION      CHOLECYSTECTOMY      [address] [facility] GASTRIC BY[facility]  2004    HYSTERECTOMY  2012    INSERT / REPLACE PERIPHERAL NEUROSTIMULATOR PULSE GENERATOR /       OTHER SURGICAL HISTORY  2012    [facility]plantatin at [facility]      WRIST ARTHROSCOPY      with internal fixation        Current Outpatient Medications:     Cholecalciferol (D3-1000) 1000 units capsule, Take 1 capsule by mouth daily  , Disp: , Rfl:     Diclofenac Sodium 1 5 % SOLN, Apply 2 drops to affected area 3x/day, Disp: 1 Bottle, Rfl: 2    dronabinol (MARINOL) 5 MG capsule, Take 1 capsule (5 mg total) by mouth 2 (two) times a day before meals By Dr Claudia Bermudez, Disp: 60 capsule, Rfl: 0    DULoxetine (CYMBALTA) 30 mg delayed release capsule, TAKE 1 CAPSULE DAILY, Disp: 90 capsule, Rfl: 2    Iron-Vitamin C (IRON 100/C) 100-250 MG TABS, Take by mouth daily, Disp: , Rfl:     lisinopril (ZESTRIL) 10 mg tablet, TAKE 1 TABLET DAILY, Disp: 90 tablet, Rfl: 4    metoprolol tartrate (LOPRESSOR) 25 mg tablet, TAKE ONE-HALF (1/2) TABLET TWICE A DAY, Disp: 90 tablet, Rfl: 4    Multiple Vitamin (MULTI-VITAMIN DAILY) TABS, Take 1 tablet by mouth daily, Disp: , Rfl:     pantoprazole (PROTONIX) 40 mg tablet, TAKE 1 TABLET TWICE A DAY, Disp: 180 tablet, Rfl: 4    lidocaine (XYLOCAINE) 5 % ointment, Apply topically 2 (two) times a day as needed, Disp: , Rfl:     sulfamethoxazole-trimethoprim (BACTRIM DS) 800-160 mg per tablet, Take 1 tablet by mouth 2 (two) times a day for 3 days, Disp: 14 tablet, Rfl: 0    Allergies   Allergen Reactions    Cat Hair Extract      Cat Dander    Dog Epithelium     Morphine Abdominal Pain    Other Other (See Comments) and Sneezing     Dogs  Crab Meat    Shellfish-Derived Products      CRAB MEAT ONLY          Lab Review   No visits with results within 2 Month(s) from this visit     Latest known visit with results is:   Procedure visit on 12/03/2019   Component Date Value     COLOR,UA 12/03/2019 yellow     CLARITY,UA 12/03/2019 clear     SPECIFIC GRAVITY,UA 12/03/2019 1 020      PH,UA 12/03/2019 7 0     LEUKOCYTE ESTERASE,UA 12/03/2019 n     NITRITE,UA 12/03/2019 n     GLUCOSE, UA 12/03/2019 n     KETONES,UA 12/03/2019 n     BILIRUBIN,UA 12/03/2019 n     BLOOD,UA 12/03/2019 mod     POCT URINE PROTEIN 12/03/2019 n     SL AMB POCT UROBILINOGEN 12/03/2019 0 2 Imaging: No results found  Objective:     Physical Exam   Constitutional: She is oriented to person, place, and time  She appears well-developed and well-nourished  No distress  HENT:   Head: Normocephalic and atraumatic  Right Ear: External ear normal    Left Ear: External ear normal    Mouth/Throat: Oropharynx is clear and moist    Eyes: Pupils are equal, round, and reactive to light  Conjunctivae and EOM are normal  Right eye exhibits no discharge  Left eye exhibits no discharge  Neck: Normal range of motion  Neck supple  Cardiovascular: Normal rate, regular rhythm and normal heart sounds  Exam reveals no friction rub  No murmur heard  Pulmonary/Chest: Effort normal and breath sounds normal  No respiratory distress  She has no wheezes  She has no rales  Abdominal: Soft  Bowel sounds are normal  There is tenderness (to s/p area  Ptt with tenderness right flank  )  Musculoskeletal: Normal range of motion  She exhibits no edema, tenderness or deformity  Lymphadenopathy:     She has no cervical adenopathy  Neurological: She is alert and oriented to person, place, and time  No cranial nerve deficit  Skin: Skin is warm and dry  No rash noted  She is not diaphoretic  Psychiatric: She has a normal mood and affect  Her behavior is normal  Judgment and thought content normal          Patient Instructions   Discussed all with patient  She has been doing great since she has been weaned off narcotics  The mood is better a specially since the recent trip  Since she does not usually have pain in the flank I am concerned that she may have a kidney infection  Plenty of liquids  Take the antibiotic 1 pill twice daily for the next 7 days  I will call with the urine culture  You did lose another lb and a half since the last visit  Continue to work on the weight as this will also help  Blood pressure is perfect today      Obesity   AMBULATORY CARE:   Obesity  is when your body mass index (BMI) is greater than 30  Your healthcare provider will use your height and weight to measure your BMI  The risks of obesity include  many health problems, such as injuries or physical disability  You may need tests to check for the following:  · Diabetes     · High blood pressure or high cholesterol     · Heart disease     · Gallbladder or liver disease     · Cancer of the colon, breast, prostate, liver, or kidney     · Sleep apnea     · Arthritis or gout  Seek care immediately if:   · You have a severe headache, confusion, or difficulty speaking  · You have weakness on one side of your body  · You have chest pain, sweating, or shortness of breath  Contact your healthcare provider if:   · You have symptoms of gallbladder or liver disease, such as pain in your upper abdomen  · You have knee or hip pain and discomfort while walking  · You have symptoms of diabetes, such as intense hunger and thirst, and frequent urination  · You have symptoms of sleep apnea, such as snoring or daytime sleepiness  · You have questions or concerns about your condition or care  Treatment for obesity  focuses on helping you lose weight to improve your health  Even a small decrease in BMI can reduce the risk for many health problems  Your healthcare provider will help you set a weight-loss goal   · Lifestyle changes  are the first step in treating obesity  These include making healthy food choices and getting regular physical activity  Your healthcare provider may suggest a weight-loss program that involves coaching, education, and therapy  · Medicine  may help you lose weight when it is used with a healthy diet and physical activity  · Surgery  can help you lose weight if you are very obese and have other health problems  There are several types of weight-loss surgery  Ask your healthcare provider for more information  Be successful losing weight:   · Set small, realistic goals    An example of a small goal is to walk for 20 minutes 5 days a week  Kristin goal is to lose 5% of your body weight  · Tell friends, family members, and coworkers about your goals  and ask for their support  Ask a friend to lose weight with you, or join a weight-loss support group  · Identify foods or triggers that may cause you to overeat , and find ways to avoid them  Remove tempting high-calorie foods from your home and workplace  Place a bowl of fresh fruit on your kitchen counter  If stress causes you to eat, then find other ways to cope with stress  · Keep a diary to track what you eat and drink  Also write down how many minutes of physical activity you do each day  Weigh yourself once a week and record it in your diary  Eating changes: You will need to eat 500 to 1,000 fewer calories each day than you currently eat to lose 1 to 2 pounds a week  The following changes will help you cut calories:  · Eat smaller portions  Use small plates, no larger than 9 inches in diameter  Fill your plate half full of fruits and vegetables  Measure your food using measuring cups until you know what a serving size looks like  · Eat 3 meals and 1 or 2 snacks each day  Plan your meals in advance  Latanya Su and eat at home most of the time  Eat slowly  · Eat fruits and vegetables at every meal   They are low in calories and high in fiber, which makes you feel full  Do not add butter, margarine, or cream sauce to vegetables  Use herbs to season steamed vegetables  · Eat less fat and fewer fried foods  Eat more baked or grilled chicken and fish  These protein sources are lower in calories and fat than red meat  Limit fast food  Dress your salads with olive oil and vinegar instead of bottled dressing  · Limit the amount of sugar you eat  Do not drink sugary beverages  Limit alcohol  Activity changes:  Physical activity is good for your body in many ways  It helps you burn calories and build strong muscles   It decreases stress and depression, and improves your mood  It can also help you sleep better  Talk to your healthcare provider before you begin an exercise program   · Exercise for at least 30 minutes 5 days a week  Start slowly  Set aside time each day for physical activity that you enjoy and that is convenient for you  It is best to do both weight training and an activity that increases your heart rate, such as walking, bicycling, or swimming  · Find ways to be more active  Do yard work and housecleaning  Walk up the stairs instead of using elevators  Spend your leisure time going to events that require walking, such as outdoor festivals or fairs  This extra physical activity can help you lose weight and keep it off  Follow up with your healthcare provider as directed: You may need to meet with a dietitian  Write down your questions so you remember to ask them during your visits  © 2017 [employer] [employer] Information is for End User's use only and may not be sold, redistributed or otherwise used for commercial purposes  All illustrations and images included in CareNotes® are the copyrighted property of [employer]  or [employer] Monge  The above information is an  only  It is not intended as medical advice for individual conditions or treatments  Talk to your doctor, nurse or pharmacist before following any medical regimen to see if it is safe and effective for you  Weight Management   AMBULATORY CARE:   Why it is important to manage your weight:  Being overweight increases your risk of health conditions such as heart disease, high blood pressure, type 2 diabetes, and certain types of cancer  It can also increase your risk for osteoarthritis, sleep apnea, and other respiratory problems  Aim for a slow, steady weight loss  Even a small amount of weight loss can lower your risk of health problems    How to lose weight safely:  A safe and healthy way to lose weight is to eat fewer calories and get regular exercise  You can lose up about 1 pound a week by decreasing the number of calories you eat by 500 calories each day  You can decrease calories by eating smaller portion sizes or by cutting out high-calorie foods  Read labels to find out how many calories are in the foods you eat  You can also burn calories with exercise such as walking, swimming, or biking  You will be more likely to keep weight off if you make these changes part of your lifestyle  Healthy meal plan for weight management:  A healthy meal plan includes a variety of foods, contains fewer calories, and helps you stay healthy  A healthy meal plan includes the following:  · Eat whole-grain foods more often  A healthy meal plan should contain fiber  Fiber is the part of grains, fruits, and vegetables that is not broken down by your body  Whole-grain foods are healthy and provide extra fiber in your diet  Some examples of whole-grain foods are whole-wheat breads and pastas, oatmeal, brown rice, and bulgur  · Eat a variety of vegetables every day  Include dark, leafy greens such as spinach, kale, marie greens, and mustard greens  Eat yellow and orange vegetables such as carrots, sweet potatoes, and winter squash  · Eat a variety of fruits every day  Choose fresh or canned fruit (canned in its own juice or light syrup) instead of juice  Fruit juice has very little or no fiber  · Eat low-fat dairy foods  Drink fat-free (skim) milk or 1% milk  Eat fat-free yogurt and low-fat cottage cheese  Try low-fat cheeses such as mozzarella and other reduced-fat cheeses  · Choose meat and other protein foods that are low in fat  Choose beans or other legumes such as split peas or lentils  Choose fish, skinless poultry (chicken or turkey), or lean cuts of red meat (beef or pork)  Before you cook meat or poultry, cut off any visible fat  · Use less fat and oil  Try baking foods instead of frying them   Add less fat, such as margarine, sour cream, regular salad dressing and mayonnaise to foods  Eat fewer high-fat foods  Some examples of high-fat foods include french fries, doughnuts, ice cream, and cakes  · Eat fewer sweets  Limit foods and drinks that are high in sugar  This includes candy, cookies, regular soda, and sweetened drinks  Ways to decrease calories:   · Eat smaller portions  ¨ Use a small plate with smaller servings  ¨ Do not eat second helpings  ¨ When you eat at a restaurant, ask for a box and place half of your meal in the box before you eat  ¨ Share an entrée with someone else  · Replace high-calorie snacks with healthy, low-calorie snacks  ¨ Choose fresh fruit, vegetables, fat-free rice cakes, or air-popped popcorn instead of potato chips, nuts, or chocolate  ¨ Choose water or calorie-free drinks instead of soda or sweetened drinks  · Eat regular meals  Skipping meals can lead to overeating later in the day  Eat a healthy snack in place of a meal if you do not have time to eat a regular meal      · Do not shop for groceries when you are hungry  You may be more likely to make unhealthy food choices  Take a grocery list of healthy foods and shop after you have eaten  Exercise:  Exercise at least 30 minutes per day on most days of the week  Some examples of exercise include walking, biking, dancing, and swimming  You can also fit in more physical activity by taking the stairs instead of the elevator or parking farther away from stores  Ask your healthcare provider about the best exercise plan for you  Other things to consider as you try to lose weight:   · Be aware of situations that may give you the urge to overeat, such as eating while watching television  Find ways to avoid these situations  For example, read a book, go for a walk, or do crafts  · Meet with a weight loss support group or friends who are also trying to lose weight   This may help you stay motivated to continue working on your weight loss goals  © 2017 [employer] Information is for End User's use only and may not be sold, redistributed or otherwise used for commercial purposes  All illustrations and images included in CareNotes® are the copyrighted property of A [employer]  or [employer]  The above information is an  only  It is not intended as medical advice for individual conditions or treatments  Talk to your doctor, nurse or pharmacist before following any medical regimen to see if it is safe and effective for you  Low Fat Diet   AMBULATORY CARE:   A low-fat diet  is an eating plan that is low in total fat, unhealthy fat, and cholesterol  You may need to follow a low-fat diet if you have trouble digesting or absorbing fat  You may also need to follow this diet if you have high cholesterol  You can also lower your cholesterol by increasing the amount of fiber in your diet  Soluble fiber is a type of fiber that helps to decrease cholesterol levels  Different types of fat in food:   · Limit unhealthy fats  A diet that is high in cholesterol, saturated fat, and trans fat may cause unhealthy cholesterol levels  Unhealthy cholesterol levels increase your risk of heart disease  ¨ Cholesterol:  Limit intake of cholesterol to less than 200 mg per day  Cholesterol is found in meat, eggs, and dairy  ¨ Saturated fat:  Limit saturated fat to less than 7% of your total daily calories  Ask your dietitian how many calories you need each day  Saturated fat is found in butter, cheese, ice cream, whole milk, and palm oil  Saturated fat is also found in meat, such as beef, pork, chicken skin, and processed meats  Processed meats include sausage, hot dogs, and bologna  ¨ Trans fat:  Avoid trans fat as much as possible  Trans fat is used in fried and baked foods  Foods that say trans fat free on the label may still have up to 0 5 grams of trans fat per serving  · Include healthy fats  Replace foods that are high in saturated and trans fat with foods high in healthy fats  This may help to decrease high cholesterol levels  ¨ Monounsaturated fats: These are found in avocados, nuts, and vegetable oils, such as olive, canola, and sunflower oil  ¨ Polyunsaturated fats: These can be found in vegetable oils, such as soybean or corn oil  Omega-3 fats can help to decrease the risk of heart disease  Omega-3 fats are found in fish, such as salmon, herring, trout, and tuna  Omega-3 fats can also be found in plant foods, such as walnuts, flaxseed, soybeans, and canola oil    Foods to limit or avoid:   · Grains:      ¨ Snacks that are made with partially hydrogenated oils, such as chips, regular crackers, and butter-flavored popcorn    ¨ High-fat baked goods, such as biscuits, croissants, doughnuts, pies, cookies, and pastries    · Dairy:      ¨ Whole milk, 2% milk, and yogurt and ice cream made with whole milk    ¨ Half and half creamer, heavy cream, and whipping cream    ¨ Cheese, cream cheese, and sour cream    · Meats and proteins:      ¨ High-fat cuts of meat (T-bone steak, regular hamburger, and ribs)    ¨ Fried meat, poultry (turkey and chicken), and fish    ¨ Poultry (chicken and turkey) with skin    ¨ Cold cuts (salami or bologna), hot dogs, lema, and sausage    ¨ Whole eggs and egg yolks    · Vegetables and fruits with added fat:      ¨ Fried vegetables or vegetables in butter or high-fat sauces, such as cream or cheese sauces    ¨ Fried fruit or fruit served with butter or cream    · Fats:      ¨ Butter, stick margarine, and shortening    ¨ Coconut, palm oil, and palm kernel oil  Foods to include:   · Grains:      ¨ Whole-grain breads, cereals, pasta, and brown rice    ¨ Low-fat crackers and pretzels    · Vegetables and fruits:      ¨ Fresh, frozen, or canned vegetables (no salt or low-sodium)    ¨ Fresh, frozen, dried, or canned fruit (canned in light syrup or fruit juice)    ¨ Avocado    · Low-fat dairy products:      ¨ Nonfat (skim) or 1% milk    ¨ Nonfat or low-fat cheese, yogurt, and cottage cheese    · Meats and proteins:      ¨ Chicken or turkey with no skin    ¨ Baked or broiled fish    ¨ Lean beef and pork (loin, round, extra lean hamburger)    ¨ Beans and peas, unsalted nuts, soy products    ¨ Egg whites and substitutes    ¨ Seeds and nuts    · Fats:      ¨ Unsaturated oil, such as canola, olive, peanut, soybean, or sunflower oil    ¨ Soft or liquid margarine and vegetable oil spread    ¨ Low-fat salad dressing  Other ways to decrease fat:   · Read food labels before you buy foods  Choose foods that have less than 30% of calories from fat  Choose low-fat or fat-free dairy products  Remember that fat free does not mean calorie free  These foods still contain calories, and too many calories can lead to weight gain  · Trim fat from meat and avoid fried food  Trim all visible fat from meat before you cook it  Remove the skin from poultry  Do not recinos meat, fish, or poultry  Bake, roast, boil, or broil these foods instead  Avoid fried foods  Eat a baked potato instead of [place] fries  Steam vegetables instead of sautéing them in butter  · Add less fat to foods  Use imitation lema bits on salads and baked potatoes instead of regular lema bits  Use fat-free or low-fat salad dressings instead of regular dressings  Use low-fat or nonfat butter-flavored topping instead of regular butter or margarine on popcorn and other foods  Ways to decrease fat in recipes:  Replace high-fat ingredients with low-fat or nonfat ones  This may cause baked goods to be drier than usual  You may need to use nonfat cooking spray on pans to prevent food from sticking  You also may need to change the amount of other ingredients, such as water, in the recipe  Try the following:  · Use low-fat or light margarine instead of regular margarine or shortening       · Use lean ground turkey breast or chicken, or lean ground beef (less than 5% fat) instead of hamburger  · Add 1 teaspoon of canola oil to 8 ounces of skim milk instead of using cream or half and half  · Use grated zucchini, carrots, or apples in breads instead of coconut  · Use blenderized, low-fat cottage cheese, plain tofu, or low-fat ricotta cheese instead of cream cheese  · Use 1 egg white and 1 teaspoon of canola oil, or use ¼ cup (2 ounces) of fat-free egg substitute instead of a whole egg  · Replace half of the oil that is called for in a recipe with applesauce when you bake  Use 3 tablespoons of cocoa powder and 1 tablespoon of canola oil instead of a square of baking chocolate  How to increase fiber:  Eat enough high-fiber foods to get 20 to 30 grams of fiber every day  Slowly increase your fiber intake to avoid stomach cramps, gas, and other problems  · Eat 3 ounces of whole-grain foods each day  An ounce is about 1 slice of bread  Eat whole-grain breads, such as whole-wheat bread  Whole wheat, whole-wheat flour, or other whole grains should be listed as the first ingredient on the food label  Replace white flour with whole-grain flour or use half of each in recipes  Whole-grain flour is heavier than white flour, so you may have to add more yeast or baking powder  · Eat a high-fiber cereal for breakfast   Oatmeal is a good source of soluble fiber  Look for cereals that have bran or fiber in the name  Choose whole-grain products, such as brown rice, barley, and whole-wheat pasta  · Eat more beans, peas, and lentils  For example, add beans to soups or salads  Eat at least 5 cups of fruits and vegetables each day  Eat fruits and vegetables with the peel because the peel is high in fiber  © 2017 [employer] Information is for End User's use only and may not be sold, redistributed or otherwise used for commercial purposes   All illustrations and images included in CareNotes® are the copyrighted property of [employer]  or [employer]  The above information is an  only  It is not intended as medical advice for individual conditions or treatments  Talk to your doctor, nurse or pharmacist before following any medical regimen to see if it is safe and effective for you  Heart Healthy Diet   AMBULATORY CARE:   A heart healthy diet  is an eating plan low in total fat, unhealthy fats, and sodium (salt)  A heart healthy diet helps decrease your risk for heart disease and stroke  Limit the amount of fat you eat to 25% to 35% of your total daily calories  Limit sodium to less than 2,300 mg each day  Healthy fats:  Healthy fats can help improve cholesterol levels  The risk for heart disease is decreased when cholesterol levels are normal  Choose healthy fats, such as the following:  · Unsaturated fat  is found in foods such as soybean, canola, olive, corn, and safflower oils  It is also found in soft tub margarine that is made with liquid vegetable oil  · Omega-3 fat  is found in certain fish, such as salmon, tuna, and trout, and in walnuts and flaxseed  Unhealthy fats:  Unhealthy fats can cause unhealthy cholesterol levels in your blood and increase your risk of heart disease  Limit unhealthy fats, such as the following:  · Cholesterol  is found in animal foods, such as eggs and lobster, and in dairy products made from whole milk  Limit cholesterol to less than 300 milligrams (mg) each day  You may need to limit cholesterol to 200 mg each day if you have heart disease  · Saturated fat  is found in meats, such as lema and hamburger  It is also found in chicken or turkey skin, whole milk, and butter  Limit saturated fat to less than 7% of your total daily calories  Limit saturated fat to less than 6% if you have heart disease or are at increased risk for it  · Trans fat  is found in packaged foods, such as potato chips and cookies   It is also in hard margarine, some fried foods, and shortening  Avoid trans fats as much as possible    Heart healthy foods and drinks to include:  Ask your dietitian or healthcare provider how many servings to have from each of the following food groups:  · Grains:      ¨ Whole-wheat breads, cereals, and pastas, and brown rice    ¨ Low-fat, low-sodium crackers and chips    · Vegetables:      ¨ Broccoli, green beans, green peas, and spinach    ¨ Collards, kale, and lima beans    ¨ Carrots, sweet potatoes, tomatoes, and peppers    ¨ Canned vegetables with no salt added    · Fruits:      ¨ Bananas, peaches, pears, and pineapple    ¨ Grapes, raisins, and dates    ¨ Oranges, tangerines, grapefruit, orange juice, and grapefruit juice    ¨ Apricots, mangoes, melons, and papaya    ¨ Raspberries and strawberries    ¨ Canned fruit with no added sugar    · Low-fat dairy products:      ¨ Nonfat (skim) milk, 1% milk, and low-fat almond, cashew, or soy milks fortified with calcium    ¨ Low-fat cheese, regular or frozen yogurt, and cottage cheese    · Meats and proteins , such as lean cuts of beef and pork (loin, leg, round), skinless chicken and turkey, legumes, soy products, egg whites, and nuts  Foods and drinks to limit or avoid:  Ask your dietitian or healthcare provider about these and other foods that are high in unhealthy fat, sodium, and sugar:  · Snack or packaged foods , such as frozen dinners, cookies, macaroni and cheese, and cereals with more than 300 mg of sodium per serving    · Canned or dry mixes  for cakes, soups, sauces, or gravies    · Vegetables with added sodium , such as instant potatoes, vegetables with added sauces, or regular canned vegetables    · Other foods high in sodium , such as ketchup, barbecue sauce, salad dressing, pickles, olives, soy sauce, and miso    · High-fat dairy foods  such as whole or 2% milk, cream cheese, or sour cream, and cheeses     · High-fat protein foods  such as high-fat cuts of beef (T-bone steaks, ribs), chicken or turkey with skin, and organ meats, such as liver    · Cured or smoked meats , such as hot dogs, lema, and sausage    · Unhealthy fats and oils , such as butter, stick margarine, shortening, and cooking oils such as coconut or palm oil    · Food and drinks high in sugar , such as soft drinks (soda), sports drinks, sweetened tea, candy, cake, cookies, pies, and doughnuts  Other diet guidelines to follow:   · Eat more foods containing omega-3 fats  Eat fish high in omega-3 fats at least 2 times a week  · Limit alcohol  Too much alcohol can damage your heart and raise your blood pressure  Women should limit alcohol to 1 drink a day  Men should limit alcohol to 2 drinks a day  A drink of alcohol is 12 ounces of beer, 5 ounces of wine, or 1½ ounces of liquor  · Choose low-sodium foods  High-sodium foods can lead to high blood pressure  Add little or no salt to food you prepare  Use herbs and spices in place of salt  · Eat more fiber  to help lower cholesterol levels  Eat at least 5 servings of fruits and vegetables each day  Eat 3 ounces of whole-grain foods each day  Legumes (beans) are also a good source of fiber  Lifestyle guidelines:   · Do not smoke  Nicotine and other chemicals in cigarettes and cigars can cause lung and heart damage  Ask your healthcare provider for information if you currently smoke and need help to quit  E-cigarettes or smokeless tobacco still contain nicotine  Talk to your healthcare provider before you use these products  · Exercise regularly  to help you maintain a healthy weight and improve your blood pressure and cholesterol levels  Ask your healthcare provider about the best exercise plan for you  Do not start an exercise program without asking your healthcare provider  Follow up with your healthcare provider as directed:  Write down your questions so you remember to ask them during your visits     © 2017 [employer] Information is for End User's use only and may not be sold, redistributed or otherwise used for commercial purposes  All illustrations and images included in CareNotes® are the copyrighted property of [employer]  or [employer]  The above information is an  only  It is not intended as medical advice for individual conditions or treatments  Talk to your doctor, nurse or pharmacist before following any medical regimen to see if it is safe and effective for you  Calorie Counting Diet   WHAT YOU NEED TO KNOW:   What is a calorie counting diet? It is a meal plan based on counting calories each day to reach a healthy body weight  You will need to eat fewer calories if you are trying to lose weight  Weight loss may decrease your risk for certain health problems or improve your health if you have health problems  Some of these health problems include heart disease, high blood pressure, and diabetes  What foods should I avoid? Your dietitian will tell you if you need to avoid certain foods based on your body weight and health condition  You may need to avoid high-fat foods if you are at risk for or have heart disease  You may need to eat fewer foods from the breads and starches food group if you have diabetes  How many calories are in foods? The following is a list of foods and drinks with the approximate number of calories in each  Check the food label to find the exact number of calories  A dietitian can tell you how many calories you should have from each food group each day    · Carbohydrate:      ¨ ½ of a 3-inch bagel, 1 slice of bread, or ½ of a hamburger bun or hot dog bun (80)    ¨ 1 (8-inch) flour tortilla or ½ cup of cooked rice (100)    ¨ 1 (6-inch) corn tortilla (80)    ¨ 1 (6-inch) pancake or 1 cup of bran flakes cereal (110)    ¨ ½ cup of cooked cereal (80)    ¨ ½ cup of cooked pasta (85)    ¨ 1 ounce of pretzels (100)    ¨ 3 cups of air-popped popcorn without butter or oil (80)    · Dairy: ¨ 1 cup of skim or 1% milk (90)    ¨ 1 cup of 2% milk (120)    ¨ 1 cup of whole milk (160)    ¨ 1 cup of 2% chocolate milk (220)    ¨ 1 ounce of low-fat cheese with 3 grams of fat per ounce (70)    ¨ 1 ounce of cheddar cheese (114)    ¨ ½ cup of 1% fat cottage cheese (80)    ¨ 1 cup of plain or sugar-free, fat-free yogurt (90)    · Protein foods:      ¨ 3 ounces of fish (not breaded or fried) (95)    ¨ 3 ounces of breaded, fried fish (195)    ¨ ¾ cup of tuna canned in water (105)    ¨ 3 ounces of chicken breast without skin (105)    ¨ 1 fried chicken breast with skin (350)    ¨ ¼ cup of fat free egg substitute (40)    ¨ 1 large egg (75)    ¨ 3 ounces of lean beef or pork (165)    ¨ 3 ounces of fried pork chop or ham (185)    ¨ ½ cup of cooked dried beans, such as kidney, rogers, lentils, or navy (115)    ¨ 3 ounces of bologna or lunch meat (225)    ¨ 2 links of breakfast sausage (140)    · Vegetables:      ¨ ½ cup of sliced mushrooms (10)    ¨ 1 cup of salad greens, such as lettuce, spinach, or deborah (15)    ¨ ½ cup of steamed asparagus (20)    ¨ ½ cup of cooked summer squash, zucchini squash, or green or wax beans (25)    ¨ 1 cup of broccoli or cauliflower florets, or 1 medium tomato (25)    ¨ 1 large raw carrot or ½ cup of cooked carrots (40)    ¨ ? of a medium cucumber or 1 stalk of celery (5)    ¨ 1 small baked potato (160)    ¨ 1 cup of breaded, fried vegetables (230)    · Fruit:      ¨ 1 (6-inch) banana (55)     ¨ ½ of a 4-inch grapefruit (55)    ¨ 15 grapes (60)    ¨ 1 medium orange or apple (70)    ¨ 1 large peach (65)    ¨ 1 cup of fresh pineapple chunks (75)    ¨ 1 cup of melon cubes (50)    ¨ 1¼ cups of whole strawberries (45)    ¨ ½ cup of fruit canned in juice (55)    ¨ ½ cup of fruit canned in heavy syrup (110)    ¨ ?  cup of raisins (130)    ¨ ½ cup of unsweetened fruit juice (60)    ¨ ½ cup of grape, cranberry, or prune juice (90)    · Fat:      ¨ 10 peanuts or 2 teaspoons of peanut butter (55)    ¨ 2 tablespoons of avocado or 1 tablespoon of regular salad dressing (45)    ¨ 2 slices of lema (90)    ¨ 1 teaspoon of oil, such as safflower, canola, corn, or olive oil (45)    ¨ 2 teaspoons of low-fat margarine, or 1 tablespoon of low-fat mayonnaise (50)    ¨ 1 teaspoon of regular margarine (40)    ¨ 1 tablespoon of regular mayonnaise (135)    ¨ 1 tablespoon of cream cheese or 2 tablespoons of low-fat cream cheese (45)    ¨ 2 tablespoons of vegetable shortening (215)    · Dessert and sweets:      ¨ 8 animal crackers or 5 vanilla wafers (80)    ¨ 1 frozen fruit juice bar (80)    ¨ ½ cup of ice milk or low-fat frozen yogurt (90)    ¨ ½ cup of sherbet or sorbet (125)    ¨ ½ cup of sugar-free pudding or custard (60)    ¨ ½ cup of ice cream (140)    ¨ ½ cup of pudding or custard (175)    ¨ 1 (2-inch) square chocolate brownie (185)    · Combination foods:      ¨ Bean burrito made with an 8-inch tortilla, without cheese (275)    ¨ Chicken breast sandwich with lettuce and tomato (325)    ¨ 1 cup of chicken noodle soup (60)    ¨ 1 beef taco (175)    ¨ Regular hamburger with lettuce and tomato (310)    ¨ Regular cheeseburger with lettuce and tomato (410)     ¨ ¼ of a 12-inch cheese pizza (280)    ¨ Fried fish sandwich with lettuce and tomato (425)    ¨ Hot dog and bun (275)    ¨ 1½ cups of macaroni and cheese (310)    ¨ Taco salad with a fried tortilla shell (870)    · Low-calorie foods:      ¨ 1 tablespoon of ketchup or 1 tablespoon of fat free sour cream (15)    ¨ 1 teaspoon of mustard (5)    ¨ ¼ cup of salsa (20)    ¨ 1 large dill pickle (15)    ¨ 1 tablespoon of fat free salad dressing (10)    ¨ 2 teaspoons of low-sugar, light jam or jelly, or 1 tablespoon of sugar-free syrup (15)    ¨ 1 sugar-free popsicle (15)    ¨ 1 cup of club soda, seltzer water, or diet soda (0)  CARE AGREEMENT:   You have the right to help plan your care   Discuss treatment options with your caregivers to decide what care you want to receive  You always have the right to refuse treatment  The above information is an  only  It is not intended as medical advice for individual conditions or treatments  Talk to your doctor, nurse or pharmacist before following any medical regimen to see if it is safe and effective for you  © 2017 [employer] Information is for End User's use only and may not be sold, redistributed or otherwise used for commercial purposes  All illustrations and images included in CareNotes® are the copyrighted property of [employer]  or [employer]    Portions of the record may have been created with voice recognition software  Occasional wrong word or "sound a like" substitutions may have occurred due to the inherent limitations of voice recognition software  Read the chart carefully and recognize, using context, where substitutions have occurred  BMI Counseling: Body mass index is 37 73 kg/m²  The BMI is above normal  Nutrition recommendations include reducing portion sizes, decreasing overall calorie intake, 3-5 servings of fruits/vegetables daily, reducing fast food intake, consuming healthier snacks, decreasing soda and/or juice intake, moderation in carbohydrate intake, increasing intake of lean protein, reducing intake of saturated fat and trans fat and reducing intake of cholesterol  Exercise recommendations include moderate aerobic physical activity for 150 minutes/week

## 2020-03-04 LAB — BACTERIA UR CULT: NORMAL

## 2020-03-09 ENCOUNTER — HOSPITAL ENCOUNTER (OUTPATIENT)
Dept: CT IMAGING | Facility: HOSPITAL | Age: 60
Discharge: HOME | End: 2020-03-09
Payer: COMMERCIAL

## 2020-03-09 ENCOUNTER — OFFICE VISIT (OUTPATIENT)
Dept: FAMILY MEDICINE CLINIC | Facility: CLINIC | Age: 60
End: 2020-03-09
Payer: COMMERCIAL

## 2020-03-09 ENCOUNTER — TELEPHONE (OUTPATIENT)
Dept: FAMILY MEDICINE CLINIC | Facility: CLINIC | Age: 60
End: 2020-03-09

## 2020-03-09 VITALS
HEIGHT: 64 IN | BODY MASS INDEX: 37.22 KG/M2 | SYSTOLIC BLOOD PRESSURE: 118 MMHG | OXYGEN SATURATION: 95 % | HEART RATE: 77 BPM | TEMPERATURE: 98.3 F | WEIGHT: 218 LBS | DIASTOLIC BLOOD PRESSURE: 78 MMHG

## 2020-03-09 DIAGNOSIS — R31.9 HEMATURIA, UNSPECIFIED TYPE: ICD-10-CM

## 2020-03-09 DIAGNOSIS — R19.7 DIARRHEA, UNSPECIFIED TYPE: ICD-10-CM

## 2020-03-09 DIAGNOSIS — R10.9 RIGHT FLANK PAIN: ICD-10-CM

## 2020-03-09 DIAGNOSIS — M79.2 NEURALGIA: ICD-10-CM

## 2020-03-09 DIAGNOSIS — R05.9 COUGH: ICD-10-CM

## 2020-03-09 DIAGNOSIS — R31.9 HEMATURIA, UNSPECIFIED TYPE: Primary | ICD-10-CM

## 2020-03-09 DIAGNOSIS — R11.2 NON-INTRACTABLE VOMITING WITH NAUSEA, UNSPECIFIED VOMITING TYPE: ICD-10-CM

## 2020-03-09 LAB
BACTERIA UR QL AUTO: ABNORMAL /HPF
BILIRUB UR QL STRIP: ABNORMAL
CLARITY UR: ABNORMAL
COLOR UR: ABNORMAL
FLUAV RNA NPH QL NAA+PROBE: NORMAL
FLUBV RNA NPH QL NAA+PROBE: NORMAL
GLUCOSE UR STRIP-MCNC: NEGATIVE MG/DL
HGB UR QL STRIP.AUTO: ABNORMAL
KETONES UR STRIP-MCNC: NEGATIVE MG/DL
LEUKOCYTE ESTERASE UR QL STRIP: NEGATIVE
MUCOUS THREADS UR QL AUTO: ABNORMAL
NITRITE UR QL STRIP: NEGATIVE
NON-SQ EPI CELLS URNS QL MICRO: ABNORMAL /HPF
PH UR STRIP.AUTO: 5.5 [PH]
PROT UR STRIP-MCNC: ABNORMAL MG/DL
RBC #/AREA URNS AUTO: ABNORMAL /HPF
RSV RNA NPH QL NAA+PROBE: NORMAL
SL AMB  POCT GLUCOSE, UA: ABNORMAL
SL AMB LEUKOCYTE ESTERASE,UA: ABNORMAL
SL AMB POCT BILIRUBIN,UA: ABNORMAL
SL AMB POCT BLOOD,UA: ABNORMAL
SL AMB POCT CLARITY,UA: ABNORMAL
SL AMB POCT COLOR,UA: ABNORMAL
SL AMB POCT KETONES,UA: ABNORMAL
SL AMB POCT NITRITE,UA: ABNORMAL
SL AMB POCT PH,UA: 5
SL AMB POCT SPECIFIC GRAVITY,UA: 1.03
SL AMB POCT URINE PROTEIN: 30
SL AMB POCT UROBILINOGEN: 0.2
SP GR UR STRIP.AUTO: 1.04 (ref 1–1.03)
URATE CRY URNS QL MICRO: ABNORMAL /HPF
UROBILINOGEN UR QL STRIP.AUTO: 0.2 E.U./DL
WBC #/AREA URNS AUTO: ABNORMAL /HPF

## 2020-03-09 PROCEDURE — 81002 URINALYSIS NONAUTO W/O SCOPE: CPT | Performed by: FAMILY MEDICINE

## 2020-03-09 PROCEDURE — 3078F DIAST BP <80 MM HG: CPT | Performed by: FAMILY MEDICINE

## 2020-03-09 PROCEDURE — 87086 URINE CULTURE/COLONY COUNT: CPT | Performed by: FAMILY MEDICINE

## 2020-03-09 PROCEDURE — 3074F SYST BP LT 130 MM HG: CPT | Performed by: FAMILY MEDICINE

## 2020-03-09 PROCEDURE — 81001 URINALYSIS AUTO W/SCOPE: CPT | Performed by: FAMILY MEDICINE

## 2020-03-09 PROCEDURE — 99214 OFFICE O/P EST MOD 30 MIN: CPT | Performed by: FAMILY MEDICINE

## 2020-03-09 PROCEDURE — 3008F BODY MASS INDEX DOCD: CPT | Performed by: FAMILY MEDICINE

## 2020-03-09 PROCEDURE — 87631 RESP VIRUS 3-5 TARGETS: CPT | Performed by: FAMILY MEDICINE

## 2020-03-09 PROCEDURE — 74177 CT ABD & PELVIS W/CONTRAST: CPT

## 2020-03-09 PROCEDURE — 1036F TOBACCO NON-USER: CPT | Performed by: FAMILY MEDICINE

## 2020-03-09 RX ADMIN — IOHEXOL 100 ML: 350 INJECTION, SOLUTION INTRAVENOUS at 12:31

## 2020-03-09 NOTE — PROGRESS NOTES
Assessment/Plan:     Chronic Problems:  Neuralgia  S/p hysterectomy  Pt has no urinary sensation  Visit Diagnosis:  Diagnoses and all orders for this visit:    Hematuria, unspecified type  -     Cancel: CT abdomen pelvis w wo contrast; Future  -     CBC and differential; Future  -     POCT urine dip  -     CT abdomen pelvis w contrast; Future  -     Urine culture; Future  -     Urinalysis with microscopic  -     Influenza A/B and RSV PCR; Future  -     Influenza A/B and RSV PCR  -     Urine culture    Right flank pain  -     Cancel: CT abdomen pelvis w wo contrast; Future  -     CBC and differential; Future  -     POCT urine dip  -     CT abdomen pelvis w contrast; Future  -     Urine culture; Future  -     Urinalysis with microscopic  -     Influenza A/B and RSV PCR; Future  -     Influenza A/B and RSV PCR  -     Urine culture    Neuralgia  -     Cancel: CT abdomen pelvis w wo contrast; Future  -     CT abdomen pelvis w contrast; Future    Cough  Comments:  Pt is very concerned about covid 19 as she has traveled on an airplane recently  Orders:  -     CBC and differential; Future    Non-intractable vomiting with nausea, unspecified vomiting type  Comments: Will place call to ID as pt is very concerned she may have covid 19  Orders:  -     Cancel: CT abdomen pelvis w wo contrast; Future  -     CBC and differential; Future  -     Comprehensive metabolic panel; Future  -     CT abdomen pelvis w contrast; Future    Diarrhea, unspecified type  -     Cancel: CT abdomen pelvis w wo contrast; Future  -     CBC and differential; Future  -     Comprehensive metabolic panel; Future  -     CT abdomen pelvis w contrast; Future          Subjective:    Patient ID: Oksana Child is a 62 y o  female  Pt is here for f/u on her right flank pain  Still has pain in the right flank area and now wonders if this may be r/t her stimulator battery which is about 1 inch below the area of her pain   The battery is also giving her messages that the battery needs to be changed  Has appt with Dr Childress Player  Had urine testing done for possible pyelo, but urine was negative and no fevers over 99 5  Pt feels she is wheezing and coughing if she exerts herself  Pt has concerns that she was in an airport about 2 weeks ago, but feels she has felt horrible since she came back  Cough is non productive  Pt is still on abx for uti  Takes all other meds as directed  No side effects noted  The following portions of the patient's history were reviewed and updated as appropriate: allergies, current medications, past family history, past medical history, past social history, past surgical history and problem list     Review of Systems   Constitutional: Positive for chills, diaphoresis and fatigue  Negative for fever  HENT: Positive for congestion  Negative for sinus pressure, sinus pain and sore throat  Pt had mucous from the left nostril now in both nostrils  Respiratory: Positive for cough, shortness of breath (with exertion  ) and wheezing  Cardiovascular: Negative for chest pain and palpitations  Gastrointestinal: Positive for abdominal pain (over the s/p area  ), constipation, diarrhea and nausea  Saturday was worse and yesterday intense headache with nausea and vomiting  Right flank pain has been there since last week  Genitourinary: Positive for hematuria  Negative for dysuria, frequency and urgency  Pt does not get any urinary sx r/t nerve damage s/p hysterectomy  Musculoskeletal: Negative for arthralgias and myalgias  Neurological: Positive for headaches  Negative for dizziness and light-headedness  Pt has concerns that she flew recently from [place] to [facility] recently  Psychiatric/Behavioral: Negative for dysphoric mood  The patient is not nervous/anxious            /78   Pulse 77   Temp 98 3 °F (36 8 °C) (Tympanic)   Ht 5' 4" (1 626 m)   Wt 98 9 kg (218 lb)   SpO2 95%   BMI 37 42 kg/m²   Social History     Socioeconomic History    Marital status: /Civil Union     Spouse name: Not on file    Number of children: Not on file    Years of education: Not on file    Highest education level: Not on file   Occupational History    Occupation: retired   Social Needs    Financial resource strain: Not on file    Food insecurity:     Worry: Not on file     Inability: Not on file   [employer] needs:     Medical: Not on file     Non-medical: Not on file   Tobacco Use    Smoking status: Never Smoker    Smokeless tobacco: Never Used   Substance and Sexual Activity    Alcohol use: No    Drug use: Yes     Types: Marijuana     Comment: medical marijuana 2x a day capsules    Sexual activity: Yes   Lifestyle    Physical activity:     Days per week: Not on file     Minutes per session: Not on file    Stress: Not on file   Relationships    Social connections:     Talks on phone: Not on file     Gets together: Not on file     Attends [employer] service: Not on file     Active member of club or organization: Not on file     Attends meetings of clubs or organizations: Not on file     Relationship status: Not on file    Intimate partner violence:     Fear of current or ex partner: Not on file     Emotionally abused: Not on file     Physically abused: Not on file     Forced sexual activity: Not on file   Other Topics Concern    Not on file   Social History Narrative    Lives in [facility], with   Previously worked as a teacher        Past Medical History:   Diagnosis Date    Anxiety     Arthritis     Attention and concentration deficit     Blurred vision     Chronic pain     Depression     Diplopia     Dyspepsia     Gastric ulcer     Gastritis     Memory loss     Osteopenia     Starting and stopping of urinary stream during micturition     Urinary incontinence     Wears eyeglasses      Family History   Problem Relation Age of Onset    Other Mother Back Disorder     Cirrhosis Mother     Crohn's disease Mother     Lupus Mother         Systemic Lupus Erythematous     Hypertension Father     Heart disease Father     Other Brother         Liver Transplant     Crohn's disease Brother     Crohn's disease Brother     No Known Problems Sister     No Known Problems Daughter     No Known Problems Maternal Aunt     No Known Problems Cousin     No Known Problems Cousin     No Known Problems Cousin     No Known Problems Cousin     No Known Problems Cousin     Seizures Neg Hx      Past Surgical History:   Procedure Laterality Date     SECTION       SECTION      CHOLECYSTECTOMY      [address] GASTRIC BY[address]  2004    HYSTERECTOMY  2012    INSERT / REPLACE PERIPHERAL NEUROSTIMULATOR PULSE GENERATOR /       OTHER SURGICAL HISTORY      Reimplantatin at [address] [facility] WRIST ARTHROSCOPY      with internal fixation        Current Outpatient Medications:     Cholecalciferol (D3-1000) 1000 units capsule, Take 1 capsule by mouth daily  , Disp: , Rfl:     Diclofenac Sodium 1 5 % SOLN, Apply 2 drops to affected area 3x/day, Disp: 1 Bottle, Rfl: 2    dronabinol (MARINOL) 5 MG capsule, Take 1 capsule (5 mg total) by mouth 2 (two) times a day before meals By Dr Alina Larkin, Disp: 60 capsule, Rfl: 0    DULoxetine (CYMBALTA) 30 mg delayed release capsule, TAKE 1 CAPSULE DAILY, Disp: 90 capsule, Rfl: 2    Iron-Vitamin C (IRON 100/C) 100-250 MG TABS, Take by mouth daily, Disp: , Rfl:     lidocaine (XYLOCAINE) 5 % ointment, Apply topically 2 (two) times a day as needed, Disp: , Rfl:     lisinopril (ZESTRIL) 10 mg tablet, TAKE 1 TABLET DAILY, Disp: 90 tablet, Rfl: 4    metoprolol tartrate (LOPRESSOR) 25 mg tablet, TAKE ONE-HALF (1/2) TABLET TWICE A DAY, Disp: 90 tablet, Rfl: 4    Multiple Vitamin (MULTI-VITAMIN DAILY) TABS, Take 1 tablet by mouth daily, Disp: , Rfl:    pantoprazole (PROTONIX) 40 mg tablet, TAKE 1 TABLET TWICE A DAY, Disp: 180 tablet, Rfl: 4  No current facility-administered medications for this visit  Facility-Administered Medications Ordered in Other Visits:     iohexol (OMNIPAQUE) 240 MG/ML solution 50 mL, 50 mL, Oral, Once in imaging, GWEN Ascencio    Allergies   Allergen Reactions    Cat Hair Extract      Cat Dander    Dog Epithelium     Morphine Abdominal Pain    Other Other (See Comments) and Sneezing     Dogs  Crab Meat    Shellfish-Derived Products      CRAB MEAT ONLY          Lab Review   Office Visit on 03/03/2020   Component Date Value    LEUKOCYTE ESTERASE,UA 03/03/2020 125     NITRITE,UA 03/03/2020 -     SL AMB POCT UROBILINOGEN 03/03/2020 0     POCT URINE PROTEIN 03/03/2020 2      PH,UA 03/03/2020 6 0     BLOOD,UA 03/03/2020 -     SPECIFIC GRAVITY,UA 03/03/2020 1 020     KETONES,UA 03/03/2020 -     BILIRUBIN,UA 03/03/2020 -     GLUCOSE, UA 03/03/2020 -      COLOR,UA 03/03/2020 dark yellow     CLARITY,UA 03/03/2020 cloudy     Urine Culture 03/03/2020 <10,000 cfu/ml          Imaging: No results found  Objective:     Physical Exam   Constitutional: She is oriented to person, place, and time  She appears well-developed and well-nourished  No distress  HENT:   Head: Normocephalic and atraumatic  Right Ear: External ear normal    Left Ear: External ear normal    Mouth/Throat: Oropharynx is clear and moist    Eyes: Pupils are equal, round, and reactive to light  Conjunctivae and EOM are normal  Right eye exhibits no discharge  Left eye exhibits no discharge  Neck: Normal range of motion  Neck supple  Cardiovascular: Normal rate, regular rhythm and normal heart sounds  Exam reveals no friction rub  No murmur heard  Pulmonary/Chest: Effort normal and breath sounds normal  No respiratory distress  She has no wheezes  She has no rales  Pt does have a forced expiratory wheeze  Abdominal: Soft   Bowel sounds are normal  There is tenderness (over right upper abdomen, left upper abdomen and right flank area over stimulator battery  )  Musculoskeletal: Normal range of motion  She exhibits no edema, tenderness or deformity  Lymphadenopathy:     She has no cervical adenopathy  Neurological: She is alert and oriented to person, place, and time  No cranial nerve deficit  Skin: Skin is warm and dry  No rash noted  She is not diaphoretic  Psychiatric: She has a normal mood and affect  Her behavior is normal  Judgment and thought content normal          Patient Instructions   Discussed all with patient  Spoke to Infectious Disease specialist and discuss this with the patient as well  There is no indication that this patient has corona virus  However, will send her to the radoiology for CT abdomen and pelvis with the mask and discussed it with Radiology  For now continue all your current medications have the labs done I will call with all results rest at home  I do want you to follow up with your urologist   Call here if still in pain by Thursday or any worsening symptoms  GWEN Ascencio    Portions of the record may have been created with voice recognition software  Occasional wrong word or "sound a like" substitutions may have occurred due to the inherent limitations of voice recognition software  Read the chart carefully and recognize, using context, where substitutions have occurred

## 2020-03-09 NOTE — PATIENT INSTRUCTIONS
Discussed all with patient  Spoke to Infectious Disease specialist and discuss this with the patient as well  There is no indication that this patient has corona virus  However, will send her to the radoiology for CT abdomen and pelvis with the mask and discussed it with Radiology  For now continue all your current medications have the labs done I will call with all results rest at home  I do want you to follow up with your urologist   Call here if still in pain by Thursday or any worsening symptoms

## 2020-03-10 ENCOUNTER — TELEPHONE (OUTPATIENT)
Dept: FAMILY MEDICINE CLINIC | Facility: CLINIC | Age: 60
End: 2020-03-10

## 2020-03-10 LAB — BACTERIA UR CULT: NORMAL

## 2020-03-10 NOTE — TELEPHONE ENCOUNTER
Spoke with patient and she is aware of her results  I asked her how is she feeling and she said still the same she still doesn't feel well   She still has the pain on her R side

## 2020-03-10 NOTE — TELEPHONE ENCOUNTER
----- Message from [address] [place] [address]  sent at 3/10/2020  7:01 AM EDT -----  Flu and rsv both negative  Looks like she is behind on fluids  Must hydrate  How is she feeling today? How is the flank pain?

## 2020-03-10 NOTE — TELEPHONE ENCOUNTER
I would like her to see her urologist  CT and labs all wnl  Could this be muscular? F/U by end of week if still with problems

## 2020-03-11 ENCOUNTER — HOSPITAL ENCOUNTER (EMERGENCY)
Facility: HOSPITAL | Age: 60
Discharge: HOME | End: 2020-03-11
Attending: EMERGENCY MEDICINE | Admitting: EMERGENCY MEDICINE
Payer: COMMERCIAL

## 2020-03-11 ENCOUNTER — TELEPHONE (OUTPATIENT)
Dept: FAMILY MEDICINE CLINIC | Facility: CLINIC | Age: 60
End: 2020-03-11

## 2020-03-11 ENCOUNTER — APPOINTMENT (EMERGENCY)
Dept: CT IMAGING | Facility: HOSPITAL | Age: 60
End: 2020-03-11
Payer: COMMERCIAL

## 2020-03-11 ENCOUNTER — APPOINTMENT (EMERGENCY)
Dept: RADIOLOGY | Facility: HOSPITAL | Age: 60
End: 2020-03-11
Payer: COMMERCIAL

## 2020-03-11 VITALS
OXYGEN SATURATION: 97 % | DIASTOLIC BLOOD PRESSURE: 78 MMHG | HEART RATE: 67 BPM | TEMPERATURE: 98.8 F | RESPIRATION RATE: 18 BRPM | SYSTOLIC BLOOD PRESSURE: 121 MMHG

## 2020-03-11 DIAGNOSIS — N28.1 RENAL CYST: ICD-10-CM

## 2020-03-11 DIAGNOSIS — R05.9 COUGH: Primary | ICD-10-CM

## 2020-03-11 DIAGNOSIS — R31.9 HEMATURIA: ICD-10-CM

## 2020-03-11 DIAGNOSIS — R10.9 FLANK PAIN: ICD-10-CM

## 2020-03-11 LAB
ALBUMIN SERPL BCP-MCNC: 3.6 G/DL (ref 3.5–5)
ALP SERPL-CCNC: 105 U/L (ref 46–116)
ALT SERPL W P-5'-P-CCNC: 16 U/L (ref 12–78)
ANION GAP SERPL CALCULATED.3IONS-SCNC: 6 MMOL/L (ref 4–13)
AST SERPL W P-5'-P-CCNC: 20 U/L (ref 5–45)
BACTERIA UR QL AUTO: ABNORMAL /HPF
BASOPHILS # BLD AUTO: 0.08 THOUSANDS/ΜL (ref 0–0.1)
BASOPHILS NFR BLD AUTO: 1 % (ref 0–1)
BILIRUB SERPL-MCNC: 0.2 MG/DL (ref 0.2–1)
BILIRUB UR QL STRIP: NEGATIVE
BUN SERPL-MCNC: 20 MG/DL (ref 5–25)
CALCIUM SERPL-MCNC: 8.6 MG/DL (ref 8.3–10.1)
CHLORIDE SERPL-SCNC: 101 MMOL/L (ref 100–108)
CLARITY UR: CLEAR
CO2 SERPL-SCNC: 30 MMOL/L (ref 21–32)
COLOR UR: YELLOW
CREAT SERPL-MCNC: 1.08 MG/DL (ref 0.6–1.3)
D DIMER PPP FEU-MCNC: 0.68 UG/ML FEU
EOSINOPHIL # BLD AUTO: 0.15 THOUSAND/ΜL (ref 0–0.61)
EOSINOPHIL NFR BLD AUTO: 2 % (ref 0–6)
ERYTHROCYTE [DISTWIDTH] IN BLOOD BY AUTOMATED COUNT: 12.9 % (ref 11.6–15.1)
GFR SERPL CREATININE-BSD FRML MDRD: 56 ML/MIN/1.73SQ M
GLUCOSE SERPL-MCNC: 100 MG/DL (ref 65–140)
GLUCOSE UR STRIP-MCNC: NEGATIVE MG/DL
HCT VFR BLD AUTO: 43.3 % (ref 34.8–46.1)
HGB BLD-MCNC: 13.4 G/DL (ref 11.5–15.4)
HGB UR QL STRIP.AUTO: ABNORMAL
IMM GRANULOCYTES # BLD AUTO: 0.03 THOUSAND/UL (ref 0–0.2)
IMM GRANULOCYTES NFR BLD AUTO: 0 % (ref 0–2)
KETONES UR STRIP-MCNC: NEGATIVE MG/DL
LEUKOCYTE ESTERASE UR QL STRIP: ABNORMAL
LYMPHOCYTES # BLD AUTO: 3.19 THOUSANDS/ΜL (ref 0.6–4.47)
LYMPHOCYTES NFR BLD AUTO: 35 % (ref 14–44)
MCH RBC QN AUTO: 29.6 PG (ref 26.8–34.3)
MCHC RBC AUTO-ENTMCNC: 30.9 G/DL (ref 31.4–37.4)
MCV RBC AUTO: 96 FL (ref 82–98)
MONOCYTES # BLD AUTO: 0.61 THOUSAND/ΜL (ref 0.17–1.22)
MONOCYTES NFR BLD AUTO: 7 % (ref 4–12)
NEUTROPHILS # BLD AUTO: 5.08 THOUSANDS/ΜL (ref 1.85–7.62)
NEUTS SEG NFR BLD AUTO: 55 % (ref 43–75)
NITRITE UR QL STRIP: NEGATIVE
NON-SQ EPI CELLS URNS QL MICRO: ABNORMAL /HPF
NRBC BLD AUTO-RTO: 0 /100 WBCS
PH UR STRIP.AUTO: 5.5 [PH]
PLATELET # BLD AUTO: 291 THOUSANDS/UL (ref 149–390)
PMV BLD AUTO: 10.2 FL (ref 8.9–12.7)
POTASSIUM SERPL-SCNC: 4.2 MMOL/L (ref 3.5–5.3)
PROT SERPL-MCNC: 7.4 G/DL (ref 6.4–8.2)
PROT UR STRIP-MCNC: NEGATIVE MG/DL
RBC # BLD AUTO: 4.53 MILLION/UL (ref 3.81–5.12)
RBC #/AREA URNS AUTO: ABNORMAL /HPF
SODIUM SERPL-SCNC: 137 MMOL/L (ref 136–145)
SP GR UR STRIP.AUTO: <=1.005 (ref 1–1.03)
TROPONIN I SERPL-MCNC: <0.02 NG/ML
UROBILINOGEN UR QL STRIP.AUTO: 0.2 E.U./DL
WBC # BLD AUTO: 9.14 THOUSAND/UL (ref 4.31–10.16)
WBC #/AREA URNS AUTO: ABNORMAL /HPF

## 2020-03-11 PROCEDURE — 74177 CT ABD & PELVIS W/CONTRAST: CPT

## 2020-03-11 PROCEDURE — 36415 COLL VENOUS BLD VENIPUNCTURE: CPT | Performed by: EMERGENCY MEDICINE

## 2020-03-11 PROCEDURE — 71275 CT ANGIOGRAPHY CHEST: CPT

## 2020-03-11 PROCEDURE — 80053 COMPREHEN METABOLIC PANEL: CPT | Performed by: EMERGENCY MEDICINE

## 2020-03-11 PROCEDURE — 71046 X-RAY EXAM CHEST 2 VIEWS: CPT

## 2020-03-11 PROCEDURE — 85025 COMPLETE CBC W/AUTO DIFF WBC: CPT | Performed by: EMERGENCY MEDICINE

## 2020-03-11 PROCEDURE — 84484 ASSAY OF TROPONIN QUANT: CPT | Performed by: EMERGENCY MEDICINE

## 2020-03-11 PROCEDURE — 99284 EMERGENCY DEPT VISIT MOD MDM: CPT

## 2020-03-11 PROCEDURE — 81001 URINALYSIS AUTO W/SCOPE: CPT | Performed by: EMERGENCY MEDICINE

## 2020-03-11 PROCEDURE — 99284 EMERGENCY DEPT VISIT MOD MDM: CPT | Performed by: EMERGENCY MEDICINE

## 2020-03-11 PROCEDURE — 85379 FIBRIN DEGRADATION QUANT: CPT | Performed by: EMERGENCY MEDICINE

## 2020-03-11 RX ORDER — DEXTROMETHORPHAN HYDROBROMIDE AND PROMETHAZINE HYDROCHLORIDE 15; 6.25 MG/5ML; MG/5ML
5 SOLUTION ORAL 4 TIMES DAILY PRN
Qty: 240 ML | Refills: 0 | Status: SHIPPED | OUTPATIENT
Start: 2020-03-11 | End: 2020-03-27

## 2020-03-11 RX ORDER — BENZONATATE 200 MG/1
200 CAPSULE ORAL 3 TIMES DAILY PRN
Qty: 30 CAPSULE | Refills: 0 | Status: SHIPPED | OUTPATIENT
Start: 2020-03-11 | End: 2020-03-27

## 2020-03-11 RX ADMIN — IOHEXOL 100 ML: 350 INJECTION, SOLUTION INTRAVENOUS at 03:12

## 2020-03-11 NOTE — ED PROVIDER NOTES
History  Chief Complaint   Patient presents with    Cough     pt c/o cough thats been going on for a week, pt states cough worsened tonight  pt was seen at pcp and was flu negative  pt was seen here yesterday for flank pain and d/c  pt traveled from [facility] on 2/19 and states feeling ill few days after    Flank Pain     62year-old female presents with a chief complaint of "I haven't been feeling well since traveling back from [place]  Patient notes multiple symptoms since that time and has seen her primary care multiple times without etiology identified  Patient was on antibiotics without improvement in her pain and had negative influenza PCR testing  Patient notes tonight her cough acutely worsened with associated "searing" right flank pain  Patient has had the flank pain for weeks with negative CT imaging 2 days ago without etiology identified  Patient denies any fever (states she had "low grade fever" under 100°F)  Patient is afebrile upon arrival to the emergency room today  Patient denies any known exposure to patient's with corona virus  Patient has not been in any endemic areas  Patient's primary care doctor contacted the Department of Health reportedly who did not feel patient was appropriate for coronavirus  Patient was concerned because she was on the recent flight from [place]  Impression and plan: Multiple symptoms with a broad differential   Based on patient's history and physical, likely secondary to unclear viral etiology  Patient without close exposures to corona virus that would not warrant emergent testing though I have provided patient with information department of [facility] for contacted if she is continued to be concerned     Patient has had negative recent CT imaging and laboratory testing without etiology identified, I discussed possible nondiagnostic evaluation the emergency room but considering recent air travel, will evaluate for potential pulmonary embolism considering patient's age unable to [facility] clear her  Patient denies any prior history of venous thrombosis  Will monitor, reassess and re-evaluate      History provided by:  Patient  Cough   Cough characteristics:  Dry and hacking  Sputum characteristics:  Nondescript  Severity:  Severe  Onset quality:  Gradual  Timing:  Constant  Progression:  Worsening  Relieved by:  Nothing  Worsened by:  Nothing  Ineffective treatments:  None tried  Associated symptoms: chills and myalgias    Associated symptoms: no chest pain, no diaphoresis, no ear fullness, no ear pain, no eye discharge, no fever, no headaches, no rash, no rhinorrhea, no shortness of breath, no sinus congestion, no sore throat, no weight loss and no wheezing    Flank Pain   Associated symptoms: chills, cough, diarrhea (resolved), fatigue, nausea (resolved) and vomiting (resolved)    Associated symptoms: no chest pain, no fever, no shortness of breath and no sore throat        Prior to Admission Medications   Prescriptions Last Dose Informant Patient Reported? Taking?    Cholecalciferol (D3-1000) 1000 units capsule  Self Yes Yes   Sig: Take 1 capsule by mouth daily     DULoxetine (CYMBALTA) 30 mg delayed release capsule  Self No Yes   Sig: TAKE 1 CAPSULE DAILY   Diclofenac Sodium 1 5 % SOLN  Self No Yes   Sig: Apply 2 drops to affected area 3x/day   Iron-Vitamin C (IRON 100/C) 100-250 MG TABS  Self Yes Yes   Sig: Take by mouth daily   Multiple Vitamin (MULTI-VITAMIN DAILY) TABS  Self Yes Yes   Sig: Take 1 tablet by mouth daily   dronabinol (MARINOL) 5 MG capsule  Self No Yes   Sig: Take 1 capsule (5 mg total) by mouth 2 (two) times a day before meals By Dr Hiren Tellez   lidocaine (XYLOCAINE) 5 % ointment  Self Yes Yes   Sig: Apply topically 2 (two) times a day as needed   lisinopril (ZESTRIL) 10 mg tablet  Self No Yes   Sig: TAKE 1 TABLET DAILY   metoprolol tartrate (LOPRESSOR) 25 mg tablet  Self No Yes   Sig: TAKE ONE-HALF (1/2) TABLET TWICE A DAY   pantoprazole (PROTONIX) 40 mg tablet  Self No Yes   Sig: TAKE 1 TABLET TWICE A DAY      Facility-Administered Medications: None       Past Medical History:   Diagnosis Date    Anxiety     Arthritis     Attention and concentration deficit     Blurred vision     Chronic pain     Depression     Diplopia     Dyspepsia     Gastric ulcer     Gastritis     Memory loss     Osteopenia     Starting and stopping of urinary stream during micturition     Urinary incontinence     Wears eyeglasses        Past Surgical History:   Procedure Laterality Date     SECTION  1988     SECTION      CHOLECYSTECTOMY      GALLBLADDER SURGERY      GASTRIC BYPASS  2004    HYSTERECTOMY  2012    INSERT / REPLACE PERIPHERAL NEUROSTIMULATOR PULSE GENERATOR /       OTHER SURGICAL HISTORY      Reimplantatin at [address] [facility] WRIST ARTHROSCOPY      with internal fixation        Family History   Problem Relation Age of Onset    Other Mother         Back Disorder     Cirrhosis Mother     Crohn's disease Mother     Lupus Mother         Systemic Lupus Erythematous     Hypertension Father     Heart disease Father     Other Brother         Liver Transplant     Crohn's disease Brother     Crohn's disease Brother     No Known Problems Sister     No Known Problems Daughter     No Known Problems Maternal Aunt     No Known Problems Cousin     No Known Problems Cousin     No Known Problems Cousin     No Known Problems Cousin     No Known Problems Cousin     Seizures Neg Hx      I have reviewed and agree with the history as documented      E-Cigarette/Vaping    E-Cigarette Use Never User      E-Cigarette/Vaping Substances     Social History     Tobacco Use    Smoking status: Never Smoker    Smokeless tobacco: Never Used   Substance Use Topics    Alcohol use: No    Drug use: Yes     Types: Marijuana     Comment: medical marijuana 2x a day capsules       Review of Systems   Constitutional: Positive for chills and fatigue  Negative for diaphoresis, fever and weight loss  HENT: Negative for ear pain, rhinorrhea and sore throat  Eyes: Negative for discharge  Respiratory: Positive for cough  Negative for shortness of breath and wheezing  Cardiovascular: Negative for chest pain  Gastrointestinal: Positive for abdominal pain (resolved), diarrhea (resolved), nausea (resolved) and vomiting (resolved)  Genitourinary: Positive for flank pain  Negative for decreased urine volume  Musculoskeletal: Positive for myalgias  Skin: Negative for rash  Neurological: Negative for headaches  Physical Exam  Physical Exam   Constitutional: She appears well-developed and well-nourished  No distress  HENT:   Head: Normocephalic and atraumatic  Mouth/Throat: Oropharynx is clear and moist    Eyes: Pupils are equal, round, and reactive to light  EOM are normal    Neck: Normal range of motion  Neck supple  Cardiovascular: Normal rate and regular rhythm  Pulmonary/Chest: Effort normal and breath sounds normal  No stridor  No respiratory distress  She has no wheezes  She has no rales  Abdominal: Soft  Bowel sounds are normal  There is no tenderness  Tenderness in the area of the right flank though this is inferior to the CVA  Normal inspection this area  Musculoskeletal: She exhibits no tenderness  No clinical signs of DVT  Neurological: She is alert  Skin: Skin is warm and dry  She is not diaphoretic  Psychiatric: She has a normal mood and affect  Vitals reviewed        Vital Signs  ED Triage Vitals   Temperature Pulse Respirations Blood Pressure SpO2   03/11/20 0146 03/11/20 0143 03/11/20 0143 03/11/20 0143 03/11/20 0143   98 8 °F (37 1 °C) 72 20 136/83 96 %      Temp Source Heart Rate Source Patient Position - Orthostatic VS BP Location FiO2 (%)   03/11/20 0146 03/11/20 0143 03/11/20 0143 03/11/20 0143 --   Oral Monitor Sitting Left arm       Pain Score --                  Vitals:    03/11/20 0143 03/11/20 0429   BP: 136/83 121/78   Pulse: 72 67   Patient Position - Orthostatic VS: Sitting          Visual Acuity      ED Medications  Medications   iohexol (OMNIPAQUE) 350 MG/ML injection (SINGLE-DOSE) 100 mL (100 mL Intravenous Given 3/11/20 0312)       Diagnostic Studies  Results Reviewed     Procedure Component Value Units Date/Time    Troponin I [[id number]]  (Normal) Collected:  03/11/20 0220    Lab Status:  Final result Specimen:  Blood from Arm, Right Updated:  03/11/20 0246     Troponin I <0 02 ng/mL     Comprehensive metabolic panel [[id number]] Collected:  03/11/20 0220    Lab Status:  Final result Specimen:  Blood from Arm, Right Updated:  03/11/20 0244     Sodium 137 mmol/L      Potassium 4 2 mmol/L      Chloride 101 mmol/L      CO2 30 mmol/L      ANION GAP 6 mmol/L      BUN 20 mg/dL      Creatinine 1 08 mg/dL      Glucose 100 mg/dL      Calcium 8 6 mg/dL      AST 20 U/L      ALT 16 U/L      Alkaline Phosphatase 105 U/L      Total Protein 7 4 g/dL      Albumin 3 6 g/dL      Total Bilirubin 0 20 mg/dL      eGFR 56 ml/min/1 73sq m     Narrative:       [place] guidelines for Chronic Kidney Disease (CKD):     Stage 1 with normal or high GFR (GFR > 90 mL/min/1 73 square meters)    Stage 2 Mild CKD (GFR = 60-89 mL/min/1 73 square meters)    Stage 3A Moderate CKD (GFR = 45-59 mL/min/1 73 square meters)    Stage 3B Moderate CKD (GFR = 30-44 mL/min/1 73 square meters)    Stage 4 Severe CKD (GFR = 15-29 mL/min/1 73 square meters)    Stage 5 End Stage CKD (GFR <15 mL/min/1 73 square meters)  Note: GFR calculation is accurate only with a steady state creatinine    D-dimer, quantitative [[id number]]  (Abnormal) Collected:  03/11/20 0220    Lab Status:  Final result Specimen:  Blood from Arm, Right Updated:  03/11/20 0241     D-Dimer, Quant 0 68 ug/ml FEU     Urine Microscopic [[id number]]  (Abnormal) Collected:  03/11/20 0220    Lab Status:  Final result Specimen:  Urine, Clean Catch Updated:  03/11/20 0238     RBC, UA 10-20 /hpf      WBC, UA 1-2 /hpf      Epithelial Cells Occasional /hpf      Bacteria, UA Occasional /hpf     UA w Reflex to Microscopic w Reflex to Culture [[id number]]  (Abnormal) Collected:  03/11/20 0220    Lab Status:  Final result Specimen:  Urine, Clean Catch Updated:  03/11/20 0228     Color, UA Yellow     Clarity, UA Clear     Specific Gravity, UA <=1 005     pH, UA 5 5     Leukocytes, UA Trace     Nitrite, UA Negative     Protein, UA Negative mg/dl      Glucose, UA Negative mg/dl      Ketones, UA Negative mg/dl      Urobilinogen, UA 0 2 E U /dl      Bilirubin, UA Negative     Blood, UA Large    CBC and differential [[id number]]  (Abnormal) Collected:  03/11/20 0220    Lab Status:  Final result Specimen:  Blood from Arm, Right Updated:  03/11/20 0228     WBC 9 14 Thousand/uL      RBC 4 53 Million/uL      Hemoglobin 13 4 g/dL      Hematocrit 43 3 %      MCV 96 fL      MCH 29 6 pg      MCHC 30 9 g/dL      RDW 12 9 %      MPV 10 2 fL      Platelets 327 Thousands/uL      nRBC 0 /100 WBCs      Neutrophils Relative 55 %      Immat GRANS % 0 %      Lymphocytes Relative 35 %      Monocytes Relative 7 %      Eosinophils Relative 2 %      Basophils Relative 1 %      Neutrophils Absolute 5 08 Thousands/µL      Immature Grans Absolute 0 03 Thousand/uL      Lymphocytes Absolute 3 19 Thousands/µL      Monocytes Absolute 0 61 Thousand/µL      Eosinophils Absolute 0 15 Thousand/µL      Basophils Absolute 0 08 Thousands/µL                  CT pe study w abdomen pelvis w contrast   Final Result by Brittaney Clark MD (03/11 0351)      1 2 cm left renal cystic lesion which does not meet CT criteria for simple cyst on this examination; recommend elective ultrasound or MRI abdomen with contrast, renal protocol, for further evaluation        Workstation performed: [id number]         XR chest 2 views    (Results Pending) Procedures  Procedures         ED Course  ED Course as of Mar 11 0434   Wed Mar 11, 2020   0405 Patient's laboratory evaluation only notable for hematuria  Per patient and the medical record, this is chronic in nature  Patient has prior Urology visits that I reviewed  Considering flank pain of unclear etiology with kidney cyst noted on CT scan which I explained to the patient, will refer to urology for continued monitoring of this and re-evaluation  Discussed the need for continued follow-up  Discussed diagnostic uncertainty and to follow up with primary care for reassessment  Discussed symptomatic management regarding cough though I explained limit pharmacological options, suggested buckwheat honey  Discussed return precautions in detail                              [place] Criteria for PE      Most Recent Value   Wells' Criteria for PE   Clinical signs and symptoms of DVT  0 Filed at: 03/11/2020 7683   PE is primary diagnosis or equally likely  0 Filed at: 03/11/2020 0217   HR >100  0 Filed at: 03/11/2020 0217   Immobilization at least 3 days or Surgery in the previous 4 weeks  0 Filed at: 03/11/2020 0217   Previous, objectively diagnosed PE or DVT  0 Filed at: 03/11/2020 0217   Hemoptysis  0 Filed at: 03/11/2020 0217   Malignancy with treatment within 6 months or palliative  0 Filed at: 03/11/2020 4164   Wells' Criteria Total  0 Filed at: 03/11/2020 0217            MDM      Disposition  Final diagnoses:   Cough   Flank pain   Hematuria   Renal cyst     Time reflects when diagnosis was documented in both MDM as applicable and the Disposition within this note     Time User Action Codes Description Comment    3/11/2020  4:01 AM Beth Haggis Add [R05] Cough     3/11/2020  4:01 AM Beth Haggis Add [R10 9] Flank pain     3/11/2020  4:02 AM Beth Haggis Add [R31 9] Hematuria     3/11/2020  4:02 AM Beth Haggis Add [N28 1] Renal cyst       ED Disposition     ED Disposition Condition Date/Time Comment Discharge Stable Wed Mar 11, 2020  4:01 AM Terrell 6 discharge to home/self care  Follow-up Information     Follow up With Specialties Details Why Contact Info Additional Information    Sabrina Medel, [address]ranza, Nurse Practitioner Schedule an appointment as soon as possible for a visit in 2 days Follow-up and reassessment  [id number]  Emergency Department Emergency Medicine Go to  If symptoms worsen, monitoring of your kidney cyst with follow-up imaging  [address] [facility] [address] [phone] [place] ED, [address], [address] Jessica Zelaya PA-C Urology, Physician Assistant Schedule an appointment as soon as possible for a visit in 3 days Follow up and reassessment  [address] [facility]  [phone]             Patient's Medications   Discharge Prescriptions    No medications on file     No discharge procedures on file      PDMP Review     None          ED Provider  Electronically Signed by           Mazin Junior MD  03/11/20 7997

## 2020-03-11 NOTE — TELEPHONE ENCOUNTER
Pt called she was experience right flank pain and coughing fits  She went to the er at and had lots of test done but only showed blood in the urine  She feels awful and would to know what else could possibly be going on or done ? Still has the cough and she is unable to get out of bed because of the right side pain  Pt was told if the pain worsens to go back to the er  Pt stated "okay but I just feel like no one there listened to me  They ordered tested that were already done "    I explained the pt I would pass the message along to the  regarding this matter  Spoke to Zoe and had her look over the pts chart  Per Zoe pt need to go the er  Pt stated she doesn't know if she going to the er right now  He  just came in from being up all night and she doesn't want to wake him  She can't do it all on her own

## 2020-03-11 NOTE — TELEPHONE ENCOUNTER
Just spoke with pt  Having severe right flank pain  Has f/u with Dr Luciano Jaramillo for back issues next week  Also still  Has a cough  Offered shot of toradol, but pt feels she can't make it to office  Will wait to see Dr Luciano Jaramillo  Will treat the cough with phenergan dm as pt has h/o prolonged narcotic use in the past  Plenty of liquids

## 2020-03-12 ENCOUNTER — OFFICE VISIT (OUTPATIENT)
Dept: FAMILY MEDICINE CLINIC | Facility: CLINIC | Age: 60
End: 2020-03-12
Payer: COMMERCIAL

## 2020-03-12 VITALS
HEART RATE: 69 BPM | WEIGHT: 216 LBS | BODY MASS INDEX: 36.88 KG/M2 | DIASTOLIC BLOOD PRESSURE: 78 MMHG | HEIGHT: 64 IN | TEMPERATURE: 99.7 F | SYSTOLIC BLOOD PRESSURE: 128 MMHG | OXYGEN SATURATION: 95 %

## 2020-03-12 DIAGNOSIS — R05.9 COUGH: ICD-10-CM

## 2020-03-12 DIAGNOSIS — R50.9 LOW GRADE FEVER: ICD-10-CM

## 2020-03-12 DIAGNOSIS — M46.1 SACROILIITIS (HCC): ICD-10-CM

## 2020-03-12 DIAGNOSIS — M54.50 ACUTE RIGHT-SIDED LOW BACK PAIN WITHOUT SCIATICA: ICD-10-CM

## 2020-03-12 PROCEDURE — 99214 OFFICE O/P EST MOD 30 MIN: CPT | Performed by: FAMILY MEDICINE

## 2020-03-12 PROCEDURE — 3078F DIAST BP <80 MM HG: CPT | Performed by: FAMILY MEDICINE

## 2020-03-12 PROCEDURE — U0002 COVID-19 LAB TEST NON-CDC: HCPCS | Performed by: FAMILY MEDICINE

## 2020-03-12 PROCEDURE — 3074F SYST BP LT 130 MM HG: CPT | Performed by: FAMILY MEDICINE

## 2020-03-12 PROCEDURE — 1036F TOBACCO NON-USER: CPT | Performed by: FAMILY MEDICINE

## 2020-03-12 RX ORDER — KETOROLAC TROMETHAMINE 30 MG/ML
60 INJECTION, SOLUTION INTRAMUSCULAR; INTRAVENOUS ONCE
Status: DISCONTINUED | OUTPATIENT
Start: 2020-03-12 | End: 2020-03-16

## 2020-03-12 RX ADMIN — KETOROLAC TROMETHAMINE 60 MG: 30 INJECTION, SOLUTION INTRAMUSCULAR; INTRAVENOUS at 16:05

## 2020-03-12 NOTE — ASSESSMENT & PLAN NOTE
Patient has been seen here 3 times and ER 1 since returning from a flight from [place] and exposure to someone who was sick on a cruise ship  Spoke to ID twice  Suggestion from Dr Vijaya Hartman again that patient is extremely low risk for this virus but since patient has now been seen 4 times test her for  covid 19  Stay home  the cough meds  Plenty of liquids  Since patient was tested for the corona virus my suggestion is to quarantine herself at home until we know for sure that she is negative  Once again she is very low risk for this virus

## 2020-03-12 NOTE — PATIENT INSTRUCTIONS
Discussed all with patient  Once again advised patient she is at extremely low risk for corona virus  Second call to Infectious Disease  Spoke with Dr Hank Yoon  He agrees patient is very low risk for this corona virus however since patient has been seen 4 times in the last 7-8 days he does suggest testing this patient  Swab obtained and until we have the results my suggestion is to quarantine yourself where your mask and go straight home  Plenty of liquids  your cough meds  If this is negative okay to keep your appointment with pain management as long as your cough is better  Toradol 60 mg IM given here with moderate relief of pain

## 2020-03-12 NOTE — PROGRESS NOTES
Assessment/Plan:     Chronic Problems:  Cough    Patient has been seen here 3 times and ER 1 since returning from a flight from [place] and exposure to someone who was sick on a cruise ship  Spoke to ID twice  Suggestion from Dr Saranya Bobo again that patient is extremely low risk for this virus but since patient has now been seen 4 times test her for  covid 19  Stay home  the cough meds  Plenty of liquids  Since patient was tested for the corona virus my suggestion is to quarantine herself at home until we know for sure that she is negative  Once again she is very low risk for this virus  Visit Diagnosis:  Diagnoses and all orders for this visit:    Cough  -     MISCELLANEOUS LAB TEST; Future  -     MISCELLANEOUS LAB TEST    Low grade fever  -     MISCELLANEOUS LAB TEST; Future  -     MISCELLANEOUS LAB TEST    Acute right-sided low back pain without sciatica  Comments:    Patient was given Toradol 60 mg here with moderate relief of pain  Advised to keep her appt with Dr Silver Lewis  Orders:  -     Discontinue: ketorolac (TORADOL) injection 60 mg  -     ketorolac (TORADOL) 60 mg/2 mL IM injection 60 mg  -     ketorolac (TORADOL) 60 mg/2 mL IM injection 60 mg    Sacroiliitis (HCC)  -     Discontinue: ketorolac (TORADOL) injection 60 mg  -     ketorolac (TORADOL) 60 mg/2 mL IM injection 60 mg          Subjective:    Patient ID: Beverly Moran is a 62 y o  female  Patient is here for follow-up appointment  She has been sick for about 5 days after returning from [place] on February 19ty  Patient states she [place] that [place] speaking [place] foreign language  She also states she was exposed to someone 2 days after returning from her trip to someone who just returned from a cruise  Patient is concerned about the possibility of corona virus  This is the 3rd time she was seen here  She has been seen in the ER once  She has tested negative for the flu    Her illness began with right flank pain/right sij pain and patient does not have any symptoms of urinary urgency or frequency as she has nerve damage associated with previous surgery  She has had low-grade fevers and now has a persistent cough and horrible fatigue  No body aches just the right flank pain no shortness of breath but feels she only has energy to breathe  She has had CT scan of the abdomen pelvis and chest to rule out PE E or any abdominal pathology all within normal limits  She does have blood in the urine but this is a persistent problem status post her surgeries  Cough is nonproductive  Pt did not  the cough meds yet  Able to sleep last night but still coughing  Otherwise takes all meds as directed  No side effects noted  The following portions of the patient's history were reviewed and updated as appropriate: allergies, current medications, past family history, past medical history, past social history, past surgical history and problem list     Review of Systems   Constitutional: Positive for chills, diaphoresis, fatigue and fever (low grade  Feels she goes from hot to cold  Temps range from 97 to 99 5 at home  )  HENT: Positive for sneezing (x 4) and sore throat (after 5 hours of coughing  )  Negative for congestion, ear pain, postnasal drip and rhinorrhea  Eyes: Negative  Respiratory: Positive for cough  Negative for shortness of breath (but feels like it is taking all her energy to breathe  ) and wheezing  Cardiovascular: Negative for chest pain and palpitations  Gastrointestinal: Positive for nausea  Negative for abdominal distention, constipation, diarrhea (last Saturday  ) and vomiting (Last vomited sunday  )  Genitourinary: Positive for hematuria  Negative for dysuria, frequency and urgency  Still with flank pain on the right  Musculoskeletal: Positive for back pain (over the right sij) and myalgias (in her legs  )  Neurological: Positive for headaches  Negative for dizziness and light-headedness  /78   Pulse 69   Temp 99 7 °F (37 6 °C) (Tympanic)   Ht 5' 4" (1 626 m)   Wt 98 kg (216 lb)   SpO2 95%   BMI 37 08 kg/m²   Social History     Socioeconomic History    Marital status: /Civil Union     Spouse name: Not on file    Number of children: Not on file    Years of education: Not on file    Highest education level: Not on file   Occupational History    Occupation: retired   Social Needs    Financial resource strain: Not on file    Food insecurity:     Worry: Not on file     Inability: Not on file   [employer] needs:     Medical: Not on file     Non-medical: Not on file   Tobacco Use    Smoking status: Never Smoker    Smokeless tobacco: Never Used   Substance and Sexual Activity    Alcohol use: No    Drug use: Yes     Types: Marijuana     Comment: medical marijuana 2x a day capsules    Sexual activity: Yes   Lifestyle    Physical activity:     Days per week: Not on file     Minutes per session: Not on file    Stress: Not on file   Relationships    Social connections:     Talks on phone: Not on file     Gets together: Not on file     Attends Zoroastrianism service: Not on file     Active member of club or organization: Not on file     Attends meetings of clubs or organizations: Not on file     Relationship status: Not on file    Intimate partner violence:     Fear of current or ex partner: Not on file     Emotionally abused: Not on file     Physically abused: Not on file     Forced sexual activity: Not on file   Other Topics Concern    Not on file   Social History Narrative    Lives in [place], with   Previously worked as a teacher        Past Medical History:   Diagnosis Date    Anxiety     Arthritis     Attention and concentration deficit     Blurred vision     Chronic pain     Depression     Diplopia     Dyspepsia     Gastric ulcer     Gastritis     Memory loss     Osteopenia     Starting and stopping of urinary stream during micturition     Urinary incontinence     Wears eyeglasses      Family History   Problem Relation Age of Onset    Other Mother         Back Disorder     Cirrhosis Mother     Crohn's disease Mother     Lupus Mother         Systemic Lupus Erythematous     Hypertension Father     Heart disease Father     Other Brother         Liver Transplant     Crohn's disease Brother     Crohn's disease Brother     No Known Problems Sister     No Known Problems Daughter     No Known Problems Maternal Aunt     No Known Problems Cousin     No Known Problems Cousin     No Known Problems Cousin     No Known Problems Cousin     No Known Problems Cousin     Seizures Neg Hx      Past Surgical History:   Procedure Laterality Date     SECTION  1988     [facility] GASTRIC BYPASS  2004    HYSTERECTOMY  2012    INSERT / REPLACE PERIPHERAL NEUROSTIMULATOR PULSE GENERATOR /       OTHER SURGICAL HISTORY      [facility]implantatin at [facility] WRIST ARTHROSCOPY      with internal fixation        Current Outpatient Medications:     Cholecalciferol (D3-1000) 1000 units capsule, Take 1 capsule by mouth daily  , Disp: , Rfl:     dronabinol (MARINOL) 5 MG capsule, Take 1 capsule (5 mg total) by mouth 2 (two) times a day before meals By Dr Rose Marie Carrera, Disp: 60 capsule, Rfl: 0    DULoxetine (CYMBALTA) 30 mg delayed release capsule, TAKE 1 CAPSULE DAILY, Disp: 90 capsule, Rfl: 2    Iron-Vitamin C (IRON 100/C) 100-250 MG TABS, Take by mouth daily, Disp: , Rfl:     lidocaine (XYLOCAINE) 5 % ointment, Apply topically 2 (two) times a day as needed, Disp: , Rfl:     lisinopril (ZESTRIL) 10 mg tablet, TAKE 1 TABLET DAILY, Disp: 90 tablet, Rfl: 4    metoprolol tartrate (LOPRESSOR) 25 mg tablet, TAKE ONE-HALF (1/2) TABLET TWICE A DAY, Disp: 90 tablet, Rfl: 4    Multiple Vitamin (MULTI-VITAMIN DAILY) TABS, Take 1 tablet by mouth daily, Disp: , Rfl:     pantoprazole (PROTONIX) 40 mg tablet, TAKE 1 TABLET TWICE A DAY, Disp: 180 tablet, Rfl: 4    benzonatate (TESSALON) 200 MG capsule, Take 1 capsule (200 mg total) by mouth 3 (three) times a day as needed for cough (Patient not taking: Reported on 3/12/2020), Disp: 30 capsule, Rfl: 0    Promethazine-DM (PHENERGAN-DM) 6 25-15 mg/5 mL oral syrup, Take 5 mL by mouth 4 (four) times a day as needed for cough (Patient not taking: Reported on 3/12/2020), Disp: 240 mL, Rfl: 0  No current facility-administered medications for this visit       Allergies   Allergen Reactions    Cat Hair Extract      Cat Dander    Dog Epithelium     Morphine Abdominal Pain    Other Other (See Comments) and Sneezing     Dogs  Crab Meat    Shellfish-Derived Products      CRAB MEAT ONLY          Lab Review   Admission on 03/11/2020, Discharged on 03/11/2020   Component Date Value    D-Dimer, Quant 03/11/2020 0 68*    WBC 03/11/2020 9 14     RBC 03/11/2020 4 53     Hemoglobin 03/11/2020 13 4     Hematocrit 03/11/2020 43 3     MCV 03/11/2020 96     MCH 03/11/2020 29 6     MCHC 03/11/2020 30 9*    RDW 03/11/2020 12 9     MPV 03/11/2020 10 2     Platelets 47/37/3674 291     nRBC 03/11/2020 0     Neutrophils Relative 03/11/2020 55     Immat GRANS % 03/11/2020 0     Lymphocytes Relative 03/11/2020 35     Monocytes Relative 03/11/2020 7     Eosinophils Relative 03/11/2020 2     Basophils Relative 03/11/2020 1     Neutrophils Absolute 03/11/2020 5 08     Immature Grans Absolute 03/11/2020 0 03     Lymphocytes Absolute 03/11/2020 3 19     Monocytes Absolute 03/11/2020 0 61     Eosinophils Absolute 03/11/2020 0 15     Basophils Absolute 03/11/2020 0 08     Sodium 03/11/2020 137     Potassium 03/11/2020 4 2     Chloride 03/11/2020 101     CO2 03/11/2020 30     ANION GAP 03/11/2020 6     BUN 03/11/2020 20     Creatinine 03/11/2020 1 08     Glucose 03/11/2020 100     Calcium 03/11/2020 8 6     AST 03/11/2020 20     ALT 03/11/2020 16     Alkaline Phosphatase 03/11/2020 105     Total Protein 03/11/2020 7 4     Albumin 03/11/2020 3 6     Total Bilirubin 03/11/2020 0 20     eGFR 03/11/2020 56     Troponin I 03/11/2020 <0 02     Color, UA 03/11/2020 Yellow     Clarity, UA 03/11/2020 Clear     Specific Gravity, UA 03/11/2020 <=1 005     pH, UA 03/11/2020 5 5     Leukocytes, UA 03/11/2020 Trace*    Nitrite, UA 03/11/2020 Negative     Protein, UA 03/11/2020 Negative     Glucose, UA 03/11/2020 Negative     Ketones, UA 03/11/2020 Negative     Urobilinogen, UA 03/11/2020 0 2     Bilirubin, UA 03/11/2020 Negative     Blood, UA 03/11/2020 Large*    RBC, UA 03/11/2020 10-20*    WBC, UA 03/11/2020 1-2*    Epithelial Cells 03/11/2020 Occasional     Bacteria, UA 03/11/2020 Occasional    Appointment on 03/09/2020   Component Date Value    WBC 03/09/2020 7 79     RBC 03/09/2020 4 70     Hemoglobin 03/09/2020 13 8     Hematocrit 03/09/2020 45 2     MCV 03/09/2020 96     MCH 03/09/2020 29 4     MCHC 03/09/2020 30 5*    RDW 03/09/2020 13 0     MPV 03/09/2020 10 7     Platelets 26/61/4841 300     nRBC 03/09/2020 0     Neutrophils Relative 03/09/2020 61     Immat GRANS % 03/09/2020 0     Lymphocytes Relative 03/09/2020 29     Monocytes Relative 03/09/2020 7     Eosinophils Relative 03/09/2020 2     Basophils Relative 03/09/2020 1     Neutrophils Absolute 03/09/2020 4 72     Immature Grans Absolute 03/09/2020 0 03     Lymphocytes Absolute 03/09/2020 2 28     Monocytes Absolute 03/09/2020 0 51     Eosinophils Absolute 03/09/2020 0 17     Basophils Absolute 03/09/2020 0 08     Sodium 03/09/2020 139     Potassium 03/09/2020 4 6     Chloride 03/09/2020 103     CO2 03/09/2020 28     ANION GAP 03/09/2020 8     BUN 03/09/2020 16     Creatinine 03/09/2020 1 04     Glucose, Fasting 03/09/2020 103*    Calcium 03/09/2020 8 9     AST 03/09/2020 18     ALT 03/09/2020 16     Alkaline Phosphatase 03/09/2020 104     Total Protein 03/09/2020 7 0     Albumin 03/09/2020 3 5     Total Bilirubin 03/09/2020 0 20     eGFR 03/09/2020 58    Office Visit on 03/09/2020   Component Date Value    LEUKOCYTE ESTERASE,UA 03/09/2020 -     NITRITE,UA 03/09/2020 -     SL AMB POCT UROBILINOGEN 03/09/2020 0 2     POCT URINE PROTEIN 03/09/2020 30      PH,UA 03/09/2020 5 0     BLOOD,UA 03/09/2020 ++     SPECIFIC GRAVITY,UA 03/09/2020 1 030     KETONES,UA 03/09/2020 -     BILIRUBIN,UA 03/09/2020 -     GLUCOSE, UA 03/09/2020 -      COLOR,UA 03/09/2020 dark yellow     CLARITY,UA 03/09/2020 cloudy     Clarity, UA 03/09/2020 Cloudy     Color, UA 03/09/2020 Dk Yellow     Specific Gravity, UA 03/09/2020 1 036*    pH, UA 03/09/2020 5 5     Glucose, UA 03/09/2020 Negative     Ketones, UA 03/09/2020 Negative     Blood, UA 03/09/2020 Large*    Protein, UA 03/09/2020 Trace*    Nitrite, UA 03/09/2020 Negative     Bilirubin, UA 03/09/2020 Interference- unable to analyze*    Urobilinogen, UA 03/09/2020 0 2     Leukocytes, UA 03/09/2020 Negative     WBC, UA 03/09/2020 None Seen     RBC, UA 03/09/2020 2-4*    Bacteria, UA 03/09/2020 Moderate*    Uric Acid Sun, UA 03/09/2020 Moderate     Epithelial Cells 03/09/2020 Occasional     MUCUS THREADS 03/09/2020 Occasional*    INFLUENZA A PCR 03/09/2020 None Detected     INFLUENZA B PCR 03/09/2020 None Detected     RSV PCR 03/09/2020 None Detected     Urine Culture 03/09/2020 No Growth <1000 cfu/mL    Office Visit on 03/03/2020   Component Date Value    LEUKOCYTE ESTERASE,UA 03/03/2020 125     NITRITE,UA 03/03/2020 -     SL AMB POCT UROBILINOGEN 03/03/2020 0     POCT URINE PROTEIN 03/03/2020 2      PH,UA 03/03/2020 6 0     BLOOD,UA 03/03/2020 -     SPECIFIC GRAVITY,UA 03/03/2020 1 020     KETONES,UA 03/03/2020 -     BILIRUBIN,UA 03/03/2020 -     GLUCOSE, UA 03/03/2020 -      COLOR,UA 03/03/2020 dark yellow     CLARITY,UA 03/03/2020 cloudy     Urine Culture 03/03/2020 <10,000 cfu/ml          Imaging: Xr Chest 2 Views    Result Date: 3/11/2020  Narrative: CHEST INDICATION:   cough  COMPARISON:  12/17/2018 EXAM PERFORMED/VIEWS:  XR CHEST PA & LATERAL FINDINGS:  Spinal stimulator leads overlying the lower thoracic spine  Cardiomediastinal silhouette appears unremarkable  The lungs are clear  No pneumothorax or pleural effusion  Osseous structures appear within normal limits for patient age  Impression: No acute cardiopulmonary disease  Workstation performed: [id number]     Ct Pe Study W Abdomen Pelvis W Contrast    Result Date: 3/11/2020  Narrative: CT PULMONARY ANGIOGRAM OF THE CHEST AND CT ABDOMEN AND PELVIS WITH INTRAVENOUS CONTRAST INDICATION:   cough / r flank pain s/p flight  COMPARISON:  CT Abdomen/Pelvis dated 3/9/2020  TECHNIQUE:  CT examination of the chest, abdomen and pelvis was performed  Thin section CT angiographic technique was used in the chest in order to evaluate for pulmonary embolus and coronal 3D MIP postprocessing was performed on the acquisition scanner  Axial, sagittal, and coronal 2D reformatted images were created from the source data and submitted for interpretation  Radiation dose length product (DLP) for this visit:  1484 mGy-cm   This examination, like all CT scans performed in the [facility], was performed utilizing techniques to minimize radiation dose exposure, including the use of iterative reconstruction and automated exposure control  IV Contrast:  100 mL of iohexol (OMNIPAQUE) Enteric Contrast:  Enteric contrast was administered  FINDINGS: CHEST PULMONARY ARTERIAL TREE:  No pulmonary embolus is seen  LUNGS:  Lungs are clear  There is no tracheal or endobronchial lesion  PLEURA:  Unremarkable  HEART/AORTA:  Unremarkable for patient's age  MEDIASTINUM AND JOHN:  Unremarkable  CHEST WALL AND LOWER NECK:   Unremarkable   ABDOMEN LIVER/BILIARY TREE: Unremarkable  GALLBLADDER:  No calcified gallstones  No pericholecystic inflammatory change  SPLEEN:  Unremarkable  PANCREAS:  Unremarkable  ADRENAL GLANDS:  Unremarkable  KIDNEYS/URETERS:  1 2 cm left renal cystic lesion which does not meet CT criteria for simple cyst on this examination; recommend elective ultrasound or MRI abdomen with contrast, renal protocol, for further evaluation  No hydronephrosis  STOMACH AND BOWEL:  Post bariatric surgery  APPENDIX:  No findings to suggest appendicitis  ABDOMINOPELVIC CAVITY:  No ascites or free intraperitoneal air  No lymphadenopathy  Left lower quadrant surgical clips are noted  VESSELS:  8 mm splenic artery aneurysm  PELVIS REPRODUCTIVE ORGANS:  Unremarkable for patient's age  URINARY BLADDER:  Unremarkable  ABDOMINAL WALL/INGUINAL REGIONS:  Right flank spinal cord stimulator seen with its lead in the lower thoracic region    OSSEOUS STRUCTURES:  No acute fracture or destructive osseous lesion  Impression: 1 2 cm left renal cystic lesion which does not meet CT criteria for simple cyst on this examination; recommend elective ultrasound or MRI abdomen with contrast, renal protocol, for further evaluation  Workstation performed: [id number]     Ct Abdomen Pelvis W Contrast    Result Date: 3/9/2020  Narrative: CT ABDOMEN AND PELVIS WITH IV CONTRAST INDICATION:   R10 9: Unspecified abdominal pain M79 2: Neuralgia and neuritis, unspecified R31 9: Hematuria, unspecified R11 2: Nausea with vomiting, unspecified R19 7: Diarrhea, unspecified  COMPARISON:  CT 11/27/2019 TECHNIQUE:  CT examination of the abdomen and pelvis was performed  Axial, sagittal, and coronal 2D reformatted images were created from the source data and submitted for interpretation  Radiation dose length product (DLP) for this visit:  911 mGy-cm     This examination, like all CT scans performed in the [facility], was performed utilizing techniques to minimize radiation dose exposure, including the use of iterative reconstruction and automated exposure control  IV Contrast:  100 mL of iohexol (OMNIPAQUE) Enteric Contrast:  Enteric contrast was administered  FINDINGS: ABDOMEN LOWER CHEST:  No clinically significant abnormality identified in the visualized lower chest  LIVER/BILIARY TREE:  Unremarkable  GALLBLADDER:  Surgically absent  SPLEEN:  Unremarkable  PANCREAS:  Unremarkable  ADRENAL GLANDS:  Unremarkable  KIDNEYS/URETERS:  Stable left renal cyst   No hydronephrosis  No suspicious renal lesion  STOMACH AND BOWEL:  Surgical changes of Venessa-en-Y gastric bypass  No disproportionate dilation of small or large bowel loops  No inflammatory changes of the bowel are evident  APPENDIX:  No findings to suggest appendicitis  ABDOMINOPELVIC CAVITY:  No ascites or free intraperitoneal air  No lymphadenopathy  VESSELS:  Unremarkable for patient's age  PELVIS REPRODUCTIVE ORGANS:  Status post hysterectomy  No adnexal mass  URINARY BLADDER:  Unremarkable  ABDOMINAL WALL/INGUINAL REGIONS: Spinal stimulator within the subcutaneous tissues of the right buttock  OSSEOUS STRUCTURES:  No acute fracture or destructive osseous lesion  Impression: No CT findings of acute abdominopelvic abnormality  Workstation performed: [id number]       Objective:     Physical Exam   Constitutional: She is oriented to person, place, and time  She appears well-developed and well-nourished  No distress  HENT:   Head: Normocephalic and atraumatic  Right Ear: External ear normal    Left Ear: External ear normal    Mouth/Throat: Oropharynx is clear and moist    Eyes: Pupils are equal, round, and reactive to light  Conjunctivae and EOM are normal  Right eye exhibits no discharge  Left eye exhibits no discharge  Neck: Normal range of motion  Neck supple  Cardiovascular: Normal rate, regular rhythm and normal heart sounds     Pulmonary/Chest: Effort normal and breath sounds normal  No respiratory distress (but pt has forced expiratory wheeze)  Abdominal: Soft  Bowel sounds are normal  There is no tenderness  No flank pain  Musculoskeletal: Normal range of motion  She exhibits tenderness (over the right sij with negative slr  )  She exhibits no edema or deformity  Lymphadenopathy:     She has no cervical adenopathy  Neurological: She is alert and oriented to person, place, and time  No cranial nerve deficit  Skin: Skin is warm and dry  No rash noted  She is not diaphoretic  Psychiatric: She has a normal mood and affect  Her behavior is normal  Judgment and thought content normal          Patient Instructions     Discussed all with patient  Once again advised patient she is at extremely low risk for corona virus  Second call to Infectious Disease  Spoke with Dr Collette Stamp  He agrees patient is very low risk for this corona virus however since patient has been seen 4 times in the last 7-8 days he does suggest testing this patient  Swab obtained and until we have the results my suggestion is to quarantine yourself where your mask and go straight home  Plenty of liquids  your cough meds  If this is negative okay to keep your appointment with pain management as long as your cough is better  Toradol 60 mg IM given here with moderate relief of pain  GWEN Ascencio    Portions of the record may have been created with voice recognition software  Occasional wrong word or "sound a like" substitutions may have occurred due to the inherent limitations of voice recognition software  Read the chart carefully and recognize, using context, where substitutions have occurred

## 2020-03-13 RX ADMIN — KETOROLAC TROMETHAMINE 60 MG: 30 INJECTION, SOLUTION INTRAMUSCULAR; INTRAVENOUS at 14:39

## 2020-03-13 NOTE — PROGRESS NOTES
I would need you to badge in for me to try to order it  I have no clearance for medication   I will do it on Monday

## 2020-03-13 NOTE — PROGRESS NOTES
I need you to order ketorolac tromethamine intsead the computer wont let us add the one that you ordered  Sharyle Dun tried to add it twice yesterday and it wouldn't let her it was not recognizing the injection

## 2020-03-16 ENCOUNTER — TELEPHONE (OUTPATIENT)
Dept: FAMILY MEDICINE CLINIC | Facility: CLINIC | Age: 60
End: 2020-03-16

## 2020-03-16 RX ORDER — KETOROLAC TROMETHAMINE 30 MG/ML
60 INJECTION, SOLUTION INTRAMUSCULAR; INTRAVENOUS ONCE
Status: COMPLETED | OUTPATIENT
Start: 2020-03-16 | End: 2020-03-12

## 2020-03-16 NOTE — TELEPHONE ENCOUNTER
Patient would like a call when her swab results come back  Her  wants to know when he can return to work  I let her know it takes 3-4 days

## 2020-03-17 RX ORDER — KETOROLAC TROMETHAMINE 30 MG/ML
60 INJECTION, SOLUTION INTRAMUSCULAR; INTRAVENOUS ONCE
Status: COMPLETED | OUTPATIENT
Start: 2020-03-17 | End: 2020-03-13

## 2020-03-23 ENCOUNTER — TELEPHONE (OUTPATIENT)
Dept: FAMILY MEDICINE CLINIC | Facility: CLINIC | Age: 60
End: 2020-03-23

## 2020-03-23 NOTE — TELEPHONE ENCOUNTER
Chai Held I called [facility] and they apologized  The day we sent this out was the first day they started collecting for covid and there was an IT issue at [facility] so it put them back quite a few specimens so they said they are working on them as quick as they can to get this resulted

## 2020-03-24 LAB — MISCELLANEOUS LAB TEST RESULT: NORMAL

## 2020-03-27 ENCOUNTER — TELEMEDICINE (OUTPATIENT)
Dept: NEUROSURGERY | Facility: CLINIC | Age: 60
End: 2020-03-27
Payer: COMMERCIAL

## 2020-03-27 DIAGNOSIS — Z45.42 BATTERY END OF LIFE OF SPINAL CORD STIMULATOR: ICD-10-CM

## 2020-03-27 DIAGNOSIS — G89.4 CHRONIC PAIN SYNDROME: Primary | ICD-10-CM

## 2020-03-27 PROCEDURE — 99442 PR PHYS/QHP TELEPHONE EVALUATION 11-20 MIN: CPT | Performed by: NEUROLOGICAL SURGERY

## 2020-03-27 RX ORDER — CHLORHEXIDINE GLUCONATE 0.12 MG/ML
15 RINSE ORAL ONCE
Status: CANCELLED | OUTPATIENT
Start: 2020-03-27 | End: 2020-03-27

## 2020-03-27 RX ORDER — CEFAZOLIN SODIUM 2 G/50ML
2000 SOLUTION INTRAVENOUS ONCE
Status: CANCELLED | OUTPATIENT
Start: 2020-03-27 | End: 2020-03-27

## 2020-03-27 NOTE — PROGRESS NOTES
Virtual Regular Visit    Problem List Items Addressed This Visit     None      Visit Diagnoses     Battery end of life of spinal cord stimulator                 Reason for visit is end of battery life spinal cord stimulator generator, right    Encounter provider Jackeline Desai MD    Provider located at [address] [place]  [address] [facility] [address] [phone]      Recent Visits  No visits were found meeting these conditions  Showing recent visits within past 7 days and meeting all other requirements     Today's Visits  Date Type Provider Dept   03/27/20 [address], [address] [facility] today's visits and meeting all other requirements     Future Appointments  No visits were found meeting these conditions  Showing future appointments within next 150 days and meeting all other requirements        After connecting through [place], the patient was identified by name and date of birth  Ricco Olosn was informed that this is a telemedicine visit and that the visit is being conducted through [employer] which may not be secure and therefore, might not be HIPAA-compliant  My office door was closed  No one else was in the room  She acknowledged consent and understanding of privacy and security of the video platform  The patient has agreed to participate and understands they can discontinue the visit at any time  Subjective  Ricco Olson is a 62 y o  female Patient with medtronic stimulator rechargeable for low back pain and leg pain with end of generator life  Reported placed 6 years ago  She reports efficacy from stimulation  She reports that recently she had an error message and was told to get the generator replaced  She reports during charging she has some shock like sensations         Past Medical History:   Diagnosis Date    Anxiety     Arthritis     Attention and concentration deficit     Blurred vision  Chronic pain     Depression     Diplopia     Dyspepsia     Gastric ulcer     Gastritis     Memory loss     Osteopenia     Starting and stopping of urinary stream during micturition     Urinary incontinence     Wears eyeglasses        Past Surgical History:   Procedure Laterality Date     SECTION  1988     SECTION     [address] GASTRIC BYPASS  2004    HYSTERECTOMY  2012    INSERT / REPLACE PERIPHERAL NEUROSTIMULATOR PULSE GENERATOR /       OTHER SURGICAL HISTORY      Reimplantatin at [address] WRIST ARTHROSCOPY      with internal fixation        Current Outpatient Medications   Medication Sig Dispense Refill    benzonatate (TESSALON) 200 MG capsule Take 1 capsule (200 mg total) by mouth 3 (three) times a day as needed for cough (Patient not taking: Reported on 3/12/2020) 30 capsule 0    Cholecalciferol (D3-1000) 1000 units capsule Take 1 capsule by mouth daily        dronabinol (MARINOL) 5 MG capsule Take 1 capsule (5 mg total) by mouth 2 (two) times a day before meals By Dr Delma Vazquez 60 capsule 0    DULoxetine (CYMBALTA) 30 mg delayed release capsule TAKE 1 CAPSULE DAILY 90 capsule 2    Iron-Vitamin C (IRON 100/C) 100-250 MG TABS Take by mouth daily      lidocaine (XYLOCAINE) 5 % ointment Apply topically 2 (two) times a day as needed      lisinopril (ZESTRIL) 10 mg tablet TAKE 1 TABLET DAILY 90 tablet 4    metoprolol tartrate (LOPRESSOR) 25 mg tablet TAKE ONE-HALF (1/2) TABLET TWICE A DAY 90 tablet 4    Multiple Vitamin (MULTI-VITAMIN DAILY) TABS Take 1 tablet by mouth daily      pantoprazole (PROTONIX) 40 mg tablet TAKE 1 TABLET TWICE A  tablet 4    Promethazine-DM (PHENERGAN-DM) 6 25-15 mg/5 mL oral syrup Take 5 mL by mouth 4 (four) times a day as needed for cough (Patient not taking: Reported on 3/12/2020) 240 mL 0     No current facility-administered medications for this visit  Allergies   Allergen Reactions    Cat Hair Extract      Cat Dander    Dog Epithelium     Morphine Abdominal Pain    Other Other (See Comments) and Sneezing     Dogs  Crab Meat    Shellfish-Derived Products      CRAB MEAT ONLY       Review of Systems   Constitutional: Positive for fatigue  HENT: Negative  Eyes: Negative  Respiratory: Positive for apnea (CPAP)  Cardiovascular: Negative  Gastrointestinal: Negative  Pain in abdominal and groin  Endocrine: Negative  Genitourinary:        Urinary incontinence from past surgery, hysterectomy   Musculoskeletal:        SCS placed in [place] by doctor steven  First battery change, IPG located in right buttock  Allergic/Immunologic: Negative  Neurological: Positive for numbness (left leg numbness from hysterectomy down to knee) and headaches (off & on HA since travel back in february)  Hematological:        MRSA infection when having utere tube attached to bladder   Psychiatric/Behavioral: Negative  I have personally reviewed all aspects of the review of systems as documented    Physical Exam     Patient is stable  Symptoms, as detailed in HPI, continue to significantly impact of patient's quality of life in daily activities  After carefully considering presentation, investigations, functional status and co-morbidities, the risk/benefit profile of surgical intervention is favorable  History, physical examination and diagnostic tests were reviewed and questions answered  Diagnosis, care plan and treatment options were discussed  The patient understand instructions and will follow up as directed  Patient with a spinal cord stimulator at end of life she needs a replacement  Right generator replacement  Expected postoperative course, including activity restrictions, expected pain and postoperative medication were reviewed  Patient provided verbal consent  We discussed the risk of infection and hardware issues  I spent 15 minutes with the patient during this visit  I have spent 45 minutes with Patient  today in which greater than 50% of this time was spent in counseling/coordination of care regarding Diagnostic results, Prognosis, Risks and benefits of tx options, Risk factor reductions and Impressions

## 2020-04-01 ENCOUNTER — APPOINTMENT (OUTPATIENT)
Dept: LAB | Facility: CLINIC | Age: 60
End: 2020-04-01
Payer: COMMERCIAL

## 2020-04-01 DIAGNOSIS — G89.4 CHRONIC PAIN SYNDROME: ICD-10-CM

## 2020-04-01 DIAGNOSIS — Z45.42 BATTERY END OF LIFE OF SPINAL CORD STIMULATOR: ICD-10-CM

## 2020-04-01 LAB
APTT PPP: 27 SECONDS (ref 23–37)
EST. AVERAGE GLUCOSE BLD GHB EST-MCNC: 111 MG/DL
HBA1C MFR BLD: 5.5 %
INR PPP: 0.99 (ref 0.84–1.19)
PROTHROMBIN TIME: 12.7 SECONDS (ref 11.6–14.5)

## 2020-04-01 PROCEDURE — 36415 COLL VENOUS BLD VENIPUNCTURE: CPT

## 2020-04-01 PROCEDURE — 85610 PROTHROMBIN TIME: CPT

## 2020-04-01 PROCEDURE — 83036 HEMOGLOBIN GLYCOSYLATED A1C: CPT

## 2020-04-01 PROCEDURE — 85730 THROMBOPLASTIN TIME PARTIAL: CPT

## 2020-04-02 ENCOUNTER — OFFICE VISIT (OUTPATIENT)
Dept: FAMILY MEDICINE CLINIC | Facility: CLINIC | Age: 60
End: 2020-04-02
Payer: COMMERCIAL

## 2020-04-02 ENCOUNTER — TELEPHONE (OUTPATIENT)
Dept: RADIOLOGY | Facility: CLINIC | Age: 60
End: 2020-04-02

## 2020-04-02 VITALS
WEIGHT: 217 LBS | TEMPERATURE: 98.7 F | HEIGHT: 64 IN | SYSTOLIC BLOOD PRESSURE: 120 MMHG | OXYGEN SATURATION: 94 % | BODY MASS INDEX: 37.05 KG/M2 | DIASTOLIC BLOOD PRESSURE: 74 MMHG | HEART RATE: 77 BPM | RESPIRATION RATE: 16 BRPM

## 2020-04-02 DIAGNOSIS — Z01.818 PREOPERATIVE CLEARANCE: Primary | ICD-10-CM

## 2020-04-02 DIAGNOSIS — Z45.42 BATTERY END OF LIFE OF SPINAL CORD STIMULATOR: ICD-10-CM

## 2020-04-02 PROCEDURE — 99244 OFF/OP CNSLTJ NEW/EST MOD 40: CPT | Performed by: FAMILY MEDICINE

## 2020-04-14 ENCOUNTER — DOCUMENTATION (OUTPATIENT)
Dept: NEUROSURGERY | Facility: CLINIC | Age: 60
End: 2020-04-14

## 2020-04-14 ENCOUNTER — ANESTHESIA EVENT (OUTPATIENT)
Dept: PERIOP | Facility: HOSPITAL | Age: 60
End: 2020-04-14
Payer: COMMERCIAL

## 2020-04-14 DIAGNOSIS — Z98.890 STATUS POST SURGERY: Primary | ICD-10-CM

## 2020-04-14 RX ORDER — OXYCODONE HYDROCHLORIDE AND ACETAMINOPHEN 5; 325 MG/1; MG/1
1 TABLET ORAL EVERY 8 HOURS PRN
Qty: 9 TABLET | Refills: 0 | Status: SHIPPED | OUTPATIENT
Start: 2020-04-14 | End: 2020-06-02

## 2020-04-14 RX ORDER — CEPHALEXIN 500 MG/1
500 CAPSULE ORAL EVERY 6 HOURS SCHEDULED
Qty: 12 CAPSULE | Refills: 0 | Status: SHIPPED | OUTPATIENT
Start: 2020-04-14 | End: 2020-04-17

## 2020-04-15 ENCOUNTER — HOSPITAL ENCOUNTER (OUTPATIENT)
Facility: HOSPITAL | Age: 60
Setting detail: OUTPATIENT SURGERY
Discharge: HOME | End: 2020-04-15
Attending: NEUROLOGICAL SURGERY | Admitting: NEUROLOGICAL SURGERY
Payer: COMMERCIAL

## 2020-04-15 ENCOUNTER — ANESTHESIA (OUTPATIENT)
Dept: PERIOP | Facility: HOSPITAL | Age: 60
End: 2020-04-15
Payer: COMMERCIAL

## 2020-04-15 VITALS
WEIGHT: 214 LBS | HEIGHT: 64 IN | DIASTOLIC BLOOD PRESSURE: 70 MMHG | OXYGEN SATURATION: 96 % | RESPIRATION RATE: 18 BRPM | BODY MASS INDEX: 36.54 KG/M2 | TEMPERATURE: 96.4 F | SYSTOLIC BLOOD PRESSURE: 135 MMHG | HEART RATE: 70 BPM

## 2020-04-15 PROCEDURE — C1787 PATIENT PROGR, NEUROSTIM: HCPCS | Performed by: NEUROLOGICAL SURGERY

## 2020-04-15 PROCEDURE — C1820 GENERATOR NEURO RECHG BAT SY: HCPCS | Performed by: NEUROLOGICAL SURGERY

## 2020-04-15 PROCEDURE — 63685 INS/RPLC SPI NPG/RCVR POCKET: CPT | Performed by: NEUROLOGICAL SURGERY

## 2020-04-15 DEVICE — NEUROSTIM INTELLIS SURESCAN MRI W/ ADAPTIVESTIM: Type: IMPLANTABLE DEVICE | Site: BUTTOCKS | Status: FUNCTIONAL

## 2020-04-15 RX ORDER — LIDOCAINE HYDROCHLORIDE 10 MG/ML
INJECTION, SOLUTION EPIDURAL; INFILTRATION; INTRACAUDAL; PERINEURAL AS NEEDED
Status: DISCONTINUED | OUTPATIENT
Start: 2020-04-15 | End: 2020-04-15 | Stop reason: SURG

## 2020-04-15 RX ORDER — FENTANYL CITRATE/PF 50 MCG/ML
25 SYRINGE (ML) INJECTION
Status: DISCONTINUED | OUTPATIENT
Start: 2020-04-15 | End: 2020-04-15 | Stop reason: HOSPADM

## 2020-04-15 RX ORDER — CHLORHEXIDINE GLUCONATE 0.12 MG/ML
15 RINSE ORAL ONCE
Status: DISCONTINUED | OUTPATIENT
Start: 2020-04-15 | End: 2020-04-15

## 2020-04-15 RX ORDER — SODIUM CHLORIDE, SODIUM LACTATE, POTASSIUM CHLORIDE, CALCIUM CHLORIDE 600; 310; 30; 20 MG/100ML; MG/100ML; MG/100ML; MG/100ML
125 INJECTION, SOLUTION INTRAVENOUS CONTINUOUS
Status: DISCONTINUED | OUTPATIENT
Start: 2020-04-15 | End: 2020-04-15 | Stop reason: HOSPADM

## 2020-04-15 RX ORDER — PROPOFOL 10 MG/ML
INJECTION, EMULSION INTRAVENOUS AS NEEDED
Status: DISCONTINUED | OUTPATIENT
Start: 2020-04-15 | End: 2020-04-15 | Stop reason: SURG

## 2020-04-15 RX ORDER — SODIUM CHLORIDE, SODIUM LACTATE, POTASSIUM CHLORIDE, CALCIUM CHLORIDE 600; 310; 30; 20 MG/100ML; MG/100ML; MG/100ML; MG/100ML
INJECTION, SOLUTION INTRAVENOUS CONTINUOUS PRN
Status: DISCONTINUED | OUTPATIENT
Start: 2020-04-15 | End: 2020-04-15 | Stop reason: SURG

## 2020-04-15 RX ORDER — CEFAZOLIN SODIUM 2 G/50ML
2000 SOLUTION INTRAVENOUS ONCE
Status: DISCONTINUED | OUTPATIENT
Start: 2020-04-15 | End: 2020-04-15 | Stop reason: HOSPADM

## 2020-04-15 RX ORDER — OXYCODONE HYDROCHLORIDE AND ACETAMINOPHEN 5; 325 MG/1; MG/1
1 TABLET ORAL EVERY 4 HOURS PRN
Status: DISCONTINUED | OUTPATIENT
Start: 2020-04-15 | End: 2020-04-15 | Stop reason: HOSPADM

## 2020-04-15 RX ORDER — EPHEDRINE SULFATE 50 MG/ML
INJECTION INTRAVENOUS AS NEEDED
Status: DISCONTINUED | OUTPATIENT
Start: 2020-04-15 | End: 2020-04-15 | Stop reason: SURG

## 2020-04-15 RX ORDER — ONDANSETRON 2 MG/ML
4 INJECTION INTRAMUSCULAR; INTRAVENOUS ONCE AS NEEDED
Status: DISCONTINUED | OUTPATIENT
Start: 2020-04-15 | End: 2020-04-15 | Stop reason: HOSPADM

## 2020-04-15 RX ORDER — ONDANSETRON 2 MG/ML
4 INJECTION INTRAMUSCULAR; INTRAVENOUS EVERY 6 HOURS PRN
Status: DISCONTINUED | OUTPATIENT
Start: 2020-04-15 | End: 2020-04-15 | Stop reason: HOSPADM

## 2020-04-15 RX ORDER — CEFAZOLIN SODIUM 1 G/3ML
INJECTION, POWDER, FOR SOLUTION INTRAMUSCULAR; INTRAVENOUS AS NEEDED
Status: DISCONTINUED | OUTPATIENT
Start: 2020-04-15 | End: 2020-04-15 | Stop reason: SURG

## 2020-04-15 RX ORDER — MIDAZOLAM HYDROCHLORIDE 2 MG/2ML
INJECTION, SOLUTION INTRAMUSCULAR; INTRAVENOUS AS NEEDED
Status: DISCONTINUED | OUTPATIENT
Start: 2020-04-15 | End: 2020-04-15 | Stop reason: SURG

## 2020-04-15 RX ORDER — LIDOCAINE HYDROCHLORIDE AND EPINEPHRINE 10; 10 MG/ML; UG/ML
INJECTION, SOLUTION INFILTRATION; PERINEURAL AS NEEDED
Status: DISCONTINUED | OUTPATIENT
Start: 2020-04-15 | End: 2020-04-15 | Stop reason: HOSPADM

## 2020-04-15 RX ORDER — FENTANYL CITRATE 50 UG/ML
INJECTION, SOLUTION INTRAMUSCULAR; INTRAVENOUS AS NEEDED
Status: DISCONTINUED | OUTPATIENT
Start: 2020-04-15 | End: 2020-04-15 | Stop reason: SURG

## 2020-04-15 RX ORDER — PROPOFOL 10 MG/ML
INJECTION, EMULSION INTRAVENOUS CONTINUOUS PRN
Status: DISCONTINUED | OUTPATIENT
Start: 2020-04-15 | End: 2020-04-15 | Stop reason: SURG

## 2020-04-15 RX ADMIN — FENTANYL CITRATE 25 MCG: 50 INJECTION, SOLUTION INTRAMUSCULAR; INTRAVENOUS at 09:57

## 2020-04-15 RX ADMIN — LIDOCAINE HYDROCHLORIDE 50 MG: 10 INJECTION, SOLUTION EPIDURAL; INFILTRATION; INTRACAUDAL; PERINEURAL at 09:37

## 2020-04-15 RX ADMIN — PHENYLEPHRINE HYDROCHLORIDE 100 MCG: 10 INJECTION INTRAVENOUS at 09:47

## 2020-04-15 RX ADMIN — CEFAZOLIN 2000 MG: 1 INJECTION, POWDER, FOR SOLUTION INTRAVENOUS at 09:44

## 2020-04-15 RX ADMIN — FENTANYL CITRATE 25 MCG: 50 INJECTION, SOLUTION INTRAMUSCULAR; INTRAVENOUS at 09:45

## 2020-04-15 RX ADMIN — PROPOFOL 75 MCG/KG/MIN: 10 INJECTION, EMULSION INTRAVENOUS at 09:37

## 2020-04-15 RX ADMIN — FENTANYL CITRATE 25 MCG: 50 INJECTION, SOLUTION INTRAMUSCULAR; INTRAVENOUS at 09:37

## 2020-04-15 RX ADMIN — PHENYLEPHRINE HYDROCHLORIDE 100 MCG: 10 INJECTION INTRAVENOUS at 09:40

## 2020-04-15 RX ADMIN — SODIUM CHLORIDE, SODIUM LACTATE, POTASSIUM CHLORIDE, AND CALCIUM CHLORIDE 125 ML/HR: .6; .31; .03; .02 INJECTION, SOLUTION INTRAVENOUS at 08:00

## 2020-04-15 RX ADMIN — EPHEDRINE SULFATE 15 MG: 50 INJECTION, SOLUTION INTRAVENOUS at 09:59

## 2020-04-15 RX ADMIN — FENTANYL CITRATE 25 MCG: 50 INJECTION, SOLUTION INTRAMUSCULAR; INTRAVENOUS at 10:01

## 2020-04-15 RX ADMIN — MIDAZOLAM 2 MG: 1 INJECTION INTRAMUSCULAR; INTRAVENOUS at 09:25

## 2020-04-15 RX ADMIN — PHENYLEPHRINE HYDROCHLORIDE 200 MCG: 10 INJECTION INTRAVENOUS at 09:53

## 2020-04-15 RX ADMIN — PROPOFOL 50 MG: 10 INJECTION, EMULSION INTRAVENOUS at 09:37

## 2020-04-15 RX ADMIN — SODIUM CHLORIDE, SODIUM LACTATE, POTASSIUM CHLORIDE, AND CALCIUM CHLORIDE: .6; .31; .03; .02 INJECTION, SOLUTION INTRAVENOUS at 08:00

## 2020-04-16 ENCOUNTER — TELEPHONE (OUTPATIENT)
Dept: NEUROSURGERY | Facility: CLINIC | Age: 60
End: 2020-04-16

## 2020-04-16 ENCOUNTER — TELEMEDICINE (OUTPATIENT)
Dept: NEUROSURGERY | Facility: CLINIC | Age: 60
End: 2020-04-16

## 2020-04-16 DIAGNOSIS — Z98.890 STATUS POST SURGERY: Primary | ICD-10-CM

## 2020-04-16 PROCEDURE — 99024 POSTOP FOLLOW-UP VISIT: CPT

## 2020-04-29 ENCOUNTER — TELEMEDICINE (OUTPATIENT)
Dept: NEUROSURGERY | Facility: CLINIC | Age: 60
End: 2020-04-29

## 2020-04-29 DIAGNOSIS — G89.4 CHRONIC PAIN SYNDROME: Primary | ICD-10-CM

## 2020-04-29 DIAGNOSIS — Z45.42 BATTERY END OF LIFE OF SPINAL CORD STIMULATOR: ICD-10-CM

## 2020-04-29 DIAGNOSIS — Z96.89 STATUS POST INSERTION OF SPINAL CORD STIMULATOR: ICD-10-CM

## 2020-04-29 DIAGNOSIS — G58.8 PUDENDAL NEURALGIA: ICD-10-CM

## 2020-04-29 DIAGNOSIS — Z48.89 AFTERCARE FOLLOWING SURGERY: ICD-10-CM

## 2020-04-29 DIAGNOSIS — M79.605 PAIN OF LEFT LOWER EXTREMITY: ICD-10-CM

## 2020-04-29 PROCEDURE — 99024 POSTOP FOLLOW-UP VISIT: CPT | Performed by: NURSE PRACTITIONER

## 2020-04-29 PROCEDURE — 3078F DIAST BP <80 MM HG: CPT | Performed by: NURSE PRACTITIONER

## 2020-04-29 PROCEDURE — 3075F SYST BP GE 130 - 139MM HG: CPT | Performed by: NURSE PRACTITIONER

## 2020-04-30 DIAGNOSIS — G89.29 CHRONIC LEFT SHOULDER PAIN: ICD-10-CM

## 2020-04-30 DIAGNOSIS — M25.512 CHRONIC LEFT SHOULDER PAIN: ICD-10-CM

## 2020-05-02 RX ORDER — DULOXETIN HYDROCHLORIDE 30 MG/1
CAPSULE, DELAYED RELEASE ORAL
Qty: 90 CAPSULE | Refills: 3 | Status: SHIPPED | OUTPATIENT
Start: 2020-05-02 | End: 2020-06-02

## 2020-05-13 ENCOUNTER — TELEPHONE (OUTPATIENT)
Dept: OTHER | Facility: OTHER | Age: 60
End: 2020-05-13

## 2020-06-02 ENCOUNTER — OFFICE VISIT (OUTPATIENT)
Dept: FAMILY MEDICINE CLINIC | Facility: CLINIC | Age: 60
End: 2020-06-02
Payer: COMMERCIAL

## 2020-06-02 VITALS
OXYGEN SATURATION: 97 % | SYSTOLIC BLOOD PRESSURE: 110 MMHG | RESPIRATION RATE: 16 BRPM | HEART RATE: 75 BPM | BODY MASS INDEX: 37.63 KG/M2 | TEMPERATURE: 98.2 F | WEIGHT: 220.4 LBS | DIASTOLIC BLOOD PRESSURE: 70 MMHG | HEIGHT: 64 IN

## 2020-06-02 DIAGNOSIS — G89.4 CHRONIC PAIN SYNDROME: ICD-10-CM

## 2020-06-02 DIAGNOSIS — F41.9 ANXIETY AND DEPRESSION: Primary | ICD-10-CM

## 2020-06-02 DIAGNOSIS — G47.00 INSOMNIA, UNSPECIFIED TYPE: ICD-10-CM

## 2020-06-02 DIAGNOSIS — F32.A ANXIETY AND DEPRESSION: Primary | ICD-10-CM

## 2020-06-02 DIAGNOSIS — G44.89 OTHER HEADACHE SYNDROME: ICD-10-CM

## 2020-06-02 PROBLEM — F41.8 ANXIETY WITH DEPRESSION: Status: RESOLVED | Noted: 2018-11-08 | Resolved: 2020-06-02

## 2020-06-02 PROCEDURE — 3078F DIAST BP <80 MM HG: CPT | Performed by: FAMILY MEDICINE

## 2020-06-02 PROCEDURE — 99214 OFFICE O/P EST MOD 30 MIN: CPT | Performed by: FAMILY MEDICINE

## 2020-06-02 PROCEDURE — 1036F TOBACCO NON-USER: CPT | Performed by: FAMILY MEDICINE

## 2020-06-02 PROCEDURE — 3074F SYST BP LT 130 MM HG: CPT | Performed by: FAMILY MEDICINE

## 2020-06-02 PROCEDURE — 3008F BODY MASS INDEX DOCD: CPT | Performed by: FAMILY MEDICINE

## 2020-06-02 RX ORDER — ESZOPICLONE 3 MG/1
3 TABLET, FILM COATED ORAL
Qty: 30 TABLET | Refills: 0 | Status: SHIPPED | OUTPATIENT
Start: 2020-06-02 | End: 2020-07-22

## 2020-06-02 RX ORDER — DULOXETIN HYDROCHLORIDE 60 MG/1
60 CAPSULE, DELAYED RELEASE ORAL DAILY
Qty: 30 CAPSULE | Refills: 1 | Status: SHIPPED | OUTPATIENT
Start: 2020-06-02 | End: 2020-06-23 | Stop reason: SDUPTHER

## 2020-06-02 RX ORDER — PROPRANOLOL HCL 60 MG
60 CAPSULE, EXTENDED RELEASE 24HR ORAL DAILY
Qty: 30 CAPSULE | Refills: 0 | Status: SHIPPED | OUTPATIENT
Start: 2020-06-02 | End: 2020-06-23 | Stop reason: DRUGHIGH

## 2020-06-04 ENCOUNTER — HOSPITAL ENCOUNTER (OUTPATIENT)
Dept: RADIOLOGY | Facility: CLINIC | Age: 60
Discharge: HOME | End: 2020-06-04
Attending: ANESTHESIOLOGY
Payer: COMMERCIAL

## 2020-06-04 VITALS
SYSTOLIC BLOOD PRESSURE: 120 MMHG | DIASTOLIC BLOOD PRESSURE: 74 MMHG | HEART RATE: 63 BPM | OXYGEN SATURATION: 96 % | RESPIRATION RATE: 20 BRPM | TEMPERATURE: 99.3 F

## 2020-06-04 DIAGNOSIS — M54.16 LUMBAR RADICULOPATHY: ICD-10-CM

## 2020-06-04 PROCEDURE — 64483 NJX AA&/STRD TFRM EPI L/S 1: CPT | Performed by: ANESTHESIOLOGY

## 2020-06-04 RX ORDER — PAPAVERINE HCL 150 MG
20 CAPSULE, EXTENDED RELEASE ORAL ONCE
Status: DISCONTINUED | OUTPATIENT
Start: 2020-06-04 | End: 2020-06-08 | Stop reason: HOSPADM

## 2020-06-04 RX ORDER — BUPIVACAINE HCL/PF 2.5 MG/ML
5 VIAL (ML) INJECTION ONCE
Status: DISCONTINUED | OUTPATIENT
Start: 2020-06-04 | End: 2020-06-08 | Stop reason: HOSPADM

## 2020-06-04 RX ORDER — LIDOCAINE HYDROCHLORIDE 10 MG/ML
5 INJECTION, SOLUTION EPIDURAL; INFILTRATION; INTRACAUDAL; PERINEURAL ONCE
Status: DISCONTINUED | OUTPATIENT
Start: 2020-06-04 | End: 2020-06-08 | Stop reason: HOSPADM

## 2020-06-11 ENCOUNTER — TELEPHONE (OUTPATIENT)
Dept: PAIN MEDICINE | Facility: CLINIC | Age: 60
End: 2020-06-11

## 2020-06-18 ENCOUNTER — HOSPITAL ENCOUNTER (OUTPATIENT)
Dept: RADIOLOGY | Facility: CLINIC | Age: 60
Discharge: HOME | End: 2020-06-18
Attending: ANESTHESIOLOGY
Payer: COMMERCIAL

## 2020-06-18 VITALS
SYSTOLIC BLOOD PRESSURE: 113 MMHG | HEART RATE: 70 BPM | OXYGEN SATURATION: 97 % | DIASTOLIC BLOOD PRESSURE: 70 MMHG | TEMPERATURE: 98.9 F | RESPIRATION RATE: 20 BRPM

## 2020-06-18 DIAGNOSIS — M25.512 LEFT SHOULDER PAIN: ICD-10-CM

## 2020-06-18 PROCEDURE — 77002 NEEDLE LOCALIZATION BY XRAY: CPT | Performed by: ANESTHESIOLOGY

## 2020-06-18 PROCEDURE — 20610 DRAIN/INJ JOINT/BURSA W/O US: CPT | Performed by: ANESTHESIOLOGY

## 2020-06-18 PROCEDURE — 77002 NEEDLE LOCALIZATION BY XRAY: CPT

## 2020-06-18 RX ORDER — LIDOCAINE HYDROCHLORIDE 10 MG/ML
5 INJECTION, SOLUTION EPIDURAL; INFILTRATION; INTRACAUDAL; PERINEURAL ONCE
Status: DISCONTINUED | OUTPATIENT
Start: 2020-06-18 | End: 2020-06-22 | Stop reason: HOSPADM

## 2020-06-18 RX ORDER — METHYLPREDNISOLONE ACETATE 80 MG/ML
80 INJECTION, SUSPENSION INTRA-ARTICULAR; INTRALESIONAL; INTRAMUSCULAR; PARENTERAL; SOFT TISSUE ONCE
Status: DISCONTINUED | OUTPATIENT
Start: 2020-06-18 | End: 2020-06-22 | Stop reason: HOSPADM

## 2020-06-18 RX ORDER — BUPIVACAINE HCL/PF 2.5 MG/ML
5 VIAL (ML) INJECTION ONCE
Status: DISCONTINUED | OUTPATIENT
Start: 2020-06-18 | End: 2020-06-22 | Stop reason: HOSPADM

## 2020-06-23 ENCOUNTER — OFFICE VISIT (OUTPATIENT)
Dept: FAMILY MEDICINE CLINIC | Facility: CLINIC | Age: 60
End: 2020-06-23
Payer: COMMERCIAL

## 2020-06-23 VITALS
OXYGEN SATURATION: 97 % | HEART RATE: 64 BPM | SYSTOLIC BLOOD PRESSURE: 110 MMHG | HEIGHT: 64 IN | DIASTOLIC BLOOD PRESSURE: 75 MMHG | RESPIRATION RATE: 12 BRPM | BODY MASS INDEX: 37.56 KG/M2 | TEMPERATURE: 97.4 F | WEIGHT: 220 LBS

## 2020-06-23 DIAGNOSIS — G44.89 OTHER HEADACHE SYNDROME: ICD-10-CM

## 2020-06-23 DIAGNOSIS — G47.00 INSOMNIA, UNSPECIFIED TYPE: ICD-10-CM

## 2020-06-23 DIAGNOSIS — R51.9 NONINTRACTABLE HEADACHE, UNSPECIFIED CHRONICITY PATTERN, UNSPECIFIED HEADACHE TYPE: ICD-10-CM

## 2020-06-23 DIAGNOSIS — Z13.220 SCREENING, LIPID: ICD-10-CM

## 2020-06-23 DIAGNOSIS — F41.9 ANXIETY AND DEPRESSION: Primary | ICD-10-CM

## 2020-06-23 DIAGNOSIS — F32.A ANXIETY AND DEPRESSION: Primary | ICD-10-CM

## 2020-06-23 PROCEDURE — 1036F TOBACCO NON-USER: CPT | Performed by: FAMILY MEDICINE

## 2020-06-23 PROCEDURE — 3078F DIAST BP <80 MM HG: CPT | Performed by: FAMILY MEDICINE

## 2020-06-23 PROCEDURE — 99214 OFFICE O/P EST MOD 30 MIN: CPT | Performed by: FAMILY MEDICINE

## 2020-06-23 PROCEDURE — 3008F BODY MASS INDEX DOCD: CPT | Performed by: FAMILY MEDICINE

## 2020-06-23 PROCEDURE — 3074F SYST BP LT 130 MM HG: CPT | Performed by: FAMILY MEDICINE

## 2020-06-23 RX ORDER — PROPRANOLOL HYDROCHLORIDE 80 MG/1
80 CAPSULE, EXTENDED RELEASE ORAL DAILY
Qty: 30 CAPSULE | Refills: 0 | Status: SHIPPED | OUTPATIENT
Start: 2020-06-23 | End: 2020-06-25 | Stop reason: SDUPTHER

## 2020-06-23 RX ORDER — TRAZODONE HYDROCHLORIDE 50 MG/1
TABLET ORAL
Qty: 30 TABLET | Refills: 0 | Status: SHIPPED | OUTPATIENT
Start: 2020-06-23 | End: 2020-07-20

## 2020-06-23 RX ORDER — DULOXETIN HYDROCHLORIDE 60 MG/1
60 CAPSULE, DELAYED RELEASE ORAL DAILY
Qty: 90 CAPSULE | Refills: 1 | Status: SHIPPED | OUTPATIENT
Start: 2020-06-23 | End: 2020-06-25 | Stop reason: SDUPTHER

## 2020-06-25 ENCOUNTER — TELEPHONE (OUTPATIENT)
Dept: PAIN MEDICINE | Facility: CLINIC | Age: 60
End: 2020-06-25

## 2020-06-25 DIAGNOSIS — F41.9 ANXIETY AND DEPRESSION: ICD-10-CM

## 2020-06-25 DIAGNOSIS — R51.9 NONINTRACTABLE HEADACHE, UNSPECIFIED CHRONICITY PATTERN, UNSPECIFIED HEADACHE TYPE: ICD-10-CM

## 2020-06-25 DIAGNOSIS — F32.A ANXIETY AND DEPRESSION: ICD-10-CM

## 2020-06-25 RX ORDER — DULOXETIN HYDROCHLORIDE 60 MG/1
60 CAPSULE, DELAYED RELEASE ORAL DAILY
Qty: 90 CAPSULE | Refills: 1 | Status: SHIPPED | OUTPATIENT
Start: 2020-06-25 | End: 2021-01-12

## 2020-06-25 RX ORDER — PROPRANOLOL HYDROCHLORIDE 80 MG/1
80 CAPSULE, EXTENDED RELEASE ORAL DAILY
Qty: 90 CAPSULE | Refills: 1 | Status: SHIPPED | OUTPATIENT
Start: 2020-06-25 | End: 2020-10-02 | Stop reason: SDUPTHER

## 2020-07-06 ENCOUNTER — APPOINTMENT (OUTPATIENT)
Dept: LAB | Facility: CLINIC | Age: 60
End: 2020-07-06
Payer: COMMERCIAL

## 2020-07-06 DIAGNOSIS — Z13.220 SCREENING, LIPID: ICD-10-CM

## 2020-07-06 LAB
ALBUMIN SERPL BCP-MCNC: 3.4 G/DL (ref 3.5–5)
ALP SERPL-CCNC: 102 U/L (ref 46–116)
ALT SERPL W P-5'-P-CCNC: 18 U/L (ref 12–78)
ANION GAP SERPL CALCULATED.3IONS-SCNC: 4 MMOL/L (ref 4–13)
AST SERPL W P-5'-P-CCNC: 17 U/L (ref 5–45)
BILIRUB SERPL-MCNC: 0.28 MG/DL (ref 0.2–1)
BUN SERPL-MCNC: 13 MG/DL (ref 5–25)
CALCIUM SERPL-MCNC: 8.6 MG/DL (ref 8.3–10.1)
CHLORIDE SERPL-SCNC: 105 MMOL/L (ref 100–108)
CHOLEST SERPL-MCNC: 297 MG/DL (ref 50–200)
CO2 SERPL-SCNC: 28 MMOL/L (ref 21–32)
CREAT SERPL-MCNC: 0.83 MG/DL (ref 0.6–1.3)
GFR SERPL CREATININE-BSD FRML MDRD: 76 ML/MIN/1.73SQ M
GLUCOSE P FAST SERPL-MCNC: 99 MG/DL (ref 65–99)
HDLC SERPL-MCNC: 75 MG/DL
LDLC SERPL CALC-MCNC: 202 MG/DL (ref 0–100)
NONHDLC SERPL-MCNC: 222 MG/DL
POTASSIUM SERPL-SCNC: 4.5 MMOL/L (ref 3.5–5.3)
PROT SERPL-MCNC: 7 G/DL (ref 6.4–8.2)
SODIUM SERPL-SCNC: 137 MMOL/L (ref 136–145)
TRIGL SERPL-MCNC: 101 MG/DL

## 2020-07-06 PROCEDURE — 80061 LIPID PANEL: CPT

## 2020-07-06 PROCEDURE — 80053 COMPREHEN METABOLIC PANEL: CPT

## 2020-07-06 PROCEDURE — 36415 COLL VENOUS BLD VENIPUNCTURE: CPT

## 2020-07-07 ENCOUNTER — TELEPHONE (OUTPATIENT)
Dept: FAMILY MEDICINE CLINIC | Facility: CLINIC | Age: 60
End: 2020-07-07

## 2020-07-07 NOTE — TELEPHONE ENCOUNTER
Patient said she still is having headaches everyday mostly at 4pm and at night  It is hard to fall asleep at night sometimes  She said it hurts in the base of her neck and stiff neck with pressure  The headache will wake her up at 7:45 in the am and she lay in the bed with no lights or sun until it eas up a little bit she has an apt with Roxi Revering on Friday  She also has a rash on her L butt that itchy and a sting to it  with nerve pain and traveling down on her leg  She has an apt with you on 7/22/2020 as well and nothing has changed in her diet as well

## 2020-07-07 NOTE — TELEPHONE ENCOUNTER
----- Message from [address] [place] [address]  sent at 7/7/2020  7:50 AM EDT -----  What is different in her diet? Her cholesterol went up from 228 to 297 and ldl went up from 126 to 202  How are the headaches? If still with headache, will need f/u appt  Must work on diet and weight loss or will be adding meds  Mediterranean diet is the best  We will also be discussing meds if she feels she can't correct the diet

## 2020-07-10 ENCOUNTER — OFFICE VISIT (OUTPATIENT)
Dept: NEUROLOGY | Facility: CLINIC | Age: 60
End: 2020-07-10
Payer: COMMERCIAL

## 2020-07-10 VITALS
HEART RATE: 65 BPM | OXYGEN SATURATION: 96 % | DIASTOLIC BLOOD PRESSURE: 76 MMHG | BODY MASS INDEX: 37.56 KG/M2 | RESPIRATION RATE: 14 BRPM | TEMPERATURE: 99.8 F | WEIGHT: 220 LBS | HEIGHT: 64 IN | SYSTOLIC BLOOD PRESSURE: 124 MMHG

## 2020-07-10 DIAGNOSIS — G44.89 OTHER HEADACHE SYNDROME: ICD-10-CM

## 2020-07-10 DIAGNOSIS — G47.33 OSA (OBSTRUCTIVE SLEEP APNEA): Primary | ICD-10-CM

## 2020-07-10 PROCEDURE — 1036F TOBACCO NON-USER: CPT | Performed by: PSYCHIATRY & NEUROLOGY

## 2020-07-10 PROCEDURE — 3074F SYST BP LT 130 MM HG: CPT | Performed by: PSYCHIATRY & NEUROLOGY

## 2020-07-10 PROCEDURE — 3008F BODY MASS INDEX DOCD: CPT | Performed by: PSYCHIATRY & NEUROLOGY

## 2020-07-10 PROCEDURE — 3078F DIAST BP <80 MM HG: CPT | Performed by: PSYCHIATRY & NEUROLOGY

## 2020-07-10 PROCEDURE — 99214 OFFICE O/P EST MOD 30 MIN: CPT | Performed by: PSYCHIATRY & NEUROLOGY

## 2020-07-10 RX ORDER — DIVALPROEX SODIUM 250 MG/1
500 TABLET, EXTENDED RELEASE ORAL DAILY
Qty: 30 TABLET | Refills: 5 | Status: SHIPPED | OUTPATIENT
Start: 2020-07-10 | End: 2020-09-11 | Stop reason: SDUPTHER

## 2020-07-10 NOTE — PROGRESS NOTES
Sonya Lao is a 62 y o  female  Chief Complaint   Patient presents with    Sleep Apnea     Difficulty sleeping at night   Headache     Constant every day  Assessment:  1  CATHRYN (obstructive sleep apnea)    2  Other headache syndrome          Discussion:  Patient's recent CPAP download was reviewed, she is on CPAP of 6 cm of water pressure with an RDI of 4 0 and compliance greater than 4 hours at 98% and average use of 9 hours and 45 minutes, we discussed differential diagnosis of headache, it is possible that she does have migraine headaches, she has had an MRI scan of the brain in the past that was normal, I have advised her to check routine blood work, she will call me after that to discuss the results, I have advised her to avoid migraine triggers which we discussed in detail, including foods to avoid, also was advised to avoid stress, limit caffeine to 12 oz per day, keep herself well hydrated  We discussed different prophylactic medications for headache, she is already on beta-blocker, she has history of kidney stones and hence cannot use Topamax, she does not want to go on Neurontin, would recommend patient to go on Depakote 250 mg extended release once a day, side effects discussed with the patient, since patient continues to have headaches she is willing to go on Depakote, she will stop the medication if experiences any side effects and will monitor her LFTs, if he still continues to have headaches then we would repeat an MRI scan of the brain  Patient was advised to continue with current CPAP pressure, he was also advised to maintain ideal body weight, avoid alcohol, muscle relaxers, pain killers and sleeping aids, he was advised to preferably sleep in lateral recumbent position, avoid driving and operating machinery if feeling drowsy or tired, clean the CPAP machine as per the [employer] instructions, go to the hospital if has any worsening symptoms and call me    And to follow up with his other physicians  And see me back in 3 months  Subjective:    HPI   Patient is here in follow-up for sleep apnea and headaches, since her last visit she says that she has been having headaches since she came back from [place] in February, her headaches are mostly in the frontal head region and they radiate to the back associated with photophobia and phonophobia, she has been having headaches almost every day, she saw her family physician and has been on a beta blocker without much relief, she denies any episodes of confusion, no fevers, she had similar type of headaches when she came back from [place] last year, she she denies having any confusion at this time, this is not the worst headache of the live headache is about 5 on 10 it is annoying to her, she has been using the CPAP machine every night she usually goes to sleep by 10:30 p m  and gets up around noon but she has difficulty in going to sleep and sometimes stays awake for a few hours, she denies any drowsiness while driving, her ESS is 8, weight has been stable, as per the patient she is going to discuss with her family physician about concern  for valley fever no other complaints      Vitals:    07/10/20 0816   BP: 124/76   BP Location: Left arm   Patient Position: Sitting   Cuff Size: Standard   Pulse: 65   Resp: 14   Temp: 99 8 °F (37 7 °C)   Weight: 99 8 kg (220 lb)   Height: 5' 4" (1 626 m)       Current Medications    Current Outpatient Medications:     dronabinol (MARINOL) 5 MG capsule, Take 1 capsule (5 mg total) by mouth 2 (two) times a day before meals By Dr Mary Salgado (Patient taking differently: Take 5 mg by mouth daily By Dr Mary Salgado), Disp: 60 capsule, Rfl: 0    DULoxetine (CYMBALTA) 60 mg delayed release capsule, Take 1 capsule (60 mg total) by mouth daily, Disp: 90 capsule, Rfl: 1    Iron-Vitamin C (IRON 100/C) 100-250 MG TABS, Take by mouth daily, Disp: , Rfl:     lidocaine (XYLOCAINE) 5 % ointment, Apply topically 2 (two) times a day as needed, Disp: , Rfl:     lisinopril (ZESTRIL) 10 mg tablet, TAKE 1 TABLET DAILY, Disp: 90 tablet, Rfl: 4    Multiple Vitamin (MULTI-VITAMIN DAILY) TABS, Take 1 tablet by mouth daily, Disp: , Rfl:     pantoprazole (PROTONIX) 40 mg tablet, TAKE 1 TABLET TWICE A DAY, Disp: 180 tablet, Rfl: 4    propranolol (INDERAL LA) 80 mg 24 hr capsule, Take 1 capsule (80 mg total) by mouth daily, Disp: 90 capsule, Rfl: 1    traZODone (DESYREL) 50 mg tablet, Take 1/2 to 1 po qhs prn insomnia , Disp: 30 tablet, Rfl: 0    Cholecalciferol (D3-1000) 1000 units capsule, Take 1 capsule by mouth daily  , Disp: , Rfl:     eszopiclone (LUNESTA) 3 MG tablet, Take 1 tablet (3 mg total) by mouth daily at bedtime as needed for sleep Take immediately before bedtime, Disp: 30 tablet, Rfl: 0      Allergies  Morphine; Shellfish-derived products; Cat hair extract;  Dog epithelium; and Other    Past Medical History  Past Medical History:   Diagnosis Date    Anxiety     Arthritis     Attention and concentration deficit     Blurred vision     Chronic pain     Depression     Diplopia     Dyspepsia     Gastric ulcer     Gastritis     Memory loss     Osteopenia     Starting and stopping of urinary stream during micturition     Urinary incontinence     Wears eyeglasses          Past Surgical History:  Past Surgical History:   Procedure Laterality Date     SECTION       SECTION      CHOLECYSTECTOMY     [address] GASTRIC BYPASS  2004    HYSTERECTOMY  2012    INSERT / REPLACE PERIPHERAL NEUROSTIMULATOR PULSE GENERATOR /       OTHER SURGICAL HISTORY      [facility] at [facility]     [place] IMPLANT SPINAL NEUROSTIM/ Right 4/15/2020    Procedure: REPLACEMENT IMPLANTABLE PULSE GENERATOR FOR DORSAL SPINAL COLUMN STIMULATOR,  RIGHT BUTTOCKS;  Surgeon: Marta Chiang MD;  Location: [facility];  Service: Neurosurgery    [address] REPAIR      WRIST ARTHROSCOPY      with internal fixation          Family History:  Family History   Problem Relation Age of Onset    Other Mother         Back Disorder     Cirrhosis Mother     Crohn's disease Mother     Lupus Mother         Systemic Lupus Erythematous     Hypertension Father     Heart disease Father     Other Brother         Liver Transplant     Crohn's disease Brother     Crohn's disease Brother     No Known Problems Sister     No Known Problems Daughter     No Known Problems Maternal Aunt     No Known Problems Cousin     No Known Problems Cousin     No Known Problems Cousin     No Known Problems Cousin     No Known Problems Cousin     Seizures Neg Hx        Social History:   reports that she has never smoked  She has never used smokeless tobacco  She reports that she has current or past drug history  Drug: Marijuana  She reports that she does not drink alcohol  I have reviewed the past medical history, surgical history, social and family history, current medications, allergies vitals, review of systems, and updated this information as appropriate today  Objective:    Physical Exam    Neurological Exam      GENERAL:  Cooperative in no acute distress  Well-developed and well-nourished    HEAD and NECK   Head is atraumatic normocephalic with no lesions or masses  Neck is supple with full range of motion    CARDIOVASCULAR  Carotid Arteries-no carotid bruits  NEUROLOGIC:  Mental Status-the patient is awake alert and oriented without aphasia or apraxia  Cranial Nerves: Visual fields are full to confrontation  Extraocular movements are full without nystagmus  Pupils are 2-1/2 mm and reactive  Face is symmetrical to light touch  Movements of facial expression move symmetrically  Hearing is normal to finger rub bilaterally  Soft palate lifts symmetrically  Shoulder shrug is symmetrical  Tongue is midline without atrophy  Motor: No drift is noted on arm extension   Strength is full in the upper and lower extremities with normal bulk and tone  Coordination: Finger to nose testing is performed accurately  Gait reveals a normal base with symmetrical arm swing  Reflexes:   2+ and symmetrical  Chest is clear to auscultate bilaterally  No meningeal signs  No temporal artery tenderness        ROS:  Review of Systems   Constitutional: Positive for fatigue  Negative for appetite change and fever  HENT: Negative  Negative for hearing loss, tinnitus, trouble swallowing and voice change  Eyes: Negative  Negative for photophobia and pain  Respiratory: Negative  Negative for shortness of breath  Cardiovascular: Positive for chest pain (Right side chest pain, )  Negative for palpitations  Gastrointestinal: Negative  Negative for nausea and vomiting  Endocrine: Negative  Negative for cold intolerance  Genitourinary: Negative  Negative for dysuria, frequency and urgency  Musculoskeletal: Positive for neck pain and neck stiffness  Negative for myalgias  Nerve pain,    Skin: Negative  Negative for rash  Allergic/Immunologic: Negative  Neurological: Positive for headaches  Negative for dizziness, tremors, seizures, syncope, facial asymmetry, speech difficulty, weakness, light-headedness and numbness  Hematological: Negative  Does not bruise/bleed easily  Psychiatric/Behavioral: Positive for sleep disturbance (Trouble falling asleep, )  Negative for confusion and hallucinations  All other systems reviewed and are negative

## 2020-07-13 ENCOUNTER — APPOINTMENT (OUTPATIENT)
Dept: LAB | Facility: CLINIC | Age: 60
End: 2020-07-13
Payer: COMMERCIAL

## 2020-07-13 DIAGNOSIS — G44.89 OTHER HEADACHE SYNDROME: ICD-10-CM

## 2020-07-13 LAB
CRP SERPL QL: <3 MG/L
ERYTHROCYTE [DISTWIDTH] IN BLOOD BY AUTOMATED COUNT: 13.1 % (ref 11.6–15.1)
ERYTHROCYTE [SEDIMENTATION RATE] IN BLOOD: 39 MM/HOUR (ref 0–20)
HCT VFR BLD AUTO: 40.9 % (ref 34.8–46.1)
HGB BLD-MCNC: 12.4 G/DL (ref 11.5–15.4)
MCH RBC QN AUTO: 29.6 PG (ref 26.8–34.3)
MCHC RBC AUTO-ENTMCNC: 30.3 G/DL (ref 31.4–37.4)
MCV RBC AUTO: 98 FL (ref 82–98)
PLATELET # BLD AUTO: 260 THOUSANDS/UL (ref 149–390)
PMV BLD AUTO: 10.8 FL (ref 8.9–12.7)
RBC # BLD AUTO: 4.19 MILLION/UL (ref 3.81–5.12)
WBC # BLD AUTO: 5.65 THOUSAND/UL (ref 4.31–10.16)

## 2020-07-13 PROCEDURE — 86140 C-REACTIVE PROTEIN: CPT

## 2020-07-13 PROCEDURE — 85652 RBC SED RATE AUTOMATED: CPT

## 2020-07-13 PROCEDURE — 86618 LYME DISEASE ANTIBODY: CPT

## 2020-07-13 PROCEDURE — 85027 COMPLETE CBC AUTOMATED: CPT

## 2020-07-13 PROCEDURE — 36415 COLL VENOUS BLD VENIPUNCTURE: CPT

## 2020-07-14 ENCOUNTER — APPOINTMENT (OUTPATIENT)
Dept: LAB | Facility: HOSPITAL | Age: 60
End: 2020-07-14
Payer: COMMERCIAL

## 2020-07-14 ENCOUNTER — HOSPITAL ENCOUNTER (OUTPATIENT)
Dept: RADIOLOGY | Facility: HOSPITAL | Age: 60
Discharge: HOME | End: 2020-07-14
Payer: COMMERCIAL

## 2020-07-14 ENCOUNTER — TELEMEDICINE (OUTPATIENT)
Dept: FAMILY MEDICINE CLINIC | Facility: CLINIC | Age: 60
End: 2020-07-14
Payer: COMMERCIAL

## 2020-07-14 ENCOUNTER — TELEPHONE (OUTPATIENT)
Dept: NEUROLOGY | Facility: CLINIC | Age: 60
End: 2020-07-14

## 2020-07-14 VITALS — BODY MASS INDEX: 37.76 KG/M2 | TEMPERATURE: 99.5 F | WEIGHT: 220 LBS | OXYGEN SATURATION: 96 % | HEART RATE: 68 BPM

## 2020-07-14 DIAGNOSIS — R05.9 COUGH: ICD-10-CM

## 2020-07-14 DIAGNOSIS — R21 RASH: ICD-10-CM

## 2020-07-14 DIAGNOSIS — Z20.822 EXPOSURE TO COVID-19 VIRUS: ICD-10-CM

## 2020-07-14 DIAGNOSIS — M25.50 ARTHRALGIA, UNSPECIFIED JOINT: ICD-10-CM

## 2020-07-14 DIAGNOSIS — R50.9 LOW GRADE FEVER: ICD-10-CM

## 2020-07-14 DIAGNOSIS — G44.52 NEW DAILY PERSISTENT HEADACHE: Primary | ICD-10-CM

## 2020-07-14 LAB — B BURGDOR IGG+IGM SER-ACNC: <0.91 ISR (ref 0–0.9)

## 2020-07-14 PROCEDURE — 3078F DIAST BP <80 MM HG: CPT | Performed by: FAMILY MEDICINE

## 2020-07-14 PROCEDURE — 36415 COLL VENOUS BLD VENIPUNCTURE: CPT

## 2020-07-14 PROCEDURE — 99215 OFFICE O/P EST HI 40 MIN: CPT | Performed by: FAMILY MEDICINE

## 2020-07-14 PROCEDURE — 1036F TOBACCO NON-USER: CPT | Performed by: FAMILY MEDICINE

## 2020-07-14 PROCEDURE — 3074F SYST BP LT 130 MM HG: CPT | Performed by: FAMILY MEDICINE

## 2020-07-14 PROCEDURE — 86635 COCCIDIOIDES ANTIBODY: CPT

## 2020-07-14 PROCEDURE — 71046 X-RAY EXAM CHEST 2 VIEWS: CPT

## 2020-07-14 NOTE — PATIENT INSTRUCTIONS
Discussed all with patient  Since there are Milagros Lebron at of symptoms will start with chest x-ray for the cough that has been persistent from February  Advised to use Del some as needed for the cough with plenty of liquids  For the persistent headache that has been there since February will get an MRI of the brain and refer to neurology for evaluation for 2nd opinion  Will get antibody testing as requested but COVID testing in the past was negative I will call with results  Will obtain a Coccidioides antibody to r/o exposure to [facility] Fever  Advised pt to return to office now as I will get a viral culture on her lesions  Will follow in 1 week  Discussed antibody testing with pt  Patient was advised these antibody tests are NOT FDA approved, their accuracy is unknown, and they may result in false positive or false negative result If your result is POSITIVE:    This may be due to a previous infection with COVID-19, or could be the result of previous infection with another coronavirus strain    This does NOT mean you are immune to coronavirus and does NOT guarantee that you can't be infected with COVID-19 in the future    It does NOT mean that you are safe to stop wearing a mask, social distancing, or other recommended protective practices    It is NOT required for return to work decisions   If your result is NEGATIVE:    This does NOT guarantee that you did not have COVID-19    This does NOT mean you cannot return to work   I will call with results

## 2020-07-14 NOTE — TELEPHONE ENCOUNTER
----- Message from Lexis Riley MD sent at 7/14/2020  9:02 AM EDT -----  Please call the patient regarding her abnormal result    Please advised the patient to follow up with family physician regarding increased sed rate

## 2020-07-14 NOTE — TELEPHONE ENCOUNTER
Spoke to patient  Informed of previous  She saw PCP today for rash on buttock, leg and behind knee with pus-like areas  Will forward labs to PCP for review as well

## 2020-07-14 NOTE — PROGRESS NOTES
Virtual Regular Visit      Assessment/Plan:    Problem List Items Addressed This Visit        Other    Cough    Relevant Orders    XR chest pa & lateral      Other Visit Diagnoses     New daily persistent headache    -  Primary    Relevant Orders    MRI brain w wo contrast    Ambulatory referral to Neurology    Exposure to COVID-19 virus        Will get antibody testing after discussion with pt  Relevant Orders    SARS-CoV2 Antibody, Total (IgG, IgA, IgM) [facility]- Lab Collect    Low grade fever        Relevant Orders    Coccidioides Antibody, Id    Rash        Will get viral culture  Relevant Orders    Coccidioides Antibody, Id    Virus culture    Arthralgia, unspecified joint        Awaiting lyme titer  Relevant Orders    Coccidioides Antibody, Id               Reason for visit is   Chief Complaint   Patient presents with    Virtual Regular Visit        Encounter provider John Paul Keita [facility]     Provider located at [facility] [address]  [address] [facility]  [phone]      Recent Visits  Date Type Provider Dept   07/07/20 Telephone Chase Lee 97 recent visits within past 7 days and meeting all other requirements     Today's Visits  Date Type Provider Dept   07/14/20 Telemedicine Chase Ascencio 97 today's visits and meeting all other requirements     Future Appointments  No visits were found meeting these conditions  Showing future appointments within next 150 days and meeting all other requirements        The patient was identified by name and date of birth  Xiao Velazquez was informed that this is a telemedicine visit and that the visit is being conducted through [employer] and patient was informed that this is not a secure, HIPAA-complaint platform  She agrees to proceed     My office door was closed  No one else was in the room  She acknowledged consent and understanding of privacy and security of the video platform  The patient has agreed to participate and understands they can discontinue the visit at any time  Patient is aware this is a billable service  Subjective  Anu Covarrubias is a 62 y o  female  Pt was called for f/u on her headaches and rash since returning from [place]  Feels all her problems started since she returned on February 19th  Started with a cough for about a month which was worse, got a little better, but still awakening her at night from the cough  The most debilitating problem is the headaches  Kept a headache diary and has a headache every day  Will awaken from sound sleep and needs to lie in bed until 11:30 am  This has been the same since February  Can't sleep despite taking meds  Seen by Dr Lenore Alexandre and given divalproex  Would like a second opinion with neuro  Seen about 18 months ago for confusion and had eeg and told not to drive  Was having headaches at that time with difficulty sleeping  Had an mri at that time for her brain  Also has pain and burning in the left eye, stiff neck and pressure in the back of the head  H/O same for months  Also has pressure in ears and below the ears on both sides  Also developed a rash with vesicular fluid inside  Feels more of them come out  She gets tingling, itching and burning  Rash is only on left buttock and behind the left leg  Also has one on the left popliteal area  Pt did have chris in L3  Pt has concerns that she might have Valley Fever as her sister has Valley Fever and lives in an area that has [facility] Fever  Also has low grade fever, cough and right sided chest pain with chills and night sweats  Having leg pains, joint and muscle pains  Any physical activity she is sob, having nerve pain and  sensitivity to light  Thinks the divalproex is helping a little with the headaches  Takes all other meds as directed   No side effects noted  Was on gabapentin for years, but felt it made her fuzzy  Felt she was cognitively impaired at that time   Pt is requesting a covid antibody test          Past Medical History:   Diagnosis Date    Anxiety     Arthritis     Attention and concentration deficit     Blurred vision     Chronic pain     Depression     Diplopia     Dyspepsia     Gastric ulcer     Gastritis     Memory loss     Osteopenia     Starting and stopping of urinary stream during micturition     Urinary incontinence     Wears eyeglasses        Past Surgical History:   Procedure Laterality Date     SECTION       SECTION     [address] GASTRIC BYPASS  2004    HYSTERECTOMY  2012    INSERT / REPLACE PERIPHERAL NEUROSTIMULATOR PULSE GENERATOR /       OTHER SURGICAL HISTORY      [facility] at [facility]     ME IMPLANT SPINAL NEUROSTIM/ Right 4/15/2020    Procedure: REPLACEMENT IMPLANTABLE PULSE GENERATOR FOR DORSAL SPINAL COLUMN STIMULATOR,  RIGHT BUTTOCKS;  Surgeon: Vitaliy Meier MD;  [facility];  Service: Neurosurgery    RADIOFREQUENCY ABLATION NERVES      URETHRAL FISTULA REPAIR      WRIST ARTHROSCOPY      with internal fixation        Current Outpatient Medications   Medication Sig Dispense Refill    Cholecalciferol (D3-1000) 1000 units capsule Take 1 capsule by mouth daily        divalproex sodium (DEPAKOTE ER) 250 mg 24 hr tablet Take 2 tablets (500 mg total) by mouth daily 30 tablet 5    dronabinol (MARINOL) 5 MG capsule Take 1 capsule (5 mg total) by mouth 2 (two) times a day before meals By Dr Giselle Samuel (Patient taking differently: Take 5 mg by mouth daily By Dr Giselle Samuel) 60 capsule 0    DULoxetine (CYMBALTA) 60 mg delayed release capsule Take 1 capsule (60 mg total) by mouth daily 90 capsule 1    eszopiclone (LUNESTA) 3 MG tablet Take 1 tablet (3 mg total) by mouth daily at bedtime as needed for sleep Take immediately before bedtime 30 tablet 0    Iron-Vitamin C (IRON 100/C) 100-250 MG TABS Take by mouth daily      lidocaine (XYLOCAINE) 5 % ointment Apply topically 2 (two) times a day as needed      lisinopril (ZESTRIL) 10 mg tablet TAKE 1 TABLET DAILY 90 tablet 4    Multiple Vitamin (MULTI-VITAMIN DAILY) TABS Take 1 tablet by mouth daily      pantoprazole (PROTONIX) 40 mg tablet TAKE 1 TABLET TWICE A  tablet 4    propranolol (INDERAL LA) 80 mg 24 hr capsule Take 1 capsule (80 mg total) by mouth daily 90 capsule 1    traZODone (DESYREL) 50 mg tablet Take 1/2 to 1 po qhs prn insomnia  30 tablet 0     No current facility-administered medications for this visit  Allergies   Allergen Reactions    Morphine Abdominal Pain     SEVERE N/V    Shellfish-Derived Products Vomiting     CRAB MEAT ONLY    Cat Hair Extract Nasal Congestion     Cat Dander    Dog Epithelium Nasal Congestion    Other Other (See Comments) and Sneezing     Dogs  Crab Meat       Review of Systems   Constitutional: Positive for chills, diaphoresis, fatigue and fever  HENT: Positive for ear pain  Negative for hearing loss, sinus pressure, sinus pain, sneezing, sore throat and trouble swallowing  Eyes: Positive for photophobia  Negative for visual disturbance  Respiratory: Positive for cough and shortness of breath  Negative for wheezing  Cardiovascular: Positive for chest pain (on the right)  Negative for palpitations  Gastrointestinal: Negative  Genitourinary: Negative  Musculoskeletal: Positive for arthralgias and myalgias  Skin: Positive for rash  Negative for color change  Neurological: Positive for headaches  Negative for dizziness, tremors, weakness and light-headedness  Psychiatric/Behavioral: Negative for dysphoric mood  The patient is not nervous/anxious          Video Exam    Vitals:    07/14/20 1122   Pulse: 68   Temp: 99 5 °F (37 5 °C)   SpO2: 96%   Weight: 99 8 kg (220 lb)       Physical Exam Constitutional: She is oriented to person, place, and time  She appears well-developed and well-nourished  She appears distressed  HENT:   Head: Normocephalic and atraumatic  Eyes: Conjunctivae and EOM are normal  Right eye exhibits no discharge  Left eye exhibits no discharge  Neck: Normal range of motion  Neck supple  Pulmonary/Chest: Effort normal  No stridor  No respiratory distress  Musculoskeletal: Normal range of motion  She exhibits no deformity  Neurological: She is alert and oriented to person, place, and time  Skin: Rash (Reviewed pictures from pt  Appears to be discrete flat erythematous lesions with vesicular heads noted  Will get culture  ) noted  She is not diaphoretic  No erythema  No pallor  Psychiatric: She has a normal mood and affect  Her behavior is normal  Judgment and thought content normal       Wrap up: Discussed all with patient  Since there are Louvenia Rings at of symptoms will start with chest x-ray for the cough that has been persistent from February  Advised to use Del some as needed for the cough with plenty of liquids  For the persistent headache that has been there since February will get an MRI of the brain and refer to neurology for evaluation for 2nd opinion  Will get antibody testing as requested but COVID testing in the past was negative I will call with results  Will obtain a Coccidioides antibody to r/o exposure to [facility] Fever  Advised pt to return to office now as I will get a viral culture on her lesions  Will follow in 1 week  Discussed antibody testing with pt   Patient was advised these antibody tests are NOT FDA approved, their accuracy is unknown, and they may result in false positive or false negative result If your result is POSITIVE:    This may be due to a previous infection with COVID-19, or could be the result of previous infection with another coronavirus strain    This does NOT mean you are immune to coronavirus and does NOT guarantee that you can't be infected with COVID-19 in the future    It does NOT mean that you are safe to stop wearing a mask, social distancing, or other recommended protective practices    It is NOT required for return to work decisions   If your result is NEGATIVE:    This does NOT guarantee that you did not have COVID-19    This does NOT mean you cannot return to work   I will call with results  I spent 35 minutes directly with the patient during this visit  Pt was also recalled to office for sampling of lesion  Covid antibody testing was ordered  VIRTUAL VISIT DISCLAIMER    Luis Angel Solano acknowledges that she has consented to an online visit or consultation  She understands that the online visit is based solely on information provided by her, and that, in the absence of a face-to-face physical evaluation by the physician, the diagnosis she receives is both limited and provisional in terms of accuracy and completeness  This is not intended to replace a full medical face-to-face evaluation by the physician  Luis Angel Solano understands and accepts these terms

## 2020-07-15 ENCOUNTER — TELEPHONE (OUTPATIENT)
Dept: FAMILY MEDICINE CLINIC | Facility: CLINIC | Age: 60
End: 2020-07-15

## 2020-07-15 NOTE — TELEPHONE ENCOUNTER
----- Message from [address] [place] [address]  sent at 7/14/2020  6:09 PM EDT -----  Lyme is negative

## 2020-07-15 NOTE — TELEPHONE ENCOUNTER
Spoke with patient starting this morning she has a sore throat now   She said Nayan Goldsmith nurse called her to let her know sed rate was elevated from the last time

## 2020-07-19 DIAGNOSIS — G47.00 INSOMNIA, UNSPECIFIED TYPE: ICD-10-CM

## 2020-07-20 LAB — SCAN RESULT: NORMAL

## 2020-07-20 RX ORDER — TRAZODONE HYDROCHLORIDE 50 MG/1
TABLET ORAL
Qty: 30 TABLET | Refills: 3 | Status: SHIPPED | OUTPATIENT
Start: 2020-07-20 | End: 2020-09-11 | Stop reason: SDUPTHER

## 2020-07-22 ENCOUNTER — OFFICE VISIT (OUTPATIENT)
Dept: FAMILY MEDICINE CLINIC | Facility: CLINIC | Age: 60
End: 2020-07-22
Payer: COMMERCIAL

## 2020-07-22 ENCOUNTER — TELEPHONE (OUTPATIENT)
Dept: LAB | Facility: HOSPITAL | Age: 60
End: 2020-07-22

## 2020-07-22 VITALS
SYSTOLIC BLOOD PRESSURE: 110 MMHG | OXYGEN SATURATION: 95 % | BODY MASS INDEX: 37.73 KG/M2 | DIASTOLIC BLOOD PRESSURE: 70 MMHG | TEMPERATURE: 98.5 F | WEIGHT: 221 LBS | HEART RATE: 64 BPM | HEIGHT: 64 IN | RESPIRATION RATE: 18 BRPM

## 2020-07-22 DIAGNOSIS — M46.1 SACROILIITIS (HCC): ICD-10-CM

## 2020-07-22 DIAGNOSIS — E78.2 MIXED HYPERLIPIDEMIA: ICD-10-CM

## 2020-07-22 DIAGNOSIS — F32.A ANXIETY AND DEPRESSION: Primary | ICD-10-CM

## 2020-07-22 DIAGNOSIS — R51.9 NONINTRACTABLE HEADACHE, UNSPECIFIED CHRONICITY PATTERN, UNSPECIFIED HEADACHE TYPE: ICD-10-CM

## 2020-07-22 DIAGNOSIS — F41.9 ANXIETY AND DEPRESSION: Primary | ICD-10-CM

## 2020-07-22 PROCEDURE — 99214 OFFICE O/P EST MOD 30 MIN: CPT | Performed by: FAMILY MEDICINE

## 2020-07-22 PROCEDURE — 3078F DIAST BP <80 MM HG: CPT | Performed by: FAMILY MEDICINE

## 2020-07-22 PROCEDURE — 3008F BODY MASS INDEX DOCD: CPT | Performed by: FAMILY MEDICINE

## 2020-07-22 PROCEDURE — 3074F SYST BP LT 130 MM HG: CPT | Performed by: FAMILY MEDICINE

## 2020-07-22 PROCEDURE — 1036F TOBACCO NON-USER: CPT | Performed by: FAMILY MEDICINE

## 2020-07-22 NOTE — ASSESSMENT & PLAN NOTE
Discussed the elevated cholesterol with patient  Although the risk for heart disease is only 4% I am still concerned that the LDL is very elevated  I will recheck this in 4 months but suggest she make dietary changes  Look into the [address]  If still with an LDL over 200 at the follow-up appointment we need to consider a statin

## 2020-07-22 NOTE — PROGRESS NOTES
Assessment/Plan:     Chronic Problems:  Anxiety and depression  Stable on her meds  Nonintractable headache  Better on meds from neuro  Sacroiliitis ([address] )  Keep the f/u with Dr Erin Cortes  Mixed hyperlipidemia  Discussed the elevated cholesterol with patient  Although the risk for heart disease is only 4% I am still concerned that the LDL is very elevated  I will recheck this in 4 months but suggest she make dietary changes  Look into the [address]  If still with an LDL over 200 at the follow-up appointment we need to consider a statin  Visit Diagnosis:  Diagnoses and all orders for this visit:    Anxiety and depression    Nonintractable headache, unspecified chronicity pattern, unspecified headache type    Sacroiliitis (HCC)    Mixed hyperlipidemia  -     Comprehensive metabolic panel; Future  -     Lipid panel; Future          Subjective:    Patient ID: Yomaira Alvarado is a 62 y o  female  Pt is here for f/u on her headaches and her cough  Seeing Dr Lalo Sun and the headaches are not as bad, but still there on the meds given by Dr May Dino  Was having nerve pain in both legs again, but now pain behind the knees and in the hips and left shoulder that she had injected by Dr Erin Cortes  Still follows with Dr Erin Cortes and having back pain again and just had injections  Worked for about 1 to 2 weeks and then worse again  She also has weakness in the legs and arms  Was working in her garden and found it was hard to get up  Pt states the cough is gone and not sure why  Takes all other meds as directed  No side effects noted  Also rash is gone  Still problems sleeping at night, but getting better  Only taking her cbd pills once daily         The following portions of the patient's history were reviewed and updated as appropriate: allergies, current medications, past family history, past medical history, past social history, past surgical history and problem list     Review of Systems Constitutional: Positive for fatigue  Negative for chills, diaphoresis and fever  HENT: Positive for ear pain (which is sensitive behind both ears  )  Negative for postnasal drip, rhinorrhea, sinus pressure, sinus pain, sneezing and sore throat  Eyes: Positive for photophobia (with headaches  )  Last week had eye discharge during the day and cloudy vision  That resolved  Recent eye exam in January   Respiratory: Negative for cough, shortness of breath and wheezing  Cardiovascular: Negative for chest pain and palpitations  Gastrointestinal: Negative  Genitourinary: Negative  Musculoskeletal: Positive for arthralgias and back pain  Neurological: Positive for headaches (but better and persist to some degree every day  )  Negative for dizziness and light-headedness  Psychiatric/Behavioral: Negative for dysphoric mood  The patient is not nervous/anxious            /70   Pulse 64   Temp 98 5 °F (36 9 °C)   Resp 18   Ht 5' 4" (1 626 m)   Wt 100 kg (221 lb)   SpO2 95%   BMI 37 93 kg/m²   Social History     Socioeconomic History    Marital status: /Civil Union     Spouse name: Not on file    Number of children: Not on file    Years of education: Not on file    Highest education level: Not on file   Occupational History    Occupation: retired   Social Needs    Financial resource strain: Not on file    Food insecurity:     Worry: Not on file     Inability: Not on file   Analyze Re needs:     Medical: Not on file     Non-medical: Not on file   Tobacco Use    Smoking status: Never Smoker    Smokeless tobacco: Never Used   Substance and Sexual Activity    Alcohol use: No    Drug use: Yes     Types: Marijuana     Comment: medical marijuana 2x a day capsules    Sexual activity: Yes   Lifestyle    Physical activity:     Days per week: Not on file     Minutes per session: Not on file    Stress: Not on file   Relationships    Social connections:     Talks on phone: Not on file     Gets together: Not on file     Attends Cheondoism service: Not on file     Active member of club or organization: Not on file     Attends meetings of clubs or organizations: Not on file     Relationship status: Not on file    Intimate partner violence:     Fear of current or ex partner: Not on file     Emotionally abused: Not on file     Physically abused: Not on file     Forced sexual activity: Not on file   Other Topics Concern    Not on file   Social History Narrative    Lives in [facility], with   Previously worked as a teacher        Past Medical History:   Diagnosis Date    Anxiety     Arthritis     Attention and concentration deficit     Blurred vision     Chronic pain     Depression     Diplopia     Dyspepsia     Gastric ulcer     Gastritis     Memory loss     Osteopenia     Starting and stopping of urinary stream during micturition     Urinary incontinence     Wears eyeglasses      Family History   Problem Relation Age of Onset    Other Mother         Back Disorder     Cirrhosis Mother     Crohn's disease Mother     Lupus Mother         Systemic Lupus Erythematous     Hypertension Father     Heart disease Father     Other Brother         Liver Transplant     Crohn's disease Brother     Crohn's disease Brother     No Known Problems Sister     No Known Problems Daughter     No Known Problems Maternal Aunt     No Known Problems Cousin     No Known Problems Cousin     No Known Problems Cousin     No Known Problems Cousin     No Known Problems Cousin     Seizures Neg Hx      Past Surgical History:   Procedure Laterality Date     SECTION       SECTION      CHOLECYSTECTOMY      [address] [facility] GASTRIC BYPASS  2004    HYSTERECTOMY  2012    INSERT / REPLACE PERIPHERAL NEUROSTIMULATOR PULSE GENERATOR /       OTHER SURGICAL HISTORY  2012    [facility] at [facility]     TX IMPLANT SPINAL NEUROSTIM/ Right 4/15/2020    Procedure: REPLACEMENT IMPLANTABLE PULSE GENERATOR FOR DORSAL SPINAL COLUMN STIMULATOR,  RIGHT BUTTOCKS;  Surgeon: Jason Jennings MD;  [facility];  Service: Neurosurgery    [address]      WRIST ARTHROSCOPY      with internal fixation        Current Outpatient Medications:     Cholecalciferol (D3-1000) 1000 units capsule, Take 1 capsule by mouth daily  , Disp: , Rfl:     divalproex sodium (DEPAKOTE ER) 250 mg 24 hr tablet, Take 2 tablets (500 mg total) by mouth daily, Disp: 30 tablet, Rfl: 5    dronabinol (MARINOL) 5 MG capsule, Take 1 capsule (5 mg total) by mouth 2 (two) times a day before meals By Dr Sagrario Perry (Patient taking differently: Take 5 mg by mouth daily By Dr Sagrario Perry), Disp: 60 capsule, Rfl: 0    DULoxetine (CYMBALTA) 60 mg delayed release capsule, Take 1 capsule (60 mg total) by mouth daily, Disp: 90 capsule, Rfl: 1    Iron-Vitamin C (IRON 100/C) 100-250 MG TABS, Take by mouth daily, Disp: , Rfl:     lidocaine (XYLOCAINE) 5 % ointment, Apply topically 2 (two) times a day as needed, Disp: , Rfl:     lisinopril (ZESTRIL) 10 mg tablet, TAKE 1 TABLET DAILY, Disp: 90 tablet, Rfl: 4    Multiple Vitamin (MULTI-VITAMIN DAILY) TABS, Take 1 tablet by mouth daily, Disp: , Rfl:     pantoprazole (PROTONIX) 40 mg tablet, TAKE 1 TABLET TWICE A DAY, Disp: 180 tablet, Rfl: 4    propranolol (INDERAL LA) 80 mg 24 hr capsule, Take 1 capsule (80 mg total) by mouth daily, Disp: 90 capsule, Rfl: 1    traZODone (DESYREL) 50 mg tablet, TAKE 1/2 TO 1 TABLET AT BEDTIME AS NEEDED FOR INSOMNIA, Disp: 30 tablet, Rfl: 3    Allergies   Allergen Reactions    Morphine Abdominal Pain     SEVERE N/V    Shellfish-Derived Products Vomiting     CRAB MEAT ONLY    Cat Hair Extract Nasal Congestion     Cat Dander    Dog Epithelium Nasal Congestion    Other Other (See Comments) and Sneezing     Dogs  Crab Meat          Lab Review Appointment on 07/14/2020   Component Date Value    Scan Result 07/14/2020 SEE WRITTEN REPORT    Appointment on 07/13/2020   Component Date Value    WBC 07/13/2020 5 65     RBC 07/13/2020 4 19     Hemoglobin 07/13/2020 12 4     Hematocrit 07/13/2020 40 9     MCV 07/13/2020 98     MCH 07/13/2020 29 6     MCHC 07/13/2020 30 3*    RDW 07/13/2020 13 1     Platelets 87/08/5002 260     MPV 07/13/2020 10 8     Lyme IgG/IgM Ab 07/13/2020 <0 91     Sed Rate 07/13/2020 39*    CRP 07/13/2020 <3 0    Appointment on 07/06/2020   Component Date Value    Sodium 07/06/2020 137     Potassium 07/06/2020 4 5     Chloride 07/06/2020 105     CO2 07/06/2020 28     ANION GAP 07/06/2020 4     BUN 07/06/2020 13     Creatinine 07/06/2020 0 83     Glucose, Fasting 07/06/2020 99     Calcium 07/06/2020 8 6     AST 07/06/2020 17     ALT 07/06/2020 18     Alkaline Phosphatase 07/06/2020 102     Total Protein 07/06/2020 7 0     Albumin 07/06/2020 3 4*    Total Bilirubin 07/06/2020 0 28     eGFR 07/06/2020 76     Cholesterol 07/06/2020 297*    Triglycerides 07/06/2020 101     HDL, Direct 07/06/2020 75     LDL Calculated 07/06/2020 202*    Non-HDL-Chol (CHOL-HDL) 07/06/2020 222         Imaging: Xr Chest Pa & Lateral    Result Date: 7/15/2020  Narrative: CHEST INDICATION:   R05: Cough COMPARISON:  Chest radiograph and chest CT from 3/11/2020  EXAM PERFORMED/VIEWS:  XR CHEST PA & LATERAL WITH DUAL ENERGY SUBTRACTION  FINDINGS: Cardiomediastinal silhouette is normal  Lungs are clear  No effusion or pneumothorax  Left upper quadrant clips from gastric bypass  Osseous structures are within normal limits for age  Neurostimulator in the spinal canal   Moderate left glenohumeral degenerative disease  Impression: No acute cardiopulmonary disease  Workstation performed: [id number]       Objective:     Physical Exam   Constitutional: She is oriented to person, place, and time   She appears well-developed and well-nourished  No distress  HENT:   Head: Normocephalic and atraumatic  Right Ear: External ear normal    Left Ear: External ear normal    Mouth/Throat: Oropharynx is clear and moist    Eyes: Pupils are equal, round, and reactive to light  Conjunctivae and EOM are normal  Right eye exhibits no discharge  Left eye exhibits no discharge  Neck: Normal range of motion  Neck supple  Cardiovascular: Normal rate, regular rhythm and normal heart sounds  No murmur heard  Pulmonary/Chest: Effort normal and breath sounds normal  No respiratory distress  She has no wheezes  She has no rales  She exhibits no tenderness  Abdominal: Soft  Bowel sounds are normal  There is no tenderness  Abdomen is obese  Musculoskeletal: Normal range of motion  She exhibits no edema, tenderness or deformity  Pt still sits on her donut  Lymphadenopathy:     She has no cervical adenopathy  Neurological: She is alert and oriented to person, place, and time  No cranial nerve deficit  Skin: Skin is warm and dry  No rash noted  She is not diaphoretic  Psychiatric: She has a normal mood and affect  Her behavior is normal  Judgment and thought content normal          Patient Instructions     Discussed all with patient and reviewed all labs  This lab test was negative for any hint of valley fever and the cough has resolved  Keep your follow-up with your pain management specialist for your aches and pains but increase the CBD back to twice a day as it really seemed to help you quite a bit  I am concerned about your elevated cholesterol as this is a sudden increase  I will recheck that in 4 months if still elevated we need to make a decision  I would suggest looking into the Mediterranean diet  For now keep all your current consult and continue on your current medications  GWEN Ascencio    Portions of the record may have been created with voice recognition software    Occasional wrong word or "sound a like" substitutions may have occurred due to the inherent limitations of voice recognition software  Read the chart carefully and recognize, using context, where substitutions have occurred

## 2020-07-22 NOTE — PATIENT INSTRUCTIONS
Discussed all with patient and reviewed all labs  This lab test was negative for any hint of valley fever and the cough has resolved  Keep your follow-up with your pain management specialist for your aches and pains but increase the CBD back to twice a day as it really seemed to help you quite a bit  I am concerned about your elevated cholesterol as this is a sudden increase  I will recheck that in 4 months if still elevated we need to make a decision  I would suggest looking into the Mediterranean diet  For now keep all your current consult and continue on your current medications

## 2020-07-26 DIAGNOSIS — R21 RASH: Primary | ICD-10-CM

## 2020-07-26 RX ORDER — MOMETASONE FUROATE 1 MG/G
CREAM TOPICAL DAILY
Qty: 45 G | Refills: 0 | Status: SHIPPED | OUTPATIENT
Start: 2020-07-26 | End: 2020-12-28 | Stop reason: SDUPTHER

## 2020-07-29 ENCOUNTER — OFFICE VISIT (OUTPATIENT)
Dept: PAIN MEDICINE | Facility: CLINIC | Age: 60
End: 2020-07-29
Payer: COMMERCIAL

## 2020-07-29 VITALS
DIASTOLIC BLOOD PRESSURE: 81 MMHG | TEMPERATURE: 98.9 F | SYSTOLIC BLOOD PRESSURE: 122 MMHG | WEIGHT: 223 LBS | HEART RATE: 72 BPM | RESPIRATION RATE: 18 BRPM | BODY MASS INDEX: 38.07 KG/M2 | HEIGHT: 64 IN

## 2020-07-29 DIAGNOSIS — M54.16 LUMBAR RADICULOPATHY: ICD-10-CM

## 2020-07-29 DIAGNOSIS — M25.50 ARTHRALGIA OF MULTIPLE JOINTS: ICD-10-CM

## 2020-07-29 DIAGNOSIS — M19.012 PRIMARY OSTEOARTHRITIS OF LEFT SHOULDER: ICD-10-CM

## 2020-07-29 DIAGNOSIS — M46.1 SACROILIITIS (HCC): ICD-10-CM

## 2020-07-29 DIAGNOSIS — Z96.89 STATUS POST INSERTION OF SPINAL CORD STIMULATOR: ICD-10-CM

## 2020-07-29 DIAGNOSIS — M25.512 ARTHRALGIA OF SHOULDER REGION, LEFT: ICD-10-CM

## 2020-07-29 DIAGNOSIS — G89.4 CHRONIC PAIN SYNDROME: Primary | ICD-10-CM

## 2020-07-29 PROCEDURE — 3008F BODY MASS INDEX DOCD: CPT | Performed by: ANESTHESIOLOGY

## 2020-07-29 PROCEDURE — 99214 OFFICE O/P EST MOD 30 MIN: CPT | Performed by: ANESTHESIOLOGY

## 2020-07-29 PROCEDURE — 3079F DIAST BP 80-89 MM HG: CPT | Performed by: ANESTHESIOLOGY

## 2020-07-29 PROCEDURE — 1036F TOBACCO NON-USER: CPT | Performed by: ANESTHESIOLOGY

## 2020-07-29 PROCEDURE — 3074F SYST BP LT 130 MM HG: CPT | Performed by: ANESTHESIOLOGY

## 2020-07-29 NOTE — PROGRESS NOTES
Assessment:  1  Chronic pain syndrome     2  Lumbar radiculopathy     3  Sacroiliitis ([address] )     4  Primary osteoarthritis of left shoulder     5  Arthralgia of shoulder region, left     6  Status post insertion of spinal cord stimulator     7  Arthralgia of multiple joints  Ambulatory referral to Rheumatology     Plan:  58-year-old female with chronic pain syndrome, headache, multifocal joint pain and arthralgias  Patient has had a series of pain intervention in the past with limited efficacy  Her chronic pain issues is managed with medical marijuana  She reports worsening joint pain associated with rash and low grade fevers  Lyme disease testing was negative and ESR was elevated  At this time, I will refer patient to see Rheumatology for further evaluation  She will follow-up with pain management on as needed basis  My impressions and treatment recommendations were discussed in detail with the patient who verbalized understanding and had no further questions  Discharge instructions were provided  I personally saw and examined the patient and I agree with the above discussed plan of care  History of Present Illness:  Sharon Aragon is a 62 y o  female who presents for a follow up office visit in regards to Back Pain; Leg Pain; Neck Pain; Headache; and Hip Pain  The patients current symptoms include intermittent, dull achy, sharp, throbbing pain across the low back and down the legs bilaterally  Patient reports headaches as well  She rates her pain 8/10  Patient had recent left shoulder injection without much relief of pain  She reports that her sedimentation rate was high in the past  In addition, she states that she has multiple areas of rashes in the low back and extremities and areas of joint pain  She states that this all started after she returned from [place]  She states that she had Lyme disease testing which was negative  She is requesting for referral to see Rheumatology        Patient is currently off opiate therapy  She uses medical marijuana  This has been helping    I have personally reviewed and/or updated the patient's past medical history, past surgical history, family history, social history, current medications, allergies, and vital signs today  Review of Systems   Respiratory: Positive for shortness of breath  Cardiovascular: Negative for chest pain  Gastrointestinal: Positive for diarrhea and nausea  Negative for constipation and vomiting  Musculoskeletal: Positive for arthralgias, back pain and myalgias  Negative for gait problem and joint swelling  Muscle weakness, joint stiffness and pain in extremity  Skin: Positive for rash  Neurological: Negative for dizziness, seizures and weakness  All other systems reviewed and are negative        Patient Active Problem List   Diagnosis    Neuralgia    Continuous leakage of urine    Mixed hyperlipidemia    Chronic pain syndrome    Dysesthesia of multiple sites    Eczema    Hematuria    Hypertension    Arthralgia of shoulder region, left    Limb pain    Lower back pain    Lumbar facet arthropathy    Memory difficulties    Memory impairment    Numbness    Obesity (BMI 35 0-39 9 without comorbidity)    CATHRYN (obstructive sleep apnea)    Postgastrectomy malabsorption    Pudendal neuralgia    Snoring    Gastroesophageal reflux disease    Iron deficiency anemia    Osteoarthritis of left shoulder    Sacroiliitis (HCC)    Lumbar radiculopathy    Anxiety and depression    Mild cognitive impairment with memory loss    Abnormal EEG    Transient alteration of awareness    Cough    Status post insertion of spinal cord stimulator    Battery end of life of spinal cord stimulator    Other headache syndrome    Nonintractable headache    Insomnia       Past Medical History:   Diagnosis Date    Anxiety     Arthritis     Attention and concentration deficit     Blurred vision     Chronic pain     Depression     Diplopia     Dyspepsia     Gastric ulcer     Gastritis     Memory loss     Osteopenia     Starting and stopping of urinary stream during micturition     Urinary incontinence     Wears eyeglasses        Past Surgical History:   Procedure Laterality Date     SECTION       SECTION      CHOLECYSTECTOMY      GALLBLADDER SURGERY  1990    GASTRIC BYPASS  2004    HYSTERECTOMY  2012    INSERT / REPLACE PERIPHERAL NEUROSTIMULATOR PULSE GENERATOR /       OTHER SURGICAL HISTORY      [facility]implantatin at [facility]     [place] IMPLANT SPINAL NEUROSTIM/ Right 4/15/2020    Procedure: REPLACEMENT IMPLANTABLE PULSE GENERATOR FOR DORSAL SPINAL COLUMN STIMULATOR,  RIGHT BUTTOCKS;  Surgeon: Roopa Almanza MD;  [facility]: [facility];  Service: Neurosurgery    RADIOFREQUENCY ABLATION NERVES      URETHRAL FISTULA REPAIR      WRIST ARTHROSCOPY      with internal fixation        Family History   Problem Relation Age of Onset    Other Mother         Back Disorder     Cirrhosis Mother     Crohn's disease Mother     Lupus Mother         Systemic Lupus Erythematous     Hypertension Father     Heart disease Father     Other Brother         Liver Transplant     Crohn's disease Brother     Crohn's disease Brother     No Known Problems Sister     No Known Problems Daughter     No Known Problems Maternal Aunt     No Known Problems Cousin     No Known Problems Cousin     No Known Problems Cousin     No Known Problems Cousin     No Known Problems Cousin     Seizures Neg Hx        Social History     Occupational History    Occupation: retired   Tobacco Use    Smoking status: Never Smoker    Smokeless tobacco: Never Used   Substance and Sexual Activity    Alcohol use: No    Drug use: Yes     Types: Marijuana     Comment: medical marijuana 2x a day capsules    Sexual activity: Yes       Current Outpatient Medications on File Prior to Visit   Medication Sig    Cholecalciferol (D3-1000) 1000 units capsule Take 1 capsule by mouth daily      divalproex sodium (DEPAKOTE ER) 250 mg 24 hr tablet Take 2 tablets (500 mg total) by mouth daily    dronabinol (MARINOL) 5 MG capsule Take 1 capsule (5 mg total) by mouth 2 (two) times a day before meals By Dr Austen Barfield (Patient taking differently: Take 5 mg by mouth daily By Dr Austen Barfield)   Javier Vernon DULoxetine (CYMBALTA) 60 mg delayed release capsule Take 1 capsule (60 mg total) by mouth daily    Iron-Vitamin C (IRON 100/C) 100-250 MG TABS Take by mouth daily    lidocaine (XYLOCAINE) 5 % ointment Apply topically 2 (two) times a day as needed    lisinopril (ZESTRIL) 10 mg tablet TAKE 1 TABLET DAILY    mometasone (ELOCON) 0 1 % cream Apply topically daily    Multiple Vitamin (MULTI-VITAMIN DAILY) TABS Take 1 tablet by mouth daily    pantoprazole (PROTONIX) 40 mg tablet TAKE 1 TABLET TWICE A DAY    propranolol (INDERAL LA) 80 mg 24 hr capsule Take 1 capsule (80 mg total) by mouth daily    traZODone (DESYREL) 50 mg tablet TAKE 1/2 TO 1 TABLET AT BEDTIME AS NEEDED FOR INSOMNIA     No current facility-administered medications on file prior to visit  Allergies   Allergen Reactions    Morphine Abdominal Pain     SEVERE N/V    Shellfish-Derived Products Vomiting     CRAB MEAT ONLY    Cat Hair Extract Nasal Congestion     Cat Dander    Dog Epithelium Nasal Congestion    Other Other (See Comments) and Sneezing     Dogs  Crab Meat       Physical Exam:    /81   Pulse 72   Temp 98 9 °F (37 2 °C) (Temporal)   Resp 18   Ht 5' 4" (1 626 m)   Wt 101 kg (223 lb)   BMI 38 28 kg/m²     Constitutional:normal, well developed, well nourished, alert, in no distress and non-toxic and no overt pain behavior    Eyes:anicteric  HEENT:grossly intact  Neck:supple, symmetric, trachea midline and no masses   Pulmonary:even and unlabored  Cardiovascular:No edema or pitting edema present  Skin:Normal without rashes or lesions and well hydrated  Psychiatric:Mood and affect appropriate  Neurologic:Cranial Nerves II-XII grossly intact  Musculoskeletal:normal    Imaging

## 2020-08-03 ENCOUNTER — TELEPHONE (OUTPATIENT)
Dept: OBGYN CLINIC | Facility: HOSPITAL | Age: 60
End: 2020-08-03

## 2020-08-03 NOTE — TELEPHONE ENCOUNTER
Left vm for pt  I placed the slot on hold and made patient aware I needed to hear from her by 9am tomorrow morning or I would offer the slot to someone else

## 2020-08-04 NOTE — TELEPHONE ENCOUNTER
Patient called and would like to come in today for the appt    Please call patient to confirm     CB# [phone]

## 2020-08-05 ENCOUNTER — TELEPHONE (OUTPATIENT)
Dept: FAMILY MEDICINE CLINIC | Facility: CLINIC | Age: 60
End: 2020-08-05

## 2020-08-05 NOTE — TELEPHONE ENCOUNTER
Patient called asking how long the Antibody test will take to come back? She is paranoid about seeing her mother  She also stated that on her mychart she got a notification that her viral culture was placed in an  container so it could not be tested  I did inform her you are not in the office today so you may not get back to us until tomorrow, she refused this to go to a different provider and does not mind waiting for your response

## 2020-08-06 ENCOUNTER — APPOINTMENT (OUTPATIENT)
Dept: LAB | Facility: CLINIC | Age: 60
End: 2020-08-06
Payer: COMMERCIAL

## 2020-08-06 LAB — SARS-COV-2 IGG+IGM SERPL QL IA: NORMAL

## 2020-08-06 PROCEDURE — 86769 SARS-COV-2 COVID-19 ANTIBODY: CPT

## 2020-08-06 PROCEDURE — 36415 COLL VENOUS BLD VENIPUNCTURE: CPT

## 2020-08-06 NOTE — TELEPHONE ENCOUNTER
I am not sure what viral culture she is talking about  What was the viral culture about, [place] fever?

## 2020-08-06 NOTE — TELEPHONE ENCOUNTER
You swabbed her with a viral culture here and she received something stating they couldn't do it because the swab was exp

## 2020-08-06 NOTE — TELEPHONE ENCOUNTER
Spoke with the lab and they did not collect the antibody test for her the day she was in the lab  Also she questioned about about the transport media tube the viral culture was in   It was exp:     She is going to go to [address] to have the antibody done

## 2020-08-07 ENCOUNTER — HOSPITAL ENCOUNTER (OUTPATIENT)
Dept: RADIOLOGY | Facility: HOSPITAL | Age: 60
Discharge: HOME | End: 2020-08-07
Payer: COMMERCIAL

## 2020-08-07 DIAGNOSIS — G44.52 NEW DAILY PERSISTENT HEADACHE: ICD-10-CM

## 2020-08-07 PROCEDURE — A9585 GADOBUTROL INJECTION: HCPCS | Performed by: FAMILY MEDICINE

## 2020-08-07 PROCEDURE — 70553 MRI BRAIN STEM W/O & W/DYE: CPT

## 2020-08-07 RX ADMIN — GADOBUTROL 10 ML: 604.72 INJECTION INTRAVENOUS at 15:00

## 2020-08-12 ENCOUNTER — TELEPHONE (OUTPATIENT)
Dept: FAMILY MEDICINE CLINIC | Facility: CLINIC | Age: 60
End: 2020-08-12

## 2020-08-12 NOTE — TELEPHONE ENCOUNTER
Patient called in requesting a new referral  given on 7/14 Mary[facility] Do -Pulmonary Disease not Neurology   Please advise, Thank you

## 2020-08-13 NOTE — TELEPHONE ENCOUNTER
I am very confused  I thought she was sent to Dr Ray Nelson for the persistent headache which was better at the last appt and that the cough was gone

## 2020-08-13 NOTE — TELEPHONE ENCOUNTER
Patient said she has had a headache everyday since she has been back from [place]   [facility]ist so she is ok

## 2020-08-17 ENCOUNTER — TELEPHONE (OUTPATIENT)
Dept: NEUROLOGY | Facility: CLINIC | Age: 60
End: 2020-08-17

## 2020-08-17 DIAGNOSIS — R51.9 NONINTRACTABLE HEADACHE, UNSPECIFIED CHRONICITY PATTERN, UNSPECIFIED HEADACHE TYPE: Primary | ICD-10-CM

## 2020-08-17 NOTE — TELEPHONE ENCOUNTER
Discussed with the patient, advised patient to take 200 mg of magnesium and 200 mg of riboflavin, she will discuss with PCP and start, and okay for patient to see headache specialist   Thank you

## 2020-08-17 NOTE — TELEPHONE ENCOUNTER
Patient caling in stating she has been having daily headaches since February  She states this impacted her life immensely and would like to see a headache specific provider  She would like to see Dr Gilmer donis for patient to see dr Omayra Green?       [address] to leave a detailed message

## 2020-08-17 NOTE — PROGRESS NOTES
Discussed with patient, advised her to take magnesium 200 mg a day and riboflavin 200 mg a day after discussing with the family physician, other option also was to increase Depakote she would like to wait, would recommend patient to be seen by headache specialist to see if these measures do not work for possible Botox

## 2020-08-17 NOTE — TELEPHONE ENCOUNTER
Patient said she wanted to switch Neurologists to Dr Ronaldo Danielle  She sees Dr Dorothy Grier  I asked her if she wanted to do a GERTRUDIS and patient said she wasn't sure, she wanted someone to help her with her headaches  Then patient said she wanted a 2nd opinion  I told her that we don't offer 2nd opinions within the practice, and it would have to be outside the [place] [address] network  Patient kept asking me this and said she wants someone to help her with her headaches  She had questions for nurses so I transferred her over there

## 2020-08-18 NOTE — TELEPHONE ENCOUNTER
Schedule 9-23-20 2:30pm with [facility] appointment reminder sent and directions to [id number]

## 2020-08-19 ENCOUNTER — OFFICE VISIT (OUTPATIENT)
Dept: FAMILY MEDICINE CLINIC | Facility: CLINIC | Age: 60
End: 2020-08-19
Payer: COMMERCIAL

## 2020-08-19 ENCOUNTER — TELEPHONE (OUTPATIENT)
Dept: FAMILY MEDICINE CLINIC | Facility: CLINIC | Age: 60
End: 2020-08-19

## 2020-08-19 VITALS
BODY MASS INDEX: 38.31 KG/M2 | TEMPERATURE: 98.8 F | HEIGHT: 64 IN | OXYGEN SATURATION: 95 % | DIASTOLIC BLOOD PRESSURE: 76 MMHG | WEIGHT: 224.4 LBS | SYSTOLIC BLOOD PRESSURE: 114 MMHG | HEART RATE: 68 BPM

## 2020-08-19 DIAGNOSIS — E07.89 THYROID PAIN: ICD-10-CM

## 2020-08-19 DIAGNOSIS — G43.819 OTHER MIGRAINE WITHOUT STATUS MIGRAINOSUS, INTRACTABLE: Primary | ICD-10-CM

## 2020-08-19 DIAGNOSIS — M54.2 CERVICALGIA: ICD-10-CM

## 2020-08-19 PROCEDURE — 3008F BODY MASS INDEX DOCD: CPT | Performed by: NURSE PRACTITIONER

## 2020-08-19 PROCEDURE — 1036F TOBACCO NON-USER: CPT | Performed by: NURSE PRACTITIONER

## 2020-08-19 PROCEDURE — 3078F DIAST BP <80 MM HG: CPT | Performed by: NURSE PRACTITIONER

## 2020-08-19 PROCEDURE — 3074F SYST BP LT 130 MM HG: CPT | Performed by: NURSE PRACTITIONER

## 2020-08-19 PROCEDURE — 99214 OFFICE O/P EST MOD 30 MIN: CPT | Performed by: NURSE PRACTITIONER

## 2020-08-19 RX ORDER — BUTALBITAL, ACETAMINOPHEN AND CAFFEINE 50; 325; 40 MG/1; MG/1; MG/1
1 TABLET ORAL EVERY 4 HOURS PRN
Qty: 30 TABLET | Refills: 0 | Status: SHIPPED | OUTPATIENT
Start: 2020-08-19 | End: 2020-09-23

## 2020-08-19 NOTE — PROGRESS NOTES
Assessment/Plan:     Migraine  Extensive discussion regarding treatment of headaches  Discussed hydration  Discussed identifying triggers  Reviewed MRI Patient is on Inderal   Patient does see a neurologist   Was told that she needs to see the headache specialist   Olga Hook ordered for acute headache  Advised to make appointment to see headache specialist   Also advised to get some physical therapy to help with the neck pain which may be contributing to the headaches  Cervicalgia  Reports having lot of neck pain  Will order physical therapy  Discussed that neck pain may be related to ongoing headaches  Thyroid pain  Reports that her thyroid hurts  Her throat hurts  Does appear to have enlarged thyroid  Will get ultrasound done  No nodules felt         Problem List Items Addressed This Visit        Cardiovascular and Mediastinum    Migraine - Primary     Extensive discussion regarding treatment of headaches  Discussed hydration  Discussed identifying triggers  Reviewed MRI Patient is on Inderal   Patient does see a neurologist   Was told that she needs to see the headache specialist   Olga Hook ordered for acute headache  Advised to make appointment to see headache specialist   Also advised to get some physical therapy to help with the neck pain which may be contributing to the headaches  Relevant Medications    butalbital-acetaminophen-caffeine (FIORICET,ESGIC) -40 mg per tablet       Other    Cervicalgia     Reports having lot of neck pain  Will order physical therapy  Discussed that neck pain may be related to ongoing headaches  Relevant Orders    Ambulatory referral to Physical Therapy    Thyroid pain     Reports that her thyroid hurts  Her throat hurts  Does appear to have enlarged thyroid  Will get ultrasound done  No nodules felt         Relevant Orders    US thyroid            Subjective:      Patient ID: Nikki Perera is a 62 y o  female      Patient is being seen for headache  Reports that at this point it may be chronic she gets a headache every day  Has been seeing neurologist   Also had an MRI done  She was told that she needs to see a headache specialist   States that the medication she is taking is not helping  She has been taking the Inderal   Denies any stresses  Denies any migraine triggers  Reports that she has been hydrating  Has multiple other health complaints  The following portions of the patient's history were reviewed and updated as appropriate: allergies, current medications, past family history, past medical history, past social history, past surgical history and problem list     Review of Systems   Constitutional: Negative  HENT: Negative  Eyes: Positive for photophobia  Respiratory: Negative  Cardiovascular: Negative  Gastrointestinal: Positive for nausea  Endocrine: Negative  Genitourinary: Negative  Musculoskeletal: Negative  Allergic/Immunologic: Negative  Neurological: Positive for headaches  Psychiatric/Behavioral: Negative  Objective:      /76   Pulse 68   Temp 98 8 °F (37 1 °C) (Tympanic)   Ht 5' 4" (1 626 m)   Wt 102 kg (224 lb 6 4 oz)   SpO2 95%   BMI 38 52 kg/m²          Physical Exam  Vitals signs and nursing note reviewed  Constitutional:       Appearance: She is well-developed  HENT:      Head: Normocephalic and atraumatic  Right Ear: External ear normal       Left Ear: External ear normal    Eyes:      Pupils: Pupils are equal, round, and reactive to light  Neck:      Musculoskeletal: Normal range of motion  Thyroid: Thyromegaly and thyroid tenderness present  No thyroid mass  Cardiovascular:      Rate and Rhythm: Normal rate and regular rhythm  Pulmonary:      Effort: Pulmonary effort is normal    Abdominal:      General: Bowel sounds are normal       Palpations: Abdomen is soft  Musculoskeletal: Normal range of motion     Skin:     General: Skin is warm and dry  Neurological:      Mental Status: She is alert and oriented to person, place, and time             Labs:    Lab Results   Component Value Date    WBC 5 65 07/13/2020    HGB 12 4 07/13/2020    HCT 40 9 07/13/2020    MCV 98 07/13/2020     07/13/2020     Lab Results   Component Value Date    K 4 5 07/06/2020     07/06/2020    CO2 28 07/06/2020    BUN 13 07/06/2020    CREATININE 0 83 07/06/2020    GLUF 99 07/06/2020    CALCIUM 8 6 07/06/2020    AST 17 07/06/2020    ALT 18 07/06/2020    ALKPHOS 102 07/06/2020    EGFR 76 07/06/2020     Lab Results   Component Value Date    CALCIUM 8 6 07/06/2020    K 4 5 07/06/2020    CO2 28 07/06/2020     07/06/2020    BUN 13 07/06/2020    CREATININE 0 83 07/06/2020

## 2020-08-19 NOTE — PATIENT INSTRUCTIONS
Take Fioricet every 4 hours as needed for acute episodes of pain continue the other medication go to physical therapy to treat neck pain may be associated with headache a get ultrasound of the thyroid done    Migraine Headache   WHAT YOU NEED TO KNOW:   A migraine is a severe headache  The pain can be so severe that it interferes with your daily activities  A migraine can last a few hours up to several days  The exact cause of migraines is not known  DISCHARGE INSTRUCTIONS:   Return to the emergency department if:   · You have a headache that seems different or much worse than your usual migraine headache  · You have a severe headache with a fever or a stiff neck  · You have new problems with speech, vision, balance, or movement  · You feel like you are going to faint, you become confused, or you have a seizure  Contact your healthcare provider or neurologist if:   · Your migraines interfere with your daily activities  · Your medicines or treatments stop working  · You have questions or concerns about your condition or care  Medicines: You may need any of the following  Take medicine as soon as you feel a migraine begin  · Prescription pain medicine  may be given  Do not wait until the pain is severe before you take your medicine  · Migraine medicines  are used to help prevent a migraine or stop it once it starts  · Antinausea medicine  may be given to calm your stomach and to help prevent vomiting  This medicine can also help relieve pain  · Take your medicine as directed  Contact your healthcare provider if you think your medicine is not helping or if you have side effects  Tell him or her if you are allergic to any medicine  Keep a list of the medicines, vitamins, and herbs you take  Include the amounts, and when and why you take them  Bring the list or the pill bottles to follow-up visits  Carry your medicine list with you in case of an emergency    Manage your symptoms: · Rest in a dark, quiet room  This will help decrease your pain  Sleep may also help relieve the pain  · Apply ice to decrease pain  Use an ice pack, or put crushed ice in a plastic bag  Cover the ice pack with a towel and place it on your head  Apply ice for 15 to 20 minutes every hour  · Apply heat to decrease pain and muscle spasms  Use a small towel dampened with warm water or a heating pad, or sit in a warm bath  Apply heat on the area for 20 to 30 minutes every 2 hours  You may alternate heat and ice  · Keep a migraine record  Write down when your migraines start and stop  Include your symptoms and what you were doing when a migraine began  Record what you ate or drank for 24 hours before the migraine started  Keep track of what you did to treat your migraine and if it worked  Bring the migraine record with you to visits with your healthcare provider  Follow up with your healthcare provider or neurologist as directed:  Bring your migraine record with you  Write down your questions so you remember to ask them during your visits  Prevent another migraine:   · Do not smoke  Nicotine and other chemicals in cigarettes and cigars can trigger a migraine or make it worse  Ask your healthcare provider for information if you currently smoke and need help to quit  E-cigarettes or smokeless tobacco still contain nicotine  Talk to your healthcare provider before you use these products  · Do not drink alcohol  Alcohol can trigger a migraine  It can also keep medicines used to treat your migraines from working  · Get regular exercise  Exercise may help prevent migraines  Talk to your healthcare provider about the best exercise plan for you  Try to get at least 30 minutes of exercise on most days  · Manage stress  Stress may trigger a migraine  Learn new ways to relax, such as deep breathing  · Create a sleep schedule  Go to bed and get up at the same times each day   Do not watch television before bed  · Eat regular meals  Include healthy foods such as include fruit, vegetables, whole-grain breads, low-fat dairy products, beans, lean meat, and fish  Do not have food or drinks that trigger your migraines  © 2017 [employer] Information is for End User's use only and may not be sold, redistributed or otherwise used for commercial purposes  All illustrations and images included in CareNotes® are the copyrighted property of A D [employer] M [employer]  or [employer]  The above information is an  only  It is not intended as medical advice for individual conditions or treatments  Talk to your doctor, nurse or pharmacist before following any medical regimen to see if it is safe and effective for you

## 2020-08-21 PROBLEM — G43.909 MIGRAINE: Status: ACTIVE | Noted: 2020-08-21

## 2020-08-21 PROBLEM — E07.89 THYROID PAIN: Status: ACTIVE | Noted: 2020-08-21

## 2020-08-21 PROBLEM — M54.2 CERVICALGIA: Status: ACTIVE | Noted: 2020-08-21

## 2020-08-21 NOTE — ASSESSMENT & PLAN NOTE
Extensive discussion regarding treatment of headaches  Discussed hydration  Discussed identifying triggers  Reviewed MRI Patient is on Inderal   Patient does see a neurologist   Was told that she needs to see the headache specialist   Charmayne Graves ordered for acute headache  Advised to make appointment to see headache specialist   Also advised to get some physical therapy to help with the neck pain which may be contributing to the headaches

## 2020-08-21 NOTE — ASSESSMENT & PLAN NOTE
Reports having lot of neck pain  Will order physical therapy  Discussed that neck pain may be related to ongoing headaches

## 2020-08-21 NOTE — ASSESSMENT & PLAN NOTE
Reports that her thyroid hurts  Her throat hurts  Does appear to have enlarged thyroid  Will get ultrasound done    No nodules felt

## 2020-09-03 ENCOUNTER — EVALUATION (OUTPATIENT)
Dept: PHYSICAL THERAPY | Facility: CLINIC | Age: 60
End: 2020-09-03
Payer: COMMERCIAL

## 2020-09-03 DIAGNOSIS — M54.2 CERVICALGIA: ICD-10-CM

## 2020-09-03 PROCEDURE — 97161 PT EVAL LOW COMPLEX 20 MIN: CPT

## 2020-09-03 PROCEDURE — 97110 THERAPEUTIC EXERCISES: CPT

## 2020-09-03 NOTE — PROGRESS NOTES
PT Evaluation     Today's date: 9/3/2020  Patient name: Rodrigo Beckwith  : 1960  MRN: [id number]  Referring provider: GWEN Franco  Dx:   Encounter Diagnosis     ICD-10-CM    1  Cervicalgia  M54 2 Ambulatory referral to Physical Therapy                  Assessment  Assessment details: Rodrigo Beckwith is a 62 y o female who was referred for cervical spine pain with frequent headaches  Functionally, the headaches limit the amount of activity that she can tolerate at home  PT examination findings include normal cervical ROM with posterior pain produced with retractions and flexion while lateral flexion and rotations produced a stretching and pressure feeling in her upper trapezius muscles  Her seated posture demonstrates a forward head and rounded shoulders  Her bilateral upper trapezius muscles as well as suboccipital region are tender to palpation with the R side more tender than the L side  When palpated, these areas referred some pain into her head  She was provided an HEP that consists of small bouts of cervical retractions (either supine or seated) frequently throughout the day and an upper trap stretch to start loosening up tense muscles  She will monitor her response to the exercises and report back next session  She was educated to stop the exercises should it make her symptoms worse or a new complaint arises and she verbalized understanding  The patient would benefit from skilled physical therapy to provide exercises, neuromuscular reeducation, manual techniques, and modalities as deemed necessary in order to help achieve her goals and help return her to her PLOF  Impairments: activity intolerance, lacks appropriate home exercise program, pain with function and poor posture     Symptom irritability: highUnderstanding of Dx/Px/POC: good   Prognosis: fair    Goals  STGs in 4 weeks  1  Patient will be independent with HEP     2  Patient will report a decrease in frequency of headaches by at least 25%     LTGs in 6 weeks  1  Patient will report a decrease in frequency of headaches by at least 50%  2  Patient will report the ability to make it through the day without needing to lay down due to headache pain  3  Patient will have full and painless cervical ROM  Plan  Patient would benefit from: skilled physical therapy  Planned modality interventions: TENS and thermotherapy: hydrocollator packs  Planned therapy interventions: joint mobilization, manual therapy, massage, balance, motor coordination training, neuromuscular re-education, patient education, postural training, strengthening, stretching, therapeutic activities, therapeutic exercise and home exercise program  Frequency: 2x week  Duration in weeks: 6  Treatment plan discussed with: patient        Subjective Evaluation    History of Present Illness  Mechanism of injury: Stan Soto states that she travelled to [place] for about 2 months returning in February  Since arriving back, she has been experiencing daily headaches  Started on the L side behind the eyes that has spread to the R side  The pain is now at the base of her skull as well as her jaw  She notes that she has difficulty falling asleep at night due to the presence of a headache  She takes medication for the headaches in the morning that help temporarily, but she will end up needing to lay down in bed as the pain is severe  She states that she gets light sensitive when she gets the headaches and her eyes itch  Currently, she is having headache pain on the L side of her head that is extending down towards her ear  She states that she has chronic pain since about  and has been seeing pain management  She cannot correlate any positional changes or head movements that make the medication worse  She also notes that when she gets the headaches she also becomes very nauseous, but denies any vomiting    Pain  Current pain ratin  At best pain ratin  At worst pain ratin  Location: L forehead and base of skull  Quality: pressure  Relieving factors: medications          Objective     Concurrent Complaints  Positive for night pain, disturbed sleep, headaches and nausea/motion sickness  Postural Observations  Seated posture: fair    Additional Postural Observation Details  Forward head and rounded shoulders    Tenderness     Additional Tenderness Details  Bilateral upper traps and suboccipital region are tender to palpation    Active Range of Motion   Cervical/Thoracic Spine       Cervical    Subcranial retraction: Active cervical subcranial retraction: pressure suboccipital region  WFL   Flexion:  Restriction level: minimal  Extension:  WFL  Left lateral flexion: Neck active lateral bend left: stretch of R trap  WFL  Right lateral flexion: Neck active lateral bend right: stretch of L trap and R neck pain  WFL  Left rotation:  WFL  Right rotation: Neck active rotation right: pressure posterior neck  Delivered    Joint Play     Comments: L side glides produced minor pain    Strength/Myotome Testing   Cervical Spine     Left   Interossei strength (t1): 5    Right   Interossei strength (t1): 5    Left Shoulder     Planes of Motion   Abduction: 5     Isolated Muscles   Upper trapezius: 5     Right Shoulder     Planes of Motion   Abduction: 5     Isolated Muscles   Upper trapezius: 5     Left Elbow   Flexion: 5  Extension: 5    Right Elbow   Flexion: 5  Extension: 5    Left Wrist/Hand   Wrist extension: 5  Wrist flexion: 5  Thumb extension: 5    Right Wrist/Hand   Wrist extension: 5  Wrist flexion: 5  Thumb extension: 5    Tests   Cervical   Negative vertical compression and lumbar distraction  Left   Negative Spurling's Test A  Right   Positive Spurling's Test A  Lumbar   Negative vertical compression  Neuro Exam:     Headaches   Patient reports headaches: Yes         Flowsheet Rows      Most Recent Value   PT/OT G-Codes   Current Score  64   Projected Score  67 Precautions: chronic pain syndrome; migraines      Manuals 9/3                                                                Neuro Re-Ed                                                                                                        Ther Ex             Cervical retractions supine x10 3"            UT stretch  2x30"                                                                                          Ther Activity                                       Gait Training                                       Modalities

## 2020-09-08 ENCOUNTER — TELEPHONE (OUTPATIENT)
Dept: NEUROLOGY | Facility: CLINIC | Age: 60
End: 2020-09-08

## 2020-09-08 ENCOUNTER — OFFICE VISIT (OUTPATIENT)
Dept: PHYSICAL THERAPY | Facility: CLINIC | Age: 60
End: 2020-09-08
Payer: COMMERCIAL

## 2020-09-08 DIAGNOSIS — M54.2 CERVICALGIA: Primary | ICD-10-CM

## 2020-09-08 PROCEDURE — 97140 MANUAL THERAPY 1/> REGIONS: CPT

## 2020-09-08 PROCEDURE — 97110 THERAPEUTIC EXERCISES: CPT

## 2020-09-08 PROCEDURE — 97112 NEUROMUSCULAR REEDUCATION: CPT

## 2020-09-08 NOTE — PROGRESS NOTES
Daily Note     Today's date: 2020  Patient name: Nickolas Boucher  : 1960  MRN: [id number]  Referring provider: GWEN Sosa  Dx:   Encounter Diagnosis     ICD-10-CM    1  Cervicalgia  M54 2                   Subjective: Linda Forde states that she feels about the same  She reports that the exercises have been going well and not producing any more pain, but not easing anything yet  Objective: See treatment diary below      Assessment: Linda Forde tolerated the addition of postural exercises well without an increase in pain  Force was progressed to the cervical spine to retraction extension without any reports of pain, just tightness  Retractions following manual techniques reportedly felt looser and the tightness eased  Treatment will continue to progress motion of the cervical spine as well as postural exercises in order to address her functional limitations  Plan: Continue per plan of care        Precautions: chronic pain syndrome; migraines      Manuals 9/3 9/8           STM UT  8'           Suboccipital release  4'                                     Neuro Re-Ed             TB rows/ pull downs  RTB x30           Bwd shoulder rows  x30           Scap retractions  5" x10                                                               Ther Ex             Cervical retractions supine x10 3" seated 3x10 3"            UT stretch  2x30" 3x30"            Pulleys  3'           Cervical retract-ext  seated x10                                                               Ther Activity                                       Gait Training                                       Modalities

## 2020-09-10 ENCOUNTER — OFFICE VISIT (OUTPATIENT)
Dept: PHYSICAL THERAPY | Facility: CLINIC | Age: 60
End: 2020-09-10
Payer: COMMERCIAL

## 2020-09-10 DIAGNOSIS — M54.2 CERVICALGIA: Primary | ICD-10-CM

## 2020-09-10 PROCEDURE — 97140 MANUAL THERAPY 1/> REGIONS: CPT

## 2020-09-10 PROCEDURE — 97112 NEUROMUSCULAR REEDUCATION: CPT

## 2020-09-10 PROCEDURE — 97110 THERAPEUTIC EXERCISES: CPT

## 2020-09-10 NOTE — PROGRESS NOTES
Daily Note     Today's date: 9/10/2020  Patient name: Adia Tom  : 1960  MRN: [id number]  Referring provider: GWEN Whitehead  Dx:   Encounter Diagnosis     ICD-10-CM    1  Cervicalgia  M54 2                   Subjective: Stan Soto states that she felt pretty good and looser after her last session until the afternoon when the headache came on  She notes that the headaches have not been as intense recently  Objective: See treatment diary below      Assessment: Stan Soto tolerated treatment well without any production of cervical or headache pain  Trigger point was found in L UT and eased with sustained pressure to it  She will incorporate cervical SNAGs into her HEP  Treatment will continue to progress postural exercises as well as cervical limitations to attempt to decrease her headache symptoms  Plan: Continue per plan of care        Precautions: chronic pain syndrome; migraines      Manuals 9/3 9/8 9/10          STM UT  8' 5'          Suboccipital release  4' 5'                                    Neuro Re-Ed             TB rows/ pull downs  RTB x30 RTB x30          Bwd shoulder rows  x30 x30          Scap retractions  5" x10           scap retract w/ depression (hands clasped behind back)   5" x10                                                 Ther Ex             Cervical retractions supine x10 3" seated 3x10 3"  supine 3x10 3"          UT stretch  2x30" 3x30"  3x30"          Pulleys  3' 4'          Cervical retract-ext  seated x10 seated x10          Levator stretch   2x20"          Cervical ext SNAGs   5" x20                                    Ther Activity                                       Gait Training                                       Modalities

## 2020-09-11 DIAGNOSIS — G44.89 OTHER HEADACHE SYNDROME: ICD-10-CM

## 2020-09-11 DIAGNOSIS — G47.00 INSOMNIA, UNSPECIFIED TYPE: ICD-10-CM

## 2020-09-11 RX ORDER — TRAZODONE HYDROCHLORIDE 50 MG/1
TABLET ORAL
Qty: 90 TABLET | Refills: 1 | Status: SHIPPED | OUTPATIENT
Start: 2020-09-11 | End: 2020-10-02 | Stop reason: ALTCHOICE

## 2020-09-11 RX ORDER — DIVALPROEX SODIUM 250 MG/1
500 TABLET, EXTENDED RELEASE ORAL DAILY
Qty: 180 TABLET | Refills: 1 | Status: SHIPPED | OUTPATIENT
Start: 2020-09-11 | End: 2020-09-23

## 2020-09-12 ENCOUNTER — HOSPITAL ENCOUNTER (OUTPATIENT)
Dept: ULTRASOUND IMAGING | Facility: HOSPITAL | Age: 60
Discharge: HOME | End: 2020-09-12
Payer: COMMERCIAL

## 2020-09-12 DIAGNOSIS — E07.89 THYROID PAIN: ICD-10-CM

## 2020-09-12 PROCEDURE — 76536 US EXAM OF HEAD AND NECK: CPT

## 2020-09-14 ENCOUNTER — OFFICE VISIT (OUTPATIENT)
Dept: PHYSICAL THERAPY | Facility: CLINIC | Age: 60
End: 2020-09-14
Payer: COMMERCIAL

## 2020-09-14 ENCOUNTER — TELEPHONE (OUTPATIENT)
Dept: PAIN MEDICINE | Facility: CLINIC | Age: 60
End: 2020-09-14

## 2020-09-14 DIAGNOSIS — M54.2 CERVICALGIA: Primary | ICD-10-CM

## 2020-09-14 PROCEDURE — 97110 THERAPEUTIC EXERCISES: CPT

## 2020-09-14 PROCEDURE — 97140 MANUAL THERAPY 1/> REGIONS: CPT

## 2020-09-14 PROCEDURE — 97112 NEUROMUSCULAR REEDUCATION: CPT

## 2020-09-14 NOTE — TELEPHONE ENCOUNTER
Marylen Estelle [facility] ) called in to request records for SPA   As soon as possible  Marylen Estelle ( [facility])  Phone # [phone]  Fax # [phone]        Please be advise thank you        Please call Marylen Estelle back @  [phone]

## 2020-09-14 NOTE — PROGRESS NOTES
Daily Note     Today's date: 2020  Patient name: Adia Tom  : 1960  MRN: [id number]  Referring provider: GWEN Whitehead  Dx:   Encounter Diagnosis     ICD-10-CM    1  Cervicalgia  M54 2                   Subjective: Stan Soto states that she thinks her headaches is gradually improving in intensity  She describes it as tingling feeling from the base of her skull through to her eyes  Objective: See treatment diary below      Assessment: With cervical PA mobilization, Stan Soto began experiencing the pain spreading to behind her R ear and eye  Supine retractions helped ease this pain, bringing it more to her midline cervical spine  By end of session, after cervical extension and rotation SNAGs, she reported that the pain had completely resolved  She will trial cervical retractions more regularly at home and emphasize them when the pain is present to determine the effect on her symptoms  She verbalized understanding  Treatment will continue force progression to the cervical spine as well as postural exercises  Plan: Continue per plan of care        Precautions: chronic pain syndrome; migraines      Manuals 9/3 9/8 9/10 9/14         STM UT  8' 5' IASTM 6'         Suboccipital release  4' 5' 3'         Cervical PA mob    Gr I/II                      Neuro Re-Ed             TB rows/ pull downs  RTB x30 RTB x30 Gr x30         Bwd shoulder rows  x30 x30 x30         Scap retractions  5" x10           scap retract w/ depression (hands clasped behind back)   5" x10                                                 Ther Ex             Cervical retractions supine x10 3" seated 3x10 3"  supine 3x10 3" seated w/ PT OP 3x10; 3x10 supine          UT stretch  2x30" 3x30"  3x30" 3x30"         Pulleys  3' 4' 5'         Cervical retract-ext  seated x10 seated x10 seated 3x10         Levator stretch   2x20" 2x30"         Cervical ext SNAGs   5" x20 x20         Cervical rotation SNAGs    x10 ea                      Ther Activity                                       Gait Training                                       Modalities

## 2020-09-17 ENCOUNTER — APPOINTMENT (OUTPATIENT)
Dept: PHYSICAL THERAPY | Facility: CLINIC | Age: 60
End: 2020-09-17
Payer: COMMERCIAL

## 2020-09-17 ENCOUNTER — TRANSCRIBE ORDERS (OUTPATIENT)
Dept: ADMINISTRATIVE | Facility: HOSPITAL | Age: 60
End: 2020-09-17

## 2020-09-17 DIAGNOSIS — E04.1 NONTOXIC SINGLE THYROID NODULE: Primary | ICD-10-CM

## 2020-09-17 DIAGNOSIS — E04.1 THYROID NODULE: Primary | ICD-10-CM

## 2020-09-21 ENCOUNTER — OFFICE VISIT (OUTPATIENT)
Dept: PHYSICAL THERAPY | Facility: CLINIC | Age: 60
End: 2020-09-21
Payer: COMMERCIAL

## 2020-09-21 DIAGNOSIS — M54.2 CERVICALGIA: Primary | ICD-10-CM

## 2020-09-21 PROCEDURE — 97112 NEUROMUSCULAR REEDUCATION: CPT

## 2020-09-21 PROCEDURE — 97110 THERAPEUTIC EXERCISES: CPT

## 2020-09-21 PROCEDURE — 97140 MANUAL THERAPY 1/> REGIONS: CPT

## 2020-09-21 NOTE — PROGRESS NOTES
[address] [facility] Consult  PATIENT:  Santhosh Romano  MRN:  [id number]  :  1960  DATE OF SERVICE:  2020  REFERRED BY: No ref  provider found  PMD: GWEN Sen    Assessment/Plan:     Santhosh Romano is a very pleasant 62 y o  female with a past medical history that includes  chronic pain syndrome on dronabinol (s/p FILIBERTO in  with damage to nerves and ureter and multiple surgeries with subsequent chronic back pain, pelvic pain, left knee and left leg pain), anxiety, depression, GERD, insomnia, chronic headaches, cervicalgia, chronic fatigue, post gastrectomy malabsorption, CATHRYN, hypertension, lumbar radiculopathy, mild cognitive impairment, osteoarthritis, status post insertion of spinal cord stimulator, kidney stones, IBS  referred here for evaluation of headache  My initial evaluation 2020     Chronic daily headache  Chronic migraine without aura without status migrainosus, not intractable  Cervicogenic headache  Cervicalgia  Anxiety and depression  She reports history of prolonged headache/migraine cycles in the past years, but does not recall migraines growing up  Pain is variable, see HPI for details  Components of neuralgia, migrainous components, cervicogenic components  She denies aura and reports typical associated migrainous features  In 2020 went to visit sister and was feeling well, was there 7 weeks, on plane on the way back, her chronic pains got worse and headaches became constant  Also after flying she had a cough  Had both types of COVID tests that were negative  (2 years ago had a few months of headaches as well that self resolved)  - as of 2020: chronic daily headache ever since 2020, past month has felt depressed because of it     Workup:  - MRI brain with without contrast 2020: Normal MRI of the brain  No pathologic intracranial enhancement   - MRI brain neuroquant  2019   1  Normal MRI of the brain  2    Neuroquant was performed and is a normal study; Does not support neurodegeneration   - MRI brain neuroquant 07/26/2016: No acute disease  Neuro Quant imaging normal, no indication of Alzheimer's type, medial temporal lobe neuro degenerative disease  Preventative:  - we discussed headache hygiene and lifestyle factors that may improve headaches  - Discussed how cognitive behavioral therapy can help with many symptoms, recommended considering listening to the neuroscience of pain/discomfort lectures on [employer]  - trial of emgality  Discussed proper use, possible side effects and risks  - past/failed:  Depakote, propranolol, duloxetine, trazodone, lisinopril, topiramate, gabapentin, pregabalin, venlafaxine and amitriptyline would be contraindicated due to interaction with duloxetine, aimovig - would avoid at this time due to history of constipation   - future options: Alternative CGRP, botox     Abortive:  - discussed not taking over-the-counter or prescription pain medications more than 3 days per week to prevent medication overuse/rebound headache  - discussed stopping Fioricet as it is not a good medication for headaches or migraines and not helping anyone  - trial of dexamethasone 2 mg daily for 5 days to try and abort this headache  Discussed proper use, possible side effects and risks  - trial of ondansetron/Zofran for nausea  Discussed proper use, possible side effects and risks  - past/failed:  Would avoid p o  NSAIDs due to GI symptoms and history of bleed, Fioricet  - future options:  Could consider Triptan, prochlorperazine, Toradol IM, could consider trial for 5 days of Depakote or dexamethasone 4 prolonged migraine, ubrelvy, reyvow    Nausea  -     ondansetron (ZOFRAN-ODT) 4 mg disintegrating tablet; Take 1 tablet (4 mg total) by mouth every 6 (six) hours as needed for nausea or vomiting  Discussed proper use, possible side effects and risks      Patient instructions      I recommend considering talking with a counselor regarding mood  - will have our  contact you to see if she can help you with a list of providers covered by insurance       [employer] - Download this free [employer] on your phone (they will offer subscription, but you do not have to do this)  - I recommend listening to the first approximately 5 or so lectures (more if you want) that are about 10-20 minutes long on the neuroscience of pain  - They discuss how pain works in the brain and steps to try and cure chronic pain    I recommend these free lectures from [employer]  "The basic neuroscience of pain"  "Pain is more than just tissue damage"  "How pain becomes chronic"  "What does the pain mean to you"  "What to do next"      Headache/migraine treatment:   Abortive medications (for immediate treatment of a headache): It is ok to take ibuprofen, acetaminophen or naproxen (Advil, Tylenol,  Aleve, Excedrin) if they help your headaches you should limit these to No more than 3 times a week to avoid medication overuse/rebound headaches  Recommend stopping Fioricet    Dexamethasone 2 mg daily with breakfast for the next 5 days to try and abort this headache    Zofran/ondansetron as needed for nausea    Prescription preventive medications for headaches/migraines   (to take every day to help prevent headaches - not to take at the time of headache):  [x] decrease Depakote to 250 mg for 2 week and then discontinue    [x] emgality 2 pens the first month and 1 pen every month after   Go on the website for emgality  [employer] and watch instructional videos       *Typically these types of medications take time untill you see the benefit, although some may see improvement in days, often it may take weeks, especially if the medication is being titrated up to a beneficial level  Please contact us if there are any concerns or questions regarding the medication       Lifestyle Recommendations:  [x] SLEEP - Maintain a regular sleep schedule: Adults need at least 7-8 hours of uninterrupted a night  Maintain good sleep hygiene:  Going to bed and waking up at consistent times, avoiding excessive daytime naps, avoiding caffeinated beverages in the evening, avoid excessive stimulation in the evening and generally using bed primarily for sleeping  One hour before bedtime would recommend turning lights down lower, decreasing your activity (may read quietly, listen to music at a low volume)  When you get into bed, should eliminate all technology (no texting, emailing, playing with your phone, iPad or tablet in bed)  [x] HYDRATION - Maintain good hydration  Drink  2L of fluid a day (4 typical small water bottles)  [x] DIET - Maintain good nutrition  In particular don't skip meals and try and eat healthy balanced meals regularly  [x] TRIGGERS - Look for other triggers and avoid them: Limit caffeine to 1-2 cups a day or less  Avoid dietary triggers that you have noticed bring on your headaches (this could include aged cheese, peanuts, MSG, aspartame and nitrates)  [x] EXERCISE - physical exercise as we all know is good for you in many ways, and not only is good for your heart, but also is beneficial for your mental health, cognitive health and  chronic pain/headaches  I would encourage at the least 5 days of physical exercise weekly for at least 30 minutes  Education and Follow-up  [x] Please call with any questions or concerns  Of course if any new concerning symptoms go to the emergency department  [x] Follow up with Dr Tasha Rizzo in 3 months for consideration of botox in the future, and patient had requested her to begin with    CC:   We had the pleasure of evaluating Francisco Ham in neurological consultation today  Francisco Ham is a 62 y o    right handed female who presents today for evaluation of headaches  History obtained from patient as well as available medical record review    History of Present Illness:   Current medical illnesses include chronic pain syndrome on dronabinol (s/p FILIBERTO in 2012 with damage to nerves and ureter and multiple surgeries with chronic back pain and pelvic pain, left knee and left leg pain), anxiety, depression, GERD, insomnia, chronic headaches, cervicalgia, chronic fatigue, post gastrectomy malabsorption, CATHRYN, hypertension, lumbar radiculopathy, mild cognitive impairment, osteoarthritis, status post insertion of spinal cord stimulator, kidney stones, IBS     - she follows with my neurology colleague Dr Carlota Villegas  - she also follows with Pain Medicine, and also gets blocks with urogynecologist    Headaches started at what age? Never had headaches really growing up, her and her twin sister lately have been having severe headaches  How often do the headaches occur? n 1/2020 went to visit sister and was feeling well, was there 7 weeks, on plane on the way back, her chronic pains got worse and headaches became constant  Also after flying she had a cough  Had both types of COVID tests that were negative  (2 years ago had a few months of headaches as well and self resolved)  - as of 9/23/2020: chronic daily headache ever since 2/2020, past month has felt depressed because of it (denies SI)  How long do the headaches last? All day   Are you ever headache free? No    Aura? without aura     Last eye exam: Jan 2020, normal except for glasses    Where is your headache located and pain quality?   - entire head hurts - dull achy and shots of electric for short periods of time   - tingling in back of head  - worst pain right side on the back and comes up and goes through right eye  - bilateral eyes hurt and around eye sockets   - bilateral ear pain   What is the intensity of pain?  Average: 7/10, worst 7/10  Associated symptoms:   [x] Nausea       [] Vomiting   - rarely    [x] Insomnia    [x] Stiff or sore neck   [x] Problems with concentration  [x] Photophobia     [x]Phonophobia      [] Osmophobia  [x] Blurred vision   [x] Prefer quiet, dark room  [x] Light-headed or dizzy with standing     [] Tinnitus     [] Red ear      [] Ptosis      [] Facial droop  [] Lacrimation  [] Nasal congestion/rhinorrhea   [] Flushing of face  [] Change in pupil size    Things that make the headache worse? No specific movements, any movement     Headache triggers:  unknown    Have you seen someone else for headaches or pain? Yes, neurology   Have you had trigger point injection performed and how often? No  Have you had Botox injection performed and how often? No   Have you had epidural injections or transforaminal injections performed? Yes, made things worse  Are you current pregnant or planning on getting pregnant?no, s/p hysterectomy  Have you ever had any Brain imaging? yes     What medications do you take or have you taken for your headaches? ABORTIVE:      Fioricet - every other day or daily  Wasn't sure it was working anyway     PREVENTIVE:     Depakote 500 mg - started 07/10/2020 - does not seem to be helping   Propranolol 80 mg - started around 3/2020, not sure if helping   For mood:  Duloxetine 60 mg  For sleep:  Trazodone 50 mg - not sure it is helping  For BP:  Lisinopril 10 mg      Past  Topiramate - contraindicated due to history of kidney stones  Venlafaxine and amitriptyline would be contraindicated due to interaction with duloxetine  Gabapentin - SE hand shaking and convulsions/hand tightening - was on maximum amount   lyrica - felt like it was making it difficult to think  aimovig - would avoid at this time due to history of constipation     Alternative therapies used in the past for headaches?   She feels like physical therapy has been helping headache    Sleep - chronic insomnia       The following portions of the patient's history were reviewed and updated as appropriate: allergies, current medications, past family history, past medical history, past social history, past surgical history and problem list     Pertinent family history:  Family history of headaches:  migraine headaches in sister and brother - occipital neuralgia     Pertinent social history:  Work: retired, used to teach gifted students  Lives with      Illicit Drugs: denies  Alcohol/tobacco: Denies alcohol use, Denies tobacco use    Past Medical History:     Past Medical History:   Diagnosis Date    Anxiety     Arthritis     Attention and concentration deficit     Blurred vision     Chronic pain     Depression     Diplopia     Dyspepsia     Gastric ulcer     Gastritis     Memory loss     Osteopenia     Starting and stopping of urinary stream during micturition     Urinary incontinence     Wears eyeglasses        Patient Active Problem List   Diagnosis    Neuralgia    Continuous leakage of urine    Mixed hyperlipidemia    Chronic pain syndrome    Dysesthesia of multiple sites    Eczema    Hematuria    Hypertension    Arthralgia of shoulder region, left    Limb pain    Lower back pain    Lumbar facet arthropathy    Memory difficulties    Memory impairment    Numbness    Obesity (BMI 35 0-39 9 without comorbidity)    CATHRYN (obstructive sleep apnea)    Postgastrectomy malabsorption    Pudendal neuralgia    Snoring    Gastroesophageal reflux disease    Iron deficiency anemia    Osteoarthritis of left shoulder    Sacroiliitis (HCC)    Lumbar radiculopathy    Anxiety and depression    Mild cognitive impairment with memory loss    Abnormal EEG    Transient alteration of awareness    Cough    Status post insertion of spinal cord stimulator    Battery end of life of spinal cord stimulator    Other headache syndrome    Nonintractable headache    Insomnia    Migraine    Cervicalgia    Thyroid pain       Medications:      Current Outpatient Medications   Medication Sig Dispense Refill    Cholecalciferol (D3-1000) 1000 units capsule Take 1 capsule by mouth daily        dronabinol (MARINOL) 5 MG capsule Take 1 capsule (5 mg total) by mouth 2 (two) times a day before meals By Dr Trenton Tirado (Patient taking differently: Take 5 mg by mouth daily By Dr Trenton Tirado) 60 capsule 0    DULoxetine (CYMBALTA) 60 mg delayed release capsule Take 1 capsule (60 mg total) by mouth daily 90 capsule 1    Iron-Vitamin C (IRON 100/C) 100-250 MG TABS Take by mouth daily      lidocaine (XYLOCAINE) 5 % ointment Apply topically 2 (two) times a day as needed      lisinopril (ZESTRIL) 10 mg tablet TAKE 1 TABLET DAILY 90 tablet 4    mometasone (ELOCON) 0 1 % cream Apply topically daily 45 g 0    Multiple Vitamin (MULTI-VITAMIN DAILY) TABS Take 1 tablet by mouth daily      pantoprazole (PROTONIX) 40 mg tablet TAKE 1 TABLET TWICE A  tablet 4    propranolol (INDERAL LA) 80 mg 24 hr capsule Take 1 capsule (80 mg total) by mouth daily 90 capsule 1    traZODone (DESYREL) 50 mg tablet TAKE 1/2 TO 1 TABLET AT BEDTIME AS NEEDED FOR INSOMNIA 90 tablet 1    dexamethasone (DECADRON) 2 mg tablet Take 1 tablet (2 mg total) by mouth daily with breakfast for 5 days 5 tablet 0    Galcanezumab-gnlm 120 MG/ML SOAJ Inject 240 mg under the skin once for 1 dose 2 pen 0    [START ON 10/23/2020] Galcanezumab-gnlm 120 MG/ML SOAJ Inject 120 mg under the skin every 30 (thirty) days 1 pen 11    ondansetron (ZOFRAN-ODT) 4 mg disintegrating tablet Take 1 tablet (4 mg total) by mouth every 6 (six) hours as needed for nausea or vomiting 20 tablet 1     No current facility-administered medications for this visit  Allergies:       Allergies   Allergen Reactions    Morphine Abdominal Pain     SEVERE N/V    Shellfish-Derived Products Vomiting     CRAB MEAT ONLY    Cat Hair Extract Nasal Congestion     Cat Dander    Dog Epithelium Nasal Congestion    Other Other (See Comments) and Sneezing     Dogs  Crab Meat       Family History:     Family History   Problem Relation Age of Onset    Other Mother         Back Disorder     Cirrhosis Mother     Crohn's disease Mother     Lupus Mother         Systemic Lupus Erythematous     Hypertension Father     Heart disease Father     Other Brother         Liver Transplant     Crohn's disease Brother     Crohn's disease Brother     No Known Problems Sister     No Known Problems Daughter     No Known Problems Maternal Aunt     No Known Problems Cousin     No Known Problems Cousin     No Known Problems Cousin     No Known Problems Cousin     No Known Problems Cousin     Seizures Neg Hx        Social History:       Social History     Socioeconomic History    Marital status: /Civil Union     Spouse name: Not on file    Number of children: Not on file    Years of education: Not on file    Highest education level: Not on file   Occupational History    Occupation: retired   Social Needs    Financial resource strain: Not on file    Food insecurity     Worry: Not on file     Inability: Not on file   [employer] needs     Medical: Not on file     Non-medical: Not on file   Tobacco Use    Smoking status: Never Smoker    Smokeless tobacco: Never Used   Substance and Sexual Activity    Alcohol use: No    Drug use: Yes     Types: Marijuana     Comment: medical marijuana 2x a day capsules    Sexual activity: Yes   Lifestyle    Physical activity     Days per week: Not on file     Minutes per session: Not on file    Stress: Not on file   Relationships    Social connections     Talks on phone: Not on file     Gets together: Not on file     Attends [employer] service: Not on file     Active member of club or organization: Not on file     Attends meetings of clubs or organizations: Not on file     Relationship status: Not on file    Intimate partner violence     Fear of current or ex partner: Not on file     Emotionally abused: Not on file     Physically abused: Not on file     Forced sexual activity: Not on file   Other Topics Concern    Not on file   Social History Narrative    Lives in [facility], with   Previously worked as a teacher            Objective: Exam limited by pandemic/social distancing    Physical Exam:                                                                 Vitals:            Constitutional:    /78 (BP Location: Left arm, Patient Position: Sitting, Cuff Size: Large)   Pulse 80   Temp 99 8 °F (37 7 °C) (Tympanic)   Ht 5' 4" (1 626 m)   Wt 101 kg (222 lb)   BMI 38 11 kg/m²   BP Readings from Last 3 Encounters:   09/23/20 128/78   08/19/20 114/76   07/29/20 122/81     Pulse Readings from Last 3 Encounters:   09/23/20 80   08/19/20 68   07/29/20 72         Well developed, well nourished, well groomed  No dysmorphic features, cooperative       HEENT:  Normocephalic atraumatic  No meningismus  Chest:  Respirations appear regular and unlabored  Musculoskeletal:  Appears to have Full range of motion  (see below under neurologic exam for evaluation of motor function and gait)   Skin:  Appears warm and dry, not diaphoretic  No apparent birthmarks or stigmata of neurocutaneous disease  Psychiatric:  Normal behavior and appropriate affect, tearful       Neurological Examination:     Mental status/cognitive function:   Recent and remote memory intact  Attention span and concentration as well as fund of knowledge are appropriate for age  Normal language and spontaneous speech  Cranial Nerves:  II-visual fields appear full  Unable to do fundus exam due to pandemic/social distancing  III, IV, VI-Pupils appear equal, round  Extraocular movements were full and conjugate without nystagmus  V-facial sensation symmetric  VII-facial expression symmetric, intact forehead wrinkle, strong eye closure  VIII-hearing grossly intact bilaterally   IX, X-palate elevation symmetric, no dysarthria  XI-shoulder shrug strength intact    XII-tongue protrusion midline  Motor Exam: symmetric bulk and tone throughout, no pronator drift  no atrophy, fasciculations or abnormal movements noted     Sensory: they report grossly intact light touch in all extremities  Reflexes:  Deferred due to pandemic/social distancing  Coordination:no apparent dysmetria, ataxia or tremor noted  Gait: steady casual and tandem gait  Pertinent lab results:   07/13/2020 CBC unremarkable  07/06/2020 CMP unremarkable  05/20/2019:  Normal 1 3  1/2/19: B12 616     Imaging:   I have personally reviewed imaging and radiology read   - MRI brain with without contrast 08/07/2020: Normal MRI of the brain  No pathologic intracranial enhancement   - MRI brain neuroquant  01/25/2019   1  Normal MRI of the brain  2  Neuroquant was performed and is a normal study; Does not support neurodegeneration   - MRI brain neuroquant 07/26/2016: No acute disease  Neuro Quant imaging normal, no indication of Alzheimer's type, medial temporal lobe neuro degenerative disease  Review of Systems:   ROS obtained by medical assistant Personally reviewed and updated if indicated  Patient listed multiple symptoms they are not currently experiencing  Review of Systems   Constitutional: Negative  Negative for appetite change and fever  HENT: Negative  Negative for hearing loss, tinnitus, trouble swallowing and voice change  Eyes: Positive for pain and visual disturbance  Negative for photophobia  Respiratory: Negative  Negative for shortness of breath  Cardiovascular: Negative  Negative for palpitations  Gastrointestinal: Positive for nausea  Negative for vomiting  Endocrine: Negative  Negative for cold intolerance  Genitourinary: Negative  Negative for dysuria, frequency and urgency  Musculoskeletal: Positive for neck pain  Negative for myalgias  Skin: Negative  Negative for rash  Neurological: Positive for numbness (Head/Neck) and headaches  Negative for dizziness, tremors, seizures, syncope, facial asymmetry, speech difficulty, weakness and light-headedness  Hematological: Negative  Does not bruise/bleed easily  Psychiatric/Behavioral: Negative    Negative for confusion, hallucinations and sleep disturbance  I have spent over 70 minutes with Patient  today in which greater than 50% of this time was spent in counseling/coordination of care regarding Diagnostic results, Prognosis, Risks and benefits of tx options, Intructions for management, Patient education, Importance of tx compliance, Risk factor reductions and Impressions        Author:  Emile Boyre MD 9/23/2020 5:01 PM

## 2020-09-21 NOTE — PROGRESS NOTES
Daily Note     Today's date: 2020  Patient name: Nikki Perera  : 1960  MRN: [id number]  Referring provider: GWEN Bunn  Dx:   Encounter Diagnosis     ICD-10-CM    1  Cervicalgia  M54 2                   Subjective: Yarelis Jimenez states that she feels overall fatigued and nauseated today  She reports that the fatigue has been constant since February  She says that the headache is about 5/10 today  She feels that PT is helping as she thinks that the severity of the headaches is trending down, still with intermittent and regular severe headaches  She has yet to notice a trend on if retractions performed during a headache helps decrease her symptoms at home  Objective: See treatment diary below      Assessment: Yarelis Jimenez reported a decrease in headache pain following cervical retractions and retraction-extensions  SNAGs today increased her nausea symptoms so these were stopped after a few repetitions  She reported a further decrease in headache pain to a 2/10 following manual techniques including manual traction to the cervical spine  She will monitor duration of benefit of the manual traction and report back  Consider cervical mechanical traction next session if manual traction tolerated well  She will benefit from continued skilled PT  Plan: Continue per plan of care        Precautions: chronic pain syndrome; migraines      Manuals 9/3 9/8 9/10 9/14 9/21        STM UT  8' 5' IASTM 6' IASTM 5'        Suboccipital release  4' 5' 3' 2'        Cervical PA mob    Gr I/II         Manual C/S traction     DF        Neuro Re-Ed             TB rows/ pull downs  RTB x30 RTB x30 Gr x30 Gr x30        Bwd shoulder rows  x30 x30 x30 x30        Scap retractions  5" x10           scap retract w/ depression (hands clasped behind back)   5" x10  5" x10                                               Ther Ex             Cervical retractions supine x10 3" seated 3x10 3"  supine 3x10 3" seated w/ PT OP 3x10; 3x10 supine seated w/ self-OP 3x10        UT stretch  2x30" 3x30"  3x30" 3x30" 3x30"         Pulleys  3' 4' 5' 5'        Cervical retract-ext  seated x10 seated x10 seated 3x10 seated 3x10        Levator stretch   2x20" 2x30" 2x30"        Cervical ext SNAGs   5" x20 x20 x15 stopped due to ^ nausea        Cervical rotation SNAGs    x10 ea np                     Ther Activity                                       Gait Training                                       Modalities

## 2020-09-23 ENCOUNTER — TRANSCRIBE ORDERS (OUTPATIENT)
Dept: RADIOLOGY | Facility: HOSPITAL | Age: 60
End: 2020-09-23

## 2020-09-23 ENCOUNTER — TELEPHONE (OUTPATIENT)
Dept: NEUROLOGY | Facility: CLINIC | Age: 60
End: 2020-09-23

## 2020-09-23 ENCOUNTER — HOSPITAL ENCOUNTER (OUTPATIENT)
Dept: RADIOLOGY | Facility: HOSPITAL | Age: 60
Discharge: HOME | End: 2020-09-23
Payer: COMMERCIAL

## 2020-09-23 ENCOUNTER — OFFICE VISIT (OUTPATIENT)
Dept: NEUROLOGY | Facility: CLINIC | Age: 60
End: 2020-09-23
Payer: COMMERCIAL

## 2020-09-23 VITALS
DIASTOLIC BLOOD PRESSURE: 78 MMHG | SYSTOLIC BLOOD PRESSURE: 128 MMHG | BODY MASS INDEX: 37.9 KG/M2 | WEIGHT: 222 LBS | TEMPERATURE: 99.8 F | HEART RATE: 80 BPM | HEIGHT: 64 IN

## 2020-09-23 DIAGNOSIS — G43.709 CHRONIC MIGRAINE WITHOUT AURA WITHOUT STATUS MIGRAINOSUS, NOT INTRACTABLE: ICD-10-CM

## 2020-09-23 DIAGNOSIS — M54.2 CERVICALGIA: ICD-10-CM

## 2020-09-23 DIAGNOSIS — F32.A ANXIETY AND DEPRESSION: ICD-10-CM

## 2020-09-23 DIAGNOSIS — R11.0 NAUSEA: ICD-10-CM

## 2020-09-23 DIAGNOSIS — G44.86 CERVICOGENIC HEADACHE: ICD-10-CM

## 2020-09-23 DIAGNOSIS — E04.1 NONTOXIC SINGLE THYROID NODULE: ICD-10-CM

## 2020-09-23 DIAGNOSIS — F41.9 ANXIETY AND DEPRESSION: ICD-10-CM

## 2020-09-23 DIAGNOSIS — R51.9 CHRONIC DAILY HEADACHE: Primary | ICD-10-CM

## 2020-09-23 PROCEDURE — 88172 CYTP DX EVAL FNA 1ST EA SITE: CPT | Performed by: PATHOLOGY

## 2020-09-23 PROCEDURE — 10005 FNA BX W/US GDN 1ST LES: CPT

## 2020-09-23 PROCEDURE — 88173 CYTOPATH EVAL FNA REPORT: CPT | Performed by: PATHOLOGY

## 2020-09-23 PROCEDURE — 99215 OFFICE O/P EST HI 40 MIN: CPT | Performed by: PSYCHIATRY & NEUROLOGY

## 2020-09-23 PROCEDURE — 99354 PR PROLONGED SVC OUTPATIENT SETTING 1ST HOUR: CPT | Performed by: PSYCHIATRY & NEUROLOGY

## 2020-09-23 RX ORDER — ONDANSETRON 4 MG/1
4 TABLET, ORALLY DISINTEGRATING ORAL EVERY 6 HOURS PRN
Qty: 20 TABLET | Refills: 1 | Status: SHIPPED | OUTPATIENT
Start: 2020-09-23 | End: 2020-10-02 | Stop reason: SDUPTHER

## 2020-09-23 RX ORDER — LIDOCAINE HYDROCHLORIDE 10 MG/ML
4 INJECTION, SOLUTION EPIDURAL; INFILTRATION; INTRACAUDAL; PERINEURAL ONCE
Status: COMPLETED | OUTPATIENT
Start: 2020-09-23 | End: 2020-09-23

## 2020-09-23 RX ORDER — DEXAMETHASONE 2 MG/1
2 TABLET ORAL
Qty: 5 TABLET | Refills: 0 | Status: SHIPPED | OUTPATIENT
Start: 2020-09-23 | End: 2020-09-28

## 2020-09-23 RX ADMIN — LIDOCAINE HYDROCHLORIDE 4 ML: 10 INJECTION, SOLUTION EPIDURAL; INFILTRATION; INTRACAUDAL; PERINEURAL at 10:40

## 2020-09-23 NOTE — PATIENT INSTRUCTIONS
I recommend considering talking with a counselor regarding mood  - will have our  contact you to see if she can help you with a list of providers       [employer] - Download this free [employer] on your phone (they will offer subscription, but you do not have to do this)  - I recommend listening to the first approximately 5 or so lectures (more if you want) that are about 10-20 minutes long on the neuroscience of pain  - They discuss how pain works in the brain and steps to try and cure chronic pain    I recommend these free lectures from [employer]  "The basic neuroscience of pain"  "Pain is more than just tissue damage"  "How pain becomes chronic"  "What does the pain mean to you"  "What to do next"      Headache/migraine treatment:   Abortive medications (for immediate treatment of a headache): It is ok to take ibuprofen, acetaminophen or naproxen (Advil, Tylenol,  Aleve, Excedrin) if they help your headaches you should limit these to No more than 3 times a week to avoid medication overuse/rebound headaches  Recommend stopping Fioricet      Dexamethasone 2 mg daily with breakfast for the next 5 days to try and abort this headache    Zofran/ondansetron as needed for nausea    Prescription preventive medications for headaches/migraines   (to take every day to help prevent headaches - not to take at the time of headache):  [x] decrease Depakote to 250 mg for 2 week and then discontinue    [x] emgality 2 pens the first month and 1 pen every month after   Go on the website for emgality  [employer] and watch instructional videos       *Typically these types of medications take time untill you see the benefit, although some may see improvement in days, often it may take weeks, especially if the medication is being titrated up to a beneficial level  Please contact us if there are any concerns or questions regarding the medication       Lifestyle Recommendations:  [x] SLEEP - Maintain a regular sleep schedule: Adults need at least 7-8 hours of uninterrupted a night  Maintain good sleep hygiene:  Going to bed and waking up at consistent times, avoiding excessive daytime naps, avoiding caffeinated beverages in the evening, avoid excessive stimulation in the evening and generally using bed primarily for sleeping  One hour before bedtime would recommend turning lights down lower, decreasing your activity (may read quietly, listen to music at a low volume)  When you get into bed, should eliminate all technology (no texting, emailing, playing with your phone, iPad or tablet in bed)  [x] HYDRATION - Maintain good hydration  Drink  2L of fluid a day (4 typical small water bottles)  [x] DIET - Maintain good nutrition  In particular don't skip meals and try and eat healthy balanced meals regularly  [x] TRIGGERS - Look for other triggers and avoid them: Limit caffeine to 1-2 cups a day or less  Avoid dietary triggers that you have noticed bring on your headaches (this could include aged cheese, peanuts, MSG, aspartame and nitrates)  [x] EXERCISE - physical exercise as we all know is good for you in many ways, and not only is good for your heart, but also is beneficial for your mental health, cognitive health and  chronic pain/headaches  I would encourage at the least 5 days of physical exercise weekly for at least 30 minutes  Education and Follow-up  [x] Please call with any questions or concerns  Of course if any new concerning symptoms go to the emergency department    [x] Follow up with Dr Judy Hale in 3 months for consideration of botox in the future

## 2020-09-23 NOTE — PROGRESS NOTES
Review of Systems   Constitutional: Negative  Negative for appetite change and fever  HENT: Negative  Negative for hearing loss, tinnitus, trouble swallowing and voice change  Eyes: Positive for pain and visual disturbance  Negative for photophobia  Respiratory: Negative  Negative for shortness of breath  Cardiovascular: Negative  Negative for palpitations  Gastrointestinal: Positive for nausea  Negative for vomiting  Endocrine: Negative  Negative for cold intolerance  Genitourinary: Negative  Negative for dysuria, frequency and urgency  Musculoskeletal: Positive for neck pain  Negative for myalgias  Skin: Negative  Negative for rash  Neurological: Positive for numbness (Head/Neck) and headaches  Negative for dizziness, tremors, seizures, syncope, facial asymmetry, speech difficulty, weakness and light-headedness  Hematological: Negative  Does not bruise/bleed easily  Psychiatric/Behavioral: Negative  Negative for confusion, hallucinations and sleep disturbance

## 2020-09-24 ENCOUNTER — TELEPHONE (OUTPATIENT)
Dept: NEUROLOGY | Facility: CLINIC | Age: 60
End: 2020-09-24

## 2020-09-24 NOTE — TELEPHONE ENCOUNTER
Fax received regarding emgality  PA required  Submitted via [facility]  [id number]  Emgality approved  [facility]WL[facility]:[id number];REEXNE:LARA; Review Type:Prior Auth; Coverage Start Date:08/25/2020; Coverage End Date:09/24/2021;

## 2020-09-30 ENCOUNTER — TELEPHONE (OUTPATIENT)
Dept: FAMILY MEDICINE CLINIC | Facility: CLINIC | Age: 60
End: 2020-09-30

## 2020-09-30 ENCOUNTER — OFFICE VISIT (OUTPATIENT)
Dept: PAIN MEDICINE | Facility: CLINIC | Age: 60
End: 2020-09-30
Payer: COMMERCIAL

## 2020-09-30 VITALS
HEART RATE: 60 BPM | DIASTOLIC BLOOD PRESSURE: 79 MMHG | TEMPERATURE: 99.7 F | RESPIRATION RATE: 20 BRPM | HEIGHT: 64 IN | WEIGHT: 225.6 LBS | BODY MASS INDEX: 38.51 KG/M2 | SYSTOLIC BLOOD PRESSURE: 118 MMHG

## 2020-09-30 DIAGNOSIS — M54.16 LUMBAR RADICULOPATHY: Primary | ICD-10-CM

## 2020-09-30 PROCEDURE — 99214 OFFICE O/P EST MOD 30 MIN: CPT | Performed by: STUDENT IN AN ORGANIZED HEALTH CARE EDUCATION/TRAINING PROGRAM

## 2020-09-30 NOTE — PROGRESS NOTES
Pain Medicine Follow-Up Note    Assessment:  1  Lumbar radiculopathy        Plan:  Orders Placed This Encounter   Procedures    FL spine and pain procedure     Standing Status:   Future     Standing Expiration Date:   9/30/2024     Order Specific Question:   Reason for Exam:     Answer:   L4-5     Order Specific Question:   Anticoagulant hold needed? Answer:   No       No orders of the defined types were placed in this encounter  My impressions and treatment recommendations were discussed in detail with the patient who verbalized understanding and had no further questions  The patient's chief complaint today is bilateral leg pain  She has reported relief from previous epidural steroid injections in the past   I discussed repeating the procedure with an interlaminar approach at L4-5 to allow good bilateral spread of the medication  She does report that she is a little more symptomatic on the right  She also has spinal cord stimulator in place and reports that it is helping to cover the left lower extremity better than right  She continues to maintain contact with her [employer] representative for reprogramming  Given the patient's symptoms, examination findings and imaging results noted above, I discussed the utility of proceeding with a L4-5 interlaminar epidural steroid injection under fluoroscopic guidance  Using an anatomical model, the procedure, as well as its potential risks, benefits, and reasonable alternatives were discussed in detail  Discussed risks of the procedure included but are not limited to bleeding, infection, allergic reaction, nerve damage, hematoma fomation, abscess formation, failure of the pain to improve and potential worsening of the pain  Since Ms Mimi Braldey has failed at least 6 weeks of conservative measures including over-the-counter pain medications, prescription medications and a home exercise program it is reasonable to proceed with the above injection    The patient verbalized understanding to the potential risks, benefits, and reasonable alternatives to the above injection and wishes to proceed  Her response will help to further determine a treatment plan  She will call our office to schedule injection in the future  Discharge instructions were provided  I personally saw and examined the patient and I agree with the above discussed plan of care  History of Present Illness:    Flex Dela Cruz is a 62 y o  female who presents for a follow up office visit in regards to back and leg pain  The patient's current symptoms include dull aching pain along the lateral left thigh, burning pain in the perineal area, shooting pain down the anterolateral right thigh, throbbing in the bilateral knees, hands sharp/slight seeing pain in the low back  She also endorses frontal headaches and bilateral neck pain without radiculopathy  She rates her pain 8/10  She has had multiple procedures from us in the past including bilateral L3 transforaminal injection, right L4-5 transforaminal injection, bilateral shoulder injection, and sacroiliac joint injection  Her last procedure was a bilateral L3 transforaminal injection in June 2020 which provided her 50- 80% relief in the initial period  Patient is currently off opiate therapy  She uses medical marijuana  This continues to help her symptoms    I have personally reviewed and/or updated the patient's past medical history, past surgical history, family history, social history, current medications, allergies, and vital signs today  Other than as stated above, the patient denies any interval changes in medications, medical condition, mental condition, symptoms, or allergies since the last office visit  Review of Systems:    Review of Systems   Respiratory: Negative for shortness of breath  Cardiovascular: Negative for chest pain  Gastrointestinal: Positive for nausea   Negative for constipation, diarrhea and vomiting  Musculoskeletal: Negative for arthralgias, gait problem, joint swelling and myalgias  Joint stiffness   Skin: Negative for rash  Neurological: Positive for dizziness  Negative for seizures and weakness  All other systems reviewed and are negative          Patient Active Problem List   Diagnosis    Neuralgia    Continuous leakage of urine    Mixed hyperlipidemia    Chronic pain syndrome    Dysesthesia of multiple sites    Eczema    Hematuria    Hypertension    Arthralgia of shoulder region, left    Limb pain    Lower back pain    Lumbar facet arthropathy    Memory difficulties    Memory impairment    Numbness    Obesity (BMI 35 0-39 9 without comorbidity)    CATHRYN (obstructive sleep apnea)    Postgastrectomy malabsorption    Pudendal neuralgia    Snoring    Gastroesophageal reflux disease    Iron deficiency anemia    Osteoarthritis of left shoulder    Sacroiliitis (HCC)    Lumbar radiculopathy    Anxiety and depression    Mild cognitive impairment with memory loss    Abnormal EEG    Transient alteration of awareness    Cough    Status post insertion of spinal cord stimulator    Battery end of life of spinal cord stimulator    Other headache syndrome    Nonintractable headache    Insomnia    Migraine    Cervicalgia    Thyroid pain       Past Medical History:   Diagnosis Date    Anxiety     Arthritis     Attention and concentration deficit     Blurred vision     Chronic pain     Depression     Diplopia     Dyspepsia     Gastric ulcer     Gastritis     Memory loss     Osteopenia     Starting and stopping of urinary stream during micturition     Urinary incontinence     Wears eyeglasses        Past Surgical History:   Procedure Laterality Date     SECTION       SECTION     [address]    GASTRIC BYPASS  2004    HYSTERECTOMY  2012    INSERT / REPLACE PERIPHERAL NEUROSTIMULATOR PULSE GENERATOR /       OTHER SURGICAL HISTORY  2012    [facility]     NC IMPLANT SPINAL NEUROSTIM/ Right 4/15/2020    Procedure: REPLACEMENT IMPLANTABLE PULSE GENERATOR FOR DORSAL SPINAL COLUMN STIMULATOR,  RIGHT BUTTOCKS;  Surgeon: Rashard Adair MD;  [facility];  Service: Neurosurgery    RADIOFREQUENCY ABLATION NERVES      URETHRAL FISTULA REPAIR      US GUIDED THYROID BIOPSY  9/23/2020    WRIST ARTHROSCOPY      with internal fixation        Family History   Problem Relation Age of Onset    Other Mother         Back Disorder     Cirrhosis Mother     Crohn's disease Mother     Lupus Mother         Systemic Lupus Erythematous     Hypertension Father     Heart disease Father     Other Brother         Liver Transplant     Crohn's disease Brother     Crohn's disease Brother     No Known Problems Sister     No Known Problems Daughter     No Known Problems Maternal Aunt     No Known Problems Cousin     No Known Problems Cousin     No Known Problems Cousin     No Known Problems Cousin     No Known Problems Cousin     Seizures Neg Hx        Social History     Occupational History    Occupation: retired   Tobacco Use    Smoking status: Never Smoker    Smokeless tobacco: Never Used   Substance and Sexual Activity    Alcohol use: No    Drug use: Yes     Types: Marijuana     Comment: medical marijuana 2x a day capsules    Sexual activity: Yes         Current Outpatient Medications:     Cholecalciferol (D3-1000) 1000 units capsule, Take 1 capsule by mouth daily  , Disp: , Rfl:     dronabinol (MARINOL) 5 MG capsule, Take 1 capsule (5 mg total) by mouth 2 (two) times a day before meals By Dr Sailaja Bardales (Patient taking differently: Take 5 mg by mouth daily By Dr Sailaja Bardales), Disp: 60 capsule, Rfl: 0    DULoxetine (CYMBALTA) 60 mg delayed release capsule, Take 1 capsule (60 mg total) by mouth daily, Disp: 90 capsule, Rfl: 1    [START ON 10/23/2020] Galcanezumab-[facility] 120 MG/ML SOAJ, Inject 120 mg under the skin every 30 (thirty) days, Disp: 1 pen, Rfl: 11    Iron-Vitamin C (IRON 100/C) 100-250 MG TABS, Take by mouth daily, Disp: , Rfl:     lidocaine (XYLOCAINE) 5 % ointment, Apply topically 2 (two) times a day as needed, Disp: , Rfl:     lisinopril (ZESTRIL) 10 mg tablet, TAKE 1 TABLET DAILY, Disp: 90 tablet, Rfl: 4    mometasone (ELOCON) 0 1 % cream, Apply topically daily, Disp: 45 g, Rfl: 0    Multiple Vitamin (MULTI-VITAMIN DAILY) TABS, Take 1 tablet by mouth daily, Disp: , Rfl:     ondansetron (ZOFRAN-ODT) 4 mg disintegrating tablet, Take 1 tablet (4 mg total) by mouth every 6 (six) hours as needed for nausea or vomiting, Disp: 20 tablet, Rfl: 1    pantoprazole (PROTONIX) 40 mg tablet, TAKE 1 TABLET TWICE A DAY, Disp: 180 tablet, Rfl: 4    propranolol (INDERAL LA) 80 mg 24 hr capsule, Take 1 capsule (80 mg total) by mouth daily, Disp: 90 capsule, Rfl: 1    traZODone (DESYREL) 50 mg tablet, TAKE 1/2 TO 1 TABLET AT BEDTIME AS NEEDED FOR INSOMNIA, Disp: 90 tablet, Rfl: 1    Allergies   Allergen Reactions    Morphine Abdominal Pain     SEVERE N/V    Shellfish-Derived Products Vomiting     CRAB MEAT ONLY    Cat Hair Extract Nasal Congestion     Cat Dander    Dog Epithelium Nasal Congestion    Other Other (See Comments) and Sneezing     Dogs  Crab Meat       Physical Exam:    /79   Pulse 60   Temp 99 7 °F (37 6 °C) (Temporal)   Resp 20   Ht 5' 4" (1 626 m)   Wt 102 kg (225 lb 9 6 oz)   BMI 38 72 kg/m²     Constitutional:normal, well developed, well nourished, alert, in no distress and non-toxic and no overt pain behavior    Eyes:anicteric  HEENT:grossly intact  Neck:supple, symmetric, trachea midline and no masses   Pulmonary:even and unlabored  Cardiovascular:No edema or pitting edema present  Skin:Normal without rashes or lesions and well hydrated  Psychiatric:Mood and affect appropriate  Neurologic:Cranial Nerves II-XII grossly intact  Musculoskeletal:normal      Imaging  FL spine and pain procedure    (Results Pending)         Orders Placed This Encounter   Procedures    FL spine and pain procedure

## 2020-10-01 ENCOUNTER — OFFICE VISIT (OUTPATIENT)
Dept: PHYSICAL THERAPY | Facility: CLINIC | Age: 60
End: 2020-10-01
Payer: COMMERCIAL

## 2020-10-01 DIAGNOSIS — M54.2 CERVICALGIA: Primary | ICD-10-CM

## 2020-10-01 PROCEDURE — 97112 NEUROMUSCULAR REEDUCATION: CPT

## 2020-10-01 PROCEDURE — 97140 MANUAL THERAPY 1/> REGIONS: CPT

## 2020-10-01 PROCEDURE — 97110 THERAPEUTIC EXERCISES: CPT

## 2020-10-01 NOTE — TELEPHONE ENCOUNTER
She called about her results and to cancel her apt for tomorrow  She said the one for tomorrow was rescheduled for November and never got cancelled  She needs to get lab work done before her next apt   Can you look over her results and let her know what they are?

## 2020-10-02 ENCOUNTER — OFFICE VISIT (OUTPATIENT)
Dept: FAMILY MEDICINE CLINIC | Facility: CLINIC | Age: 60
End: 2020-10-02
Payer: COMMERCIAL

## 2020-10-02 VITALS
SYSTOLIC BLOOD PRESSURE: 120 MMHG | RESPIRATION RATE: 16 BRPM | HEART RATE: 66 BPM | TEMPERATURE: 99 F | DIASTOLIC BLOOD PRESSURE: 80 MMHG | WEIGHT: 224.8 LBS | BODY MASS INDEX: 38.38 KG/M2 | HEIGHT: 64 IN | OXYGEN SATURATION: 96 %

## 2020-10-02 DIAGNOSIS — R51.9 CHRONIC DAILY HEADACHE: Primary | ICD-10-CM

## 2020-10-02 DIAGNOSIS — G47.00 INSOMNIA, UNSPECIFIED TYPE: ICD-10-CM

## 2020-10-02 DIAGNOSIS — R11.0 NAUSEA: ICD-10-CM

## 2020-10-02 DIAGNOSIS — R51.9 NONINTRACTABLE HEADACHE, UNSPECIFIED CHRONICITY PATTERN, UNSPECIFIED HEADACHE TYPE: ICD-10-CM

## 2020-10-02 DIAGNOSIS — M79.2 NEURALGIA: ICD-10-CM

## 2020-10-02 DIAGNOSIS — R19.4 CHANGE IN BOWEL HABITS: ICD-10-CM

## 2020-10-02 DIAGNOSIS — R13.10 DYSPHAGIA, UNSPECIFIED TYPE: ICD-10-CM

## 2020-10-02 DIAGNOSIS — Z12.31 SCREENING MAMMOGRAM, ENCOUNTER FOR: ICD-10-CM

## 2020-10-02 PROCEDURE — 3079F DIAST BP 80-89 MM HG: CPT | Performed by: FAMILY MEDICINE

## 2020-10-02 PROCEDURE — 99215 OFFICE O/P EST HI 40 MIN: CPT | Performed by: FAMILY MEDICINE

## 2020-10-02 PROCEDURE — 1036F TOBACCO NON-USER: CPT | Performed by: FAMILY MEDICINE

## 2020-10-02 RX ORDER — PROPRANOLOL HYDROCHLORIDE 120 MG/1
120 CAPSULE, EXTENDED RELEASE ORAL DAILY
Qty: 30 CAPSULE | Refills: 1 | Status: SHIPPED | OUTPATIENT
Start: 2020-10-02 | End: 2020-11-28 | Stop reason: SDUPTHER

## 2020-10-02 RX ORDER — ONDANSETRON 4 MG/1
4 TABLET, ORALLY DISINTEGRATING ORAL EVERY 6 HOURS PRN
Qty: 30 TABLET | Refills: 1 | Status: SHIPPED | OUTPATIENT
Start: 2020-10-02 | End: 2021-03-25 | Stop reason: SDUPTHER

## 2020-10-02 RX ORDER — GABAPENTIN 300 MG/1
300 CAPSULE ORAL
Qty: 30 CAPSULE | Refills: 0 | Status: SHIPPED | OUTPATIENT
Start: 2020-10-02 | End: 2020-11-02

## 2020-10-05 ENCOUNTER — APPOINTMENT (OUTPATIENT)
Dept: PHYSICAL THERAPY | Facility: CLINIC | Age: 60
End: 2020-10-05
Payer: COMMERCIAL

## 2020-10-05 ENCOUNTER — APPOINTMENT (OUTPATIENT)
Dept: LAB | Facility: CLINIC | Age: 60
End: 2020-10-05
Payer: COMMERCIAL

## 2020-10-05 ENCOUNTER — HOSPITAL ENCOUNTER (OUTPATIENT)
Dept: MAMMOGRAPHY | Facility: CLINIC | Age: 60
Discharge: HOME | End: 2020-10-05
Payer: COMMERCIAL

## 2020-10-05 VITALS — WEIGHT: 224 LBS | BODY MASS INDEX: 38.24 KG/M2 | HEIGHT: 64 IN

## 2020-10-05 DIAGNOSIS — E78.2 MIXED HYPERLIPIDEMIA: ICD-10-CM

## 2020-10-05 DIAGNOSIS — R19.4 CHANGE IN BOWEL HABITS: ICD-10-CM

## 2020-10-05 DIAGNOSIS — Z12.31 SCREENING MAMMOGRAM, ENCOUNTER FOR: ICD-10-CM

## 2020-10-05 LAB
ALBUMIN SERPL BCP-MCNC: 3.5 G/DL (ref 3.5–5)
ALP SERPL-CCNC: 98 U/L (ref 46–116)
ALT SERPL W P-5'-P-CCNC: 19 U/L (ref 12–78)
ANION GAP SERPL CALCULATED.3IONS-SCNC: 1 MMOL/L (ref 4–13)
AST SERPL W P-5'-P-CCNC: 12 U/L (ref 5–45)
BILIRUB SERPL-MCNC: 0.38 MG/DL (ref 0.2–1)
BUN SERPL-MCNC: 18 MG/DL (ref 5–25)
CALCIUM SERPL-MCNC: 9.2 MG/DL (ref 8.3–10.1)
CHLORIDE SERPL-SCNC: 106 MMOL/L (ref 100–108)
CHOLEST SERPL-MCNC: 324 MG/DL (ref 50–200)
CO2 SERPL-SCNC: 31 MMOL/L (ref 21–32)
CREAT SERPL-MCNC: 0.8 MG/DL (ref 0.6–1.3)
GFR SERPL CREATININE-BSD FRML MDRD: 80 ML/MIN/1.73SQ M
GLUCOSE P FAST SERPL-MCNC: 103 MG/DL (ref 65–99)
HDLC SERPL-MCNC: 83 MG/DL
HEMOCCULT STL QL IA: POSITIVE
LDLC SERPL CALC-MCNC: 224 MG/DL (ref 0–100)
NONHDLC SERPL-MCNC: 241 MG/DL
POTASSIUM SERPL-SCNC: 4.7 MMOL/L (ref 3.5–5.3)
PROT SERPL-MCNC: 7.1 G/DL (ref 6.4–8.2)
SODIUM SERPL-SCNC: 138 MMOL/L (ref 136–145)
TRIGL SERPL-MCNC: 85 MG/DL
TSH SERPL DL<=0.05 MIU/L-ACNC: 1.48 UIU/ML (ref 0.36–3.74)

## 2020-10-05 PROCEDURE — 84443 ASSAY THYROID STIM HORMONE: CPT

## 2020-10-05 PROCEDURE — 80061 LIPID PANEL: CPT

## 2020-10-05 PROCEDURE — 36415 COLL VENOUS BLD VENIPUNCTURE: CPT

## 2020-10-05 PROCEDURE — 80053 COMPREHEN METABOLIC PANEL: CPT

## 2020-10-05 PROCEDURE — G0328 FECAL BLOOD SCRN IMMUNOASSAY: HCPCS

## 2020-10-05 PROCEDURE — 77063 BREAST TOMOSYNTHESIS BI: CPT

## 2020-10-05 PROCEDURE — 77067 SCR MAMMO BI INCL CAD: CPT

## 2020-10-06 ENCOUNTER — TELEPHONE (OUTPATIENT)
Dept: FAMILY MEDICINE CLINIC | Facility: CLINIC | Age: 60
End: 2020-10-06

## 2020-10-07 ENCOUNTER — EVALUATION (OUTPATIENT)
Dept: PHYSICAL THERAPY | Facility: CLINIC | Age: 60
End: 2020-10-07
Payer: COMMERCIAL

## 2020-10-07 DIAGNOSIS — E78.2 MIXED HYPERLIPIDEMIA: Primary | ICD-10-CM

## 2020-10-07 DIAGNOSIS — M54.2 CERVICALGIA: Primary | ICD-10-CM

## 2020-10-07 PROCEDURE — 97140 MANUAL THERAPY 1/> REGIONS: CPT

## 2020-10-07 PROCEDURE — 97110 THERAPEUTIC EXERCISES: CPT

## 2020-10-07 PROCEDURE — 97112 NEUROMUSCULAR REEDUCATION: CPT

## 2020-10-07 RX ORDER — ROSUVASTATIN CALCIUM 10 MG/1
10 TABLET, COATED ORAL DAILY
Qty: 30 TABLET | Refills: 1 | Status: SHIPPED | OUTPATIENT
Start: 2020-10-07 | End: 2020-11-28 | Stop reason: SDUPTHER

## 2020-10-12 ENCOUNTER — OFFICE VISIT (OUTPATIENT)
Dept: PHYSICAL THERAPY | Facility: CLINIC | Age: 60
End: 2020-10-12
Payer: COMMERCIAL

## 2020-10-12 DIAGNOSIS — M54.2 CERVICALGIA: Primary | ICD-10-CM

## 2020-10-12 PROCEDURE — 97112 NEUROMUSCULAR REEDUCATION: CPT

## 2020-10-12 PROCEDURE — 97140 MANUAL THERAPY 1/> REGIONS: CPT

## 2020-10-12 PROCEDURE — 97110 THERAPEUTIC EXERCISES: CPT

## 2020-10-14 ENCOUNTER — OFFICE VISIT (OUTPATIENT)
Dept: PHYSICAL THERAPY | Facility: CLINIC | Age: 60
End: 2020-10-14
Payer: COMMERCIAL

## 2020-10-14 DIAGNOSIS — M54.2 CERVICALGIA: Primary | ICD-10-CM

## 2020-10-14 PROCEDURE — 97140 MANUAL THERAPY 1/> REGIONS: CPT

## 2020-10-14 PROCEDURE — 97112 NEUROMUSCULAR REEDUCATION: CPT

## 2020-10-14 PROCEDURE — 97110 THERAPEUTIC EXERCISES: CPT

## 2020-10-16 ENCOUNTER — APPOINTMENT (OUTPATIENT)
Dept: PHYSICAL THERAPY | Facility: CLINIC | Age: 60
End: 2020-10-16
Payer: COMMERCIAL

## 2020-10-19 ENCOUNTER — OFFICE VISIT (OUTPATIENT)
Dept: PHYSICAL THERAPY | Facility: CLINIC | Age: 60
End: 2020-10-19
Payer: COMMERCIAL

## 2020-10-19 DIAGNOSIS — M54.2 CERVICALGIA: Primary | ICD-10-CM

## 2020-10-19 PROCEDURE — 97112 NEUROMUSCULAR REEDUCATION: CPT

## 2020-10-19 PROCEDURE — 97140 MANUAL THERAPY 1/> REGIONS: CPT

## 2020-10-19 PROCEDURE — 97110 THERAPEUTIC EXERCISES: CPT

## 2020-10-21 ENCOUNTER — APPOINTMENT (OUTPATIENT)
Dept: PHYSICAL THERAPY | Facility: CLINIC | Age: 60
End: 2020-10-21
Payer: COMMERCIAL

## 2020-10-26 ENCOUNTER — OFFICE VISIT (OUTPATIENT)
Dept: PHYSICAL THERAPY | Facility: CLINIC | Age: 60
End: 2020-10-26
Payer: COMMERCIAL

## 2020-10-26 DIAGNOSIS — M54.2 CERVICALGIA: Primary | ICD-10-CM

## 2020-10-26 PROCEDURE — 97140 MANUAL THERAPY 1/> REGIONS: CPT

## 2020-10-26 PROCEDURE — 97110 THERAPEUTIC EXERCISES: CPT

## 2020-10-26 PROCEDURE — 97112 NEUROMUSCULAR REEDUCATION: CPT

## 2020-10-28 ENCOUNTER — APPOINTMENT (OUTPATIENT)
Dept: PHYSICAL THERAPY | Facility: CLINIC | Age: 60
End: 2020-10-28
Payer: COMMERCIAL

## 2020-11-01 DIAGNOSIS — R51.9 CHRONIC DAILY HEADACHE: ICD-10-CM

## 2020-11-01 DIAGNOSIS — R51.9 NONINTRACTABLE HEADACHE, UNSPECIFIED CHRONICITY PATTERN, UNSPECIFIED HEADACHE TYPE: ICD-10-CM

## 2020-11-01 DIAGNOSIS — G47.00 INSOMNIA, UNSPECIFIED TYPE: ICD-10-CM

## 2020-11-01 DIAGNOSIS — M79.2 NEURALGIA: ICD-10-CM

## 2020-11-02 RX ORDER — GABAPENTIN 300 MG/1
CAPSULE ORAL
Qty: 30 CAPSULE | Refills: 0 | Status: SHIPPED | OUTPATIENT
Start: 2020-11-02 | End: 2020-11-28 | Stop reason: SDUPTHER

## 2020-11-03 ENCOUNTER — OFFICE VISIT (OUTPATIENT)
Dept: RHEUMATOLOGY | Facility: CLINIC | Age: 60
End: 2020-11-03
Payer: COMMERCIAL

## 2020-11-03 DIAGNOSIS — M25.50 POLYARTHRALGIA: ICD-10-CM

## 2020-11-03 DIAGNOSIS — M54.50 CHRONIC BILATERAL LOW BACK PAIN WITHOUT SCIATICA: Primary | ICD-10-CM

## 2020-11-03 DIAGNOSIS — R70.0 ELEVATED SED RATE: ICD-10-CM

## 2020-11-03 DIAGNOSIS — G89.29 CHRONIC BILATERAL LOW BACK PAIN WITHOUT SCIATICA: Primary | ICD-10-CM

## 2020-11-03 DIAGNOSIS — M79.10 MYALGIA: ICD-10-CM

## 2020-11-03 PROCEDURE — 99241 PR OFFICE CONSULTATION NEW/ESTAB PATIENT 15 MIN: CPT | Performed by: INTERNAL MEDICINE

## 2020-11-11 ENCOUNTER — LAB (OUTPATIENT)
Dept: LAB | Facility: CLINIC | Age: 60
End: 2020-11-11
Payer: COMMERCIAL

## 2020-11-11 ENCOUNTER — HOSPITAL ENCOUNTER (OUTPATIENT)
Dept: RADIOLOGY | Facility: HOSPITAL | Age: 60
Discharge: HOME | End: 2020-11-11
Payer: COMMERCIAL

## 2020-11-11 DIAGNOSIS — G89.29 CHRONIC BILATERAL LOW BACK PAIN WITHOUT SCIATICA: ICD-10-CM

## 2020-11-11 DIAGNOSIS — M25.50 POLYARTHRALGIA: ICD-10-CM

## 2020-11-11 DIAGNOSIS — M79.10 MYALGIA: ICD-10-CM

## 2020-11-11 DIAGNOSIS — M54.50 CHRONIC BILATERAL LOW BACK PAIN WITHOUT SCIATICA: ICD-10-CM

## 2020-11-11 DIAGNOSIS — R70.0 ELEVATED SED RATE: ICD-10-CM

## 2020-11-11 LAB
BASOPHILS # BLD AUTO: 0.09 THOUSANDS/ΜL (ref 0–0.1)
BASOPHILS NFR BLD AUTO: 1 % (ref 0–1)
EOSINOPHIL # BLD AUTO: 0.18 THOUSAND/ΜL (ref 0–0.61)
EOSINOPHIL NFR BLD AUTO: 3 % (ref 0–6)
ERYTHROCYTE [DISTWIDTH] IN BLOOD BY AUTOMATED COUNT: 13.2 % (ref 11.6–15.1)
ERYTHROCYTE [SEDIMENTATION RATE] IN BLOOD: 37 MM/HOUR (ref 0–29)
HCT VFR BLD AUTO: 42.5 % (ref 34.8–46.1)
HGB BLD-MCNC: 13.3 G/DL (ref 11.5–15.4)
IMM GRANULOCYTES # BLD AUTO: 0.03 THOUSAND/UL (ref 0–0.2)
IMM GRANULOCYTES NFR BLD AUTO: 0 % (ref 0–2)
LYMPHOCYTES # BLD AUTO: 1.93 THOUSANDS/ΜL (ref 0.6–4.47)
LYMPHOCYTES NFR BLD AUTO: 28 % (ref 14–44)
MCH RBC QN AUTO: 30 PG (ref 26.8–34.3)
MCHC RBC AUTO-ENTMCNC: 31.3 G/DL (ref 31.4–37.4)
MCV RBC AUTO: 96 FL (ref 82–98)
MONOCYTES # BLD AUTO: 0.42 THOUSAND/ΜL (ref 0.17–1.22)
MONOCYTES NFR BLD AUTO: 6 % (ref 4–12)
NEUTROPHILS # BLD AUTO: 4.29 THOUSANDS/ΜL (ref 1.85–7.62)
NEUTS SEG NFR BLD AUTO: 62 % (ref 43–75)
NRBC BLD AUTO-RTO: 0 /100 WBCS
PLATELET # BLD AUTO: 256 THOUSANDS/UL (ref 149–390)
PMV BLD AUTO: 10.8 FL (ref 8.9–12.7)
RBC # BLD AUTO: 4.43 MILLION/UL (ref 3.81–5.12)
WBC # BLD AUTO: 6.94 THOUSAND/UL (ref 4.31–10.16)

## 2020-11-11 PROCEDURE — 86200 CCP ANTIBODY: CPT | Performed by: INTERNAL MEDICINE

## 2020-11-11 PROCEDURE — 82306 VITAMIN D 25 HYDROXY: CPT | Performed by: INTERNAL MEDICINE

## 2020-11-11 PROCEDURE — 85025 COMPLETE CBC W/AUTO DIFF WBC: CPT | Performed by: INTERNAL MEDICINE

## 2020-11-11 PROCEDURE — 86803 HEPATITIS C AB TEST: CPT | Performed by: INTERNAL MEDICINE

## 2020-11-11 PROCEDURE — 86704 HEP B CORE ANTIBODY TOTAL: CPT | Performed by: INTERNAL MEDICINE

## 2020-11-11 PROCEDURE — 87340 HEPATITIS B SURFACE AG IA: CPT | Performed by: INTERNAL MEDICINE

## 2020-11-11 PROCEDURE — 86430 RHEUMATOID FACTOR TEST QUAL: CPT | Performed by: INTERNAL MEDICINE

## 2020-11-11 PROCEDURE — 73130 X-RAY EXAM OF HAND: CPT

## 2020-11-11 PROCEDURE — 36415 COLL VENOUS BLD VENIPUNCTURE: CPT | Performed by: INTERNAL MEDICINE

## 2020-11-11 PROCEDURE — 86705 HEP B CORE ANTIBODY IGM: CPT | Performed by: INTERNAL MEDICINE

## 2020-11-11 PROCEDURE — 73630 X-RAY EXAM OF FOOT: CPT

## 2020-11-11 PROCEDURE — 85652 RBC SED RATE AUTOMATED: CPT | Performed by: INTERNAL MEDICINE

## 2020-11-11 PROCEDURE — 86038 ANTINUCLEAR ANTIBODIES: CPT | Performed by: INTERNAL MEDICINE

## 2020-11-11 PROCEDURE — 72202 X-RAY EXAM SI JOINTS 3/> VWS: CPT

## 2020-11-11 PROCEDURE — 86235 NUCLEAR ANTIGEN ANTIBODY: CPT | Performed by: INTERNAL MEDICINE

## 2020-11-12 LAB
25(OH)D3 SERPL-MCNC: 44.9 NG/ML (ref 30–100)
HBV CORE AB SER QL: NORMAL
HBV CORE IGM SER QL: NORMAL
HBV SURFACE AG SER QL: NORMAL
HCV AB SER QL: NORMAL
RHEUMATOID FACT SER QL LA: NEGATIVE

## 2020-11-13 LAB
ENA SS-A AB SER-ACNC: <0.2 AI (ref 0–0.9)
ENA SS-B AB SER-ACNC: <0.2 AI (ref 0–0.9)
RYE IGE QN: NEGATIVE

## 2020-11-14 LAB — CCP IGA+IGG SERPL IA-ACNC: 5 UNITS (ref 0–19)

## 2020-11-17 ENCOUNTER — CONSULT (OUTPATIENT)
Dept: GASTROENTEROLOGY | Facility: CLINIC | Age: 60
End: 2020-11-17
Payer: COMMERCIAL

## 2020-11-17 VITALS
TEMPERATURE: 96.2 F | WEIGHT: 223.5 LBS | SYSTOLIC BLOOD PRESSURE: 112 MMHG | DIASTOLIC BLOOD PRESSURE: 76 MMHG | BODY MASS INDEX: 38.36 KG/M2 | HEART RATE: 66 BPM

## 2020-11-17 DIAGNOSIS — R13.10 DYSPHAGIA, UNSPECIFIED TYPE: ICD-10-CM

## 2020-11-17 DIAGNOSIS — R19.5 POSITIVE COLORECTAL CANCER SCREENING USING COLOGUARD TEST: Primary | ICD-10-CM

## 2020-11-17 DIAGNOSIS — R19.4 CHANGE IN BOWEL HABITS: ICD-10-CM

## 2020-11-17 DIAGNOSIS — R11.0 NAUSEA: ICD-10-CM

## 2020-11-17 DIAGNOSIS — R19.5 POSITIVE FIT (FECAL IMMUNOCHEMICAL TEST): ICD-10-CM

## 2020-11-17 PROCEDURE — 3078F DIAST BP <80 MM HG: CPT | Performed by: INTERNAL MEDICINE

## 2020-11-17 PROCEDURE — 3074F SYST BP LT 130 MM HG: CPT | Performed by: INTERNAL MEDICINE

## 2020-11-17 PROCEDURE — 1036F TOBACCO NON-USER: CPT | Performed by: INTERNAL MEDICINE

## 2020-11-17 PROCEDURE — 99244 OFF/OP CNSLTJ NEW/EST MOD 40: CPT | Performed by: INTERNAL MEDICINE

## 2020-11-20 ENCOUNTER — LAB (OUTPATIENT)
Dept: LAB | Facility: CLINIC | Age: 60
End: 2020-11-20
Payer: COMMERCIAL

## 2020-11-20 DIAGNOSIS — E78.2 MIXED HYPERLIPIDEMIA: ICD-10-CM

## 2020-11-20 LAB
ALBUMIN SERPL BCP-MCNC: 3.6 G/DL (ref 3.5–5)
ALP SERPL-CCNC: 112 U/L (ref 46–116)
ALT SERPL W P-5'-P-CCNC: 19 U/L (ref 12–78)
ANION GAP SERPL CALCULATED.3IONS-SCNC: 2 MMOL/L (ref 4–13)
AST SERPL W P-5'-P-CCNC: 14 U/L (ref 5–45)
BILIRUB SERPL-MCNC: 0.3 MG/DL (ref 0.2–1)
BUN SERPL-MCNC: 15 MG/DL (ref 5–25)
CALCIUM SERPL-MCNC: 9.2 MG/DL (ref 8.3–10.1)
CHLORIDE SERPL-SCNC: 107 MMOL/L (ref 100–108)
CHOLEST SERPL-MCNC: 175 MG/DL (ref 50–200)
CO2 SERPL-SCNC: 31 MMOL/L (ref 21–32)
CREAT SERPL-MCNC: 0.9 MG/DL (ref 0.6–1.3)
GFR SERPL CREATININE-BSD FRML MDRD: 69 ML/MIN/1.73SQ M
GLUCOSE P FAST SERPL-MCNC: 106 MG/DL (ref 65–99)
HDLC SERPL-MCNC: 103 MG/DL
LDLC SERPL CALC-MCNC: 61 MG/DL (ref 0–100)
NONHDLC SERPL-MCNC: 72 MG/DL
POTASSIUM SERPL-SCNC: 4.5 MMOL/L (ref 3.5–5.3)
PROT SERPL-MCNC: 7.1 G/DL (ref 6.4–8.2)
SODIUM SERPL-SCNC: 140 MMOL/L (ref 136–145)
TRIGL SERPL-MCNC: 56 MG/DL

## 2020-11-20 PROCEDURE — 80061 LIPID PANEL: CPT

## 2020-11-20 PROCEDURE — 36415 COLL VENOUS BLD VENIPUNCTURE: CPT

## 2020-11-20 PROCEDURE — 80053 COMPREHEN METABOLIC PANEL: CPT

## 2020-11-23 ENCOUNTER — ANESTHESIA (OUTPATIENT)
Dept: GASTROENTEROLOGY | Facility: HOSPITAL | Age: 60
End: 2020-11-23

## 2020-11-23 ENCOUNTER — ANESTHESIA EVENT (OUTPATIENT)
Dept: GASTROENTEROLOGY | Facility: HOSPITAL | Age: 60
End: 2020-11-23

## 2020-11-23 ENCOUNTER — HOSPITAL ENCOUNTER (OUTPATIENT)
Dept: GASTROENTEROLOGY | Facility: HOSPITAL | Age: 60
Setting detail: OUTPATIENT SURGERY
Discharge: HOME | End: 2020-11-23
Attending: INTERNAL MEDICINE | Admitting: INTERNAL MEDICINE
Payer: COMMERCIAL

## 2020-11-23 VITALS
DIASTOLIC BLOOD PRESSURE: 53 MMHG | SYSTOLIC BLOOD PRESSURE: 103 MMHG | BODY MASS INDEX: 37.6 KG/M2 | HEART RATE: 57 BPM | OXYGEN SATURATION: 96 % | TEMPERATURE: 97.1 F | HEIGHT: 64 IN | WEIGHT: 220.24 LBS | RESPIRATION RATE: 22 BRPM

## 2020-11-23 VITALS — HEART RATE: 63 BPM

## 2020-11-23 DIAGNOSIS — R19.4 CHANGE IN BOWEL HABITS: ICD-10-CM

## 2020-11-23 DIAGNOSIS — R19.5 POSITIVE FIT (FECAL IMMUNOCHEMICAL TEST): ICD-10-CM

## 2020-11-23 DIAGNOSIS — R19.5 POSITIVE COLORECTAL CANCER SCREENING USING COLOGUARD TEST: ICD-10-CM

## 2020-11-23 PROCEDURE — 45378 DIAGNOSTIC COLONOSCOPY: CPT | Performed by: INTERNAL MEDICINE

## 2020-11-23 RX ORDER — LIDOCAINE HYDROCHLORIDE 10 MG/ML
INJECTION, SOLUTION EPIDURAL; INFILTRATION; INTRACAUDAL; PERINEURAL AS NEEDED
Status: DISCONTINUED | OUTPATIENT
Start: 2020-11-23 | End: 2020-11-23

## 2020-11-23 RX ORDER — SODIUM CHLORIDE, SODIUM LACTATE, POTASSIUM CHLORIDE, CALCIUM CHLORIDE 600; 310; 30; 20 MG/100ML; MG/100ML; MG/100ML; MG/100ML
125 INJECTION, SOLUTION INTRAVENOUS CONTINUOUS
Status: DISCONTINUED | OUTPATIENT
Start: 2020-11-23 | End: 2020-11-27 | Stop reason: HOSPADM

## 2020-11-23 RX ORDER — PROPOFOL 10 MG/ML
INJECTION, EMULSION INTRAVENOUS AS NEEDED
Status: DISCONTINUED | OUTPATIENT
Start: 2020-11-23 | End: 2020-11-23

## 2020-11-23 RX ADMIN — SODIUM CHLORIDE, SODIUM LACTATE, POTASSIUM CHLORIDE, AND CALCIUM CHLORIDE 125 ML/HR: .6; .31; .03; .02 INJECTION, SOLUTION INTRAVENOUS at 11:40

## 2020-11-23 RX ADMIN — PROPOFOL 150 MG: 10 INJECTION, EMULSION INTRAVENOUS at 11:59

## 2020-11-23 RX ADMIN — LIDOCAINE HYDROCHLORIDE 50 MG: 10 INJECTION, SOLUTION EPIDURAL; INFILTRATION; INTRACAUDAL; PERINEURAL at 11:58

## 2020-11-27 DIAGNOSIS — I10 ESSENTIAL HYPERTENSION: ICD-10-CM

## 2020-11-27 DIAGNOSIS — K21.9 GASTROESOPHAGEAL REFLUX DISEASE WITHOUT ESOPHAGITIS: ICD-10-CM

## 2020-11-27 RX ORDER — PANTOPRAZOLE SODIUM 40 MG/1
TABLET, DELAYED RELEASE ORAL
Qty: 180 TABLET | Refills: 3 | Status: SHIPPED | OUTPATIENT
Start: 2020-11-27 | End: 2021-04-15 | Stop reason: SDUPTHER

## 2020-11-27 RX ORDER — LISINOPRIL 10 MG/1
TABLET ORAL
Qty: 90 TABLET | Refills: 3 | Status: SHIPPED | OUTPATIENT
Start: 2020-11-27 | End: 2021-11-01

## 2020-11-28 DIAGNOSIS — M79.2 NEURALGIA: ICD-10-CM

## 2020-11-28 DIAGNOSIS — R51.9 CHRONIC DAILY HEADACHE: ICD-10-CM

## 2020-11-28 DIAGNOSIS — R51.9 NONINTRACTABLE HEADACHE, UNSPECIFIED CHRONICITY PATTERN, UNSPECIFIED HEADACHE TYPE: ICD-10-CM

## 2020-11-28 DIAGNOSIS — E78.2 MIXED HYPERLIPIDEMIA: ICD-10-CM

## 2020-11-28 DIAGNOSIS — G47.00 INSOMNIA, UNSPECIFIED TYPE: ICD-10-CM

## 2020-11-30 RX ORDER — GABAPENTIN 300 MG/1
300 CAPSULE ORAL
Qty: 30 CAPSULE | Refills: 0 | Status: SHIPPED | OUTPATIENT
Start: 2020-11-30 | End: 2020-12-03 | Stop reason: SDUPTHER

## 2020-11-30 RX ORDER — PROPRANOLOL HYDROCHLORIDE 120 MG/1
120 CAPSULE, EXTENDED RELEASE ORAL DAILY
Qty: 30 CAPSULE | Refills: 0 | Status: SHIPPED | OUTPATIENT
Start: 2020-11-30 | End: 2020-12-03 | Stop reason: SDUPTHER

## 2020-11-30 RX ORDER — ROSUVASTATIN CALCIUM 10 MG/1
10 TABLET, COATED ORAL DAILY
Qty: 30 TABLET | Refills: 0 | Status: SHIPPED | OUTPATIENT
Start: 2020-11-30 | End: 2020-12-03 | Stop reason: SDUPTHER

## 2020-12-03 DIAGNOSIS — E78.2 MIXED HYPERLIPIDEMIA: ICD-10-CM

## 2020-12-03 DIAGNOSIS — M79.2 NEURALGIA: ICD-10-CM

## 2020-12-03 DIAGNOSIS — R51.9 CHRONIC DAILY HEADACHE: ICD-10-CM

## 2020-12-03 DIAGNOSIS — R51.9 NONINTRACTABLE HEADACHE, UNSPECIFIED CHRONICITY PATTERN, UNSPECIFIED HEADACHE TYPE: ICD-10-CM

## 2020-12-03 DIAGNOSIS — G47.00 INSOMNIA, UNSPECIFIED TYPE: ICD-10-CM

## 2020-12-03 RX ORDER — PROPRANOLOL HYDROCHLORIDE 120 MG/1
120 CAPSULE, EXTENDED RELEASE ORAL DAILY
Qty: 30 CAPSULE | Refills: 3 | Status: SHIPPED | OUTPATIENT
Start: 2020-12-03 | End: 2020-12-03 | Stop reason: SDUPTHER

## 2020-12-04 RX ORDER — PROPRANOLOL HYDROCHLORIDE 120 MG/1
120 CAPSULE, EXTENDED RELEASE ORAL DAILY
Qty: 90 CAPSULE | Refills: 1 | Status: SHIPPED | OUTPATIENT
Start: 2020-12-04 | End: 2021-02-15 | Stop reason: SDUPTHER

## 2020-12-04 RX ORDER — GABAPENTIN 300 MG/1
300 CAPSULE ORAL
Qty: 30 CAPSULE | Refills: 0 | Status: SHIPPED | OUTPATIENT
Start: 2020-12-04 | End: 2020-12-14 | Stop reason: SDUPTHER

## 2020-12-04 RX ORDER — ROSUVASTATIN CALCIUM 10 MG/1
10 TABLET, COATED ORAL DAILY
Qty: 30 TABLET | Refills: 0 | Status: SHIPPED | OUTPATIENT
Start: 2020-12-04 | End: 2020-12-14 | Stop reason: SDUPTHER

## 2020-12-05 DIAGNOSIS — G43.709 CHRONIC MIGRAINE WITHOUT AURA WITHOUT STATUS MIGRAINOSUS, NOT INTRACTABLE: ICD-10-CM

## 2020-12-14 DIAGNOSIS — R51.9 CHRONIC DAILY HEADACHE: ICD-10-CM

## 2020-12-14 DIAGNOSIS — R51.9 NONINTRACTABLE HEADACHE, UNSPECIFIED CHRONICITY PATTERN, UNSPECIFIED HEADACHE TYPE: ICD-10-CM

## 2020-12-14 DIAGNOSIS — G47.00 INSOMNIA, UNSPECIFIED TYPE: ICD-10-CM

## 2020-12-14 DIAGNOSIS — E78.2 MIXED HYPERLIPIDEMIA: ICD-10-CM

## 2020-12-14 DIAGNOSIS — M79.2 NEURALGIA: ICD-10-CM

## 2020-12-15 RX ORDER — GABAPENTIN 300 MG/1
300 CAPSULE ORAL
Qty: 30 CAPSULE | Refills: 0 | Status: SHIPPED | OUTPATIENT
Start: 2020-12-15 | End: 2021-01-21 | Stop reason: SDUPTHER

## 2020-12-15 RX ORDER — ROSUVASTATIN CALCIUM 10 MG/1
10 TABLET, COATED ORAL DAILY
Qty: 30 TABLET | Refills: 0 | Status: SHIPPED | OUTPATIENT
Start: 2020-12-15 | End: 2021-01-21 | Stop reason: SDUPTHER

## 2020-12-28 ENCOUNTER — TELEPHONE (OUTPATIENT)
Dept: PAIN MEDICINE | Facility: MEDICAL CENTER | Age: 60
End: 2020-12-28

## 2020-12-28 DIAGNOSIS — R21 RASH: ICD-10-CM

## 2020-12-29 RX ORDER — MOMETASONE FUROATE 1 MG/G
CREAM TOPICAL DAILY
Qty: 45 G | Refills: 0 | Status: SHIPPED | OUTPATIENT
Start: 2020-12-29 | End: 2022-01-14

## 2021-01-12 DIAGNOSIS — F32.A ANXIETY AND DEPRESSION: ICD-10-CM

## 2021-01-12 DIAGNOSIS — F41.9 ANXIETY AND DEPRESSION: ICD-10-CM

## 2021-01-12 RX ORDER — DULOXETIN HYDROCHLORIDE 60 MG/1
CAPSULE, DELAYED RELEASE ORAL
Qty: 90 CAPSULE | Refills: 3 | Status: SHIPPED | OUTPATIENT
Start: 2021-01-12 | End: 2021-02-10 | Stop reason: ALTCHOICE

## 2021-01-14 ENCOUNTER — HOSPITAL ENCOUNTER (OUTPATIENT)
Dept: RADIOLOGY | Facility: CLINIC | Age: 61
Discharge: HOME | End: 2021-01-14
Attending: STUDENT IN AN ORGANIZED HEALTH CARE EDUCATION/TRAINING PROGRAM | Admitting: STUDENT IN AN ORGANIZED HEALTH CARE EDUCATION/TRAINING PROGRAM
Payer: COMMERCIAL

## 2021-01-14 VITALS
SYSTOLIC BLOOD PRESSURE: 122 MMHG | OXYGEN SATURATION: 98 % | TEMPERATURE: 99.4 F | HEART RATE: 65 BPM | DIASTOLIC BLOOD PRESSURE: 74 MMHG | RESPIRATION RATE: 20 BRPM

## 2021-01-14 DIAGNOSIS — M54.16 LUMBAR RADICULOPATHY: ICD-10-CM

## 2021-01-14 PROCEDURE — A9579 GAD-BASE MR CONTRAST NOS,1ML: HCPCS | Performed by: STUDENT IN AN ORGANIZED HEALTH CARE EDUCATION/TRAINING PROGRAM

## 2021-01-14 PROCEDURE — 62323 NJX INTERLAMINAR LMBR/SAC: CPT | Performed by: STUDENT IN AN ORGANIZED HEALTH CARE EDUCATION/TRAINING PROGRAM

## 2021-01-14 RX ORDER — METHYLPREDNISOLONE ACETATE 80 MG/ML
80 INJECTION, SUSPENSION INTRA-ARTICULAR; INTRALESIONAL; INTRAMUSCULAR; PARENTERAL; SOFT TISSUE ONCE
Status: COMPLETED | OUTPATIENT
Start: 2021-01-14 | End: 2021-01-14

## 2021-01-14 RX ORDER — 0.9 % SODIUM CHLORIDE 0.9 %
4 VIAL (ML) INJECTION ONCE
Status: COMPLETED | OUTPATIENT
Start: 2021-01-14 | End: 2021-01-14

## 2021-01-14 RX ORDER — LIDOCAINE HYDROCHLORIDE 10 MG/ML
5 INJECTION, SOLUTION EPIDURAL; INFILTRATION; INTRACAUDAL; PERINEURAL ONCE
Status: COMPLETED | OUTPATIENT
Start: 2021-01-14 | End: 2021-01-14

## 2021-01-14 RX ADMIN — METHYLPREDNISOLONE ACETATE 80 MG: 80 INJECTION, SUSPENSION INTRA-ARTICULAR; INTRALESIONAL; INTRAMUSCULAR; PARENTERAL; SOFT TISSUE at 15:52

## 2021-01-14 RX ADMIN — GADOPENTETATE DIMEGLUMINE 1 ML: 469.01 INJECTION INTRAVENOUS at 15:51

## 2021-01-14 RX ADMIN — LIDOCAINE HYDROCHLORIDE 5 ML: 10 INJECTION, SOLUTION EPIDURAL; INFILTRATION; INTRACAUDAL; PERINEURAL at 15:37

## 2021-01-14 RX ADMIN — SODIUM CHLORIDE 4 ML: 9 INJECTION INTRAMUSCULAR; INTRAVENOUS; SUBCUTANEOUS at 15:52

## 2021-01-14 NOTE — DISCHARGE INSTR - LAB
Epidural Steroid Injection   WHAT YOU NEED TO KNOW:   An epidural steroid injection (TREVIN) is a procedure to inject steroid medicine into the epidural space  The epidural space is between your spinal cord and vertebrae  Steroids reduce inflammation and fluid buildup in your spine that may be causing pain  You may be given pain medicine along with the steroids  ACTIVITY  · Do not drive or operate machinery today  · No strenuous activity today - bending, lifting, etc   · You may resume normal activites starting tomorrow - start slowly and as tolerated  · You may shower today, but no tub baths or hot tubs  · You may have numbness for several hours from the local anesthetic  Please use caution and common sense, especially with weight-bearing activities  CARE OF THE INJECTION SITE  · If you have soreness or pain, apply ice to the area today (20 minutes on/20 minutes off)  · Starting tomorrow, you may use warm, moist heat or ice if needed  · You may have an increase or change in your discomfort for 36-48 hours after your treatment  · Apply ice and continue with any pain medication you have been prescribed  · [facility]ify [facility] if you have any of the following: redness, drainage, swelling, headache, stiff neck or fever above 100°F     SPECIAL INSTRUCTIONS  · Our office will contact you in approximately 7 days for a progress report  MEDICATIONS  · Continue to take all routine medications  · Our office may have instructed you to hold some medications  If you have a problem specifically related to your procedure, please call our office at [phone]  Problems not related to your procedure should be directed to your primary care physician

## 2021-01-14 NOTE — H&P
History of Present Illness:  The patient is a 56 y o  female who presents with complaints of low back and leg pain    Patient Active Problem List   Diagnosis    Neuralgia    Continuous leakage of urine    Mixed hyperlipidemia    Chronic pain syndrome    Dysesthesia of multiple sites    Eczema    Hematuria    Hypertension    Arthralgia of shoulder region, left    Limb pain    Lower back pain    Lumbar facet arthropathy    Memory difficulties    Memory impairment    Numbness    Obesity (BMI 35 0-39 9 without comorbidity)    CATHRYN (obstructive sleep apnea)    Postgastrectomy malabsorption    Pudendal neuralgia    Snoring    Gastroesophageal reflux disease    Iron deficiency anemia    Osteoarthritis of left shoulder    Sacroiliitis (HCC)    Lumbar radiculopathy    Anxiety and depression    Mild cognitive impairment with memory loss    Abnormal EEG    Transient alteration of awareness    Cough    Status post insertion of spinal cord stimulator    Battery end of life of spinal cord stimulator    Other headache syndrome    Nonintractable headache    Insomnia    Migraine    Cervicalgia    Thyroid pain    Chronic daily headache    Positive FIT (fecal immunochemical test)       Past Medical History:   Diagnosis Date    Anxiety     Arthritis     Attention and concentration deficit     Blurred vision     Chronic pain     Depression     Diplopia     Dyspepsia     Gastric ulcer     Gastritis     Irritable bowel syndrome     Memory loss     Osteopenia     Starting and stopping of urinary stream during micturition     Urinary incontinence     Wears eyeglasses        Past Surgical History:   Procedure Laterality Date     SECTION       SECTION      CHOLECYSTECTOMY      GALLBLADDER SURGERY      GASTRIC BYPASS  2004    HYSTERECTOMY  2012    INSERT / REPLACE PERIPHERAL NEUROSTIMULATOR PULSE GENERATOR /       OTHER SURGICAL HISTORY 2012    Reimplantatin at [facility] 87 NEUROSTIM/ Right 4/15/2020    Procedure: REPLACEMENT IMPLANTABLE PULSE GENERATOR FOR DORSAL SPINAL COLUMN STIMULATOR,  RIGHT BUTTOCKS;  Surgeon: Mona Medel MD;  [facility];  Service: Neurosurgery    [facility]      US GUIDED THYROID BIOPSY  9/23/2020    WRIST ARTHROSCOPY      with internal fixation          Current Outpatient Medications:     Cholecalciferol (D3-1000) 1000 units capsule, Take 1 capsule by mouth daily  , Disp: , Rfl:     dronabinol (MARINOL) 5 MG capsule, Take 1 capsule (5 mg total) by mouth 2 (two) times a day before meals By Dr Alina Larkin (Patient taking differently: Take 5 mg by mouth daily By Dr Alina Larkin), Disp: 60 capsule, Rfl: 0    DULoxetine (CYMBALTA) 60 mg delayed release capsule, TAKE 1 CAPSULE DAILY, Disp: 90 capsule, Rfl: 3    gabapentin (NEURONTIN) 300 mg capsule, Take 1 capsule (300 mg total) by mouth daily at bedtime, Disp: 30 capsule, Rfl: 0    Galcanezumab-gnlm 120 MG/ML SOAJ, Inject 120 mg under the skin every 30 (thirty) days, Disp: 1 pen, Rfl: 11    Iron-Vitamin C (IRON 100/C) 100-250 MG TABS, Take by mouth daily, Disp: , Rfl:     lidocaine (XYLOCAINE) 5 % ointment, Apply topically 2 (two) times a day as needed, Disp: , Rfl:     lisinopril (ZESTRIL) 10 mg tablet, TAKE 1 TABLET DAILY, Disp: 90 tablet, Rfl: 3    mometasone (ELOCON) 0 1 % cream, Apply topically daily, Disp: 45 g, Rfl: 0    Multiple Vitamin (MULTI-VITAMIN DAILY) TABS, Take 1 tablet by mouth daily, Disp: , Rfl:     ondansetron (ZOFRAN-ODT) 4 mg disintegrating tablet, Take 1 tablet (4 mg total) by mouth every 6 (six) hours as needed for nausea or vomiting, Disp: 30 tablet, Rfl: 1    pantoprazole (PROTONIX) 40 mg tablet, TAKE 1 TABLET TWICE A DAY, Disp: 180 tablet, Rfl: 3    propranolol (INDERAL LA) 120 mg 24 hr capsule, Take 1 capsule (120 mg total) by mouth daily, Disp: 90 capsule, Rfl: 1    rosuvastatin (CRESTOR) 10 MG tablet, Take 1 tablet (10 mg total) by mouth daily, Disp: 30 tablet, Rfl: 0    Allergies   Allergen Reactions    Morphine Abdominal Pain     SEVERE N/V    Shellfish-Derived Products Vomiting     CRAB MEAT ONLY    Cat Hair Extract Nasal Congestion     Cat Dander    Dog Epithelium Nasal Congestion    Other Other (See Comments) and Sneezing     Dogs  Crab Meat       Physical Exam: There were no vitals filed for this visit  General: Awake, Alert, Oriented x 3, Mood and affect appropriate  Respiratory: Respirations even and unlabored  Cardiovascular: Peripheral pulses intact; no edema  Musculoskeletal Exam: low back and leg pain    ASA Score: 3         Assessment:   1   Lumbar radiculopathy        Plan: L4-5 LESI

## 2021-01-21 ENCOUNTER — TELEPHONE (OUTPATIENT)
Dept: PAIN MEDICINE | Facility: CLINIC | Age: 61
End: 2021-01-21

## 2021-01-21 DIAGNOSIS — E78.2 MIXED HYPERLIPIDEMIA: ICD-10-CM

## 2021-01-21 DIAGNOSIS — R51.9 NONINTRACTABLE HEADACHE, UNSPECIFIED CHRONICITY PATTERN, UNSPECIFIED HEADACHE TYPE: ICD-10-CM

## 2021-01-21 DIAGNOSIS — M79.2 NEURALGIA: ICD-10-CM

## 2021-01-21 DIAGNOSIS — R51.9 CHRONIC DAILY HEADACHE: ICD-10-CM

## 2021-01-21 DIAGNOSIS — G47.00 INSOMNIA, UNSPECIFIED TYPE: ICD-10-CM

## 2021-01-21 RX ORDER — GABAPENTIN 300 MG/1
300 CAPSULE ORAL
Qty: 90 CAPSULE | Refills: 1 | Status: SHIPPED | OUTPATIENT
Start: 2021-01-21 | End: 2021-01-26 | Stop reason: SDUPTHER

## 2021-01-21 RX ORDER — ROSUVASTATIN CALCIUM 10 MG/1
10 TABLET, COATED ORAL DAILY
Qty: 30 TABLET | Refills: 3 | Status: SHIPPED | OUTPATIENT
Start: 2021-01-21 | End: 2021-01-21 | Stop reason: SDUPTHER

## 2021-01-21 RX ORDER — GABAPENTIN 300 MG/1
300 CAPSULE ORAL
Qty: 30 CAPSULE | Refills: 3 | Status: SHIPPED | OUTPATIENT
Start: 2021-01-21 | End: 2021-01-21 | Stop reason: SDUPTHER

## 2021-01-21 RX ORDER — ROSUVASTATIN CALCIUM 10 MG/1
10 TABLET, COATED ORAL DAILY
Qty: 90 TABLET | Refills: 1 | Status: SHIPPED | OUTPATIENT
Start: 2021-01-21 | End: 2021-01-26 | Stop reason: SDUPTHER

## 2021-01-26 DIAGNOSIS — G47.00 INSOMNIA, UNSPECIFIED TYPE: ICD-10-CM

## 2021-01-26 DIAGNOSIS — R51.9 NONINTRACTABLE HEADACHE, UNSPECIFIED CHRONICITY PATTERN, UNSPECIFIED HEADACHE TYPE: ICD-10-CM

## 2021-01-26 DIAGNOSIS — M79.2 NEURALGIA: ICD-10-CM

## 2021-01-26 DIAGNOSIS — E78.2 MIXED HYPERLIPIDEMIA: ICD-10-CM

## 2021-01-26 DIAGNOSIS — R51.9 CHRONIC DAILY HEADACHE: ICD-10-CM

## 2021-01-27 RX ORDER — ROSUVASTATIN CALCIUM 10 MG/1
10 TABLET, COATED ORAL DAILY
Qty: 90 TABLET | Refills: 1 | Status: SHIPPED | OUTPATIENT
Start: 2021-01-27 | End: 2021-07-03

## 2021-01-27 RX ORDER — GABAPENTIN 300 MG/1
300 CAPSULE ORAL
Qty: 90 CAPSULE | Refills: 1 | Status: SHIPPED | OUTPATIENT
Start: 2021-01-27 | End: 2021-03-12 | Stop reason: SDUPTHER

## 2021-02-03 ENCOUNTER — TELEPHONE (OUTPATIENT)
Dept: PSYCHIATRY | Facility: CLINIC | Age: 61
End: 2021-02-03

## 2021-02-04 ENCOUNTER — TELEPHONE (OUTPATIENT)
Dept: PSYCHIATRY | Facility: CLINIC | Age: 61
End: 2021-02-04

## 2021-02-04 NOTE — TELEPHONE ENCOUNTER
Behavorial Health Outpatient Intake Questions    Referred by: Self    Please advised interviewee that they need to answer all questions truthfully to allow for best care and any misrepresentations of information may affect their ability to be seen at this clinic   => Was this discussed? Yes     Behavorial Health Outpatient Intake History -     Presenting Problem (in patient's words): Patient has been struggling with her emotional health. She has chronic pain which makes her wonder if her life is worth it - fleeting suicidal thoughts with no plan. Patient has both Anxiety and Depression. She is really anxious about going out in public during [place], and she has completely isolated herself. She is to a point where her  and herself sleep in different rooms because she is so scared. Patient feels immense guilt due to her father in law passing in October even through she had nothing to contribute towards it. Seeking psychiatric help. Are there any developmental disabilities? ? If yes, can they speak to you on the phone? If they are too limited to speak to you on phone, refer out No    Are you taking any psychiatric medications? Yes    => If yes, who prescribes? If yes, are they injectable medications? Cymbalta    Does the patient have a language barrier or hearing impairment? No    Have you been treated at [facility] by a therapist or a doctor in the past? If yes, who? No    Has the patient been hospitalized for mental health? No   If yes, how long ago was last hospitalization and where was it? Do you actively use alcohol or marijuana or illegal substances? If yes, what and how much - refer out to Drug and alcohol treatment if use is excessive or daily use of illegal substances No concerns of substance abuse are reported. Do you have a community treatment team or ? No    Legal History-     Does the patient have any history of arrests, halfway/FCI time, or DUIs?  No  If Yes-  What types of charges? When were they last incarcerated? Are they currently on parole or probation? Minor Child-    Who has custody of the child? Is there a custody agreement? If there is a custody agreement remind parent that they must bring a copy to the first appt or they will not be seen. Intake Team, please check with provider before scheduling if flags come up such as:  - complex case  - legal history (other than DUI)  - communication barrier concerns are present  - if, in your judgment, this needs further review    ACCEPTED as a patient Yes  => Appointment Date:     Referred Elsewhere? No    Name of Insurance Co: [address] [place] [address] ID# [id number]  Insurance Phone #  If ins is primary or secondary  If patient is a minor, parents information such as Name, D. O.B of guarantor.

## 2021-02-10 ENCOUNTER — OFFICE VISIT (OUTPATIENT)
Dept: FAMILY MEDICINE CLINIC | Facility: CLINIC | Age: 61
End: 2021-02-10
Payer: COMMERCIAL

## 2021-02-10 VITALS
SYSTOLIC BLOOD PRESSURE: 120 MMHG | WEIGHT: 229.6 LBS | DIASTOLIC BLOOD PRESSURE: 84 MMHG | TEMPERATURE: 98.7 F | RESPIRATION RATE: 18 BRPM | HEIGHT: 64 IN | OXYGEN SATURATION: 97 % | BODY MASS INDEX: 39.2 KG/M2 | HEART RATE: 65 BPM

## 2021-02-10 DIAGNOSIS — E78.2 MIXED HYPERLIPIDEMIA: ICD-10-CM

## 2021-02-10 DIAGNOSIS — R22.1 LUMP IN NECK: ICD-10-CM

## 2021-02-10 DIAGNOSIS — E55.9 HYPOVITAMINOSIS D: ICD-10-CM

## 2021-02-10 DIAGNOSIS — F41.9 ANXIETY AND DEPRESSION: ICD-10-CM

## 2021-02-10 DIAGNOSIS — Z00.00 ANNUAL PHYSICAL EXAM: Primary | ICD-10-CM

## 2021-02-10 DIAGNOSIS — R73.01 ELEVATED FASTING GLUCOSE: ICD-10-CM

## 2021-02-10 DIAGNOSIS — G58.8 PUDENDAL NEURALGIA: ICD-10-CM

## 2021-02-10 DIAGNOSIS — F32.A ANXIETY AND DEPRESSION: ICD-10-CM

## 2021-02-10 DIAGNOSIS — Z00.00 HEALTHCARE MAINTENANCE: ICD-10-CM

## 2021-02-10 DIAGNOSIS — I10 ESSENTIAL HYPERTENSION: ICD-10-CM

## 2021-02-10 PROCEDURE — 99214 OFFICE O/P EST MOD 30 MIN: CPT | Performed by: NURSE PRACTITIONER

## 2021-02-10 PROCEDURE — 3725F SCREEN DEPRESSION PERFORMED: CPT | Performed by: NURSE PRACTITIONER

## 2021-02-10 PROCEDURE — 99396 PREV VISIT EST AGE 40-64: CPT | Performed by: NURSE PRACTITIONER

## 2021-02-10 RX ORDER — SERTRALINE HYDROCHLORIDE 25 MG/1
25 TABLET, FILM COATED ORAL DAILY
Qty: 30 TABLET | Refills: 5 | Status: SHIPPED | OUTPATIENT
Start: 2021-02-10 | End: 2021-03-12 | Stop reason: DRUGHIGH

## 2021-02-10 RX ORDER — ALPRAZOLAM 1 MG/1
1 TABLET ORAL
Qty: 30 TABLET | Refills: 0 | Status: SHIPPED | OUTPATIENT
Start: 2021-02-10 | End: 2021-03-12 | Stop reason: DRUGHIGH

## 2021-02-10 RX ORDER — CAPSAICIN 0.025 %
1 CREAM (GRAM) TOPICAL 2 TIMES DAILY
Qty: 60 G | Refills: 0 | Status: SHIPPED | OUTPATIENT
Start: 2021-02-10 | End: 2021-04-13 | Stop reason: SDUPTHER

## 2021-02-10 NOTE — PATIENT INSTRUCTIONS
Obtain fasting labs, nothing to eat after midnight, may drink water  Decrease cymbalta  Start 25 mg of Zoloft daily x 2 weeks  Then increase to 50 mg of Zoloft  Get Us done for neck lump      Wellness Visit for Adults   AMBULATORY CARE:   A wellness visit  is when you see your healthcare provider to get screened for health problems  Your healthcare provider will also give you advice on how to stay healthy  Write down your questions so you remember to ask them  Ask your healthcare provider how often you should have a wellness visit  What happens at a wellness visit:  Your healthcare provider will ask about your health, and your family history of health problems  This includes high blood pressure, heart disease, and cancer  He or she will ask if you have symptoms that concern you, if you smoke, and about your mood  You may also be asked about your intake of medicines, supplements, food, and alcohol  Any of the following may be done:  · Your weight  will be checked  Your height may also be checked so your body mass index (BMI) can be calculated  Your BMI shows if you are at a healthy weight  · Your blood pressure  and heart rate will be checked  Your temperature may also be checked  · Blood and urine tests  may be done  Blood tests may be done to check your cholesterol levels  Abnormal cholesterol levels increase your risk for heart disease and stroke  You may also need a blood or urine test to check for diabetes if you are at increased risk  Urine tests may be done to look for signs of an infection or kidney disease  · A physical exam  includes checking your heartbeat and lungs with a stethoscope  Your healthcare provider may also check your skin to look for sun damage  · Screening tests  may be recommended  A screening test is done to check for diseases that may not cause symptoms  The screening tests you may need depend on your age, gender, family history, and lifestyle habits   For example, colorectal screening may be recommended if you are 48years old or older  Screening tests you need if you are a woman:   · A Pap smear  is used to screen for cervical cancer  Pap smears are usually done every 3 to 5 years depending on your age  You may need them more often if you have had abnormal Pap smear test results in the past  Ask your healthcare provider how often you should have a Pap smear  · A mammogram  is an x-ray of your breasts to screen for breast cancer  Experts recommend mammograms every 2 years starting at age 48 years  You may need a mammogram at age 52 years or younger if you have an increased risk for breast cancer  Talk to your healthcare provider about when you should start having mammograms and how often you need them  Vaccines you may need:   · Get an influenza vaccine  every year  The influenza vaccine protects you from the flu  Several types of viruses cause the flu  The viruses change over time, so new vaccines are made each year  · Get a tetanus-diphtheria (Td) booster vaccine  every 10 years  This vaccine protects you against tetanus and diphtheria  Tetanus is a severe infection that may cause painful muscle spasms and lockjaw  Diphtheria is a severe bacterial infection that causes a thick covering in the back of your mouth and throat  · Get a human papillomavirus (HPV) vaccine  if you are female and aged 23 to 32 or male 23 to 24 and never received it  This vaccine protects you from HPV infection  HPV is the most common infection spread by sexual contact  HPV may also cause vaginal, penile, and anal cancers  · Get a pneumococcal vaccine  if you are aged 72 years or older  The pneumococcal vaccine is an injection given to protect you from pneumococcal disease  Pneumococcal disease is an infection caused by pneumococcal bacteria  The infection may cause pneumonia, meningitis, or an ear infection      · Get a shingles vaccine  if you are 60 or older, even if you have had shingles before  The shingles vaccine is an injection to protect you from the varicella-zoster virus  This is the same virus that causes chickenpox  Shingles is a painful rash that develops in people who had chickenpox or have been exposed to the virus  How to eat healthy:  My Plate is a model for planning healthy meals  It shows the types and amounts of foods that should go on your plate  Fruits and vegetables make up about half of your plate, and grains and protein make up the other half  A serving of dairy is included on the side of your plate  The amount of calories and serving sizes you need depends on your age, gender, weight, and height  Examples of healthy foods are listed below:  · Eat a variety of vegetables  such as dark green, red, and orange vegetables  You can also include canned vegetables low in sodium (salt) and frozen vegetables without added butter or sauces  · Eat a variety of fresh fruits , canned fruit in 100% juice, frozen fruit, and dried fruit  · Include whole grains  At least half of the grains you eat should be whole grains  Examples include whole-wheat bread, wheat pasta, brown rice, and whole-grain cereals such as oatmeal     · Eat a variety of protein foods such as seafood (fish and shellfish), lean meat, and poultry without skin (turkey and chicken)  Examples of lean meats include pork leg, shoulder, or tenderloin, and beef round, sirloin, tenderloin, and extra lean ground beef  Other protein foods include eggs and egg substitutes, beans, peas, soy products, nuts, and seeds  · Choose low-fat dairy products such as skim or 1% milk or low-fat yogurt, cheese, and cottage cheese  · Limit unhealthy fats  such as butter, hard margarine, and shortening  Exercise:  Exercise at least 30 minutes per day on most days of the week  Some examples of exercise include walking, biking, dancing, and swimming   You can also fit in more physical activity by taking the stairs instead of the elevator or parking farther away from stores  Include muscle strengthening activities 2 days each week  Regular exercise provides many health benefits  It helps you manage your weight, and decreases your risk for type 2 diabetes, heart disease, stroke, and high blood pressure  Exercise can also help improve your mood  Ask your healthcare provider about the best exercise plan for you  General health and safety guidelines:   · Do not smoke  Nicotine and other chemicals in cigarettes and cigars can cause lung damage  Ask your healthcare provider for information if you currently smoke and need help to quit  E-cigarettes or smokeless tobacco still contain nicotine  Talk to your healthcare provider before you use these products  · Limit alcohol  A drink of alcohol is 12 ounces of beer, 5 ounces of wine, or 1½ ounces of liquor  · Lose weight, if needed  Being overweight increases your risk of certain health conditions  These include heart disease, high blood pressure, type 2 diabetes, and certain types of cancer  · Protect your skin  Do not sunbathe or use tanning beds  Use sunscreen with a SPF 15 or higher  Apply sunscreen at least 15 minutes before you go outside  Reapply sunscreen every 2 hours  Wear protective clothing, hats, and sunglasses when you are outside  · Drive safely  Always wear your seatbelt  Make sure everyone in your car wears a seatbelt  A seatbelt can save your life if you are in an accident  Do not use your cell phone when you are driving  This could distract you and cause an accident  Pull over if you need to make a call or send a text message  · Practice safe sex  Use latex condoms if are sexually active and have more than one partner  Your healthcare provider may recommend screening tests for sexually transmitted infections (STIs)  · Wear helmets, lifejackets, and protective gear    Always wear a helmet when you ride a bike or motorcycle, go skiing, or play sports that could cause a head injury  Wear protective equipment when you play sports  Wear a lifejacket when you are on a boat or doing water sports  © Copyright [address] Information is for End User's use only and may not be sold, redistributed or otherwise used for commercial purposes  All illustrations and images included in CareNotes® are the copyrighted property of [employer]  or [address] [place], [place] [address]   The above information is an  only  It is not intended as medical advice for individual conditions or treatments  Talk to your doctor, nurse or pharmacist before following any medical regimen to see if it is safe and effective for you

## 2021-02-10 NOTE — PROGRESS NOTES
[facility] [address] [facility]    NAME: Guillermo Sosa  AGE: 56 y o  SEX: female  : 1960     DATE: 2021     Assessment and Plan:     Problem List Items Addressed This Visit        Cardiovascular and Mediastinum    Hypertension    Relevant Orders    Lipid panel (Completed)       Other    Mixed hyperlipidemia    Relevant Orders    Lipid panel (Completed)    Pudendal neuralgia    Relevant Medications    capsaicin (ZOSTRIX) 0 025 % cream    Anxiety and depression    Relevant Medications    sertraline (ZOLOFT) 25 mg tablet    sertraline (ZOLOFT) 50 mg tablet    ALPRAZolam (XANAX) 1 mg tablet      Other Visit Diagnoses     Annual physical exam    -  Primary    Hypovitaminosis D        Relevant Orders    Vitamin D 25 hydroxy (Completed)    Elevated fasting glucose        Relevant Orders    Hemoglobin A1C (Completed)    Healthcare maintenance        Relevant Orders    CBC and differential (Completed)    Comprehensive metabolic panel (Completed)    TSH, 3rd generation with Free T4 reflex (Completed)    Lump in neck        Relevant Orders    US head neck soft tissue    US MSK limited          Immunizations and preventive care screenings were discussed with patient today  Appropriate education was printed on patient's after visit summary  Counseling:  · Alcohol/drug use: discussed moderation in alcohol intake, the recommendations for healthy alcohol use, and avoidance of illicit drug use  BMI Counseling: Body mass index is 39 41 kg/m²  The BMI is above normal  Nutrition recommendations include decreasing portion sizes, encouraging healthy choices of fruits and vegetables, decreasing fast food intake, consuming healthier snacks, limiting drinks that contain sugar, moderation in carbohydrate intake, increasing intake of lean protein, reducing intake of saturated and trans fat and reducing intake of cholesterol   Exercise recommendations include strength training exercises  No pharmacotherapy was ordered  Return if symptoms worsen or fail to improve  Chief Complaint:     Chief Complaint   Patient presents with    Physical Exam      History of Present Illness:     Adult Annual Physical   Patient here for a comprehensive physical exam  The patient reports problems - pain  Sleep cycle     Diet and Physical Activity  · Diet/Nutrition: poor diet  · Exercise: no formal exercise  Depression Screening  PHQ-9 Depression Screening    PHQ-9:   Frequency of the following problems over the past two weeks:      Little interest or pleasure in doing things: 2 - more than half the days  Feeling down, depressed, or hopeless: 2 - more than half the days  Trouble falling or staying asleep, or sleeping too much: 3 - nearly every day  Feeling tired or having little energy: 3 - nearly every day  Poor appetite or overeatin - more than half the days  Feeling bad about yourself - or that you are a failure or have let yourself or your family down: 3 - nearly every day  Trouble concentrating on things, such as reading the newspaper or watching television: 3 - nearly every day  Moving or speaking so slowly that other people could have noticed  Or the opposite - being so fidgety or restless that you have been moving around a lot more than usual: 0 - not at all  Thoughts that you would be better off dead, or of hurting yourself in some way: 3 - nearly every day  PHQ-2 Score: 4  PHQ-9 Score: 21       General Health  · Sleep: sleeps poorly  · Hearing: normal - bilateral   · Vision: no vision problems  · Dental: regular dental visits  /GYN Health  · Patient is: postmenopausal  · Last menstrual period:   · Contraceptive method: none needed  Review of Systems:     Review of Systems   Constitutional: Negative  HENT: Negative  Eyes: Negative  Respiratory: Negative  Cardiovascular: Negative      Gastrointestinal: Negative  Endocrine: Negative  Genitourinary: Negative  Musculoskeletal: Negative  Skin: Negative  Allergic/Immunologic: Negative  Neurological: Positive for numbness  Psychiatric/Behavioral: Positive for dysphoric mood, self-injury, sleep disturbance and suicidal ideas        Past Medical History:     Past Medical History:   Diagnosis Date    Anxiety     Arthritis     Attention and concentration deficit     Blurred vision     Chronic pain     Depression     Diplopia     Dyspepsia     Gastric ulcer     Gastritis     Irritable bowel syndrome     Memory loss     Osteopenia     Starting and stopping of urinary stream during micturition     Urinary incontinence     Wears eyeglasses       Past Surgical History:     Past Surgical History:   Procedure Laterality Date     SECTION       SECTION      CHOLECYSTECTOMY     [address] GASTRIC BYPASS  2004    HYSTERECTOMY  2012    INSERT / REPLACE PERIPHERAL NEUROSTIMULATOR PULSE GENERATOR /       OTHER SURGICAL HISTORY      [facility]     ND IMPLANT SPINAL NEUROSTIM/ Right 4/15/2020    Procedure: REPLACEMENT IMPLANTABLE PULSE GENERATOR FOR DORSAL SPINAL COLUMN STIMULATOR,  RIGHT BUTTOCKS;  Surgeon: Elizabeth Trevizo MD;  Location: [facility];  Service: Neurosurgery    [address] [facility] THYROID BIOPSY  2020    WRIST ARTHROSCOPY      with internal fixation       Social History:     E-Cigarette/Vaping    E-Cigarette Use Never User      E-Cigarette/Vaping Substances    Nicotine No     THC No     CBD No     Flavoring No     Other No     Unknown No      Social History     Socioeconomic History    Marital status: /Civil Union     Spouse name: None    Number of children: None    Years of education: None    Highest education level: None   Occupational History    Occupation: retired Social Needs    Financial resource strain: None    Food insecurity     Worry: None     Inability: None    Transportation needs     Medical: None     Non-medical: None   Tobacco Use    Smoking status: Never Smoker    Smokeless tobacco: Never Used   Substance and Sexual Activity    Alcohol use: No    Drug use: Yes     Types: Marijuana     Comment: medical marijuana 2x a day capsules    Sexual activity: Yes   Lifestyle    Physical activity     Days per week: None     Minutes per session: None    Stress: None   Relationships    Social connections     Talks on phone: None     Gets together: None     Attends Temple service: None     Active member of club or organization: None     Attends meetings of clubs or organizations: None     Relationship status: None    Intimate partner violence     Fear of current or ex partner: None     Emotionally abused: None     Physically abused: None     Forced sexual activity: None   Other Topics Concern    None   Social History Narrative    Lives in [place], with   Previously worked as a teacher         Family History:     Family History   Problem Relation Age of Onset    Other Mother         Back Disorder     Cirrhosis Mother     Crohn's disease Mother     Lupus Mother         Systemic Lupus Erythematous     Hypertension Father     Heart disease Father     Other Brother         Liver Transplant     Crohn's disease Brother     Crohn's disease Brother     No Known Problems Sister     No Known Problems Daughter     No Known Problems Maternal Aunt     No Known Problems Cousin     No Known Problems Cousin     No Known Problems Cousin     No Known Problems Cousin     No Known Problems Cousin     Seizures Neg Hx       Current Medications:     Current Outpatient Medications   Medication Sig Dispense Refill    Cholecalciferol (D3-1000) 1000 units capsule Take 1 capsule by mouth daily        dronabinol (MARINOL) 5 MG capsule Take 1 capsule (5 mg total) by mouth 2 (two) times a day before meals By Dr Claudia Bermudez (Patient taking differently: Take 5 mg by mouth daily By Dr Claudia Bermudez) 60 capsule 0    gabapentin (NEURONTIN) 300 mg capsule Take 1 capsule (300 mg total) by mouth daily at bedtime 90 capsule 1    Galcanezumab-gnlm 120 MG/ML SOAJ Inject 120 mg under the skin every 30 (thirty) days 1 pen 11    Iron-Vitamin C (IRON 100/C) 100-250 MG TABS Take by mouth daily      lidocaine (XYLOCAINE) 5 % ointment Apply topically 2 (two) times a day as needed      lisinopril (ZESTRIL) 10 mg tablet TAKE 1 TABLET DAILY 90 tablet 3    mometasone (ELOCON) 0 1 % cream Apply topically daily 45 g 0    Multiple Vitamin (MULTI-VITAMIN DAILY) TABS Take 1 tablet by mouth daily      ondansetron (ZOFRAN-ODT) 4 mg disintegrating tablet Take 1 tablet (4 mg total) by mouth every 6 (six) hours as needed for nausea or vomiting 30 tablet 1    pantoprazole (PROTONIX) 40 mg tablet TAKE 1 TABLET TWICE A  tablet 3    propranolol (INDERAL LA) 120 mg 24 hr capsule Take 1 capsule (120 mg total) by mouth daily 90 capsule 1    rosuvastatin (CRESTOR) 10 MG tablet Take 1 tablet (10 mg total) by mouth daily 90 tablet 1    ALPRAZolam (XANAX) 1 mg tablet Take 1 tablet (1 mg total) by mouth daily at bedtime as needed for anxiety 30 tablet 0    capsaicin (ZOSTRIX) 0 025 % cream Apply 1 application topically 2 (two) times a day 60 g 0    sertraline (ZOLOFT) 25 mg tablet Take 1 tablet (25 mg total) by mouth daily 30 tablet 5    sertraline (ZOLOFT) 50 mg tablet Take 1 tablet (50 mg total) by mouth daily 30 tablet 5     No current facility-administered medications for this visit  Allergies:      Allergies   Allergen Reactions    Morphine Abdominal Pain     SEVERE N/V    Shellfish-Derived Products Vomiting     CRAB MEAT ONLY    Cat Hair Extract Nasal Congestion     Cat Dander    Dog Epithelium Nasal Congestion    Other Other (See Comments) and Sneezing     Dogs  Crab Meat      Physical Exam:     /84   Pulse 65   Temp 98 7 °F (37 1 °C) (Tympanic)   Resp 18   Ht 5' 4" (1 626 m)   Wt 104 kg (229 lb 9 6 oz)   SpO2 97%   BMI 39 41 kg/m²     Physical Exam  Constitutional:       General: She is not in acute distress  Appearance: Normal appearance  She is obese  She is not ill-appearing  HENT:      Head: Normocephalic and atraumatic  Nose: Nose normal       Mouth/Throat:      Mouth: Mucous membranes are moist    Eyes:      Pupils: Pupils are equal, round, and reactive to light  Neck:      Musculoskeletal: Normal range of motion and neck supple  Cardiovascular:      Rate and Rhythm: Normal rate and regular rhythm  Pulses: Normal pulses  Pulmonary:      Effort: Pulmonary effort is normal  No respiratory distress  Breath sounds: Normal breath sounds  Chest:      Chest wall: No tenderness  Abdominal:      General: Abdomen is flat  Bowel sounds are normal  There is no distension  Palpations: There is no mass  Tenderness: There is no abdominal tenderness  Musculoskeletal: Normal range of motion  Skin:     General: Skin is warm and dry  Neurological:      General: No focal deficit present  Mental Status: She is alert and oriented to person, place, and time  Psychiatric:         Mood and Affect: Mood is depressed  Affect is tearful  Behavior: Behavior normal          Thought Content:  Thought content normal          Judgment: Judgment normal           Doris Feliz, [address] [facility]

## 2021-02-11 ENCOUNTER — HOSPITAL ENCOUNTER (EMERGENCY)
Facility: HOSPITAL | Age: 61
End: 2021-02-12
Attending: EMERGENCY MEDICINE
Payer: COMMERCIAL

## 2021-02-11 ENCOUNTER — APPOINTMENT (OUTPATIENT)
Dept: LAB | Facility: CLINIC | Age: 61
End: 2021-02-11
Payer: COMMERCIAL

## 2021-02-11 DIAGNOSIS — I10 ESSENTIAL HYPERTENSION: ICD-10-CM

## 2021-02-11 DIAGNOSIS — E55.9 HYPOVITAMINOSIS D: ICD-10-CM

## 2021-02-11 DIAGNOSIS — F32.A DEPRESSION: Primary | ICD-10-CM

## 2021-02-11 DIAGNOSIS — R73.01 ELEVATED FASTING GLUCOSE: ICD-10-CM

## 2021-02-11 DIAGNOSIS — Z00.00 HEALTHCARE MAINTENANCE: ICD-10-CM

## 2021-02-11 DIAGNOSIS — E78.2 MIXED HYPERLIPIDEMIA: ICD-10-CM

## 2021-02-11 LAB
25(OH)D3 SERPL-MCNC: 39.9 NG/ML (ref 30–100)
ALBUMIN SERPL BCP-MCNC: 3.6 G/DL (ref 3.5–5)
ALBUMIN SERPL BCP-MCNC: 3.6 G/DL (ref 3.5–5)
ALP SERPL-CCNC: 111 U/L (ref 46–116)
ALP SERPL-CCNC: 116 U/L (ref 46–116)
ALT SERPL W P-5'-P-CCNC: 23 U/L (ref 12–78)
ALT SERPL W P-5'-P-CCNC: 24 U/L (ref 12–78)
AMPHETAMINES SERPL QL SCN: NEGATIVE
ANION GAP SERPL CALCULATED.3IONS-SCNC: 2 MMOL/L (ref 4–13)
ANION GAP SERPL CALCULATED.3IONS-SCNC: 2 MMOL/L (ref 4–13)
AST SERPL W P-5'-P-CCNC: 16 U/L (ref 5–45)
AST SERPL W P-5'-P-CCNC: 17 U/L (ref 5–45)
ATRIAL RATE: 62 BPM
BACTERIA UR QL AUTO: ABNORMAL /HPF
BARBITURATES UR QL: NEGATIVE
BASOPHILS # BLD AUTO: 0.08 THOUSANDS/ΜL (ref 0–0.1)
BASOPHILS NFR BLD AUTO: 1 % (ref 0–1)
BENZODIAZ UR QL: POSITIVE
BILIRUB SERPL-MCNC: 0.41 MG/DL (ref 0.2–1)
BILIRUB SERPL-MCNC: 0.45 MG/DL (ref 0.2–1)
BILIRUB UR QL STRIP: NEGATIVE
BUN SERPL-MCNC: 21 MG/DL (ref 5–25)
BUN SERPL-MCNC: 21 MG/DL (ref 5–25)
CALCIUM SERPL-MCNC: 9.3 MG/DL (ref 8.3–10.1)
CALCIUM SERPL-MCNC: 9.5 MG/DL (ref 8.3–10.1)
CHLORIDE SERPL-SCNC: 107 MMOL/L (ref 100–108)
CHLORIDE SERPL-SCNC: 107 MMOL/L (ref 100–108)
CHOLEST SERPL-MCNC: 218 MG/DL (ref 50–200)
CLARITY UR: CLEAR
CO2 SERPL-SCNC: 31 MMOL/L (ref 21–32)
CO2 SERPL-SCNC: 32 MMOL/L (ref 21–32)
COCAINE UR QL: NEGATIVE
COLOR UR: YELLOW
CREAT SERPL-MCNC: 0.87 MG/DL (ref 0.6–1.3)
CREAT SERPL-MCNC: 0.89 MG/DL (ref 0.6–1.3)
EOSINOPHIL # BLD AUTO: 0.26 THOUSAND/ΜL (ref 0–0.61)
EOSINOPHIL NFR BLD AUTO: 4 % (ref 0–6)
ERYTHROCYTE [DISTWIDTH] IN BLOOD BY AUTOMATED COUNT: 13.1 % (ref 11.6–15.1)
EST. AVERAGE GLUCOSE BLD GHB EST-MCNC: 114 MG/DL
ETHANOL EXG-MCNC: 0 MG/DL
FLUAV RNA RESP QL NAA+PROBE: NEGATIVE
FLUBV RNA RESP QL NAA+PROBE: NEGATIVE
GFR SERPL CREATININE-BSD FRML MDRD: 70 ML/MIN/1.73SQ M
GFR SERPL CREATININE-BSD FRML MDRD: 72 ML/MIN/1.73SQ M
GLUCOSE SERPL-MCNC: 109 MG/DL (ref 65–140)
GLUCOSE SERPL-MCNC: 109 MG/DL (ref 65–140)
GLUCOSE UR STRIP-MCNC: NEGATIVE MG/DL
HBA1C MFR BLD: 5.6 %
HCT VFR BLD AUTO: 44 % (ref 34.8–46.1)
HDLC SERPL-MCNC: 97 MG/DL
HGB BLD-MCNC: 13.4 G/DL (ref 11.5–15.4)
HGB UR QL STRIP.AUTO: ABNORMAL
IMM GRANULOCYTES # BLD AUTO: 0.02 THOUSAND/UL (ref 0–0.2)
IMM GRANULOCYTES NFR BLD AUTO: 0 % (ref 0–2)
KETONES UR STRIP-MCNC: NEGATIVE MG/DL
LDLC SERPL CALC-MCNC: 106 MG/DL (ref 0–100)
LEUKOCYTE ESTERASE UR QL STRIP: NEGATIVE
LYMPHOCYTES # BLD AUTO: 1.82 THOUSANDS/ΜL (ref 0.6–4.47)
LYMPHOCYTES NFR BLD AUTO: 25 % (ref 14–44)
MCH RBC QN AUTO: 29.5 PG (ref 26.8–34.3)
MCHC RBC AUTO-ENTMCNC: 30.5 G/DL (ref 31.4–37.4)
MCV RBC AUTO: 97 FL (ref 82–98)
METHADONE UR QL: NEGATIVE
MONOCYTES # BLD AUTO: 0.5 THOUSAND/ΜL (ref 0.17–1.22)
MONOCYTES NFR BLD AUTO: 7 % (ref 4–12)
MUCOUS THREADS UR QL AUTO: ABNORMAL
NEUTROPHILS # BLD AUTO: 4.7 THOUSANDS/ΜL (ref 1.85–7.62)
NEUTS SEG NFR BLD AUTO: 63 % (ref 43–75)
NITRITE UR QL STRIP: NEGATIVE
NON-SQ EPI CELLS URNS QL MICRO: ABNORMAL /HPF
NONHDLC SERPL-MCNC: 121 MG/DL
NRBC BLD AUTO-RTO: 0 /100 WBCS
OPIATES UR QL SCN: NEGATIVE
OXYCODONE+OXYMORPHONE UR QL SCN: NEGATIVE
P AXIS: 61 DEGREES
PCP UR QL: NEGATIVE
PH UR STRIP.AUTO: 7 [PH]
PLATELET # BLD AUTO: 281 THOUSANDS/UL (ref 149–390)
PMV BLD AUTO: 10.5 FL (ref 8.9–12.7)
POTASSIUM SERPL-SCNC: 4.4 MMOL/L (ref 3.5–5.3)
POTASSIUM SERPL-SCNC: 4.4 MMOL/L (ref 3.5–5.3)
PR INTERVAL: 166 MS
PROT SERPL-MCNC: 6.7 G/DL (ref 6.4–8.2)
PROT SERPL-MCNC: 7.3 G/DL (ref 6.4–8.2)
PROT UR STRIP-MCNC: NEGATIVE MG/DL
QRS AXIS: 50 DEGREES
QRSD INTERVAL: 94 MS
QT INTERVAL: 404 MS
QTC INTERVAL: 410 MS
RBC # BLD AUTO: 4.55 MILLION/UL (ref 3.81–5.12)
RBC #/AREA URNS AUTO: ABNORMAL /HPF
RSV RNA RESP QL NAA+PROBE: NEGATIVE
SARS-COV-2 RNA RESP QL NAA+PROBE: NEGATIVE
SODIUM SERPL-SCNC: 140 MMOL/L (ref 136–145)
SODIUM SERPL-SCNC: 141 MMOL/L (ref 136–145)
SP GR UR STRIP.AUTO: 1.02 (ref 1–1.03)
T WAVE AXIS: 39 DEGREES
THC UR QL: POSITIVE
TRIGL SERPL-MCNC: 73 MG/DL
TSH SERPL DL<=0.05 MIU/L-ACNC: 1.33 UIU/ML (ref 0.36–3.74)
UROBILINOGEN UR QL STRIP.AUTO: 0.2 E.U./DL
VENTRICULAR RATE: 62 BPM
WBC # BLD AUTO: 7.38 THOUSAND/UL (ref 4.31–10.16)
WBC #/AREA URNS AUTO: ABNORMAL /HPF

## 2021-02-11 PROCEDURE — 93010 ELECTROCARDIOGRAM REPORT: CPT | Performed by: INTERNAL MEDICINE

## 2021-02-11 PROCEDURE — 80053 COMPREHEN METABOLIC PANEL: CPT | Performed by: EMERGENCY MEDICINE

## 2021-02-11 PROCEDURE — 84443 ASSAY THYROID STIM HORMONE: CPT

## 2021-02-11 PROCEDURE — 80053 COMPREHEN METABOLIC PANEL: CPT

## 2021-02-11 PROCEDURE — 93005 ELECTROCARDIOGRAM TRACING: CPT

## 2021-02-11 PROCEDURE — 85025 COMPLETE CBC W/AUTO DIFF WBC: CPT

## 2021-02-11 PROCEDURE — 83036 HEMOGLOBIN GLYCOSYLATED A1C: CPT

## 2021-02-11 PROCEDURE — 82075 ASSAY OF BREATH ETHANOL: CPT | Performed by: EMERGENCY MEDICINE

## 2021-02-11 PROCEDURE — 96372 THER/PROPH/DIAG INJ SC/IM: CPT

## 2021-02-11 PROCEDURE — 80061 LIPID PANEL: CPT

## 2021-02-11 PROCEDURE — 81001 URINALYSIS AUTO W/SCOPE: CPT | Performed by: EMERGENCY MEDICINE

## 2021-02-11 PROCEDURE — 36415 COLL VENOUS BLD VENIPUNCTURE: CPT

## 2021-02-11 PROCEDURE — 99285 EMERGENCY DEPT VISIT HI MDM: CPT

## 2021-02-11 PROCEDURE — 82306 VITAMIN D 25 HYDROXY: CPT

## 2021-02-11 PROCEDURE — 80307 DRUG TEST PRSMV CHEM ANLYZR: CPT | Performed by: EMERGENCY MEDICINE

## 2021-02-11 PROCEDURE — 99285 EMERGENCY DEPT VISIT HI MDM: CPT | Performed by: EMERGENCY MEDICINE

## 2021-02-11 PROCEDURE — 0241U HB NFCT DS VIR RESP RNA 4 TRGT: CPT | Performed by: EMERGENCY MEDICINE

## 2021-02-11 RX ORDER — KETOROLAC TROMETHAMINE 30 MG/ML
30 INJECTION, SOLUTION INTRAMUSCULAR; INTRAVENOUS ONCE
Status: COMPLETED | OUTPATIENT
Start: 2021-02-11 | End: 2021-02-11

## 2021-02-11 RX ORDER — ACETAMINOPHEN 325 MG/1
975 TABLET ORAL ONCE
Status: COMPLETED | OUTPATIENT
Start: 2021-02-11 | End: 2021-02-11

## 2021-02-11 RX ORDER — ALPRAZOLAM 0.5 MG/1
1 TABLET ORAL ONCE
Status: COMPLETED | OUTPATIENT
Start: 2021-02-11 | End: 2021-02-11

## 2021-02-11 RX ORDER — DRONABINOL 2.5 MG/1
5 CAPSULE ORAL
Status: DISCONTINUED | OUTPATIENT
Start: 2021-02-12 | End: 2021-02-12 | Stop reason: HOSPADM

## 2021-02-11 RX ADMIN — ACETAMINOPHEN 975 MG: 325 TABLET ORAL at 18:41

## 2021-02-11 RX ADMIN — KETOROLAC TROMETHAMINE 30 MG: 30 INJECTION, SOLUTION INTRAMUSCULAR; INTRAVENOUS at 21:55

## 2021-02-11 RX ADMIN — ALPRAZOLAM 1 MG: 0.5 TABLET ORAL at 21:54

## 2021-02-11 NOTE — LETTER
[facility] [address]  [address]  [facility] [address]  Dept: [phone]                                                                   EMTALA TRANSFER CONSENT    NAME Jackie BERTRAND 1960                              MRN [id number]    I have been informed of my rights regarding examination, treatment, and transfer   by Dr Kimberli Escobar MD    Benefits: Other benefits (Include comment)_______________________(Inpatient Psych Tx (201))    Risks: Potential for delay in receiving treatment    Consent for Transfer:  I acknowledge that my medical condition has been evaluated and explained to me by the emergency department physician or other qualified medical person and/or my attending physician, who has recommended that I be transferred to the service of  Accepting Physician: Dr Migel Gray at [facility] Name, [address] : [place]  The above potential benefits of such transfer, the potential risks associated with such transfer, and the probable risks of not being transferred have been explained to me, and I fully understand them  The doctor has explained that, in my case, the benefits of transfer outweigh the risks  I agree to be transferred  I authorize the performance of emergency medical procedures and treatments upon me in both transit and upon arrival at the receiving facility  Additionally, I authorize the release of any and all medical records to the receiving facility and request they be transported with me, if possible  I understand that the safest mode of transportation during a medical emergency is an ambulance and that the Hospital advocates the use of this mode of transport  Risks of traveling to the receiving facility by car, including absence of medical control, life sustaining equipment, such as oxygen, and medical personnel has been explained to me and I fully understand them      ([place] BELOW)  Veronica Lara  I consent to the stated transfer and to be transported by ambulance/helicopter  [  ]  I consent to the stated transfer, but refuse transportation by ambulance and accept full responsibility for my transportation by car  I understand the risks of non-ambulance transfers and I exonerate the Hospital and its staff from any deterioration in my condition that results from this refusal     X___________________________________________    DATE  21  TIME________  Signature of patient or legally responsible individual signing on patient behalf           RELATIONSHIP TO PATIENT_________________________                          Provider Certification    NAME Jairo Dueñas                                1960                              MRN [id number]    A medical screening exam was performed on the above named patient  Based on the examination:    Condition Necessitating Transfer The encounter diagnosis was Depression  Patient Condition: The patient has been stabilized such that within reasonable medical probability, no material deterioration of the patient condition or the condition of the unborn child(daniel) is likely to result from the transfer    Reason for Transfer: Level of Care needed not available at this facility    · Transfer Requirements: Facility [place]   · Space available and qualified personnel available for treatment as acknowledged by JIMENA Sharma  · Agreed to accept transfer and to provide appropriate medical treatment as acknowledged by       Dr Christiane Schirmer  · Appropriate medical records of the examination and treatment of the patient are provided at the time of transfer   [facility], [address] ___JG____  · Transfer will be performed by qualified personnel from [address] [place] [address]  and appropriate transfer equipment as required, including the use of necessary and appropriate life support measures      Provider Certification: I have examined the patient and explained the following risks and benefits of being transferred/refusing transfer to the patient/family:  The patient is stable for psychiatric transfer because they are medically stable, and is protected from harming him/herself or others during transport      Based on these reasonable risks and benefits to the patient and/or the unborn child(daniel), and based upon the information available at the time of the patients examination, I certify that the medical benefits reasonably to be expected from the provision of appropriate medical treatments at another medical facility outweigh the increasing risks, if any, to the individuals medical condition, and in the case of labor to the unborn child, from effecting the transfer      X____________________________________________ DATE 02/12/21        TIME_______      ORIGINAL - SEND TO MEDICAL RECORDS   COPY - SEND WITH PATIENT DURING TRANSFER

## 2021-02-11 NOTE — ED NOTES
Pt belongings obtained by this writer       PT Belongings:    Pillow    Shirt     Bra     Glasses (with pt)    Pants    Shoes     Socks    Belongings going home with  Bolivar Vanessal:    Kaiser Smith  02/11/21 31 Marisela Stokes  02/11/21 [id number]

## 2021-02-11 NOTE — Clinical Note
Dorothy Hassan should be transferred out to [facility] and has been medically cleared     Accepted by Margurette Goodpasture

## 2021-02-12 VITALS
SYSTOLIC BLOOD PRESSURE: 110 MMHG | HEART RATE: 62 BPM | OXYGEN SATURATION: 96 % | DIASTOLIC BLOOD PRESSURE: 55 MMHG | TEMPERATURE: 98.3 F | RESPIRATION RATE: 16 BRPM

## 2021-02-12 PROBLEM — R73.01 ELEVATED FASTING GLUCOSE: Status: ACTIVE | Noted: 2021-02-12

## 2021-02-12 PROBLEM — E55.9 HYPOVITAMINOSIS D: Status: ACTIVE | Noted: 2021-02-12

## 2021-02-12 PROBLEM — Z00.00 ANNUAL PHYSICAL EXAM: Status: ACTIVE | Noted: 2021-02-12

## 2021-02-12 NOTE — ED RE-EVALUATION NOTE
Sleeping, accepted at [facility]  No requirements this time  Medically cleared for psychiatric placement and evaluation       Kimberli Escobar MD  02/12/21 0784

## 2021-02-12 NOTE — ED NOTES
Patient is accepted at [facility]  Patient is accepted by Dr Padilla Zamora per Admissions  Transportation is arranged with CTS tracie Sofia at [facility]  Transportation is scheduled for 9am        Nurse report is to be called to [phone] prior to patient transfer  Building - [address], [place]  02/12/21

## 2021-02-12 NOTE — ED NOTES
Pt came to ED with  due to increased depression   disclosed that he was contacted by pt who was sitting in a parking lot at pharmacy crying hysterically   stated that he drove to pharmacy to calm pt down  Pt disclosed that she was crying because of extreme anxiety about covid   stated that while driving pt home, pt made a statement if I had my pain medicine, I would already be dead  Pt informed CW that she often has SI but denies a plan  Pt stated that she is home alone during the day while  is at work and she feels lonely and hopeless  Pt disclosed that she has poor sleep habits, which consist of difficulties going to sleep  Pt disclosed that her depression 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

## 2021-02-12 NOTE — ED PROVIDER NOTES
History  Chief Complaint   Patient presents with    Psychiatric Evaluation     Pt comes to ED with   Reports long term depression due to medical condition, worse in last few months  SI with vague plans, but also stated to  "if I had my pills with me I'd already be dead", after he picked her up from [employer] parking lot where she reports she had been crying for hours "for no reason"  Reports lack of motivation to accomplish tasks, and fatigue, poor sleep  78-year-old female presented for evaluation of worsening depression and suicidal thoughts  The patient reports that over the past days and weeks she has been increasingly depressed, tearful, and emotionally labile  She she feels increasingly isolated and is disabling the anxious about magnolia COVID-19  Over the past few days she has had thoughts of suicide  She states that she does have thoughts of specific ways to end her life, but assures me that it would be nothing messy    Her  reports that prior to arriving at the hospital she told him that if I had my pills I would be dead already," a statement that she confirms to me during my interview  History provided by:  Patient   used: No    Psychiatric Evaluation  Presenting symptoms: depression and suicidal thoughts    Patient accompanied by: spouse  Degree of incapacity (severity):  Severe  Onset quality:  Gradual  Timing:  Constant  Progression:  Worsening  Chronicity:  New  Context: recent medication change and stressful life event    Treatment compliance: All of the time  Relieved by:  Nothing  Worsened by:  Nothing  Ineffective treatments:  Antidepressants  Associated symptoms: no abdominal pain and no chest pain        Prior to Admission Medications   Prescriptions Last Dose Informant Patient Reported? Taking?    ALPRAZolam (XANAX) 1 mg tablet   No No   Sig: Take 1 tablet (1 mg total) by mouth daily at bedtime as needed for anxiety Cholecalciferol (D3-1000) 1000 units capsule  Self Yes No   Sig: Take 1 capsule by mouth daily     Galcanezumab-gnlm 120 MG/ML SOAJ  Self No No   Sig: Inject 120 mg under the skin every 30 (thirty) days   Iron-Vitamin C (IRON 100/C) 100-250 MG TABS  Self Yes No   Sig: Take by mouth daily   Multiple Vitamin (MULTI-VITAMIN DAILY) TABS  Self Yes No   Sig: Take 1 tablet by mouth daily   capsaicin (ZOSTRIX) 0 025 % cream   No No   Sig: Apply 1 application topically 2 (two) times a day   dronabinol (MARINOL) 5 MG capsule  Self No No   Sig: Take 1 capsule (5 mg total) by mouth 2 (two) times a day before meals By Dr Laura Sterling   Patient taking differently: Take 5 mg by mouth daily By Dr Laura Sterling   gabapentin (NEURONTIN) 300 mg capsule   No No   Sig: Take 1 capsule (300 mg total) by mouth daily at bedtime   lidocaine (XYLOCAINE) 5 % ointment  Self Yes No   Sig: Apply topically 2 (two) times a day as needed   lisinopril (ZESTRIL) 10 mg tablet   No No   Sig: TAKE 1 TABLET DAILY   mometasone (ELOCON) 0 1 % cream   No No   Sig: Apply topically daily   ondansetron (ZOFRAN-ODT) 4 mg disintegrating tablet  Self No No   Sig: Take 1 tablet (4 mg total) by mouth every 6 (six) hours as needed for nausea or vomiting   pantoprazole (PROTONIX) 40 mg tablet   No No   Sig: TAKE 1 TABLET TWICE A DAY   rosuvastatin (CRESTOR) 10 MG tablet   No No   Sig: Take 1 tablet (10 mg total) by mouth daily   sertraline (ZOLOFT) 25 mg tablet   No No   Sig: Take 1 tablet (25 mg total) by mouth daily   sertraline (ZOLOFT) 50 mg tablet   No No   Sig: Take 1 tablet (50 mg total) by mouth daily      Facility-Administered Medications: None       Past Medical History:   Diagnosis Date    Anxiety     Arthritis     Attention and concentration deficit     Blurred vision     Chronic pain     Depression     Diplopia     Dyspepsia     Gastric ulcer     Gastritis     Irritable bowel syndrome     Memory loss     Osteopenia     Starting and stopping of urinary stream during micturition     Urinary incontinence     Wears eyeglasses        Past Surgical History:   Procedure Laterality Date     SECTION       SECTION      CHOLECYSTECTOMY      [address]    GASTRIC BYPASS  2004    HYSTERECTOMY  2012    INSERT / REPLACE PERIPHERAL NEUROSTIMULATOR PULSE GENERATOR /       OTHER SURGICAL HISTORY      Reimplantatin at [facility]     SC IMPLANT SPINAL NEUROSTIM/ Right 4/15/2020    Procedure: REPLACEMENT IMPLANTABLE PULSE GENERATOR FOR DORSAL SPINAL COLUMN STIMULATOR,  RIGHT BUTTOCKS;  Surgeon: Jackeline Desai MD;  Location: [facility];  Service: Neurosurgery    RADIOFREQUENCY ABLATION NERVES      URETHRAL FISTULA REPAIR      US GUIDED THYROID BIOPSY  2020    WRIST ARTHROSCOPY      with internal fixation        Family History   Problem Relation Age of Onset    Other Mother         Back Disorder     Cirrhosis Mother     Crohn's disease Mother     Lupus Mother         Systemic Lupus Erythematous     Hypertension Father     Heart disease Father     Other Brother         Liver Transplant     Crohn's disease Brother     Crohn's disease Brother     No Known Problems Sister     No Known Problems Daughter     No Known Problems Maternal Aunt     No Known Problems Cousin     No Known Problems Cousin     No Known Problems Cousin     No Known Problems Cousin     No Known Problems Cousin     Seizures Neg Hx      I have reviewed and agree with the history as documented      E-Cigarette/Vaping    E-Cigarette Use Never User      E-Cigarette/Vaping Substances    Nicotine No     THC No     CBD No     Flavoring No     Other No     Unknown No      Social History     Tobacco Use    Smoking status: Never Smoker    Smokeless tobacco: Never Used   Substance Use Topics    Alcohol use: No    Drug use: Yes     Types: Marijuana     Comment: medical marijuana 2x a day capsules       Review of Systems Constitutional: Negative for chills and fever  HENT: Negative for ear pain and sore throat  Eyes: Negative for pain and visual disturbance  Respiratory: Negative for cough and shortness of breath  Cardiovascular: Negative for chest pain and palpitations  Gastrointestinal: Negative for abdominal pain and vomiting  Genitourinary: Negative for dysuria and hematuria  Musculoskeletal: Negative for arthralgias and back pain  Skin: Negative for color change and rash  Neurological: Negative for seizures and syncope  Psychiatric/Behavioral: Positive for suicidal ideas  All other systems reviewed and are negative  Physical Exam  Physical Exam  Constitutional:       Appearance: Normal appearance  She is obese  She is not toxic-appearing  HENT:      Head: Normocephalic and atraumatic  Nose: Nose normal       Mouth/Throat:      Mouth: Mucous membranes are moist       Pharynx: Oropharynx is clear  Eyes:      Extraocular Movements: Extraocular movements intact  Conjunctiva/sclera: Conjunctivae normal       Pupils: Pupils are equal, round, and reactive to light  Neck:      Musculoskeletal: Normal range of motion and neck supple  No neck rigidity  Cardiovascular:      Rate and Rhythm: Normal rate and regular rhythm  Pulses: Normal pulses  Pulmonary:      Effort: Pulmonary effort is normal  No respiratory distress  Breath sounds: Normal breath sounds  Abdominal:      General: There is no distension  Palpations: Abdomen is soft  Tenderness: There is no abdominal tenderness  Musculoskeletal: Normal range of motion  General: No swelling, tenderness or signs of injury  Skin:     General: Skin is warm and dry  Capillary Refill: Capillary refill takes less than 2 seconds  Findings: No rash  Neurological:      General: No focal deficit present  Mental Status: She is alert and oriented to person, place, and time     Psychiatric:         Mood and Affect: Mood normal          Thought Content:  Thought content normal          Vital Signs  ED Triage Vitals   Temperature Pulse Respirations Blood Pressure SpO2   02/11/21 1818 02/11/21 1818 02/11/21 1818 02/11/21 1818 02/11/21 1818   98 5 °F (36 9 °C) 62 19 123/70 98 %      Temp Source Heart Rate Source Patient Position - Orthostatic VS BP Location FiO2 (%)   02/11/21 1818 02/11/21 1818 02/11/21 1818 02/11/21 1818 --   Oral Monitor Lying Right arm       Pain Score       02/11/21 1841       8           Vitals:    02/11/21 1818 02/12/21 0845   BP: 123/70 110/55   Pulse: 62 62   Patient Position - Orthostatic VS: Lying Lying         Visual Acuity      ED Medications  Medications   acetaminophen (TYLENOL) tablet 975 mg (975 mg Oral Given 2/11/21 1841)   ALPRAZolam (XANAX) tablet 1 mg (1 mg Oral Given 2/11/21 2154)   ketorolac (TORADOL) injection 30 mg (30 mg Intramuscular Given 2/11/21 2155)       Diagnostic Studies  Results Reviewed     Procedure Component Value Units Date/Time    Comprehensive metabolic panel [[id number]]  (Abnormal) Collected: 02/11/21 1501    Lab Status: Final result Specimen: Blood Updated: 02/11/21 2246     Sodium 140 mmol/L      Potassium 4 4 mmol/L      Chloride 107 mmol/L      CO2 31 mmol/L      ANION GAP 2 mmol/L      BUN 21 mg/dL      Creatinine 0 87 mg/dL      Glucose 109 mg/dL      Calcium 9 3 mg/dL      AST 16 U/L      ALT 23 U/L      Alkaline Phosphatase 116 U/L      Total Protein 6 7 g/dL      Albumin 3 6 g/dL      Total Bilirubin 0 45 mg/dL      eGFR 72 ml/min/1 73sq m     Narrative:      Ag guidelines for Chronic Kidney Disease (CKD):     Stage 1 with normal or high GFR (GFR > 90 mL/min/1 73 square meters)    Stage 2 Mild CKD (GFR = 60-89 mL/min/1 73 square meters)    Stage 3A Moderate CKD (GFR = 45-59 mL/min/1 73 square meters)    Stage 3B Moderate CKD (GFR = 30-44 mL/min/1 73 square meters)    Stage 4 Severe CKD (GFR = 15-29 mL/min/1 73 square meters)    Stage 5 End Stage CKD (GFR <15 mL/min/1 73 square meters)  Note: GFR calculation is accurate only with a steady state creatinine    Urine Microscopic [[id number]]  (Abnormal) Collected: 02/11/21 2100    Lab Status: Final result Specimen: Urine Updated: 02/11/21 2119     RBC, UA 10-20 /hpf      WBC, UA None Seen /hpf      Epithelial Cells None Seen /hpf      Bacteria, UA Occasional /hpf      MUCUS THREADS Occasional    UA (URINE) with reflex to Scope [[id number]]  (Abnormal) Collected: 02/11/21 2100    Lab Status: Final result Specimen: Urine Updated: 02/11/21 2104     Color, UA Yellow     Clarity, UA Clear     Specific [place], UA 1 020     pH, UA 7 0     Leukocytes, UA Negative     Nitrite, UA Negative     Protein, UA Negative mg/dl      Glucose, UA Negative mg/dl      Ketones, UA Negative mg/dl      Urobilinogen, UA 0 2 E U /dl      Bilirubin, UA Negative     Blood, UA Moderate    COVID19, Influenza A/B, RSV PCR, [facility] [[id number]]  (Normal) Collected: 02/11/21 1841    Lab Status: Final result Specimen: Nares from Nasopharyngeal Swab Updated: 02/11/21 1935     SARS-CoV-2 Negative     INFLUENZA A PCR Negative     INFLUENZA B PCR Negative     RSV PCR Negative    Narrative: This test has been authorized by FDA under an EUA (Emergency Use Assay) for use by authorized laboratories  Clinical caution and judgement should be used with the interpretation of these results with consideration of the clinical impression and other laboratory testing  Testing reported as "Positive" or "Negative" has been proven to be accurate according to standard laboratory validation requirements  All testing is performed with control materials showing appropriate reactivity at standard intervals      Rapid drug screen, urine [[id number]]  (Abnormal) Collected: 02/11/21 1807    Lab Status: Final result Specimen: Urine, Clean Catch Updated: 02/11/21 1831     Amph/Meth UR Negative     Barbiturate Ur Negative     Benzodiazepine Urine Positive     Cocaine Urine Negative     Methadone Urine Negative     Opiate Urine Negative     PCP Ur Negative     THC Urine Positive     Oxycodone Urine Negative    Narrative:      Presumptive report  If requested, specimen will be sent to reference lab for confirmation  FOR MEDICAL PURPOSES ONLY  IF CONFIRMATION NEEDED PLEASE CONTACT THE LAB WITHIN 5 DAYS  Drug Screen Cutoff Levels:  AMPHETAMINE/METHAMPHETAMINES  1000 ng/mL  BARBITURATES     200 ng/mL  BENZODIAZEPINES     200 ng/mL  COCAINE      300 ng/mL  METHADONE      300 ng/mL  OPIATES      300 ng/mL  PHENCYCLIDINE     25 ng/mL  THC       50 ng/mL  OXYCODONE      100 ng/mL    POCT alcohol breath test [[id number]]  (Normal) Resulted: 02/11/21 1740    Lab Status: Final result Updated: 02/11/21 1741     EXTBreath Alcohol 0 000                 No orders to display              Procedures  Procedures         ED Course                             SBIRT 22yo+      Most Recent Value   SBIRT (25 yo +)   In order to provide better care to our patients, we are screening all of our patients for alcohol and drug use  Would it be okay to ask you these screening questions? Yes Filed at: 02/11/2021 1801   Initial Alcohol Screen: US AUDIT-C    1  How often do you have a drink containing alcohol?  0 Filed at: 02/11/2021 1801   2  How many drinks containing alcohol do you have on a typical day you are drinking? 0 Filed at: 02/11/2021 1801   3a  Male UNDER 65: How often do you have five or more drinks on one occasion? 0 Filed at: 02/11/2021 1801   3b  FEMALE Any Age, or MALE 65+: How often do you have 4 or more drinks on one occassion? 0 Filed at: 02/11/2021 1801   Audit-C Score  0 Filed at: 02/11/2021 1801   YONNY: How many times in the past year have you    Used an illegal drug or used a prescription medication for non-medical reasons?   Never Filed at: 02/11/2021 1801                    MDM  Number of Diagnoses or Management Options  Depression: new and requires workup     Amount and/or Complexity of Data Reviewed  Clinical lab tests: reviewed and ordered  Review and summarize past medical records: yes  Discuss the patient with other providers: yes        Disposition  Final diagnoses:   Depression     Time reflects when diagnosis was documented in both MDM as applicable and the Disposition within this note     Time User Action Codes Description Comment    2/12/2021  6:53 AM Deborah Dennison Add [F32 9] Depression       ED Disposition     ED Disposition Condition Date/Time Comment    Transfer to Another Facility  Fri Feb 12, 2021  9:26 AM Kumar Carr should be transferred out to [facility] and has been medically cleared     Accepted by Milly Spears MD Documentation      Most Recent Value   Patient Condition  The patient has been stabilized such that within reasonable medical probability, no material deterioration of the patient condition or the condition of the unborn child(daniel) is likely to result from the transfer   Reason for Transfer  Level of Care needed not available at this facility   Benefits of Transfer  Other benefits (Include comment)_______________________ [facility] (201)]   Risks of Transfer  Potential for delay in receiving treatment   Accepting Physician  Dr Goldy Bhakta NameBaudilio    (Name & Tel number)  Liana Huggins LSW   Transported by [employer] and Unit #)  [facility]   Sending MD Dr Camilla Aranda   Provider Certification  The patient is stable for psychiatric transfer because they are medically stable, and is protected from harming him/herself or others during transport      RN Documentation      [facility] Name, [address]   [facility]    (Name & Tel number)  Liana Mom, LSW   Transported by [employer] and Unit #)  [facility]      Follow-up Information    None         Discharge Medication List as of 2/12/2021  9:27 AM      CONTINUE these medications which have NOT CHANGED    Details   ALPRAZolam (XANAX) 1 mg tablet Take 1 tablet (1 mg total) by mouth daily at bedtime as needed for anxiety, Starting Wed 2/10/2021, Normal      capsaicin (ZOSTRIX) 0 025 % cream Apply 1 application topically 2 (two) times a day, Starting Wed 2/10/2021, Normal      Cholecalciferol (D3-1000) 1000 units capsule Take 1 capsule by mouth daily  , Historical Med      dronabinol (MARINOL) 5 MG capsule Take 1 capsule (5 mg total) by mouth 2 (two) times a day before meals By Dr Alina Larkin, Starting Thu 10/25/2018, No Print      gabapentin (NEURONTIN) 300 mg capsule Take 1 capsule (300 mg total) by mouth daily at bedtime, Starting Wed 1/27/2021, Normal      Galcanezumab-gnlm 120 MG/ML SOAJ Inject 120 mg under the skin every 30 (thirty) days, Starting Fri 10/23/2020, Normal      Iron-Vitamin C (IRON 100/C) 100-250 MG TABS Take by mouth daily, Historical Med      lidocaine (XYLOCAINE) 5 % ointment Apply topically 2 (two) times a day as needed, Starting Wed 1/20/2016, Historical Med      lisinopril (ZESTRIL) 10 mg tablet TAKE 1 TABLET DAILY, Normal      mometasone (ELOCON) 0 1 % cream Apply topically daily, Starting Tue 12/29/2020, Normal      Multiple Vitamin (MULTI-VITAMIN DAILY) TABS Take 1 tablet by mouth daily, Historical Med      ondansetron (ZOFRAN-ODT) 4 mg disintegrating tablet Take 1 tablet (4 mg total) by mouth every 6 (six) hours as needed for nausea or vomiting, Starting Fri 10/2/2020, Normal      pantoprazole (PROTONIX) 40 mg tablet TAKE 1 TABLET TWICE A DAY, Normal      rosuvastatin (CRESTOR) 10 MG tablet Take 1 tablet (10 mg total) by mouth daily, Starting Wed 1/27/2021, Normal      !! sertraline (ZOLOFT) 25 mg tablet Take 1 tablet (25 mg total) by mouth daily, Starting Wed 2/10/2021, Normal      !! sertraline (ZOLOFT) 50 mg tablet Take 1 tablet (50 mg total) by mouth daily, Starting Wed 2/10/2021, Normal      propranolol (INDERAL LA) 120 mg 24 hr capsule Take 1 capsule (120 mg total) by mouth daily, Starting Fri 12/4/2020, Normal       !! - Potential duplicate medications found  Please discuss with provider  No discharge procedures on file      PDMP Review       Value Time User    PDMP Reviewed  Yes 6/2/2020 10:47 AM Sylvia Macias, [address] [place] [address]          ED Provider  Electronically Signed by           Shandra Pichardo MD  02/25/21 8606

## 2021-02-12 NOTE — PROGRESS NOTES
Assessment/Plan:     Annual physical exam    Assessment completed  Labs ordered  Anxiety and depression    Patient reports that the medication is not working  Will discontinue  Advised weaning off  Will start Zoloft  Discussed coping mechanisms, distraction  Problem List Items Addressed This Visit        Cardiovascular and Mediastinum    Hypertension    Relevant Orders    Lipid panel (Completed)       Other    Mixed hyperlipidemia    Relevant Orders    Lipid panel (Completed)    Pudendal neuralgia    Relevant Medications    capsaicin (ZOSTRIX) 0 025 % cream    Anxiety and depression       Patient reports that the medication is not working  Will discontinue  Advised weaning off  Will start Zoloft  Discussed coping mechanisms, distraction  Relevant Medications    sertraline (ZOLOFT) 25 mg tablet    sertraline (ZOLOFT) 50 mg tablet    ALPRAZolam (XANAX) 1 mg tablet    Annual physical exam - Primary       Assessment completed  Labs ordered  Hypovitaminosis D    Relevant Orders    Vitamin D 25 hydroxy (Completed)    Elevated fasting glucose    Relevant Orders    Hemoglobin A1C (Completed)      Other Visit Diagnoses     Healthcare maintenance        Relevant Orders    CBC and differential (Completed)    Comprehensive metabolic panel (Completed)    TSH, 3rd generation with Free T4 reflex (Completed)    Lump in neck        Relevant Orders    US head neck soft tissue    US MSK limited            Subjective:      Patient ID: Blane Villafana is a 56 y o  female  Here for annual physical has complaints of anxiety, depression, insomnia  States that she has been taking depression meds for a long time does not feel like it is working anymore  Has a lot of pain problems  Did get an appointment to see a psychiatrist   Would like to have labs done        The following portions of the patient's history were reviewed and updated as appropriate: allergies, current medications, past family history, past medical history, past social history, past surgical history and problem list     Review of Systems   Constitutional: Negative  HENT: Negative  Eyes: Negative  Respiratory: Negative  Cardiovascular: Negative  Gastrointestinal: Negative  Endocrine: Negative  Genitourinary: Negative  Musculoskeletal: Positive for back pain  Skin: Negative  Allergic/Immunologic: Negative  Neurological: Negative  Psychiatric/Behavioral: Positive for dysphoric mood, sleep disturbance and suicidal ideas  The patient is nervous/anxious            Objective:      /84   Pulse 65   Temp 98 7 °F (37 1 °C) (Tympanic)   Resp 18   Ht 5' 4" (1 626 m)   Wt 104 kg (229 lb 9 6 oz)   SpO2 97%   BMI 39 41 kg/m²          Physical Exam      Labs:    Lab Results   Component Value Date    WBC 7 38 02/11/2021    HGB 13 4 02/11/2021    HCT 44 0 02/11/2021    MCV 97 02/11/2021     02/11/2021     Lab Results   Component Value Date    K 4 4 02/11/2021    K 4 4 02/11/2021     02/11/2021     02/11/2021    CO2 32 02/11/2021    CO2 31 02/11/2021    BUN 21 02/11/2021    BUN 21 02/11/2021    CREATININE 0 89 02/11/2021    CREATININE 0 87 02/11/2021    GLUF 106 (H) 11/20/2020    CALCIUM 9 5 02/11/2021    CALCIUM 9 3 02/11/2021    AST 17 02/11/2021    AST 16 02/11/2021    ALT 24 02/11/2021    ALT 23 02/11/2021    ALKPHOS 111 02/11/2021    ALKPHOS 116 02/11/2021    EGFR 70 02/11/2021    EGFR 72 02/11/2021     Lab Results   Component Value Date    CALCIUM 9 5 02/11/2021    CALCIUM 9 3 02/11/2021    K 4 4 02/11/2021    K 4 4 02/11/2021    CO2 32 02/11/2021    CO2 31 02/11/2021     02/11/2021     02/11/2021    BUN 21 02/11/2021    BUN 21 02/11/2021    CREATININE 0 89 02/11/2021    CREATININE 0 87 02/11/2021

## 2021-02-12 NOTE — ED NOTES
Dr Magdalene Terrazas at patient bedside to medically evaluate patient       Anil Mcneil RN  02/11/21 3855

## 2021-02-12 NOTE — ED NOTES
Amanda Hwang from [place] [address] called to confirm authorization information  Please call with pickup time, [phone]

## 2021-02-12 NOTE — ED NOTES
Patient is accepted at [facility]  Patient is accepted by Dr Zachary Recio per [address] is arranged with *TBD  Transportation is scheduled for **  Patient may go to the floor at **  Nurse report is to be called to [phone]  prior to patient transfer      Tax ID [id number]  Denton COCHRAN [id number]  F [phone]

## 2021-02-12 NOTE — ED NOTES
Insurance Authorization for admission:   Phone call placed to [place] Sylvie  Phone number: [phone]  Spoke to Ericka falls      4 days approved  Level of care: Inpatient Psych 201  Review on 2/15  Authorization # [id number]  Insurance Authorization for Transportation:  Included with authorization, if needed

## 2021-02-12 NOTE — ED NOTES
Bed search initiated at this time:    [facility] bed available    Clinical faxed to [facility] for review  Bed search to continue if needed

## 2021-02-12 NOTE — ED RE-EVALUATION NOTE
Spoke with patient now, awake alert  Patient denies any suicidal ideation  She denies any homicidal ideation  I have no grounds to remove the patient's rights  I have no grounds to keep the patient in the hospital   Discussed the patient will discharge  Apparently then accepted transport to mental health       Tony Carcamo MD  02/12/21 3261       Tony Carcamo MD  02/25/21 0387

## 2021-02-12 NOTE — ASSESSMENT & PLAN NOTE
Patient reports that the medication is not working  Will discontinue  Advised weaning off  Will start Zoloft  Discussed coping mechanisms, distraction

## 2021-02-12 NOTE — ED NOTES
Spoke with Prisca Phan at [facility] for transportation  Once transport is secured, crisis worker will get information via [employer]  [facility] to be updated on ETA

## 2021-02-15 DIAGNOSIS — R51.9 NONINTRACTABLE HEADACHE, UNSPECIFIED CHRONICITY PATTERN, UNSPECIFIED HEADACHE TYPE: ICD-10-CM

## 2021-02-15 RX ORDER — PROPRANOLOL HYDROCHLORIDE 120 MG/1
120 CAPSULE, EXTENDED RELEASE ORAL DAILY
Qty: 90 CAPSULE | Refills: 1 | Status: SHIPPED | OUTPATIENT
Start: 2021-02-15 | End: 2021-04-02

## 2021-02-26 ENCOUNTER — OFFICE VISIT (OUTPATIENT)
Dept: FAMILY MEDICINE CLINIC | Facility: CLINIC | Age: 61
End: 2021-02-26
Payer: COMMERCIAL

## 2021-02-26 VITALS
WEIGHT: 232 LBS | OXYGEN SATURATION: 97 % | DIASTOLIC BLOOD PRESSURE: 80 MMHG | BODY MASS INDEX: 39.61 KG/M2 | HEIGHT: 64 IN | TEMPERATURE: 97.5 F | SYSTOLIC BLOOD PRESSURE: 120 MMHG | HEART RATE: 57 BPM

## 2021-02-26 DIAGNOSIS — F41.9 ANXIETY AND DEPRESSION: ICD-10-CM

## 2021-02-26 DIAGNOSIS — R06.02 SOB (SHORTNESS OF BREATH): Primary | ICD-10-CM

## 2021-02-26 DIAGNOSIS — F32.A ANXIETY AND DEPRESSION: ICD-10-CM

## 2021-02-26 DIAGNOSIS — Z87.09 HX OF EXTRINSIC ASTHMA: ICD-10-CM

## 2021-02-26 PROCEDURE — 99213 OFFICE O/P EST LOW 20 MIN: CPT | Performed by: NURSE PRACTITIONER

## 2021-02-26 RX ORDER — SERTRALINE HYDROCHLORIDE 100 MG/1
TABLET, FILM COATED ORAL
COMMUNITY
Start: 2021-02-22 | End: 2021-03-12 | Stop reason: DRUGHIGH

## 2021-02-26 RX ORDER — PROPRANOLOL HYDROCHLORIDE 60 MG/1
60 TABLET ORAL 2 TIMES DAILY
COMMUNITY
Start: 2021-02-22 | End: 2021-04-26 | Stop reason: SDUPTHER

## 2021-02-26 RX ORDER — QUETIAPINE FUMARATE 50 MG/1
TABLET, FILM COATED ORAL
COMMUNITY
Start: 2021-02-22 | End: 2021-03-12 | Stop reason: SDUPTHER

## 2021-02-26 RX ORDER — BUSPIRONE HYDROCHLORIDE 10 MG/1
TABLET ORAL
COMMUNITY
Start: 2021-02-22 | End: 2021-03-12 | Stop reason: SDUPTHER

## 2021-02-26 NOTE — PROGRESS NOTES
Assessment/Plan:     Anxiety and depression    Patient was admitted to to [facility]  Due tosuicide  ideation  Patient has  continued therapy via zoom  Is taking Seroquel Zoloft  Also BuSpar as needed and Xanax  Reports that she feels she is getting better sleep but feels a little groggy  Will decrease Xanax to only at bedtime  Continue therapy  Continue medication  Continue self-care  Problem List Items Addressed This Visit        Other    Anxiety and depression       Patient was admitted to to [facility]  Due tosuicide  ideation  Patient has  continued therapy via zoom  Is taking Seroquel Zoloft  Also BuSpar as needed and Xanax  Reports that she feels she is getting better sleep but feels a little groggy  Will decrease Xanax to only at bedtime  Continue therapy  Continue medication  Continue self-care  Relevant Medications    busPIRone (BUSPAR) 10 mg tablet    QUEtiapine (SEROquel) 50 mg tablet    sertraline (ZOLOFT) 100 mg tablet    SOB (shortness of breath) - Primary    Relevant Orders    Complete PFT with post bronchodilator      Other Visit Diagnoses     Hx of extrinsic asthma        Relevant Orders    Complete PFT with post bronchodilator            Subjective:      Patient ID: Milvia Jin is a 56 y o  female  Patient is being seen for follow-up after being admitted to [facility] facility  Patient is here with daughter Was released a few days ago  Is continuing to have therapy via Zoom  Medications were adjusted,  Reports feeling very groggy at times  Patient also continues to have shortness of breath  The following portions of the patient's history were reviewed and updated as appropriate: allergies, current medications, past family history, past medical history, past social history, past surgical history and problem list     Review of Systems   Constitutional: Negative  HENT: Negative  Eyes: Negative  Respiratory: Negative  Cardiovascular: Negative  Gastrointestinal: Negative  Endocrine: Negative  Genitourinary: Negative  Musculoskeletal: Negative  Skin: Negative  Allergic/Immunologic: Negative  Neurological: Negative  Psychiatric/Behavioral: Positive for dysphoric mood, sleep disturbance ( improvement with sleep) and suicidal ideas  The patient is nervous/anxious  Objective:      /80 (BP Location: Left arm, Patient Position: Sitting)   Pulse 57   Temp 97 5 °F (36 4 °C) (Tympanic)   Ht 5' 4" (1 626 m)   Wt 105 kg (232 lb)   SpO2 97%   BMI 39 82 kg/m²          Physical Exam  Vitals signs and nursing note reviewed  Constitutional:       Appearance: She is well-developed  HENT:      Head: Normocephalic and atraumatic  Right Ear: External ear normal       Left Ear: External ear normal    Eyes:      Pupils: Pupils are equal, round, and reactive to light  Neck:      Musculoskeletal: Normal range of motion  Cardiovascular:      Rate and Rhythm: Normal rate and regular rhythm  Pulmonary:      Effort: Pulmonary effort is normal       Breath sounds: Normal breath sounds  Musculoskeletal: Normal range of motion  Skin:     General: Skin is warm and dry  Neurological:      General: No focal deficit present  Mental Status: She is alert and oriented to person, place, and time  Psychiatric:         Attention and Perception: Attention and perception normal          Mood and Affect: Mood is depressed  Affect is tearful  Speech: Speech normal          Behavior: Behavior normal          Thought Content:  Thought content normal          Cognition and Memory: Cognition and memory normal          Judgment: Judgment normal            Labs:    Lab Results   Component Value Date    WBC 7 38 02/11/2021    HGB 13 4 02/11/2021    HCT 44 0 02/11/2021    MCV 97 02/11/2021     02/11/2021     Lab Results   Component Value Date    K 4 4 02/11/2021    K 4 4 02/11/2021     02/11/2021     02/11/2021    CO2 32 02/11/2021    CO2 31 02/11/2021    BUN 21 02/11/2021    BUN 21 02/11/2021    CREATININE 0 89 02/11/2021    CREATININE 0 87 02/11/2021    GLUF 106 (H) 11/20/2020    CALCIUM 9 5 02/11/2021    CALCIUM 9 3 02/11/2021    AST 17 02/11/2021    AST 16 02/11/2021    ALT 24 02/11/2021    ALT 23 02/11/2021    ALKPHOS 111 02/11/2021    ALKPHOS 116 02/11/2021    EGFR 70 02/11/2021    EGFR 72 02/11/2021     Lab Results   Component Value Date    CALCIUM 9 5 02/11/2021    CALCIUM 9 3 02/11/2021    K 4 4 02/11/2021    K 4 4 02/11/2021    CO2 32 02/11/2021    CO2 31 02/11/2021     02/11/2021     02/11/2021    BUN 21 02/11/2021    BUN 21 02/11/2021    CREATININE 0 89 02/11/2021    CREATININE 0 87 02/11/2021

## 2021-02-26 NOTE — PATIENT INSTRUCTIONS
For Xanax pill at night 0 5 mg instead of 1 mg continue with the 50 mg of Seroquel   BuSpar you may take only as needed for acute episodes of anxiety may take 5-10 mg   if the Xanax is working at 0 5 mg leave it at that then think about decreasing the gabapentin to only once a day  Maintain heart healthy diet  Get pulmonary function test  Gastroesophageal Reflux Disease   WHAT YOU NEED TO KNOW:   Gastroesophageal reflux disease (GERD) is reflux that occurs more than twice a week for a few weeks  Reflux means acid and food in the stomach back up into the esophagus  It usually causes heartburn and other symptoms  GERD can cause other health problems over time if it is not treated  DISCHARGE INSTRUCTIONS:   Call your local emergency number (911 in the [address] [facility]) if:   · You have severe chest pain and sudden trouble breathing  Return to the emergency department if:   · You have trouble breathing after you vomit  · You have trouble swallowing, or pain with swallowing  · Your bowel movements are black, bloody, or tarry-looking  · Your vomit looks like coffee grounds or has blood in it  Call your doctor or gastroenterologist if:   · You feel full and cannot burp or vomit  · You vomit large amounts, or you vomit often  · You are losing weight without trying  · Your symptoms get worse or do not improve with treatment  · You have questions or concerns about your condition or care  Medicines:   · Medicines  are used to decrease stomach acid  Medicine may also be used to help your lower esophageal sphincter and stomach contract (tighten) more  · Take your medicine as directed  Contact your healthcare provider if you think your medicine is not helping or if you have side effects  Tell him of her if you are allergic to any medicine  Keep a list of the medicines, vitamins, and herbs you take  Include the amounts, and when and why you take them   Bring the list or the pill bottles to follow-up visits  Carry your medicine list with you in case of an emergency  Manage GERD:   · Do not have foods or drinks that may increase heartburn  These include chocolate, peppermint, fried or fatty foods, drinks that contain caffeine, or carbonated drinks (soda)  Other foods include spicy foods, onions, tomatoes, and tomato-based foods  Do not have foods or drinks that can irritate your esophagus, such as citrus fruits, juices, and alcohol  · Do not eat large meals  When you eat a lot of food at one time, your stomach needs more acid to digest it  Eat 6 small meals each day instead of 3 large ones, and eat slowly  Do not eat meals 2 to 3 hours before bedtime  · Elevate the head of your bed  Place 6-inch blocks under the head of your bed frame  You may also use more than one pillow under your head and shoulders while you sleep  · Maintain a healthy weight  If you are overweight, weight loss may help relieve symptoms of GERD  · Do not smoke  Smoking weakens the lower esophageal sphincter and increases the risk of GERD  Ask your healthcare provider for information if you currently smoke and need help to quit  E-cigarettes or smokeless tobacco still contain nicotine  Talk to your healthcare provider before you use these products  · Do not wear clothing that is tight around your waist   Tight clothing can put pressure on your stomach and cause or worsen GERD symptoms  Follow up with your doctor or gastroenterologist as directed:  Write down your questions so you remember to ask them during your visits  © Copyright [address] Information is for End User's use only and may not be sold, redistributed or otherwise used for commercial purposes  All illustrations and images included in CareNotes® are the copyrighted property of [employer]  or [facility] Brooke Henson   The above information is an  only  It is not intended as medical advice for individual conditions or treatments   Talk to your doctor, nurse or pharmacist before following any medical regimen to see if it is safe and effective for you

## 2021-03-06 ENCOUNTER — HOSPITAL ENCOUNTER (OUTPATIENT)
Dept: ULTRASOUND IMAGING | Facility: HOSPITAL | Age: 61
Discharge: HOME | End: 2021-03-06
Payer: COMMERCIAL

## 2021-03-06 DIAGNOSIS — R22.1 LUMP IN NECK: ICD-10-CM

## 2021-03-06 PROBLEM — R06.02 SOB (SHORTNESS OF BREATH): Status: ACTIVE | Noted: 2021-03-06

## 2021-03-06 PROBLEM — G47.9 DISTURBANCE IN SLEEP BEHAVIOR: Status: ACTIVE | Noted: 2021-03-06

## 2021-03-06 PROCEDURE — 76536 US EXAM OF HEAD AND NECK: CPT

## 2021-03-06 NOTE — ASSESSMENT & PLAN NOTE
Patient was admitted to to [facility]  Due tosuicide  ideation  Patient has  continued therapy via zoom  Is taking Seroquel Zoloft  Also BuSpar as needed and Xanax  Reports that she feels she is getting better sleep but feels a little groggy  Will decrease Xanax to only at bedtime  Continue therapy  Continue medication  Continue self-care

## 2021-03-10 DIAGNOSIS — Z23 ENCOUNTER FOR IMMUNIZATION: ICD-10-CM

## 2021-03-11 PROBLEM — Z79.891 LONG TERM CURRENT USE OF OPIATE ANALGESIC: Status: ACTIVE | Noted: 2021-03-11

## 2021-03-11 NOTE — PSYCH
Virtual Regular Visit    Assessment/Plan:    Problem List Items Addressed This Visit        Other    Neuralgia    Relevant Medications    gabapentin (NEURONTIN) 300 mg capsule    Nonintractable headache    Relevant Medications    gabapentin (NEURONTIN) 300 mg capsule    Insomnia    Relevant Medications    gabapentin (NEURONTIN) 300 mg capsule    Chronic daily headache    Relevant Medications    gabapentin (NEURONTIN) 300 mg capsule    sertraline (ZOLOFT) 100 mg tablet      Other Visit Diagnoses     Moderate episode of recurrent major depressive disorder (HCC)    -  Primary    Relevant Medications    busPIRone (BUSPAR) 10 mg tablet    QUEtiapine (SEROquel) 50 mg tablet    sertraline (ZOLOFT) 100 mg tablet    ALPRAZolam (XANAX) 0 25 mg tablet    Generalized anxiety disorder with panic attacks        Relevant Medications    busPIRone (BUSPAR) 10 mg tablet    QUEtiapine (SEROquel) 50 mg tablet    sertraline (ZOLOFT) 100 mg tablet    ALPRAZolam (XANAX) 0 25 mg tablet               Reason for visit is   Chief Complaint   Patient presents with    Psychiatric Evaluation    Medication Management    Anxiety    Depression        Encounter provider Ashish Matias MD    Provider located at: [address]    Recent Visits  No visits were found meeting these conditions  Showing recent visits within past 7 days and meeting all other requirements     Today's Visits  Date Type Provider Dept   03/12/21 Telemedicine Ashish Matias MD [facility] 18 today's visits and meeting all other requirements     Future Appointments  No visits were found meeting these conditions  Showing future appointments within next 150 days and meeting all other requirements        The patient was identified by name and date of birth   Rohini Rowley was informed that this is a telemedicine visit and that the visit is being conducted through [employer] and patient was informed that this is a secure, HIPAA-compliant platform  She agrees to proceed     My office door was closed  No one else was in the room  She acknowledged consent and understanding of privacy and security of the video platform  The patient has agreed to participate and understands they can discontinue the visit at any time  Patient is aware this is a billable service  William Dietz Viv    Name and Date of Birth:  Concha Barrera 56 y o  1960 MRN: [id number]    Date of Visit: March 12, 2021    Reason for visit:   Chief Complaint   Patient presents with    Psychiatric Evaluation    Medication Management    Anxiety    Depression       HPI:     Concha Barrera is a 56 y o   female,  (two adult children 32 and 29 y/o), domiciled w/ , retired teacher, w/ PMH of multiple medical conditions including obesity, HTN, HLD, GERD, post-gastrectomy malabsorption, CATHRYN (on CPAP), migraine, neuralgia, dysesthesia of multiple sites, lumbar radiculopathy, OA of L shoulder, cervicalgia, mild cognitive impairment w/ memory loss, BRIANA, TIA (2 5 yrs ago), abnormal sleep deprived EEG, TBI ([place]ebastien Crater off 12 foot retaining wall more than 25 yrs ago), long-term use of opiate analgesic, BRIANA, vit D def and PPH of depression and anxiety, recent ED visit on 2/11/2021 due to worsening of depression and SI lead to inpatient psychiatric admission at [facility] (for 11 days) and then to the PHP (finished on 3/11/2021), one prior SA (~40 yrs ago via OD), no h/o self-injurious behavior, on Xanax 1 mg HS, Buspar 10 mg BID, Neurontin 300 mg BID, Seroquel 50 mg nightly, Zoloft 100 mg daily, who presented to the mental health clinic for the initial intake and psychiatric evaluation  The pt was visited virtually; chart reviewed   Presented calm, cooperative and well related, casually dressed w/ good hygiene, good eye contact, depressed mood, constricted affect, talking in normal tone, volume and amount, w/ linear thought process, fair insight and judgement  She reported worsening of medical problems, w/ daily headaches started in Feb 2020, and then started feeling very anxious and paranoid about COVID-19, and has been more isolated, and exacerbated her depression  She reported passing thoughts of death last month, issa when triggered by visiting her PCP last month who told her that nothing more could be considered her pain, and she felt hopeless and helpless  She also felt frustrated and had a crying spell while was trying to take grocery to her mother in law and felt guilty about possibly transmitting COVID-19 and infecting her 81 y/o mother in law, and called her daughter and , and went to the ED on 2/11/2021  She reported marked improvement of her sxs during the inpatient admission and further PHP  She reported feeling better and sleeping better at night since recent admission  Sleeps around 9:30 PM and wakes up around 8-8:30 AM  She noted that her pain level is less if she sleeps well at night  Scored her pain 2/10 in the morning, and then could be 7/10 in the evening  Reported poor energy and fair appetite  Denied anhedonia, hopelessness, worthlessness or feeling guilty since being in PHP  She also got vaccinated on Tuesday, and looks forward to be socially active  Reported having panic attacks as feeling very anxious, hyperventilating as first time happened 9 yrs ago in the grocery store when 911 was called, and she continues to have panic attacks while going to the grocery store  Denied any panic attacks since discharge  No specific phobia reported  Denied A/VH  No manic sxs, paranoid ideations or fixed delusions were elicited  Vehemently denied SI/HI, intent or plan upon direct inquiry at this time  Reported taking Medical Marijuana BID for chronic pain   Denied smoking cigarettes, drinking alcohol or other illicit substance use  Reported FH of depression in mother, identical twin sister and son  No FH of suicide  Denied h/o physical or sexual abuse  Denied any history of eating disorder or obsessive/compulsive sxs  The patient was educated about the [address] [place] [address] and [facility] to the mental health  Also, was educated about the importance of sleep hygiene, mindfulness, meditation and breathing techniques, and recommended to use Mindfulness  application and recommended to read the book, Man's search for Meaning by Caryn Lay  The patient was receptive to the education  Referred for individual therapy  Risks and side effects of chronic benzodiazepine use discussed with patient, including risk for falls, sedation, respiratory depression (especially if taken in higher dose(s) than prescribed or if taken together with another sedating medication or alcohol or other sedating substance), as well as risk of addiction (especially if taken more often or at higher doses than prescribed) and withdrawal (if stops abruptly, issa if taken more often or at higher doses) including seizures and death  The patient was instructed to not drive or operate heavy machinery while taking benzodiazepines, as the medication can cause increased drowsiness  Patient verbalized understanding and agreed to taper off Xanax as tolerated  Psycho-education regarding indications, benefits, risks, side effects, and alternative options provided to the patient, and the importance of the compliance with psychiatric treatment reiterated  The patient verbalized understanding and agreed to the proposed regimen           Prescriptions  Total Prescriptions: 9    Total Private Pay: 1    Fill Date ID   Written Drug Qty Days Prescriber Rx # Pharmacy Refill   Daily Dose* Pymt Type      02/10/2021  1   02/10/2021  Alprazolam 1 MG Tablet  30 00  30 As For   [id number]   Pen (8224)   0   [place]   08/19/2020  1 08/19/2020  Zdfawt-Fpcslmym-Joxi -40  30 00  5 As For   [id number]   Pen (3458)   0   [place]   06/02/2020  1   06/02/2020  Eszopiclone 3 MG Tablet  15 00  15 An Rom   [id number]   Pen (3458)   0   [place]   04/14/2020  1   04/14/2020  Oxycodone-Acetaminophen 5-325  9 00  3 Ro Hwa   [id number]   Pen (3458)   0  22 50 MME  [place] Ins   [place]   01/21/2020  1   01/21/2020  Zaleplon 5 MG Capsule  15 00  7 Va Ril   [id number]   Pen (3458)   0   [place]   01/21/2020  1   01/21/2020  Oxycodone-Acetaminophen   30 00  15 Va Ril   [id number]   Pen (3458)   0  30 00 MME  [place]   09/09/2019  1   09/09/2019  Phentermine 15 MG Capsule  30 00  30 An Rom   [id number]   Pen (0273)   0   Private Pay   [place]   07/05/2019  1   07/05/2019  Oxycodone-Acetaminophen 5-325  25 00  7 An Smo   [id number]   Pen (3458)   0  26 79 MME  [place] Ins   [place]   03/16/2019  1   03/16/2019  Oxycodone-Acetaminophen 5-325  25 00  6 An Smo   [id number]   Pen (3823)   0  31 25 MME  Comm Ins   [place]   *Per CDC guidance, the MME conversion factors prescribed or provided as part of the medication-assisted treatment for opioid use disorder should not be used to benchmark against dosage thresholds meant for opioids prescribed for pain  Buprenorphine products have no agreed upon morphine equivalency, and as partial opioid agonists, are not expected to be associated with overdose risk in the same dose-dependent manner as doses for full agonist opioids  MME = morphine milligram equivalents  LME = Lorazepam milligram equivalents  MG = dose in milligrams  Review Of Systems:  Pertinent items are noted in HPI; all others are negative; no recent changes in medications or health status reported     PHQ-9 Depression Screening    PHQ-9:   Frequency of the following problems over the past two weeks:      Little interest or pleasure in doing things: 0 - not at all  Feeling down, depressed, or hopeless: 1 - several days  Trouble falling or staying asleep, or sleeping too much: 0 - not at all  Feeling tired or having little energy: 3 - nearly every day  Poor appetite or overeatin - several days  Feeling bad about yourself - or that you are a failure or have let yourself or your family down: 0 - not at all  Trouble concentrating on things, such as reading the newspaper or watching television: 2 - more than half the days  Moving or speaking so slowly that other people could have noticed  Or the opposite - being so fidgety or restless that you have been moving around a lot more than usual: 0 - not at all  Thoughts that you would be better off dead, or of hurting yourself in some way: 0 - not at all  PHQ-2 Score: 1  PHQ-9 Score: 7         YANY-7 Flowsheet Screening      Most Recent Value   Over the last two weeks, how often have you been bothered by the following problems? Feeling nervous, anxious, or on edge  1   Not being able to stop or control worrying  0   Worrying too much about different things  0   Trouble relaxing   0   Being so restless that it's hard to sit still  0   Becoming easily annoyed or irritable   0   Feeling afraid as if something awful might happen  0   How difficult have these problems made it for you to do your work, take care of things at home, or get along with other people?    Not difficult at all   YANY Score   1            Past Psychiatric History:     Past Inpatient Psychiatric Treatment:   Past inpatient psychiatric admissions at [facility] in 2021  Past Outpatient Psychiatric Treatment:    recently discharged from [facility] on 3/11/2021  Past Suicide Attempts: yes  Past Violent Behavior: no  Past Psychiatric Medication Trials: Zoloft, Seroquel, Neurontin, Xanax, Zaleplon    Traumatic History:     Abuse: no history of physical or sexual abuse  Other Traumatic Events: none     Family Psychiatric History:     Family History   Problem Relation Age of Onset    Other Mother         Back Disorder     Cirrhosis Mother     Crohn's disease Mother     Lupus Mother         Systemic Lupus Erythematous     Hypertension Father     Heart disease Father     Other Brother         Liver Transplant     Crohn's disease Brother     Crohn's disease Brother     No Known Problems Sister     No Known Problems Daughter     No Known Problems Maternal Aunt     No Known Problems Cousin     No Known Problems Cousin     No Known Problems Cousin     No Known Problems Cousin     No Known Problems Cousin     Seizures Neg Hx        Substance Use History:    Social History     Substance and Sexual Activity   Alcohol Use No     Social History     Substance and Sexual Activity   Drug Use Yes    Types: Marijuana    Comment: medical marijuana 2x a day capsules       Social History:  Developmental:  Education: college graduate  Marital history:   Children: two adult children  Living arrangement, social support:   Occupational History: retired teacher  Access to firearms: not reported    Social History     Socioeconomic History    Marital status: /Civil Union     Spouse name: Not on file    Number of children: Not on file    Years of education: Not on file    Highest education level: Not on file   Occupational History    Occupation: retired   Social Needs    Financial resource strain: Not on file    Food insecurity     Worry: Not on file     Inability: Not on file   [employer] needs     Medical: Not on file     Non-medical: Not on file   Tobacco Use    Smoking status: Never Smoker    Smokeless tobacco: Never Used   Substance and Sexual Activity    Alcohol use: No    Drug use: Yes     Types: Marijuana     Comment: medical marijuana 2x a day capsules    Sexual activity: Yes   Lifestyle    Physical activity     Days per week: Not on file     Minutes per session: Not on file    Stress: Not on file   Relationships    Social connections     Talks on phone: Not on file     Gets together: Not on file     Attends Worship service: Not on file     Active member of club or organization: Not on file     Attends meetings of clubs or organizations: Not on file     Relationship status: Not on file    Intimate partner violence     Fear of current or ex partner: Not on file     Emotionally abused: Not on file     Physically abused: Not on file     Forced sexual activity: Not on file   Other Topics Concern    Not on file   Social History Narrative    Lives in [facility], with   Previously worked as a teacher  Past Medical History:    Past Medical History:   Diagnosis Date    Anxiety     Arthritis     Attention and concentration deficit     Blurred vision     Chronic pain     Depression     Diplopia     Dyspepsia     Gastric ulcer     Gastritis     Irritable bowel syndrome     Memory loss     Osteopenia     Starting and stopping of urinary stream during micturition     Urinary incontinence     Wears eyeglasses      No past medical history pertinent negatives    Past Surgical History:   Procedure Laterality Date     SECTION       SECTION      CHOLECYSTECTOMY      [facility] GASTRIC BYPASS  2004    HYSTERECTOMY  2012    INSERT / REPLACE PERIPHERAL NEUROSTIMULATOR PULSE GENERATOR /       OTHER SURGICAL HISTORY  2012    [facility]     SD IMPLANT SPINAL NEUROSTIM/ Right 4/15/2020    Procedure: REPLACEMENT IMPLANTABLE PULSE GENERATOR FOR DORSAL SPINAL COLUMN STIMULATOR,  RIGHT BUTTOCKS;  Surgeon: Tomás Mensah MD;  Location: [facility];  Service: Neurosurgery    [address]      US GUIDED THYROID BIOPSY  2020    WRIST ARTHROSCOPY      with internal fixation      Allergies   Allergen Reactions    Morphine Abdominal Pain     SEVERE N/V    Shellfish-Derived Products Vomiting     CRAB MEAT ONLY    Cat Hair Extract Nasal Congestion     Cat Dander    Dog Epithelium Nasal Congestion    Other Other (See Comments) and Sneezing     Dogs  Crab Meat       History Review: The following portions of the patient's history were reviewed and updated as appropriate: allergies, current medications, past family history, past medical history, past social history, past surgical history and problem list     OBJECTIVE:    Vital signs in last 24 hours:    Vitals:    03/12/21 1055   Weight: 103 kg (228 lb)   Height: 5' 4" (1 626 m)       Mental Status Evaluation:  Appearance and attitude: appeared as stated age, cooperative and attentive, casually dressed with good hygiene  Eye contact: good  Motor Function: within normal limits, No PMA/PMR  Gait/station: Not observed  Speech: normal for rate, rhythm, volume, latency, amount  Language: Able to name objects and Able to repeat phrases  Mood/affect: euthymic / Affect was euthymic, reactive, in full range, normal intensity and mood congruent  Thought Processes: sequential and goal-directed  Thought content: denies suicidal ideation or homicidal ideation; no delusions or first rank symptoms  Associations: intact associations  Perceptual disturbances: denies Auditory/Visual/Tactile Hallucinations  Orientation: oriented to time, person, place and to the situational context  Cognitive Function: intact  Memory: intact short-term and long-term  Intellect: average  Fund of knowledge: aware of current events, aware of past history and vocabulary average  Impulse control: good  Insight/judgment: good/good    Pain: reported having pain in lower back and neck  Pain scale: 2-7 during the day    Lab Results: I have personally reviewed pertinent lab results          WBC   Date Value Ref Range Status   02/11/2021 7 38 4 31 - 10 16 Thousand/uL Final     WBC, UA   Date Value Ref Range Status   02/11/2021 None Seen None Seen, 0-1, 1-2, 0-5, 2-4 /hpf Final     MCV   Date Value Ref Range Status   02/11/2021 97 82 - 98 fL Final     Lab Results   Component Value Date    BUN 21 02/11/2021    BUN 21 02/11/2021    SODIUM 141 02/11/2021    SODIUM 140 02/11/2021    CO2 32 02/11/2021    CO2 31 02/11/2021     Lab Results   Component Value Date    ALKPHOS 111 02/11/2021    ALKPHOS 116 02/11/2021     No results found for: CKMB  No results found for: TSH  INR   Date Value Ref Range Status   04/01/2020 0 99 0 84 - 1 19 Final     No results found for: APTT  No results found for: PHENO  Sodium   Date Value Ref Range Status   02/11/2021 141 136 - 145 mmol/L Final   02/11/2021 140 136 - 145 mmol/L Final     BUN   Date Value Ref Range Status   02/11/2021 21 5 - 25 mg/dL Final   02/11/2021 21 5 - 25 mg/dL Final     Creatinine   Date Value Ref Range Status   02/11/2021 0 89 0 60 - 1 30 mg/dL Final     Comment:     Standardized to IDMS reference method   02/11/2021 0 87 0 60 - 1 30 mg/dL Final     Comment:     Standardized to IDMS reference method     TSH 3RD GENERATON   Date Value Ref Range Status   02/11/2021 1 330 0 358 - 3 740 uIU/mL Final     Comment:     The recommended reference ranges for TSH during pregnancy are as follows:   First trimester 0 1 to 2 5 uIU/mL   Second trimester  0 2 to 3 0 uIU/mL   Third trimester 0 3 to 3 0 uIU/m    Note: Normal ranges may not apply to patients who are transgender, non-binary, or whose legal sex, sex at birth, and gender identity differ       WBC   Date Value Ref Range Status   02/11/2021 7 38 4 31 - 10 16 Thousand/uL Final     WBC, UA   Date Value Ref Range Status   02/11/2021 None Seen None Seen, 0-1, 1-2, 0-5, 2-4 /hpf Final     No components found for: B12  Lab Results   Component Value Date    FOLATE >20 0 (H) 08/27/2018     Lab Results   Component Value Date    RPR Non-Reactive 01/02/2019       Imaging Studies: reviewed    EKG, Pathology, and Other Studies: reviewed    Suicide/Homicide Risk Assessment:    Risk of Harm to Self:  The following ratings are based on assessment at the time of the interview  Demographic risk factors include: , age: over 48 or older  Historical Risk Factors include: history of depression, history of anxiety, history of suicide attempt  Recent Specific Risk Factors include: current depressive symptoms, chronic pain, health problems, recent inpatient psychiatric admission  Protective Factors: no current suicidal ideation, being a parent, being   Weapons: none  The following steps have been taken to ensure weapons are properly secured: not applicable  Based on today's assessment, Shane Leyva presents the following risk of harm to self: chronically elevated; low at this time    Risk of Harm to Others: The following ratings are based on assessment at the time of the interview  Demographic Risk Factors include: none  Historical Risk Factors include: none  Recent Specific Risk Factors include: none  Protective Factors: no current homicidal ideation  Weapons: none  The following steps have been taken to ensure weapons are properly secured: not applicable  Based on today's assessment, Shane Leyva presents the following risk of harm to others: low    The following interventions are recommended: contracts for safety at present - agrees to go to ED if feeling unsafe, contracts for safety at present - agrees to call Crisis Intervention Service if feeling unsafe, referral for psychotherapy    Assessment/Plan:   In summary, the patient is a 56 y o    female,  (two adult children 32 and 27 y/o), domiciled w/ , retired teacher, w/ PMH of multiple medical conditions including obesity, HTN, HLD, GERD, post-gastrectomy malabsorption, CATHRYN (on CPAP), migraine, neuralgia, dysesthesia of multiple sites, lumbar radiculopathy, OA of L shoulder, cervicalgia, mild cognitive impairment w/ memory loss, BRIANA, TIA (2 5 yrs ago), abnormal sleep deprived EEG, TBI (Chris Reagin off 12 foot retaining wall more than 25 yrs ago), long-term use of opiate analgesic, BRIANA, vit D def and PPH of depression and anxiety, recent ED visit on 2/11/2021 due to worsening of depression and SI lead to inpatient psychiatric admission at [facility] (for 11 days) and then to the PHP (finished on 3/11/2021), one prior SA (~40 yrs ago via OD), no h/o self-injurious behavior, on Xanax 1 mg HS, Buspar 10 mg BID, Neurontin 300 mg BID, Seroquel 50 mg nightly, Zoloft 100 mg daily, who presented to the mental health clinic for the initial intake and psychiatric evaluation  Presented w/ increased depression and anxiety over past year in the context of chronic pain, multiple medical conditions, and isolation due to COVID-19 pandemic, which has markedly improved since recent inpatient hospitalization and finishing PHP  Denied SI/HI, intent or plan upon direct inquiry at this time  PHQ-9: 7; YANY-7: 1  Her current presentation meets criteria for MDD and YANY w/ panic attacks  Currently she is not at risk for suicide, homicide, self-injury, aggressive behaviors, self-neglect, or neglect of dependents or children  Given this presentation, the patient will benefit from further outpatient follow up for management of her symptoms  Maintained on Zoloft 100 mg daily, Seroquel 50 mg nightly, Buspar 10 mg BID and Neurontin 300 mg BID, and Xanax would be tapered off as tolerated  Referred for individual therapy  Diagnoses and all orders for this visit:    Moderate episode of recurrent major depressive disorder (HCC)  -     QUEtiapine (SEROquel) 50 mg tablet; Take 1 tablet (50 mg total) by mouth daily at bedtime  -     sertraline (ZOLOFT) 100 mg tablet; Take 1 tablet (100 mg total) by mouth daily    Chronic daily headache  -     gabapentin (NEURONTIN) 300 mg capsule; Take 1 capsule (300 mg total) by mouth 2 (two) times a day    Nonintractable headache, unspecified chronicity pattern, unspecified headache type  -     gabapentin (NEURONTIN) 300 mg capsule; Take 1 capsule (300 mg total) by mouth 2 (two) times a day    Neuralgia  -     gabapentin (NEURONTIN) 300 mg capsule;  Take 1 capsule (300 mg total) by mouth 2 (two) times a day    Insomnia, unspecified type  -     gabapentin (NEURONTIN) 300 mg capsule; Take 1 capsule (300 mg total) by mouth 2 (two) times a day    Generalized anxiety disorder with panic attacks  -     busPIRone (BUSPAR) 10 mg tablet; Take 1 tablet (10 mg total) by mouth 2 (two) times a day  -     ALPRAZolam (XANAX) 0 25 mg tablet; Take 1 tablet (0 25 mg total) by mouth daily at bedtime as needed for anxiety          PROVISIONAL DIAGNOSIS :   - MDD  - YANY w/ panic attacks    TREATMENT AND RECOMMENDATIONS:  - Continue using CPAP for sleep apnea and vit D supplements as per PCP  - pain management as per primary medical team; consider complimentary medicine options (e g acupuncture)  - Continue Zoloft 100 mg po daily for depression and anxiety  - Continue Seroquel 50 mg po nightly for insomnia and adjunct treatment for depression  - Continue Buspar 10 mg po BID for anxiety and panic attacks  - Continue Neurontin 300 mg po BID for neuralgia, anxiety and panic attacks  - Taper off Xanax 1 mg nightly PRN as tolerated (to 0 5 mg for two weeks and then 0 25 mg po nightly for two weeks and then 0 125 mg po nightly)  - Psychoeducation regarding medication benefits and risks, side effects, indications  and alternatives provided to the patient and the importance of compliance with psychiatric medication reiterated  The pt verbalized understanding and agreed with the plan  - Patient was referred for individual psychotherapy   - RTC in 4 weeks  - The patient was educated about 24 hour and weekend coverage for urgent situations accessed by calling [facility] main practice number  - Patient was educated to call [address] [facility] ([phone]]) for behavioral crisis at anytime or 338 for any safety concerns, or go to nearest ER if her symptoms become overwhelming or unmanageable      Medications Risks/Benefits:      Risks, Benefits And Possible Side Effects Of Medications:    Risks, benefits, and possible side effects of medications explained to Franchesca Wheeler and she verbalizes understanding and agreement for treatment  Controlled Medication Discussion:     Franchesca Wheeler has been filling controlled prescriptions on time as prescribed according to [facility]  Discussed with Marsydni Wheeler the risks of sedation, respiratory depression, impairment of ability to drive and potential for abuse and addiction related to treatment with benzodiazepine medications  She understands risk of treatment with benzodiazepine medications, agrees to not drive if feels impaired and agrees to take medications as prescribed  Discussed with Rasheed Foreman warning on concurrent use of benzodiazepines and opioid medications including sedation, respiratory depression, coma and death   She understands the risk of treatment with benzodiazepines in addition to opioids and wants to continue taking those medications    Treatment Plan:    Completed and signed during the session: Yes - with Kory Mishra MD 03/12/21

## 2021-03-12 ENCOUNTER — TELEMEDICINE (OUTPATIENT)
Dept: PSYCHIATRY | Facility: CLINIC | Age: 61
End: 2021-03-12
Payer: COMMERCIAL

## 2021-03-12 VITALS — BODY MASS INDEX: 38.93 KG/M2 | HEIGHT: 64 IN | WEIGHT: 228 LBS

## 2021-03-12 DIAGNOSIS — M79.2 NEURALGIA: ICD-10-CM

## 2021-03-12 DIAGNOSIS — F33.1 MODERATE EPISODE OF RECURRENT MAJOR DEPRESSIVE DISORDER (HCC): Primary | ICD-10-CM

## 2021-03-12 DIAGNOSIS — F41.1 GENERALIZED ANXIETY DISORDER WITH PANIC ATTACKS: ICD-10-CM

## 2021-03-12 DIAGNOSIS — R51.9 CHRONIC DAILY HEADACHE: ICD-10-CM

## 2021-03-12 DIAGNOSIS — D17.0 LIPOMA OF NECK: Primary | ICD-10-CM

## 2021-03-12 DIAGNOSIS — G47.00 INSOMNIA, UNSPECIFIED TYPE: ICD-10-CM

## 2021-03-12 DIAGNOSIS — R51.9 NONINTRACTABLE HEADACHE, UNSPECIFIED CHRONICITY PATTERN, UNSPECIFIED HEADACHE TYPE: ICD-10-CM

## 2021-03-12 DIAGNOSIS — F41.0 GENERALIZED ANXIETY DISORDER WITH PANIC ATTACKS: ICD-10-CM

## 2021-03-12 PROCEDURE — 90792 PSYCH DIAG EVAL W/MED SRVCS: CPT | Performed by: STUDENT IN AN ORGANIZED HEALTH CARE EDUCATION/TRAINING PROGRAM

## 2021-03-12 PROCEDURE — 96127 BRIEF EMOTIONAL/BEHAV ASSMT: CPT | Performed by: STUDENT IN AN ORGANIZED HEALTH CARE EDUCATION/TRAINING PROGRAM

## 2021-03-12 RX ORDER — BUSPIRONE HYDROCHLORIDE 10 MG/1
10 TABLET ORAL 2 TIMES DAILY
Qty: 180 TABLET | Refills: 0 | Status: SHIPPED | OUTPATIENT
Start: 2021-03-12 | End: 2021-05-23

## 2021-03-12 RX ORDER — GABAPENTIN 300 MG/1
300 CAPSULE ORAL 2 TIMES DAILY
Qty: 180 CAPSULE | Refills: 0 | Status: SHIPPED | OUTPATIENT
Start: 2021-03-12 | End: 2021-05-26

## 2021-03-12 RX ORDER — SERTRALINE HYDROCHLORIDE 100 MG/1
100 TABLET, FILM COATED ORAL DAILY
Qty: 90 TABLET | Refills: 0 | Status: SHIPPED | OUTPATIENT
Start: 2021-03-12 | End: 2021-04-15 | Stop reason: SDUPTHER

## 2021-03-12 RX ORDER — QUETIAPINE FUMARATE 50 MG/1
50 TABLET, FILM COATED ORAL
Qty: 90 TABLET | Refills: 0 | Status: SHIPPED | OUTPATIENT
Start: 2021-03-12 | End: 2021-05-23

## 2021-03-12 RX ORDER — ALPRAZOLAM 0.25 MG/1
0.25 TABLET ORAL
Qty: 30 TABLET | Refills: 0 | Status: SHIPPED | OUTPATIENT
Start: 2021-03-12 | End: 2021-04-26 | Stop reason: ALTCHOICE

## 2021-03-12 NOTE — BH TREATMENT PLAN
Treatment Plan done but not signed at time of office visit due to:  Plan reviewed by phone or in person  and verbal consent given due to COVID social distancing      TREATMENT PLAN (Medication Management Only)        [facility]    Name and Date of Birth:  Charlene Alexander 56 y o  1960  Date of Treatment Plan: March 12, 2021  Diagnosis/Diagnoses:    1  Moderate episode of recurrent major depressive disorder ([address] )    2  Chronic daily headache    3  Nonintractable headache, unspecified chronicity pattern, unspecified headache type    4  Neuralgia    5  Insomnia, unspecified type    6  Generalized anxiety disorder with panic attacks      Strengths/Personal Resources for Self-Care: "outdoor gardening", supportive family, motivation for treatment  Area/Areas of need (in own words): "anxiety, panic attacks and depression"  1  Long Term Goal: improve anxiety, panic attacks and depression  Target Date:4 months - 7/12/2021  Person/Persons responsible for completion of goal: Анна Sheets  2  Short Term Objective (s) - How will we reach this goal?:   A  Provider new recommended medication/dosage changes and/or continue medication(s): continue current medications as prescribed  B  refer for therapy  C  N/A  Target Date:4 months - 7/12/2021  Person/Persons Responsible for Completion of Goal: Анна Sheets  Progress Towards Goals: initiating treatment  Treatment Modality: medication management every 4 months  Review due 180 days from date of this plan: 4 months - 7/12/2021  Expected length of service: maintenance  My Physician/PA/NP and I have developed this plan together and I agree to work on the goals and objectives  I understand the treatment goals that were developed for my treatment

## 2021-03-16 ENCOUNTER — TELEPHONE (OUTPATIENT)
Dept: FAMILY MEDICINE CLINIC | Facility: CLINIC | Age: 61
End: 2021-03-16

## 2021-03-16 ENCOUNTER — APPOINTMENT (EMERGENCY)
Dept: RADIOLOGY | Facility: HOSPITAL | Age: 61
End: 2021-03-16
Payer: COMMERCIAL

## 2021-03-16 ENCOUNTER — APPOINTMENT (EMERGENCY)
Dept: CT IMAGING | Facility: HOSPITAL | Age: 61
End: 2021-03-16
Payer: COMMERCIAL

## 2021-03-16 ENCOUNTER — HOSPITAL ENCOUNTER (EMERGENCY)
Facility: HOSPITAL | Age: 61
Discharge: HOME | End: 2021-03-16
Attending: EMERGENCY MEDICINE | Admitting: EMERGENCY MEDICINE
Payer: COMMERCIAL

## 2021-03-16 VITALS
SYSTOLIC BLOOD PRESSURE: 99 MMHG | OXYGEN SATURATION: 97 % | HEART RATE: 53 BPM | TEMPERATURE: 97.9 F | RESPIRATION RATE: 19 BRPM | WEIGHT: 227.07 LBS | BODY MASS INDEX: 38.98 KG/M2 | DIASTOLIC BLOOD PRESSURE: 55 MMHG

## 2021-03-16 DIAGNOSIS — R51.9 HEADACHE: ICD-10-CM

## 2021-03-16 DIAGNOSIS — R07.9 CHEST PAIN: Primary | ICD-10-CM

## 2021-03-16 LAB
ALBUMIN SERPL BCP-MCNC: 3.2 G/DL (ref 3.5–5)
ALP SERPL-CCNC: 119 U/L (ref 46–116)
ALT SERPL W P-5'-P-CCNC: 23 U/L (ref 12–78)
ANION GAP SERPL CALCULATED.3IONS-SCNC: 7 MMOL/L (ref 4–13)
AST SERPL W P-5'-P-CCNC: 19 U/L (ref 5–45)
ATRIAL RATE: 53 BPM
BASOPHILS # BLD AUTO: 0.1 THOUSANDS/ΜL (ref 0–0.1)
BASOPHILS NFR BLD AUTO: 1 % (ref 0–1)
BILIRUB SERPL-MCNC: 0.36 MG/DL (ref 0.2–1)
BUN SERPL-MCNC: 21 MG/DL (ref 5–25)
CALCIUM ALBUM COR SERPL-MCNC: 9.6 MG/DL (ref 8.3–10.1)
CALCIUM SERPL-MCNC: 9 MG/DL (ref 8.3–10.1)
CHLORIDE SERPL-SCNC: 103 MMOL/L (ref 100–108)
CO2 SERPL-SCNC: 30 MMOL/L (ref 21–32)
CREAT SERPL-MCNC: 0.96 MG/DL (ref 0.6–1.3)
EOSINOPHIL # BLD AUTO: 0.32 THOUSAND/ΜL (ref 0–0.61)
EOSINOPHIL NFR BLD AUTO: 4 % (ref 0–6)
ERYTHROCYTE [DISTWIDTH] IN BLOOD BY AUTOMATED COUNT: 13.2 % (ref 11.6–15.1)
GFR SERPL CREATININE-BSD FRML MDRD: 64 ML/MIN/1.73SQ M
GLUCOSE SERPL-MCNC: 105 MG/DL (ref 65–140)
HCT VFR BLD AUTO: 39.4 % (ref 34.8–46.1)
HGB BLD-MCNC: 12 G/DL (ref 11.5–15.4)
HOLD SPECIMEN: NORMAL
IMM GRANULOCYTES # BLD AUTO: 0.03 THOUSAND/UL (ref 0–0.2)
IMM GRANULOCYTES NFR BLD AUTO: 0 % (ref 0–2)
LYMPHOCYTES # BLD AUTO: 1.96 THOUSANDS/ΜL (ref 0.6–4.47)
LYMPHOCYTES NFR BLD AUTO: 25 % (ref 14–44)
MCH RBC QN AUTO: 29.5 PG (ref 26.8–34.3)
MCHC RBC AUTO-ENTMCNC: 30.5 G/DL (ref 31.4–37.4)
MCV RBC AUTO: 97 FL (ref 82–98)
MONOCYTES # BLD AUTO: 0.6 THOUSAND/ΜL (ref 0.17–1.22)
MONOCYTES NFR BLD AUTO: 8 % (ref 4–12)
NEUTROPHILS # BLD AUTO: 4.92 THOUSANDS/ΜL (ref 1.85–7.62)
NEUTS SEG NFR BLD AUTO: 62 % (ref 43–75)
NRBC BLD AUTO-RTO: 0 /100 WBCS
P AXIS: 68 DEGREES
PLATELET # BLD AUTO: 252 THOUSANDS/UL (ref 149–390)
PMV BLD AUTO: 11 FL (ref 8.9–12.7)
POTASSIUM SERPL-SCNC: 4.1 MMOL/L (ref 3.5–5.3)
PR INTERVAL: 162 MS
PROT SERPL-MCNC: 7.2 G/DL (ref 6.4–8.2)
QRS AXIS: 48 DEGREES
QRSD INTERVAL: 92 MS
QT INTERVAL: 416 MS
QTC INTERVAL: 390 MS
RBC # BLD AUTO: 4.07 MILLION/UL (ref 3.81–5.12)
SODIUM SERPL-SCNC: 140 MMOL/L (ref 136–145)
T WAVE AXIS: 46 DEGREES
TROPONIN I SERPL-MCNC: <0.02 NG/ML
VENTRICULAR RATE: 53 BPM
WBC # BLD AUTO: 7.93 THOUSAND/UL (ref 4.31–10.16)

## 2021-03-16 PROCEDURE — 93010 ELECTROCARDIOGRAM REPORT: CPT | Performed by: INTERNAL MEDICINE

## 2021-03-16 PROCEDURE — 85025 COMPLETE CBC W/AUTO DIFF WBC: CPT | Performed by: EMERGENCY MEDICINE

## 2021-03-16 PROCEDURE — 96365 THER/PROPH/DIAG IV INF INIT: CPT

## 2021-03-16 PROCEDURE — G1004 CDSM NDSC: HCPCS

## 2021-03-16 PROCEDURE — 71045 X-RAY EXAM CHEST 1 VIEW: CPT

## 2021-03-16 PROCEDURE — 84484 ASSAY OF TROPONIN QUANT: CPT | Performed by: EMERGENCY MEDICINE

## 2021-03-16 PROCEDURE — 36415 COLL VENOUS BLD VENIPUNCTURE: CPT

## 2021-03-16 PROCEDURE — 96375 TX/PRO/DX INJ NEW DRUG ADDON: CPT

## 2021-03-16 PROCEDURE — 80053 COMPREHEN METABOLIC PANEL: CPT | Performed by: EMERGENCY MEDICINE

## 2021-03-16 PROCEDURE — 99285 EMERGENCY DEPT VISIT HI MDM: CPT

## 2021-03-16 PROCEDURE — 99285 EMERGENCY DEPT VISIT HI MDM: CPT | Performed by: EMERGENCY MEDICINE

## 2021-03-16 PROCEDURE — 93005 ELECTROCARDIOGRAM TRACING: CPT

## 2021-03-16 PROCEDURE — 70450 CT HEAD/BRAIN W/O DYE: CPT

## 2021-03-16 RX ORDER — DIPHENHYDRAMINE HYDROCHLORIDE 50 MG/ML
25 INJECTION INTRAMUSCULAR; INTRAVENOUS ONCE
Status: COMPLETED | OUTPATIENT
Start: 2021-03-16 | End: 2021-03-16

## 2021-03-16 RX ORDER — METOCLOPRAMIDE HYDROCHLORIDE 5 MG/ML
10 INJECTION INTRAMUSCULAR; INTRAVENOUS ONCE
Status: COMPLETED | OUTPATIENT
Start: 2021-03-16 | End: 2021-03-16

## 2021-03-16 RX ORDER — MAGNESIUM SULFATE 1 G/100ML
1 INJECTION INTRAVENOUS ONCE
Status: COMPLETED | OUTPATIENT
Start: 2021-03-16 | End: 2021-03-16

## 2021-03-16 RX ORDER — KETOROLAC TROMETHAMINE 30 MG/ML
15 INJECTION, SOLUTION INTRAMUSCULAR; INTRAVENOUS ONCE
Status: COMPLETED | OUTPATIENT
Start: 2021-03-16 | End: 2021-03-16

## 2021-03-16 RX ADMIN — DIPHENHYDRAMINE HYDROCHLORIDE 25 MG: 50 INJECTION, SOLUTION INTRAMUSCULAR; INTRAVENOUS at 10:41

## 2021-03-16 RX ADMIN — MAGNESIUM SULFATE HEPTAHYDRATE 1 G: 1 INJECTION, SOLUTION INTRAVENOUS at 10:41

## 2021-03-16 RX ADMIN — KETOROLAC TROMETHAMINE 15 MG: 30 INJECTION, SOLUTION INTRAMUSCULAR; INTRAVENOUS at 11:36

## 2021-03-16 RX ADMIN — METOCLOPRAMIDE HYDROCHLORIDE 10 MG: 5 INJECTION INTRAMUSCULAR; INTRAVENOUS at 10:41

## 2021-03-16 RX ADMIN — SODIUM CHLORIDE 1000 ML: 0.9 INJECTION, SOLUTION INTRAVENOUS at 10:41

## 2021-03-16 NOTE — TELEPHONE ENCOUNTER
She said she called on Friday and yesterday and someone was suppose to get back to her  She is having pain under her Rt breast around to her back of the scapula area  Severe headache in her Rt eye, hard to process things  I told her to go directly to the ER  She said she called her  and heard him coming in the driveway now  She accepts and understands

## 2021-03-16 NOTE — ED PROVIDER NOTES
History  Chief Complaint   Patient presents with    Chest Pain     Pt with right sided chest pain that radiates to her back that has been going on for months  Pt also c/o headache      HPI  57 yo F presents with headache for the past day in addition to chest pain for the past several months  She states she has history of migraines and gets injections from neurologist and this feels similar with pain in right side of head, nonradiating and constant  Light/sound make worse  Has not tried anything for pain  No fevers  The chest pain is aching/pressure under her breast on the right and radiating to back, worse when lying down to sleep  Prior to Admission Medications   Prescriptions Last Dose Informant Patient Reported? Taking?    ALPRAZolam (XANAX) 0 25 mg tablet   No No   Sig: Take 1 tablet (0 25 mg total) by mouth daily at bedtime as needed for anxiety   Cholecalciferol (D3-1000) 1000 units capsule  Self Yes No   Sig: Take 1 capsule by mouth daily     Galcanezumab-gnlm 120 MG/ML SOAJ  Self No No   Sig: Inject 120 mg under the skin every 30 (thirty) days   Iron-Vitamin C (IRON 100/C) 100-250 MG TABS  Self Yes No   Sig: Take by mouth daily   Multiple Vitamin (MULTI-VITAMIN DAILY) TABS  Self Yes No   Sig: Take 1 tablet by mouth daily   QUEtiapine (SEROquel) 50 mg tablet   No No   Sig: Take 1 tablet (50 mg total) by mouth daily at bedtime   busPIRone (BUSPAR) 10 mg tablet   No No   Sig: Take 1 tablet (10 mg total) by mouth 2 (two) times a day   capsaicin (ZOSTRIX) 0 025 % cream   No No   Sig: Apply 1 application topically 2 (two) times a day   dronabinol (MARINOL) 5 MG capsule  Self No No   Sig: Take 1 capsule (5 mg total) by mouth 2 (two) times a day before meals By Dr Laura Sterling   gabapentin (NEURONTIN) 300 mg capsule   No No   Sig: Take 1 capsule (300 mg total) by mouth 2 (two) times a day   lidocaine (XYLOCAINE) 5 % ointment  Self Yes No   Sig: Apply topically 2 (two) times a day as needed   lisinopril (ZESTRIL) 10 mg tablet   No No   Sig: TAKE 1 TABLET DAILY   mometasone (ELOCON) 0 1 % cream   No No   Sig: Apply topically daily   ondansetron (ZOFRAN-ODT) 4 mg disintegrating tablet  Self No No   Sig: Take 1 tablet (4 mg total) by mouth every 6 (six) hours as needed for nausea or vomiting   pantoprazole (PROTONIX) 40 mg tablet   No No   Sig: TAKE 1 TABLET TWICE A DAY   propranolol (INDERAL LA) 120 mg 24 hr capsule   No No   Sig: Take 1 capsule (120 mg total) by mouth daily   propranolol (INDERAL) 60 mg tablet   Yes No   rosuvastatin (CRESTOR) 10 MG tablet   No No   Sig: Take 1 tablet (10 mg total) by mouth daily   sertraline (ZOLOFT) 100 mg tablet   No No   Sig: Take 1 tablet (100 mg total) by mouth daily      Facility-Administered Medications: None       Past Medical History:   Diagnosis Date    Anxiety     Arthritis     Attention and concentration deficit     Blurred vision     Chronic pain     Depression     Diplopia     Dyspepsia     Gastric ulcer     Gastritis     Irritable bowel syndrome     Memory loss     Osteopenia     Starting and stopping of urinary stream during micturition     Urinary incontinence     Wears eyeglasses        Past Surgical History:   Procedure Laterality Date     SECTION       SECTION      CHOLECYSTECTOMY     [address] GASTRIC BYPASS  2004    HYSTERECTOMY  2012    INSERT / REPLACE PERIPHERAL NEUROSTIMULATOR PULSE GENERATOR /       OTHER SURGICAL HISTORY      [facility]implant[facility] at [facility]     [place] IMPLANT SPINAL NEUROSTIM/ Right 4/15/2020    Procedure: [address],  RIGHT BUTTOCKS;  Surgeon: Sarmad Wilhelm MD;  Location:  MAIN OR;  Service: Neurosurgery    [facility] THYROID BIOPSY  2020    WRIST ARTHROSCOPY      with internal fixation        Family History   Problem Relation Age of Onset    Other Mother         Back Disorder     Cirrhosis Mother     Crohn's disease Mother     Lupus Mother         Systemic Lupus Erythematous     Hypertension Father     Heart disease Father     Other Brother         Liver Transplant     Crohn's disease Brother     Crohn's disease Brother     No Known Problems Sister     No Known Problems Daughter     No Known Problems Maternal Aunt     No Known Problems Cousin     No Known Problems Cousin     No Known Problems Cousin     No Known Problems Cousin     No Known Problems Cousin     Seizures Neg Hx      I have reviewed and agree with the history as documented  E-Cigarette/Vaping    E-Cigarette Use Never User      E-Cigarette/Vaping Substances    Nicotine No     THC No     CBD No     Flavoring No     Other No     Unknown No      Social History     Tobacco Use    Smoking status: Never Smoker    Smokeless tobacco: Never Used   Substance Use Topics    Alcohol use: No    Drug use: Yes     Types: Marijuana     Comment: medical marijuana 2x a day capsules       Review of Systems   Constitutional: Negative for chills and fever  HENT: Negative for dental problem and ear pain  Eyes: Positive for photophobia  Negative for pain and redness  Respiratory: Negative for cough and shortness of breath  Cardiovascular: Positive for chest pain  Negative for palpitations  Gastrointestinal: Negative for abdominal pain and nausea  Endocrine: Negative for polydipsia and polyphagia  Genitourinary: Negative for dysuria and frequency  Musculoskeletal: Negative for arthralgias and joint swelling  Skin: Negative for color change and rash  Neurological: Positive for headaches  Negative for dizziness  Psychiatric/Behavioral: Negative for behavioral problems and confusion  All other systems reviewed and are negative  Physical Exam  Physical Exam  Vitals signs and nursing note reviewed     Constitutional:       General: She is not in acute distress  Appearance: She is well-developed  She is not diaphoretic  HENT:      Head: Atraumatic  Right Ear: External ear normal       Left Ear: External ear normal       Nose: Nose normal    Eyes:      Conjunctiva/sclera: Conjunctivae normal       Pupils: Pupils are equal, round, and reactive to light  Neck:      Musculoskeletal: Normal range of motion and neck supple  Vascular: No JVD  Cardiovascular:      Rate and Rhythm: Normal rate and regular rhythm  Heart sounds: Normal heart sounds  No murmur  Pulmonary:      Effort: Pulmonary effort is normal  No respiratory distress  Breath sounds: Normal breath sounds  No wheezing  Abdominal:      General: Bowel sounds are normal  There is no distension  Palpations: Abdomen is soft  Tenderness: There is no abdominal tenderness  Musculoskeletal: Normal range of motion  Skin:     General: Skin is warm and dry  Capillary Refill: Capillary refill takes less than 2 seconds  Neurological:      General: No focal deficit present  Mental Status: She is alert and oriented to person, place, and time  Cranial Nerves: No cranial nerve deficit  Motor: No weakness     Psychiatric:         Behavior: Behavior normal          Vital Signs  ED Triage Vitals   Temperature Pulse Respirations Blood Pressure SpO2   03/16/21 1008 03/16/21 1008 03/16/21 1008 03/16/21 1008 03/16/21 1008   97 9 °F (36 6 °C) 60 16 126/70 98 %      Temp src Heart Rate Source Patient Position - Orthostatic VS BP Location FiO2 (%)   -- 03/16/21 1115 03/16/21 1115 03/16/21 1115 --    Monitor Lying Left arm       Pain Score       03/16/21 1008       7           Vitals:    03/16/21 1008 03/16/21 1115 03/16/21 1130   BP: 126/70 90/53 99/55   Pulse: 60 55 (!) 53   Patient Position - Orthostatic VS:  Lying Lying         Visual Acuity  Visual Acuity      Most Recent Value   L Pupil Size (mm)  4   R Pupil Size (mm)  4          ED Medications  Medications metoclopramide (REGLAN) injection 10 mg (10 mg Intravenous Given 3/16/21 1041)   diphenhydrAMINE (BENADRYL) injection 25 mg (25 mg Intravenous Given 3/16/21 1041)   magnesium sulfate IVPB (premix) SOLN 1 g (0 g Intravenous Stopped 3/16/21 1141)   sodium chloride 0 9 % bolus 1,000 mL (0 mL Intravenous Stopped 3/16/21 1141)   ketorolac (TORADOL) injection 15 mg (15 mg Intravenous Given 3/16/21 1136)       Diagnostic Studies  Results Reviewed     Procedure Component Value Units Date/Time    Troponin I [[id number]]  (Normal) Collected: 03/16/21 1016    Lab Status: Final result Specimen: Blood from Arm, Right Updated: 03/16/21 1040     Troponin I <0 02 ng/mL     Comprehensive metabolic panel [[id number]]  (Abnormal) Collected: 03/16/21 1016    Lab Status: Final result Specimen: Blood from Arm, Right Updated: 03/16/21 1037     Sodium 140 mmol/L      Potassium 4 1 mmol/L      Chloride 103 mmol/L      CO2 30 mmol/L      ANION GAP 7 mmol/L      BUN 21 mg/dL      Creatinine 0 96 mg/dL      Glucose 105 mg/dL      Calcium 9 0 mg/dL      Corrected Calcium 9 6 mg/dL      AST 19 U/L      ALT 23 U/L      Alkaline Phosphatase 119 U/L      Total Protein 7 2 g/dL      Albumin 3 2 g/dL      Total Bilirubin 0 36 mg/dL      eGFR 64 ml/min/1 73sq m     Narrative:      [place] guidelines for Chronic Kidney Disease (CKD):     Stage 1 with normal or high GFR (GFR > 90 mL/min/1 73 square meters)    Stage 2 Mild CKD (GFR = 60-89 mL/min/1 73 square meters)    Stage 3A Moderate CKD (GFR = 45-59 mL/min/1 73 square meters)    Stage 3B Moderate CKD (GFR = 30-44 mL/min/1 73 square meters)    Stage 4 Severe CKD (GFR = 15-29 mL/min/1 73 square meters)    Stage 5 End Stage CKD (GFR <15 mL/min/1 73 square meters)  Note: GFR calculation is accurate only with a steady state creatinine    CBC and differential [[id number]]  (Abnormal) Collected: 03/16/21 1016    Lab Status: Final result Specimen: Blood from Arm, Right Updated: 03/16/21 1022     WBC 7 93 Thousand/uL      RBC 4 07 Million/uL      Hemoglobin 12 0 g/dL      Hematocrit 39 4 %      MCV 97 fL      MCH 29 5 pg      MCHC 30 5 g/dL      RDW 13 2 %      MPV 11 0 fL      Platelets 948 Thousands/uL      nRBC 0 /100 WBCs      Neutrophils Relative 62 %      Immat GRANS % 0 %      Lymphocytes Relative 25 %      Monocytes Relative 8 %      Eosinophils Relative 4 %      Basophils Relative 1 %      Neutrophils Absolute 4 92 Thousands/µL      Immature Grans Absolute 0 03 Thousand/uL      Lymphocytes Absolute 1 96 Thousands/µL      Monocytes Absolute 0 60 Thousand/µL      Eosinophils Absolute 0 32 Thousand/µL      Basophils Absolute 0 10 Thousands/µL                  CT head without contrast   Final Result by Margo De La Fuente DO (03/16 1059)      No acute intracranial abnormality  Workstation performed: [id number]         XR chest 1 view portable   Final Result by Amy Parkinson MD (03/16 1121)      No acute consolidation congestion      Hypoinflated lungs            Workstation performed: [id number]                    Procedures  ECG 12 Lead Documentation Only    Date/Time: 3/16/2021 10:39 AM  Performed by: Bernadette Lynn MD  Authorized by: Bernadette Lynn MD     Comments:      Sinus bradycardia rate of 53, normal axis and intervals, no acute ST elevations or depressions             ED Course             HEART Risk Score      Most Recent Value   Heart Score Risk Calculator   History  0 Filed at: 03/16/2021 1040   ECG  0 Filed at: 03/16/2021 1040   Age  1 Filed at: 03/16/2021 1040   Risk Factors  2 Filed at: 03/16/2021 1040   Troponin  0 Filed at: 03/16/2021 1040   HEART Score  3 Filed at: 03/16/2021 1040                      SBIRT 22yo+      Most Recent Value   SBIRT (25 yo +)   In order to provide better care to our patients, we are screening all of our patients for alcohol and drug use  Would it be okay to ask you these screening questions?   Yes Filed at: 03/16/2021 1019   Initial Alcohol Screen: US AUDIT-C    1  How often do you have a drink containing alcohol?  0 Filed at: 03/16/2021 1019   2  How many drinks containing alcohol do you have on a typical day you are drinking? 0 Filed at: 03/16/2021 1019   3a  Male UNDER 65: How often do you have five or more drinks on one occasion? 0 Filed at: 03/16/2021 1019   3b  FEMALE Any Age, or MALE 65+: How often do you have 4 or more drinks on one occassion? 0 Filed at: 03/16/2021 1019   Audit-C Score  0 Filed at: 03/16/2021 1019   YONNY: How many times in the past year have you    Used an illegal drug or used a prescription medication for non-medical reasons? Never Filed at: 03/16/2021 1019                    MDM  55 yo F presents with chest pain that has been ongoing in addition to headache  Normal neuro exam  Headache is not maximal in onset or worst of life  No neck pain/stiffness, no fevers, doubt SAH or meningitis  Trop and EKG negative, doubt acs  No hypoxia, tachycardia to suggest PE  Disposition  Final diagnoses:   Chest pain   Headache     Time reflects when diagnosis was documented in both MDM as applicable and the Disposition within this note     Time User Action Codes Description Comment    3/16/2021 12:05 PM Alanna Duverney Add [R07 9] Chest pain     3/16/2021 12:05 PM Alanna Duverney Add [R51 9] Headache       ED Disposition     ED Disposition Condition Date/Time Comment    Discharge Stable Tue Mar 16, 2021 12:05 PM [address] discharge to home/self care              Follow-up Information     Follow up With Specialties Details Jasper Temple, [address] Nurse Ramon Schedule an appointment as soon as possible for a visit   [place]  [address]  [phone]            Discharge Medication List as of 3/16/2021 12:05 PM      CONTINUE these medications which have NOT CHANGED    Details   ALPRAZolam (XANAX) 0 25 mg tablet Take 1 tablet (0 25 mg total) by mouth daily at bedtime as needed for anxiety, Starting Fri 3/12/2021, Until Sun 4/11/2021, Normal      busPIRone (BUSPAR) 10 mg tablet Take 1 tablet (10 mg total) by mouth 2 (two) times a day, Starting Fri 3/12/2021, Until Thu 6/10/2021, Normal      capsaicin (ZOSTRIX) 0 025 % cream Apply 1 application topically 2 (two) times a day, Starting Wed 2/10/2021, Normal      Cholecalciferol (D3-1000) 1000 units capsule Take 1 capsule by mouth daily  , Historical Med      dronabinol (MARINOL) 5 MG capsule Take 1 capsule (5 mg total) by mouth 2 (two) times a day before meals By Dr Nilo Sumner, Starting Thu 10/25/2018, No Print      gabapentin (NEURONTIN) 300 mg capsule Take 1 capsule (300 mg total) by mouth 2 (two) times a day, Starting Fri 3/12/2021, Until Thu 6/10/2021, Normal      Galcanezumab-gnlm 120 MG/ML SOAJ Inject 120 mg under the skin every 30 (thirty) days, Starting Fri 10/23/2020, Normal      Iron-Vitamin C (IRON 100/C) 100-250 MG TABS Take by mouth daily, Historical Med      lidocaine (XYLOCAINE) 5 % ointment Apply topically 2 (two) times a day as needed, Starting Wed 1/20/2016, Historical Med      lisinopril (ZESTRIL) 10 mg tablet TAKE 1 TABLET DAILY, Normal      mometasone (ELOCON) 0 1 % cream Apply topically daily, Starting Tue 12/29/2020, Normal      Multiple Vitamin (MULTI-VITAMIN DAILY) TABS Take 1 tablet by mouth daily, Historical Med      ondansetron (ZOFRAN-ODT) 4 mg disintegrating tablet Take 1 tablet (4 mg total) by mouth every 6 (six) hours as needed for nausea or vomiting, Starting Fri 10/2/2020, Normal      pantoprazole (PROTONIX) 40 mg tablet TAKE 1 TABLET TWICE A DAY, Normal      propranolol (INDERAL LA) 120 mg 24 hr capsule Take 1 capsule (120 mg total) by mouth daily, Starting Mon 2/15/2021, Normal      propranolol (INDERAL) 60 mg tablet Starting Mon 2/22/2021, Historical Med      QUEtiapine (SEROquel) 50 mg tablet Take 1 tablet (50 mg total) by mouth daily at bedtime, Starting Fri 3/12/2021, Until Thu 6/10/2021, Normal      rosuvastatin (CRESTOR) 10 MG tablet Take 1 tablet (10 mg total) by mouth daily, Starting Wed 1/27/2021, Normal      sertraline (ZOLOFT) 100 mg tablet Take 1 tablet (100 mg total) by mouth daily, Starting Fri 3/12/2021, Until Thu 6/10/2021, Normal           No discharge procedures on file      PDMP Review       Value Time User    PDMP Reviewed  Yes 3/12/2021 11:45 AM Richar Gutierrez MD          ED Provider  Electronically Signed by           Megan Lynch MD  03/16/21 [id number]

## 2021-03-18 ENCOUNTER — TELEPHONE (OUTPATIENT)
Dept: SURGERY | Facility: CLINIC | Age: 61
End: 2021-03-18

## 2021-03-18 NOTE — TELEPHONE ENCOUNTER
[employer]  PPO PLUS    NO REFERRAL REQUIRED  IN NETWORK  ACTIVE AND EFFECTIVE AS OF: 1/01/2017 NO FUTURE TERMINATION DATE

## 2021-03-19 ENCOUNTER — HOSPITAL ENCOUNTER (OUTPATIENT)
Dept: RADIOLOGY | Facility: HOSPITAL | Age: 61
Discharge: HOME | End: 2021-03-19
Payer: COMMERCIAL

## 2021-03-19 ENCOUNTER — HOSPITAL ENCOUNTER (OUTPATIENT)
Dept: PULMONOLOGY | Facility: HOSPITAL | Age: 61
Discharge: HOME | End: 2021-03-19
Payer: COMMERCIAL

## 2021-03-19 ENCOUNTER — TRANSCRIBE ORDERS (OUTPATIENT)
Dept: ADMINISTRATIVE | Facility: HOSPITAL | Age: 61
End: 2021-03-19

## 2021-03-19 DIAGNOSIS — R06.02 SOB (SHORTNESS OF BREATH): ICD-10-CM

## 2021-03-19 DIAGNOSIS — T14.8XXA NERVE DAMAGE: ICD-10-CM

## 2021-03-19 DIAGNOSIS — M54.16 LUMBAR RADICULOPATHY: ICD-10-CM

## 2021-03-19 DIAGNOSIS — M47.819 SPONDYLOARTHRITIS: ICD-10-CM

## 2021-03-19 DIAGNOSIS — Z87.09 HX OF EXTRINSIC ASTHMA: ICD-10-CM

## 2021-03-19 DIAGNOSIS — M47.819 SPONDYLOARTHRITIS: Primary | ICD-10-CM

## 2021-03-19 PROCEDURE — 94729 DIFFUSING CAPACITY: CPT | Performed by: INTERNAL MEDICINE

## 2021-03-19 PROCEDURE — 94729 DIFFUSING CAPACITY: CPT

## 2021-03-19 PROCEDURE — 94727 GAS DIL/WSHOT DETER LNG VOL: CPT | Performed by: INTERNAL MEDICINE

## 2021-03-19 PROCEDURE — 72070 X-RAY EXAM THORAC SPINE 2VWS: CPT

## 2021-03-19 PROCEDURE — 94060 EVALUATION OF WHEEZING: CPT

## 2021-03-19 PROCEDURE — 94060 EVALUATION OF WHEEZING: CPT | Performed by: INTERNAL MEDICINE

## 2021-03-19 PROCEDURE — 94760 N-INVAS EAR/PLS OXIMETRY 1: CPT

## 2021-03-19 PROCEDURE — 72110 X-RAY EXAM L-2 SPINE 4/>VWS: CPT

## 2021-03-19 PROCEDURE — 94727 GAS DIL/WSHOT DETER LNG VOL: CPT

## 2021-03-19 RX ORDER — ALBUTEROL SULFATE 2.5 MG/3ML
2.5 SOLUTION RESPIRATORY (INHALATION) ONCE
Status: COMPLETED | OUTPATIENT
Start: 2021-03-19 | End: 2021-03-19

## 2021-03-19 RX ADMIN — ALBUTEROL SULFATE 2.5 MG: 2.5 SOLUTION RESPIRATORY (INHALATION) at 09:02

## 2021-03-22 ENCOUNTER — CONSULT (OUTPATIENT)
Dept: SURGERY | Facility: CLINIC | Age: 61
End: 2021-03-22
Payer: COMMERCIAL

## 2021-03-22 VITALS
HEIGHT: 64 IN | DIASTOLIC BLOOD PRESSURE: 68 MMHG | BODY MASS INDEX: 39.61 KG/M2 | TEMPERATURE: 97 F | HEART RATE: 68 BPM | SYSTOLIC BLOOD PRESSURE: 102 MMHG | WEIGHT: 232 LBS

## 2021-03-22 DIAGNOSIS — D17.0 LIPOMA OF NECK: ICD-10-CM

## 2021-03-22 PROCEDURE — 99243 OFF/OP CNSLTJ NEW/EST LOW 30: CPT | Performed by: STUDENT IN AN ORGANIZED HEALTH CARE EDUCATION/TRAINING PROGRAM

## 2021-03-22 PROCEDURE — 1036F TOBACCO NON-USER: CPT | Performed by: STUDENT IN AN ORGANIZED HEALTH CARE EDUCATION/TRAINING PROGRAM

## 2021-03-22 RX ORDER — CHLORHEXIDINE GLUCONATE 4 G/100ML
SOLUTION TOPICAL DAILY PRN
Status: CANCELLED | OUTPATIENT
Start: 2021-03-22

## 2021-03-22 NOTE — H&P (VIEW-ONLY)
Assessment/Plan:   56-year-old female with a a right shoulder/back lipoma  - patient with a lipoma roughly 9 years ago which has grown in size recently  - also has some tenderness to palpation of the area  -  Ultrasound shows lipoma over right trapezius muscle  - will plan for excision of mass under local anesthesia  - patient had recent blood work     1  Lipoma of neck  -     Ambulatory referral to General Surgery  -     Case request operating room: EXCISION  BIOPSY LESION/MASS BACK; Standing  -     Case request operating room: EXCISION  BIOPSY LESION/MASS BACK               Subjective: lipoma on neck     Patient ID: Sharon Aragon is a 56 y o  female  Triage Notes:     Patient is a 56-year-old female who presents to office for evaluation of a right shoulder/ back lipoma  She states she 1st noticed it roughly 9 years ago  Recently it has gotten significantly larger  She states when it is palpated it does cause her some discomfort  She otherwise does have some stressors in her life due to the pandemic and helping to take care of her mother-in-law  The following portions of the patient's history were reviewed and updated as appropriate:   She  has a past medical history of Anxiety, Arthritis, Attention and concentration deficit, Blurred vision, Chronic pain, Depression, Diplopia, Dyspepsia, Gastric ulcer, Gastritis, Irritable bowel syndrome, Memory loss, Osteopenia, Starting and stopping of urinary stream during micturition, Urinary incontinence, and Wears eyeglasses    She   Patient Active Problem List    Diagnosis Date Noted    Lipoma of neck 03/22/2021    Long term current use of opiate analgesic 03/11/2021    SOB (shortness of breath) 03/06/2021    Disturbance in sleep behavior 03/06/2021    Annual physical exam 02/12/2021    Hypovitaminosis D 02/12/2021    Elevated fasting glucose 02/12/2021    Positive FIT (fecal immunochemical test) 11/17/2020    Chronic daily headache 10/02/2020    Migraine 2020    Cervicalgia 2020    Thyroid pain 2020    Nonintractable headache 2020    Insomnia 2020    Other headache syndrome 2020    Status post insertion of spinal cord stimulator 2020    Battery end of life of spinal cord stimulator 2020    Cough 2020    Transient alteration of awareness 2019    Abnormal EEG     Mild cognitive impairment with memory loss     Anxiety and depression 2018    Lumbar radiculopathy 2018    Sacroiliitis ([address] ) 2018    Osteoarthritis of left shoulder     Neuralgia 2018    Continuous leakage of urine 2018    Mixed hyperlipidemia 2017    Eczema 2017    Obesity (BMI 35 0-39 9 without comorbidity) 2017    Hematuria 2017    Arthralgia of shoulder region, left 2016    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    ACTHRYN (obstructive sleep apnea) 2016    Hypertension 2016    Memory difficulties 2016    Dysesthesia of multiple sites 06/10/2016    Memory impairment 06/10/2016    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    Snoring 06/10/2016    Chronic pain syndrome 2016    Lumbar facet arthropathy 2016    Gastroesophageal reflux disease 10/20/2014    Iron deficiency anemia 10/20/2014    Limb pain 2013    Lower back pain 2013    Numbness 2013     She  has a past surgical history that includes Hysterectomy (2012); Gastric bypass (); Cholecystectomy;  section ();  section (); Gallbladder surgery (); Radiofrequency ablation nerves; Insert / replace peripheral neurostimulator pulse generator / ; Other surgical history (); Wrist arthroscopy; Urethral fistula repair; pr implant spinal neurostim/ (Right, 4/15/2020); and US guided thyroid biopsy (2020)    Her family history includes Cirrhosis in her mother; Crohn's disease in her brother, brother, and mother; Heart disease in her father; Hypertension in her father; Lupus in her mother; No Known Problems in her cousin, cousin, cousin, cousin, cousin, daughter, maternal aunt, and sister; Other in her brother and mother  She  reports that she has never smoked  She has never used smokeless tobacco  She reports current drug use  Drug: Marijuana  She reports that she does not drink alcohol    Current Outpatient Medications on File Prior to Visit   Medication Sig    ALPRAZolam (XANAX) 0 25 mg tablet Take 1 tablet (0 25 mg total) by mouth daily at bedtime as needed for anxiety    busPIRone (BUSPAR) 10 mg tablet Take 1 tablet (10 mg total) by mouth 2 (two) times a day    capsaicin (ZOSTRIX) 0 025 % cream Apply 1 application topically 2 (two) times a day    Cholecalciferol (D3-1000) 1000 units capsule Take 1 capsule by mouth daily      dronabinol (MARINOL) 5 MG capsule Take 1 capsule (5 mg total) by mouth 2 (two) times a day before meals By Dr Alonzo Lyn gabapentin (NEURONTIN) 300 mg capsule Take 1 capsule (300 mg total) by mouth 2 (two) times a day    Galcanezumab-gnlm 120 MG/ML SOAJ Inject 120 mg under the skin every 30 (thirty) days    Iron-Vitamin C (IRON 100/C) 100-250 MG TABS Take by mouth daily    lidocaine (XYLOCAINE) 5 % ointment Apply topically 2 (two) times a day as needed    lisinopril (ZESTRIL) 10 mg tablet TAKE 1 TABLET DAILY    mometasone (ELOCON) 0 1 % cream Apply topically daily    Multiple Vitamin (MULTI-VITAMIN DAILY) TABS Take 1 tablet by mouth daily    ondansetron (ZOFRAN-ODT) 4 mg disintegrating tablet Take 1 tablet (4 mg total) by mouth every 6 (six) hours as needed for nausea or vomiting    pantoprazole (PROTONIX) 40 mg tablet TAKE 1 TABLET TWICE A DAY    propranolol (INDERAL) 60 mg tablet     QUEtiapine (SEROquel) 50 mg tablet Take 1 tablet (50 mg total) by mouth daily at bedtime    rosuvastatin (CRESTOR) 10 MG tablet Take 1 tablet (10 mg total) by mouth daily    sertraline (ZOLOFT) 100 mg tablet Take 1 tablet (100 mg total) by mouth daily    propranolol (INDERAL LA) 120 mg 24 hr capsule Take 1 capsule (120 mg total) by mouth daily     No current facility-administered medications on file prior to visit  She is allergic to morphine; shellfish-derived products; cat hair extract; dog epithelium; and other       Review of Systems   Constitutional: Negative for chills, fatigue and fever  HENT: Negative for congestion, hearing loss, rhinorrhea and sore throat  Eyes: Negative for pain and discharge  Respiratory: Negative for cough, chest tightness and shortness of breath  Cardiovascular: Negative for chest pain and palpitations  Gastrointestinal: Negative for abdominal pain, constipation, diarrhea, nausea and vomiting  Endocrine: Negative for cold intolerance and heat intolerance  Genitourinary: Negative for difficulty urinating and dysuria  Musculoskeletal: Negative for back pain and neck pain  Positive right upper back / shoulder lipoma   Skin: Negative for color change and rash  Allergic/Immunologic: Negative for environmental allergies and food allergies  Neurological: Negative for seizures and headaches  Hematological: Negative for adenopathy  Does not bruise/bleed easily  Psychiatric/Behavioral: Negative for confusion and hallucinations  Objective:      /68   Pulse 68   Temp (!) 97 °F (36 1 °C) (Temporal)   Ht 5' 4" (1 626 m)   Wt 105 kg (232 lb)   Breastfeeding No   BMI 39 82 kg/m²     Below is the patient's most recent value for Albumin, ALT, AST, BUN, Calcium, Chloride, Cholesterol, CO2, Creatinine, GFR, Glucose, HDL, Hematocrit, Hemoglobin, Hemoglobin A1C, LDL, Magnesium, Phosphorus, Platelets, Potassium, PSA, Sodium, Triglycerides, and WBC     Lab Results   Component Value Date    ALT 23 03/16/2021    AST 19 03/16/2021    BUN 21 03/16/2021    CALCIUM 9 0 03/16/2021     03/16/2021    CO2 30 03/16/2021    CREATININE 0 96 03/16/2021    HDL 97 02/11/2021    HCT 39 4 03/16/2021    HGB 12 0 03/16/2021    HGBA1C 5 6 02/11/2021    MG 2 2 08/27/2018    PHOS 3 3 08/27/2018     03/16/2021    K 4 1 03/16/2021    TRIG 73 02/11/2021    WBC 7 93 03/16/2021     Note: for a comprehensive list of the patient's lab results, access the Results Review activity  Physical Exam  Constitutional:       Appearance: Normal appearance  HENT:      Head: Normocephalic and atraumatic  Nose: Nose normal    Eyes:      General: No scleral icterus  Conjunctiva/sclera: Conjunctivae normal    Neck:      Musculoskeletal: Neck supple  Cardiovascular:      Rate and Rhythm: Normal rate and regular rhythm  Pulses: Normal pulses  Heart sounds: Normal heart sounds  Pulmonary:      Effort: Pulmonary effort is normal       Breath sounds: Normal breath sounds  Abdominal:      General: There is no distension  Palpations: Abdomen is soft  Tenderness: There is no abdominal tenderness  Musculoskeletal:         General: No signs of injury  Skin:     General: Skin is warm  Coloration: Skin is not jaundiced  Comments: Right upper back / shoulder lipoma overlying the trapezius muscle roughly 4 x 2 cm   Neurological:      General: No focal deficit present  Mental Status: She is alert and oriented to person, place, and time     Psychiatric:         Mood and Affect: Mood normal          Behavior: Behavior normal

## 2021-03-22 NOTE — PROGRESS NOTES
Assessment/Plan:   69-year-old female with a a right shoulder/back lipoma  - patient with a lipoma roughly 9 years ago which has grown in size recently  - also has some tenderness to palpation of the area  -  Ultrasound shows lipoma over right trapezius muscle  - will plan for excision of mass under local anesthesia  - patient had recent blood work     1  Lipoma of neck  -     Ambulatory referral to General Surgery  -     Case request operating room: EXCISION  BIOPSY LESION/MASS BACK; Standing  -     Case request operating room: EXCISION  BIOPSY LESION/MASS BACK               Subjective: lipoma on neck     Patient ID: Fe Kimble is a 56 y o  female  Triage Notes:     Patient is a 69-year-old female who presents to office for evaluation of a right shoulder/ back lipoma  She states she 1st noticed it roughly 9 years ago  Recently it has gotten significantly larger  She states when it is palpated it does cause her some discomfort  She otherwise does have some stressors in her life due to the pandemic and helping to take care of her mother-in-law  The following portions of the patient's history were reviewed and updated as appropriate:   She  has a past medical history of Anxiety, Arthritis, Attention and concentration deficit, Blurred vision, Chronic pain, Depression, Diplopia, Dyspepsia, Gastric ulcer, Gastritis, Irritable bowel syndrome, Memory loss, Osteopenia, Starting and stopping of urinary stream during micturition, Urinary incontinence, and Wears eyeglasses    She   Patient Active Problem List    Diagnosis Date Noted    Lipoma of neck 03/22/2021    Long term current use of opiate analgesic 03/11/2021    SOB (shortness of breath) 03/06/2021    Disturbance in sleep behavior 03/06/2021    Annual physical exam 02/12/2021    Hypovitaminosis D 02/12/2021    Elevated fasting glucose 02/12/2021    Positive FIT (fecal immunochemical test) 11/17/2020    Chronic daily headache 10/02/2020    Migraine 2020    Cervicalgia 2020    Thyroid pain 2020    Nonintractable headache 2020    Insomnia 2020    Other headache syndrome 2020    Status post insertion of spinal cord stimulator 2020    Battery end of life of spinal cord stimulator 2020    Cough 2020    Transient alteration of awareness 2019    Abnormal EEG     Mild cognitive impairment with memory loss     Anxiety and depression 2018    Lumbar radiculopathy 2018    Sacroiliitis ([address] ) 2018    Osteoarthritis of left shoulder     Neuralgia 2018    Continuous leakage of urine 2018    Mixed hyperlipidemia 2017    Eczema 2017    Obesity (BMI 35 0-39 9 without comorbidity) 2017    Hematuria 2017    Arthralgia of shoulder region, left 2016    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    CATHRYN (obstructive sleep apnea) 2016    Hypertension 2016    Memory difficulties 2016    Dysesthesia of multiple sites 06/10/2016    Memory impairment 06/10/2016    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    Snoring 06/10/2016    Chronic pain syndrome 2016    Lumbar facet arthropathy 2016    Gastroesophageal reflux disease 10/20/2014    Iron deficiency anemia 10/20/2014    Limb pain 2013    Lower back pain 2013    Numbness 2013     She  has a past surgical history that includes Hysterectomy (2012); Gastric bypass (); Cholecystectomy;  section ();  section (); Gallbladder surgery (); Radiofrequency ablation nerves; Insert / replace peripheral neurostimulator pulse generator / ; Other surgical history (); Wrist arthroscopy; Urethral fistula repair; pr implant spinal neurostim/ (Right, 4/15/2020); and US guided thyroid biopsy (2020)    Her family history includes Cirrhosis in her mother; Crohn's disease in her brother, brother, and mother; Heart disease in her father; Hypertension in her father; Lupus in her mother; No Known Problems in her cousin, cousin, cousin, cousin, cousin, daughter, maternal aunt, and sister; Other in her brother and mother  She  reports that she has never smoked  She has never used smokeless tobacco  She reports current drug use  Drug: Marijuana  She reports that she does not drink alcohol    Current Outpatient Medications on File Prior to Visit   Medication Sig    ALPRAZolam (XANAX) 0 25 mg tablet Take 1 tablet (0 25 mg total) by mouth daily at bedtime as needed for anxiety    busPIRone (BUSPAR) 10 mg tablet Take 1 tablet (10 mg total) by mouth 2 (two) times a day    capsaicin (ZOSTRIX) 0 025 % cream Apply 1 application topically 2 (two) times a day    Cholecalciferol (D3-1000) 1000 units capsule Take 1 capsule by mouth daily      dronabinol (MARINOL) 5 MG capsule Take 1 capsule (5 mg total) by mouth 2 (two) times a day before meals By Dr Ericka Gooden gabapentin (NEURONTIN) 300 mg capsule Take 1 capsule (300 mg total) by mouth 2 (two) times a day    Galcanezumab-gnlm 120 MG/ML SOAJ Inject 120 mg under the skin every 30 (thirty) days    Iron-Vitamin C (IRON 100/C) 100-250 MG TABS Take by mouth daily    lidocaine (XYLOCAINE) 5 % ointment Apply topically 2 (two) times a day as needed    lisinopril (ZESTRIL) 10 mg tablet TAKE 1 TABLET DAILY    mometasone (ELOCON) 0 1 % cream Apply topically daily    Multiple Vitamin (MULTI-VITAMIN DAILY) TABS Take 1 tablet by mouth daily    ondansetron (ZOFRAN-ODT) 4 mg disintegrating tablet Take 1 tablet (4 mg total) by mouth every 6 (six) hours as needed for nausea or vomiting    pantoprazole (PROTONIX) 40 mg tablet TAKE 1 TABLET TWICE A DAY    propranolol (INDERAL) 60 mg tablet     QUEtiapine (SEROquel) 50 mg tablet Take 1 tablet (50 mg total) by mouth daily at bedtime    rosuvastatin (CRESTOR) 10 MG tablet Take 1 tablet (10 mg total) by mouth daily    sertraline (ZOLOFT) 100 mg tablet Take 1 tablet (100 mg total) by mouth daily    propranolol (INDERAL LA) 120 mg 24 hr capsule Take 1 capsule (120 mg total) by mouth daily     No current facility-administered medications on file prior to visit  She is allergic to morphine; shellfish-derived products; cat hair extract; dog epithelium; and other       Review of Systems   Constitutional: Negative for chills, fatigue and fever  HENT: Negative for congestion, hearing loss, rhinorrhea and sore throat  Eyes: Negative for pain and discharge  Respiratory: Negative for cough, chest tightness and shortness of breath  Cardiovascular: Negative for chest pain and palpitations  Gastrointestinal: Negative for abdominal pain, constipation, diarrhea, nausea and vomiting  Endocrine: Negative for cold intolerance and heat intolerance  Genitourinary: Negative for difficulty urinating and dysuria  Musculoskeletal: Negative for back pain and neck pain  Positive right upper back / shoulder lipoma   Skin: Negative for color change and rash  Allergic/Immunologic: Negative for environmental allergies and food allergies  Neurological: Negative for seizures and headaches  Hematological: Negative for adenopathy  Does not bruise/bleed easily  Psychiatric/Behavioral: Negative for confusion and hallucinations  Objective:      /68   Pulse 68   Temp (!) 97 °F (36 1 °C) (Temporal)   Ht 5' 4" (1 626 m)   Wt 105 kg (232 lb)   Breastfeeding No   BMI 39 82 kg/m²     Below is the patient's most recent value for Albumin, ALT, AST, BUN, Calcium, Chloride, Cholesterol, CO2, Creatinine, GFR, Glucose, HDL, Hematocrit, Hemoglobin, Hemoglobin A1C, LDL, Magnesium, Phosphorus, Platelets, Potassium, PSA, Sodium, Triglycerides, and WBC     Lab Results   Component Value Date    ALT 23 03/16/2021    AST 19 03/16/2021    BUN 21 03/16/2021    CALCIUM 9 0 03/16/2021     03/16/2021    CO2 30 03/16/2021    CREATININE 0 96 03/16/2021    HDL 97 02/11/2021    HCT 39 4 03/16/2021    HGB 12 0 03/16/2021    HGBA1C 5 6 02/11/2021    MG 2 2 08/27/2018    PHOS 3 3 08/27/2018     03/16/2021    K 4 1 03/16/2021    TRIG 73 02/11/2021    WBC 7 93 03/16/2021     Note: for a comprehensive list of the patient's lab results, access the Results Review activity  Physical Exam  Constitutional:       Appearance: Normal appearance  HENT:      Head: Normocephalic and atraumatic  Nose: Nose normal    Eyes:      General: No scleral icterus  Conjunctiva/sclera: Conjunctivae normal    Neck:      Musculoskeletal: Neck supple  Cardiovascular:      Rate and Rhythm: Normal rate and regular rhythm  Pulses: Normal pulses  Heart sounds: Normal heart sounds  Pulmonary:      Effort: Pulmonary effort is normal       Breath sounds: Normal breath sounds  Abdominal:      General: There is no distension  Palpations: Abdomen is soft  Tenderness: There is no abdominal tenderness  Musculoskeletal:         General: No signs of injury  Skin:     General: Skin is warm  Coloration: Skin is not jaundiced  Comments: Right upper back / shoulder lipoma overlying the trapezius muscle roughly 4 x 2 cm   Neurological:      General: No focal deficit present  Mental Status: She is alert and oriented to person, place, and time     Psychiatric:         Mood and Affect: Mood normal          Behavior: Behavior normal

## 2021-03-23 ENCOUNTER — TELEPHONE (OUTPATIENT)
Dept: NEUROLOGY | Facility: CLINIC | Age: 61
End: 2021-03-23

## 2021-03-23 NOTE — TELEPHONE ENCOUNTER
Called to confirm appt with Dr Jasmina Simeon for 3/25/21   Patient states she spoke with someone about changing the visit to a virtual and the individual was to get back to her  No documentation regarding request     [place] can patient change to a virtual visit?

## 2021-03-24 ENCOUNTER — TELEPHONE (OUTPATIENT)
Dept: NEUROLOGY | Facility: CLINIC | Age: 61
End: 2021-03-24

## 2021-03-24 NOTE — TELEPHONE ENCOUNTER
Patient changed appt with Dr Hughes Ip to Virtual Video   Changed in [facility]   Scheduled in [place]

## 2021-03-25 ENCOUNTER — TELEMEDICINE (OUTPATIENT)
Dept: NEUROLOGY | Facility: CLINIC | Age: 61
End: 2021-03-25
Payer: COMMERCIAL

## 2021-03-25 VITALS
WEIGHT: 230 LBS | BODY MASS INDEX: 39.27 KG/M2 | DIASTOLIC BLOOD PRESSURE: 70 MMHG | SYSTOLIC BLOOD PRESSURE: 116 MMHG | HEART RATE: 58 BPM | HEIGHT: 64 IN

## 2021-03-25 DIAGNOSIS — G43.709 CHRONIC MIGRAINE WITHOUT AURA WITHOUT STATUS MIGRAINOSUS, NOT INTRACTABLE: ICD-10-CM

## 2021-03-25 DIAGNOSIS — R11.0 NAUSEA: ICD-10-CM

## 2021-03-25 PROCEDURE — 99214 OFFICE O/P EST MOD 30 MIN: CPT | Performed by: PSYCHIATRY & NEUROLOGY

## 2021-03-25 RX ORDER — ONDANSETRON 4 MG/1
4 TABLET, ORALLY DISINTEGRATING ORAL EVERY 6 HOURS PRN
Qty: 30 TABLET | Refills: 1 | Status: SHIPPED | OUTPATIENT
Start: 2021-03-25 | End: 2021-04-27

## 2021-03-25 RX ORDER — RIZATRIPTAN BENZOATE 10 MG/1
10 TABLET, ORALLY DISINTEGRATING ORAL ONCE AS NEEDED
Qty: 12 TABLET | Refills: 3 | Status: SHIPPED | OUTPATIENT
Start: 2021-03-25 | End: 2021-06-22

## 2021-03-25 NOTE — PROGRESS NOTES
Virtual Regular Visit      Assessment/Plan:    Problem List Items Addressed This Visit     None               Reason for visit is headache  Chief Complaint   Patient presents with    Virtual Regular Visit        Encounter provider [facility],     Provider located at [address]  [place] [address]  [phone]      Recent Visits  Date Type Provider Dept   03/24/21 Telephone [facility]  72   03/23/21 Telephone [facility] 141 recent visits within past 7 days and meeting all other requirements     Future Appointments  No visits were found meeting these conditions  Showing future appointments within next 150 days and meeting all other requirements        The patient was identified by name and date of birth  Krystal Larsen was informed that this is a telemedicine visit and that the visit is being conducted through {AMB CORONAVIRUS VISIT ZKFTOX:30404}  {Telemedicine confidentiality :30647} {Telemedicine participants:86436}  She acknowledged consent and understanding of privacy and security of the video platform  The patient has agreed to participate and understands they can discontinue the visit at any time  Patient is aware this is a billable service  Subjective  Krystal Larsen is a 56 y o  female seen in follow up for chronic migraine    States significant reduction in frequency on emgality once a week vs almost daily prior  States she takes tylenol abortively and goes and lays down   Severity: 7/10    Family hx of migraines in identical sister    Krystal Larsen is a very pleasant 62 y o  female with a past medical history that includes  chronic pain syndrome on dronabinol (s/p FILIBERTO in 2012 with damage to nerves and ureter and multiple surgeries with subsequent chronic back pain, pelvic pain, left knee and left leg pain), anxiety, depression, GERD, insomnia, chronic headaches, cervicalgia, chronic fatigue, post gastrectomy malabsorption, CATHRYN, hypertension, lumbar radiculopathy, mild cognitive impairment, osteoarthritis, status post insertion of spinal cord stimulator, kidney stones, IBS  referred here for evaluation of headache        Workup:  - MRI brain with without contrast 2020: Normal MRI of the brain   No pathologic intracranial enhancement   - MRI brain neuroquant  2019   1   Normal MRI of the brain  2   Neuroquant was performed and is a normal study;  Does not support neurodegeneration   - MRI brain neuroquant 2016: No acute disease   Neuro Quant imaging normal, no indication of Alzheimer's type, medial temporal lobe neuro degenerative disease      HPI     Past Medical History:   Diagnosis Date    Anxiety     Arthritis     Attention and concentration deficit     Blurred vision     Chronic pain     Depression     Diplopia     Dyspepsia     Gastric ulcer     Gastritis     Irritable bowel syndrome     Memory loss     Osteopenia     Starting and stopping of urinary stream during micturition     Urinary incontinence     Wears eyeglasses        Past Surgical History:   Procedure Laterality Date     SECTION  1988     SECTION      CHOLECYSTECTOMY      GALLBLADDER SURGERY      GASTRIC BYPASS  2004    HYSTERECTOMY  2012    INSERT / REPLACE PERIPHERAL NEUROSTIMULATOR PULSE GENERATOR /       OTHER SURGICAL HISTORY      Reimplantatin at [place] [address] NEUROSTIM/ Right 4/15/2020    Procedure: REPLACEMENT IMPLANTABLE PULSE GENERATOR FOR DORSAL SPINAL COLUMN STIMULATOR,  RIGHT BUTTOCKS;  Surgeon: Saintclair Sports, MD;  [facility]: [facility];  Service: Neurosurgery    [facility] THYROID BIOPSY  2020    WRIST ARTHROSCOPY      with internal fixation        Current Outpatient Medications   Medication Sig Dispense Refill    ALPRAZolam (XANAX) 0 25 mg tablet Take 1 tablet (0 25 mg total) by mouth daily at bedtime as needed for anxiety 30 tablet 0    busPIRone (BUSPAR) 10 mg tablet Take 1 tablet (10 mg total) by mouth 2 (two) times a day 180 tablet 0    capsaicin (ZOSTRIX) 0 025 % cream Apply 1 application topically 2 (two) times a day 60 g 0    Cholecalciferol (D3-1000) 1000 units capsule Take 1 capsule by mouth daily        dronabinol (MARINOL) 5 MG capsule Take 1 capsule (5 mg total) by mouth 2 (two) times a day before meals By Dr Menard Para 60 capsule 0    gabapentin (NEURONTIN) 300 mg capsule Take 1 capsule (300 mg total) by mouth 2 (two) times a day 180 capsule 0    Galcanezumab-gnlm 120 MG/ML SOAJ Inject 120 mg under the skin every 30 (thirty) days 1 pen 11    Iron-Vitamin C (IRON 100/C) 100-250 MG TABS Take by mouth daily      lidocaine (XYLOCAINE) 5 % ointment Apply topically 2 (two) times a day as needed      lisinopril (ZESTRIL) 10 mg tablet TAKE 1 TABLET DAILY 90 tablet 3    mometasone (ELOCON) 0 1 % cream Apply topically daily 45 g 0    Multiple Vitamin (MULTI-VITAMIN DAILY) TABS Take 1 tablet by mouth daily      ondansetron (ZOFRAN-ODT) 4 mg disintegrating tablet Take 1 tablet (4 mg total) by mouth every 6 (six) hours as needed for nausea or vomiting 30 tablet 1    pantoprazole (PROTONIX) 40 mg tablet TAKE 1 TABLET TWICE A  tablet 3    propranolol (INDERAL LA) 120 mg 24 hr capsule Take 1 capsule (120 mg total) by mouth daily 90 capsule 1    propranolol (INDERAL) 60 mg tablet       QUEtiapine (SEROquel) 50 mg tablet Take 1 tablet (50 mg total) by mouth daily at bedtime 90 tablet 0    rosuvastatin (CRESTOR) 10 MG tablet Take 1 tablet (10 mg total) by mouth daily 90 tablet 1    sertraline (ZOLOFT) 100 mg tablet Take 1 tablet (100 mg total) by mouth daily 90 tablet 0     No current facility-administered medications for this visit           Allergies   Allergen Reactions    Morphine Abdominal Pain SEVERE N/V    Shellfish-Derived Products Vomiting     CRAB MEAT ONLY    Cat Hair Extract Nasal Congestion     Cat Dander    Dog Epithelium Nasal Congestion    Other Other (See Comments) and Sneezing     Dogs  Crab Meat       Review of Systems   Constitutional: Negative  Negative for appetite change and fever  HENT: Negative  Negative for hearing loss, tinnitus, trouble swallowing and voice change  Eyes: Positive for photophobia (Sometimes)  Negative for pain  Respiratory: Negative  Negative for shortness of breath  Cardiovascular: Negative  Negative for palpitations  Gastrointestinal: Positive for nausea  Negative for vomiting  Endocrine: Negative  Negative for cold intolerance  Genitourinary: Negative  Negative for dysuria, frequency and urgency  Musculoskeletal: Negative  Negative for myalgias and neck pain  Skin: Negative  Negative for rash  Neurological: Positive for weakness (Left arm Sometimes), numbness and headaches  Negative for dizziness, tremors, seizures, syncope, facial asymmetry, speech difficulty and light-headedness  Hematological: Negative  Does not bruise/bleed easily  Psychiatric/Behavioral: Negative  Negative for confusion, hallucinations and sleep disturbance  Video Exam    There were no vitals filed for this visit  Physical Exam     {covid time spent:12739}      VIRTUAL VISIT DISCLAIMER    Njrose mariesam Xiomara acknowledges that she has consented to an online visit or consultation  She understands that the online visit is based solely on information provided by her, and that, in the absence of a face-to-face physical evaluation by the physician, the diagnosis she receives is both limited and provisional in terms of accuracy and completeness  This is not intended to replace a full medical face-to-face evaluation by the physician  Luis Angel Solano understands and accepts these terms

## 2021-04-01 NOTE — PROGRESS NOTES
Virtual Regular Visit      Assessment/Plan:    Problem List Items Addressed This Visit        Cardiovascular and Mediastinum    Migraine    Relevant Medications    Preventative: Galcanezumab-gnlm 120 MG/ML SOAJ    Abortive: rizatriptan (MAXALT-MLT) 10 MG disintegrating tablet- discussed potential a/e      Other Visit Diagnoses     Nausea        Relevant Medications    ondansetron (ZOFRAN-ODT) 4 mg disintegrating tablet        Follow up in six months time or sooner if clinically warranted       Reason for visit is migraine follow up  Chief Complaint   Patient presents with    Virtual Regular Visit    Virtual Regular Visit        Encounter provider [address] [facility],     Provider located at [address] [place] [address]  [phone]      Recent Visits  Date Type Provider Dept   03/25/21 Telemedicine [facility]   Showing recent visits within past 7 days and meeting all other requirements     Future Appointments  No visits were found meeting these conditions  Showing future appointments within next 150 days and meeting all other requirements        The patient was identified by name and date of birth  Telma Herrera was informed that this is a telemedicine visit and that the visit is being conducted through [employer] and patient was informed that this is a secure, HIPAA-compliant platform  She agrees to proceed     My office door was closed  No one else was in the room  She acknowledged consent and understanding of privacy and security of the video platform  The patient has agreed to participate and understands they can discontinue the visit at any time  Patient is aware this is a billable service       Subjective  Telma Herrera is a very pleasant 56 y o  female   History of chronic pain on dronabinol status post total abdominal hysterectomy in 20, anxiety, depression, insomnia, cervicalgia, post gastrectomy malabsorption, CATHRYN, hypertension, chronic headaches seen in follow-up, last saw Dr Echeverria 2020  Patient tells me today that since she has been started on Emgality  And this has significantly reduced her migraine frequency and severity  No adverse effects  migraines now on rare occasion right-sided with photophobia phonophobia nausea emesis prefers quiet room  She can be lightheaded with this also  Prior visit pertinent notes:  "  Workup:  - MRI brain with without contrast 2020: Normal MRI of the brain   No pathologic intracranial enhancement   - MRI brain neuroquant  2019   1   Normal MRI of the brain  2   Neuroquant was performed and is a normal study;  Does not support neurodegeneration   - MRI brain neuroquant 2016: No acute disease   Neuro Quant imaging normal, no indication of Alzheimer's type, medial temporal lobe neuro degenerative disease  past/failed:  Depakote, propranolol, duloxetine, trazodone, lisinopril, topiramate, gabapentin, pregabalin, venlafaxine and amitriptyline would be contraindicated due to interaction with duloxetine, aimovig - would avoid at this time due to history of constipation "  HPI - as above  10 point ROS completed negative other than that noted above    Past Medical History:   Diagnosis Date    Anxiety     Arthritis     Attention and concentration deficit     Blurred vision     Chronic pain     Depression     Diplopia     Dyspepsia     Gastric ulcer     Gastritis     Irritable bowel syndrome     Memory loss     Osteopenia     Starting and stopping of urinary stream during micturition     Urinary incontinence     Wears eyeglasses        Past Surgical History:   Procedure Laterality Date     SECTION       SECTION      CHOLECYSTECTOMY     [address] GASTRIC BYPASS  2004    HYSTERECTOMY  2012    INSERT / REPLACE PERIPHERAL NEUROSTIMULATOR PULSE GENERATOR /       OTHER SURGICAL HISTORY  2012    [facility]implant[facility] at [facility]     CO IMPLANT SPINAL NEUROSTIM/ Right 4/15/2020    Procedure: REPLACEMENT IMPLANTABLE PULSE GENERATOR FOR DORSAL SPINAL COLUMN STIMULATOR,  RIGHT BUTTOCKS;  Surgeon: Saintclair Sports, MD;  [facility];  Service: Neurosurgery    [address]      US GUIDED THYROID BIOPSY  9/23/2020    WRIST ARTHROSCOPY      with internal fixation        Current Outpatient Medications   Medication Sig Dispense Refill    ALPRAZolam (XANAX) 0 25 mg tablet Take 1 tablet (0 25 mg total) by mouth daily at bedtime as needed for anxiety 30 tablet 0    busPIRone (BUSPAR) 10 mg tablet Take 1 tablet (10 mg total) by mouth 2 (two) times a day 180 tablet 0    capsaicin (ZOSTRIX) 0 025 % cream Apply 1 application topically 2 (two) times a day 60 g 0    Cholecalciferol (D3-1000) 1000 units capsule Take 1 capsule by mouth daily        dronabinol (MARINOL) 5 MG capsule Take 1 capsule (5 mg total) by mouth 2 (two) times a day before meals By Dr Amari Samuel 60 capsule 0    gabapentin (NEURONTIN) 300 mg capsule Take 1 capsule (300 mg total) by mouth 2 (two) times a day 180 capsule 0    Galcanezumab-gnlm 120 MG/ML SOAJ Inject 120 mg under the skin every 30 (thirty) days 3 pen 4    Iron-Vitamin C (IRON 100/C) 100-250 MG TABS Take by mouth daily      lidocaine (XYLOCAINE) 5 % ointment Apply topically 2 (two) times a day as needed      lisinopril (ZESTRIL) 10 mg tablet TAKE 1 TABLET DAILY 90 tablet 3    mometasone (ELOCON) 0 1 % cream Apply topically daily 45 g 0    Multiple Vitamin (MULTI-VITAMIN DAILY) TABS Take 1 tablet by mouth daily      ondansetron (ZOFRAN-ODT) 4 mg disintegrating tablet Take 1 tablet (4 mg total) by mouth every 6 (six) hours as needed for nausea or vomiting 30 tablet 1    pantoprazole (PROTONIX) 40 mg tablet TAKE 1 TABLET TWICE A  tablet 3    propranolol (INDERAL) 60 mg tablet       QUEtiapine (SEROquel) 50 mg tablet Take 1 tablet (50 mg total) by mouth daily at bedtime 90 tablet 0    rosuvastatin (CRESTOR) 10 MG tablet Take 1 tablet (10 mg total) by mouth daily 90 tablet 1    sertraline (ZOLOFT) 100 mg tablet Take 1 tablet (100 mg total) by mouth daily 90 tablet 0    propranolol (INDERAL LA) 120 mg 24 hr capsule Take 1 capsule (120 mg total) by mouth daily 90 capsule 1    rizatriptan (MAXALT-MLT) 10 MG disintegrating tablet Take 1 tablet (10 mg total) by mouth once as needed for migraine May repeat every 2 hours if needed, max 30mg/24 hours 12 tablet 3     No current facility-administered medications for this visit  Allergies   Allergen Reactions    Morphine Abdominal Pain     SEVERE N/V    Shellfish-Derived Products - Food Allergy Vomiting     CRAB MEAT ONLY    Cat Hair Extract Nasal Congestion     Cat Dander    Dog Epithelium Nasal Congestion    Other Other (See Comments) and Sneezing     Dogs  Crab Meat       Review of Systems    Video Exam    Vitals:    03/25/21 1331   BP: 116/70   BP Location: Left arm   Patient Position: Sitting   Cuff Size: Large   Pulse: 58   Weight: 104 kg (230 lb)   Height: 5' 4" (1 626 m)       Physical Exam     Modified PE as this is a video consultation:  Gen:   NAD  HEENT:  No Septal deviation EOMI NCAT  Resp:  Symmetric chest rise and patient in no obvious respiratory distress  MSK: ROM normal  Skin: No rash noted in visualized portion of this exam    Neuroexam:  AO X 4  No aphasia or dysarthria confusion noted    Patient is able to follow 2 and 3 step commands with ease  CN 2-12 grossly intact including hearing intact to coversation, V1-3 done with help of patient touching her own face in those distributions as instructed normal to LT, no facial asymmetry with eye brow raise or smile, EOMI, hearing intact to conversation no tongue deviation, symmetric shoulder shrug and side bending and neck rotation, pupils symmetric  Motor: Intact antigravity X 4 extremities  No Pronator drift noted  Sensation: Intact to light touch, instructed patient on how to do this in all extremities proximal and distal   Cerebellar: No dysmetria b/l with FNF testing  GWEN with no dysdiadochokinesia  Gait:  Romberg with no sway,      I spent 25 minutes directly with the patient during this visit including chart review prior note review imaging and blood work review      VIRTUAL VISIT DISCLAIMER    Flex Dela Cruz acknowledges that she has consented to an online visit or consultation  She understands that the online visit is based solely on information provided by her, and that, in the absence of a face-to-face physical evaluation by the physician, the diagnosis she receives is both limited and provisional in terms of accuracy and completeness  This is not intended to replace a full medical face-to-face evaluation by the physician  Flex Dela Cruz understands and accepts these terms

## 2021-04-02 NOTE — PRE-PROCEDURE INSTRUCTIONS
Pre-Surgery Instructions:   Medication Instructions    ALPRAZolam (XANAX) 0 25 mg tablet Patient was instructed by Physician and understands   busPIRone (BUSPAR) 10 mg tablet Patient was instructed by Physician and understands   capsaicin (ZOSTRIX) 0 025 % cream Patient was instructed by Physician and understands   Cholecalciferol (D3-1000) 1000 units capsule Patient was instructed by Physician and understands   dronabinol (MARINOL) 5 MG capsule Patient was instructed by Physician and understands   gabapentin (NEURONTIN) 300 mg capsule Patient was instructed by Physician and understands   Galcanezumab-gnlm 120 MG/ML SOAJ Patient was instructed by Physician and understands   Iron-Vitamin C (IRON 100/C) 100-250 MG TABS Patient was instructed by Physician and understands   lidocaine (XYLOCAINE) 5 % ointment Patient was instructed by Physician and understands   lisinopril (ZESTRIL) 10 mg tablet Patient was instructed by Physician and understands   mometasone (ELOCON) 0 1 % cream Patient was instructed by Physician and understands   Multiple Vitamin (MULTI-VITAMIN DAILY) TABS Patient was instructed by Physician and understands   ondansetron (ZOFRAN-ODT) 4 mg disintegrating tablet Patient was instructed by Physician and understands   pantoprazole (PROTONIX) 40 mg tablet Patient was instructed by Physician and understands   propranolol (INDERAL) 60 mg tablet Patient was instructed by Physician and understands   QUEtiapine (SEROquel) 50 mg tablet Patient was instructed by Physician and understands   rizatriptan (MAXALT-MLT) 10 MG disintegrating tablet Patient was instructed by Physician and understands   rosuvastatin (CRESTOR) 10 MG tablet Patient was instructed by Physician and understands   sertraline (ZOLOFT) 100 mg tablet Patient was instructed by Physician and understands  Pt's surgery being done under local, therefore she may take her meds as prescribed   Med list reviewed as above  And pt told  to avoid NSAID's  3 days prior to surgery  Tylenol is acceptable if needed  Pt has and/or is getting CHG surgical soap and verbalizes understanding of preoperative showering protocol  Reviewed [facility] current covid visitor restriction policy and pt understands that it may change at any time  All information within "My Surgical Experience" pamphlet reviewed and patient verbalizes understanding and compliance  All questions answered

## 2021-04-05 ENCOUNTER — TELEPHONE (OUTPATIENT)
Dept: PSYCHIATRY | Facility: CLINIC | Age: 61
End: 2021-04-05

## 2021-04-05 ENCOUNTER — HOSPITAL ENCOUNTER (OUTPATIENT)
Facility: HOSPITAL | Age: 61
Setting detail: OUTPATIENT SURGERY
Discharge: HOME | End: 2021-04-05
Attending: STUDENT IN AN ORGANIZED HEALTH CARE EDUCATION/TRAINING PROGRAM | Admitting: STUDENT IN AN ORGANIZED HEALTH CARE EDUCATION/TRAINING PROGRAM
Payer: COMMERCIAL

## 2021-04-05 VITALS
HEIGHT: 64 IN | HEART RATE: 57 BPM | BODY MASS INDEX: 40.72 KG/M2 | DIASTOLIC BLOOD PRESSURE: 66 MMHG | SYSTOLIC BLOOD PRESSURE: 107 MMHG | WEIGHT: 238.54 LBS | TEMPERATURE: 97.9 F | RESPIRATION RATE: 20 BRPM | OXYGEN SATURATION: 96 %

## 2021-04-05 DIAGNOSIS — D17.0 LIPOMA OF NECK: Primary | ICD-10-CM

## 2021-04-05 DIAGNOSIS — G58.8 PUDENDAL NEURALGIA: ICD-10-CM

## 2021-04-05 PROCEDURE — 88304 TISSUE EXAM BY PATHOLOGIST: CPT | Performed by: PATHOLOGY

## 2021-04-05 PROCEDURE — 21556 EXC NECK TUM DEEP < 5 CM: CPT | Performed by: STUDENT IN AN ORGANIZED HEALTH CARE EDUCATION/TRAINING PROGRAM

## 2021-04-05 RX ORDER — LIDOCAINE HYDROCHLORIDE AND EPINEPHRINE 10; 10 MG/ML; UG/ML
INJECTION, SOLUTION INFILTRATION; PERINEURAL AS NEEDED
Status: DISCONTINUED | OUTPATIENT
Start: 2021-04-05 | End: 2021-04-05 | Stop reason: HOSPADM

## 2021-04-05 RX ORDER — CEFAZOLIN SODIUM 2 G/50ML
2000 SOLUTION INTRAVENOUS ONCE
Status: COMPLETED | OUTPATIENT
Start: 2021-04-05 | End: 2021-04-05

## 2021-04-05 RX ORDER — TRAMADOL HYDROCHLORIDE 50 MG/1
50 TABLET ORAL EVERY 6 HOURS PRN
Qty: 12 TABLET | Refills: 0 | Status: SHIPPED | OUTPATIENT
Start: 2021-04-05 | End: 2021-04-15

## 2021-04-05 RX ORDER — CHLORHEXIDINE GLUCONATE 4 G/100ML
SOLUTION TOPICAL DAILY PRN
Status: DISCONTINUED | OUTPATIENT
Start: 2021-04-05 | End: 2021-04-05 | Stop reason: HOSPADM

## 2021-04-05 RX ORDER — MAGNESIUM HYDROXIDE 1200 MG/15ML
LIQUID ORAL AS NEEDED
Status: DISCONTINUED | OUTPATIENT
Start: 2021-04-05 | End: 2021-04-05 | Stop reason: HOSPADM

## 2021-04-05 RX ORDER — DOCUSATE SODIUM 100 MG/1
100 CAPSULE, LIQUID FILLED ORAL 3 TIMES DAILY PRN
Qty: 30 CAPSULE | Refills: 0 | Status: SHIPPED | OUTPATIENT
Start: 2021-04-05 | End: 2022-04-11

## 2021-04-05 RX ADMIN — CEFAZOLIN SODIUM 2000 MG: 2 SOLUTION INTRAVENOUS at 07:36

## 2021-04-05 NOTE — TELEPHONE ENCOUNTER
[facility] to [facility] for follow up via [employer] on 4/2/2021  Encounter created for reference

## 2021-04-05 NOTE — TELEPHONE ENCOUNTER
----- Message from Chi Nash sent at 4/2/2021 11:19 AM EDT -----  Regarding: NP to therapy, sees Taz Strong MD referred patient for therapy services      Please add to waitlist   Per Provider: " PLEASE EXPEDITE: recent discharge from inpatient and finished PHP at [facility] yesterday, on 3/11 "

## 2021-04-05 NOTE — OP NOTE
OPERATIVE REPORT  PATIENT NAME: Nickolas Boucher    :  1960  MRN: [id number]  Pt Location: [facility] OR ROOM 04    SURGERY DATE: 2021    Surgeon(s) and Role:     * Domonique Bryson DO - Primary    Preop Diagnosis:  Lipoma of neck [D17 0]    Post-Op Diagnosis Codes:     * Lipoma of neck [D17 0]    Procedure(s) (LRB):  EXCISION  BIOPSY LESION/MASS LOWER NECK (Right)    Specimen(s):  ID Type Source Tests Collected by Time Destination   1 : RIGHT SHOULDER Tissue Soft Tissue, Lipoma TISSUE EXAM Domonique Bryson DO 2021 0804        Estimated Blood Loss:   Minimal    Drains:  None    Anesthesia Type:   Local    Operative Indications:  Lipoma of neck [D17 0]    Operative Findings:  Lipoma of of right trapezius muscle removed with grossly negative margins roughly 4 5 x 4 x 2 cm    Complications:   None    Procedure and Technique:  The patient was seen again in Preoperative Holding  All the risks, benefits, complications, treatment options, and expected outcomes were discussed with the patient and family at length  All questions were answered to satisfaction  There was concurrence with the proposed plan and informed consent was obtained  The site of surgery was properly noted/marked  The patient was taken to Operating Room, identified, and the procedure verified as excision of mass right back/shoulder  The patient was placed in the supine position on the stretcher  The patient had received preoperative antibiotics     The right neck/back was then prepped and draped in the usual sterile fashion using ChloraPrep  A Time-Out was then performed with all involved present confirming the correct patient, procedure, antibiotics, and any additional concerns  Using 1% lidocaine with epinephrine the area of incision was anesthetized  A longitudinal incision was made over the area of concern and dissection was done into the subcutaneous tissue    The lipoma was noted to bulge into the incision using a combination of blunt and sharp dissection using Metzenbaum scissors the lipoma was removed from the cavity and sent off  It was removed grossly negative margins and was noted to be 4 5 x 4 by 2 cm  The cavity was then explored and hemostasis was ensured with electrocautery  The cavity was then irrigated  The deep dermal tissue was then approximated with interrupted 3-0 Vicryl  The skin was then closed with running 4-0 Monocryl  The incision was then cleansed and dried and Exofin was used to cover the site  All instrument, sponge, and needle counts were noted to be correct at the conclusion of the case  The patient was brought to the PACU in stable and satisfactory condition       I was present for the entire procedure and A qualified resident physician was not available    Patient Disposition:  hemodynamically stable    SIGNATURE: Vinny Huerta DO  DATE: April 5, 2021  TIME: 8:22 AM

## 2021-04-05 NOTE — DISCHARGE INSTRUCTIONS
Your incisions are closed with surgical glue, do not pick at it as it will fall off on its own  Do not soak your incisions including tub baths or swimming  You may shower and let water and soap wash over incisions  Do not scrub your incisions  You may resume your normal diet as tolerated  Do not make any important decisions and do not drive while taking narcotic pain medication  You are prescribed Tramadol for severe pain  Take only as needed  Take Colace to soften your stool while taking narcotic pain medication  You may take Tylenol over-the-counter as needed for pain, follow instructions on the bottle  You may take Ibuprofen over-the-counter as needed for pain, follow instructions on the bottle  You may alternate Tylenol and Ibuprofen if needed, but do not take at the same time  Follow-up with your Surgeon in 2 weeks, call the office for an appointment

## 2021-04-05 NOTE — INTERVAL H&P NOTE
H&P reviewed  After examining the patient I find no changes in the patients condition since the H&P had been written      Vitals:    04/05/21 0708   BP: 98/55   Pulse: 57   Resp: 16   Temp: 98 4 °F (36 9 °C)   SpO2: 93%

## 2021-04-08 NOTE — PSYCH
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 prescribed according to [facility]  Discussed with Rabia Lew the risks of sedation, respiratory depression, impairment of ability to drive and potential for abuse and addiction related to treatment with benzodiazepine medications  She understands risk of treatment with benzodiazepine medications, agrees to not drive if feels impaired and agrees to take medications as prescribed  Discussed with Teja Schneider Box warning on concurrent use of benzodiazepines and opioid medications including sedation, respiratory depression, coma and death  She understands the risk of treatment with benzodiazepines in addition to opioids and wants to continue taking those medications    Psychotherapy Provided:     Individual psychotherapy provided: Yes  Counseling was provided during the session today for 16 minutes     Psychoeducation provided to the patient and was educated about the importance of compliance with the medications and psychiatric treatment  Supportive psychotherapy provided to the patient  Psycho-spiritual therapy provided to the patient, and the importance of simultaneously engaging the body, mind, and spirit in healing mental health issues, moving beyond problematic life patterns, and overcoming traumatic life experiences reiterated  The patient was educated about the breathing techniques, guided imagery meditation and healthy life-style  Solution Focused Brief Therapy (SFBT) provided  Patient's emotions were validated and specific labeled praise provided  Bath suggestions were offered in a supportive non-critical way       Treatment Plan:    Completed and signed during the session: Not applicable - Treatment Plan not due at this session    Haley Knight MD 04/09/21

## 2021-04-09 ENCOUNTER — TELEMEDICINE (OUTPATIENT)
Dept: PSYCHIATRY | Facility: CLINIC | Age: 61
End: 2021-04-09
Payer: COMMERCIAL

## 2021-04-09 DIAGNOSIS — F41.1 GENERALIZED ANXIETY DISORDER WITH PANIC ATTACKS: ICD-10-CM

## 2021-04-09 DIAGNOSIS — F41.0 GENERALIZED ANXIETY DISORDER WITH PANIC ATTACKS: ICD-10-CM

## 2021-04-09 DIAGNOSIS — F33.1 MODERATE EPISODE OF RECURRENT MAJOR DEPRESSIVE DISORDER (HCC): Primary | ICD-10-CM

## 2021-04-09 PROCEDURE — 99214 OFFICE O/P EST MOD 30 MIN: CPT | Performed by: STUDENT IN AN ORGANIZED HEALTH CARE EDUCATION/TRAINING PROGRAM

## 2021-04-09 PROCEDURE — 90833 PSYTX W PT W E/M 30 MIN: CPT | Performed by: STUDENT IN AN ORGANIZED HEALTH CARE EDUCATION/TRAINING PROGRAM

## 2021-04-09 PROCEDURE — 3725F SCREEN DEPRESSION PERFORMED: CPT | Performed by: STUDENT IN AN ORGANIZED HEALTH CARE EDUCATION/TRAINING PROGRAM

## 2021-04-12 ENCOUNTER — OFFICE VISIT (OUTPATIENT)
Dept: SURGERY | Facility: CLINIC | Age: 61
End: 2021-04-12

## 2021-04-12 VITALS — TEMPERATURE: 98.4 F | HEIGHT: 64 IN | BODY MASS INDEX: 40.12 KG/M2 | WEIGHT: 235 LBS | HEART RATE: 65 BPM

## 2021-04-12 DIAGNOSIS — D17.0 LIPOMA OF NECK: Primary | ICD-10-CM

## 2021-04-12 PROCEDURE — 99024 POSTOP FOLLOW-UP VISIT: CPT | Performed by: STUDENT IN AN ORGANIZED HEALTH CARE EDUCATION/TRAINING PROGRAM

## 2021-04-12 NOTE — PROGRESS NOTES
Assessment/Plan:  56-year-old female status post excision of right  Neck/shoulder lipoma on 04/05  - patient denies any pain  - states she has been healing well  - patient states she has some constipation but has been using the Colace and is helped her  - incision is healing well  - follow-up in office as needed    Pathology Results:  Final Diagnosis   A  Subcutis, right shoulder:  - Benign, partially encapsulated mature adipose tissue, consistent with lipoma  I reviewed the pathology report and discussed the findings with the patient and their accompanying family or caregiver, if present  All questions were answered to satisfaction and the patient along with family or caregiver, if present, voiced understanding  1  Lipoma of neck               Subjective:      Patient ID: Ion Johnson is a 56 y o  female  Triage Notes:     Patient is a 56-year-old female who presents to office for evaluation status post excision of lipoma of right neck/ shoulder  She denies any pain at the site  She states she has been healing well  Denies any issues  States she has been taking the Colace for some constipation has been helping  The following portions of the patient's history were reviewed and updated as appropriate: allergies, current medications, past family history, past medical history, past social history, past surgical history and problem list     Review of Systems   Constitutional: Negative for chills, fatigue and fever  HENT: Negative for congestion, hearing loss, rhinorrhea and sore throat  Eyes: Negative for pain and discharge  Respiratory: Negative for cough, chest tightness and shortness of breath  Cardiovascular: Negative for chest pain and palpitations  Gastrointestinal: Negative for abdominal pain, constipation, diarrhea, nausea and vomiting  Endocrine: Negative for cold intolerance and heat intolerance  Genitourinary: Negative for difficulty urinating and dysuria  Musculoskeletal: Negative for back pain and neck pain  Skin: Negative for color change and rash  Allergic/Immunologic: Negative for environmental allergies and food allergies  Neurological: Negative for seizures and headaches  Hematological: Negative for adenopathy  Does not bruise/bleed easily  Psychiatric/Behavioral: Negative for confusion and hallucinations  Objective:      Pulse 65   Temp 98 4 °F (36 9 °C)   Ht 5' 4" (1 626 m)   Wt 107 kg (235 lb)   BMI 40 34 kg/m²     Below is the patient's most recent value for Albumin, ALT, AST, BUN, Calcium, Chloride, Cholesterol, CO2, Creatinine, GFR, Glucose, HDL, Hematocrit, Hemoglobin, Hemoglobin A1C, LDL, Magnesium, Phosphorus, Platelets, Potassium, PSA, Sodium, Triglycerides, and WBC  Lab Results   Component Value Date    ALT 23 03/16/2021    AST 19 03/16/2021    BUN 21 03/16/2021    CALCIUM 9 0 03/16/2021     03/16/2021    CO2 30 03/16/2021    CREATININE 0 96 03/16/2021    HDL 97 02/11/2021    HCT 39 4 03/16/2021    HGB 12 0 03/16/2021    HGBA1C 5 6 02/11/2021    MG 2 2 08/27/2018    PHOS 3 3 08/27/2018     03/16/2021    K 4 1 03/16/2021    TRIG 73 02/11/2021    WBC 7 93 03/16/2021     Note: for a comprehensive list of the patient's lab results, access the Results Review activity  Physical Exam  Constitutional:       Appearance: Normal appearance  HENT:      Head: Normocephalic and atraumatic  Nose: Nose normal    Eyes:      General: No scleral icterus  Conjunctiva/sclera: Conjunctivae normal    Neck:      Musculoskeletal: Neck supple  Cardiovascular:      Rate and Rhythm: Normal rate and regular rhythm  Pulses: Normal pulses  Heart sounds: Normal heart sounds  Pulmonary:      Effort: Pulmonary effort is normal       Breath sounds: Normal breath sounds  Abdominal:      General: There is no distension  Palpations: Abdomen is soft  Tenderness: There is no abdominal tenderness  Musculoskeletal:         General: No signs of injury  Skin:     General: Skin is warm  Coloration: Skin is not jaundiced  Comments: Right neck/ shoulder incision healing well without erythema induration or drainage   Neurological:      General: No focal deficit present  Mental Status: She is alert and oriented to person, place, and time     Psychiatric:         Mood and Affect: Mood normal          Behavior: Behavior normal

## 2021-04-13 DIAGNOSIS — G58.8 PUDENDAL NEURALGIA: ICD-10-CM

## 2021-04-13 RX ORDER — CAPSAICIN 0.025 %
1 CREAM (GRAM) TOPICAL 2 TIMES DAILY
Qty: 60 G | Refills: 0 | Status: SHIPPED | OUTPATIENT
Start: 2021-04-13 | End: 2022-04-11

## 2021-04-13 RX ORDER — CAPSAICIN 0.025 %
1 CREAM (GRAM) TOPICAL 2 TIMES DAILY
Qty: 60 G | Refills: 0 | Status: CANCELLED | OUTPATIENT
Start: 2021-04-13

## 2021-04-15 DIAGNOSIS — K21.9 GASTROESOPHAGEAL REFLUX DISEASE WITHOUT ESOPHAGITIS: ICD-10-CM

## 2021-04-15 DIAGNOSIS — F33.1 MODERATE EPISODE OF RECURRENT MAJOR DEPRESSIVE DISORDER (HCC): ICD-10-CM

## 2021-04-15 RX ORDER — PANTOPRAZOLE SODIUM 40 MG/1
40 TABLET, DELAYED RELEASE ORAL 2 TIMES DAILY
Qty: 180 TABLET | Refills: 0 | Status: SHIPPED | OUTPATIENT
Start: 2021-04-15 | End: 2022-01-06

## 2021-04-15 RX ORDER — SERTRALINE HYDROCHLORIDE 100 MG/1
100 TABLET, FILM COATED ORAL DAILY
Qty: 90 TABLET | Refills: 0 | Status: SHIPPED | OUTPATIENT
Start: 2021-04-15 | End: 2021-06-28 | Stop reason: SDUPTHER

## 2021-04-25 DIAGNOSIS — R11.0 NAUSEA: Primary | ICD-10-CM

## 2021-04-26 ENCOUNTER — TELEPHONE (OUTPATIENT)
Dept: NEUROLOGY | Facility: CLINIC | Age: 61
End: 2021-04-26

## 2021-04-26 ENCOUNTER — HOSPITAL ENCOUNTER (OUTPATIENT)
Dept: RADIOLOGY | Facility: HOSPITAL | Age: 61
Discharge: HOME | End: 2021-04-26
Payer: COMMERCIAL

## 2021-04-26 ENCOUNTER — OFFICE VISIT (OUTPATIENT)
Dept: FAMILY MEDICINE CLINIC | Facility: CLINIC | Age: 61
End: 2021-04-26
Payer: COMMERCIAL

## 2021-04-26 VITALS
OXYGEN SATURATION: 96 % | HEIGHT: 64 IN | HEART RATE: 55 BPM | BODY MASS INDEX: 40.29 KG/M2 | SYSTOLIC BLOOD PRESSURE: 112 MMHG | TEMPERATURE: 98.6 F | WEIGHT: 236 LBS | DIASTOLIC BLOOD PRESSURE: 70 MMHG

## 2021-04-26 DIAGNOSIS — M25.512 CHRONIC LEFT SHOULDER PAIN: ICD-10-CM

## 2021-04-26 DIAGNOSIS — G89.29 CHRONIC LEFT SHOULDER PAIN: ICD-10-CM

## 2021-04-26 DIAGNOSIS — F33.1 MODERATE EPISODE OF RECURRENT MAJOR DEPRESSIVE DISORDER (HCC): ICD-10-CM

## 2021-04-26 DIAGNOSIS — H81.10 BENIGN PAROXYSMAL POSITIONAL VERTIGO, UNSPECIFIED LATERALITY: ICD-10-CM

## 2021-04-26 DIAGNOSIS — R51.9 CHRONIC DAILY HEADACHE: ICD-10-CM

## 2021-04-26 DIAGNOSIS — M46.1 SACROILIITIS (HCC): ICD-10-CM

## 2021-04-26 DIAGNOSIS — R11.0 NAUSEA: ICD-10-CM

## 2021-04-26 DIAGNOSIS — G58.8 PUDENDAL NEURALGIA: Primary | ICD-10-CM

## 2021-04-26 DIAGNOSIS — E66.01 OBESITY, MORBID (HCC): ICD-10-CM

## 2021-04-26 PROCEDURE — 99214 OFFICE O/P EST MOD 30 MIN: CPT | Performed by: FAMILY MEDICINE

## 2021-04-26 PROCEDURE — 73030 X-RAY EXAM OF SHOULDER: CPT

## 2021-04-26 PROCEDURE — 1036F TOBACCO NON-USER: CPT | Performed by: FAMILY MEDICINE

## 2021-04-26 PROCEDURE — 3008F BODY MASS INDEX DOCD: CPT | Performed by: FAMILY MEDICINE

## 2021-04-26 RX ORDER — MECLIZINE HYDROCHLORIDE 25 MG/1
25 TABLET ORAL EVERY 8 HOURS SCHEDULED
Qty: 30 TABLET | Refills: 0 | Status: SHIPPED | OUTPATIENT
Start: 2021-04-26 | End: 2021-12-10 | Stop reason: ALTCHOICE

## 2021-04-26 RX ORDER — PROPRANOLOL HYDROCHLORIDE 60 MG/1
60 TABLET ORAL 2 TIMES DAILY
Qty: 180 TABLET | Refills: 0 | Status: SHIPPED | OUTPATIENT
Start: 2021-04-26 | End: 2021-07-06 | Stop reason: SDUPTHER

## 2021-04-26 NOTE — TELEPHONE ENCOUNTER
----- Message from Krista Smith RN sent at 4/26/2021  1:32 PM EDT -----  Regarding: FW: Non-Urgent Medical Question  Contact: [phone]    ----- Message -----  From: Christina Vickers  Sent: 4/26/2021   1:04 PM EDT  To: Neurology [address] Clinical Team 5  Subject: Non-Urgent Medical Question                      Since we had our video call I have been having more frequent headaches and daily nausea  By 4pm each day I have terrible nausea and cant lead a normal life      Aziza Dawson

## 2021-04-26 NOTE — PATIENT INSTRUCTIONS
Reviewed all with patient  Advised to keep the follow-up appointment with psychiatry to titrated down medications when it is the right time to do it continue all medications for now  Also use the meclizine for vertigo and you could also use that in the car prior to long car trips  Stay on the propranolol 60 mg twice daily  I would like you to consider if something is happening between 3:00 a m  and 4:00 a m  every day to cause her persistent nausea  Are you taking vitamins, is it your coffee and milk, is it something your doing  Once you rule everything out okay to start taking the Zofran at 3:30 a m  every day  Stay on all current medications  Have the xrays done and make your appt with Dr Stella Goldman as you have already had shots in the shoulder and failed physical therapy

## 2021-04-26 NOTE — TELEPHONE ENCOUNTER
Patient states that she continues to have daily nausea and has headaches 3-4 times per week, states that it did decrease with Emgality  She states that the nausea happens independent of her headaches  Call had to be ended by patient d/t being at a doctor's appointment  She is going to call back  Patient returned call  Discussed her current medications  Patient takes her vitamins in the morning, and her zoloft 100mg  Nausea occurs by the late afternoon  Patient states she takes her buspar 10mg tablet around dinnertime and all her cardiac medications and other medications are taken at bedtime  Patient states that her psych meds are new to her, and that [facility]  She states that if she eats something small when she has the nausea, it does improve  Patient was advised that she can have some crackers or a small snack when this happens if it helps  Patient also advised possibly trying peppermint hard candy may help  She will try this  She states she had J&J vaccine prior to appointment  She was not sure if this had any bearing on why she is having daily nausea  Dr Steve Daniels, please advise

## 2021-04-26 NOTE — PROGRESS NOTES
Assessment/Plan:     Chronic Problems:  Sacroiliitis ([address] )  Keep the f/u with pain management  Pudendal neuralgia  Keep the f/u appt with pain management  Visit Diagnosis:  Diagnoses and all orders for this visit:    Pudendal neuralgia    Sacroiliitis (HCC)    Moderate episode of recurrent major depressive disorder (HCC)    Obesity, morbid (HCC)    Chronic daily headache  -     propranolol (INDERAL) 60 mg tablet; Take 1 tablet (60 mg total) by mouth 2 (two) times a day    Benign paroxysmal positional vertigo, unspecified laterality  Comments:  Ok to use the meclizine prn  If this continue will refer to p t  for repositioning  Ok to also used for long car trips  Orders:  -     meclizine (ANTIVERT) 25 mg tablet; Take 1 tablet (25 mg total) by mouth every 8 (eight) hours    Nausea  Comments:  Advised to notice if certain foods or vitamins cause this  Take the zofran at 3:30 during the day  Chronic left shoulder pain  Comments: Will get xrays of the left shoulder and refer to ortho  Orders:  -     XR shoulder 2+ vw left; Future  -     Ambulatory referral to Orthopedic Surgery; Future          Subjective:    Patient ID: Radha Rodrigez is a 56 y o  female  Pt is here for routine f/u appt  Reports that she was paranoid about covid and was struggling with depression  Pt was admitted for about 11 days and then zoom classes at home  Now has psychiatrist, Dr Andria Anna  Pt has weaned off the xanax and wants to come down off meds further  Now able to sleep at night  Always has nausea every day, by 4 pm   Had the lipoma removed from her right shoulder  Did not need the tramadol for the pain  Feels her headaches are back, but not every day  Back to having problems with the pudendal neuralgia  Seen by Dr Nadira Smalls but she would like another ablation for the pudendal neuralgia  Had a spinal injection by him, but she would prefer to see another pain management doc, from Dr Grey Meehan   Had some xrays done and mri was ordered, but they are giving her a problem from the insurance company  Canceled her f/u appt with him until the mri is done  Takes all other meds as directed  No side effects noted  The following portions of the patient's history were reviewed and updated as appropriate: allergies, current medications, past family history, past medical history, past social history, past surgical history and problem list     Review of Systems   Constitutional: Positive for fatigue  Negative for chills, diaphoresis and fever  HENT: Positive for postnasal drip  Negative for ear pain, rhinorrhea, sinus pressure and sinus pain  Eyes: Negative  Respiratory: Positive for cough (rare in the am  )  Negative for shortness of breath and wheezing  Cardiovascular: Negative for chest pain and palpitations  Gastrointestinal: Positive for constipation and nausea  Negative for abdominal pain, diarrhea and vomiting         H/o ibs which has been an issue the past year  Now with more constipation than diarrhea  Genitourinary: Negative for dysuria, frequency and urgency  Musculoskeletal: Positive for arthralgias (left shoulder pains for 9 years  Pt is requesting a referral to ortho  ), back pain and gait problem  Neurological: Positive for dizziness (in the am's when she rolls over in bed  ) and headaches  Negative for light-headedness  Psychiatric/Behavioral: Positive for dysphoric mood and sleep disturbance  The patient is nervous/anxious  Still with moderate anxiety and depression  Wants to come off some more of her meds            /70   Pulse 55   Temp 98 6 °F (37 °C)   Ht 5' 4" (1 626 m)   Wt 107 kg (236 lb)   SpO2 96%   BMI 40 51 kg/m²   Social History     Socioeconomic History    Marital status: /Civil Union     Spouse name: Not on file    Number of children: Not on file    Years of education: Not on file    Highest education level: Not on file   Occupational History    Occupation: retired   Social Needs    Financial resource strain: Not on file    Food insecurity     Worry: Not on file     Inability: Not on file   [employer] needs     Medical: Not on file     Non-medical: Not on file   Tobacco Use    Smoking status: Never Smoker    Smokeless tobacco: Never Used   Substance and Sexual Activity    Alcohol use: No    Drug use: Yes     Types: Marijuana     Comment: medical marijuana 2x a day capsules    Sexual activity: Yes   Lifestyle    Physical activity     Days per week: Not on file     Minutes per session: Not on file    Stress: Not on file   Relationships    Social connections     Talks on phone: Not on file     Gets together: Not on file     Attends Restoration service: Not on file     Active member of club or organization: Not on file     Attends meetings of clubs or organizations: Not on file     Relationship status: Not on file    Intimate partner violence     Fear of current or ex partner: Not on file     Emotionally abused: Not on file     Physically abused: Not on file     Forced sexual activity: Not on file   Other Topics Concern    Not on file   Social History Narrative    Lives in [facility], with   Previously worked as a teacher        Past Medical History:   Diagnosis Date    Anxiety     Arthritis     Attention and concentration deficit     Blurred vision     Chronic pain     CPAP (continuous positive airway pressure) dependence     Depression     Diplopia     Dyspepsia     Gastric ulcer     Gastritis     GERD (gastroesophageal reflux disease)     History of transfusion 2005    Hypertension     Insomnia     Irritable bowel syndrome     Memory loss     Migraines     Osteopenia     Presence of neurostimulator     Pudendal neuralgia    Pudendal neuralgia     Sleep apnea     Spinal stenosis     Starting and stopping of urinary stream during micturition     Urinary incontinence     Wears eyeglasses      Family History   Problem Relation Age of Onset    Other Mother         Back Disorder     Cirrhosis Mother     Crohn's disease Mother     Lupus Mother         Systemic Lupus Erythematous     Hypertension Father     Heart disease Father     Other Brother         Liver Transplant     Crohn's disease Brother     Crohn's disease Brother     No Known Problems Sister     No Known Problems Daughter     No Known Problems Maternal Aunt     No Known Problems Cousin     No Known Problems Cousin     No Known Problems Cousin     No Known Problems Cousin     No Known Problems Cousin     Seizures Neg Hx      Past Surgical History:   Procedure Laterality Date     SECTION       SECTION     [address] GASTRIC BYPASS  2004    HYSTERECTOMY  2012    INSERT / REPLACE PERIPHERAL NEUROSTIMULATOR PULSE GENERATOR /       MASS EXCISION Right 2021    Procedure: EXCISION  BIOPSY LESION/MASS LOWER NECK;  Surgeon: Joellen Meehan DO;  Location: [facility];  Service: General    OTHER SURGICAL HISTORY      Reimplantatin at [place] [address] NEUROSTIM/ Right 4/15/2020    Procedure: REPLACEMENT IMPLANTABLE PULSE GENERATOR FOR DORSAL SPINAL COLUMN STIMULATOR,  RIGHT BUTTOCKS;  Surgeon: Tonny Horn MD;  Location: [facility];  Service: Neurosurgery    RADIOFREQUENCY ABLATION NERVES      URETER SURGERY      URETHRAL FISTULA REPAIR      US GUIDED THYROID BIOPSY  2020    WRIST ARTHROSCOPY      with internal fixation        Current Outpatient Medications:     busPIRone (BUSPAR) 10 mg tablet, Take 1 tablet (10 mg total) by mouth 2 (two) times a day, Disp: 180 tablet, Rfl: 0    capsaicin (ZOSTRIX) 0 025 % cream, Apply 1 application topically 2 (two) times a day, Disp: 60 g, Rfl: 0    Cholecalciferol (D3-1000) 1000 units capsule, Take 1 capsule by mouth daily  , Disp: , Rfl:     docusate sodium (COLACE) 100 mg capsule, Take 1 capsule (100 mg total) by mouth 3 (three) times a day as needed for constipation (while taking narcotic pain medication), Disp: 30 capsule, Rfl: 0    dronabinol (MARINOL) 5 MG capsule, Take 1 capsule (5 mg total) by mouth 2 (two) times a day before meals By Dr Giselle Samuel, Disp: 60 capsule, Rfl: 0    gabapentin (NEURONTIN) 300 mg capsule, Take 1 capsule (300 mg total) by mouth 2 (two) times a day, Disp: 180 capsule, Rfl: 0    Galcanezumab-gnlm 120 MG/ML SOAJ, Inject 120 mg under the skin every 30 (thirty) days, Disp: 3 pen, Rfl: 4    Iron-Vitamin C (IRON 100/C) 100-250 MG TABS, Take by mouth daily, Disp: , Rfl:     lidocaine (XYLOCAINE) 5 % ointment, Apply topically 2 (two) times a day as needed, Disp: , Rfl:     lisinopril (ZESTRIL) 10 mg tablet, TAKE 1 TABLET DAILY, Disp: 90 tablet, Rfl: 3    meclizine (ANTIVERT) 25 mg tablet, Take 1 tablet (25 mg total) by mouth every 8 (eight) hours, Disp: 30 tablet, Rfl: 0    mometasone (ELOCON) 0 1 % cream, Apply topically daily, Disp: 45 g, Rfl: 0    Multiple Vitamin (MULTI-VITAMIN DAILY) TABS, Take 1 tablet by mouth daily, Disp: , Rfl:     ondansetron (ZOFRAN-ODT) 4 mg disintegrating tablet, Take 1 tablet (4 mg total) by mouth every 6 (six) hours as needed for nausea or vomiting, Disp: 30 tablet, Rfl: 1    pantoprazole (PROTONIX) 40 mg tablet, Take 1 tablet (40 mg total) by mouth 2 (two) times a day, Disp: 180 tablet, Rfl: 0    propranolol (INDERAL) 60 mg tablet, Take 1 tablet (60 mg total) by mouth 2 (two) times a day, Disp: 180 tablet, Rfl: 0    QUEtiapine (SEROquel) 50 mg tablet, Take 1 tablet (50 mg total) by mouth daily at bedtime, Disp: 90 tablet, Rfl: 0    rizatriptan (MAXALT-MLT) 10 MG disintegrating tablet, Take 1 tablet (10 mg total) by mouth once as needed for migraine May repeat every 2 hours if needed, max 30mg/24 hours, Disp: 12 tablet, Rfl: 3    rosuvastatin (CRESTOR) 10 MG tablet, Take 1 tablet (10 mg total) by mouth daily, Disp: 90 tablet, Rfl: 1    sertraline (ZOLOFT) 100 mg tablet, Take 1 tablet (100 mg total) by mouth daily, Disp: 90 tablet, Rfl: 0    Allergies   Allergen Reactions    Morphine Abdominal Pain     SEVERE N/V    Cat Hair Extract Nasal Congestion     Cat Dander    Dog Epithelium Nasal Congestion    Other Other (See Comments)     Dogs- sneezing  Crab Meat- GI intolerance          Lab Review   Admission on 04/05/2021, Discharged on 04/05/2021   Component Date Value    Case Report 04/05/2021                      Value:Surgical Pathology Report                         Case: [id number]                                   Authorizing Provider:  Vinny Huerta DO        Collected:           04/05/2021 2557              Ordering Location:     [facility] Received:            04/05/2021 1217                                     Operating Room                                                               Pathologist:           Adolfo Samuel MD                                                         Specimen:    Soft Tissue, Lipoma, RIGHT SHOULDER                                                        Final Diagnosis 04/05/2021                      Value: This result contains rich text formatting which cannot be displayed here   Additional Information 04/05/2021                      Value: This result contains rich text formatting which cannot be displayed here  [facility] Quintana Gross Description 04/05/2021                      Value: This result contains rich text formatting which cannot be displayed here     Admission on 03/16/2021, Discharged on 03/16/2021   Component Date Value    WBC 03/16/2021 7 93     RBC 03/16/2021 4 07     Hemoglobin 03/16/2021 12 0     Hematocrit 03/16/2021 39 4     MCV 03/16/2021 97     MCH 03/16/2021 29 5     MCHC 03/16/2021 30 5*    RDW 03/16/2021 13 2     MPV 03/16/2021 11 0     Platelets 59/33/9966 252     nRBC 03/16/2021 0     Neutrophils Relative 03/16/2021 62     Immat GRANS % 03/16/2021 0     Lymphocytes Relative 03/16/2021 25     Monocytes Relative 03/16/2021 8     Eosinophils Relative 03/16/2021 4     Basophils Relative 03/16/2021 1     Neutrophils Absolute 03/16/2021 4 92     Immature Grans Absolute 03/16/2021 0 03     Lymphocytes Absolute 03/16/2021 1 96     Monocytes Absolute 03/16/2021 0 60     Eosinophils Absolute 03/16/2021 0 32     Basophils Absolute 03/16/2021 0 10     Sodium 03/16/2021 140     Potassium 03/16/2021 4 1     Chloride 03/16/2021 103     CO2 03/16/2021 30     ANION GAP 03/16/2021 7     BUN 03/16/2021 21     Creatinine 03/16/2021 0 96     Glucose 03/16/2021 105     Calcium 03/16/2021 9 0     Corrected Calcium 03/16/2021 9 6     AST 03/16/2021 19     ALT 03/16/2021 23     Alkaline Phosphatase 03/16/2021 119*    Total Protein 03/16/2021 7 2     Albumin 03/16/2021 3 2*    Total Bilirubin 03/16/2021 0 36     eGFR 03/16/2021 64     Troponin I 03/16/2021 <0 02     Extra Tube 03/16/2021 Hold for add-ons   Ventricular Rate 03/16/2021 53     Atrial Rate 03/16/2021 53     MD Interval 03/16/2021 162     QRSD Interval 03/16/2021 92     QT Interval 03/16/2021 416     QTC Interval 03/16/2021 390     P Axis 03/16/2021 68     QRS Axis 03/16/2021 48     T Wave Axis 03/16/2021 46         Imaging: No results found  Objective:     Physical Exam  Nursing note reviewed  Constitutional:       General: She is not in acute distress  Appearance: Normal appearance  She is well-developed  She is not ill-appearing, toxic-appearing or diaphoretic  HENT:      Head: Normocephalic and atraumatic  Right Ear: Tympanic membrane, ear canal and external ear normal  There is no impacted cerumen  Left Ear: Tympanic membrane, ear canal and external ear normal  There is no impacted cerumen  Nose: Nose normal  No congestion  Mouth/Throat:      Mouth: Mucous membranes are moist       Pharynx: No oropharyngeal exudate     Eyes:      General:         Right eye: No discharge  Left eye: No discharge  Extraocular Movements: Extraocular movements intact  Conjunctiva/sclera: Conjunctivae normal       Pupils: Pupils are equal, round, and reactive to light  Neck:      Musculoskeletal: Normal range of motion and neck supple  No neck rigidity  Cardiovascular:      Rate and Rhythm: Normal rate and regular rhythm  Heart sounds: No murmur  Pulmonary:      Effort: Pulmonary effort is normal  No respiratory distress  Breath sounds: Normal breath sounds  Abdominal:      General: Abdomen is flat  Bowel sounds are normal       Palpations: Abdomen is soft  Comments: Abdomen is obese  Musculoskeletal: Normal range of motion  General: No deformity  Right lower leg: No edema  Left lower leg: No edema  Skin:     General: Skin is warm  Capillary Refill: Capillary refill takes less than 2 seconds  Coloration: Skin is not pale  Findings: No erythema  Neurological:      General: No focal deficit present  Mental Status: She is alert and oriented to person, place, and time  Psychiatric:         Mood and Affect: Mood normal          Behavior: Behavior normal          Thought Content: Thought content normal          Judgment: Judgment normal            Patient Instructions   Reviewed all with patient  Advised to keep the follow-up appointment with psychiatry to titrated down medications when it is the right time to do it continue all medications for now  Also use the meclizine for vertigo and you could also use that in the car prior to long car trips  Stay on the propranolol 60 mg twice daily  I would like you to consider if something is happening between 3:00 a m  and 4:00 a m  every day to cause her persistent nausea  Are you taking vitamins, is it your coffee and milk, is it something your doing  Once you rule everything out okay to start taking the Zofran at 3:30 a m  every day    Stay on all current medications  Have the xrays done and make your appt with Dr Nyasia Eden as you have already had shots in the shoulder and failed physical therapy  GWEN Ascencio    Portions of the record may have been created with voice recognition software  Occasional wrong word or "sound a like" substitutions may have occurred due to the inherent limitations of voice recognition software  Read the chart carefully and recognize, using context, where substitutions have occurred

## 2021-04-27 RX ORDER — ONDANSETRON 4 MG/1
TABLET, ORALLY DISINTEGRATING ORAL
Qty: 30 TABLET | Refills: 51 | Status: SHIPPED | OUTPATIENT
Start: 2021-04-27 | End: 2021-04-28 | Stop reason: CLARIF

## 2021-04-28 RX ORDER — ONDANSETRON 4 MG/1
4 TABLET, ORALLY DISINTEGRATING ORAL EVERY 6 HOURS PRN
Qty: 30 TABLET | Refills: 5 | Status: SHIPPED | OUTPATIENT
Start: 2021-04-28

## 2021-04-28 NOTE — TELEPHONE ENCOUNTER
Called and provided patient with information  She did speak with PCP this week  She will f/u with psych

## 2021-04-28 NOTE — TELEPHONE ENCOUNTER
Dr Kwabena Spencer  I d/c'ed the incorrect medication order with the 51 refills and queued up a corrected order for you  Please sign       TY

## 2021-04-28 NOTE — TELEPHONE ENCOUNTER
Carolina Sanford, DO  You 18 hours ago (5:34 PM)     I would recommend talking to PCP and psychiatry about constant nausea to make sure no med a/e / no GI issue   I do not think the J/J vaccine would be causing the daily nausea this far out     Thank you    Message text

## 2021-05-10 NOTE — ED NOTES
Patient transported to [address] [place] [address]  01/30/18 4945
FAMILY HISTORY:  Father  Still living? No  Family history of kidney disease, Age at diagnosis: Age Unknown    Mother  Still living? Unknown  FH: stroke, Age at diagnosis: Age Unknown    Sibling  Still living? No  FH: stroke, Age at diagnosis: Age Unknown

## 2021-05-17 ENCOUNTER — TELEPHONE (OUTPATIENT)
Dept: PSYCHIATRY | Facility: CLINIC | Age: 61
End: 2021-05-17

## 2021-05-17 NOTE — TELEPHONE ENCOUNTER
Left message for Patient to contact our office in regards to an appointment that has to be rescheduled due to the Provider being out of the office       6-8-21

## 2021-05-23 DIAGNOSIS — F33.1 MODERATE EPISODE OF RECURRENT MAJOR DEPRESSIVE DISORDER (HCC): ICD-10-CM

## 2021-05-23 DIAGNOSIS — F41.0 GENERALIZED ANXIETY DISORDER WITH PANIC ATTACKS: ICD-10-CM

## 2021-05-23 DIAGNOSIS — F41.1 GENERALIZED ANXIETY DISORDER WITH PANIC ATTACKS: ICD-10-CM

## 2021-05-23 RX ORDER — QUETIAPINE FUMARATE 50 MG/1
TABLET, FILM COATED ORAL
Qty: 90 TABLET | Refills: 3 | Status: SHIPPED | OUTPATIENT
Start: 2021-05-23 | End: 2021-06-28 | Stop reason: SDUPTHER

## 2021-05-23 RX ORDER — BUSPIRONE HYDROCHLORIDE 10 MG/1
TABLET ORAL
Qty: 180 TABLET | Refills: 3 | Status: SHIPPED | OUTPATIENT
Start: 2021-05-23 | End: 2021-06-28 | Stop reason: SDUPTHER

## 2021-05-25 DIAGNOSIS — G47.00 INSOMNIA, UNSPECIFIED TYPE: ICD-10-CM

## 2021-05-25 DIAGNOSIS — R51.9 NONINTRACTABLE HEADACHE, UNSPECIFIED CHRONICITY PATTERN, UNSPECIFIED HEADACHE TYPE: ICD-10-CM

## 2021-05-25 DIAGNOSIS — M79.2 NEURALGIA: ICD-10-CM

## 2021-05-25 DIAGNOSIS — R51.9 CHRONIC DAILY HEADACHE: ICD-10-CM

## 2021-05-26 RX ORDER — GABAPENTIN 300 MG/1
CAPSULE ORAL
Qty: 180 CAPSULE | Refills: 3 | Status: SHIPPED | OUTPATIENT
Start: 2021-05-26 | End: 2021-06-28 | Stop reason: SDUPTHER

## 2021-06-02 ENCOUNTER — CONSULT (OUTPATIENT)
Dept: OBGYN CLINIC | Facility: CLINIC | Age: 61
End: 2021-06-02
Payer: COMMERCIAL

## 2021-06-02 VITALS
WEIGHT: 237 LBS | BODY MASS INDEX: 40.46 KG/M2 | DIASTOLIC BLOOD PRESSURE: 75 MMHG | HEIGHT: 64 IN | HEART RATE: 47 BPM | SYSTOLIC BLOOD PRESSURE: 108 MMHG

## 2021-06-02 DIAGNOSIS — M19.012 PRIMARY OSTEOARTHRITIS OF LEFT SHOULDER: ICD-10-CM

## 2021-06-02 PROCEDURE — 99203 OFFICE O/P NEW LOW 30 MIN: CPT | Performed by: ORTHOPAEDIC SURGERY

## 2021-06-02 PROCEDURE — 20610 DRAIN/INJ JOINT/BURSA W/O US: CPT | Performed by: ORTHOPAEDIC SURGERY

## 2021-06-02 PROCEDURE — 3008F BODY MASS INDEX DOCD: CPT | Performed by: FAMILY MEDICINE

## 2021-06-02 RX ADMIN — LIDOCAINE HYDROCHLORIDE 2 ML: 10 INJECTION, SOLUTION INFILTRATION; PERINEURAL at 12:53

## 2021-06-02 RX ADMIN — BUPIVACAINE HYDROCHLORIDE 2 ML: 2.5 INJECTION, SOLUTION INFILTRATION; PERINEURAL at 12:53

## 2021-06-02 RX ADMIN — METHYLPREDNISOLONE ACETATE 2 ML: 40 INJECTION, SUSPENSION INTRA-ARTICULAR; INTRALESIONAL; INTRAMUSCULAR; SOFT TISSUE at 12:53

## 2021-06-02 NOTE — PROGRESS NOTES
Chief Complaint   Patient presents with    Left Shoulder - Pain         SUBJECTIVE: Patient is a 56y o  year old RHD female presenting with chronic left shoulder pain  Patient was referred for orthopedic consultation by her PCP GWEN Ascencio  Patient states she has had the pain in her shoulder for quite some time now with no significant injury or trauma  She has difficulty sleeping on her left side due to pain and she also has pain with certain motions  Patient has done physical therapy in the past for left shoulder as well as other issues in 2019  Patient offers no additional complaints or concerns at this time  Review of Systems:  General:   Negative for fever, chills and weight loss  Gastrointestinal:  No abdominal pain, no nausea, vomiting  Respiratory:   Negative for cough and shortness of breath  Endocrine:  No frequent urination  Musculoskeletal: See HPI  Cardiovascular:   Negative for chest pain and palpitations      Neurological:   Negative for dizziness, and numbness  All other Review of systems negative unless otherwise specified in HPI      Past Medical History:   Diagnosis Date    Anxiety     Arthritis     Attention and concentration deficit     Blurred vision     Chronic pain     CPAP (continuous positive airway pressure) dependence     Depression     Diplopia     Dyspepsia     Gastric ulcer     Gastritis     GERD (gastroesophageal reflux disease)     History of transfusion 2005    Hypertension     Insomnia     Irritable bowel syndrome     Memory loss     Migraines     Osteopenia     Presence of neurostimulator     Pudendal neuralgia    Pudendal neuralgia     Sleep apnea     Spinal stenosis     Starting and stopping of urinary stream during micturition     Urinary incontinence     Wears eyeglasses          Social History     Tobacco Use    Smoking status: Never Smoker    Smokeless tobacco: Never Used   Vaping Use    Vaping Use: Never used Substance Use Topics    Alcohol use: No    Drug use: Yes     Types: Marijuana     Comment: medical marijuana 2x a day capsules       Past Surgical History:   Procedure Laterality Date     SECTION  1988   [facility]    GASTRIC BYPASS  2004    HYSTERECTOMY  2012    INSERT / REPLACE PERIPHERAL NEUROSTIMULATOR PULSE GENERATOR /       MASS EXCISION Right 2021    Procedure: EXCISION  BIOPSY LESION/MASS LOWER NECK;  Surgeon: Merlinda Balboa, DO;  Location: [facility];  Service: General    OTHER SURGICAL HISTORY      [facility]implantatin at [facility] 87 NEUROSTIM/ Right 4/15/2020    Procedure: REPLACEMENT IMPLANTABLE PULSE GENERATOR FOR DORSAL SPINAL COLUMN STIMULATOR,  RIGHT BUTTOCKS;  Surgeon: Mansoor Peres MD;  Location:  MAIN OR;  Service: Neurosurgery    [address]      URETHRAL FISTULA REPAIR      US GUIDED THYROID BIOPSY  2020    WRIST ARTHROSCOPY      with internal fixation          Current Outpatient Medications on File Prior to Visit   Medication Sig Dispense Refill    busPIRone (BUSPAR) 10 mg tablet TAKE 1 TABLET TWICE A  tablet 3    capsaicin (ZOSTRIX) 0 025 % cream Apply 1 application topically 2 (two) times a day 60 g 0    Cholecalciferol (D3-1000) 1000 units capsule Take 1 capsule by mouth daily        docusate sodium (COLACE) 100 mg capsule Take 1 capsule (100 mg total) by mouth 3 (three) times a day as needed for constipation (while taking narcotic pain medication) 30 capsule 0    dronabinol (MARINOL) 5 MG capsule Take 1 capsule (5 mg total) by mouth 2 (two) times a day before meals By Dr Reymundo Apley 60 capsule 0    gabapentin (NEURONTIN) 300 mg capsule TAKE 1 CAPSULE TWICE A  capsule 3    Galcanezumab-gnlm 120 MG/ML SOAJ Inject 120 mg under the skin every 30 (thirty) days 3 pen 4    Iron-Vitamin C (IRON 100/C) 100-250 MG TABS Take by mouth daily      lidocaine (XYLOCAINE) 5 % ointment Apply topically 2 (two) times a day as needed      lisinopril (ZESTRIL) 10 mg tablet TAKE 1 TABLET DAILY 90 tablet 3    meclizine (ANTIVERT) 25 mg tablet Take 1 tablet (25 mg total) by mouth every 8 (eight) hours 30 tablet 0    mometasone (ELOCON) 0 1 % cream Apply topically daily 45 g 0    Multiple Vitamin (MULTI-VITAMIN DAILY) TABS Take 1 tablet by mouth daily      ondansetron (ZOFRAN-ODT) 4 mg disintegrating tablet Take 1 tablet (4 mg total) by mouth every 6 (six) hours as needed for nausea or vomiting 30 tablet 5    pantoprazole (PROTONIX) 40 mg tablet Take 1 tablet (40 mg total) by mouth 2 (two) times a day 180 tablet 0    propranolol (INDERAL) 60 mg tablet Take 1 tablet (60 mg total) by mouth 2 (two) times a day 180 tablet 0    QUEtiapine (SEROquel) 50 mg tablet TAKE 1 TABLET DAILY AT BEDTIME 90 tablet 3    rizatriptan (MAXALT-MLT) 10 MG disintegrating tablet Take 1 tablet (10 mg total) by mouth once as needed for migraine May repeat every 2 hours if needed, max 30mg/24 hours 12 tablet 3    rosuvastatin (CRESTOR) 10 MG tablet Take 1 tablet (10 mg total) by mouth daily 90 tablet 1    sertraline (ZOLOFT) 100 mg tablet Take 1 tablet (100 mg total) by mouth daily 90 tablet 0     No current facility-administered medications on file prior to visit             Physical Exam:    Vitals:    06/02/21 0951   BP: 108/75   Pulse: (!) 47   Weight: 108 kg (237 lb)   Height: 5' 4" (1 626 m)       General Appearance:  Alert, cooperative, no distress, appears stated age   Lungs:   respirations unlabored   Heart:  Normal heart rate noted   Abdomen:   Soft, non-tender,  no masses   Extremities: Extremities normal, atraumatic, no cyanosis or edema   Pulses: 2+ and symmetric   Skin: Skin color, texture, turgor normal, no rashes or lesions   Neurologic: Normal         Left Shoulder Exam     Muscle Strength   Abduction: 5/5   Internal rotation: 5/5   External rotation: 5/5     Other   Erythema: absent  Pulse: present     Comments: With shoulder at 90 degrees, she can externally rotate to 75 degrees and internally rotate to 45 degrees  With arm at side, she can internally rotate to L5  Forward flexion 155 degrees  Abduction 155 degrees            Imaging Studies: The following imaging studies were reviewed in office today  My findings are noted  X-rays of the left shoulder performed on 04/26/2021 demonstrate significant degenerative changes to the glenohumeral joint  Mild AC joint degenerative changes  No acute osseous abnormalities  Large joint arthrocentesis: L glenohumeral  Universal Protocol:  Consent: Verbal consent obtained  Risks and benefits: risks, benefits and alternatives were discussed  Consent given by: patient  Time out: Immediately prior to procedure a "time out" was called to verify the correct patient, procedure, equipment, support staff and site/side marked as required  Patient understanding: patient states understanding of the procedure being performed  Site marked: the operative site was marked  Patient identity confirmed: verbally with patient    Supporting Documentation  Indications: pain   Procedure Details  Location: shoulder - L glenohumeral  Preparation: Patient was prepped and draped in the usual sterile fashion  Needle size: 22 G  Ultrasound guidance: no  Medications administered: 2 mL bupivacaine 0 25 %; 2 mL lidocaine 1 %; 2 mL methylPREDNISolone acetate 40 mg/mL    Patient tolerance: patient tolerated the procedure well with no immediate complications  Dressing:  Sterile dressing applied          ASSESSMENT:    Lilly Albarran was seen today for pain  Diagnoses and all orders for this visit:    Primary osteoarthritis of left shoulder  -     Ambulatory referral to Orthopedic Surgery  -     Ambulatory referral to Physical Therapy;  Future  -     Large joint arthrocentesis: L glenohumeral        PLAN:  29-year-old female with left shoulder osteoarthritis  Patient was provided with a steroid injection to the left glenohumeral joint in the office today  Patient tolerated this procedure well with no immediate complications   Post-injection instructions discussed with the patient  They should rest, apply ice, or take Tylenol/NSAIDs as needed for post-injection soreness   We did order formal physical therapy for ROM, stretching, strengthening  Modalities as see fit   She may be WBAT, activity modification to avoid painful maneuvers   OTC pain medications as needed for pain   Contact the office with any further questions or concerns  Prior to next follow-up   Follow-up in 6 weeks for repeat clinical evaluation          Scribe Attestation    I,:  Diya Elias am acting as a scribe while in the presence of the attending physician :       I,:  Cralitos Mcmanus MD personally performed the services described in this documentation    as scribed in my presence :

## 2021-06-18 DIAGNOSIS — G43.709 CHRONIC MIGRAINE WITHOUT AURA WITHOUT STATUS MIGRAINOSUS, NOT INTRACTABLE: ICD-10-CM

## 2021-06-22 RX ORDER — RIZATRIPTAN BENZOATE 10 MG/1
TABLET, ORALLY DISINTEGRATING ORAL
Qty: 12 TABLET | Refills: 23 | Status: SHIPPED | OUTPATIENT
Start: 2021-06-22

## 2021-06-22 RX ORDER — METHYLPREDNISOLONE ACETATE 40 MG/ML
2 INJECTION, SUSPENSION INTRA-ARTICULAR; INTRALESIONAL; INTRAMUSCULAR; SOFT TISSUE
Status: COMPLETED | OUTPATIENT
Start: 2021-06-02 | End: 2021-06-02

## 2021-06-22 RX ORDER — LIDOCAINE HYDROCHLORIDE 10 MG/ML
2 INJECTION, SOLUTION INFILTRATION; PERINEURAL
Status: COMPLETED | OUTPATIENT
Start: 2021-06-02 | End: 2021-06-02

## 2021-06-22 RX ORDER — BUPIVACAINE HYDROCHLORIDE 2.5 MG/ML
2 INJECTION, SOLUTION INFILTRATION; PERINEURAL
Status: COMPLETED | OUTPATIENT
Start: 2021-06-02 | End: 2021-06-02
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
## 2021-06-28 ENCOUNTER — TELEMEDICINE (OUTPATIENT)
Dept: PSYCHIATRY | Facility: CLINIC | Age: 61
End: 2021-06-28
Payer: COMMERCIAL

## 2021-06-28 DIAGNOSIS — F41.0 GENERALIZED ANXIETY DISORDER WITH PANIC ATTACKS: ICD-10-CM

## 2021-06-28 DIAGNOSIS — F41.1 GENERALIZED ANXIETY DISORDER WITH PANIC ATTACKS: ICD-10-CM

## 2021-06-28 DIAGNOSIS — R51.9 NONINTRACTABLE HEADACHE, UNSPECIFIED CHRONICITY PATTERN, UNSPECIFIED HEADACHE TYPE: ICD-10-CM

## 2021-06-28 DIAGNOSIS — M79.2 NEURALGIA: ICD-10-CM

## 2021-06-28 DIAGNOSIS — R51.9 CHRONIC DAILY HEADACHE: ICD-10-CM

## 2021-06-28 DIAGNOSIS — F33.1 MODERATE EPISODE OF RECURRENT MAJOR DEPRESSIVE DISORDER (HCC): ICD-10-CM

## 2021-06-28 DIAGNOSIS — G47.00 INSOMNIA, UNSPECIFIED TYPE: ICD-10-CM

## 2021-06-28 PROCEDURE — 3725F SCREEN DEPRESSION PERFORMED: CPT | Performed by: STUDENT IN AN ORGANIZED HEALTH CARE EDUCATION/TRAINING PROGRAM

## 2021-06-28 PROCEDURE — 99214 OFFICE O/P EST MOD 30 MIN: CPT | Performed by: STUDENT IN AN ORGANIZED HEALTH CARE EDUCATION/TRAINING PROGRAM

## 2021-06-28 PROCEDURE — 90833 PSYTX W PT W E/M 30 MIN: CPT | Performed by: STUDENT IN AN ORGANIZED HEALTH CARE EDUCATION/TRAINING PROGRAM

## 2021-06-28 PROCEDURE — 1036F TOBACCO NON-USER: CPT | Performed by: STUDENT IN AN ORGANIZED HEALTH CARE EDUCATION/TRAINING PROGRAM

## 2021-06-28 RX ORDER — SERTRALINE HYDROCHLORIDE 100 MG/1
100 TABLET, FILM COATED ORAL DAILY
Qty: 90 TABLET | Refills: 0 | Status: SHIPPED | OUTPATIENT
Start: 2021-06-28 | End: 2021-09-22 | Stop reason: SDUPTHER

## 2021-06-28 RX ORDER — GABAPENTIN 300 MG/1
300 CAPSULE ORAL 2 TIMES DAILY
Qty: 180 CAPSULE | Refills: 0 | Status: SHIPPED | OUTPATIENT
Start: 2021-06-28 | End: 2021-09-22 | Stop reason: SDUPTHER

## 2021-06-28 RX ORDER — BUSPIRONE HYDROCHLORIDE 10 MG/1
10 TABLET ORAL 2 TIMES DAILY
Qty: 180 TABLET | Refills: 0 | Status: SHIPPED | OUTPATIENT
Start: 2021-06-28 | End: 2021-09-22 | Stop reason: SDUPTHER

## 2021-06-28 RX ORDER — QUETIAPINE FUMARATE 50 MG/1
50 TABLET, FILM COATED ORAL
Qty: 90 TABLET | Refills: 0 | Status: SHIPPED | OUTPATIENT
Start: 2021-06-28 | End: 2021-09-22 | Stop reason: SDUPTHER

## 2021-06-28 NOTE — BH TREATMENT PLAN
Treatment Plan done but not signed at time of office visit due to:  Plan reviewed by phone or in person  and verbal consent given due to COVID social distancing    TREATMENT PLAN (Medication Management Only)        Sherwin Muniz    Name and Date of Birth:  Kylah Grossman 56 y o  1960  Date of Treatment Plan: June 28, 2021  Diagnosis/Diagnoses:    1  Moderate episode of recurrent major depressive disorder ([address] )    2  Generalized anxiety disorder with panic attacks    3  Chronic daily headache    4  Nonintractable headache, unspecified chronicity pattern, unspecified headache type    5  Neuralgia    6  Insomnia, unspecified type      Strengths/Personal Resources for Self-Care: "gardening and outdoor activities", supportive family, motivation for treatment  Area/Areas of need (in own words): "depression and anxiety"  1  Long Term Goal: maintain control of anxiety and depression  Target Date:4 months - 10/28/2021  Person/Persons responsible for completion of goal: Lilly Albarran  2  Short Term Objective (s) - How will we reach this goal?:   A  Provider new recommended medication/dosage changes and/or continue medication(s): continue current medications as prescribed  B  individual therapy  C  N/A  Target Date:4 months - 10/28/2021  Person/Persons Responsible for Completion of Goal: Lilly Albarran  Progress Towards Goals: continuing treatment  Treatment Modality: medication management every 4 months, continue psychotherapy with own therapist  Review due 180 days from date of this plan: 4 months - 10/28/2021  Expected length of service: maintenance  My Physician/PA/NP and I have developed this plan together and I agree to work on the goals and objectives  I understand the treatment goals that were developed for my treatment

## 2021-07-03 DIAGNOSIS — E78.2 MIXED HYPERLIPIDEMIA: ICD-10-CM

## 2021-07-03 RX ORDER — ROSUVASTATIN CALCIUM 10 MG/1
TABLET, COATED ORAL
Qty: 90 TABLET | Refills: 3 | Status: SHIPPED | OUTPATIENT
Start: 2021-07-03 | End: 2022-06-27

## 2021-07-05 DIAGNOSIS — R51.9 CHRONIC DAILY HEADACHE: ICD-10-CM

## 2021-07-06 RX ORDER — PROPRANOLOL HYDROCHLORIDE 60 MG/1
TABLET ORAL
Qty: 180 TABLET | Refills: 3 | Status: SHIPPED | OUTPATIENT
Start: 2021-07-06 | End: 2022-05-23

## 2021-07-14 ENCOUNTER — OFFICE VISIT (OUTPATIENT)
Dept: OBGYN CLINIC | Facility: CLINIC | Age: 61
End: 2021-07-14
Payer: COMMERCIAL

## 2021-07-14 VITALS
HEART RATE: 58 BPM | BODY MASS INDEX: 40.46 KG/M2 | HEIGHT: 64 IN | SYSTOLIC BLOOD PRESSURE: 105 MMHG | DIASTOLIC BLOOD PRESSURE: 72 MMHG | WEIGHT: 237 LBS

## 2021-07-14 DIAGNOSIS — M19.012 PRIMARY OSTEOARTHRITIS OF LEFT SHOULDER: Primary | ICD-10-CM

## 2021-07-14 PROCEDURE — 1036F TOBACCO NON-USER: CPT | Performed by: ORTHOPAEDIC SURGERY

## 2021-07-14 PROCEDURE — 99213 OFFICE O/P EST LOW 20 MIN: CPT | Performed by: ORTHOPAEDIC SURGERY

## 2021-07-14 PROCEDURE — 3008F BODY MASS INDEX DOCD: CPT | Performed by: STUDENT IN AN ORGANIZED HEALTH CARE EDUCATION/TRAINING PROGRAM

## 2021-07-14 NOTE — PROGRESS NOTES
SUBJECTIVE: Patient is a 56y o  year old female presenting with chronic left shoulder pain  At her last visit on 06/02/2021 she was provided with a steroid injection to the left glenohumeral joint  Patient states that the injection provided her with significant relief to her left shoulder pain  She does still have occasional discomfort with certain motions and abduction but overall this is doing much better for her  Patient states that due to physical therapy appointment wait list, she has not yet seen the therapist but she does have an appointment to do so  Patient offers no additional complaints or concerns at this time         ROS:   General: No fever, no chills, no weight loss, no weight gain  HEENT:  No loss of hearing, no nose bleeds, no sore throat  Eyes:  No eye pain, no red eyes, no visual disturbance  Respiratory:  No cough, no shortness of breath, no wheezing  Cardiovascular:  No chest pain, no palpitations, no edema  GI: No abdominal pain, no nausea, no vomiting  Endocrine: No frequent urination, no excessive thirst  Urinary:  No dysuria, no hematuria, no incontinence  Musculoskeletal: see HPI and PE  Skin:  No rash, no wounds  Neurological:  No dizziness, no headache, no numbness  Psychiatric:  No difficulty concentrating, no depression, no suicide thoughts, no anxiety  Review of all other systems is negative    PMH:  Past Medical History:   Diagnosis Date    Anxiety     Arthritis     Attention and concentration deficit     Blurred vision     Chronic pain     CPAP (continuous positive airway pressure) dependence     Depression     Diplopia     Dyspepsia     Gastric ulcer     Gastritis     GERD (gastroesophageal reflux disease)     History of transfusion 2005    Hypertension     Insomnia     Irritable bowel syndrome     Memory loss     Migraines     Osteopenia     Presence of neurostimulator     Pudendal neuralgia    Pudendal neuralgia     Sleep apnea     Spinal stenosis     Starting and stopping of urinary stream during micturition     Urinary incontinence     Wears eyeglasses        PSH:  Past Surgical History:   Procedure Laterality Date     SECTION  1988   [place] GASTRIC BYPASS  2004    HYSTERECTOMY  2012    INSERT / REPLACE PERIPHERAL NEUROSTIMULATOR PULSE GENERATOR /       MASS EXCISION Right 2021    Procedure: EXCISION  BIOPSY LESION/MASS LOWER NECK;  Surgeon: Matilda Felipe DO;  Location: [facility];  Service: General    OTHER SURGICAL HISTORY      [facility]implantatin at [facility] NEUROSTIM/ Right 4/15/2020    Procedure: REPLACEMENT IMPLANTABLE PULSE GENERATOR FOR DORSAL SPINAL COLUMN STIMULATOR,  RIGHT BUTTOCKS;  Surgeon: Win Mireles MD;  Location: [facility];  Service: Neurosurgery    [address]      URETHRAL FISTULA REPAIR      US GUIDED THYROID BIOPSY  2020    WRIST ARTHROSCOPY      with internal fixation        Medications:  Current Outpatient Medications   Medication Sig Dispense Refill    busPIRone (BUSPAR) 10 mg tablet Take 1 tablet (10 mg total) by mouth 2 (two) times a day 180 tablet 0    capsaicin (ZOSTRIX) 0 025 % cream Apply 1 application topically 2 (two) times a day 60 g 0    Cholecalciferol (D3-1000) 1000 units capsule Take 1 capsule by mouth daily        docusate sodium (COLACE) 100 mg capsule Take 1 capsule (100 mg total) by mouth 3 (three) times a day as needed for constipation (while taking narcotic pain medication) 30 capsule 0    dronabinol (MARINOL) 5 MG capsule Take 1 capsule (5 mg total) by mouth 2 (two) times a day before meals By Dr Emily Main 60 capsule 0    gabapentin (NEURONTIN) 300 mg capsule Take 1 capsule (300 mg total) by mouth 2 (two) times a day 180 capsule 0    Galcanezumab-gnlm 120 MG/ML SOAJ Inject 120 mg under the skin every 30 (thirty) days 3 pen 4    Iron-Vitamin C (IRON 100/C) 100-250 MG TABS Take by mouth daily      lidocaine (XYLOCAINE) 5 % ointment Apply topically 2 (two) times a day as needed      lisinopril (ZESTRIL) 10 mg tablet TAKE 1 TABLET DAILY 90 tablet 3    meclizine (ANTIVERT) 25 mg tablet Take 1 tablet (25 mg total) by mouth every 8 (eight) hours 30 tablet 0    mometasone (ELOCON) 0 1 % cream Apply topically daily 45 g 0    Multiple Vitamin (MULTI-VITAMIN DAILY) TABS Take 1 tablet by mouth daily      ondansetron (ZOFRAN-ODT) 4 mg disintegrating tablet Take 1 tablet (4 mg total) by mouth every 6 (six) hours as needed for nausea or vomiting 30 tablet 5    pantoprazole (PROTONIX) 40 mg tablet Take 1 tablet (40 mg total) by mouth 2 (two) times a day 180 tablet 0    propranolol (INDERAL) 60 mg tablet TAKE 1 TABLET TWICE A  tablet 3    QUEtiapine (SEROquel) 50 mg tablet Take 1 tablet (50 mg total) by mouth daily at bedtime 90 tablet 0    rizatriptan (MAXALT-MLT) 10 MG disintegrating tablet PLACE 1 TABLET ON TONGUE ONCE AS NEEDED FOR MIGRAINE MAY REPEAT EVERY 2 HOURS IF NEEDED, MAXIMUM 30 MG IN 24 HOURS 12 tablet 23    rosuvastatin (CRESTOR) 10 MG tablet TAKE 1 TABLET DAILY 90 tablet 3    sertraline (ZOLOFT) 100 mg tablet Take 1 tablet (100 mg total) by mouth daily 90 tablet 0     No current facility-administered medications for this visit  Allergies:   Allergies   Allergen Reactions    Morphine Abdominal Pain     SEVERE N/V    Cat Hair Extract Nasal Congestion     Cat Dander    Dog Epithelium Nasal Congestion    Other Other (See Comments)     Dogs- sneezing  Crab Meat- GI intolerance       Family History:  Family History   Problem Relation Age of Onset    Other Mother         Back Disorder     Cirrhosis Mother     Crohn's disease Mother     Lupus Mother         Systemic Lupus Erythematous     Hypertension Father     Heart disease Father     Other Brother         Liver Transplant     Crohn's disease Brother     Crohn's disease Brother     No Known Problems Sister     No Known Problems Daughter     No Known Problems Maternal Aunt     No Known Problems Cousin     No Known Problems Cousin     No Known Problems Cousin     No Known Problems Cousin     No Known Problems Cousin     Seizures Neg Hx        Social History:  Social History     Occupational History    Occupation: retired   Tobacco Use    Smoking status: Never Smoker    Smokeless tobacco: Never Used   Vaping Use    Vaping Use: Never used   Substance and Sexual Activity    Alcohol use: No    Drug use: Yes     Types: Marijuana     Comment: medical marijuana 2x a day capsules    Sexual activity: Yes       Physical Exam:  General :  Alert, cooperative, no distress, appears stated age  Blood pressure 105/72, pulse 58, height 5' 4" (1 626 m), weight 108 kg (237 lb), not currently breastfeeding  Head:  Normocephalic, without obvious abnormality, atraumatic   Eyes:  Conjunctiva/corneas clear, EOM's intact,   Ears: Both ears normal appearance, no hearing deficits  Nose: Nares normal, septum midline, no drainage    Neck: Supple,  trachea midline, no adenopathy, no tenderness, no mass   Back:   Symmetric, no curvature, ROM normal, no tenderness   Lungs:   Respirations unlabored   Chest Wall:  No tenderness or deformity   Extremities: Extremities normal, atraumatic, no cyanosis or edema      Pulses: 2+ and symmetric   Skin: Skin color, texture, turgor normal, no rashes or lesions      Neurologic: Normal             Left Shoulder Exam     Muscle Strength   Abduction: 5/5   Internal rotation: 5/5   External rotation: 5/5     Other   Erythema: absent  Sensation: normal  Pulse: present     Comments:  Skin intact  No erythema or ecchymosis  Nontender throughout  ROM significantly improved, mild discomfort with abduction          Imaging Studies: The following imaging studies were reviewed in office today  My findings are noted    None performed today         Assessment  Encounter Diagnosis   Name Primary?  Primary osteoarthritis of left shoulder Yes         Plan:   56-year-old female with left shoulder osteoarthritis   Patient is doing very well 6 weeks following a left glenohumeral joint steroid injection   Remain WBAT on the extremity  Activity modifications to avoid painful maneuvers   Patient understands the importance of attending physical therapy for this condition and she does have an appointment scheduled when she comes back from vacation  · OTC pain medications as needed for pain  · Contact the office with any further questions or concerns prior to next follow-up  · Follow-up in 8 weeks for repeat clinical evaluation      Scribe Attestation    I,:  Judy Garcia am acting as a scribe while in the presence of the attending physician :       I,:  Lenore Knight MD personally performed the services described in this documentation    as scribed in my presence :

## 2021-08-02 ENCOUNTER — EVALUATION (OUTPATIENT)
Dept: PHYSICAL THERAPY | Facility: CLINIC | Age: 61
End: 2021-08-02
Payer: COMMERCIAL

## 2021-08-02 DIAGNOSIS — M19.012 PRIMARY OSTEOARTHRITIS OF LEFT SHOULDER: Primary | ICD-10-CM

## 2021-08-02 PROCEDURE — 97161 PT EVAL LOW COMPLEX 20 MIN: CPT | Performed by: PHYSICAL THERAPIST

## 2021-08-02 NOTE — PROGRESS NOTES
PT Evaluation     Today's date: 2021  Patient name: Ang Kent  : 1960  MRN: [id number]  Referring provider: Sybil Suarez MD  Dx:   Encounter Diagnosis     ICD-10-CM    1  Primary osteoarthritis of left shoulder  M19 012 Ambulatory referral to Physical Therapy                  Assessment  Assessment details: Patient was provided a home exercise program and demonstrated an understanding of exercises  Patient was advised to stop performing home exercise program if symptoms increase or new complaints developed  Verbal understanding demonstrated regarding home exercise program instructions  Patient would benefit from skilled physical therapy services for prescribed exercises, manual interventions, neuromuscular re-education, education, and modalities as deemed appropriate to assist patient in achieving their maximum level of function  Patient presents with long standing Left shoulder pain off / on  She was injected 2-3 months ago and states this has offered her 50% reduced pain which is still helpful today  Evaluation reveals:   (+) tenderness superior/ anterior left shoulder/ left bicep tendon, loss of P/AROM Left shoulder, weakness, decreased functional abilities, (+) impingement testing  Trial kinesiotape today for unloading -  Monitor response and repeat as appropriately  Impairments: abnormal or restricted ROM, abnormal movement, activity intolerance, impaired physical strength, lacks appropriate home exercise program, pain with function and poor posture   Understanding of Dx/Px/POC: good  Goals  STG   1  Patient will demonstrate independence and competence with HEP 2 -4 weeks  2  Patient will report > 25-50% reduced pain 2-4 weeks    LTG   1  Patient will report improvements with both functional and recreational abilities  4-6 weeks  2  Patient will demonstrate improved motor function  4-6 weeks  3   Improved postural awareness and self correction  4-6 weeks     4   No pain with dressing/ OH activities  4-8 weeks  Plan  Plan details: Patient response to treatment will be monitored each session and progressed accordingly    Thank you for this referral    Patient would benefit from: skilled physical therapy  Planned modality interventions: cryotherapy and thermotherapy: hydrocollator packs  Planned therapy interventions: IADL retraining, manual therapy, patient education, postural training, strengthening, stretching, therapeutic exercise, flexibility, home exercise program, joint mobilization and neuromuscular re-education  Frequency: 2x week  Duration in weeks: 8  Treatment plan discussed with: patient        Subjective Evaluation    History of Present Illness  Mechanism of injury:  - started with Left shoulder pain     X-rays reveals severe OA     Recent cortizone injection ( 2-3 months ago ) offered her 50% reduction in symptoms  Pain is located superior left shoulder - elbow intermittently    Pain  Current pain ratin  At worst pain ratin  Quality: dull ache and radiating  Relieving factors: change in position  Symptom course: better since the injection  Social Support  Lives in: condominium  Lives with: spouse    Hand dominance: right    Treatments  Current treatment: injection treatment  Patient Goals  Patient goals for therapy: decreased pain  Patient goal: sleep better         Objective     Postural Observations  Seated posture: fair  Standing posture: fair  Correction of posture: makes symptoms better    Additional Postural Observation Details  With cueing - she can get into a good posturing - but lacks endurance to maintain  She sits with fhp, shoulders rounded/ protracted slightly bilaterally         Palpation   Left   Tenderness of the anterior deltoid and biceps  Tenderness     Left Shoulder   Tenderness in the [facility] joint, biceps tendon (proximal) and supraspinatus tendon       Cervical/Thoracic Screen   Cervical range of motion within normal limits with the following exceptions: All movements Painfree and wfl   NO shoulder / left UE elicited with AROM to c-spine    Neurological Testing     Sensation     Shoulder   Left Shoulder   Intact: light touch    Right Shoulder   Intact: light touch    Active Range of Motion   Left Shoulder   Flexion: 150 degrees with pain  Abduction: 165 degrees with pain  External rotation BTH: C2 with pain  Internal rotation BTB: L4 with pain    Right Shoulder   Normal active range of motion    Passive Range of Motion   Left Shoulder   Flexion: 160 degrees with pain  Abduction: 170 degrees with pain  External rotation 90°: 65 degrees with pain  Internal rotation 90°: 45 degrees with pain    Strength/Myotome Testing     Left Shoulder     Planes of Motion   Flexion: 4-   Extension: 4+   Abduction: 4   Adduction: 4+   External rotation at 0°: 4+   Internal rotation at 0°: 4+     Isolated Muscles   Biceps: 4+   Triceps: 4     Right Shoulder   Normal muscle strength    Tests     Left Shoulder   Positive crossover and Hawkin's  Negative clunk, drop arm, empty can, Neer's, painful arc and Speed's                Precautions: Left shoulder PAIN -  Severe OA      Manuals 8/2            FOTO  db                         PROM- AAROM Left shoulder  db                         kinesiotape Left shoulder "Y"  "I" db            Neuro Re-Ed             Back rolls/ scap retract 20x ea                                                                                           Ther Ex             UBE             pulleys             Wall slides 10s x 10            IR Towel stretch 20s x 3            Stand cane ext 1 arm AA from R             Supine cane flexion/ cp                           TB rows, lpd, no monies                                                                 Ther Activity                                       Gait Training                                       Modalities

## 2021-08-06 ENCOUNTER — OFFICE VISIT (OUTPATIENT)
Dept: PHYSICAL THERAPY | Facility: CLINIC | Age: 61
End: 2021-08-06
Payer: COMMERCIAL

## 2021-08-06 DIAGNOSIS — M19.012 PRIMARY OSTEOARTHRITIS OF LEFT SHOULDER: Primary | ICD-10-CM

## 2021-08-06 PROCEDURE — 97112 NEUROMUSCULAR REEDUCATION: CPT | Performed by: PHYSICAL THERAPIST

## 2021-08-06 PROCEDURE — 97140 MANUAL THERAPY 1/> REGIONS: CPT | Performed by: PHYSICAL THERAPIST

## 2021-08-06 PROCEDURE — 97110 THERAPEUTIC EXERCISES: CPT | Performed by: PHYSICAL THERAPIST

## 2021-08-06 NOTE — PROGRESS NOTES
Daily Note     Today's date: 2021  Patient name: Alva Payton  : 1960  MRN: [id number]  Referring provider: Akilah Box MD  Dx:   Encounter Diagnosis     ICD-10-CM    1  Primary osteoarthritis of left shoulder  M19 012                   Subjective: Patient ranks shoulder pain 7/10 upon arrival today -  Notes that she has been more sore despite no great increase in activity level  Notes that activities today feel good overall  Notes feeling better post session       Objective: See treatment diary below      Assessment: Tolerated treatment fair  Patient exhibited good technique with therapeutic exercises and would benefit from continued PT      Plan: Continue per plan of care  Progress treatment as tolerated  Monitor response and progress accordingly          Precautions: Left shoulder PAIN -  Severe OA      Manuals            FOTO  db                         PROM- AAROM Left shoulder  db db                        kinesiotape Left shoulder "Y"  "I" db NP           Neuro Re-Ed             Back rolls/ scap retract 20x ea  20x ea                                                                                            Ther Ex             UBE  4' alt           pulleys  5'           Wall slides 10s x 10 10s x 10           IR Towel stretch 20s x 3 20s x 3           Stand cane ext 1 arm AA from R  20x  5s           Supine cane flexion/ cp   20x ea            Standing cane ext  20x            TB rows, lpd, no monies  rtb x 20 ea                         Serratus/ triceps  20x ea                                                                                         Ther Activity                                       Gait Training                                       Modalities             CP left shoulder to end  10'

## 2021-08-10 ENCOUNTER — OFFICE VISIT (OUTPATIENT)
Dept: FAMILY MEDICINE CLINIC | Facility: CLINIC | Age: 61
End: 2021-08-10
Payer: COMMERCIAL

## 2021-08-10 VITALS
SYSTOLIC BLOOD PRESSURE: 102 MMHG | WEIGHT: 239 LBS | HEIGHT: 64 IN | OXYGEN SATURATION: 97 % | BODY MASS INDEX: 40.8 KG/M2 | RESPIRATION RATE: 16 BRPM | HEART RATE: 62 BPM | TEMPERATURE: 97.7 F | DIASTOLIC BLOOD PRESSURE: 80 MMHG

## 2021-08-10 DIAGNOSIS — F32.A ANXIETY AND DEPRESSION: ICD-10-CM

## 2021-08-10 DIAGNOSIS — Z90.3 POSTGASTRECTOMY MALABSORPTION: ICD-10-CM

## 2021-08-10 DIAGNOSIS — G89.4 CHRONIC PAIN SYNDROME: Primary | ICD-10-CM

## 2021-08-10 DIAGNOSIS — F41.9 ANXIETY AND DEPRESSION: ICD-10-CM

## 2021-08-10 DIAGNOSIS — E78.2 MIXED HYPERLIPIDEMIA: ICD-10-CM

## 2021-08-10 DIAGNOSIS — Z96.82 PRESENCE OF NEUROSTIMULATOR: ICD-10-CM

## 2021-08-10 DIAGNOSIS — K91.2 POSTGASTRECTOMY MALABSORPTION: ICD-10-CM

## 2021-08-10 PROBLEM — Z00.00 ANNUAL PHYSICAL EXAM: Status: RESOLVED | Noted: 2021-02-12 | Resolved: 2021-08-10

## 2021-08-10 PROCEDURE — 3008F BODY MASS INDEX DOCD: CPT | Performed by: ORTHOPAEDIC SURGERY

## 2021-08-10 PROCEDURE — 99214 OFFICE O/P EST MOD 30 MIN: CPT | Performed by: FAMILY MEDICINE

## 2021-08-10 NOTE — PROGRESS NOTES
Assessment/Plan:     Chronic Problems:  Pudendal neuralgia  Follows with Dr Jun Anderson  Chronic pain syndrome  Keep the f/u with Dr Jun Anderson  Presence of neurostimulator  Keep the f/u with pain management  Anxiety and depression  Keep the f/u with psych and  Counseling  Visit Diagnosis:  Diagnoses and all orders for this visit:    Chronic pain syndrome  -     Diclofenac Sodium (VOLTAREN) 1 %; Apply 2 g topically 4 (four) times a day    Presence of neurostimulator    Postgastrectomy malabsorption  -     Comprehensive metabolic panel; Future  -     Microalbumin / creatinine urine ratio  -     Urinalysis with microscopic  -     CBC and differential; Future  -     Vitamin B12; Future  -     Iron Saturation %; Future  -     Iron, TIBC and Ferritin Panel; Future  -     Folate; Future    Mixed hyperlipidemia  -     Lipid panel; Future    Anxiety and depression          Subjective:    Patient ID: Ramone De La Fuente is a 56 y o  female  Pt is here for routine f/u  Has started seeing Dr Jun Anderson from [facility] again for pudendal nerve pain  Had more xrays and mri but the mri was denied as she is not scheduled for surgery  Now they are appealing the denial  Had her neurostimulator reprogrammed  Plans on downsizing her life  Selling her home and moving to a condo  Pt was having a lot of mental health issues  Was admitted in February and now televisits with [facility] and weekly meeting with counselor  Feels it is helping and she is doing a lot better just stressed about moving  Feels relieved that she had the covid shot  Uses voltaren prn for her nerve pain  Requesting a renewal   Meds reviewed with pt  Takes all other meds as directed  No side effects noted         The following portions of the patient's history were reviewed and updated as appropriate: allergies, current medications, past family history, past medical history, past social history, past surgical history and problem list     Review of Systems Constitutional: Positive for fatigue  Negative for chills, diaphoresis and fever  HENT: Negative  Eyes: Negative  Respiratory: Negative for cough, shortness of breath and wheezing  Cardiovascular: Negative for chest pain and palpitations  Gastrointestinal: Positive for nausea  Negative for abdominal pain, constipation, diarrhea and vomiting  Stopped drinking iced coffee and latte's  Now knows that she cannot tolerate this  Feels her ibs is in check  Genitourinary: Negative for dysuria, frequency and urgency  Some mixed urinary incontinence  Does not get the urge to urinate s/p her surgeries  Was following with Dr Maryana Leija per pt and now follows with Dr Izabela Nicholas  Musculoskeletal:        Pt with chronic pains ;    Neurological: Positive for headaches (and gets nauseated with the headaches  )  Negative for dizziness and light-headedness  Now follows with neuro for the headaches  Psychiatric/Behavioral: Positive for dysphoric mood  The patient is nervous/anxious  But feels her meds are helping and she is controlled  /80   Pulse 62   Temp 97 7 °F (36 5 °C)   Resp 16   Ht 5' 4" (1 626 m)   Wt 108 kg (239 lb)   SpO2 97%   BMI 41 02 kg/m²   Social History     Socioeconomic History    Marital status: /Civil Union     Spouse name: Not on file    Number of children: Not on file    Years of education: Not on file    Highest education level: Not on file   Occupational History    Occupation: retired   Tobacco Use    Smoking status: Never Smoker    Smokeless tobacco: Never Used   Vaping Use    Vaping Use: Never used   Substance and Sexual Activity    Alcohol use: No    Drug use: Yes     Types: Marijuana     Comment: medical marijuana 2x a day capsules    Sexual activity: Yes   Other Topics Concern    Not on file   Social History Narrative    Lives in [place], with   Previously worked as a teacher        Social Determinants of Health     Financial Resource Strain:     Difficulty of Paying Living Expenses:    Food Insecurity:     Worried About Running Out of Food in the Last Year:     [address] in the Last Year:    Transportation Needs:     Lack of Transportation (Medical):      Lack of Transportation (Non-Medical):    Physical Activity:     Days of Exercise per Week:     Minutes of Exercise per Session:    Stress:     Feeling of Stress :    Social Connections:     Frequency of Communication with Friends and Family:     Frequency of Social Gatherings with Friends and Family:     Attends Islam Services:     Active Member of Clubs or Organizations:     Attends Club or Organization Meetings:     Marital Status:    Intimate Partner Violence:     Fear of Current or Ex-Partner:     Emotionally Abused:     Physically Abused:     Sexually Abused:      Past Medical History:   Diagnosis Date    Anxiety     Arthritis     Attention and concentration deficit     Blurred vision     Chronic pain     Chronic pain syndrome 1/20/2016    CPAP (continuous positive airway pressure) dependence     Depression     Diplopia     Dyspepsia     Gastric ulcer     Gastritis     GERD (gastroesophageal reflux disease)     History of transfusion 2005    Hypertension     Insomnia     Irritable bowel syndrome     Memory loss     Migraines     Osteopenia     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    Presence of neurostimulator     Pudendal neuralgia    Pudendal neuralgia     Sleep apnea     Spinal stenosis     Starting and stopping of urinary stream during micturition     Urinary incontinence     Wears eyeglasses      Family History   Problem Relation Age of Onset    Other Mother         Back Disorder     Cirrhosis Mother     Crohn's disease Mother     Lupus Mother         Systemic Lupus Erythematous     Hypertension Father     Heart disease Father     Other Brother         Liver Transplant     Crohn's disease Brother     Crohn's disease Brother     No Known Problems Sister     No Known Problems Daughter     No Known Problems Maternal Aunt     No Known Problems Cousin     No Known Problems Cousin     No Known Problems Cousin     No Known Problems Cousin     No Known Problems Cousin     Seizures Neg Hx      Past Surgical History:   Procedure Laterality Date     SECTION       SECTION     [address] GASTRIC BYPASS  2004    HYSTERECTOMY  2012    INSERT / REPLACE PERIPHERAL NEUROSTIMULATOR PULSE GENERATOR /       MASS EXCISION Right 2021    Procedure: EXCISION  BIOPSY LESION/MASS LOWER NECK;  Surgeon: Torie Holt DO;  Location: [facility];  Service: General    OTHER SURGICAL HISTORY      [facility]implantatin at [facility] NEUROSTIM/ Right 4/15/2020    Procedure: REPLACEMENT IMPLANTABLE PULSE GENERATOR FOR DORSAL SPINAL COLUMN STIMULATOR,  RIGHT BUTTOCKS;  Surgeon: Camden Garcia MD;  Location: [facility];  Service: Neurosurgery    RADIOFREQUENCY ABLATION NERVES      URETER SURGERY      URETHRAL FISTULA REPAIR      US GUIDED THYROID BIOPSY  2020    WRIST ARTHROSCOPY      with internal fixation        Current Outpatient Medications:     busPIRone (BUSPAR) 10 mg tablet, Take 1 tablet (10 mg total) by mouth 2 (two) times a day, Disp: 180 tablet, Rfl: 0    Cholecalciferol (D3-1000) 1000 units capsule, Take 1 capsule by mouth daily  , Disp: , Rfl:     docusate sodium (COLACE) 100 mg capsule, Take 1 capsule (100 mg total) by mouth 3 (three) times a day as needed for constipation (while taking narcotic pain medication), Disp: 30 capsule, Rfl: 0    dronabinol (MARINOL) 5 MG capsule, Take 1 capsule (5 mg total) by mouth 2 (two) times a day before meals By Dr Desmond Tam, Disp: 60 capsule, Rfl: 0    gabapentin (NEURONTIN) 300 mg capsule, Take 1 capsule (300 mg total) by mouth 2 (two) times a day, Disp: 180 capsule, Rfl: 0    Galcanezumab-gnlm 120 MG/ML SOAJ, Inject 120 mg under the skin every 30 (thirty) days, Disp: 3 pen, Rfl: 4    Iron-Vitamin C (IRON 100/C) 100-250 MG TABS, Take by mouth daily, Disp: , Rfl:     lidocaine (XYLOCAINE) 5 % ointment, Apply topically 2 (two) times a day as needed, Disp: , Rfl:     lisinopril (ZESTRIL) 10 mg tablet, TAKE 1 TABLET DAILY, Disp: 90 tablet, Rfl: 3    meclizine (ANTIVERT) 25 mg tablet, Take 1 tablet (25 mg total) by mouth every 8 (eight) hours, Disp: 30 tablet, Rfl: 0    Multiple Vitamin (MULTI-VITAMIN DAILY) TABS, Take 1 tablet by mouth daily, Disp: , Rfl:     ondansetron (ZOFRAN-ODT) 4 mg disintegrating tablet, Take 1 tablet (4 mg total) by mouth every 6 (six) hours as needed for nausea or vomiting, Disp: 30 tablet, Rfl: 5    pantoprazole (PROTONIX) 40 mg tablet, Take 1 tablet (40 mg total) by mouth 2 (two) times a day, Disp: 180 tablet, Rfl: 0    propranolol (INDERAL) 60 mg tablet, TAKE 1 TABLET TWICE A DAY, Disp: 180 tablet, Rfl: 3    QUEtiapine (SEROquel) 50 mg tablet, Take 1 tablet (50 mg total) by mouth daily at bedtime, Disp: 90 tablet, Rfl: 0    rizatriptan (MAXALT-MLT) 10 MG disintegrating tablet, PLACE 1 TABLET ON TONGUE ONCE AS NEEDED FOR MIGRAINE MAY REPEAT EVERY 2 HOURS IF NEEDED, MAXIMUM 30 MG IN 24 HOURS, Disp: 12 tablet, Rfl: 23    rosuvastatin (CRESTOR) 10 MG tablet, TAKE 1 TABLET DAILY, Disp: 90 tablet, Rfl: 3    sertraline (ZOLOFT) 100 mg tablet, Take 1 tablet (100 mg total) by mouth daily, Disp: 90 tablet, Rfl: 0    capsaicin (ZOSTRIX) 0 025 % cream, Apply 1 application topically 2 (two) times a day (Patient not taking: Reported on 8/10/2021), Disp: 60 g, Rfl: 0    Diclofenac Sodium (VOLTAREN) 1 %, Apply 2 g topically 4 (four) times a day, Disp: 300 g, Rfl: 1    mometasone (ELOCON) 0 1 % cream, Apply topically daily, Disp: 45 g, Rfl: 0    Allergies   Allergen Reactions    Morphine Abdominal Pain     SEVERE N/V    Cat Hair Extract Nasal Congestion     Cat Dander    Dog Epithelium Nasal Congestion    Other Other (See Comments)     Dogs- sneezing  Crab Meat- GI intolerance          Lab Review   No visits with results within 2 Month(s) from this visit  Latest known visit with results is:   Admission on 04/05/2021, Discharged on 04/05/2021   Component Date Value    Case Report 04/05/2021                      Value:Surgical Pathology Report                         Case: [id number]                                   Authorizing Provider:  Mingo Davison DO        Collected:           04/05/2021 8997              Ordering Location:     [facility] Received:            04/05/2021 1217                                     Operating Room                                                               Pathologist:           Caryn Avery MD                                                         Specimen:    Soft Tissue, Lipoma, RIGHT SHOULDER                                                        Final Diagnosis 04/05/2021                      Value: This result contains rich text formatting which cannot be displayed here   Additional Information 04/05/2021                      Value: This result contains rich text formatting which cannot be displayed here  Mary Crowley Gross Description 04/05/2021                      Value: This result contains rich text formatting which cannot be displayed here  Imaging: No results found  Objective:     Physical Exam      Patient Instructions   Reviewed all with patient  Blood pressure today is perfect  Keep your follow-up appointment with all your specialist   Kely was renewed for you  Have the blood work done in 4-6 months  Mammo gets done in October  Follow-up here in 4 months      GWEN Ascencio    Portions of the record may have been created with voice recognition software    Occasional wrong word or "sound a like" substitutions may have occurred due to the inherent limitations of voice recognition software  Read the chart carefully and recognize, using context, where substitutions have occurred

## 2021-08-10 NOTE — PATIENT INSTRUCTIONS
Reviewed all with patient  Blood pressure today is perfect  Keep your follow-up appointment with all your specialist   Kely was renewed for you  Have the blood work done in 4-6 months  Mammo gets done in October   Follow-up here in 4 months

## 2021-08-11 ENCOUNTER — OFFICE VISIT (OUTPATIENT)
Dept: PHYSICAL THERAPY | Facility: CLINIC | Age: 61
End: 2021-08-11
Payer: COMMERCIAL

## 2021-08-11 DIAGNOSIS — M19.012 PRIMARY OSTEOARTHRITIS OF LEFT SHOULDER: Primary | ICD-10-CM

## 2021-08-11 PROCEDURE — 97140 MANUAL THERAPY 1/> REGIONS: CPT | Performed by: PHYSICAL THERAPIST

## 2021-08-11 PROCEDURE — 97110 THERAPEUTIC EXERCISES: CPT | Performed by: PHYSICAL THERAPIST

## 2021-08-11 PROCEDURE — 97112 NEUROMUSCULAR REEDUCATION: CPT | Performed by: PHYSICAL THERAPIST

## 2021-08-11 NOTE — PROGRESS NOTES
Daily Note     Today's date: 2021  Patient name: Flakito Arciniega  : 1960  MRN: [id number]  Referring provider: Imer Miller MD  Dx:   Encounter Diagnosis     ICD-10-CM    1  Primary osteoarthritis of left shoulder  M19 012                   Subjective: Patient reports that her left shoulder "feels very painful today"  - pointing at left biceps region / superior left shoulder region  Notes feeling good post session    Objective: See treatment diary below      Assessment: Tolerated treatment fair  Patient exhibited good technique with therapeutic exercises and would benefit from continued PT   Loosened with IASTM       Plan: Continue per plan of care  Progress treatment as tolerated      Monitor response to IASTM      Precautions: Left shoulder PAIN -  Severe OA      Manuals           FOTO  db                         PROM- AAROM Left shoulder  db db db          IASTM  Left bicep/ proximallly   db          kinesiotape Left shoulder "Y"  "I" db NP           Neuro Re-Ed             Back rolls/ scap retract 20x ea  20x ea    20x ea                                                                                         Ther Ex             UBE  4' alt 5' alt          pulleys  5' 3'  10s hold          Wall slides 10s x 10 10s x 10 10s x 10          IR Towel stretch 20s x 3 20s x 3 20s x 3          Stand cane ext 1 arm AA from R  20x  5s 20x   5s           Supine cane flexion/ cp   20x ea  20x           Standing cane ext  20x  20x           TB rows, lpd, no monies  rtb x 20 ea  rtb x 20                        Serratus/ triceps  20x ea 20x ea                                                                                         Ther Activity                                       Gait Training                                       Modalities             CP left shoulder to end  10'  10'

## 2021-08-13 ENCOUNTER — OFFICE VISIT (OUTPATIENT)
Dept: PHYSICAL THERAPY | Facility: CLINIC | Age: 61
End: 2021-08-13
Payer: COMMERCIAL

## 2021-08-13 DIAGNOSIS — M19.012 PRIMARY OSTEOARTHRITIS OF LEFT SHOULDER: Primary | ICD-10-CM

## 2021-08-13 PROCEDURE — 97140 MANUAL THERAPY 1/> REGIONS: CPT

## 2021-08-13 PROCEDURE — 97110 THERAPEUTIC EXERCISES: CPT

## 2021-08-13 NOTE — PROGRESS NOTES
Daily Note     Today's date: 2021  Patient name: Bharat Ca  : 1960  MRN: [id number]  Referring provider: Alistair Rojo MD  Dx:   Encounter Diagnosis     ICD-10-CM    1  Primary osteoarthritis of left shoulder  M19 012                   Subjective: Patient reports "When I first started I was sore because I was using it more "      Objective: See treatment diary below      Assessment: Tolerated treatment well  Patient has anterior soreness at start of exercises but dissipates as she progressively works through reps  Demonstrates good ROM  Patient had a sharp pain in her L glute d/t her stimulator causing her to cry out in discomfort  Patient denied needing to change position, just needed a moment to rest  Patient would benefit from continued PT      Plan: Continue per plan of care        Precautions: Left shoulder PAIN -  Severe OA      Manuals          FOTO  db                         PROM- AAROM Left shoulder  db db db np         IASTM  Left bicep/ proximallly   db VIV         kinesiotape Left shoulder "Y"  "I" db NP           Neuro Re-Ed             Back rolls/ scap retract 20x ea  20x ea    20x ea  20x ea                                                                                        Ther Ex             UBE  4' alt 5' alt 5' alt         pulleys  5' 3'  10s hold 3' 10s hold         Wall slides 10s x 10 10s x 10 10s x 10 10s x10         IR Towel stretch 20s x 3 20s x 3 20s x 3 20x3         Stand cane ext 1 arm AA from R  20x  5s 20x   5s  20x5"         Supine cane flexion/ cp   20x ea  20x           Standing cane ext  20x  20x  20x         TB rows, lpd, no monies  rtb x 20 ea  rtb x 20  RTB x20 ea                      Serratus/ triceps  20x ea 20x ea  20x ea                                                                                       Ther Activity                                       Gait Training                                       Modalities             CP left shoulder to end  10'  10'

## 2021-08-17 ENCOUNTER — OFFICE VISIT (OUTPATIENT)
Dept: PHYSICAL THERAPY | Facility: CLINIC | Age: 61
End: 2021-08-17
Payer: COMMERCIAL

## 2021-08-17 ENCOUNTER — TELEPHONE (OUTPATIENT)
Dept: FAMILY MEDICINE CLINIC | Facility: CLINIC | Age: 61
End: 2021-08-17

## 2021-08-17 DIAGNOSIS — M19.012 PRIMARY OSTEOARTHRITIS OF LEFT SHOULDER: Primary | ICD-10-CM

## 2021-08-17 PROCEDURE — 97110 THERAPEUTIC EXERCISES: CPT

## 2021-08-17 PROCEDURE — 97140 MANUAL THERAPY 1/> REGIONS: CPT

## 2021-08-17 NOTE — TELEPHONE ENCOUNTER
Sheryle Alter from [facility] called and stated the patients insurance is requesting two medication that are on the formulary be tried first prior to them approving coverage for diclofenac sodium 1%  They would like for the patient to try two of the following medications listed below       Diclofenac sodium topical solution 1 5%  Diclofenac tablets 25 mg    diclofenac sodium DR 50 mg 75 mg  meloxicam

## 2021-08-17 NOTE — PROGRESS NOTES
Daily Note     Today's date: 2021  Patient name: Tommy Aly  : 1960  MRN: [id number]  Referring provider: Timothy Burks MD  Dx:   Encounter Diagnosis     ICD-10-CM    1  Primary osteoarthritis of left shoulder  M19 012                   Subjective:  "I think I have a bone spur, I can feel it 'catch' in two spots (with AAROM flexion with cane) "  SPR=4-/10  Patient stated activity (elevation above 90 degrees) aggravates left shoulder pain; patient also noted "light" activity makes left shoulder "feel better" she suspects movement decreases swelling and as a result decreases pain  Objective: See treatment diary below      Assessment: Tolerated treatment with good understanding of current program and HEP; patient demonstrated good technique and pacing with minimal vc's for instruction  L shoulder PROM is WFL in all planes with discomfort noted at end range ER  Patient demonstrated fatigue post treatment and would benefit from continued PT      Plan: Continue per plan of care  Progress treatment as tolerated         Precautions: Left shoulder PAIN -  Severe OA      Manuals         FOTO  db    LA                     PROM- AAROM Left shoulder  db db db np LA        IASTM  Left bicep/ proximallly   db IVV LA        kinesiotape Left shoulder "Y"  "I" db NP           Neuro Re-Ed             Back rolls/ scap retract 20x ea  20x ea    20x ea  20x ea  20x each                                                                                      Ther Ex             UBE  4' alt 5' alt 5' alt 5' alt        pulleys  5' 3'  10s hold 3' 10s hold :10 hold  x3'        Wall slides 10s x 10 10s x 10 10s x 10 10s x10 :10  10x        IR Towel stretch 20s x 3 20s x 3 20s x 3 20x3 :20  3x        Stand cane ext 1 arm AA from R  20x  5s 20x   5s  20x5"         Supine cane flexion/ cp   20x ea  20x   20x        Standing cane ext  20x  20x  20x 20x        TB rows, lpd, no monies  rtb x 20 ea  rtb x 20 RTB x20 ea RTB  20x each                     Serratus/ triceps  20x ea 20x ea  20x ea 20x each        Standing cane IR     20x                                                                         Ther Activity                                       Gait Training                                       Modalities             CP left shoulder to end  10'  10'   10'

## 2021-08-20 ENCOUNTER — OFFICE VISIT (OUTPATIENT)
Dept: PHYSICAL THERAPY | Facility: CLINIC | Age: 61
End: 2021-08-20
Payer: COMMERCIAL

## 2021-08-20 DIAGNOSIS — M19.012 PRIMARY OSTEOARTHRITIS OF LEFT SHOULDER: Primary | ICD-10-CM

## 2021-08-20 PROCEDURE — 97140 MANUAL THERAPY 1/> REGIONS: CPT

## 2021-08-20 PROCEDURE — 97110 THERAPEUTIC EXERCISES: CPT

## 2021-08-20 NOTE — PROGRESS NOTES
Daily Note     Today's date: 2021  Patient name: Margarita Ayon  : 1960  MRN: [id number]  Referring provider: Neoma Sicard, MD  Dx:   Encounter Diagnosis     ICD-10-CM    1  Primary osteoarthritis of left shoulder  M19 012                   Subjective: Patient stated she is "almost done" with moving and plans to finish with "last bit of cleaning today"  Objective: See treatment diary below      Assessment: Tolerated treatment well  Patient demonstrated fatigue post treatment, exhibited good technique with therapeutic exercises and would benefit from continued PT      Plan: Continue per plan of care  Progress treatment as tolerated         Precautions: Left shoulder PAIN -  Severe OA      Manuals     FOTO  db    LA               PROM- AAROM Left shoulder  db db db np LA LA      IASTM  Left bicep/ proximallly   db VIV LA     kinesiotape Left shoulder "Y"  "I" db NP        Neuro Re-Ed          Back rolls/ scap retract 20x ea  20x ea    20x ea  20x ea  20x each 20x each                                                                Ther Ex          UBE  4' alt 5' alt 5' alt 5' alt 5' alt    pulleys  5' 3'  10s hold 3' 10s hold :10 hold  x3' :10 hold  x3'    Wall slides 10s x 10 10s x 10 10s x 10 10s x10 :10  10x :10  10x    IR Towel stretch 20s x 3 20s x 3 20s x 3 20x3 :20  3x :20  3x    Stand cane ext 1 arm AA from R  20x  5s 20x   5s  20x5"  :05  20x    Supine cane flexion/ cp   20x ea  20x   20x 20x    Standing cane ext  20x  20x  20x 20x :05  20x    TB rows, lpd, no monies  rtb x 20 ea  rtb x 20  RTB x20 ea RTB  20x each RTB  20x each              Serratus/ triceps  20x ea 20x ea  20x ea 20x each 20x each    Standing cane IR     20x 20x                                                      Ther Activity                              Gait Training                              Modalities          CP left shoulder to end  10'  10'   10' 10'

## 2021-08-25 ENCOUNTER — OFFICE VISIT (OUTPATIENT)
Dept: PHYSICAL THERAPY | Facility: CLINIC | Age: 61
End: 2021-08-25
Payer: COMMERCIAL

## 2021-08-25 DIAGNOSIS — M19.012 PRIMARY OSTEOARTHRITIS OF LEFT SHOULDER: Primary | ICD-10-CM

## 2021-08-25 PROCEDURE — 97112 NEUROMUSCULAR REEDUCATION: CPT | Performed by: PHYSICAL THERAPIST

## 2021-08-25 PROCEDURE — 97140 MANUAL THERAPY 1/> REGIONS: CPT | Performed by: PHYSICAL THERAPIST

## 2021-08-25 PROCEDURE — 97110 THERAPEUTIC EXERCISES: CPT | Performed by: PHYSICAL THERAPIST

## 2021-08-25 NOTE — PROGRESS NOTES
Daily Note     Today's date: 2021  Patient name: Stas Rawls  : 1960  MRN: [id number]  Referring provider: Markus Aaron MD  Dx:   Encounter Diagnosis     ICD-10-CM    1  Primary osteoarthritis of left shoulder  M19 012                   Subjective: Patient states that she is improving each week  7/10 VPS @ worst ( in bed @ night )  Pleased with progress  Objective: See treatment diary below      Assessment: Tolerated treatment well  Myofascial restrictions are less left biceps region today with IASTM   PROM improving all planes  Most discomfort is produced end range flexion > abd,  ER > IR  Plan: Continue per plan of care  Progress treatment as tolerated  Recommendation is to continue through September to further strengthening          Precautions: Left shoulder PAIN -  Severe OA      Manuals  825   FOTO  db    LA               PROM- AAROM Left shoulder  db db db np LA LA   db   IASTM  Left bicep/ proximallly   db VIV LA  db   kinesiotape Left shoulder "Y"  "I" db NP        Neuro Re-Ed          Back rolls/ scap retract 20x ea  20x ea    20x ea  20x ea  20x each 20x each 20x ea                                                               Ther Ex          UBE  4' alt 5' alt 5' alt 5' alt 5' alt 5' alt   pulleys  5' 3'  10s hold 3' 10s hold :10 hold  x3' :10 hold  x3' 10s x 3'   Wall slides 10s x 10 10s x 10 10s x 10 10s x10 :10  10x :10  10x 10s x 10    IR Towel stretch 20s x 3 20s x 3 20s x 3 20x3 :20  3x :20  3x 20s x 5   Stand cane ext 1 arm AA from R  20x  5s 20x   5s  20x5"  :05  20x 2 5# x 20    Supine cane flexion/ cp   20x ea  20x   20x 20x 2 5# x 20    Standing cane ext  20x  20x  20x 20x :05  20x 2 5# x 20    TB rows, lpd, no monies  rtb x 20 ea  rtb x 20  RTB x20 ea RTB  20x each RTB  20x each gtb x 20              Serratus/ triceps  20x ea 20x ea  20x ea 20x each 20x each 2# x 20 ea    Standing cane IR     20x 20x 2 5# x 20    BOR / tricep kickbacks / biceps        2# x 20 ea    Supine flexion/ abd to 90        20x ea                                                                          Ther Activity                              Gait Training                              Modalities          CP left shoulder to end  10'  10'   10' 10' 10'

## 2021-08-27 ENCOUNTER — OFFICE VISIT (OUTPATIENT)
Dept: PHYSICAL THERAPY | Facility: CLINIC | Age: 61
End: 2021-08-27
Payer: COMMERCIAL

## 2021-08-27 DIAGNOSIS — M19.012 PRIMARY OSTEOARTHRITIS OF LEFT SHOULDER: Primary | ICD-10-CM

## 2021-08-27 PROCEDURE — 97110 THERAPEUTIC EXERCISES: CPT

## 2021-08-27 PROCEDURE — 97140 MANUAL THERAPY 1/> REGIONS: CPT

## 2021-08-27 PROCEDURE — 97112 NEUROMUSCULAR REEDUCATION: CPT

## 2021-08-27 NOTE — PROGRESS NOTES
Daily Note     Today's date: 2021  Patient name: Angie Ramesh  : 1960  MRN: [id number]  Referring provider: Matthew Goldberg MD  Dx:   Encounter Diagnosis     ICD-10-CM    1  Primary osteoarthritis of left shoulder  M19 012        Start Time: 1320          Subjective: SPR=4/10 of left shoulder  Objective: See treatment diary below      Assessment: Tolerated treatment well  Patient demonstrated fatigue post treatment, exhibited good technique with therapeutic exercises and would benefit from continued PT      Plan: Continue per plan of care  Progress treatment as tolerated         Precautions: Left shoulder PAIN -  Severe OA      Manuals  825   FOTO      LA               PROM- AAROM Left shoulder  LA   np LA LA   db   IASTM  Left bicep/ proximallly LA   VIV LA  db   kinesiotape Left shoulder "Y"  "I"          Neuro Re-Ed          Back rolls/ scap retract 20x each   20x ea  20x each 20x each 20x ea                                                               Ther Ex          UBE 5' alt    5' alt 5' alt 5' alt 5' alt   pulleys :10  3'   3' 10s hold :10 hold  x3' :10 hold  x3' 10s x 3'   Wall slides :10  10x   10s x10 :10  10x :10  10x 10s x 10    IR Towel stretch :20  5x   20x3 :20  3x :20  3x 20s x 5   Stand cane ext 1 arm AA from R 2 5#  20x    20x5"  :05  20x 2 5# x 20    Supine cane flexion/ cp  2 5#  20x    20x 20x 2 5# x 20    Standing cane ext 2 5#  20x   20x 20x :05  20x 2 5# x 20    TB rows, lpd, no monies BTB  20x each   RTB x20 ea RTB  20x each RTB  20x each gtb x 20              Serratus/ triceps 2#  20x each   20x ea 20x each 20x each 2# x 20 ea    Standing cane IR 2 5#  20x    20x 20x 2 5# x 20    BOR / tricep kickbacks / biceps  2#  20x each      2# x 20 ea    Supine flexion/ abd to 90  20x each  Flex 2#  abd   0#      20x ea                                                                          Ther Activity                              Gait Training Modalities          CP left shoulder to end 10'    10' 10' 10' 15yo f w 3 dental fx, including Petersen III, of superior central incisors and lateral incisor. Will give motrin, dental consult

## 2021-09-01 ENCOUNTER — OFFICE VISIT (OUTPATIENT)
Dept: PHYSICAL THERAPY | Facility: CLINIC | Age: 61
End: 2021-09-01
Payer: COMMERCIAL

## 2021-09-01 DIAGNOSIS — M19.012 PRIMARY OSTEOARTHRITIS OF LEFT SHOULDER: Primary | ICD-10-CM

## 2021-09-01 PROCEDURE — 97110 THERAPEUTIC EXERCISES: CPT | Performed by: PHYSICAL THERAPIST

## 2021-09-01 PROCEDURE — 97112 NEUROMUSCULAR REEDUCATION: CPT | Performed by: PHYSICAL THERAPIST

## 2021-09-01 NOTE — PROGRESS NOTES
Daily Note     Today's date: 2021  Patient name: Julio Mchugh  : 1960  MRN: [id number]  Referring provider: Vitaliy Mattson MD  Dx:   Encounter Diagnosis     ICD-10-CM    1  Primary osteoarthritis of left shoulder  M19 012                   Subjective: SPR=5/10 of left shoulder "when using it"  At worst     " I feel like the pain is never going to go away"  Objective: See treatment diary below      Assessment: Tolerated treatment fair overall -   End range PROM remains uncomfortable yet her movement continues to improve    Plan: Continue per plan of care  Progress treatment as tolerated         Precautions: Left shoulder PAIN -  Severe OA      Manuals      825   FOTO                     PROM- AAROM Left shoulder  LA db     db   IASTM  Left bicep/ proximallly LA def     db   kinesiotape Left shoulder "Y"  "I"          Neuro Re-Ed          Back rolls/ scap retract 20x each 20x ea      20x ea                                                               Ther Ex          UBE 5' alt  6' alt      5' alt   pulleys :10  3' 3'      10s x 3'   Wall slides :10  10x 10s x 10     10s x 10    IR Towel stretch :20  5x 20s x 5      20s x 5   Stand cane ext 1 arm AA from R 2 5#  20x  3# x 20 ea      2 5# x 20    Supine cane flexion/ cp  2 5#  20x 3# x 20      2 5# x 20    Standing cane ext 2 5#  20x 3# x 20      2 5# x 20    TB rows, lpd, no monies, paloff press  BTB  20x each gtb x 20 ea      gtb x 20              Serratus/ triceps 2#  20x each 2# x 20     2# x 20 ea    Standing cane IR 2 5#  20x 3# x 20      2 5# x 20    BOR / tricep kickbacks / biceps  2#  20x each 2# x 20      2# x 20 ea    Supine flexion/ abd to 90  20x each  Flex 2#  abd   0# 20x 2# flex     20x ea    Place and hold  5s x 10        Arc oH   10x L                                                          Ther Activity                              Gait Training                              Modalities          CP left shoulder to end 10' 10'

## 2021-09-03 ENCOUNTER — OFFICE VISIT (OUTPATIENT)
Dept: PHYSICAL THERAPY | Facility: CLINIC | Age: 61
End: 2021-09-03
Payer: COMMERCIAL

## 2021-09-03 DIAGNOSIS — M19.012 PRIMARY OSTEOARTHRITIS OF LEFT SHOULDER: Primary | ICD-10-CM

## 2021-09-03 PROCEDURE — 97112 NEUROMUSCULAR REEDUCATION: CPT

## 2021-09-03 PROCEDURE — 97110 THERAPEUTIC EXERCISES: CPT

## 2021-09-03 NOTE — PROGRESS NOTES
Daily Note     Today's date: 9/3/2021  Patient name: Castro Boykin  : 1960  MRN: [id number]  Referring provider: Christian Nolasco MD  Dx:   Encounter Diagnosis     ICD-10-CM    1  Primary osteoarthritis of left shoulder  M19 012                   Subjective: Patient stated "today is a better day"; patient also reported she is pleased with gains in AROM, strength and decline in discomfort since initiating physical therapy services  Objective: See treatment diary below      Assessment: Tolerated treatment with good understanding of present HEP with good technique and pacing  Note overall improved left shoulder PROM; WFL in all planes of motion  Patient also presented with less tissue restriction of anterior left G-H joint/bicep region    Patient demonstrated fatigue post treatment and would benefit from continued PT      Plan: Continue per plan of care  Progress treatment as tolerated         Precautions: Left shoulder PAIN -  Severe OA      Manuals 8/27 9/1 9/3    825   FOTO                     PROM- AAROM Left shoulder  LA db LA    db   IASTM  Left bicep/ proximallly LA def LA    db   kinesiotape Left shoulder "Y"  "I"          Neuro Re-Ed          Back rolls/ scap retract 20x each 20x ea  20x each    20x ea                                                               Ther Ex          UBE 5' alt  6' alt  6' alt    5' alt   pulleys :10  3' 3'  :10  3'    10s x 3'   Wall slides :10  10x 10s x 10 :10  10x    10s x 10    IR Towel stretch :20  5x 20s x 5  :20  5x    20s x 5   Stand cane ext 1 arm AA from R 2 5#  20x  3# x 20 ea  3#  20x each    2 5# x 20    Supine cane flexion/ cp  2 5#  20x 3# x 20  3#  20x each    2 5# x 20    Standing cane ext 2 5#  20x 3# x 20  3#  20x    2 5# x 20    TB rows, lpd, no monies, paloff press  BTB  20x each gtb x 20 ea  GTB  20x each    gtb x 20              Serratus/ triceps 2#  20x each 2# x 20 3#  20x    2# x 20 ea    Standing cane IR 2 5#  20x 3# x 20  3#  20x     2 5# x 20    BOR / tricep kickbacks / biceps  2#  20x each 2# x 20  3#  20x    2# x 20 ea    Supine flexion/ abd to 90  20x each  Flex 2#  abd   0# 20x 2# flex 3#  20x  Flexion 0# abd    20x ea    Place and hold  5s x 10 :05  10x       Arc oH   10x L L  10x                                                         Ther Activity                              Gait Training                              Modalities          CP left shoulder to end 10'  Pt def    10'

## 2021-09-08 ENCOUNTER — OFFICE VISIT (OUTPATIENT)
Dept: PHYSICAL THERAPY | Facility: CLINIC | Age: 61
End: 2021-09-08
Payer: COMMERCIAL

## 2021-09-08 ENCOUNTER — OFFICE VISIT (OUTPATIENT)
Dept: OBGYN CLINIC | Facility: CLINIC | Age: 61
End: 2021-09-08
Payer: COMMERCIAL

## 2021-09-08 VITALS
HEART RATE: 66 BPM | SYSTOLIC BLOOD PRESSURE: 83 MMHG | HEIGHT: 64 IN | DIASTOLIC BLOOD PRESSURE: 60 MMHG | WEIGHT: 236.4 LBS | BODY MASS INDEX: 40.36 KG/M2

## 2021-09-08 DIAGNOSIS — M19.012 PRIMARY OSTEOARTHRITIS OF LEFT SHOULDER: Primary | ICD-10-CM

## 2021-09-08 PROCEDURE — 99213 OFFICE O/P EST LOW 20 MIN: CPT | Performed by: ORTHOPAEDIC SURGERY

## 2021-09-08 PROCEDURE — 97112 NEUROMUSCULAR REEDUCATION: CPT

## 2021-09-08 PROCEDURE — 3008F BODY MASS INDEX DOCD: CPT | Performed by: STUDENT IN AN ORGANIZED HEALTH CARE EDUCATION/TRAINING PROGRAM

## 2021-09-08 PROCEDURE — 97110 THERAPEUTIC EXERCISES: CPT

## 2021-09-08 NOTE — PROGRESS NOTES
Daily Note     Today's date: 2021  Patient name: Michael Cr  : 1960  MRN: [id number]  Referring provider: Lord Meera MD  Dx:   Encounter Diagnosis     ICD-10-CM    1  Primary osteoarthritis of left shoulder  M19 012                   Subjective: Patient reported fatigue today  Patient also c/o crepitus(without pain) of right knee  Patient stated she saw physician and will try to become independent by month's end  Objective: See treatment diary below      Assessment: Tolerated treatment well  Patient demonstrated fatigue post treatment, exhibited good technique with therapeutic exercises and would benefit from continued PT      Plan: Continue per plan of care  Progress treatment as tolerated         Precautions: Left shoulder PAIN -  Severe OA      Manuals 8/27 9/1 9/3 9/8   825   FOTO                     PROM- AAROM Left shoulder  LA db LA LA   db   IASTM  Left bicep/ proximallly LA def LA LA   db   kinesiotape Left shoulder "Y"  "I"          Neuro Re-Ed          Back rolls/ scap retract 20x each 20x ea  20x each 20x each   20x ea                                                               Ther Ex          UBE 5' alt  6' alt  6' alt 6' alt   5' alt   pulleys :10  3' 3'  :10  3' :10  3'   10s x 3'   Wall slides :10  10x 10s x 10 :10  10x :10  10x   10s x 10    IR Towel stretch :20  5x 20s x 5  :20  5x :20  5x   20s x 5   Stand cane ext 1 arm AA from R 2 5#  20x  3# x 20 ea  3#  20x each 3#  20x each   2 5# x 20    Supine cane flexion/ cp  2 5#  20x 3# x 20  3#  20x each 3#  20x each   2 5# x 20    Standing cane ext 2 5#  20x 3# x 20  3#  20x 3#  20x   2 5# x 20    TB rows, lpd, no monies, paloff press  BTB  20x each gtb x 20 ea  GTB  20x each GTB  20x each   gtb x 20              Serratus/ triceps 2#  20x each 2# x 20 3#  20x 3#  20x    2# x 20 ea    Standing cane IR 2 5#  20x 3# x 20  3#  20x  3#  20x   2 5# x 20    BOR / tricep kickbacks / biceps  2#  20x each 2# x 20  3#  20x 3#  20x 2# x 20 ea    Supine flexion/ abd to 90  20x each  Flex 2#  abd   0# 20x 2# flex 3#  20x  Flexion 0# abd Flexion:3#  Abd:  0#  20x each     20x ea    Place and hold  5s x 10 :05  10x :05  10x      Arc oH   10x L L  10x L  10x                                                        Ther Activity                              Gait Training                              Modalities          CP left shoulder to end 10'  Pt def    10'

## 2021-09-08 NOTE — PROGRESS NOTES
SUBJECTIVE   a 47-year-old female presented to the office for follow-up of  Left shoulder osteoarthritis  The patient last received a glenohumeral joint steroid injection on 06/02/2021  At last office exam on 07/14/2021 patient was instructed to begin physical therapy  Today the patient states that her physical therapy is going very well  She states her last physical therapy session is scheduled for the end of the month  She reports improved range of motion of the shoulder, however external rotation is still a bit of a challenge  The patient denies any new injury or trauma  She denies any numbness or tingling in the upper left extremity        ROS:   General: No fever, no chills, no weight loss, no weight gain  HEENT:  No loss of hearing, no nose bleeds, no sore throat  Eyes:  No eye pain, no red eyes, no visual disturbance  Respiratory:  No cough, no shortness of breath, no wheezing  Cardiovascular:  No chest pain, no palpitations, no edema  GI: No abdominal pain, no nausea, no vomiting  Endocrine: No frequent urination, no excessive thirst  Urinary:  No dysuria, no hematuria, no incontinence  Musculoskeletal: see HPI and PE  Skin:  No rash, no wounds  Neurological:  No dizziness, no headache, no numbness  Psychiatric:  No difficulty concentrating, no depression, no suicide thoughts, no anxiety  Review of all other systems is negative    PMH:  Past Medical History:   Diagnosis Date    Anxiety     Arthritis     Attention and concentration deficit     Blurred vision     Chronic pain     Chronic pain syndrome 1/20/2016    CPAP (continuous positive airway pressure) dependence     Depression     Diplopia     Dyspepsia     Gastric ulcer     Gastritis     GERD (gastroesophageal reflux disease)     History of transfusion 2005    Hypertension     Insomnia     Irritable bowel syndrome     Memory loss     Migraines     Osteopenia     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    Presence of neurostimulator     Pudendal neuralgia    Pudendal neuralgia     Sleep apnea     Spinal stenosis     Starting and stopping of urinary stream during micturition     Urinary incontinence     Wears eyeglasses        PSH:  Past Surgical History:   Procedure Laterality Date     SECTION  1988   [place] GASTRIC BYPASS  2004    HYSTERECTOMY  2012    INSERT / REPLACE PERIPHERAL NEUROSTIMULATOR PULSE GENERATOR /       MASS EXCISION Right 2021    Procedure: EXCISION  BIOPSY LESION/MASS LOWER NECK;  Surgeon: Austen Torres DO;  Location: [facility];  Service: General    OTHER SURGICAL HISTORY      Reimplantatin at [facility] NEUROSTIM/ Right 4/15/2020    Procedure: REPLACEMENT IMPLANTABLE PULSE GENERATOR FOR DORSAL SPINAL COLUMN STIMULATOR,  RIGHT BUTTOCKS;  Surgeon: Charo Barron MD;  Location:  MAIN OR;  Service: Neurosurgery    [address]      URETHRAL FISTULA REPAIR      US GUIDED THYROID BIOPSY  2020    WRIST ARTHROSCOPY      with internal fixation        Medications:  Current Outpatient Medications   Medication Sig Dispense Refill    Cholecalciferol (D3-1000) 1000 units capsule Take 1 capsule by mouth daily        docusate sodium (COLACE) 100 mg capsule Take 1 capsule (100 mg total) by mouth 3 (three) times a day as needed for constipation (while taking narcotic pain medication) 30 capsule 0    dronabinol (MARINOL) 5 MG capsule Take 1 capsule (5 mg total) by mouth 2 (two) times a day before meals By Dr Ynes Hughes 60 capsule 0    Galcanezumab-gnlm 120 MG/ML SOAJ Inject 120 mg under the skin every 30 (thirty) days 3 pen 4    Iron-Vitamin C (IRON 100/C) 100-250 MG TABS Take by mouth daily      Multiple Vitamin (MULTI-VITAMIN DAILY) TABS Take 1 tablet by mouth daily      ondansetron (ZOFRAN-ODT) 4 mg disintegrating tablet Take 1 tablet (4 mg total) by mouth every 6 (six) hours as needed for nausea or vomiting 30 tablet 5    propranolol (INDERAL) 60 mg tablet TAKE 1 TABLET TWICE A  tablet 3    rizatriptan (MAXALT-MLT) 10 MG disintegrating tablet PLACE 1 TABLET ON TONGUE ONCE AS NEEDED FOR MIGRAINE MAY REPEAT EVERY 2 HOURS IF NEEDED, MAXIMUM 30 MG IN 24 HOURS 12 tablet 23    rosuvastatin (CRESTOR) 10 MG tablet TAKE 1 TABLET DAILY 90 tablet 3    Botox 200 units SOLR       busPIRone (BUSPAR) 10 mg tablet Take 1 tablet (10 mg total) by mouth 2 (two) times a day 180 tablet 1    capsaicin (ZOSTRIX) 0 025 % cream Apply 1 application topically 2 (two) times a day (Patient not taking: Reported on 9/8/2021) 60 g 0    gabapentin (NEURONTIN) 300 mg capsule Take 1 capsule (300 mg total) by mouth 2 (two) times a day 180 capsule 1    lidocaine (XYLOCAINE) 5 % ointment Apply topically 2 (two) times a day as needed for moderate pain 35 44 g 1    lisinopril (ZESTRIL) 10 mg tablet TAKE 1 TABLET DAILY 90 tablet 3    meloxicam (Mobic) 15 mg tablet Take 1 tablet (15 mg total) by mouth daily 21 tablet 0    mometasone (ELOCON) 0 1 % cream APPLY TOPICALLY DAILY 45 g 1    pantoprazole (PROTONIX) 40 mg tablet TAKE 1 TABLET TWICE A  tablet 3    QUEtiapine (SEROquel) 50 mg tablet Take 1 tablet (50 mg total) by mouth daily at bedtime 90 tablet 1    sertraline (ZOLOFT) 100 mg tablet Take 1 tablet (100 mg total) by mouth daily 90 tablet 1     No current facility-administered medications for this visit  Allergies:   Allergies   Allergen Reactions    Morphine Abdominal Pain     SEVERE N/V    Cat Hair Extract Nasal Congestion     Cat Dander    Dog Epithelium Nasal Congestion    Other Other (See Comments)     Dogs- sneezing  Crab Meat- GI intolerance       Family History:  Family History   Problem Relation Age of Onset    Other Mother         Back Disorder     Cirrhosis Mother     Crohn's disease Mother     Lupus Mother         Systemic Lupus Erythematous     Hypertension Father     Heart disease Father     Other Brother         Liver Transplant     Crohn's disease Brother     Crohn's disease Brother     No Known Problems Sister     No Known Problems Daughter     No Known Problems Maternal Aunt     No Known Problems Cousin     No Known Problems Cousin     No Known Problems Cousin     No Known Problems Cousin     No Known Problems Cousin     Seizures Neg Hx        Social History:  Social History     Occupational History    Occupation: retired   Tobacco Use    Smoking status: Never Smoker    Smokeless tobacco: Never Used   Vaping Use    Vaping Use: Never used   Substance and Sexual Activity    Alcohol use: No    Drug use: Yes     Types: Marijuana     Comment: medical marijuana 2x a day capsules    Sexual activity: Yes       Physical Exam:  General :  Alert, cooperative, no distress, appears stated age  Blood pressure (!) 83/60, pulse 66, height 5' 4" (1 626 m), weight 107 kg (236 lb 6 4 oz), not currently breastfeeding  Head:  Normocephalic, without obvious abnormality, atraumatic   Eyes:  Conjunctiva/corneas clear, EOM's intact,   Ears: Both ears normal appearance, no hearing deficits  Nose: Nares normal, septum midline, no drainage    Neck: Supple,  trachea midline, no adenopathy, no tenderness, no mass   Back:   Symmetric, no curvature, ROM normal, no tenderness   Lungs:   Respirations unlabored   Chest Wall:  No tenderness or deformity   Extremities: Extremities normal, atraumatic, no cyanosis or edema      Pulses: 2+ and symmetric   Skin: Skin color, texture, turgor normal, no rashes or lesions      Neurologic: Normal           Left Shoulder Exam     Tenderness   The patient is experiencing no tenderness  Range of Motion   External rotation: abnormal Left shoulder external rotation: Limited, but improved since last exam      Muscle Strength   The patient has normal left shoulder strength    Abduction: 5/5   Internal rotation: 5/5   External rotation: 5/5     Tests   Gallardo test: negative  Impingement: negative  Drop arm: negative    Other   Erythema: absent  Scars: absent  Sensation: normal  Pulse: present     Comments:    Skin without lesions, effusion, ecchymosis, erythema, warmth or other signs of infection  Sensation intact along the median, radial, ulnar nerve distributions  Brisk cap refill in all fingers  Imaging Studies: The following imaging studies were reviewed in office today  My findings are noted  No new images today  Assessment  Encounter Diagnosis   Name Primary?  Primary osteoarthritis of left shoulder Yes         Plan:    56-year-old female presents to the office today for follow-up of left shoulder osteoarthritis  The patient is doing very well and notes improved range of motion in history and exam   The patient will finish her physical therapy by the end of the month  The patient was instructed to continue with home exercises once physical therapy has finished  She was advised that due to her osteoarthritis her pain and discomfort may continue  She may take Tylenol/ NSAIDs for pain relief as needed  The patient was instructed to follow-up as needed if any problems arise

## 2021-09-10 ENCOUNTER — OFFICE VISIT (OUTPATIENT)
Dept: PHYSICAL THERAPY | Facility: CLINIC | Age: 61
End: 2021-09-10
Payer: COMMERCIAL

## 2021-09-10 DIAGNOSIS — M19.012 PRIMARY OSTEOARTHRITIS OF LEFT SHOULDER: Primary | ICD-10-CM

## 2021-09-10 PROCEDURE — 97110 THERAPEUTIC EXERCISES: CPT | Performed by: PHYSICAL THERAPIST

## 2021-09-10 PROCEDURE — 97112 NEUROMUSCULAR REEDUCATION: CPT | Performed by: PHYSICAL THERAPIST

## 2021-09-10 NOTE — PROGRESS NOTES
Daily Note     Today's date: 9/10/2021  Patient name: Margarita Ayon  : 1960  MRN: [id number]  Referring provider: Neoma Sicard, MD  Dx:   Encounter Diagnosis     ICD-10-CM    1  Primary osteoarthritis of left shoulder  M19 012                   Subjective: patient continues to reports an occasional "catch- sharp pain" - intermittent depending on positioning  Notes new exercises are challenging but pleased to be able to do them    Objective: See treatment diary below      Assessment: Tolerated treatment well  Less restrictions felt with iastm today -   Fatigued by additions today     Plan: Continue per plan of care  Progress treatment as tolerated  monitor response to changes        Precautions: Left shoulder PAIN -  Severe OA      Manuals 8/27 9/1 9/3 9/8 9/10  825   FOTO                     PROM- AAROM Left shoulder  LA db LA LA db  db   IASTM  Left bicep/ proximallly LA def LA LA db  db   kinesiotape Left shoulder "Y"  "I"          Neuro Re-Ed          Back rolls/ scap retract 20x each 20x ea  20x each 20x each hep  20x ea                                                               Ther Ex          UBE 5' alt  6' alt  6' alt 6' alt 6' alt  5' alt   pulleys :10  3' 3'  :10  3' :10  3' 3'   10s x 3'   Wall slides :10  10x 10s x 10 :10  10x :10  10x 10s x 10  10s x 10    IR Towel stretch :20  5x 20s x 5  :20  5x :20  5x hep  20s x 5   Stand cane ext 1 arm AA from R 2 5#  20x  3# x 20 ea  3#  20x each 3#  20x each dc  2 5# x 20    Supine cane flexion/ cp  2 5#  20x 3# x 20  3#  20x each 3#  20x each dc  2 5# x 20    Standing cane ext 2 5#  20x 3# x 20  3#  20x 3#  20x dc  2 5# x 20    TB rows, lpd, no monies, paloff press  BTB  20x each gtb x 20 ea  GTB  20x each GTB  20x each btb x 20 ea   gtb x 20              Serratus/ triceps 2#  20x each 2# x 20 3#  20x 3#  20x  3# x 20   2# x 20 ea    Standing cane IR 2 5#  20x 3# x 20  3#  20x  3#  20x dc  2 5# x 20    BOR / tricep kickbacks / biceps  2#  20x each 2# x 20  3#  20x 3#  20x 3# x 20   2# x 20 ea    Supine flexion/ abd to 90  20x each  Flex 2#  abd   0# 20x 2# flex 3#  20x  Flexion 0# abd Flexion:3#  Abd:  0#  20x each   3#  flexion x 20   20x ea    Place and hold  5s x 10 :05  10x :05  10x 5s x 10      Arc oH   10x L L  10x L  10x L 10 x     Stand flex/ scap     2# x 20     Cw/ccw ball circles      rmb x 20 ea      Ball walks chest/ OH     gmb x 5 30'                                                                  Ther Activity                              Gait Training                              Modalities          CP left shoulder to end 10'  Pt def    10'

## 2021-09-15 ENCOUNTER — OFFICE VISIT (OUTPATIENT)
Dept: PHYSICAL THERAPY | Facility: CLINIC | Age: 61
End: 2021-09-15
Payer: COMMERCIAL

## 2021-09-15 DIAGNOSIS — M19.012 PRIMARY OSTEOARTHRITIS OF LEFT SHOULDER: Primary | ICD-10-CM

## 2021-09-15 PROCEDURE — 97140 MANUAL THERAPY 1/> REGIONS: CPT

## 2021-09-15 PROCEDURE — 97112 NEUROMUSCULAR REEDUCATION: CPT

## 2021-09-15 PROCEDURE — 97110 THERAPEUTIC EXERCISES: CPT

## 2021-09-15 NOTE — PROGRESS NOTES
Daily Note     Today's date: 9/15/2021  Patient name: Margarita Ayon  : 1960  MRN: [id number]  Referring provider: Neoma Sicard, MD  Dx:   Encounter Diagnosis     ICD-10-CM    1  Primary osteoarthritis of left shoulder  M19 012        Start Time: 1205          Subjective: Patient continues to report headache "almost daily, later in the day"  Discussed eyewear/eye strain as possible irritant/cause of HA  Objective: See treatment diary below      Assessment: Tolerated treatment and progression from last session well; patient appeared and reported being challenged    Patient demonstrated fatigue post treatment, exhibited good technique with therapeutic exercises and would benefit from continued PT      Plan: Continue per plan of care  Progress treatment as tolerated         Precautions: Left shoulder PAIN -  Severe OA      Manuals 8/27 9/1 9/3 9/8 9/10 9/15    FOTO       LA              PROM- AAROM Left shoulder  LA db LA LA db LA    IASTM  Left bicep/ proximallly LA def LA LA db LA    kinesiotape Left shoulder "Y"  "I"          Neuro Re-Ed          Back rolls/ scap retract 20x each 20x ea  20x each 20x each hep                                                                 Ther Ex          UBE 5' alt  6' alt  6' alt 6' alt 6' alt 6'  alt    pulleys :10  3' 3'  :10  3' :10  3' 3'  3'    Wall slides :10  10x 10s x 10 :10  10x :10  10x 10s x 10 :10  10x    IR Towel stretch :20  5x 20s x 5  :20  5x :20  5x hep     Stand cane ext 1 arm AA from R 2 5#  20x  3# x 20 ea  3#  20x each 3#  20x each dc     Supine cane flexion/ cp  2 5#  20x 3# x 20  3#  20x each 3#  20x each dc     Standing cane ext 2 5#  20x 3# x 20  3#  20x 3#  20x dc     TB rows, lpd, no monies, paloff press  BTB  20x each gtb x 20 ea  GTB  20x each GTB  20x each btb x 20 ea  BTB  20x each              Serratus/ triceps 2#  20x each 2# x 20 3#  20x 3#  20x  3# x 20  3#  20x    Standing cane IR 2 5#  20x 3# x 20  3#  20x  3#  20x dc     BOR / tricep kickbacks / biceps  2#  20x each 2# x 20  3#  20x 3#  20x 3# x 20  3#  20x    Supine flexion/ abd to 90  20x each  Flex 2#  abd   0# 20x 2# flex 3#  20x  Flexion 0# abd Flexion:3#  Abd:  0#  20x each   3#  flexion x 20  3#  flexion 20x  0# scaption  20x    Place and hold  5s x 10 :05  10x :05  10x 5s x 10  Left  :05  10x    Arc oH   10x L L  10x L  10x L 10 x Left  10x    Stand flex/ scap     2# x 20 2#  20x    Cw/ccw ball circles      rmb x 20 ea  RMB  20x each    Ball walks chest/ OH     gmb x 5 30'  GMB  30'x5                                                                Ther Activity                              Gait Training                              Modalities          CP left shoulder to end 10'  Pt def

## 2021-09-17 ENCOUNTER — OFFICE VISIT (OUTPATIENT)
Dept: PHYSICAL THERAPY | Facility: CLINIC | Age: 61
End: 2021-09-17
Payer: COMMERCIAL

## 2021-09-17 DIAGNOSIS — M19.012 PRIMARY OSTEOARTHRITIS OF LEFT SHOULDER: Primary | ICD-10-CM

## 2021-09-17 PROCEDURE — 97140 MANUAL THERAPY 1/> REGIONS: CPT

## 2021-09-17 PROCEDURE — 97110 THERAPEUTIC EXERCISES: CPT

## 2021-09-17 PROCEDURE — 97112 NEUROMUSCULAR REEDUCATION: CPT

## 2021-09-17 NOTE — PROGRESS NOTES
Daily Note     Today's date: 2021  Patient name: Josefa Copeland  : 1960  MRN: [id number]  Referring provider: Patti Pinedo MD  Dx:   Encounter Diagnosis     ICD-10-CM    1  Primary osteoarthritis of left shoulder  M19 012                   Subjective: Upon presentation, SPR =2/10  Patient noted muscular fatigue post intervention and noted she is challenged with present program   Patient reports compliance with present HEP daily  Objective: See treatment diary below      Assessment: Tolerated treatment without limitations or restrictions in exercise performance    Patient demonstrated fatigue post treatment, exhibited good technique with therapeutic exercises and would benefit from continued PT      Plan: Continue per plan of care  Progress treatment as tolerated         Precautions: Left shoulder PAIN -  Severe OA      Manuals 8/27 9/1 9/3 9/8 9/10 9/15 9/17   FOTO       LA              PROM- AAROM Left shoulder  LA db LA LA db LA LA   IASTM  Left bicep/ proximallly LA def LA LA db LA LA   kinesiotape Left shoulder "Y"  "I"          Neuro Re-Ed          Back rolls/ scap retract 20x each 20x ea  20x each 20x each hep                                                                 Ther Ex          UBE 5' alt  6' alt  6' alt 6' alt 6' alt 6'  alt 6'   pulleys :10  3' 3'  :10  3' :10  3' 3'  3' 4'   Wall slides :10  10x 10s x 10 :10  10x :10  10x 10s x 10 :10  10x :10  10x   IR Towel stretch :20  5x 20s x 5  :20  5x :20  5x hep     Stand cane ext 1 arm AA from R 2 5#  20x  3# x 20 ea  3#  20x each 3#  20x each dc     Supine cane flexion/ cp  2 5#  20x 3# x 20  3#  20x each 3#  20x each dc     Standing cane ext 2 5#  20x 3# x 20  3#  20x 3#  20x dc     TB rows, lpd, no monies, paloff press  BTB  20x each gtb x 20 ea  GTB  20x each GTB  20x each btb x 20 ea  BTB  20x each BTB  20x each             Serratus/ triceps 2#  20x each 2# x 20 3#  20x 3#  20x  3# x 20  3#  20x 3#  20x   Standing cane IR 2 5#  20x 3# x 20  3#  20x  3#  20x dc     BOR / tricep kickbacks / biceps  2#  20x each 2# x 20  3#  20x 3#  20x 3# x 20  3#  20x 3#  20x   Supine flexion/ abd to 90  20x each  Flex 2#  abd   0# 20x 2# flex 3#  20x  Flexion 0# abd Flexion:3#  Abd:  0#  20x each   3#  flexion x 20  3#  flexion 20x  0# scaption  20x 3#  20x flexion  0#  scaption  20x each   Place and hold  5s x 10 :05  10x :05  10x 5s x 10  Left  :05  10x Left   1 5#  :05  10x   Arc oH   10x L L  10x L  10x L 10 x Left  10x Left   1 5#  10x   Stand flex/ scap     2# x 20 2#  20x 2#  20x   Cw/ccw ball circles      rmb x 20 ea  RMB  20x each RMB  20x each   Ball walks chest/ OH     gmb x 5 30'  GMB  30'x5 GMB  30'x5                                                               Ther Activity                              Gait Training                              Modalities          CP left shoulder to end 10'  Pt def
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
## 2021-09-22 ENCOUNTER — OFFICE VISIT (OUTPATIENT)
Dept: PHYSICAL THERAPY | Facility: CLINIC | Age: 61
End: 2021-09-22
Payer: COMMERCIAL

## 2021-09-22 ENCOUNTER — TELEMEDICINE (OUTPATIENT)
Dept: PSYCHIATRY | Facility: CLINIC | Age: 61
End: 2021-09-22
Payer: COMMERCIAL

## 2021-09-22 DIAGNOSIS — F41.0 GENERALIZED ANXIETY DISORDER WITH PANIC ATTACKS: ICD-10-CM

## 2021-09-22 DIAGNOSIS — M19.012 PRIMARY OSTEOARTHRITIS OF LEFT SHOULDER: Primary | ICD-10-CM

## 2021-09-22 DIAGNOSIS — G47.00 INSOMNIA, UNSPECIFIED TYPE: ICD-10-CM

## 2021-09-22 DIAGNOSIS — R51.9 NONINTRACTABLE HEADACHE, UNSPECIFIED CHRONICITY PATTERN, UNSPECIFIED HEADACHE TYPE: ICD-10-CM

## 2021-09-22 DIAGNOSIS — F41.1 GENERALIZED ANXIETY DISORDER WITH PANIC ATTACKS: ICD-10-CM

## 2021-09-22 DIAGNOSIS — M79.2 NEURALGIA: ICD-10-CM

## 2021-09-22 DIAGNOSIS — F33.1 MODERATE EPISODE OF RECURRENT MAJOR DEPRESSIVE DISORDER (HCC): Primary | ICD-10-CM

## 2021-09-22 DIAGNOSIS — R51.9 CHRONIC DAILY HEADACHE: ICD-10-CM

## 2021-09-22 PROCEDURE — 1036F TOBACCO NON-USER: CPT | Performed by: STUDENT IN AN ORGANIZED HEALTH CARE EDUCATION/TRAINING PROGRAM

## 2021-09-22 PROCEDURE — 97110 THERAPEUTIC EXERCISES: CPT

## 2021-09-22 PROCEDURE — 90833 PSYTX W PT W E/M 30 MIN: CPT | Performed by: STUDENT IN AN ORGANIZED HEALTH CARE EDUCATION/TRAINING PROGRAM

## 2021-09-22 PROCEDURE — 97112 NEUROMUSCULAR REEDUCATION: CPT

## 2021-09-22 PROCEDURE — 97140 MANUAL THERAPY 1/> REGIONS: CPT

## 2021-09-22 PROCEDURE — 99214 OFFICE O/P EST MOD 30 MIN: CPT | Performed by: STUDENT IN AN ORGANIZED HEALTH CARE EDUCATION/TRAINING PROGRAM

## 2021-09-22 RX ORDER — SERTRALINE HYDROCHLORIDE 100 MG/1
100 TABLET, FILM COATED ORAL DAILY
Qty: 90 TABLET | Refills: 1 | Status: SHIPPED | OUTPATIENT
Start: 2021-09-22 | End: 2021-12-13 | Stop reason: SDUPTHER

## 2021-09-22 RX ORDER — BUSPIRONE HYDROCHLORIDE 10 MG/1
10 TABLET ORAL 2 TIMES DAILY
Qty: 180 TABLET | Refills: 1 | Status: SHIPPED | OUTPATIENT
Start: 2021-09-22 | End: 2021-12-13 | Stop reason: SDUPTHER

## 2021-09-22 RX ORDER — GABAPENTIN 300 MG/1
300 CAPSULE ORAL 2 TIMES DAILY
Qty: 180 CAPSULE | Refills: 1 | Status: SHIPPED | OUTPATIENT
Start: 2021-09-22 | End: 2021-12-13 | Stop reason: SDUPTHER

## 2021-09-22 RX ORDER — QUETIAPINE FUMARATE 50 MG/1
50 TABLET, FILM COATED ORAL
Qty: 90 TABLET | Refills: 1 | Status: SHIPPED | OUTPATIENT
Start: 2021-09-22 | End: 2021-12-13 | Stop reason: SDUPTHER

## 2021-09-22 RX ORDER — METHOCARBAMOL 500 MG/1
TABLET, FILM COATED ORAL
COMMUNITY
Start: 2021-09-10 | End: 2021-10-06 | Stop reason: ALTCHOICE

## 2021-09-22 NOTE — PROGRESS NOTES
Daily Note     Today's date: 2021  Patient name: Lashonda Orr  : 1960  MRN: [id number]  Referring provider: Amanda Sierra MD  Dx:   Encounter Diagnosis     ICD-10-CM    1  Primary osteoarthritis of left shoulder  M19 012        Start Time: 1246          Subjective: Patient continues to report gains towards her goals of decreased discomfort, increased strength and improved functional mobility  Patient noted compliance with her present HEP without restrictions  Objective: See treatment diary below      Assessment: Tolerated treatment well  Patient demonstrated fatigue post treatment, exhibited good technique with therapeutic exercises and would benefit from continued PT      Plan: Continue per plan of care  Progress treatment as tolerated         Precautions: Left shoulder PAIN -  Severe OA      Manuals 9/22   9/8 9/10 9/15 9/17   FOTO       LA              PROM- AAROM Left shoulder  LA   LA db LA LA   IASTM  Left bicep/ proximallly LA   LA db LA LA   kinesiotape Left shoulder "Y"  "I"          Neuro Re-Ed          Back rolls/ scap retract    20x each hep                                                                 Ther Ex          UBE 6' alt   6' alt 6' alt 6'  alt 6'   pulleys 4'   :10  3' 3'  3' 4'   Wall slides :10  10x   :10  10x 10s x 10 :10  10x :10  10x   IR Towel stretch    :20  5x hep     Stand cane ext 1 arm AA from R    3#  20x each dc     Supine cane flexion/ cp     3#  20x each dc     Standing cane ext    3#  20x dc     TB rows, lpd, no monies, paloff press  BTB  20x each   GTB  20x each btb x 20 ea  BTB  20x each BTB  20x each             Serratus/ triceps 3#  20x   3#  20x  3# x 20  3#  20x 3#  20x   Standing cane IR    3#  20x dc     BOR / tricep kickbacks / biceps  3#  20x   3#  20x 3# x 20  3#  20x 3#  20x   Supine flexion/ abd to 90  Flex 3#  Scap  0#  20x each     Flexion:3#  Abd:  0#  20x each   3#  flexion x 20  3#  flexion 20x  0# scaption  20x 3#  20x flexion  0#  scaption  20x each   Place and hold Left 1 5#  :05  10x   :05  10x 5s x 10  Left  :05  10x Left   1 5#  :05  10x   Arc oH  Left  1 5#  10x   L  10x L 10 x Left  10x Left   1 5#  10x   Stand flex/ scap 2#  20x    2# x 20 2#  20x 2#  20x   Cw/ccw ball circles  RMB  1 5#  20x      rmb x 20 ea  RMB  20x each RMB  20x each   Ball walks chest/ OH 1 5#  GMB  30'x5    gmb x 5 30'  GMB  30'x5 GMB  30'x5                                                               Ther Activity                              Gait Training                              Modalities          CP left shoulder to end   Pt def

## 2021-09-24 ENCOUNTER — OFFICE VISIT (OUTPATIENT)
Dept: PHYSICAL THERAPY | Facility: CLINIC | Age: 61
End: 2021-09-24
Payer: COMMERCIAL

## 2021-09-24 DIAGNOSIS — M19.012 PRIMARY OSTEOARTHRITIS OF LEFT SHOULDER: Primary | ICD-10-CM

## 2021-09-24 PROCEDURE — 97110 THERAPEUTIC EXERCISES: CPT | Performed by: PHYSICAL THERAPIST

## 2021-09-24 PROCEDURE — 97112 NEUROMUSCULAR REEDUCATION: CPT | Performed by: PHYSICAL THERAPIST

## 2021-09-24 NOTE — PROGRESS NOTES
Daily Note     Today's date: 2021  Patient name: Kavita Mckeon  : 1960  MRN: [id number]  Referring provider: Marc Williamson MD  Dx:   Encounter Diagnosis     ICD-10-CM    1  Primary osteoarthritis of left shoulder  M19 012                   Subjective: Patient continues to report that she does have pain off / on when she uses her left arm more  Notes overall less incidences of pain during day/ week  Objective: See treatment diary below      Assessment: Tolerated treatment well  (+) grinding movement felt with passive movements into ER - mild pain associated     Plan: Continue per plan of care  Progress treatment as tolerated        Tentative dc at end of next week planned - patient in aggreance        Precautions: Left shoulder PAIN -  Severe OA      Manuals 9/22 9/24  9/8 9/10 9/15 9/17   FOTO       LA              PROM- AAROM Left shoulder  LA db  LA db LA LA   IASTM  Left bicep/ proximallly LA db  LA db LA LA   kinesiotape Left shoulder "Y"  "I"          Neuro Re-Ed          Back rolls/ scap retract    20x each hep                                                                 Ther Ex          UBE 6' alt 6'  6' alt 6' alt 6'  alt 6'   pulleys 4' 4'  :10  3' 3'  3' 4'   Wall slides :10  10x   :10  10x 10s x 10 :10  10x :10  10x   TB rows, lpd, no monies, paloff press  BTB  20x each btb x 20 ea   GTB  20x each btb x 20 ea  BTB  20x each BTB  20x each             Serratus/ triceps 3#  20x 3#  X 20 ea  3#  20x  3# x 20  3#  20x 3#  20x   Standing cane IR    3#  20x dc     BOR / tricep kickbacks / biceps  3#  20x 3# x 20 ea  3#  20x 3# x 20  3#  20x 3#  20x   Supine flexion/ abd to 90  Flex 3#  Scap  0#  20x each   Flex 3# x 20   Flexion:3#  Abd:  0#  20x each   3#  flexion x 20  3#  flexion 20x  0# scaption  20x 3#  20x flexion  0#  scaption  20x each   Place and hold Left 1 5#  :05  10x 2#  X 10  5s  :05  10x 5s x 10  Left  :05  10x Left   1 5#  :05  10x   Arc oH  Left  1 5#  10x 2# x 10 L  10x L 10 x Left  10x Left   1 5#  10x   Stand flex/ scap 2#  20x 3# x 20 ea    2# x 20 2#  20x 2#  20x   Cw/ccw ball circles  RMB  1 5#  20x   rmb x 20 ea   rmb x 20 ea  RMB  20x each RMB  20x each   Ball walks chest/ OH 1 5#  GMB  30'x5 rmb x 20 ea    gmb x 5 30'  GMB  30'x5 GMB  30'x5                                                               Ther Activity                              Gait Training                              Modalities          CP left shoulder to end   Pt def

## 2021-09-29 ENCOUNTER — OFFICE VISIT (OUTPATIENT)
Dept: PHYSICAL THERAPY | Facility: CLINIC | Age: 61
End: 2021-09-29
Payer: COMMERCIAL

## 2021-09-29 ENCOUNTER — TELEPHONE (OUTPATIENT)
Dept: PSYCHIATRY | Facility: CLINIC | Age: 61
End: 2021-09-29

## 2021-09-29 DIAGNOSIS — M19.012 PRIMARY OSTEOARTHRITIS OF LEFT SHOULDER: Primary | ICD-10-CM

## 2021-09-29 PROCEDURE — 97110 THERAPEUTIC EXERCISES: CPT

## 2021-09-29 PROCEDURE — 97112 NEUROMUSCULAR REEDUCATION: CPT

## 2021-09-29 NOTE — PROGRESS NOTES
Daily Note     Today's date: 2021  Patient name: Ulises Schultz  : 1960  MRN: [id number]  Referring provider: Bert Coles MD  Dx:   Encounter Diagnosis     ICD-10-CM    1  Primary osteoarthritis of left shoulder  M19 012                   Subjective: SPR=0/10  Objective: See treatment diary below      Assessment: Tolerated treatment with good understanding of present HEP demonstrating good technique, posture and pacing    Patient exhibited good technique with therapeutic exercises      Plan: Discharge therapeutic intervention as per plan of care    Patient plans to resume HEP independently at home     Precautions: Left shoulder PAIN -  Severe OA      Manuals 9/22 9/24 9/29   9/15 9/17   FOTO       LA              PROM- AAROM Left shoulder  LA db    LA LA   IASTM  Left bicep/ proximallly LA db    LA LA   kinesiotape Left shoulder "Y"  "I"          Neuro Re-Ed          Back rolls/ scap retract                                                                      Ther Ex          UBE 6' alt 6' 6'   6'  alt 6'   pulleys 4' 4' 4'   3' 4'   Wall slides :10  10x     :10  10x :10  10x   TB rows, lpd, no monies, paloff press  BTB  20x each btb x 20 ea  BTB  20x each   BTB  20x each BTB  20x each             Serratus/ triceps 3#  20x 3#  X 20 ea 3#  20x each   3#  20x 3#  20x   Standing cane IR          BOR / tricep kickbacks / biceps  3#  20x 3# x 20 ea 3#  20x each   3#  20x 3#  20x   Supine flexion/ abd to 90  Flex 3#  Scap  0#  20x each   Flex 3# x 20  Flex  3#  20x   3#  flexion 20x  0# scaption  20x 3#  20x flexion  0#  scaption  20x each   Place and hold Left 1 5#  :05  10x 2#  X 10  5s 2#  :05  10x   Left  :05  10x Left   1 5#  :05  10x   Arc oH  Left  1 5#  10x 2# x 10  2#  10x   Left  10x Left   1 5#  10x   Stand flex/ scap 2#  20x 3# x 20 ea  3#  20x each   2#  20x 2#  20x   Cw/ccw ball circles  RMB  1 5#  20x   rmb x 20 ea RMB  20x each   RMB  20x each RMB  20x each   Ball walks chest/ [facility] 1 5#  GMB  30'x5 rmb x 20 ea  RMB  20x each   GMB  30'x5 GMB  30'x5                                                               Ther Activity                              Gait Training                              Modalities          CP left shoulder to end   Pt def

## 2021-10-06 ENCOUNTER — OFFICE VISIT (OUTPATIENT)
Dept: NEUROLOGY | Facility: CLINIC | Age: 61
End: 2021-10-06
Payer: COMMERCIAL

## 2021-10-06 VITALS
RESPIRATION RATE: 20 BRPM | HEIGHT: 64 IN | WEIGHT: 232.8 LBS | BODY MASS INDEX: 39.75 KG/M2 | HEART RATE: 68 BPM | SYSTOLIC BLOOD PRESSURE: 124 MMHG | DIASTOLIC BLOOD PRESSURE: 66 MMHG

## 2021-10-06 DIAGNOSIS — F45.8 BRUXISM: ICD-10-CM

## 2021-10-06 DIAGNOSIS — M54.2 CERVICALGIA: ICD-10-CM

## 2021-10-06 DIAGNOSIS — G43.719 INTRACTABLE CHRONIC MIGRAINE WITHOUT AURA AND WITHOUT STATUS MIGRAINOSUS: Primary | ICD-10-CM

## 2021-10-06 PROCEDURE — 1036F TOBACCO NON-USER: CPT | Performed by: PSYCHIATRY & NEUROLOGY

## 2021-10-06 PROCEDURE — 3008F BODY MASS INDEX DOCD: CPT | Performed by: PSYCHIATRY & NEUROLOGY

## 2021-10-06 PROCEDURE — 99215 OFFICE O/P EST HI 40 MIN: CPT | Performed by: PSYCHIATRY & NEUROLOGY

## 2021-10-07 ENCOUNTER — TELEPHONE (OUTPATIENT)
Dept: NEUROLOGY | Facility: CLINIC | Age: 61
End: 2021-10-07

## 2021-10-08 ENCOUNTER — TELEPHONE (OUTPATIENT)
Dept: NEUROLOGY | Facility: CLINIC | Age: 61
End: 2021-10-08

## 2021-10-14 NOTE — PROGRESS NOTES
PT Discharge    Today's date: 10/14/2021  Patient name: Shila Melton  : 1960  MRN: [id number]  Referring provider: Vinny Hassan MD  Dx:   Encounter Diagnosis     ICD-10-CM    1  Primary osteoarthritis of left shoulder  M19 012          Assessment  Assessment details: Patient arrives on her last day saying that she was pleased with her progress - feeling good and reports 0/10 pain  She has made nice progress towards original goals  She is independent and competent with HEP and will be discharged to such at this time  Thank you for this referral    Understanding of Dx/Px/POC: good  Goals  STG  MET  1  Patient will demonstrate independence and competence with HEP 2 -4 weeks  2  Patient will report > 25-50% reduced pain 2-4 weeks    LTG   MET  1  Patient will report improvements with both functional and recreational abilities  4-6 weeks  2  Patient will demonstrate improved motor function  4-6 weeks  3   Improved postural awareness and self correction  4-6 weeks  4   No pain with dressing/ OH activities  4-8 weeks  Plan  Planned therapy interventions: home exercise program  Treatment plan discussed with: patient        Subjective Evaluation    History of Present Illness  Mechanism of injury:     Pain  No pain reported  Current pain ratin  Relieving factors: change in position  Progression: improved (better since the injection)    Social Support  Lives in: condominium  Lives with: spouse    Hand dominance: right    Treatments  Current treatment: injection treatment  Patient Goals  Patient goals for therapy: decreased pain  Patient goal: sleep better         Objective     Postural Observations  Seated posture: fair  Standing posture: fair  Correction of posture: makes symptoms better    Additional Postural Observation Details      Nice improvements in postural awareness and self correction  Tenderness     Left Shoulder   Tenderness in the biceps tendon (proximal)   No tenderness in the [facility] joint and supraspinatus tendon  Cervical/Thoracic Screen   Cervical range of motion within normal limits with the following exceptions: All movements Painfree and wfl   NO shoulder / left UE elicited with AROM to c-spine    Neurological Testing     Sensation     Shoulder   Left Shoulder   Intact: light touch    Right Shoulder   Intact: light touch    Active Range of Motion   Left Shoulder   Normal active range of motion    Right Shoulder   Normal active range of motion    Passive Range of Motion   Left Shoulder   Normal passive range of motion    Strength/Myotome Testing     Left Shoulder     Planes of Motion   Flexion: 4+   Extension: 5   Abduction: 4+   Adduction: 5   External rotation at 0°: 4+   Internal rotation at 0°: 5     Isolated Muscles   Biceps: 5   Triceps: 5     Right Shoulder   Normal muscle strength    Tests     Left Shoulder   Negative clunk, crossover, drop arm, empty can, Hawkin's, Neer's, painful arc, Speed's and ULTT2                Precautions: Left shoulder PAIN -  Severe OA

## 2021-10-15 DIAGNOSIS — G89.4 CHRONIC PAIN SYNDROME: ICD-10-CM

## 2021-10-15 RX ORDER — DICLOFENAC SODIUM 16.05 MG/ML
SOLUTION TOPICAL
Qty: 300 ML | Refills: 4 | Status: SHIPPED | OUTPATIENT
Start: 2021-10-15 | End: 2022-01-18

## 2021-10-29 ENCOUNTER — TELEPHONE (OUTPATIENT)
Dept: NEUROLOGY | Facility: CLINIC | Age: 61
End: 2021-10-29

## 2021-11-01 DIAGNOSIS — I10 ESSENTIAL HYPERTENSION: ICD-10-CM

## 2021-11-01 RX ORDER — LISINOPRIL 10 MG/1
TABLET ORAL
Qty: 90 TABLET | Refills: 3 | Status: SHIPPED | OUTPATIENT
Start: 2021-11-01

## 2021-11-16 ENCOUNTER — HOSPITAL ENCOUNTER (OUTPATIENT)
Dept: RADIOLOGY | Facility: HOSPITAL | Age: 61
Discharge: HOME | End: 2021-11-16
Payer: COMMERCIAL

## 2021-11-16 ENCOUNTER — OFFICE VISIT (OUTPATIENT)
Dept: FAMILY MEDICINE CLINIC | Facility: CLINIC | Age: 61
End: 2021-11-16
Payer: COMMERCIAL

## 2021-11-16 VITALS
HEART RATE: 78 BPM | OXYGEN SATURATION: 98 % | BODY MASS INDEX: 40.29 KG/M2 | DIASTOLIC BLOOD PRESSURE: 76 MMHG | SYSTOLIC BLOOD PRESSURE: 122 MMHG | WEIGHT: 236 LBS | TEMPERATURE: 97.8 F | HEIGHT: 64 IN

## 2021-11-16 DIAGNOSIS — M79.672 LEFT FOOT PAIN: Primary | ICD-10-CM

## 2021-11-16 DIAGNOSIS — F41.9 ANXIETY AND DEPRESSION: ICD-10-CM

## 2021-11-16 DIAGNOSIS — F32.A ANXIETY AND DEPRESSION: ICD-10-CM

## 2021-11-16 DIAGNOSIS — M79.672 LEFT FOOT PAIN: ICD-10-CM

## 2021-11-16 DIAGNOSIS — M54.16 LUMBAR RADICULOPATHY: ICD-10-CM

## 2021-11-16 PROCEDURE — 73630 X-RAY EXAM OF FOOT: CPT

## 2021-11-16 PROCEDURE — 3008F BODY MASS INDEX DOCD: CPT | Performed by: FAMILY MEDICINE

## 2021-11-16 PROCEDURE — 1036F TOBACCO NON-USER: CPT | Performed by: FAMILY MEDICINE

## 2021-11-16 PROCEDURE — 99214 OFFICE O/P EST MOD 30 MIN: CPT | Performed by: FAMILY MEDICINE

## 2021-11-16 RX ORDER — OXYCODONE AND ACETAMINOPHEN 10; 325 MG/1; MG/1
TABLET ORAL
COMMUNITY
Start: 2021-10-26 | End: 2021-12-10 | Stop reason: ALTCHOICE

## 2021-12-01 ENCOUNTER — APPOINTMENT (OUTPATIENT)
Dept: LAB | Facility: HOSPITAL | Age: 61
End: 2021-12-01
Payer: COMMERCIAL

## 2021-12-01 ENCOUNTER — HOSPITAL ENCOUNTER (OUTPATIENT)
Dept: MRI IMAGING | Facility: HOSPITAL | Age: 61
Discharge: HOME | End: 2021-12-01
Payer: COMMERCIAL

## 2021-12-01 DIAGNOSIS — E78.2 MIXED HYPERLIPIDEMIA: ICD-10-CM

## 2021-12-01 DIAGNOSIS — Z90.3 POSTGASTRECTOMY MALABSORPTION: Primary | ICD-10-CM

## 2021-12-01 DIAGNOSIS — M54.16 LUMBAR RADICULOPATHY: ICD-10-CM

## 2021-12-01 DIAGNOSIS — K91.2 POSTGASTRECTOMY MALABSORPTION: Primary | ICD-10-CM

## 2021-12-01 LAB
ALBUMIN SERPL BCP-MCNC: 3.1 G/DL (ref 3.5–5)
ALP SERPL-CCNC: 122 U/L (ref 46–116)
ALT SERPL W P-5'-P-CCNC: 22 U/L (ref 12–78)
ANION GAP SERPL CALCULATED.3IONS-SCNC: 7 MMOL/L (ref 4–13)
AST SERPL W P-5'-P-CCNC: 15 U/L (ref 5–45)
BACTERIA UR QL AUTO: ABNORMAL /HPF
BASOPHILS # BLD AUTO: 0.1 THOUSANDS/ΜL (ref 0–0.1)
BASOPHILS NFR BLD AUTO: 2 % (ref 0–1)
BILIRUB SERPL-MCNC: 0.26 MG/DL (ref 0.2–1)
BILIRUB UR QL STRIP: NEGATIVE
BUN SERPL-MCNC: 16 MG/DL (ref 5–25)
CALCIUM ALBUM COR SERPL-MCNC: 9 MG/DL (ref 8.3–10.1)
CALCIUM SERPL-MCNC: 8.3 MG/DL (ref 8.3–10.1)
CHLORIDE SERPL-SCNC: 106 MMOL/L (ref 100–108)
CHOLEST SERPL-MCNC: 161 MG/DL
CLARITY UR: CLEAR
CO2 SERPL-SCNC: 31 MMOL/L (ref 21–32)
COLOR UR: YELLOW
CREAT SERPL-MCNC: 0.89 MG/DL (ref 0.6–1.3)
CREAT UR-MCNC: 143 MG/DL
EOSINOPHIL # BLD AUTO: 0.23 THOUSAND/ΜL (ref 0–0.61)
EOSINOPHIL NFR BLD AUTO: 4 % (ref 0–6)
ERYTHROCYTE [DISTWIDTH] IN BLOOD BY AUTOMATED COUNT: 13.6 % (ref 11.6–15.1)
GFR SERPL CREATININE-BSD FRML MDRD: 69 ML/MIN/1.73SQ M
GLUCOSE P FAST SERPL-MCNC: 89 MG/DL (ref 65–99)
GLUCOSE UR STRIP-MCNC: NEGATIVE MG/DL
HCT VFR BLD AUTO: 40.2 % (ref 34.8–46.1)
HDLC SERPL-MCNC: 77 MG/DL
HGB BLD-MCNC: 12.3 G/DL (ref 11.5–15.4)
HGB UR QL STRIP.AUTO: ABNORMAL
IMM GRANULOCYTES # BLD AUTO: 0.01 THOUSAND/UL (ref 0–0.2)
IMM GRANULOCYTES NFR BLD AUTO: 0 % (ref 0–2)
KETONES UR STRIP-MCNC: NEGATIVE MG/DL
LDLC SERPL CALC-MCNC: 68 MG/DL (ref 0–100)
LEUKOCYTE ESTERASE UR QL STRIP: NEGATIVE
LYMPHOCYTES # BLD AUTO: 1.88 THOUSANDS/ΜL (ref 0.6–4.47)
LYMPHOCYTES NFR BLD AUTO: 32 % (ref 14–44)
MCH RBC QN AUTO: 28.8 PG (ref 26.8–34.3)
MCHC RBC AUTO-ENTMCNC: 30.6 G/DL (ref 31.4–37.4)
MCV RBC AUTO: 94 FL (ref 82–98)
MICROALBUMIN UR-MCNC: 10 MG/L (ref 0–20)
MICROALBUMIN/CREAT 24H UR: 7 MG/G CREATININE (ref 0–30)
MONOCYTES # BLD AUTO: 0.43 THOUSAND/ΜL (ref 0.17–1.22)
MONOCYTES NFR BLD AUTO: 7 % (ref 4–12)
MUCOUS THREADS UR QL AUTO: ABNORMAL
NEUTROPHILS # BLD AUTO: 3.24 THOUSANDS/ΜL (ref 1.85–7.62)
NEUTS SEG NFR BLD AUTO: 55 % (ref 43–75)
NITRITE UR QL STRIP: NEGATIVE
NON-SQ EPI CELLS URNS QL MICRO: ABNORMAL /HPF
NONHDLC SERPL-MCNC: 84 MG/DL
NRBC BLD AUTO-RTO: 0 /100 WBCS
PH UR STRIP.AUTO: 6 [PH]
PLATELET # BLD AUTO: 234 THOUSANDS/UL (ref 149–390)
PMV BLD AUTO: 11.3 FL (ref 8.9–12.7)
POTASSIUM SERPL-SCNC: 4.3 MMOL/L (ref 3.5–5.3)
PROT SERPL-MCNC: 6.5 G/DL (ref 6.4–8.2)
PROT UR STRIP-MCNC: NEGATIVE MG/DL
RBC # BLD AUTO: 4.27 MILLION/UL (ref 3.81–5.12)
RBC #/AREA URNS AUTO: ABNORMAL /HPF
SODIUM SERPL-SCNC: 144 MMOL/L (ref 136–145)
SP GR UR STRIP.AUTO: 1.02 (ref 1–1.03)
TRIGL SERPL-MCNC: 81 MG/DL
UROBILINOGEN UR QL STRIP.AUTO: 0.2 E.U./DL
WBC # BLD AUTO: 5.89 THOUSAND/UL (ref 4.31–10.16)
WBC #/AREA URNS AUTO: ABNORMAL /HPF

## 2021-12-01 PROCEDURE — 85025 COMPLETE CBC W/AUTO DIFF WBC: CPT

## 2021-12-01 PROCEDURE — 36415 COLL VENOUS BLD VENIPUNCTURE: CPT

## 2021-12-01 PROCEDURE — 83540 ASSAY OF IRON: CPT

## 2021-12-01 PROCEDURE — 81001 URINALYSIS AUTO W/SCOPE: CPT | Performed by: FAMILY MEDICINE

## 2021-12-01 PROCEDURE — 82607 VITAMIN B-12: CPT

## 2021-12-01 PROCEDURE — 82043 UR ALBUMIN QUANTITATIVE: CPT | Performed by: FAMILY MEDICINE

## 2021-12-01 PROCEDURE — 72148 MRI LUMBAR SPINE W/O DYE: CPT

## 2021-12-01 PROCEDURE — 82728 ASSAY OF FERRITIN: CPT

## 2021-12-01 PROCEDURE — 82570 ASSAY OF URINE CREATININE: CPT | Performed by: FAMILY MEDICINE

## 2021-12-01 PROCEDURE — 80053 COMPREHEN METABOLIC PANEL: CPT

## 2021-12-01 PROCEDURE — 82746 ASSAY OF FOLIC ACID SERUM: CPT

## 2021-12-01 PROCEDURE — 83550 IRON BINDING TEST: CPT

## 2021-12-01 PROCEDURE — 80061 LIPID PANEL: CPT

## 2021-12-02 LAB
FERRITIN SERPL-MCNC: 8 NG/ML (ref 8–388)
FOLATE SERPL-MCNC: 10.3 NG/ML (ref 3.1–17.5)
IRON SATN MFR SERPL: 18 % (ref 15–50)
IRON SERPL-MCNC: 58 UG/DL (ref 50–170)
TIBC SERPL-MCNC: 327 UG/DL (ref 250–450)
VIT B12 SERPL-MCNC: 366 PG/ML (ref 100–900)

## 2021-12-10 ENCOUNTER — OFFICE VISIT (OUTPATIENT)
Dept: FAMILY MEDICINE CLINIC | Facility: CLINIC | Age: 61
End: 2021-12-10
Payer: COMMERCIAL

## 2021-12-10 VITALS
SYSTOLIC BLOOD PRESSURE: 110 MMHG | WEIGHT: 232.6 LBS | HEART RATE: 57 BPM | BODY MASS INDEX: 39.71 KG/M2 | OXYGEN SATURATION: 97 % | TEMPERATURE: 97 F | DIASTOLIC BLOOD PRESSURE: 80 MMHG | RESPIRATION RATE: 14 BRPM | HEIGHT: 64 IN

## 2021-12-10 DIAGNOSIS — G89.4 CHRONIC PAIN SYNDROME: ICD-10-CM

## 2021-12-10 DIAGNOSIS — R29.898 WEAKNESS OF BOTH LEGS: ICD-10-CM

## 2021-12-10 DIAGNOSIS — M47.816 SPONDYLOSIS OF LUMBAR SPINE: Primary | ICD-10-CM

## 2021-12-10 DIAGNOSIS — R31.9 HEMATURIA, UNSPECIFIED TYPE: ICD-10-CM

## 2021-12-10 DIAGNOSIS — Z96.89 STATUS POST INSERTION OF SPINAL CORD STIMULATOR: ICD-10-CM

## 2021-12-10 PROCEDURE — 99214 OFFICE O/P EST MOD 30 MIN: CPT | Performed by: FAMILY MEDICINE

## 2021-12-10 PROCEDURE — 3008F BODY MASS INDEX DOCD: CPT | Performed by: STUDENT IN AN ORGANIZED HEALTH CARE EDUCATION/TRAINING PROGRAM

## 2021-12-13 ENCOUNTER — TELEMEDICINE (OUTPATIENT)
Dept: PSYCHIATRY | Facility: CLINIC | Age: 61
End: 2021-12-13
Payer: COMMERCIAL

## 2021-12-13 DIAGNOSIS — F33.1 MODERATE EPISODE OF RECURRENT MAJOR DEPRESSIVE DISORDER (HCC): ICD-10-CM

## 2021-12-13 DIAGNOSIS — F41.1 GENERALIZED ANXIETY DISORDER WITH PANIC ATTACKS: ICD-10-CM

## 2021-12-13 DIAGNOSIS — R51.9 NONINTRACTABLE HEADACHE, UNSPECIFIED CHRONICITY PATTERN, UNSPECIFIED HEADACHE TYPE: ICD-10-CM

## 2021-12-13 DIAGNOSIS — F41.0 GENERALIZED ANXIETY DISORDER WITH PANIC ATTACKS: ICD-10-CM

## 2021-12-13 DIAGNOSIS — M79.2 NEURALGIA: ICD-10-CM

## 2021-12-13 PROCEDURE — 1036F TOBACCO NON-USER: CPT | Performed by: STUDENT IN AN ORGANIZED HEALTH CARE EDUCATION/TRAINING PROGRAM

## 2021-12-13 PROCEDURE — 99214 OFFICE O/P EST MOD 30 MIN: CPT | Performed by: STUDENT IN AN ORGANIZED HEALTH CARE EDUCATION/TRAINING PROGRAM

## 2021-12-13 PROCEDURE — 90833 PSYTX W PT W E/M 30 MIN: CPT | Performed by: STUDENT IN AN ORGANIZED HEALTH CARE EDUCATION/TRAINING PROGRAM

## 2021-12-13 RX ORDER — SERTRALINE HYDROCHLORIDE 100 MG/1
100 TABLET, FILM COATED ORAL DAILY
Qty: 90 TABLET | Refills: 1 | Status: SHIPPED | OUTPATIENT
Start: 2021-12-13 | End: 2021-12-13 | Stop reason: SDUPTHER

## 2021-12-13 RX ORDER — QUETIAPINE FUMARATE 50 MG/1
50 TABLET, FILM COATED ORAL
Qty: 90 TABLET | Refills: 1 | Status: SHIPPED | OUTPATIENT
Start: 2021-12-13 | End: 2021-12-13 | Stop reason: SDUPTHER

## 2021-12-13 RX ORDER — QUETIAPINE FUMARATE 50 MG/1
50 TABLET, FILM COATED ORAL
Qty: 90 TABLET | Refills: 1 | Status: SHIPPED | OUTPATIENT
Start: 2021-12-13 | End: 2022-05-05 | Stop reason: SDUPTHER

## 2021-12-13 RX ORDER — BUSPIRONE HYDROCHLORIDE 10 MG/1
10 TABLET ORAL 2 TIMES DAILY
Qty: 180 TABLET | Refills: 1 | Status: SHIPPED | OUTPATIENT
Start: 2021-12-13 | End: 2022-05-05 | Stop reason: SDUPTHER

## 2021-12-13 RX ORDER — GABAPENTIN 300 MG/1
300 CAPSULE ORAL 2 TIMES DAILY
Qty: 180 CAPSULE | Refills: 1 | Status: SHIPPED | OUTPATIENT
Start: 2021-12-13 | End: 2021-12-13 | Stop reason: SDUPTHER

## 2021-12-13 RX ORDER — BUSPIRONE HYDROCHLORIDE 10 MG/1
10 TABLET ORAL 2 TIMES DAILY
Qty: 180 TABLET | Refills: 1 | Status: SHIPPED | OUTPATIENT
Start: 2021-12-13 | End: 2021-12-13 | Stop reason: SDUPTHER

## 2021-12-13 RX ORDER — SERTRALINE HYDROCHLORIDE 100 MG/1
100 TABLET, FILM COATED ORAL DAILY
Qty: 90 TABLET | Refills: 1 | Status: SHIPPED | OUTPATIENT
Start: 2021-12-13 | End: 2022-05-05 | Stop reason: SDUPTHER

## 2021-12-13 RX ORDER — GABAPENTIN 300 MG/1
300 CAPSULE ORAL 2 TIMES DAILY
Qty: 180 CAPSULE | Refills: 1 | Status: SHIPPED | OUTPATIENT
Start: 2021-12-13 | End: 2022-05-05 | Stop reason: SDUPTHER

## 2021-12-29 ENCOUNTER — EVALUATION (OUTPATIENT)
Dept: PHYSICAL THERAPY | Facility: CLINIC | Age: 61
End: 2021-12-29
Payer: COMMERCIAL

## 2021-12-29 DIAGNOSIS — G89.4 CHRONIC PAIN SYNDROME: ICD-10-CM

## 2021-12-29 DIAGNOSIS — R29.898 WEAKNESS OF BOTH LEGS: Primary | ICD-10-CM

## 2021-12-29 PROCEDURE — 97161 PT EVAL LOW COMPLEX 20 MIN: CPT | Performed by: PHYSICAL THERAPIST

## 2022-01-03 ENCOUNTER — HOSPITAL ENCOUNTER (OUTPATIENT)
Dept: RADIOLOGY | Facility: HOSPITAL | Age: 62
Discharge: HOME | End: 2022-01-03
Payer: COMMERCIAL

## 2022-01-03 DIAGNOSIS — M47.819 SPONDYLOARTHRITIS: ICD-10-CM

## 2022-01-03 DIAGNOSIS — M54.16 LUMBAR RADICULOPATHY: ICD-10-CM

## 2022-01-03 PROCEDURE — 72110 X-RAY EXAM L-2 SPINE 4/>VWS: CPT

## 2022-01-04 ENCOUNTER — TELEPHONE (OUTPATIENT)
Dept: NEUROLOGY | Facility: CLINIC | Age: 62
End: 2022-01-04

## 2022-01-05 ENCOUNTER — OFFICE VISIT (OUTPATIENT)
Dept: PHYSICAL THERAPY | Facility: CLINIC | Age: 62
End: 2022-01-05
Payer: COMMERCIAL

## 2022-01-05 DIAGNOSIS — R29.898 WEAKNESS OF BOTH LEGS: Primary | ICD-10-CM

## 2022-01-05 DIAGNOSIS — G89.4 CHRONIC PAIN SYNDROME: ICD-10-CM

## 2022-01-05 PROCEDURE — 97112 NEUROMUSCULAR REEDUCATION: CPT

## 2022-01-05 PROCEDURE — 97110 THERAPEUTIC EXERCISES: CPT

## 2022-01-05 NOTE — PROGRESS NOTES
Daily Note     Today's date: 2022  Patient name: Yomaira Alvarado  : 1960  MRN: [id number]  Referring provider: Jaguar Montero, *  Dx:   Encounter Diagnosis     ICD-10-CM    1  Weakness of both legs  R29 898    2  Chronic pain syndrome  G89 4        Start Time: 1147          Subjective: SPR =10 B lumbar region  "It's usually only the left, but today it's both"  Patient noted radicular pain into left leg most especially when she is sleeping  Patient stated she is unable to carry grocery bags/purse up 7 steps into her home because her "legs are too weak"  Patient also reported repeated (2x in months duration) falls with inability " to get back up"  Objective: See treatment diary below      Assessment: Tolerated treatment progression of current HEP without reported aggravation of symptoms or limitations in performance    Patient demonstrated fatigue post treatment, exhibited good technique with therapeutic exercises and would benefit from continued PT      Plan: Continue per plan of care  Progress treatment as tolerated         Precautions: bilateral leg weakness/ pain   (+) FALLS       Manuals 22           FOTO db                                                   Neuro Re-Ed                          Prone gs  :03  20x           Prone multifidus  :02  20x           TB paloff press                           bridging  :03  20x           HL TA w/ roll  :05  20x           HL TA marching  20x SLOW           HL TB hip abd  GTB  20x           Clamshells   GTB  20x           TB BKFO  GTB  20x                                                                Ther Ex             bike  10'           hss w/ strap  :20  3x each           Prone quad stretch  :20  3x each                        Prone prop  2'           Press ups                          HL Physioball le push outs                                                                                                         Ther Activity Gait Training                                       Modalities

## 2022-01-05 NOTE — TELEPHONE ENCOUNTER
Call [facility] spoke to Adiel the pharmacy, Verbal Rx was provided over the phone for Botox  As per Adiel please allowed 2-5 business working days for benefits verification  I will follow up with in the time frame for an update

## 2022-01-06 ENCOUNTER — OFFICE VISIT (OUTPATIENT)
Dept: PHYSICAL THERAPY | Facility: CLINIC | Age: 62
End: 2022-01-06
Payer: COMMERCIAL

## 2022-01-06 DIAGNOSIS — K21.9 GASTROESOPHAGEAL REFLUX DISEASE WITHOUT ESOPHAGITIS: ICD-10-CM

## 2022-01-06 DIAGNOSIS — R29.898 WEAKNESS OF BOTH LEGS: ICD-10-CM

## 2022-01-06 DIAGNOSIS — G89.4 CHRONIC PAIN SYNDROME: Primary | ICD-10-CM

## 2022-01-06 PROCEDURE — 97112 NEUROMUSCULAR REEDUCATION: CPT

## 2022-01-06 RX ORDER — PANTOPRAZOLE SODIUM 40 MG/1
TABLET, DELAYED RELEASE ORAL
Qty: 180 TABLET | Refills: 3 | Status: SHIPPED | OUTPATIENT
Start: 2022-01-06

## 2022-01-06 NOTE — PROGRESS NOTES
Daily Note     Today's date: 2022  Patient name: Yolette Kirkland  : 1960  MRN: [id number]  Referring provider: Rigo Gaona, *  Dx:   Encounter Diagnosis     ICD-10-CM    1  Chronic pain syndrome  G89 4    2  Weakness of both legs  R29 898        Start Time: 1250          Subjective: Patient indicated B knee discomfort today, "it's arthritis"  Patient noted moderate discomfort with supine TA activation/ball press~reported improvement  with modification  Objective: See treatment diary below      Assessment: Tolerated treatment and progression of program without limitations in TE performance   Patient exhibited good technique with therapeutic exercises and would benefit from continued PT      Plan: Continue per plan of care  Progress treatment as tolerated         Precautions: bilateral leg weakness/ pain   (+) FALLS       Manuals 22          FOTO db                                                   Neuro Re-Ed                          Prone gs  :03  20x :03  20x          Prone multifidus  :02  20x :02  20x          TB paloff press                           bridging  :03  20x :03  20x          HL TA w/ roll  :  20x :05  20x           HL TA marching  20x SLOW 20x   SLOW          HL TB hip abd  GTB  20x GTB  20x           Clamshells   GTB  20x GTB  20x           TB BKFO  GTB  20x  GTB  20x                                                              Ther Ex             bike  10' 10'          hss w/ strap  :20  3x each :20  3x each          Prone quad stretch  :20  3x each :20  3x each                       Prone prop  2' 2'          Press ups   10x2                       HL Physioball le push outs                                                                                                         Ther Activity                                       Gait Training                                       Modalities

## 2022-01-10 NOTE — TELEPHONE ENCOUNTER
Rec'd fax for PA -Request from [address] [facility], [address]   Submitted PA-Request to [facility] for Botox Approval     PA- Approve  Auth# [id number]   Eff: 12/11/2021 until 10/10/2023

## 2022-01-11 ENCOUNTER — APPOINTMENT (OUTPATIENT)
Dept: PHYSICAL THERAPY | Facility: CLINIC | Age: 62
End: 2022-01-11
Payer: COMMERCIAL

## 2022-01-11 NOTE — TELEPHONE ENCOUNTER
Botox:  200 units is schedule for delivery on 1/14/2022  VIA: [facility]    Please inform of delivery!     Heraclio Milton

## 2022-01-13 ENCOUNTER — APPOINTMENT (OUTPATIENT)
Dept: PHYSICAL THERAPY | Facility: CLINIC | Age: 62
End: 2022-01-13
Payer: COMMERCIAL

## 2022-01-14 ENCOUNTER — OFFICE VISIT (OUTPATIENT)
Dept: FAMILY MEDICINE CLINIC | Facility: CLINIC | Age: 62
End: 2022-01-14
Payer: COMMERCIAL

## 2022-01-14 ENCOUNTER — TELEPHONE (OUTPATIENT)
Dept: OBGYN CLINIC | Facility: HOSPITAL | Age: 62
End: 2022-01-14

## 2022-01-14 ENCOUNTER — OFFICE VISIT (OUTPATIENT)
Dept: OBGYN CLINIC | Facility: HOSPITAL | Age: 62
End: 2022-01-14
Payer: COMMERCIAL

## 2022-01-14 ENCOUNTER — HOSPITAL ENCOUNTER (OUTPATIENT)
Dept: RADIOLOGY | Facility: HOSPITAL | Age: 62
Discharge: HOME | End: 2022-01-14
Payer: COMMERCIAL

## 2022-01-14 VITALS
HEIGHT: 64 IN | BODY MASS INDEX: 39.61 KG/M2 | SYSTOLIC BLOOD PRESSURE: 104 MMHG | HEART RATE: 66 BPM | DIASTOLIC BLOOD PRESSURE: 66 MMHG | WEIGHT: 232 LBS

## 2022-01-14 VITALS
BODY MASS INDEX: 39.93 KG/M2 | HEIGHT: 64 IN | TEMPERATURE: 98.7 F | SYSTOLIC BLOOD PRESSURE: 122 MMHG | DIASTOLIC BLOOD PRESSURE: 70 MMHG | OXYGEN SATURATION: 98 % | HEART RATE: 68 BPM

## 2022-01-14 DIAGNOSIS — M25.561 ACUTE PAIN OF RIGHT KNEE: ICD-10-CM

## 2022-01-14 DIAGNOSIS — M25.561 ACUTE PAIN OF RIGHT KNEE: Primary | ICD-10-CM

## 2022-01-14 DIAGNOSIS — M17.11 PRIMARY OSTEOARTHRITIS OF RIGHT KNEE: ICD-10-CM

## 2022-01-14 DIAGNOSIS — M25.561 RIGHT KNEE PAIN, UNSPECIFIED CHRONICITY: Primary | ICD-10-CM

## 2022-01-14 PROCEDURE — 3008F BODY MASS INDEX DOCD: CPT | Performed by: NURSE PRACTITIONER

## 2022-01-14 PROCEDURE — 73562 X-RAY EXAM OF KNEE 3: CPT

## 2022-01-14 PROCEDURE — 99213 OFFICE O/P EST LOW 20 MIN: CPT | Performed by: PHYSICIAN ASSISTANT

## 2022-01-14 PROCEDURE — 1036F TOBACCO NON-USER: CPT | Performed by: NURSE PRACTITIONER

## 2022-01-14 PROCEDURE — 20610 DRAIN/INJ JOINT/BURSA W/O US: CPT | Performed by: PHYSICIAN ASSISTANT

## 2022-01-14 PROCEDURE — 99213 OFFICE O/P EST LOW 20 MIN: CPT | Performed by: NURSE PRACTITIONER

## 2022-01-14 RX ORDER — LIDOCAINE HYDROCHLORIDE 10 MG/ML
4 INJECTION, SOLUTION INFILTRATION; PERINEURAL
Status: COMPLETED | OUTPATIENT
Start: 2022-01-14 | End: 2022-01-14

## 2022-01-14 RX ORDER — LIDOCAINE 50 MG/G
OINTMENT TOPICAL 2 TIMES DAILY PRN
Qty: 35.44 G | Refills: 1 | Status: SHIPPED | OUTPATIENT
Start: 2022-01-14

## 2022-01-14 RX ORDER — METHYLPREDNISOLONE ACETATE 40 MG/ML
2 INJECTION, SUSPENSION INTRA-ARTICULAR; INTRALESIONAL; INTRAMUSCULAR; SOFT TISSUE
Status: COMPLETED | OUTPATIENT
Start: 2022-01-14 | End: 2022-01-14

## 2022-01-14 RX ORDER — ONABOTULINUMTOXINA 200 [USP'U]/1
INJECTION, POWDER, LYOPHILIZED, FOR SOLUTION INTRADERMAL; INTRAMUSCULAR
COMMUNITY
Start: 2022-01-10

## 2022-01-14 RX ADMIN — METHYLPREDNISOLONE ACETATE 2 ML: 40 INJECTION, SUSPENSION INTRA-ARTICULAR; INTRALESIONAL; INTRAMUSCULAR; SOFT TISSUE at 11:32

## 2022-01-14 RX ADMIN — LIDOCAINE HYDROCHLORIDE 4 ML: 10 INJECTION, SOLUTION INFILTRATION; PERINEURAL at 11:32

## 2022-01-14 NOTE — PROGRESS NOTES
Assessment:    Right knee osteoarthritis      Plan:    Steroid injection provided today in the patient tolerated this well  Initiate outpatient physical therapy for right knee strengthening program     She states she wishes to follow up on a p r n  basis  Will let us know if her symptoms do not improve or worsen over time  Subjective:     Patient ID:  Shaggy Brown is a 59 y o  female    HPI    The patient is a 78-year-old female presenting for evaluation of her right knee  According to the patient, about four days ago is vacuuming and felt sharp pain localized to the medial knee region  This was associated with increasing swelling and difficulty bearing weight  She denies any trauma or twisting event to the area  There is no fall  She states she iced and took some medications with improvement in pain and resolution of swelling however it is still stiff and sore, as well as dull and aching  No associated numbness and tingling  She is unsure if she has arthritis in her knee however she has arthritis in other joints of her body and feels like this could be similar  No history of right knee surgery  The following portions of the patient's history were reviewed and updated as appropriate: allergies, current medications, past family history, past medical history, past social history, past surgical history and problem list     Review of Systems     Objective:    Imaging:  Right knee x-rays demonstrate no acute fracture or dislocation, there are arthritic changes  Vitals:    01/14/22 1052   BP: 104/66   Pulse: 66           Physical Exam     Orthopedic Examination:  Right knee    Inspection:  No effusion or erythema  No ecchymosis  Palpation:  No warmth  Tender to palpation medial and lateral joint lines  No palpable gap quadriceps tendon  Range-of-motion:  0 to 140  Pain is increased in extension      Strength:  5/5 hip flexion knee extension ankle plantar dorsiflexion    Sensation:  Intact    Special Tests:  Stable to varus and valgus stress   No laxity   Equivocal Gal's    Large joint arthrocentesis: R knee  Universal Protocol:  Consent: Verbal consent obtained    Consent given by: patient  Patient identity confirmed: verbally with patient    Supporting Documentation  Indications: pain   Procedure Details  Location: knee - R knee  Preparation: Patient was prepped and draped in the usual sterile fashion  Needle size: 22 G  Ultrasound guidance: no  Approach: anterolateral  Medications administered: 4 mL lidocaine 1 %; 2 mL methylPREDNISolone acetate 40 mg/mL    Patient tolerance: patient tolerated the procedure well with no immediate complications  Dressing:  Sterile dressing applied

## 2022-01-14 NOTE — PATIENT INSTRUCTIONS
Knee Pain   AMBULATORY CARE:   Knee pain  may start suddenly, or it may be a long-term problem  You may have pain on the side, front, or back of your knee  You may have knee stiffness and swelling  You may hear popping sounds or feel like your knee is giving way or locking up as you walk  You may feel pain when you sit, stand, walk, or climb up and down stairs  Knee pain can be caused by conditions such as obesity, inflammation, or strains or tears in ligaments or tendons  Seek care immediately if:   · Your pain is worse, even after treatment  · You cannot bend or straighten your leg completely  · The swelling around your knee does not go down even with treatment  · Your knee is painful and hot to the touch  Contact your healthcare provider if:   · You have questions or concerns about your condition or care  Treatment  will depend on the cause of your pain  You may need any of the following:  · NSAIDs  help decrease swelling and pain or fever  This medicine is available with or without a doctor's order  NSAIDs can cause stomach bleeding or kidney problems in certain people  If you take blood thinner medicine, always ask your healthcare provider if NSAIDs are safe for you  Always read the medicine label and follow directions  · Acetaminophen  decreases pain and fever  It is available without a doctor's order  Ask how much to take and how often to take it  Follow directions  Read the labels of all other medicines you are using to see if they also contain acetaminophen, or ask your doctor or pharmacist  Acetaminophen can cause liver damage if not taken correctly  Do not use more than 4 grams (4,000 milligrams) total of acetaminophen in one day  · Prescription pain medicine  may be given  Ask your healthcare provider how to take this medicine safely  Some prescription pain medicines contain acetaminophen   Do not take other medicines that contain acetaminophen without talking to your healthcare provider  Too much acetaminophen may cause liver damage  Prescription pain medicine may cause constipation  Ask your healthcare provider how to prevent or treat constipation  · Steroid injections  may be given into your knee  Steroids reduce inflammation and pain  · Surgery  may be used for some injuries, such as to repair a torn ACL  What you can do to manage your symptoms:   · Rest your knee so it can heal   Limit activities that increase your pain  Do low-impact exercises, such as walking or swimming  · Apply ice to help reduce swelling and pain  Use an ice pack, or put crushed ice in a plastic bag  Cover it with a towel before you apply it to your knee  Apply ice for 15 to 20 minutes every hour, or as directed  · Apply compression to help reduce swelling  Use a brace or bandage only as directed  · Elevate your knee to help decrease pain and swelling  Elevate your knee while you are sitting or lying down  Prop your leg on pillows to keep your knee above the level of your heart  · Prevent your knee from moving as directed  Your healthcare provider may put on a cast or splint  You may need to wear a leg brace to stabilize your knee  A leg brace can be adjusted to increase your range of motion as your knee heals  What you can do to prevent knee pain:   · Maintain a healthy weight  Extra weight increases your risk for knee pain  Ask your healthcare provider how much you should weigh  He or she can help you create a safe weight loss plan if you need to lose weight  · Exercise or train properly  Use the correct equipment for sports  Wear shoes that provide good support  Check your posture often as you exercise, play sports, or train for an event  This can help prevent stress and strain on your knees  Rest between sessions so you do not overwork your knees      Follow up with your healthcare provider within 24 hours or as directed:  Write down your questions so you remember to ask them during your visits  © Copyright [employer] 2021 Information is for End User's use only and may not be sold, redistributed or otherwise used for commercial purposes  All illustrations and images included in CareNotes® are the copyrighted property of A D A M [employer] Inc  or Eneida Zelaya  The above information is an  only  It is not intended as medical advice for individual conditions or treatments  Talk to your doctor, nurse or pharmacist before following any medical regimen to see if it is safe and effective for you

## 2022-01-14 NOTE — TELEPHONE ENCOUNTER
ADDED TO SCHEDULE     I wanted clinical team that patient was added to NEW PATIENT slot today @ 11 am with marcos ABEL knee pain    Patient states she needs to be seen today

## 2022-01-14 NOTE — TELEPHONE ENCOUNTER
Botox number of units: 200 Units  Botox quantity: One (200 unit vial)  Arrived at what location: [facility] Location: NO  NDC number: 6850-8364-18  Lot number: [id number]  Expiration Date: 10/2024  Appt notes indicate correct medication: Yes

## 2022-01-14 NOTE — PROGRESS NOTES
Assessment/Plan:    No problem-specific Assessment & Plan notes found for this encounter  Diagnoses and all orders for this visit:    Acute pain of right knee  Comments:  Advised elevation, compression, heat therapy  Lidocaine for pain  To contact ortho or return to office if symptoms do not improve after conservative management  Orders:  -     XR knee 3 vw right non injury; Future  -     lidocaine (XYLOCAINE) 5 % ointment; Apply topically 2 (two) times a day as needed for moderate pain    Other orders  -     Botox 200 units SOLR          Subjective:      Patient ID: Telma Herrera is a 59 y o  female  Knee Pain   The incident occurred 3 to 5 days ago  The incident occurred at home  The injury mechanism is unknown  The pain is present in the right knee  The quality of the pain is described as shooting and stabbing  The pain is moderate  The pain has been improving since onset  Associated symptoms include an inability to bear weight  Pertinent negatives include no loss of motion, loss of sensation, muscle weakness, numbness or tingling  She reports no foreign bodies present  The symptoms are aggravated by weight bearing  She has tried non-weight bearing for the symptoms  The treatment provided mild relief  The following portions of the patient's history were reviewed and updated as appropriate: allergies, current medications, past family history, past medical history, past social history, past surgical history and problem list     Review of Systems   Constitutional: Negative for chills, fatigue and fever  HENT: Negative for congestion, ear pain and sore throat  Respiratory: Negative for shortness of breath  Cardiovascular: Negative for chest pain, palpitations and leg swelling  Gastrointestinal: Negative for abdominal pain, nausea and vomiting  Genitourinary: Negative for decreased urine volume     Musculoskeletal: Positive for arthralgias (right knee), gait problem and joint swelling (right knee)    Skin: Negative for color change and rash  Neurological: Negative for dizziness, tingling, weakness and numbness  Psychiatric/Behavioral: Negative for confusion  The patient is not nervous/anxious  Objective:      /70   Pulse 68   Temp 98 7 °F (37 1 °C)   Ht 5' 4" (1 626 m)   SpO2 98%   BMI 39 93 kg/m²          Physical Exam  Constitutional:       General: She is not in acute distress  Appearance: She is obese  She is not ill-appearing  HENT:      Head: Normocephalic  Right Ear: External ear normal       Left Ear: External ear normal       Nose: Nose normal    Eyes:      Conjunctiva/sclera: Conjunctivae normal       Pupils: Pupils are equal, round, and reactive to light  Cardiovascular:      Rate and Rhythm: Normal rate and regular rhythm  Pulses: Normal pulses  Heart sounds: Normal heart sounds  No murmur heard  Pulmonary:      Effort: Pulmonary effort is normal  No respiratory distress  Breath sounds: Normal breath sounds  Musculoskeletal:      Cervical back: Normal range of motion  No tenderness  Right knee: Swelling present  No effusion, erythema, ecchymosis, bony tenderness or crepitus  Tenderness present over the medial joint line  Left knee: Normal    Skin:     General: Skin is warm and dry  Neurological:      Mental Status: She is alert and oriented to person, place, and time     Psychiatric:         Mood and Affect: Mood normal          Behavior: Behavior normal

## 2022-01-18 ENCOUNTER — APPOINTMENT (OUTPATIENT)
Dept: PHYSICAL THERAPY | Facility: CLINIC | Age: 62
End: 2022-01-18
Payer: COMMERCIAL

## 2022-01-18 NOTE — TELEPHONE ENCOUNTER
Spoke with patient    She does not want to start PT without knowing more information and is requesting MRI right knee  Order placed, and mobic sent to pharmacy  She is aware she can call central scheduling

## 2022-01-18 NOTE — TELEPHONE ENCOUNTER
Patient states knee injection did not relieve pain level 8 when standing or walking, throbbing, tylenol is not helping, Please advise of what is next

## 2022-01-19 NOTE — TELEPHONE ENCOUNTER
Please have courrier bring Botox to [facility]     Thank you

## 2022-01-20 ENCOUNTER — APPOINTMENT (OUTPATIENT)
Dept: PHYSICAL THERAPY | Facility: CLINIC | Age: 62
End: 2022-01-20
Payer: COMMERCIAL

## 2022-01-20 NOTE — TELEPHONE ENCOUNTER
Botox number of units: 200 Units  Botox quantity: 1  Arrived at what location: [facility]  Botox at Correct Administering Location: Yes  [address] number: [id number]  Lot number: [id number]  Expiration Date: 10/01/2024  Appt notes indicate correct medication: Yes

## 2022-01-21 NOTE — TELEPHONE ENCOUNTER
Patient calling on status of the MRI, she would like a call back relating this  He appointment is on Monday am  She is asking about for update on authorization       # [phone]

## 2022-01-24 ENCOUNTER — HOSPITAL ENCOUNTER (OUTPATIENT)
Dept: MRI IMAGING | Facility: HOSPITAL | Age: 62
Discharge: HOME | End: 2022-01-24

## 2022-01-24 ENCOUNTER — TELEPHONE (OUTPATIENT)
Dept: PSYCHIATRY | Facility: CLINIC | Age: 62
End: 2022-01-24

## 2022-01-24 DIAGNOSIS — M17.11 PRIMARY OSTEOARTHRITIS OF RIGHT KNEE: ICD-10-CM

## 2022-01-24 DIAGNOSIS — M25.561 RIGHT KNEE PAIN, UNSPECIFIED CHRONICITY: ICD-10-CM

## 2022-01-24 NOTE — TELEPHONE ENCOUNTER
Dr Keanu Rivera will be out of the office on 3/7/22  Appt needs to be rescheduled  Spoke with   He will pass along the information and have her call back to r/s  Please offer next available

## 2022-01-25 ENCOUNTER — OFFICE VISIT (OUTPATIENT)
Dept: PHYSICAL THERAPY | Facility: CLINIC | Age: 62
End: 2022-01-25
Payer: COMMERCIAL

## 2022-01-25 DIAGNOSIS — G89.4 CHRONIC PAIN SYNDROME: ICD-10-CM

## 2022-01-25 DIAGNOSIS — R29.898 WEAKNESS OF BOTH LEGS: Primary | ICD-10-CM

## 2022-01-25 PROCEDURE — 97110 THERAPEUTIC EXERCISES: CPT

## 2022-01-25 PROCEDURE — 97112 NEUROMUSCULAR REEDUCATION: CPT

## 2022-01-25 PROCEDURE — 97530 THERAPEUTIC ACTIVITIES: CPT

## 2022-01-25 NOTE — PROGRESS NOTES
Daily Note     Today's date: 2022  Patient name: Saloni Oropeza  : 1960  MRN: [id number]  Referring provider: Celso Swanson, *  Dx:   Encounter Diagnosis     ICD-10-CM    1  Weakness of both legs  R29 898    2  Chronic pain syndrome  G89 4                   Subjective: Patient noted approximately 2 weeks prior she was at her mother in laws cleaning and noted insidious knee pain; since then, she has seen orthopedic with resultant cortisone injection with limited response and then oral anti inflammatories were prescribed 1 week ago and patient noted a decrease in pain   "they (Moloxicam) really, really helped"  Pain is reported medial aspect of right knee with inflammation  Objective: See treatment diary below      Assessment: Tolerated treatment denying exacerbation of knee pain; modified treatment to tolerance as to avoid aggravation of symptoms  Patient responded well  Re introduce previous PRE's with TB and bridges to tolerance  Patient demonstrated fatigue post treatment, exhibited good technique with therapeutic exercises and would benefit from continued PT      Plan: Continue per plan of care  Progress treatment as tolerated         Precautions: bilateral leg weakness/ pain   (+) FALLS       Manuals 22         FOTO db   NV                                                Neuro Re-Ed                          Prone gs  :03  20x :03  20x :03  20x         Prone multifidus  :02  20x :02  20x :02  20x         TB paloff press                           bridging  :  20x :03  20x held         HL TA w/ roll  :05  20x :05  20x  :05  20x         HL TA marching  20x SLOW 20x   SLOW 20x SLOW         HL TB hip abd  GTB  20x GTB  20x           Clamshells   GTB  20x GTB  20x           TB BKFO  GTB  20x  GTB  20x                                                              Ther Ex             bike  10' 10' 10'         hss w/ strap  :20  3x each :20  3x each :20  3x each         Prone quad stretch  :20  3x each :20  3x each :20  3x each                      Prone prop  2' 2' 2'         Press ups   10x2 10x2                      HL Physioball le push outs                                                                                                         Ther Activity                                       Gait Training                                       Modalities

## 2022-01-27 ENCOUNTER — PROCEDURE VISIT (OUTPATIENT)
Dept: NEUROLOGY | Facility: CLINIC | Age: 62
End: 2022-01-27
Payer: COMMERCIAL

## 2022-01-27 VITALS — DIASTOLIC BLOOD PRESSURE: 70 MMHG | TEMPERATURE: 97.4 F | SYSTOLIC BLOOD PRESSURE: 138 MMHG | HEART RATE: 56 BPM

## 2022-01-27 DIAGNOSIS — G43.719 INTRACTABLE CHRONIC MIGRAINE WITHOUT AURA AND WITHOUT STATUS MIGRAINOSUS: Primary | ICD-10-CM

## 2022-01-27 PROCEDURE — 64615 CHEMODENERV MUSC MIGRAINE: CPT | Performed by: PSYCHIATRY & NEUROLOGY

## 2022-01-27 NOTE — PROGRESS NOTES
Chemodenervation     Date/Time 1/27/2022 10:04 AM     Performed by  Uli Paraad DO     Authorized by Uli Parada DO        Pre-procedure details      Prepped With: Alcohol     Anesthesia  (see MAR for exact dosages): Anesthesia method:  None   Procedure details     Position:  Supine and upright   Botox     Botox Type:  Type A    Brand:  Botox    mL's of Botulinum Toxin:  155    mL's of preservative free sterile saline:  2    Final Concentration per CC:  100 units    Needle Gauge:  30 G 2 5 inch   Procedures     Botox Procedures: chronic headache      Indications: migraines     Injection Location      Head / Face:  L superior cervical paraspinal, R superior cervical paraspinal, L , R , R frontalis, L frontalis, L lateral occipitalis, R lateral occipitalis, procerus, R temporalis, L temporalis, L superior trapezius and R superior trapezius    L  injection amount:  5 unit(s)    R  injection amount:  5 unit(s)    L lateral frontalis:  5 unit(s)    R lateral frontalis:  5 unit(s)    L medial frontalis:  5 unit(s)    R medial frontalis:  5 unit(s)    L temporalis injection amount:  20 unit(s)    R temporalis injection amount:  20 unit(s)    Procerus injection amount:  5 unit(s)    L lateral occipitalis injection amount:  15 unit(s)    R lateral occipitalis injection amount:  15 unit(s)    L superior cervical paraspinal injection amount:  10 unit(s)    R superior cervical paraspinal injection amount:  10 unit(s)    L superior trapezius injection amount:  15 unit(s)    R superior trapezius injection amount:  15 unit(s)   Total Units     Total units used:  155    Total units discarded:  45   Post-procedure details      Chemodenervation:  Chronic migraine    Facial Nerve Location[de-identified]  Bilateral facial nerve    Patient tolerance of procedure:   Tolerated well, no immediate complications   Comments      This is her first set of Botox injections with me reviewed potential a/e including ptosis worse headaches transiently  Follow up in three months with [facility] for her next Botox injections then me

## 2022-01-28 ENCOUNTER — OFFICE VISIT (OUTPATIENT)
Dept: PHYSICAL THERAPY | Facility: CLINIC | Age: 62
End: 2022-01-28
Payer: COMMERCIAL

## 2022-01-28 DIAGNOSIS — G89.4 CHRONIC PAIN SYNDROME: ICD-10-CM

## 2022-01-28 DIAGNOSIS — R29.898 WEAKNESS OF BOTH LEGS: Primary | ICD-10-CM

## 2022-01-28 PROCEDURE — 97112 NEUROMUSCULAR REEDUCATION: CPT

## 2022-01-28 PROCEDURE — 97110 THERAPEUTIC EXERCISES: CPT

## 2022-01-28 NOTE — PROGRESS NOTES
Daily Note     Today's date: 2022  Patient name: Tuyet Li  : 1960  MRN: [id number]  Referring provider: Umesh Coe, *  Dx:   Encounter Diagnosis     ICD-10-CM    1  Weakness of both legs  R29 898    2  Chronic pain syndrome  G89 4        Start Time: 1255  Stop Time: 1348  Total time in clinic (min): 53 minutes    Subjective: Patient reports that her legs have been giving her pain while she lays down in bed  Pt states that today her R knee is bothering her  Pt has been compliant with her HEP  Objective: See treatment diary below      Assessment: Tolerated treatment well, PT was able to re-introduce previous resistance for therapeutic exercise, allowing patient to have rest breaks between each to limit increase in pain  Pt required cueing for proper form with clamshells in order to accurately target her gluteus medius muscles, able to maintain correct form  Patient demonstrated fatigue post treatment, exhibited good technique with therapeutic exercises and would benefit from continued PT      Plan: Continue per plan of care  Progress treatment as tolerated         Precautions: bilateral leg weakness/ pain   (+) FALLS       Manuals 22        FOTO db   NV sc                                               Neuro Re-Ed                          Prone gs  :03  20x :03  20x :03  20x 03:   20x        Prone multifidus  :02  20x :02  20x :02  20x         TB paloff press                           bridging  :03  20x :03  20x held :03   20x        HL TA w/ roll  :  20x :05  20x  :05  20x         HL TA marching  20x SLOW 20x   SLOW 20x SLOW 20x SLOW        HL TB hip abd  GTB  20x GTB  20x   GTB 20x        Clamshells   GTB  20x GTB  20x   GTB 20x        TB BKFO  GTB  20x  GTB  20x  GTB 20x                                                            Ther Ex             bike  10' 10' 10' 10'        hss w/ strap  :20  3x each :20  3x each :20  3x each :20   3x each        Prone quad stretch  :20  3x each :20  3x each :20  3x each :20   3x each                     Prone prop  2' 2' 2' 2'        Press ups   10x2 10x2 10x2                     HL Physioball le push outs                                                                                                         Ther Activity                                       Gait Training                                       Modalities

## 2022-01-28 NOTE — TELEPHONE ENCOUNTER
MRI knee Denied by insurance  Provider has the option to call [facility] to discuss with a physician reviewer at [phone]  [Reason for Denial: This test should be used when your doctor is looking for a specific condition  These might include a broken bone or a tear in the knee  We reviewed the notes we have  The notes do not show that you are having this test for any of these reasons  The notes do not show what type of problem is suspected  Based on the information we have, this test is not medically necessary ]    Request ID# [id number] for CPT 13410     Denial scanned in [address] [place] [address]

## 2022-01-28 NOTE — TELEPHONE ENCOUNTER
Peer to peer completed and MRI approved  Approval # [id number]  Valid until 3/24/2022      Thanks,    Senia Patton

## 2022-01-31 ENCOUNTER — TELEPHONE (OUTPATIENT)
Dept: OBGYN CLINIC | Facility: HOSPITAL | Age: 62
End: 2022-01-31

## 2022-01-31 NOTE — TELEPHONE ENCOUNTER
Patient states that she just missed a call  She is wondering if this is regarding her MRI?       # [phone]

## 2022-01-31 NOTE — TELEPHONE ENCOUNTER
Called patient and left voicemail to help schedule MRI  Also provided central scheduling information for her to call and reschedule MRI at her convenience

## 2022-02-02 ENCOUNTER — OFFICE VISIT (OUTPATIENT)
Dept: PHYSICAL THERAPY | Facility: CLINIC | Age: 62
End: 2022-02-02
Payer: COMMERCIAL

## 2022-02-02 DIAGNOSIS — G89.4 CHRONIC PAIN SYNDROME: ICD-10-CM

## 2022-02-02 DIAGNOSIS — R29.898 WEAKNESS OF BOTH LEGS: Primary | ICD-10-CM

## 2022-02-02 PROCEDURE — 97112 NEUROMUSCULAR REEDUCATION: CPT | Performed by: PHYSICAL THERAPIST

## 2022-02-02 PROCEDURE — 97110 THERAPEUTIC EXERCISES: CPT | Performed by: PHYSICAL THERAPIST

## 2022-02-02 NOTE — PROGRESS NOTES
Daily Note     Today's date: 2022  Patient name: Rhoda Casillas  : 1960  MRN: [id number]  Referring provider: Musa Mcnally, *  Dx:   Encounter Diagnosis     ICD-10-CM    1  Weakness of both legs  R29 898    2  Chronic pain syndrome  G89 4                   Subjective: Patient injured her right knee beginning of 2022 after she got up off couch and her knee buckled   She went to doctor - x-rays, Injection and anti-inflammatory prescribed  Injection helped but is wearing off now   3 days ago - her pain has returned  vps sitting  4-5/10  Standing  8/10   Walks into department with cane ( corrected technique with cane )   She is getting MRI 2-10-22 of right knee  She reports feeling much better post session     Objective: See treatment diary below  (+) tenderness medial > lateral joint line, pes anserine region   (+) Pain produced with valgus stress at 30 degrees, intermittent pain with meniscal medial testing  Assessment: Tolerated treatment fair -  Good initial response to kinesiotape to right knee for support/ effusion management  Plan: Continue per plan of care  Progress treatment as tolerated  Monitor response to taping       Precautions: bilateral leg weakness/ pain   (+) FALLS       Manuals 22       FOTO db   NV sc                     kinesiotape right knee      db                    Neuro Re-Ed                          Prone gs  :03  20x :03  20x :03  20x 03:   20x 3s x 20       Prone multifidus  :02  20x :02  20x :02  20x         TB paloff press                           bridging  :03  20x :03  20x held :03   20x 3s x 20        HL TA w/ roll  :05  20x :05  20x  :05  20x  5s x 20       HL TA marching  20x SLOW 20x   SLOW 20x SLOW 20x SLOW 20x slow       HL TB hip abd  GTB  20x GTB  20x   GTB 20x gtb x 20        Clamshells   GTB  20x GTB  20x   GTB 20x gtb x 20        TB BKFO  GTB  20x  GTB  20x  GTB 20x gtb x 20                     Qs, add set 3s  X 20                                  Ther Ex             bike  10' 10' 10' 10' 10'       hss w/ strap  :20  3x each :20  3x each :20  3x each :20   3x each 20s x 3       Prone quad stretch  :20  3x each :20  3x each :20  3x each :20   3x each                     Prone prop  2' 2' 2' 2' 2'       Press ups   10x2 10x2 10x2 10 x 2                    HL Physioball le push outs                           SLR/ saq      20x ea                                                                         Ther Activity                                       Gait Training                                       Modalities                          Cp right knee      10'

## 2022-02-04 ENCOUNTER — APPOINTMENT (OUTPATIENT)
Dept: PHYSICAL THERAPY | Facility: CLINIC | Age: 62
End: 2022-02-04
Payer: COMMERCIAL

## 2022-02-08 ENCOUNTER — HOSPITAL ENCOUNTER (OUTPATIENT)
Dept: MAMMOGRAPHY | Facility: CLINIC | Age: 62
Discharge: HOME | End: 2022-02-08
Payer: COMMERCIAL

## 2022-02-08 DIAGNOSIS — Z12.31 SCREENING MAMMOGRAM, ENCOUNTER FOR: ICD-10-CM

## 2022-02-08 PROCEDURE — 77067 SCR MAMMO BI INCL CAD: CPT

## 2022-02-08 PROCEDURE — 77063 BREAST TOMOSYNTHESIS BI: CPT

## 2022-02-09 ENCOUNTER — OFFICE VISIT (OUTPATIENT)
Dept: PHYSICAL THERAPY | Facility: CLINIC | Age: 62
End: 2022-02-09
Payer: COMMERCIAL

## 2022-02-09 DIAGNOSIS — R29.898 WEAKNESS OF BOTH LEGS: ICD-10-CM

## 2022-02-09 DIAGNOSIS — G89.4 CHRONIC PAIN SYNDROME: Primary | ICD-10-CM

## 2022-02-09 PROCEDURE — 97110 THERAPEUTIC EXERCISES: CPT | Performed by: PHYSICAL THERAPIST

## 2022-02-09 PROCEDURE — 97112 NEUROMUSCULAR REEDUCATION: CPT | Performed by: PHYSICAL THERAPIST

## 2022-02-09 NOTE — PROGRESS NOTES
Daily Note     Today's date: 2022  Patient name: Adia Tom  : 1960  MRN: [id number]  Referring provider: Ashok Doshi, *  Dx:   Encounter Diagnosis     ICD-10-CM    1  Chronic pain syndrome  G89 4    2  Weakness of both legs  R29 898                   Subjective: patient reports that her right knee continues to be painful   She is getting an MRI this Thursday  She felt that taping was helpful with improved support  ( held today due to MRI tomorrow )     Objective: See treatment diary below      Assessment: Tolerated treatment fair -  No progression today pending MRI tomorrow   Plan: Continue per plan of care  Progress treatment as tolerated      Resume taping next session      Precautions: bilateral leg weakness/ pain   (+) FALLS       Manuals 22      FOTO db   NV sc                     kinesiotape right knee      db                    Neuro Re-Ed                          Prone gs  :03  20x :03  20x :03  20x 03:   20x 3s x 20 3s x 20       Prone multifidus  :02  20x :02  20x :02  20x         TB paloff press                           bridging  :03  20x :03  20x held :03   20x 3s x 20  3s x 20       HL TA w/ roll  :05  20x :05  20x  :05  20x  5s x 20 5s x 20       HL TA marching  20x SLOW 20x   SLOW 20x SLOW 20x SLOW 20x slow 20x       HL TB hip abd  GTB  20x GTB  20x   GTB 20x gtb x 20  gtb x 20       Clamshells   GTB  20x GTB  20x   GTB 20x gtb x 20  gtb x 20       TB BKFO  GTB  20x  GTB  20x  GTB 20x gtb x 20  gtb x 20                    Qs, add set      3s  X 20  3s x 20                                 Ther Ex             bike  10' 10' 10' 10' 10' 10'       hss w/ strap  :20  3x each :20  3x each :20  3x each :20   3x each 20s x 3 20s x 3      Prone quad stretch  :20  3x each :20  3x each :20  3x each :20   3x each  20s x 3                    Prone prop  2' 2' 2' 2' 2' 2'      Press ups   10x2 10x2 10x2 10 x 2 10x 2                    HL Physioball le push outs                           SLR/ saq      20x ea  20x ea                                                                        Ther Activity                                       Gait Training                                       Modalities                          Cp right knee      10' 10'

## 2022-02-10 ENCOUNTER — HOSPITAL ENCOUNTER (OUTPATIENT)
Dept: MRI IMAGING | Facility: HOSPITAL | Age: 62
Discharge: HOME | End: 2022-02-10
Payer: COMMERCIAL

## 2022-02-10 PROCEDURE — G1004 CDSM NDSC: HCPCS

## 2022-02-10 PROCEDURE — 73721 MRI JNT OF LWR EXTRE W/O DYE: CPT

## 2022-02-11 ENCOUNTER — OFFICE VISIT (OUTPATIENT)
Dept: PHYSICAL THERAPY | Facility: CLINIC | Age: 62
End: 2022-02-11
Payer: COMMERCIAL

## 2022-02-11 DIAGNOSIS — R29.898 WEAKNESS OF BOTH LEGS: ICD-10-CM

## 2022-02-11 DIAGNOSIS — G89.4 CHRONIC PAIN SYNDROME: Primary | ICD-10-CM

## 2022-02-11 PROCEDURE — 97110 THERAPEUTIC EXERCISES: CPT | Performed by: PHYSICAL THERAPIST

## 2022-02-11 PROCEDURE — 97112 NEUROMUSCULAR REEDUCATION: CPT | Performed by: PHYSICAL THERAPIST

## 2022-02-11 NOTE — PROGRESS NOTES
Daily Note     Today's date: 2022  Patient name: Santhosh Henry  : 1960  MRN: [id number]  Referring provider: Uma Nuno, *  Dx:   Encounter Diagnosis     ICD-10-CM    1  Chronic pain syndrome  G89 4    2  Weakness of both legs  R29 898                   Subjective: Patient states that her MRI is done - waiting to speak to MD regarding results  Patient notes that her knee is very painful and interfering with her daily tasks  vps -5/10 today   Reports feeling better after taping applied today     Objective: See treatment diary below      Assessment: Tolerated treatment fair -  Performs exercises without issue    Plan: Continue per plan of care  Progress treatment as tolerated  Initiate upright pres next session        Precautions: bilateral leg weakness/ pain   (+) FALLS       Manuals 22     FOTO db   NV sc                     kinesiotape right knee      db  db                  Neuro Re-Ed                          Prone gs  :03  20x :03  20x :03  20x 03:   20x 3s x 20 3s x 20  3s x 20     Prone multifidus  :02  20x :02  20x :02  20x    2s x 20      TB paloff press                           bridging  :03  20x :03  20x held :03   20x 3s x 20  3s x 20  3s x 20      HL TA w/ roll  :05  20x :05  20x  :05  20x  5s x 20 5s x 20  5s x 20     HL TA marching  20x SLOW 20x   SLOW 20x SLOW 20x SLOW 20x slow 20x  20x     HL TB hip abd  GTB  20x GTB  20x   GTB 20x gtb x 20  gtb x 20  gtb x 20     Clamshells   GTB  20x GTB  20x   GTB 20x gtb x 20  gtb x 20  gtb x 20     TB BKFO  GTB  20x  GTB  20x  GTB 20x gtb x 20  gtb x 20  gtb  X 20                   Qs, add set      3s  X 20  3s x 20  3s x 20                                Ther Ex             bike  10' 10' 10' 10' 10' 10'  10'      hss w/ strap  :20  3x each :20  3x each :20  3x each :20   3x each 20s x 3 20s x 3 20s x 3     Prone quad stretch  :20  3x each :20  3x each :20  3x each :20   3x each  20s x 3  20s x 3                   Prone prop  2' 2' 2' 2' 2' 2' 2'     Press ups   10x2 10x2 10x2 10 x 2 10x 2  10x 2                  HL Physioball le push outs                           SLR/ saq      20x ea  20x ea  20x ea     Stand HR             Stand hip abd, hs curls                                                    Ther Activity                                       Gait Training                                       Modalities                          Cp right knee      10' 10'  10'

## 2022-02-14 ENCOUNTER — APPOINTMENT (OUTPATIENT)
Dept: PHYSICAL THERAPY | Facility: CLINIC | Age: 62
End: 2022-02-14
Payer: COMMERCIAL

## 2022-02-16 ENCOUNTER — OFFICE VISIT (OUTPATIENT)
Dept: PHYSICAL THERAPY | Facility: CLINIC | Age: 62
End: 2022-02-16
Payer: COMMERCIAL

## 2022-02-16 DIAGNOSIS — R29.898 WEAKNESS OF BOTH LEGS: ICD-10-CM

## 2022-02-16 DIAGNOSIS — G89.4 CHRONIC PAIN SYNDROME: Primary | ICD-10-CM

## 2022-02-16 PROCEDURE — 97110 THERAPEUTIC EXERCISES: CPT | Performed by: PHYSICAL THERAPIST

## 2022-02-16 PROCEDURE — 97112 NEUROMUSCULAR REEDUCATION: CPT | Performed by: PHYSICAL THERAPIST

## 2022-02-16 NOTE — PROGRESS NOTES
Daily Note     Today's date: 2022  Patient name: Shaggy Brown  : 1960  MRN: [id number]  Referring provider: Jaquelin Iqbal, *  Dx:   Encounter Diagnosis     ICD-10-CM    1  Chronic pain syndrome  G89 4    2  Weakness of both legs  R29 898                   Subjective: patient reports that she is feeling much better with knee pain today - does not feel that she needs her spc as much at this time  Presents to department walking without cane     Objective: See treatment diary below      Assessment: Tolerated treatment much better   Performed program without issue  Gait - non-antalgic today without AD     Plan: Continue per plan of care  Progress treatment as tolerated      Initiate upright pres next session / add weight as appropriate       Precautions: bilateral leg weakness/ pain   (+) FALLS       Manuals 22    FOTO db   NV sc                     kinesiotape right knee      db  db db                 Neuro Re-Ed                          Prone gs  :03  20x :03  20x :03  20x 03:   20x 3s x 20 3s x 20  3s x 20 3s x 20     Prone multifidus  :02  20x :02  20x :02  20x    2s x 20  2s x 20     TB paloff press                           bridging  :03  20x :03  20x held :03   20x 3s x 20  3s x 20  3s x 20  3s x 20    HL TA w/ roll  :05  20x :05  20x  :05  20x  5s x 20 5s x 20  5s x 20 5s x 20    HL TA marching  20x SLOW 20x   SLOW 20x SLOW 20x SLOW 20x slow 20x  20x 20x     HL TB hip abd  GTB  20x GTB  20x   GTB 20x gtb x 20  gtb x 20  gtb x 20 gtb x 20    Clamshells   GTB  20x GTB  20x   GTB 20x gtb x 20  gtb x 20  gtb x 20 gtb x 20    TB BKFO  GTB  20x  GTB  20x  GTB 20x gtb x 20  gtb x 20  gtb  X 20  gtb x 20                  Qs, add set      3s  X 20  3s x 20  3s x 20  3s x 20 ea                               Ther Ex             bike  10' 10' 10' 10' 10' 10'  10'  10'     hss w/ strap  :20  3x each :20  3x each :20  3x each :20   3x each 20s x 3 20s x 3 20s x 3 20s x 3     Prone quad stretch  :20  3x each :20  3x each :20  3x each :20   3x each  20s x 3  20s x 3  20s x 3                 Prone prop  2' 2' 2' 2' 2' 2' 2' 2'    Press ups   10x2 10x2 10x2 10 x 2 10x 2  10x 2 10x  2                 HL Physioball le push outs                           SLR/ saq      20x ea  20x ea  20x ea 20 x ea    Stand HR             Stand hip abd, hs curls                                                    Ther Activity                                       Gait Training                                       Modalities                          Cp right knee      10' 10'  10'

## 2022-02-18 ENCOUNTER — APPOINTMENT (OUTPATIENT)
Dept: PHYSICAL THERAPY | Facility: CLINIC | Age: 62
End: 2022-02-18
Payer: COMMERCIAL

## 2022-02-23 ENCOUNTER — OFFICE VISIT (OUTPATIENT)
Dept: PHYSICAL THERAPY | Facility: CLINIC | Age: 62
End: 2022-02-23
Payer: COMMERCIAL

## 2022-02-23 ENCOUNTER — TELEPHONE (OUTPATIENT)
Dept: OBGYN CLINIC | Facility: CLINIC | Age: 62
End: 2022-02-23

## 2022-02-23 ENCOUNTER — TELEPHONE (OUTPATIENT)
Dept: OBGYN CLINIC | Facility: HOSPITAL | Age: 62
End: 2022-02-23

## 2022-02-23 DIAGNOSIS — G89.4 CHRONIC PAIN SYNDROME: Primary | ICD-10-CM

## 2022-02-23 DIAGNOSIS — R29.898 WEAKNESS OF BOTH LEGS: ICD-10-CM

## 2022-02-23 PROCEDURE — 97110 THERAPEUTIC EXERCISES: CPT | Performed by: PHYSICAL THERAPIST

## 2022-02-23 PROCEDURE — 97112 NEUROMUSCULAR REEDUCATION: CPT | Performed by: PHYSICAL THERAPIST

## 2022-02-23 NOTE — PROGRESS NOTES
Daily Note     Today's date: 2022  Patient name: Sonya Lao  : 1960  MRN: [id number]  Referring provider: Sadie Beltran, *  Dx:   Encounter Diagnosis     ICD-10-CM    1  Chronic pain syndrome  G89 4    2  Weakness of both legs  R29 898                   Subjective: patient pleased to reports that her right knee is feeling " so much better "  Notes that she may have been compensating and now has some mild left hip pain  Reports that the tape didn't last quite as long, but also - she doesn't need the tape as much as she used to   Objective: See treatment diary below      Assessment: Tolerated treatment much better  Added weight for program today without issue  Plan: Continue per plan of care  Progress treatment as tolerated  Continue to progress each session as able with more dynamic tasks/ upright / strengthening           Precautions: bilateral leg weakness/ pain   (+) FALLS       Manuals 22   FOTO db   NV sc     db                kinesiotape right knee      db  db db                 Neuro Re-Ed                          Prone gs  :03  20x :03  20x :03  20x 03:   20x 3s x 20 3s x 20  3s x 20 3s x 20  3s x 20   Prone multifidus  :02  20x :02  20x :02  20x    2s x 20  2s x 20  1# x 20 2s   TB paloff press                           bridging  :03  20x :03  20x held :03   20x 3s x 20  3s x 20  3s x 20  3s x 20 3s x 20   HL TA w/ roll  :05  20x :05  20x  :05  20x  5s x 20 5s x 20  5s x 20 5s x 20 5s x 20   HL TA marching  20x SLOW 20x   SLOW 20x SLOW 20x SLOW 20x slow 20x  20x 20x  1# x 20    HL TB hip abd  GTB  20x GTB  20x   GTB 20x gtb x 20  gtb x 20  gtb x 20 gtb x 20 btb x 20    Clamshells   GTB  20x GTB  20x   GTB 20x gtb x 20  gtb x 20  gtb x 20 gtb x 20 btb x 20    TB BKFO  GTB  20x  GTB  20x  GTB 20x gtb x 20  gtb x 20  gtb  X 20  gtb x 20  btb x 20                 Qs, add set      3s  X 20  3s x 20  3s x 20  3s x 20 ea  3s x 20 ea                              Ther Ex             bike  10' 10' 10' 10' 10' 10'  10'  10'  10'    hss w/ strap  :20  3x each :20  3x each :20  3x each :20   3x each 20s x 3 20s x 3 20s x 3 20s x 3  20s x 3   Prone quad stretch  :20  3x each :20  3x each :20  3x each :20   3x each  20s x 3  20s x 3  20s x 3 20s x 3                Prone prop  2' 2' 2' 2' 2' 2' 2' 2' 2'   Press ups   10x2 10x2 10x2 10 x 2 10x 2  10x 2 10x  2 10x 2                HL Physioball le push outs                           SLR/ saq      20x ea  20x ea  20x ea 20 x ea 1#  x20 ea    Stand HR          1# x 20   Stand hip abd, hs curls, hip ext           1# x 20 ea   Squats @ bar          20x                              Ther Activity                                       Gait Training                                       Modalities                          Cp right knee      10' 10'  10'  def

## 2022-02-25 ENCOUNTER — OFFICE VISIT (OUTPATIENT)
Dept: PHYSICAL THERAPY | Facility: CLINIC | Age: 62
End: 2022-02-25
Payer: COMMERCIAL

## 2022-02-25 DIAGNOSIS — R29.898 WEAKNESS OF BOTH LEGS: ICD-10-CM

## 2022-02-25 DIAGNOSIS — G89.4 CHRONIC PAIN SYNDROME: Primary | ICD-10-CM

## 2022-02-25 PROCEDURE — 97110 THERAPEUTIC EXERCISES: CPT | Performed by: PHYSICAL THERAPIST

## 2022-02-25 PROCEDURE — 97112 NEUROMUSCULAR REEDUCATION: CPT | Performed by: PHYSICAL THERAPIST

## 2022-02-25 NOTE — PROGRESS NOTES
Daily Note     Today's date: 2022  Patient name: Ria Ha  : 1960  MRN: [id number]  Referring provider: Jose Alfredo Davidson, *  Dx:   Encounter Diagnosis     ICD-10-CM    1  Chronic pain syndrome  G89 4    2  Weakness of both legs  R29 898                   Subjective: Patient notes that her right knee continues to feel good - yet her left hip is still bothersome today  Notes feeling better post session/ post stretching  Objective: See treatment diary below      Assessment: Tolerated treatment well overall - she is demonstrating good knowledge of HEP       Plan: Continue per plan of care  Progress treatment as tolerated  Continue to progress each session as able with more dynamic tasks/ upright / strengthening           Precautions: bilateral leg weakness/ pain   (+) FALLS       Manuals    FOTO    NV sc     db   Left hip stretching db            kinesiotape right knee      db  db db                 Neuro Re-Ed                          Prone gs 3sx 20   :03  20x 03:   20x 3s x 20 3s x 20  3s x 20 3s x 20  3s x 20   Prone multifidus 1# x 20    :02  20x    2s x 20  2s x 20  1# x 20 2s   TB paloff press                           bridging 3s x 20   held :03   20x 3s x 20  3s x 20  3s x 20  3s x 20 3s x 20   HL TA w/ roll 5s x 20   :05  20x  5s x 20 5s x 20  5s x 20 5s x 20 5s x 20   HL TA marching 1# x 20   20x SLOW 20x SLOW 20x slow 20x  20x 20x  1# x 20    HL TB hip abd btb x 20    GTB 20x gtb x 20  gtb x 20  gtb x 20 gtb x 20 btb x 20    Clamshells  btb x 20    GTB 20x gtb x 20  gtb x 20  gtb x 20 gtb x 20 btb x 20    TB BKFO     GTB 20x gtb x 20  gtb x 20  gtb  X 20  gtb x 20  btb x 20                 Qs, add set      3s  X 20  3s x 20  3s x 20  3s x 20 ea  3s x 20 ea                              Ther Ex             bike 10'    10' 10' 10' 10'  10'  10'  10'    hss w/ strap 20s x 3   :20  3x each :20   3x each 20s x 3 20s x 3 20s x 3 20s x 3  20s x 3 Prone quad stretch 20s x 3   :20  3x each :20   3x each  20s x 3  20s x 3  20s x 3 20s x 3                Prone prop 2'   2' 2' 2' 2' 2' 2' 2'   Press ups 10x 2   10x2 10x2 10 x 2 10x 2  10x 2 10x  2 10x 2                HL Physioball le push outs                           SLR/ saq 1# x 20      20x ea  20x ea  20x ea 20 x ea 1#  x20 ea    Stand HR 1# x 20          1# x 20   Stand hip abd, hs curls, hip ext  1# x 20          1# x 20 ea   Squats @ bar 20x          20x                              Ther Activity                                       Gait Training                                       Modalities                          Cp right knee      10' 10'  10'  def

## 2022-03-02 ENCOUNTER — OFFICE VISIT (OUTPATIENT)
Dept: PHYSICAL THERAPY | Facility: CLINIC | Age: 62
End: 2022-03-02
Payer: COMMERCIAL

## 2022-03-02 DIAGNOSIS — R29.898 WEAKNESS OF BOTH LEGS: ICD-10-CM

## 2022-03-02 DIAGNOSIS — G89.4 CHRONIC PAIN SYNDROME: Primary | ICD-10-CM

## 2022-03-02 PROCEDURE — 97110 THERAPEUTIC EXERCISES: CPT

## 2022-03-02 PROCEDURE — 97112 NEUROMUSCULAR REEDUCATION: CPT

## 2022-03-02 NOTE — PROGRESS NOTES
Daily Note     Today's date: 3/2/2022  Patient name: Saloni Oropeza  : 1960  MRN: [id number]  Referring provider: Celso Swanson, *  Dx: No diagnosis found  Subjective: Patient denied R knee pain; her chief complaint is left groin/anterior aspect of left hip  Patient also noted left knee discomfort; SPR 3/10  Patient stated pain disrupts her sleep  Objective: See treatment diary below      Assessment: Tolerated treatment with excellent understanding and technique of current therapy program  Patient would benefit from continued PT      Plan: Continue per plan of care  Progress treatment as tolerated         Precautions: bilateral leg weakness/ pain   (+) FALLS       Manuals 12/29 1/5/22 1/6 1/25 1/28 2/2 2/9 2/11 3/2    FOTO db   NV sc                     kinesiotape right knee      db  db patient def                 Neuro Re-Ed                          Prone gs  :03  20x :03  20x :03  20x 03:   20x 3s x 20 3s x 20  3s x 20 :03  20x    Prone multifidus  :02  20x :02  20x :02  20x    2s x 20  :02  20x    TB paloff press                           bridging  :03  20x :03  20x held :03   20x 3s x 20  3s x 20  3s x 20  :03  20x    HL TA w/ roll  :05  20x :05  20x  :05  20x  5s x 20 5s x 20  5s x 20 :05  20x    HL TA marching  20x SLOW 20x   SLOW 20x SLOW 20x SLOW 20x slow 20x  20x     HL TB hip abd  GTB  20x GTB  20x   GTB 20x gtb x 20  gtb x 20  gtb x 20 GTB  20x    Clamshells   GTB  20x GTB  20x   GTB 20x gtb x 20  gtb x 20  gtb x 20 GTB  20x    TB BKFO  GTB  20x  GTB  20x  GTB 20x gtb x 20  gtb x 20  gtb  X 20  GTB  20x                 Qs, add set      3s  X 20  3s x 20  3s x 20  :03  20x each                              Ther Ex             bike  10' 10' 10' 10' 10' 10'  10'  10'    hss w/ strap  :20  3x each :20  3x each :20  3x each :20   3x each 20s x 3 20s x 3 20s x 3 :20  3x each    Prone quad stretch  :20  3x each :20  3x each :20  3x each :20   3x each  20s x 3  20s x 3 :20  3x each                 Prone prop  2' 2' 2' 2' 2' 2' 2' 2'    Press ups   10x2 10x2 10x2 10 x 2 10x 2  10x 2 10x2                 HL Physioball le push outs                           SLR/ saq      20x ea  20x ea  20x ea 1 5#  20x each    Stand HR         1 5#  20x on foam    Stand hip abd, hs curls         1 5#  20x each on foam                                           Ther Activity                                       Gait Training                                       Modalities                          Cp right knee      10' 10'  10'

## 2022-03-04 ENCOUNTER — APPOINTMENT (OUTPATIENT)
Dept: PHYSICAL THERAPY | Facility: CLINIC | Age: 62
End: 2022-03-04
Payer: COMMERCIAL

## 2022-03-08 ENCOUNTER — APPOINTMENT (OUTPATIENT)
Dept: PHYSICAL THERAPY | Facility: CLINIC | Age: 62
End: 2022-03-08
Payer: COMMERCIAL

## 2022-03-09 NOTE — TELEPHONE ENCOUNTER
Botox  200 units is schedule for delivery on 03/11/2022    VIA: [facility]    Please inform of delivery    Sheridan Kruger

## 2022-03-10 ENCOUNTER — OFFICE VISIT (OUTPATIENT)
Dept: PHYSICAL THERAPY | Facility: CLINIC | Age: 62
End: 2022-03-10
Payer: COMMERCIAL

## 2022-03-10 DIAGNOSIS — G89.4 CHRONIC PAIN SYNDROME: Primary | ICD-10-CM

## 2022-03-10 DIAGNOSIS — R29.898 WEAKNESS OF BOTH LEGS: ICD-10-CM

## 2022-03-10 PROCEDURE — 97112 NEUROMUSCULAR REEDUCATION: CPT

## 2022-03-10 PROCEDURE — 97110 THERAPEUTIC EXERCISES: CPT

## 2022-03-10 NOTE — PROGRESS NOTES
Daily Note     Today's date: 3/10/2022  Patient name: Luis Angel Solano  : 1960  MRN: [id number]  Referring provider: Elda Romano, *  Dx:   Encounter Diagnosis     ICD-10-CM    1  Chronic pain syndrome  G89 4    2  Weakness of both legs  R29 898                   Subjective: Patient noted significant decline in knee pain  Patient noted she is pleased with decline in pain and overall improved function  Patient reported she will be away secondary to her family going away for a cruise and she is caring for other family members out of town and will not be able to attend PT  Objective: See treatment diary below      Assessment: Tolerated treatment without limitations or exacerbation of symptoms during exercise performance  Patient appears compliant with currrent program  Patient exhibited good technique with therapeutic exercises      Plan: Will continue with current program until 3/20/22 as per patient request   Patient f/u with MD is scheduled for 3/14/22        Precautions: bilateral leg weakness/ pain   (+) FALLS       Manuals 12/29 1/5/22 1/6 1/25 1/28 2/2 2/9 2/11 3/2 3/10   FOTO db   NV sc                     kinesiotape right knee      db  db patient def                 Neuro Re-Ed                          Prone gs  :03  20x :03  20x :03  20x 03:   20x 3s x 20 3s x 20  3s x 20 :03  20x :03  20x   Prone multifidus  :02  20x :02  20x :02  20x    2s x 20  :02  20x :02  20x   TB paloff press                           bridging  :03  20x :03  20x held :03   20x 3s x 20  3s x 20  3s x 20  :03  20x :03  20x   HL TA w/ roll  :05  20x :05  20x  :05  20x  5s x 20 5s x 20  5s x 20 :05  20x :05  20x   HL TA marching  20x SLOW 20x   SLOW 20x SLOW 20x SLOW 20x slow 20x  20x  1 5#  20x   HL TB hip abd  GTB  20x GTB  20x   GTB 20x gtb x 20  gtb x 20  gtb x 20 GTB  20x GTB  20x   Clamshells   GTB  20x GTB  20x   GTB 20x gtb x 20  gtb x 20  gtb x 20 GTB  20x GTB  20x   TB BKFO  GTB  20x  GTB  20x  GTB 20x gtb x 20  gtb x 20  gtb  X 20  GTB  20x GTB  20x                Qs, add set      3s  X 20  3s x 20  3s x 20  :03  20x each :03  20x each                             Ther Ex             bike  10' 10' 10' 10' 10' 10'  10'  10' 10'   hss w/ strap  :20  3x each :20  3x each :20  3x each :20   3x each 20s x 3 20s x 3 20s x 3 :20  3x each :20  3x each   Prone quad stretch  :20  3x each :20  3x each :20  3x each :20   3x each  20s x 3  20s x 3  :20  3x each :20  3x each                Prone prop  2' 2' 2' 2' 2' 2' 2' 2' 2'   Press ups   10x2 10x2 10x2 10 x 2 10x 2  10x 2 10x2 10x2                HL Physioball le push outs                           SLR/ saq      20x ea  20x ea  20x ea 1 5#  20x each 1 5#  20x each   Stand HR         1 5#  20x on foam 1 5# on foam  20x   Stand hip abd, hs curls         1 5#  20x each on foam On foam  1 5#  20x                                          Ther Activity                                       Gait Training                                       Modalities                          Cp right knee      10' 10'  10'  No P!

## 2022-03-11 ENCOUNTER — OFFICE VISIT (OUTPATIENT)
Dept: PHYSICAL THERAPY | Facility: CLINIC | Age: 62
End: 2022-03-11
Payer: COMMERCIAL

## 2022-03-11 DIAGNOSIS — R29.898 WEAKNESS OF BOTH LEGS: ICD-10-CM

## 2022-03-11 DIAGNOSIS — G89.4 CHRONIC PAIN SYNDROME: Primary | ICD-10-CM

## 2022-03-11 PROCEDURE — 97110 THERAPEUTIC EXERCISES: CPT

## 2022-03-11 PROCEDURE — 97112 NEUROMUSCULAR REEDUCATION: CPT

## 2022-03-11 NOTE — PROGRESS NOTES
Daily Note     Today's date: 3/11/2022  Patient name: Yolette Kirkland  : 1960  MRN: [id number]  Referring provider: Rigo Gaona, *  Dx:   Encounter Diagnosis     ICD-10-CM    1  Chronic pain syndrome  G89 4    2  Weakness of both legs  R29 898                   Subjective: Patient denied any pain of either knee  Patient noted she is pleased with gains and progress toward PT goals  Objective: See treatment diary below      Assessment: Tolerated treatment well  Patient demonstrated fatigue post treatment and exhibited good technique with therapeutic exercises      Plan: Continue per plan of care  Progress treatment as tolerated  Potential discharge on 3/20/22       Precautions: bilateral leg weakness/ pain   (+) FALLS       Manuals 3/11    1/28 2/2 2/9 2/11 3/2 3/10   FOTO LA    sc                     kinesiotape right knee      db  db patient def                 Neuro Re-Ed                          Prone gs :03  20x     03:   20x 3s x 20 3s x 20  3s x 20 :03  20x :03  20x   Prone multifidus :02  20x       2s x 20  :02  20x :02  20x   TB paloff press                           bridging :03  20x    :03   20x 3s x 20  3s x 20  3s x 20  :03  20x :03  20x   HL TA w/ roll :05  20x      5s x 20 5s x 20  5s x 20 :05  20x :05  20x   HL TA marching 1 5#  20x    20x SLOW 20x slow 20x  20x  1 5#  20x   HL TB hip abd GTB  20x    GTB 20x gtb x 20  gtb x 20  gtb x 20 GTB  20x GTB  20x   Clamshells  GTB  20x    GTB 20x gtb x 20  gtb x 20  gtb x 20 GTB  20x GTB  20x   TB BKFO GTB  20x    GTB 20x gtb x 20  gtb x 20  gtb  X 20  GTB  20x GTB  20x                Qs, add set :03  20x each     3s  X 20  3s x 20  3s x 20  :03  20x each :03  20x each                             Ther Ex             bike 10'    10' 10' 10'  10'  10' 10'   hss w/ strap :20  3x each    :20   3x each 20s x 3 20s x 3 20s x 3 :20  3x each :20  3x each   Prone quad stretch :20  3x each    :20   3x each  20s x 3  20s x 3  :20  3x each :20  3x each Prone prop 2'    2' 2' 2' 2' 2' 2'   Press ups 10x2    10x2 10 x 2 10x 2  10x 2 10x2 10x2                HL Physioball le push outs                           SLR/ saq 1 5#  20x each     20x ea  20x ea  20x ea 1 5#  20x each 1 5#  20x each   Stand HR 1 5#  on foam  20x         1 5#  20x on foam 1 5# on foam  20x   Stand hip abd, hs curls On foam  1 5#  20x each        1 5#  20x each on foam On foam  1 5#  20x                                          Ther Activity                                       Gait Training                                       Modalities                          Cp right knee      10' 10'  10'  No P!

## 2022-03-15 ENCOUNTER — TELEPHONE (OUTPATIENT)
Dept: NEUROLOGY | Facility: CLINIC | Age: 62
End: 2022-03-15

## 2022-03-15 ENCOUNTER — OFFICE VISIT (OUTPATIENT)
Dept: PHYSICAL THERAPY | Facility: CLINIC | Age: 62
End: 2022-03-15
Payer: COMMERCIAL

## 2022-03-15 DIAGNOSIS — R29.898 WEAKNESS OF BOTH LEGS: ICD-10-CM

## 2022-03-15 DIAGNOSIS — G89.4 CHRONIC PAIN SYNDROME: Primary | ICD-10-CM

## 2022-03-15 PROCEDURE — 97110 THERAPEUTIC EXERCISES: CPT

## 2022-03-15 PROCEDURE — 97112 NEUROMUSCULAR REEDUCATION: CPT

## 2022-03-15 NOTE — TELEPHONE ENCOUNTER
Received a medical records request in the mail today from [address] [place] [address] for our patient  Sending request to the [facility] today

## 2022-03-15 NOTE — PROGRESS NOTES
Daily Note     Today's date: 3/15/2022  Patient name: Maddie Stewart  : 1960  MRN: [id number]  Referring provider: Michelle Bell, *  Dx:   Encounter Diagnosis     ICD-10-CM    1  Chronic pain syndrome  G89 4    2  Weakness of both legs  R29 898                   Subjective: Patient denied any changes or complaints upon presentation  Patient reported daily compliance with current HEP without limitations  Objective: See treatment diary below      Assessment: Tolerated treatment with review of entire program demonstrating good understanding   Patient demonstrated fatigue post treatment and exhibited good technique with therapeutic exercises      Plan: Continue per plan of care  MD f/u tomorrow       Precautions: bilateral leg weakness/ pain   (+) FALLS       Manuals 3/11 3/15   1/28 2/2 2/9 2/11 3/2 3/10   FOTO LA    sc                     kinesiotape right knee      db  db patient def                 Neuro Re-Ed                          Prone gs :03  20x  :03  20x   03:   20x 3s x 20 3s x 20  3s x 20 :03  20x :03  20x   Prone multifidus :02  20x :02  20x      2s x 20  :02  20x :02  20x   TB paloff press                           bridging :03  20x :03  20x   :03   20x 3s x 20  3s x 20  3s x 20  :03  20x :03  20x   HL TA w/ roll :05  20x  :05  20x    5s x 20 5s x 20  5s x 20 :05  20x :05  20x   HL TA marching 1 5#  20x 1 5#  20x    20x SLOW 20x slow 20x  20x  1 5#  20x   HL TB hip abd GTB  20x GTB  20x   GTB 20x gtb x 20  gtb x 20  gtb x 20 GTB  20x GTB  20x   Clamshells  GTB  20x GTB  20x   GTB 20x gtb x 20  gtb x 20  gtb x 20 GTB  20x GTB  20x   TB BKFO GTB  20x GTB  20x   GTB 20x gtb x 20  gtb x 20  gtb  X 20  GTB  20x GTB  20x                Qs, add set :03  20x each :03  20x each    3s  X 20  3s x 20  3s x 20  :03  20x each :03  20x each                             Ther Ex             bike 10' 10'   10' 10' 10'  10'  10' 10'   hss w/ strap :20  3x each :20  3x each   :20   3x each 20s x 3 20s x 3 20s x 3 :20  3x each :20  3x each   Prone quad stretch :20  3x each :20  3x each   :20   3x each  20s x 3  20s x 3  :20  3x each :20  3x each                Prone prop 2' 2'   2' 2' 2' 2' 2' 2'   Press ups 10x2 10x2   10x2 10 x 2 10x 2  10x 2 10x2 10x2                HL Physioball le push outs                           SLR/ saq 1 5#  20x each 1 5#  20x each    20x ea  20x ea  20x ea 1 5#  20x each 1 5#  20x each   Stand HR 1 5#  on foam  20x  1 5#  On foam  20x        1 5#  20x on foam 1 5# on foam  20x   Stand hip abd, hs curls On foam  1 5#  20x each On foam  1 5#  20x each       1 5#  20x each on foam On foam  1 5#  20x                                          Ther Activity                                       Gait Training                                       Modalities                          Cp right knee      10' 10'  10'  No P!

## 2022-03-16 ENCOUNTER — APPOINTMENT (OUTPATIENT)
Dept: RADIOLOGY | Facility: CLINIC | Age: 62
End: 2022-03-16
Payer: COMMERCIAL

## 2022-03-16 ENCOUNTER — OFFICE VISIT (OUTPATIENT)
Dept: OBGYN CLINIC | Facility: CLINIC | Age: 62
End: 2022-03-16
Payer: COMMERCIAL

## 2022-03-16 VITALS
HEIGHT: 64 IN | SYSTOLIC BLOOD PRESSURE: 104 MMHG | WEIGHT: 238 LBS | HEART RATE: 56 BPM | DIASTOLIC BLOOD PRESSURE: 72 MMHG | BODY MASS INDEX: 40.63 KG/M2

## 2022-03-16 DIAGNOSIS — M54.16 LEFT LUMBAR RADICULOPATHY: Primary | ICD-10-CM

## 2022-03-16 DIAGNOSIS — G89.29 CHRONIC PAIN OF LEFT KNEE: ICD-10-CM

## 2022-03-16 DIAGNOSIS — M25.562 CHRONIC PAIN OF LEFT KNEE: ICD-10-CM

## 2022-03-16 DIAGNOSIS — M17.11 PRIMARY OSTEOARTHRITIS OF RIGHT KNEE: ICD-10-CM

## 2022-03-16 PROCEDURE — 1036F TOBACCO NON-USER: CPT | Performed by: ORTHOPAEDIC SURGERY

## 2022-03-16 PROCEDURE — 3008F BODY MASS INDEX DOCD: CPT | Performed by: ORTHOPAEDIC SURGERY

## 2022-03-16 PROCEDURE — 99213 OFFICE O/P EST LOW 20 MIN: CPT | Performed by: ORTHOPAEDIC SURGERY

## 2022-03-16 PROCEDURE — 73562 X-RAY EXAM OF KNEE 3: CPT

## 2022-03-16 NOTE — PROGRESS NOTES
SUBJECTIVE  The patient presents with a chief complaint of left knee pain  The pain began a few month(s) ago and is not associated with an acute injury  Patient states that she had an injury to the right knee several months ago that was treated successfully with a cortisone injection and PT/HEP  She believes she was compensating for the injured knee and this is why her left knee has begun to bother her  The patient describes the pain as aching and dull and 5 out of 10 in intensity  It is intermittent, occurring with increasing frequency in timing, and localizes the pain to the anterior medial joint line, as well as radiating symptoms into her right anterior tibia to her ankle  The pain is worse with activities, ascending stairs, descending stairs and squatting and relieved with rest, ice, avoiding painful activities  She denies mechanical symptoms such as locking and catching  She denies instability of the knee  Patient has had no prior treatment for her left knee         ROS:   General: No fever, no chills, no weight loss, no weight gain  HEENT:  No loss of hearing, no nose bleeds, no sore throat  Eyes:  No eye pain, no red eyes, no visual disturbance  Respiratory:  No cough, no shortness of breath, no wheezing  Cardiovascular:  No chest pain, no palpitations, no edema  GI: No abdominal pain, no nausea, no vomiting  Endocrine: No frequent urination, no excessive thirst  Urinary:  No dysuria, no hematuria, no incontinence  Musculoskeletal: see HPI and PE  Skin:  No rash, no wounds  Neurological:  No dizziness, no headache, no numbness  Psychiatric:  No difficulty concentrating, no depression, no suicide thoughts, no anxiety  Review of all other systems is negative    PMH:  Past Medical History:   Diagnosis Date    Anxiety     Arthritis     Attention and concentration deficit     Blurred vision     Chronic pain     Chronic pain syndrome 1/20/2016    CPAP (continuous positive airway pressure) dependence     Depression     Diplopia     Dyspepsia     Gastric ulcer     Gastritis     GERD (gastroesophageal reflux disease)     History of transfusion     Hypertension     Insomnia     Irritable bowel syndrome     Memory loss     Migraines     Osteopenia     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    Presence of neurostimulator     Pudendal neuralgia    Pudendal neuralgia     Sleep apnea     Spinal stenosis     Starting and stopping of urinary stream during micturition     Urinary incontinence     Wears eyeglasses        PSH:  Past Surgical History:   Procedure Laterality Date     SECTION  1988   [place] GASTRIC BYPASS  2004    HYSTERECTOMY  2012    INSERT / REPLACE PERIPHERAL NEUROSTIMULATOR PULSE GENERATOR /       MASS EXCISION Right 2021    Procedure: EXCISION  BIOPSY LESION/MASS LOWER NECK;  Surgeon: Rodney Jimenez DO;  Location: [facility];  Service: General    OTHER SURGICAL HISTORY      Reimplantatin at [facility] NEUROSTIM/ Right 4/15/2020    Procedure: REPLACEMENT IMPLANTABLE [address],  RIGHT BUTTOCKS;  Surgeon: Rashard Adair MD;  Location:  MAIN OR;  Service: Neurosurgery    [facility] 4 THYROID BIOPSY  2020    WRIST ARTHROSCOPY      with internal fixation        Medications:  Current Outpatient Medications   Medication Sig Dispense Refill    Botox 200 units SOLR       busPIRone (BUSPAR) 10 mg tablet Take 1 tablet (10 mg total) by mouth 2 (two) times a day 180 tablet 1    Cholecalciferol (D3-1000) 1000 units capsule Take 1 capsule by mouth daily        docusate sodium (COLACE) 100 mg capsule Take 1 capsule (100 mg total) by mouth 3 (three) times a day as needed for constipation (while taking narcotic pain medication) 30 capsule 0    dronabinol (MARINOL) 5 MG capsule Take 1 capsule (5 mg total) by mouth 2 (two) times a day before meals By Dr Xochilt Tipton 60 capsule 0    gabapentin (NEURONTIN) 300 mg capsule Take 1 capsule (300 mg total) by mouth 2 (two) times a day 180 capsule 1    Galcanezumab-gnlm 120 MG/ML SOAJ Inject 120 mg under the skin every 30 (thirty) days 3 pen 4    Iron-Vitamin C (IRON 100/C) 100-250 MG TABS Take by mouth daily      lidocaine (XYLOCAINE) 5 % ointment Apply topically 2 (two) times a day as needed for moderate pain 35 44 g 1    lisinopril (ZESTRIL) 10 mg tablet TAKE 1 TABLET DAILY 90 tablet 3    meloxicam (Mobic) 15 mg tablet Take 1 tablet (15 mg total) by mouth daily 90 tablet 0    mometasone (ELOCON) 0 1 % cream APPLY TOPICALLY DAILY 45 g 1    Multiple Vitamin (MULTI-VITAMIN DAILY) TABS Take 1 tablet by mouth daily      ondansetron (ZOFRAN-ODT) 4 mg disintegrating tablet Take 1 tablet (4 mg total) by mouth every 6 (six) hours as needed for nausea or vomiting 30 tablet 5    pantoprazole (PROTONIX) 40 mg tablet TAKE 1 TABLET TWICE A  tablet 3    propranolol (INDERAL) 60 mg tablet TAKE 1 TABLET TWICE A  tablet 3    QUEtiapine (SEROquel) 50 mg tablet Take 1 tablet (50 mg total) by mouth daily at bedtime 90 tablet 1    rizatriptan (MAXALT-MLT) 10 MG disintegrating tablet PLACE 1 TABLET ON TONGUE ONCE AS NEEDED FOR MIGRAINE MAY REPEAT EVERY 2 HOURS IF NEEDED, MAXIMUM 30 MG IN 24 HOURS 12 tablet 23    rosuvastatin (CRESTOR) 10 MG tablet TAKE 1 TABLET DAILY 90 tablet 3    sertraline (ZOLOFT) 100 mg tablet Take 1 tablet (100 mg total) by mouth daily 90 tablet 1    capsaicin (ZOSTRIX) 0 025 % cream Apply 1 application topically 2 (two) times a day (Patient not taking: Reported on 3/16/2022 ) 60 g 0     No current facility-administered medications for this visit  Allergies:   Allergies   Allergen Reactions    Morphine Abdominal Pain     SEVERE N/V    Cat Hair Extract Nasal Congestion     Cat Dander    Dog Epithelium Nasal Congestion    Other Other (See Comments)     Dogs- sneezing  Crab Meat- GI intolerance       Family History:  Family History   Problem Relation Age of Onset    Other Mother         Back Disorder     Cirrhosis Mother     Crohn's disease Mother     Lupus Mother         Systemic Lupus Erythematous     Hypertension Father     Heart disease Father     Other Brother         Liver Transplant     Crohn's disease Brother     Crohn's disease Brother     No Known Problems Sister     No Known Problems Daughter     No Known Problems Maternal Aunt     No Known Problems Paternal Aunt     No Known Problems Cousin     No Known Problems Cousin     No Known Problems Cousin     No Known Problems Cousin     No Known Problems Cousin     Seizures Neg Hx     Breast cancer Neg Hx        Social History:  Social History     Occupational History    Occupation: retired   Tobacco Use    Smoking status: Never Smoker    Smokeless tobacco: Never Used   Vaping Use    Vaping Use: Never used   Substance and Sexual Activity    Alcohol use: No    Drug use: Yes     Types: Marijuana     Comment: medical marijuana 2x a day capsules    Sexual activity: Yes       Physical Exam:  General :  Alert, cooperative, no distress, appears stated age  Blood pressure 104/72, pulse 56, height 5' 4" (1 626 m), weight 108 kg (238 lb), not currently breastfeeding  Head:  Normocephalic, without obvious abnormality, atraumatic   Eyes:  Conjunctiva/corneas clear, EOM's intact,   Ears: Both ears normal appearance, no hearing deficits      Nose: Nares normal, septum midline, no drainage    Neck: Supple,  trachea midline, no adenopathy, no tenderness, no mass   Back:   Symmetric, no curvature, ROM normal, no tenderness   Lungs:   Respirations unlabored   Chest Wall:  No tenderness or deformity   Extremities: Extremities normal, atraumatic, no cyanosis or edema      Pulses: 2+ and symmetric Skin: Skin color, texture, turgor normal, no rashes or lesions      Neurologic: Normal           Left Ankle Exam     Muscle Strength   Dorsiflexion:  4/5   Plantar flexion:  5/5       Right Knee Exam     Tenderness   The patient is experiencing no tenderness  Range of Motion   The patient has normal right knee ROM  Tests   Varus: negative Valgus: negative    Other   Erythema: absent  Sensation: normal  Pulse: present  Effusion: no effusion present      Left Knee Exam     Tenderness   The patient is experiencing no tenderness  Range of Motion   The patient has normal left knee ROM  Tests   Varus: negative Valgus: negative    Other   Erythema: absent  Sensation: normal  Pulse: present  Effusion: no effusion present            Imaging Studies: The following imaging studies were reviewed in office today  My findings are noted  X Ray Left Knee 3/16/2022: Mild tricompartmental osteoarthritis  No other acute osseous abnormalities    X Ray Right Knee 1/14/2022: Moderate lateral and patellofemoral compartment osteoarthritis  No other acute osseous abnormalities      Assessment  Encounter Diagnoses   Name Primary?  Left lumbar radiculopathy Yes    Chronic pain of left knee     Primary osteoarthritis of right knee          Plan:  Left L4 Lumbar radiculopathy  Right knee osteoarthritis  · Explained to the patient that her left knee exam is benign  Her examination and symptoms are consistent with a Left L4 lumbar radiculopathy which she does have a history of and currently treats with pain management  She was instructed to follow up with her pain management doctor for further evaluation and treatment, possible lumbar TREVIN  · Continue use of Mobic and ice prn for pain relief  · May transition into a home exercise program for her bilateral knees  Could consider visco injections in the future into her right knee if these symptoms worsen  She understood and all questions were answered     · Follow up on an as needed basis    Scribe Attestation    I,:  Jazmín Soares am acting as a scribe while in the presence of the attending physician :       I,:  Lb Santos MD personally performed the services described in this documentation    as scribed in my presence :

## 2022-03-17 ENCOUNTER — OFFICE VISIT (OUTPATIENT)
Dept: PHYSICAL THERAPY | Facility: CLINIC | Age: 62
End: 2022-03-17
Payer: COMMERCIAL

## 2022-03-17 DIAGNOSIS — R29.898 WEAKNESS OF BOTH LEGS: ICD-10-CM

## 2022-03-17 DIAGNOSIS — G89.4 CHRONIC PAIN SYNDROME: Primary | ICD-10-CM

## 2022-03-17 PROCEDURE — 97112 NEUROMUSCULAR REEDUCATION: CPT

## 2022-03-17 PROCEDURE — 97110 THERAPEUTIC EXERCISES: CPT

## 2022-03-17 NOTE — PROGRESS NOTES
Daily Note     Today's date: 3/17/2022  Patient name: Santhsoh Romano  : 1960  MRN: [id number]  Referring provider: Evgeny Wilks, *  Dx:   Encounter Diagnosis     ICD-10-CM    1  Chronic pain syndrome  G89 4    2  Weakness of both legs  R29 898                   Subjective: Patient stated she is ready to perform HEP independently at home  Patient noted she is pleased with her progress to date  SPR of B LE/ knee=0/10  Objective: See treatment diary below      Assessment: Tolerated treatment exhibitting good technique, posturing and pacing with TE performance  Patient has good understanding of safe progression to her tolerance  Patient has TB for HEP      Plan: Patient will be discharged at this time as per plan of care  Patient plans to continue with present HEP independently       Precautions: bilateral leg weakness/ pain   (+) FALLS       Manuals 3/11 3/15 3/17          FOTO LA                         kinesiotape right knee                          Neuro Re-Ed                          Prone gs :03  20x  :03  20x :03  20x          Prone multifidus :02  20x :02  20x :02  20x          TB paloff press                           bridging :  20x :03  20x :03  20x          HL TA w/ roll :  20x  :05  20x :05  20x          HL TA marching 1 5#  20x 1 5#  20x  1 5#  20x          HL TB hip abd GTB  20x GTB  20x GTB  20x           Clamshells  GTB  20x GTB  20x GTB  20x          TB BKFO GTB  20x GTB  20x GTB  20x                       Qs, add set :03  20x each :03  20x each :03  20x each                                    Ther Ex             bike 10' 10' 10'          hss w/ strap :20  3x each :20  3x each :20  3x each          Prone quad stretch :20  3x each :20  3x each :20  3x each                       Prone prop 2' 2' 2'          Press ups 10x2 10x2 10x2                       HL Physioball le push outs                           SLR/ saq 1 5#  20x each 1 5#  20x each 1 5#  20x each          Stand HR 1 5#  on foam  20x  1 5#  On foam  20x  1 5#  On foam  20x          Stand hip abd, hs curls On foam  1 5#  20x each On foam  1 5#  20x each On foam  1 5#  20x each                                                 Ther Activity                                       Gait Training                                       Modalities                          Cp right knee

## 2022-03-22 ENCOUNTER — APPOINTMENT (OUTPATIENT)
Dept: PHYSICAL THERAPY | Facility: CLINIC | Age: 62
End: 2022-03-22
Payer: COMMERCIAL

## 2022-03-22 NOTE — TELEPHONE ENCOUNTER
Botox number of units: 200  Botox quantity: 1  Arrived at what location: [place]   Botox at [facility] Admin[facility] Location: yes  NDC number: [id number]  Lot number: [id number]  Expiration Date: 11/30/24  Appt notes indicate correct medication: yes

## 2022-03-24 ENCOUNTER — APPOINTMENT (OUTPATIENT)
Dept: PHYSICAL THERAPY | Facility: CLINIC | Age: 62
End: 2022-03-24
Payer: COMMERCIAL

## 2022-03-28 ENCOUNTER — APPOINTMENT (OUTPATIENT)
Dept: PHYSICAL THERAPY | Facility: CLINIC | Age: 62
End: 2022-03-28
Payer: COMMERCIAL

## 2022-03-30 ENCOUNTER — APPOINTMENT (OUTPATIENT)
Dept: PHYSICAL THERAPY | Facility: CLINIC | Age: 62
End: 2022-03-30
Payer: COMMERCIAL

## 2022-04-04 NOTE — PROGRESS NOTES
PT Discharge    Today's date: 2022  Patient name: Rhoda Casillas  : 1960  MRN: [id number]  Referring provider: Musa Mcnally, *  Dx:   Encounter Diagnosis     ICD-10-CM    1  Chronic pain syndrome  G89 4    2  Weakness of both legs  R29 898          Assessment  Assessment details: Patient is pleased with her progress -  She reports that her hip is painfree  She is demonstrating independence and competence with HEP and will be discharged to such at this time  Patient has achieved original goals  She will follow up prn  Thank you for this referral    Understanding of Dx/Px/POC: good  Goals  STG  MET  1  Patient will demonstrate independence and competence rwith HEP 2 -4 weeks  2  Patient will report > 25% reduced pain 2-4 weeks    LTG   MET  1  Patient will report improvements with both functional and recreational abilities  4-6 weeks  2  Patient will demonstrate improved motor function  4-6 weeks  3   Trunk mobility to wfl 4-6 weeks  4  Patient will report no falls  4-6 weeks  Plan  Plan details:    Thank you for this referral    Planned therapy interventions: home exercise program  Treatment plan discussed with: patient        Subjective Evaluation    Pain  No pain reported  Current pain ratin  Progression: improved    Social Support  Lives with: spouse      Diagnostic Tests  MRI studies: abnormal  Patient Goals  Patient goals for therapy: increased strength  Patient goal: " be able to get up from squat position or being down on the floor "         Objective     Tenderness     Additional Tenderness Details  Mild tenderness elicited left PSIS region  - abolished on DC      Lumbar Screen  Lumbar range of motion within normal limits with the following exceptions:Ff- min loss, NE  Bb - nil, NE          Neurological Testing     Sensation     Hip   Left Hip   Intact: light touch    Right Hip   Intact: light touch    Active Range of Motion   Left Hip   Flexion: 100 degrees Right Hip   Flexion: 100 degrees     Passive Range of Motion     Additional Passive Range of Motion Details  Passive movements in bilateral hip is [place]     Strength/Myotome Testing     Left Hip   Planes of Motion   Flexion: 4+  Extension: 4+  Abduction: 4+  Adduction: 4+  External rotation: 4+  Internal rotation: 4+    Right Hip   Planes of Motion   Flexion: 4+  Extension: 4+  Abduction: 4+  Adduction: 4+  External rotation: 4+  Internal rotation: 4+    Tests     Additional Tests Details  Supine - sit - equal leg length bilaterally              Precautions: bilateral leg weakness/ pain   (+) FALLS

## 2022-04-06 ENCOUNTER — TELEMEDICINE (OUTPATIENT)
Dept: NEUROLOGY | Facility: CLINIC | Age: 62
End: 2022-04-06
Payer: COMMERCIAL

## 2022-04-06 DIAGNOSIS — G47.33 OSA (OBSTRUCTIVE SLEEP APNEA): ICD-10-CM

## 2022-04-06 DIAGNOSIS — G43.719 INTRACTABLE CHRONIC MIGRAINE WITHOUT AURA AND WITHOUT STATUS MIGRAINOSUS: Primary | ICD-10-CM

## 2022-04-06 PROCEDURE — 99214 OFFICE O/P EST MOD 30 MIN: CPT | Performed by: PHYSICIAN ASSISTANT

## 2022-04-06 NOTE — PROGRESS NOTES
Virtual Regular Visit    Verification of patient location:  Patient is located in the following state in which I hold an active license [place]      Assessment/Plan:    Problem List Items Addressed This Visit        Respiratory    CATHRYN (obstructive sleep apnea)     Compliant with CPAP             Cardiovascular and Mediastinum    Migraine - Primary     Preventative:  botox injections every 3 months  With botox has had a reduction of at least 7 migraine days with less abortive medication, less ER visits which correlates to headache diary  Emgality monthly injections  Propranolol 60mg twice a day  Vitamin D3  buspar 10mg twice a day  zoloft 100mg daily  Gabapentin 300mg twice a day  marinol 5mg twice a day  seroquel 50mg qhs  Iron-vitamin C    Abortive: At onset of migraine use rizatriptan  May repeat 1 dose in 2 hours if needed  May use zofran in combination with rizatriptan  · Recommend consuming at least 64 oz of water, good sleep hygiene, continue to wear CPAP and mouth piece                    Reason for visit is   Chief Complaint   Patient presents with    Virtual Regular Visit        Encounter provider Umair Dale PA-C    Provider located at [address] [place] [address] RD  [facility] [address]  [phone]      Recent Visits  No visits were found meeting these conditions  Showing recent visits within past 7 days and meeting all other requirements  Today's Visits  Date Type Provider Dept   04/06/22 [address] [place] [address] today's visits and meeting all other requirements  Future Appointments  No visits were found meeting these conditions  Showing future appointments within next 150 days and meeting all other requirements       The patient was identified by name and date of birth   Nikos Joe was informed that this is a telemedicine visit and that the visit is being conducted through [address] [place] [address] Embedded and patient was informed this is a secure, HIPAA-complaint platform  She agrees to proceed     My office door was closed  No one else was in the room  She acknowledged consent and understanding of privacy and security of the video platform  The patient has agreed to participate and understands they can discontinue the visit at any time  Patient is aware this is a billable service  Subjective  Beverly Moran is a 59 y o  female who is following up with outpatient neurology for her history of migraine without aura  She was last seen in the office on 10/06/2021  Following that appointment she ha received one round of botox on 01/27/2022  With botox has had a reduction of at least 7 migraine days with less abortive medication, less ER visits which correlates to headache diary        What medications do you take or have you taken for your headaches/pain/mood? Preventive therapy:   Current:  botox injections every 3 months  With botox has had a reduction of at least 7 migraine days with less abortive medication, less ER visits which correlates to headache diary  Emgality monthly injections  Propranolol 60mg twice a day  Vitamin D3  buspar 10mg twice a day  zoloft 100mg daily  Gabapentin 300mg twice a day  marinol 5mg twice a day  seroquel 50mg qhs  Iron-vitamin C:  -   Abortive Therapy:   Current:  - rizatriptan  - zofran        What is your current pain level? 3-4/10    How often do the headaches occur? Currently 1-2 times per week  Prior to the botox and Emgality it was nearly daily    Are you ever headache free? Yes    Aura/Warning and how long does it last?  No aura  Warning signing - intense nausea    Certain time of the day? Mid-afternoon  How long do the headaches last?   The rest of the day  Where is your headache located? Bifrontal L>R and pain in eye  Describe your usual headache? Throbbing, pressure    What is the intensity of pain?    Mild headaches: 3-4/10  Moderate to severe headaches: 6-7/10    Associated symptoms:   [] Decreased appetite  [x] Nausea   []Vomiting   []Diarrhea  [x] Photophobia   [x]Phonophobia       []Osmophobia  [] Lacrimation   [x] rhinorrhea    [x] Flushing of face   [x] Stiff or sore neck   [x] Dizziness  [x] light headed  [x] Problems with concentration   [] Blurred vision   [] Change in pupil size     [] Ptosis  []Facial droop     [] Hands or feet tingle or feel numb/paresthesias  []Tinnitus   []Insomnia  [] Worse with lying down  [x] better with lying down  [x] Prefer to be in a cool, quiet, dark room    Do headaches get worse with  [] coughing   []sneezing []bending  []exertion     Number of days missed per month because of headaches:  Work (or school) days: Retired  Social or Family activities: estimates missing 25% of events per monthly     Headache triggers:      - decaf iced coffee with dairy cream    Alternative therapies used in the past for headaches? [] Daith piercing  [] Massage  [] physical therapy  []Acupuncture  []Acupressure []Chiropractor  []Yoga  []biofeedback  [] TPI  [x]Botox  []Epidural injections  [x]CBD/THC -    How many caffeine products to drink a day? Iced tea - 60 oz   How much water to drink a day? None     Sleep: How many hours do you sleep a night on average? 10 hours  Do you feel well rested when you wake up? No   Any history of CATHRYN? Yes, wears CPAP machine  Mood:  Any history of anxiety or depression? Yes especially in the beginning of COVID period  It is now much better  Do you follow with psychology or psychiatry? Was following with counseling just recently stopped    Dietary:  Not following a particular diet  No skipping meals     Jaw:  History of TMJ, grinding  She is currently wearing mouth piece           HPI     Past Medical History:   Diagnosis Date    Anxiety     Arthritis     Attention and concentration deficit     Blurred vision     Chronic pain     Chronic pain syndrome 1/20/2016    CPAP (continuous positive airway pressure) dependence     Depression     Diplopia     Dyspepsia     Gastric ulcer     Gastritis     GERD (gastroesophageal reflux disease)     History of transfusion     Hypertension     Insomnia     Irritable bowel syndrome     Memory loss     Migraines     Osteopenia     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    Presence of neurostimulator     Pudendal neuralgia    Pudendal neuralgia     Sleep apnea     Spinal stenosis     Starting and stopping of urinary stream during micturition     Urinary incontinence     Wears eyeglasses        Past Surgical History:   Procedure Laterality Date     SECTION  1988     SECTION     [address] GASTRIC BYPASS  2004    HYSTERECTOMY  2012    INSERT / REPLACE PERIPHERAL NEUROSTIMULATOR PULSE GENERATOR /       MASS EXCISION Right 2021    Procedure: EXCISION  BIOPSY LESION/MASS LOWER NECK;  Surgeon: Ladi Waldrop DO;  Location: [facility];  Service: General    OTHER SURGICAL HISTORY      [facility]implantatin at [facility] NEUROSTIM/ Right 4/15/2020    Procedure: [facility],  RIGHT BUTTOCKS;  Surgeon: Haley Burns MD;  Location:  MAIN OR;  Service: Neurosurgery    [facility] 4 THYROID BIOPSY  2020    WRIST ARTHROSCOPY      with internal fixation        Current Outpatient Medications   Medication Sig Dispense Refill    Botox 200 units SOLR       busPIRone (BUSPAR) 10 mg tablet Take 1 tablet (10 mg total) by mouth 2 (two) times a day 180 tablet 1    Cholecalciferol (D3-1000) 1000 units capsule Take 1 capsule by mouth daily        dronabinol (MARINOL) 5 MG capsule Take 1 capsule (5 mg total) by mouth 2 (two) times a day before meals By Dr Davina Mayo 60 capsule 0    gabapentin (NEURONTIN) 300 mg capsule Take 1 capsule (300 mg total) by mouth 2 (two) times a day 180 capsule 1    Galcanezumab-gnlm 120 MG/ML SOAJ Inject 120 mg under the skin every 30 (thirty) days 3 pen 4    Iron-Vitamin C (IRON 100/C) 100-250 MG TABS Take by mouth daily      lidocaine (XYLOCAINE) 5 % ointment Apply topically 2 (two) times a day as needed for moderate pain 35 44 g 1    lisinopril (ZESTRIL) 10 mg tablet TAKE 1 TABLET DAILY 90 tablet 3    mometasone (ELOCON) 0 1 % cream APPLY TOPICALLY DAILY 45 g 1    Multiple Vitamin (MULTI-VITAMIN DAILY) TABS Take 1 tablet by mouth daily      ondansetron (ZOFRAN-ODT) 4 mg disintegrating tablet Take 1 tablet (4 mg total) by mouth every 6 (six) hours as needed for nausea or vomiting 30 tablet 5    pantoprazole (PROTONIX) 40 mg tablet TAKE 1 TABLET TWICE A  tablet 3    propranolol (INDERAL) 60 mg tablet TAKE 1 TABLET TWICE A  tablet 3    QUEtiapine (SEROquel) 50 mg tablet Take 1 tablet (50 mg total) by mouth daily at bedtime 90 tablet 1    rizatriptan (MAXALT-MLT) 10 MG disintegrating tablet PLACE 1 TABLET ON TONGUE ONCE AS NEEDED FOR MIGRAINE MAY REPEAT EVERY 2 HOURS IF NEEDED, MAXIMUM 30 MG IN 24 HOURS 12 tablet 23    rosuvastatin (CRESTOR) 10 MG tablet TAKE 1 TABLET DAILY 90 tablet 3    sertraline (ZOLOFT) 100 mg tablet Take 1 tablet (100 mg total) by mouth daily 90 tablet 1    capsaicin (ZOSTRIX) 0 025 % cream Apply 1 application topically 2 (two) times a day (Patient not taking: Reported on 3/16/2022 ) 60 g 0    docusate sodium (COLACE) 100 mg capsule Take 1 capsule (100 mg total) by mouth 3 (three) times a day as needed for constipation (while taking narcotic pain medication) (Patient not taking: Reported on 4/6/2022 ) 30 capsule 0    meloxicam (Mobic) 15 mg tablet Take 1 tablet (15 mg total) by mouth daily (Patient not taking: Reported on 4/6/2022 ) 90 tablet 0     No current facility-administered medications for this visit          Allergies Allergen Reactions    Morphine Abdominal Pain     SEVERE N/V    Cat Hair Extract Nasal Congestion     Cat Dander    Dog Epithelium Nasal Congestion    Other Other (See Comments)     Dogs- sneezing  Crab Meat- GI intolerance       Review of Systems   Constitutional: Negative  Negative for appetite change and fever  HENT: Negative  Negative for hearing loss, tinnitus, trouble swallowing and voice change  Eyes: Negative  Negative for photophobia and pain  Respiratory: Negative  Negative for shortness of breath  Cardiovascular: Negative  Negative for palpitations  Gastrointestinal: Negative  Negative for nausea and vomiting  Endocrine: Negative  Negative for cold intolerance  Genitourinary: Negative  Negative for dysuria, frequency and urgency  Musculoskeletal: Negative  Negative for myalgias and neck pain  Skin: Negative  Negative for rash  Neurological: Negative  Negative for dizziness, tremors, seizures, syncope, facial asymmetry, speech difficulty, weakness, light-headedness, numbness and headaches  Hematological: Negative  Does not bruise/bleed easily  Psychiatric/Behavioral: Negative  Negative for confusion, hallucinations and sleep disturbance  ROS reviewed and edited as needed  Video Exam    Vitals:       Physical Exam   CONSTITUTIONAL:  Well developed, well nourished, well groomed  No dysmorphic features  MENTAL STATUS  Orientation: Alert and oriented  Fund of knowledge: Intact  Speech is fluent without dysarthria   PSYCHIATRIC:  Normal behavior and appropriate affect    CRANIAL NERVES  Conjugate gaze  Extraocular movements intact  No nystagmus  Altered sensation overlying left V2 region     Facial movements normal and symmetric  Intact gross hearing bilaterally  Intact trapezius  Tongue protrudes to the midline      COORDINATION   No pronator drift appreciated  Finger to nose: normal bilaterally    MOTOR (Upper and lower extremities) Bulk/tone/abnormal movement: Normal muscle bulk and tone  I spent 30 minutes directly with the patient during this visit    VIRTUAL VISIT [address]ney verbally agrees to participate in [employer]  Pt is aware that [employer] could be limited without vital signs or the ability to perform a full hands-on physical Karen Fried understands she or the provider may request at any time to terminate the video visit and request the patient to seek care or treatment in person

## 2022-04-06 NOTE — ASSESSMENT & PLAN NOTE
Preventative:  botox injections every 3 months  With botox has had a reduction of at least 7 migraine days with less abortive medication, less ER visits which correlates to headache diary  Emgality monthly injections  Propranolol 60mg twice a day  Vitamin D3  buspar 10mg twice a day  zoloft 100mg daily  Gabapentin 300mg twice a day  marinol 5mg twice a day  seroquel 50mg qhs  Iron-vitamin C    Abortive: At onset of migraine use rizatriptan  May repeat 1 dose in 2 hours if needed  May use zofran in combination with rizatriptan         · Recommend consuming at least 64 oz of water, good sleep hygiene, continue to wear CPAP and mouth piece

## 2022-04-11 ENCOUNTER — OFFICE VISIT (OUTPATIENT)
Dept: FAMILY MEDICINE CLINIC | Facility: CLINIC | Age: 62
End: 2022-04-11
Payer: COMMERCIAL

## 2022-04-11 VITALS
HEIGHT: 64 IN | WEIGHT: 237.8 LBS | BODY MASS INDEX: 40.6 KG/M2 | OXYGEN SATURATION: 98 % | DIASTOLIC BLOOD PRESSURE: 62 MMHG | SYSTOLIC BLOOD PRESSURE: 104 MMHG | HEART RATE: 54 BPM | TEMPERATURE: 97 F

## 2022-04-11 DIAGNOSIS — F41.0 GENERALIZED ANXIETY DISORDER WITH PANIC ATTACKS: ICD-10-CM

## 2022-04-11 DIAGNOSIS — M54.16 LUMBAR RADICULOPATHY: ICD-10-CM

## 2022-04-11 DIAGNOSIS — G58.8 PUDENDAL NEURALGIA: ICD-10-CM

## 2022-04-11 DIAGNOSIS — I10 PRIMARY HYPERTENSION: ICD-10-CM

## 2022-04-11 DIAGNOSIS — E66.01 OBESITY, MORBID (HCC): ICD-10-CM

## 2022-04-11 DIAGNOSIS — G43.719 INTRACTABLE CHRONIC MIGRAINE WITHOUT AURA AND WITHOUT STATUS MIGRAINOSUS: ICD-10-CM

## 2022-04-11 DIAGNOSIS — F41.1 GENERALIZED ANXIETY DISORDER WITH PANIC ATTACKS: ICD-10-CM

## 2022-04-11 DIAGNOSIS — F33.1 MODERATE EPISODE OF RECURRENT MAJOR DEPRESSIVE DISORDER (HCC): ICD-10-CM

## 2022-04-11 DIAGNOSIS — E78.2 MIXED HYPERLIPIDEMIA: ICD-10-CM

## 2022-04-11 DIAGNOSIS — R31.9 HEMATURIA, UNSPECIFIED TYPE: ICD-10-CM

## 2022-04-11 DIAGNOSIS — Z00.00 HEALTHCARE MAINTENANCE: Primary | ICD-10-CM

## 2022-04-11 PROBLEM — Z96.89 STATUS POST INSERTION OF SPINAL CORD STIMULATOR: Status: RESOLVED | Noted: 2020-04-29 | Resolved: 2022-04-11

## 2022-04-11 PROBLEM — R40.4 TRANSIENT ALTERATION OF AWARENESS: Status: RESOLVED | Noted: 2019-04-04 | Resolved: 2022-04-11

## 2022-04-11 PROBLEM — R73.01 ELEVATED FASTING GLUCOSE: Status: RESOLVED | Noted: 2021-02-12 | Resolved: 2022-04-11

## 2022-04-11 PROBLEM — R51.9 CHRONIC DAILY HEADACHE: Status: RESOLVED | Noted: 2020-10-02 | Resolved: 2022-04-11

## 2022-04-11 PROBLEM — M54.2 CERVICALGIA: Status: RESOLVED | Noted: 2020-08-21 | Resolved: 2022-04-11

## 2022-04-11 PROBLEM — R06.02 SOB (SHORTNESS OF BREATH): Status: RESOLVED | Noted: 2021-03-06 | Resolved: 2022-04-11

## 2022-04-11 PROBLEM — G44.89 OTHER HEADACHE SYNDROME: Status: RESOLVED | Noted: 2020-06-02 | Resolved: 2022-04-11

## 2022-04-11 PROBLEM — F32.A DEPRESSION: Status: RESOLVED | Noted: 2018-11-08 | Resolved: 2022-04-11

## 2022-04-11 PROBLEM — E07.89 THYROID PAIN: Status: RESOLVED | Noted: 2020-08-21 | Resolved: 2022-04-11

## 2022-04-11 PROBLEM — G47.9 DISTURBANCE IN SLEEP BEHAVIOR: Status: RESOLVED | Noted: 2021-03-06 | Resolved: 2022-04-11

## 2022-04-11 PROBLEM — E66.9 OBESITY (BMI 35.0-39.9 WITHOUT COMORBIDITY): Status: RESOLVED | Noted: 2017-12-19 | Resolved: 2022-04-11

## 2022-04-11 PROBLEM — M79.2 NEURALGIA: Status: RESOLVED | Noted: 2018-02-20 | Resolved: 2022-04-11

## 2022-04-11 PROBLEM — Z45.42 BATTERY END OF LIFE OF SPINAL CORD STIMULATOR: Status: RESOLVED | Noted: 2020-04-29 | Resolved: 2022-04-11

## 2022-04-11 PROBLEM — R51.9 NONINTRACTABLE HEADACHE: Status: RESOLVED | Noted: 2020-06-23 | Resolved: 2022-04-11

## 2022-04-11 PROBLEM — Z79.891 LONG TERM CURRENT USE OF OPIATE ANALGESIC: Status: RESOLVED | Noted: 2021-03-11 | Resolved: 2022-04-11

## 2022-04-11 PROBLEM — R05.9 COUGH: Status: RESOLVED | Noted: 2020-03-12 | Resolved: 2022-04-11

## 2022-04-11 PROCEDURE — 1036F TOBACCO NON-USER: CPT | Performed by: NURSE PRACTITIONER

## 2022-04-11 PROCEDURE — 99214 OFFICE O/P EST MOD 30 MIN: CPT | Performed by: NURSE PRACTITIONER

## 2022-04-11 PROCEDURE — 3008F BODY MASS INDEX DOCD: CPT | Performed by: NURSE PRACTITIONER

## 2022-04-11 NOTE — ASSESSMENT & PLAN NOTE
Seeing Neuro for management  Botox, emgality, seroquel, propanolol for prevention  Rizatriptan for abortive

## 2022-04-11 NOTE — PROGRESS NOTES
BMI Counseling: Body mass index is 40 82 kg/m²  The BMI is above normal  Exercise recommendations include exercising 3-5 times per week  Rationale for BMI follow-up plan is due to patient being overweight or obese  Assessment/Plan:     Chronic Problems:  Hypertension  BP is well controlled today  Continue lisinopril  Migraine  Seeing Neuro for management  Botox, emgality, seroquel, propanolol for prevention  Rizatriptan for abortive  Mixed hyperlipidemia  Will obtain lipid panel when she returns in 6 months  Continue statin  Hematuria  This has been occurring since hysterectomy 10 years ago  Pudendal neuralgia  Seeing pain specialist in [address] [place] [address]  Generalized anxiety disorder with panic attacks  Continue care with psychiatry  Continue sertraline and BuSpar  Moderate episode of recurrent major depressive disorder [facility])  Continue care with psychiatry  Visit Diagnosis:  Diagnoses and all orders for this visit:    Healthcare maintenance  -     CBC and differential; Future  -     Comprehensive metabolic panel; Future  -     Hemoglobin A1C; Future  -     Lipid panel; Future  -     TSH, 3rd generation with Free T4 reflex; Future  -     Urinalysis with microscopic    Hematuria, unspecified type  -     Urinalysis with microscopic    Primary hypertension    Intractable chronic migraine without aura and without status migrainosus    Lumbar radiculopathy    Pudendal neuralgia    Mixed hyperlipidemia    Moderate episode of recurrent major depressive disorder (HCC)    Generalized anxiety disorder with panic attacks    Obesity, morbid (HCC)          Subjective:    Patient ID: Saud Barba is a 59 y o  female  Patient presents for routine f/u  She went to PT, took NSAID's and feels pain is improved in knees  She wakes up every night with left knee pain  She sees ortho  Has a lot of pain after hysterectomy 10 years ago  Left groin is killing her  Surgeon accidnetally cut her left ureter   Damaged nerves during surgery  Takes marinol for nerve pain  Urogyn every 3 weeks, transvaginal injection for pain, Dr García [facility] in [facility]- Dr W[facility] for knees  Pain Management-- stopped seeing  and went back to [address] [place] [address], expertise in pudendal neuralgia  Has chronic hematuria due to s/p hysterectomy  Psych-- Dr Aldo Perez, not seeing counselor right now  The following portions of the patient's history were reviewed and updated as appropriate: allergies, current medications, past family history, past medical history, past social history, past surgical history and problem list     Review of Systems   Constitutional: Positive for fatigue  Negative for chills and fever  HENT: Negative for ear pain and sore throat  Eyes: Negative for pain and visual disturbance  Respiratory: Negative for cough and shortness of breath  Cardiovascular: Positive for leg swelling  Negative for chest pain and palpitations  Gastrointestinal: Positive for abdominal pain (left groin)  Negative for abdominal distention, constipation, diarrhea, nausea and vomiting  Genitourinary: Positive for difficulty urinating (she does not have urge to urinate) and hematuria (has had this worked up many times, always blood in urine)  Negative for dysuria, frequency and urgency  Musculoskeletal: Positive for arthralgias (bilateral knees, manageable throughout the day) and back pain (doing ok)  Groin pain 6-7/10   Skin: Negative for color change and rash  Neurological: Positive for dizziness (sometimes getting out of bed)  Negative for light-headedness and numbness  Psychiatric/Behavioral: Positive for dysphoric mood (a little more than usual)  Negative for sleep disturbance (sleeping a lot)  The patient is nervous/anxious (manageable)  All other systems reviewed and are negative          /62 (BP Location: Left arm, Patient Position: Sitting)   Pulse (!) 54   Temp (!) 97 °F (36 1 °C) (Tympanic) Ht 5' 4" (1 626 m)   Wt 108 kg (237 lb 12 8 oz)   SpO2 98%   BMI 40 82 kg/m²   Social History     Socioeconomic History    Marital status: /Civil Union     Spouse name: Not on file    Number of children: Not on file    Years of education: Not on file    Highest education level: Not on file   Occupational History    Occupation: retired   Tobacco Use    Smoking status: Never Smoker    Smokeless tobacco: Never Used   Vaping Use    Vaping Use: Never used   Substance and Sexual Activity    Alcohol use: No    Drug use: Yes     Types: Marijuana     Comment: medical marijuana 2x a day capsules    Sexual activity: Yes   Other Topics Concern    Not on file   Social History Narrative    Lives in [place], with   Previously worked as a teacher        Social Determinants of Health     Financial Resource Strain: Not on file   Food Insecurity: Not on file   Transportation Needs: Not on file   Physical Activity: Not on file   Stress: Not on file   Social Connections: Not on file   Intimate Partner Violence: Not on file   Housing Stability: Not on file     Past Medical History:   Diagnosis Date    Anxiety     Arthritis     Attention and concentration deficit     Blurred vision     Chronic pain     Chronic pain syndrome 1/20/2016    CPAP (continuous positive airway pressure) dependence     Depression     Diplopia     Dyspepsia     Gastric ulcer     Gastritis     GERD (gastroesophageal reflux disease)     History of transfusion 2005    Hypertension     Insomnia     Irritable bowel syndrome     Memory loss     Migraines     Osteopenia     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    Presence of neurostimulator     Pudendal neuralgia    Pudendal neuralgia     Sleep apnea     Spinal stenosis     Starting and stopping of urinary stream during micturition     Urinary incontinence     Wears eyeglasses      Family History   Problem Relation Age of Onset    Other Mother Back Disorder     Cirrhosis Mother     Crohn's disease Mother     Lupus Mother         Systemic Lupus Erythematous     Hypertension Father     Heart disease Father     Other Brother         Liver Transplant     Crohn's disease Brother     Crohn's disease Brother     No Known Problems Sister     No Known Problems Daughter     No Known Problems Maternal Aunt     No Known Problems Paternal Aunt     No Known Problems Cousin     No Known Problems Cousin     No Known Problems Cousin     No Known Problems Cousin     No Known Problems Cousin     Seizures Neg Hx     Breast cancer Neg Hx      Past Surgical History:   Procedure Laterality Date     SECTION       SECTION      CHOLECYSTECTOMY      [address] GASTRIC BY[address]  2004    HYSTERECTOMY  2012    INSERT / REPLACE PERIPHERAL NEUROSTIMULATOR PULSE GENERATOR /       MASS EXCISION Right 2021    Procedure: EXCISION  BIOPSY LESION/MASS LOWER NECK;  Surgeon: Simeon May DO;  Location: [facility];  Service: General    OTHER SURGICAL HISTORY      Reimplantatin at [facility] NEUROSTIM/ Right 4/15/2020    Procedure: REPLACEMENT IMPLANTABLE PULSE GENERATOR FOR DORSAL SPINAL COLUMN STIMULATOR,  RIGHT BUTTOCKS;  Surgeon: Sean Tirado MD;  [facility]: [facility];  Service: Neurosurgery    RADIOFREQUENCY ABLATION NERVES      URETER SURGERY      URETHRAL FISTULA REPAIR      US GUIDED THYROID BIOPSY  2020    WRIST ARTHROSCOPY      with internal fixation        Current Outpatient Medications:     Botox 200 units SOLR, , Disp: , Rfl:     busPIRone (BUSPAR) 10 mg tablet, Take 1 tablet (10 mg total) by mouth 2 (two) times a day, Disp: 180 tablet, Rfl: 1    Cholecalciferol (D3-1000) 1000 units capsule, Take 1 capsule by mouth daily  , Disp: , Rfl:     dronabinol (MARINOL) 5 MG capsule, Take 1 capsule (5 mg total) by mouth 2 (two) times a day before meals By   Melinda Christian, Disp: 60 capsule, Rfl: 0    gabapentin (NEURONTIN) 300 mg capsule, Take 1 capsule (300 mg total) by mouth 2 (two) times a day, Disp: 180 capsule, Rfl: 1    Galcanezumab-gnlm 120 MG/ML SOAJ, Inject 120 mg under the skin every 30 (thirty) days, Disp: 3 pen, Rfl: 4    Iron-Vitamin C (IRON 100/C) 100-250 MG TABS, Take by mouth daily, Disp: , Rfl:     lidocaine (XYLOCAINE) 5 % ointment, Apply topically 2 (two) times a day as needed for moderate pain, Disp: 35 44 g, Rfl: 1    lisinopril (ZESTRIL) 10 mg tablet, TAKE 1 TABLET DAILY, Disp: 90 tablet, Rfl: 3    mometasone (ELOCON) 0 1 % cream, APPLY TOPICALLY DAILY, Disp: 45 g, Rfl: 1    Multiple Vitamin (MULTI-VITAMIN DAILY) TABS, Take 1 tablet by mouth daily, Disp: , Rfl:     ondansetron (ZOFRAN-ODT) 4 mg disintegrating tablet, Take 1 tablet (4 mg total) by mouth every 6 (six) hours as needed for nausea or vomiting, Disp: 30 tablet, Rfl: 5    pantoprazole (PROTONIX) 40 mg tablet, TAKE 1 TABLET TWICE A DAY, Disp: 180 tablet, Rfl: 3    propranolol (INDERAL) 60 mg tablet, TAKE 1 TABLET TWICE A DAY, Disp: 180 tablet, Rfl: 3    QUEtiapine (SEROquel) 50 mg tablet, Take 1 tablet (50 mg total) by mouth daily at bedtime, Disp: 90 tablet, Rfl: 1    rizatriptan (MAXALT-MLT) 10 MG disintegrating tablet, PLACE 1 TABLET ON TONGUE ONCE AS NEEDED FOR MIGRAINE MAY REPEAT EVERY 2 HOURS IF NEEDED, MAXIMUM 30 MG IN 24 HOURS, Disp: 12 tablet, Rfl: 23    rosuvastatin (CRESTOR) 10 MG tablet, TAKE 1 TABLET DAILY, Disp: 90 tablet, Rfl: 3    sertraline (ZOLOFT) 100 mg tablet, Take 1 tablet (100 mg total) by mouth daily, Disp: 90 tablet, Rfl: 1    meloxicam (Mobic) 15 mg tablet, Take 1 tablet (15 mg total) by mouth daily (Patient not taking: Reported on 4/6/2022 ), Disp: 90 tablet, Rfl: 0    Allergies   Allergen Reactions    Morphine Abdominal Pain     SEVERE N/V    Cat Hair Extract Nasal Congestion     Cat Dander    Dog Epithelium Nasal Congestion    Other Other (See Comments)     Dogs- sneezing  Crab Meat- GI intolerance          Lab Review   No visits with results within 2 Month(s) from this visit     Latest known visit with results is:   Appointment on 12/01/2021   Component Date Value    Sodium 12/01/2021 144     Potassium 12/01/2021 4 3     Chloride 12/01/2021 106     CO2 12/01/2021 31     ANION GAP 12/01/2021 7     BUN 12/01/2021 16     Creatinine 12/01/2021 0 89     Glucose, Fasting 12/01/2021 89     Calcium 12/01/2021 8 3     Corrected Calcium 12/01/2021 9 0     AST 12/01/2021 15     ALT 12/01/2021 22     Alkaline Phosphatase 12/01/2021 122*    Total Protein 12/01/2021 6 5     Albumin 12/01/2021 3 1*    Total Bilirubin 12/01/2021 0 26     eGFR 12/01/2021 69     WBC 12/01/2021 5 89     RBC 12/01/2021 4 27     Hemoglobin 12/01/2021 12 3     Hematocrit 12/01/2021 40 2     MCV 12/01/2021 94     MCH 12/01/2021 28 8     MCHC 12/01/2021 30 6*    RDW 12/01/2021 13 6     MPV 12/01/2021 11 3     Platelets 60/76/5572 234     nRBC 12/01/2021 0     Neutrophils Relative 12/01/2021 55     Immat GRANS % 12/01/2021 0     Lymphocytes Relative 12/01/2021 32     Monocytes Relative 12/01/2021 7     Eosinophils Relative 12/01/2021 4     Basophils Relative 12/01/2021 2*    Neutrophils Absolute 12/01/2021 3 24     Immature Grans Absolute 12/01/2021 0 01     Lymphocytes Absolute 12/01/2021 1 88     Monocytes Absolute 12/01/2021 0 43     Eosinophils Absolute 12/01/2021 0 23     Basophils Absolute 12/01/2021 0 10     Vitamin B-12 12/01/2021 366     Cholesterol 12/01/2021 161     Triglycerides 12/01/2021 81     HDL, Direct 12/01/2021 77     LDL Calculated 12/01/2021 68     Non-HDL-Chol (CHOL-HDL) 12/01/2021 84     Folate 12/01/2021 10 3     Iron Saturation 12/01/2021 18     TIBC 12/01/2021 327     Iron 12/01/2021 58     Ferritin 12/01/2021 8         Imaging: XR knee 3 vw left non injury    Result Date: 3/19/2022  Narrative: LEFT KNEE INDICATION: M25 562: Pain in left knee G89 29: Other chronic pain  COMPARISON:  None VIEWS:  XR KNEE 3 VW LEFT NON INJURY FINDINGS: There is no acute fracture or dislocation  There is no joint effusion  There is mild medial and moderate patellofemoral compartment joint space narrowing  There are small marginal osteophytes at the medial, lateral and patellofemoral compartments  No lytic or blastic osseous lesion  Soft tissues are unremarkable  Impression: No acute osseous abnormality  Degenerative changes as described  Workstation performed: [id number]       Objective:     Physical Exam  Constitutional:       Appearance: She is well-developed  Cardiovascular:      Rate and Rhythm: Normal rate and regular rhythm  Heart sounds: Normal heart sounds  No murmur heard  Pulmonary:      Effort: Pulmonary effort is normal  No respiratory distress  Breath sounds: Normal breath sounds  Skin:     General: Skin is warm and dry  Neurological:      Mental Status: She is alert and oriented to person, place, and time  There are no Patient Instructions on file for this visit  GWEN Black    Portions of the record may have been created with voice recognition software  Occasional wrong word or "sound a like" substitutions may have occurred due to the inherent limitations of voice recognition software  Read the chart carefully and recognize, using context, where substitutions have occurred

## 2022-04-21 ENCOUNTER — TELEPHONE (OUTPATIENT)
Dept: NEUROLOGY | Facility: CLINIC | Age: 62
End: 2022-04-21

## 2022-04-21 NOTE — TELEPHONE ENCOUNTER
Spoke to patient about upcoming appointment, discussed time/date/location  Patient is aware and confirmed appointment

## 2022-05-02 ENCOUNTER — PROCEDURE VISIT (OUTPATIENT)
Dept: NEUROLOGY | Facility: CLINIC | Age: 62
End: 2022-05-02
Payer: COMMERCIAL

## 2022-05-02 VITALS
HEIGHT: 64 IN | SYSTOLIC BLOOD PRESSURE: 103 MMHG | HEART RATE: 58 BPM | BODY MASS INDEX: 40.46 KG/M2 | TEMPERATURE: 97.9 F | WEIGHT: 237 LBS | DIASTOLIC BLOOD PRESSURE: 53 MMHG

## 2022-05-02 DIAGNOSIS — G43.709 CHRONIC MIGRAINE WITHOUT AURA WITHOUT STATUS MIGRAINOSUS, NOT INTRACTABLE: Primary | ICD-10-CM

## 2022-05-02 PROCEDURE — 64615 CHEMODENERV MUSC MIGRAINE: CPT | Performed by: PHYSICIAN ASSISTANT

## 2022-05-02 PROCEDURE — 3008F BODY MASS INDEX DOCD: CPT | Performed by: STUDENT IN AN ORGANIZED HEALTH CARE EDUCATION/TRAINING PROGRAM

## 2022-05-02 RX ORDER — CYPROHEPTADINE HYDROCHLORIDE 4 MG/1
TABLET ORAL
Qty: 30 TABLET | Refills: 0 | Status: SHIPPED | OUTPATIENT
Start: 2022-05-02

## 2022-05-02 NOTE — PROGRESS NOTES
Universal Protocol   Consent: Verbal consent obtained  Written consent obtained    Risks and benefits: risks, benefits and alternatives were discussed  Consent given by: patient  Patient understanding: patient states understanding of the procedure being performed  Patient consent: the patient's understanding of the procedure matches consent given  Procedure consent: procedure consent matches procedure scheduled        Chemodenervation     Date/Time 5/2/2022 9:56 AM     Performed by  Garrett Whitt PA-C     Authorized by Garrett Whitt PA-C        Pre-procedure details      Prepped With: Alcohol     Procedure details     Position:  Upright   Botox     Botox Type:  Type A    Brand:  Botox    mL's of Botulinum Toxin:  175    Final Concentration per CC:  100 units    Needle Gauge:  30 G 2 5 inch   Procedures     Botox Procedures: chronic headache      Indications: migraines     Injection Location      Head / Face:  L superior trapezius, R superior trapezius, L superior cervical paraspinal, R superior cervical paraspinal, L , R , procerus, L temporalis, R temporalis, R frontalis, L frontalis, R medial occipitalis and L medial occipitalis    L  injection amount:  5 unit(s)    R  injection amount:  5 unit(s)    L lateral frontalis:  5 unit(s)    R lateral frontalis:  5 unit(s)    L medial frontalis:  5 unit(s)    R medial frontalis:  5 unit(s)    L temporalis injection amount:  20 unit(s)    R temporalis injection amount:  20 unit(s)    Procerus injection amount:  5 unit(s)    L medial occipitalis injection amount:  15 unit(s)    R medial occipitalis injection amount:  15 unit(s)    L superior cervical paraspinal injection amount:  10 unit(s)    R superior cervical paraspinal injection amount:  10 unit(s)    L superior trapezius injection amount:  15 unit(s)    R superior trapezius injection amount:  15 unit(s)   Total Units     Total units used:  175    Total units discarded:  25 Post-procedure details      Chemodenervation:  Chronic migraine    Facial Nerve Location[de-identified]  Bilateral facial nerve    Patient tolerance of procedure: Tolerated well, no immediate complications   Comments      Extra 10 units between both occipitalis muscles and 10 units between both temporalis muscles, medically necessary  Blood pressure 103/53, pulse 58, temperature 97 9 °F (36 6 °C), temperature source Temporal, height 5' 4" (1 626 m), weight 108 kg (237 lb), not currently breastfeeding  Pt was agreeable to cyproheptadine for migraines, to take either at bed to break a cycle, or more importantly for her headaches related to flying/ travel (she likes to travel to see her sister in [place] but flying triggers migraine headaches)  S/e reviewed including sedation

## 2022-05-04 DIAGNOSIS — G43.709 CHRONIC MIGRAINE WITHOUT AURA WITHOUT STATUS MIGRAINOSUS, NOT INTRACTABLE: ICD-10-CM

## 2022-05-04 NOTE — PSYCH
Virtual Regular Visit    Verification of [place] location: [place]    Patient is located in the following state in which I hold an active license: [place]    Assessment/Plan:    Problem List Items Addressed This Visit        Other    Generalized anxiety disorder with panic attacks    Relevant Medications    sertraline (ZOLOFT) 100 mg tablet    QUEtiapine (SEROquel) 50 mg tablet    busPIRone (BUSPAR) 10 mg tablet    Moderate episode of recurrent major depressive disorder (HCC) - Primary    Relevant Medications    sertraline (ZOLOFT) 100 mg tablet    QUEtiapine (SEROquel) 50 mg tablet    busPIRone (BUSPAR) 10 mg tablet      Other Visit Diagnoses     Nonintractable headache, unspecified chronicity pattern, unspecified headache type        Relevant Medications    gabapentin (NEURONTIN) 300 mg capsule    Neuralgia        Relevant Medications    gabapentin (NEURONTIN) 300 mg capsule               Reason for visit is   Chief Complaint   Patient presents with    Medication Management    Anxiety    Depression        Encounter provider Claudine Colvin MD    Provider located at: [address]    Recent Visits  No visits were found meeting these conditions  Showing recent visits within past 7 days and meeting all other requirements  Today's Visits  Date Type Provider Dept   05/05/22 Telemedicine Claudine Colvin MD [facility] 18 today's visits and meeting all other requirements  Future Appointments  No visits were found meeting these conditions  Showing future appointments within next 150 days and meeting all other requirements       The patient was identified by name and date of birth  Gauri Torres was informed that this is a telemedicine visit and that the visit is being conducted through [facility] and patient was informed this is a secure, HIPAA-complaint platform  She agrees to proceed   My office door was closed  No one else was in the room  She acknowledged consent and understanding of privacy and security of the video platform  The patient has agreed to participate and understands they can discontinue the visit at any time  Patient is aware this is a billable service  MEDICATION MANAGEMENT NOTE        [facility]      Name and Date of Birth:  Ramonita Hsieh 59 y o  1960 MRN: [id number]    Date of Visit: May 5, 2022    Reason for Visit:   Chief Complaint   Patient presents with    Medication Management    Anxiety    Depression       SUBJECTIVE:  The patient was visited virtually for medication management and follow up visit for depression and anxiety  Presented calm, and cooperative  Reported feeling good  She finished PT, and now walks without using the cane, and denied any recent falls  She likes to go out and walk in the park five minutes away from her house  Denied any changes in sleep, appetite, concentration, energy level, or daily activities  Denied feelings of anhedonia, hopelessness, helplessness, worthlessness or guilt and appeared to be future oriented  There was no thought constriction related to death  Denied SI/HI, intent or plan upon direct inquiry at this time  Denied AV/H  No intense anxiety sxs, specific phobia or panic attacks reported  No manic sxs, paranoid ideations or fixed delusions were elicited  Endorsed good compliance with the medications and denied any side effects  Continues to use medical marijuana  Denied smoking cigarettes, drinking alcohol or other illicit substance use  Given this presentation, medications are maintained at the same dosage  She finished individual therapy few months ago and has good support system  The patient was educated to call 911 or go to the nearest emergency room if the symptoms become overwhelming or unable to remain in control   Verbalized understanding and agreed to seek help in case of distress or concern for safety  Review Of Systems:  Pertinent items are noted in HPI; all others are negative; no recent changes in medications or health status reported  PHQ-2/9 Depression Screening    Little interest or pleasure in doing things: 0 - not at all  Feeling down, depressed, or hopeless: 1 - several days  Trouble falling or staying asleep, or sleeping too much: 0 - not at all  Feeling tired or having little energy: 2 - more than half the days  Poor appetite or overeatin - more than half the days  Feeling bad about yourself - or that you are a failure or have let yourself or your family down: 1 - several days  Trouble concentrating on things, such as reading the newspaper or watching television: 1 - several days  Moving or speaking so slowly that other people could have noticed   Or the opposite - being so fidgety or restless that you have been moving around a lot more than usual: 0 - not at all  Thoughts that you would be better off dead, or of hurting yourself in some way: 0 - not at all  PHQ-9 Score: 7   PHQ-9 Interpretation: Mild depression                Past Psychiatric History Update:   - No inpatient psychiatric admission since last encounter  - No SA or SIB since last encounter  - No incidence of violent behavior since last encounter    Past Trauma History Update:    - No new onset of abuse or traumatic events since last encounter     Past Medical History:    Past Medical History:   Diagnosis Date    Anxiety     Arthritis     Attention and concentration deficit     Blurred vision     Chronic pain     Chronic pain syndrome 2016    CPAP (continuous positive airway pressure) dependence     Depression     Diplopia     Dyspepsia     Gastric ulcer     Gastritis     GERD (gastroesophageal reflux disease)     History of transfusion     Hypertension     Insomnia     Irritable bowel syndrome     Memory loss     Migraines     Osteopenia     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    Presence of neurostimulator     Pudendal neuralgia    Pudendal neuralgia     Sleep apnea     Spinal stenosis     Starting and stopping of urinary stream during micturition     Urinary incontinence     Wears eyeglasses      No past medical history pertinent negatives    Past Surgical History:   Procedure Laterality Date     SECTION  1988     SECTION  1990   [address] GASTRIC BYPASS  2004    HYSTERECTOMY  2012    INSERT / REPLACE PERIPHERAL NEUROSTIMULATOR PULSE GENERATOR /       MASS EXCISION Right 2021    Procedure: EXCISION  BIOPSY LESION/MASS LOWER NECK;  Surgeon: Isreal Rodriguez DO;  Location: [place] [facility];  Service: General    OTHER SURGICAL HISTORY      [facility]implantatin at [facility] NEUROSTIM/ Right 4/15/2020    Procedure: REPLACEMENT IMPLANTABLE PULSE GENERATOR FOR DORSAL SPINAL COLUMN STIMULATOR,  RIGHT BUTTOCKS;  Surgeon: Radha Lynn MD;  Location:  MAIN OR;  Service: Neurosurgery    [address] URETHRAL [address]ULA REPAIR      US GUIDED THYROID BIOPSY  2020    WRIST ARTHROSCOPY      with internal fixation      Allergies   Allergen Reactions    Morphine Abdominal Pain     SEVERE N/V    Cat Hair Extract Nasal Congestion     Cat Dander    Dog Epithelium Nasal Congestion    Other Other (See Comments)     Dogs- sneezing  Crab Meat- GI intolerance       Substance Abuse History:    Social History     Substance and Sexual Activity   Alcohol Use No     Social History     Substance and Sexual Activity   Drug Use Yes    Types: Marijuana    Comment: medical marijuana 2x a day capsules       Social History:    Social History     Socioeconomic History    Marital status: /Civil Union     Spouse name: Not on file    Number of children: Not on file    Years of education: Not on file    Highest education level: Not on file   Occupational History    Occupation: retired   Tobacco Use    Smoking status: Never Smoker    Smokeless tobacco: Never Used   Vaping Use    Vaping Use: Never used   Substance and Sexual Activity    Alcohol use: No    Drug use: Yes     Types: Marijuana     Comment: medical marijuana 2x a day capsules    Sexual activity: Yes   Other Topics Concern    Not on file   Social History Narrative    Lives in [facility], with   Previously worked as a teacher  Social Determinants of Health     Financial Resource Strain: Not on file   Food Insecurity: Not on file   Transportation Needs: Not on file   Physical Activity: Not on file   Stress: Not on file   Social Connections: Not on file   Intimate Partner Violence: Not on file   Housing Stability: Not on file       Family Psychiatric History:     Family History   Problem Relation Age of Onset    Other Mother         Back Disorder     Cirrhosis Mother     Crohn's disease Mother     Lupus Mother         Systemic Lupus Erythematous     Hypertension Father     Heart disease Father     Other Brother         Liver Transplant     Crohn's disease Brother     Crohn's disease Brother     No Known Problems Sister     No Known Problems Daughter     No Known Problems Maternal Aunt     No Known Problems Paternal Aunt     No Known Problems Cousin     No Known Problems Cousin     No Known Problems Cousin     No Known Problems Cousin     No Known Problems Cousin     Seizures Neg Hx     Breast cancer Neg Hx        History Review:  The following portions of the patient's history were reviewed and updated as appropriate: allergies, current medications, past family history, past medical history, past social history, past surgical history and problem list        OBJECTIVE:     Vital signs in last 24 hours:    Vitals:       Mental Status Evaluation:  Appearance and attitude: appeared as stated age, cooperative and attentive, wearing eyeglasses, casually dressed with good hygiene  Eye contact: good  Motor Function: within normal limits, No PMA/PMR  Gait/station: Not observed  Speech: normal for rate, rhythm, volume, latency, amount  Language: No overt abnormality  Mood/affect: euthymic / Affect was euthymic, reactive, in full range, normal intensity and mood congruent  Thought Processes: sequential and goal-directed  Thought content: denies suicidal ideation or homicidal ideation; no delusions or first rank symptoms  Associations: intact associations  Perceptual disturbances: denies Auditory/Visual/Tactile Hallucinations  Orientation: oriented to time, person, place and to the situational context  Cognitive Function: intact  Memory: recent and remote memory grossly intact  Intellect: average  Fund of knowledge: aware of current events, aware of past history and vocabulary average  Impulse control: good  Insight/judgment: good/good    Laboratory Results: I have personally reviewed all pertinent laboratory/tests results    Recent Labs (last 2 months):   No visits with results within 2 Month(s) from this visit     Latest known visit with results is:   Appointment on 12/01/2021   Component Date Value    Sodium 12/01/2021 144     Potassium 12/01/2021 4 3     Chloride 12/01/2021 106     CO2 12/01/2021 31     ANION GAP 12/01/2021 7     BUN 12/01/2021 16     Creatinine 12/01/2021 0 89     Glucose, Fasting 12/01/2021 89     Calcium 12/01/2021 8 3     Corrected Calcium 12/01/2021 9 0     AST 12/01/2021 15     ALT 12/01/2021 22     Alkaline Phosphatase 12/01/2021 122*    Total Protein 12/01/2021 6 5     Albumin 12/01/2021 3 1*    Total Bilirubin 12/01/2021 0 26     eGFR 12/01/2021 69     WBC 12/01/2021 5 89     RBC 12/01/2021 4 27     Hemoglobin 12/01/2021 12 3     Hematocrit 12/01/2021 40 2     MCV 12/01/2021 94     MCH 12/01/2021 28 8     MCHC 12/01/2021 30 6*    RDW 12/01/2021 13 6     MPV 12/01/2021 11 3     Platelets 62/39/9625 234     nRBC 12/01/2021 0     Neutrophils Relative 12/01/2021 55     Immat GRANS % 12/01/2021 0     Lymphocytes Relative 12/01/2021 32     Monocytes Relative 12/01/2021 7     Eosinophils Relative 12/01/2021 4     Basophils Relative 12/01/2021 2*    Neutrophils Absolute 12/01/2021 3 24     Immature Grans Absolute 12/01/2021 0 01     Lymphocytes Absolute 12/01/2021 1 88     Monocytes Absolute 12/01/2021 0 43     Eosinophils Absolute 12/01/2021 0 23     Basophils Absolute 12/01/2021 0 10     Vitamin B-12 12/01/2021 366     Cholesterol 12/01/2021 161     Triglycerides 12/01/2021 81     HDL, Direct 12/01/2021 77     LDL Calculated 12/01/2021 68     Non-HDL-Chol (CHOL-HDL) 12/01/2021 84     Folate 12/01/2021 10 3     Iron Saturation 12/01/2021 18     TIBC 12/01/2021 327     Iron 12/01/2021 58     Ferritin 12/01/2021 8          Assessment/Plan:   A 58 y/o  female,  (two adult children 32 and 27 y/o), domiciled w/ , retired teacher, w/ PMH of multiple medical conditions including obesity, HTN, HLD, GERD, post-gastrectomy malabsorption, CATHRYN (on CPAP), migraine, neuralgia, dysesthesia of multiple sites, lumbar radiculopathy, OA of L shoulder, cervicalgia, mild cognitive impairment w/ memory loss, BRIANA, TIA (2 5 yrs ago), abnormal sleep deprived EEG, TBI (Catherin Rubinstein off 12 foot retaining wall more than 25 yrs ago), long-term use of opiate analgesic, BRIANA, vit D def and PPH of depression and anxiety, recent ED visit on 2/11/2021 due to worsening of depression and SI lead to inpatient psychiatric admission at [facility] (for 11 days) and then to the PHP (finished on 3/11/2021), one prior SA (~40 yrs ago via OD), no h/o self-injurious behavior, on Xanax 1 mg HS, Buspar 10 mg BID, Neurontin 300 mg BID, Seroquel 50 mg nightly, Zoloft 100 mg daily, who presented to the mental health clinic for the initial intake and psychiatric evaluation on 3/12/2021   Presented w/ increased depression and anxiety over past year in the context of chronic pain, multiple medical conditions, and isolation due to COVID-19 pandemic, which has markedly improved since recent inpatient hospitalization and finishing PHP  Denied SI/HI, intent or plan upon direct inquiry at this time  PHQ-9: 7; YANY-7: 1  Her current presentation meets criteria for MDD and YANY w/ panic attacks  Maintained on Zoloft 100 mg daily, Seroquel 50 mg nightly, Buspar 10 mg BID and Neurontin 300 mg BID, and Xanax was tapered off successfully as tolerated; started individual therapy   PHQ-9: 1; YANY-7: 1 on 6/28/2021  Will follow with her PCP regarding fixing her CPAP  Upon f/u on 5/5/22, the patient remained stable and noted that she finished individual therapy a couple of month ago, and has good support system  Maintained on the same regimen  Diagnoses and all orders for this visit:    Moderate episode of recurrent major depressive disorder (HCC)  -     sertraline (ZOLOFT) 100 mg tablet; Take 1 tablet (100 mg total) by mouth daily  -     QUEtiapine (SEROquel) 50 mg tablet; Take 1 tablet (50 mg total) by mouth daily at bedtime    Generalized anxiety disorder with panic attacks  -     busPIRone (BUSPAR) 10 mg tablet; Take 1 tablet (10 mg total) by mouth 2 (two) times a day    Nonintractable headache, unspecified chronicity pattern, unspecified headache type  -     gabapentin (NEURONTIN) 300 mg capsule; Take 1 capsule (300 mg total) by mouth 2 (two) times a day    Neuralgia  -     gabapentin (NEURONTIN) 300 mg capsule; Take 1 capsule (300 mg total) by mouth 2 (two) times a day          Impression:  1  Moderate episode of recurrent major depressive disorder (HCC)  sertraline (ZOLOFT) 100 mg tablet    QUEtiapine (SEROquel) 50 mg tablet   2  Generalized anxiety disorder with panic attacks  busPIRone (BUSPAR) 10 mg tablet   3  Nonintractable headache, unspecified chronicity pattern, unspecified headache type  gabapentin (NEURONTIN) 300 mg capsule   4   Neuralgia gabapentin (NEURONTIN) 300 mg capsule       Treatment Recommendations/Precautions:  - pain management as per primary medical team  - f/u w/ PCP regarding fixing her CPAP machine and w/u for frequent falls  - f/u with neurology regarding recent weakness in LE and memory issues as well as recent frequent falls  - Continue Zoloft 100 mg po daily for anxiety and depression  - Continue Buspar 10 mg BID for anxiety  - Continue Neurontin 300 mg BID for pain and anxiety  - Continue Seroquel 50 mg po nightly as adjunct treatment       - Medications sent to patient's pharmacy for 90 day supply    - Psychoeducation provided to the patient and benefits, potential risks and side effects discussed; importance of compliance with the psychiatric treatment reiterated, and the patient verbalized understanding of the matter     - RTC in 24 weeks  - Educated about healthy life style, risk of falls/sedation and addiction  Patient was receptive to education   - The patient was educated about 24 hour and weekend coverage for urgent situations accessed by calling [address] [facility] [address] main practice number  - Patient was educated to call [address] [facility] ([phone] [[phone]]) for behavioral crisis at anytime or 911 for any safety concerns, or go to nearest ER if her symptoms become overwhelming or unmanageable      Current Outpatient Medications   Medication Sig Dispense Refill    Botox 200 units SOLR       busPIRone (BUSPAR) 10 mg tablet Take 1 tablet (10 mg total) by mouth 2 (two) times a day 180 tablet 1    Cholecalciferol (D3-1000) 1000 units capsule Take 1 capsule by mouth daily        cyproheptadine (PERIACTIN) 4 mg tablet 1/2- 1 tab qhs for headaches or prior to flight 30 tablet 0    dronabinol (MARINOL) 5 MG capsule Take 1 capsule (5 mg total) by mouth 2 (two) times a day before meals By Dr Silver Lewis 60 capsule 0    gabapentin (NEURONTIN) 300 mg capsule Take 1 capsule (300 mg total) by mouth 2 (two) times a day 180 capsule 1    Galcanezumab-gnlm 120 MG/ML SOAJ Inject 120 mg under the skin every 30 (thirty) days 3 pen 4    Iron-Vitamin C (IRON 100/C) 100-250 MG TABS Take by mouth daily      lidocaine (XYLOCAINE) 5 % ointment Apply topically 2 (two) times a day as needed for moderate pain 35 44 g 1    lisinopril (ZESTRIL) 10 mg tablet TAKE 1 TABLET DAILY 90 tablet 3    meloxicam (Mobic) 15 mg tablet Take 1 tablet (15 mg total) by mouth daily (Patient not taking: Reported on 4/6/2022 ) 90 tablet 0    mometasone (ELOCON) 0 1 % cream APPLY TOPICALLY DAILY 45 g 1    Multiple Vitamin (MULTI-VITAMIN DAILY) TABS Take 1 tablet by mouth daily      ondansetron (ZOFRAN-ODT) 4 mg disintegrating tablet Take 1 tablet (4 mg total) by mouth every 6 (six) hours as needed for nausea or vomiting 30 tablet 5    pantoprazole (PROTONIX) 40 mg tablet TAKE 1 TABLET TWICE A  tablet 3    propranolol (INDERAL) 60 mg tablet TAKE 1 TABLET TWICE A  tablet 3    QUEtiapine (SEROquel) 50 mg tablet Take 1 tablet (50 mg total) by mouth daily at bedtime 90 tablet 1    rizatriptan (MAXALT-MLT) 10 MG disintegrating tablet PLACE 1 TABLET ON TONGUE ONCE AS NEEDED FOR MIGRAINE MAY REPEAT EVERY 2 HOURS IF NEEDED, MAXIMUM 30 MG IN 24 HOURS 12 tablet 23    rosuvastatin (CRESTOR) 10 MG tablet TAKE 1 TABLET DAILY 90 tablet 3    sertraline (ZOLOFT) 100 mg tablet Take 1 tablet (100 mg total) by mouth daily 90 tablet 1     No current facility-administered medications for this visit  Medications Risks/Benefits      Risks, Benefits And Possible Side Effects Of Medications:    Risks, benefits, and possible side effects of medications explained to Pranay Black and she verbalizes understanding and agreement for treatment  Controlled Medication Discussion:     Not applicable    Psychotherapy Provided:     Individual psychotherapy provided: Yes  Counseling was provided during the session today for 16 minutes     Psychoeducation provided to the patient and was educated about the importance of compliance with the medications and psychiatric treatment  Supportive psychotherapy provided to the patient  Solution Focused Brief Therapy (SFBT) provided  Patient's emotions were validated and specific labeled praise provided  [place] suggestions were offered in a supportive non-critical way       Treatment Plan:    Completed and signed during the session: Yes - with Rubina Ty MD 05/05/22

## 2022-05-05 ENCOUNTER — TELEMEDICINE (OUTPATIENT)
Dept: PSYCHIATRY | Facility: CLINIC | Age: 62
End: 2022-05-05
Payer: COMMERCIAL

## 2022-05-05 DIAGNOSIS — F41.1 GENERALIZED ANXIETY DISORDER WITH PANIC ATTACKS: ICD-10-CM

## 2022-05-05 DIAGNOSIS — F33.1 MODERATE EPISODE OF RECURRENT MAJOR DEPRESSIVE DISORDER (HCC): Primary | ICD-10-CM

## 2022-05-05 DIAGNOSIS — R51.9 NONINTRACTABLE HEADACHE, UNSPECIFIED CHRONICITY PATTERN, UNSPECIFIED HEADACHE TYPE: ICD-10-CM

## 2022-05-05 DIAGNOSIS — F41.0 GENERALIZED ANXIETY DISORDER WITH PANIC ATTACKS: ICD-10-CM

## 2022-05-05 DIAGNOSIS — M79.2 NEURALGIA: ICD-10-CM

## 2022-05-05 PROCEDURE — 99214 OFFICE O/P EST MOD 30 MIN: CPT | Performed by: STUDENT IN AN ORGANIZED HEALTH CARE EDUCATION/TRAINING PROGRAM

## 2022-05-05 PROCEDURE — 1036F TOBACCO NON-USER: CPT | Performed by: STUDENT IN AN ORGANIZED HEALTH CARE EDUCATION/TRAINING PROGRAM

## 2022-05-05 PROCEDURE — 90833 PSYTX W PT W E/M 30 MIN: CPT | Performed by: STUDENT IN AN ORGANIZED HEALTH CARE EDUCATION/TRAINING PROGRAM

## 2022-05-05 PROCEDURE — 3725F SCREEN DEPRESSION PERFORMED: CPT | Performed by: STUDENT IN AN ORGANIZED HEALTH CARE EDUCATION/TRAINING PROGRAM

## 2022-05-05 RX ORDER — QUETIAPINE FUMARATE 50 MG/1
50 TABLET, FILM COATED ORAL
Qty: 90 TABLET | Refills: 1 | Status: SHIPPED | OUTPATIENT
Start: 2022-05-05 | End: 2022-11-01

## 2022-05-05 RX ORDER — GABAPENTIN 300 MG/1
300 CAPSULE ORAL 2 TIMES DAILY
Qty: 180 CAPSULE | Refills: 1 | Status: SHIPPED | OUTPATIENT
Start: 2022-05-05 | End: 2022-11-01

## 2022-05-05 RX ORDER — SERTRALINE HYDROCHLORIDE 100 MG/1
100 TABLET, FILM COATED ORAL DAILY
Qty: 90 TABLET | Refills: 1 | Status: SHIPPED | OUTPATIENT
Start: 2022-05-05 | End: 2022-11-01

## 2022-05-05 RX ORDER — BUSPIRONE HYDROCHLORIDE 10 MG/1
10 TABLET ORAL 2 TIMES DAILY
Qty: 180 TABLET | Refills: 1 | Status: SHIPPED | OUTPATIENT
Start: 2022-05-05 | End: 2022-11-01

## 2022-05-05 NOTE — BH TREATMENT PLAN
Treatment Plan done but not signed at time of office visit due to:  Plan reviewed with the patient during the virtual visit and verbal consent given  TREATMENT PLAN (Medication Management Only)        [facility]    Name and Date of Birth:  Ricco Olson 59 y o  1960  Date of Treatment Plan: May 5, 2022  Diagnosis/Diagnoses:    1  Moderate episode of recurrent major depressive disorder ([address] )    2  Generalized anxiety disorder with panic attacks    3  Nonintractable headache, unspecified chronicity pattern, unspecified headache type    4  Neuralgia      Strengths/Personal Resources for Self-Care: supportive family  Area/Areas of need (in own words): anxiety, depression  1  Long Term Goal: maintain control of anxiety and depressive sxs  Target Date:6 months - 11/5/2022  Person/Persons responsible for completion of goal: Chilo Squires  2  Short Term Objective (s) - How will we reach this goal?:   A  Provider new recommended medication/dosage changes and/or continue medication(s): continue current medications as prescribed  B  N/A   C  N/A  Target Date:6 months - 11/5/2022  Person/Persons Responsible for Completion of Goal: Chilo Squires  Progress Towards Goals: continuing treatment  Treatment Modality: medication management every 6 months  Review due 180 days from date of this plan: 6 months - 11/5/2022  Expected length of service: maintenance  My Physician/PA/NP and I have developed this plan together and I agree to work on the goals and objectives  I understand the treatment goals that were developed for my treatment

## 2022-05-13 DIAGNOSIS — R21 RASH: ICD-10-CM

## 2022-05-16 RX ORDER — MOMETASONE FUROATE 1 MG/G
CREAM TOPICAL DAILY
Qty: 45 G | Refills: 7 | Status: SHIPPED | OUTPATIENT
Start: 2022-05-16

## 2022-05-17 RX ORDER — GALCANEZUMAB 120 MG/ML
INJECTION, SOLUTION SUBCUTANEOUS
Qty: 3 ML | Refills: 3 | Status: SHIPPED | OUTPATIENT
Start: 2022-05-17

## 2022-05-23 DIAGNOSIS — R51.9 CHRONIC DAILY HEADACHE: ICD-10-CM

## 2022-05-23 RX ORDER — PROPRANOLOL HYDROCHLORIDE 60 MG/1
TABLET ORAL
Qty: 180 TABLET | Refills: 3 | Status: SHIPPED | OUTPATIENT
Start: 2022-05-23

## 2022-06-27 DIAGNOSIS — E78.2 MIXED HYPERLIPIDEMIA: ICD-10-CM

## 2022-06-27 RX ORDER — ROSUVASTATIN CALCIUM 10 MG/1
TABLET, COATED ORAL
Qty: 90 TABLET | Refills: 3 | Status: SHIPPED | OUTPATIENT
Start: 2022-06-27

## 2022-06-30 ENCOUNTER — TELEPHONE (OUTPATIENT)
Dept: NEUROLOGY | Facility: CLINIC | Age: 62
End: 2022-06-30

## 2022-06-30 NOTE — TELEPHONE ENCOUNTER
Botox number of units: 200  Botox quantity: 1  Arrived at what location: [facility]   Botox at Correct Administering Location: yes  NDC number: [id number]  Lot number: [id number]  Expiration Date: 01/31/25  Appt notes indicate correct medication: yes

## 2022-07-22 ENCOUNTER — TELEMEDICINE (OUTPATIENT)
Dept: FAMILY MEDICINE CLINIC | Facility: CLINIC | Age: 62
End: 2022-07-22
Payer: COMMERCIAL

## 2022-07-22 ENCOUNTER — TELEPHONE (OUTPATIENT)
Dept: OTHER | Facility: OTHER | Age: 62
End: 2022-07-22

## 2022-07-22 ENCOUNTER — TELEPHONE (OUTPATIENT)
Dept: NEUROLOGY | Facility: CLINIC | Age: 62
End: 2022-07-22

## 2022-07-22 DIAGNOSIS — I10 PRIMARY HYPERTENSION: ICD-10-CM

## 2022-07-22 DIAGNOSIS — E66.01 OBESITY, MORBID (HCC): ICD-10-CM

## 2022-07-22 DIAGNOSIS — E78.2 MIXED HYPERLIPIDEMIA: ICD-10-CM

## 2022-07-22 DIAGNOSIS — U07.1 COVID-19: Primary | ICD-10-CM

## 2022-07-22 DIAGNOSIS — G47.33 OSA (OBSTRUCTIVE SLEEP APNEA): ICD-10-CM

## 2022-07-22 PROCEDURE — 99213 OFFICE O/P EST LOW 20 MIN: CPT

## 2022-07-22 RX ORDER — SODIUM CHLORIDE 9 MG/ML
20 INJECTION, SOLUTION INTRAVENOUS ONCE
Status: CANCELLED | OUTPATIENT
Start: 2022-07-22

## 2022-07-22 RX ORDER — ALBUTEROL SULFATE 90 UG/1
3 AEROSOL, METERED RESPIRATORY (INHALATION) ONCE AS NEEDED
Status: CANCELLED | OUTPATIENT
Start: 2022-07-22

## 2022-07-22 RX ORDER — ONDANSETRON 2 MG/ML
4 INJECTION INTRAMUSCULAR; INTRAVENOUS ONCE AS NEEDED
Status: CANCELLED | OUTPATIENT
Start: 2022-07-22

## 2022-07-22 RX ORDER — BEBTELOVIMAB 87.5 MG/ML
175 INJECTION, SOLUTION INTRAVENOUS ONCE
Status: CANCELLED | OUTPATIENT
Start: 2022-07-22

## 2022-07-22 RX ORDER — ACETAMINOPHEN 325 MG/1
650 TABLET ORAL ONCE AS NEEDED
Status: CANCELLED | OUTPATIENT
Start: 2022-07-22

## 2022-07-22 NOTE — PROGRESS NOTES
Virtual Regular Visit    Verification of patient location:    Patient is located in the following state in which I hold an active license [place]      Assessment/Plan:    Problem List Items Addressed This Visit        Respiratory    CATHRYN (obstructive sleep apnea)       Cardiovascular and Mediastinum    Hypertension       Other    Mixed hyperlipidemia    Obesity, morbid ([facility] [address] )    COVID-19 - Primary               Reason for visit is   Chief Complaint   Patient presents with    Virtual Regular Visit        Encounter provider GWEN Prieto    Provider located at [facility] [address] [facility]  [address] [facility] [address] [facility]  [phone]      Recent Visits  No visits were found meeting these conditions  Showing recent visits within past 7 days and meeting all other requirements  Today's Visits  Date Type Provider Dept   07/22/22 Telemedicine GWEN Prieto [address] today's visits and meeting all other requirements  Future Appointments  No visits were found meeting these conditions  Showing future appointments within next 150 days and meeting all other requirements       The patient was identified by name and date of birth  Flex Dela Cruz was informed that this is a telemedicine visit and that the visit is being conducted through [address] [place] [address] Now and patient was informed that this is a secure, HIPAA-compliant platform  She agrees to proceed     My office door was closed  No one else was in the room  She acknowledged consent and understanding of privacy and security of the video platform  The patient has agreed to participate and understands they can discontinue the visit at any time  Patient is aware this is a billable service  Subjective  Flex Dela Cruz is a 59 y o  female tested positive for covid today, has been ill since Tuesday        Bisi Llanos got back from a [place] on Monday and she took a COVID test Tuesday that was negative and had HA, sore throat and a 100 5F  Today she took a second test and it is positive          Past Medical History:   Diagnosis Date    Anxiety     Arthritis     Attention and concentration deficit     Blurred vision     Chronic pain     Chronic pain syndrome 2016    CPAP (continuous positive airway pressure) dependence     Depression     Diplopia     Dyspepsia     Gastric ulcer     Gastritis     GERD (gastroesophageal reflux disease)     History of transfusion     Hypertension     Insomnia     Irritable bowel syndrome     Memory loss     Migraines     Osteopenia     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    Presence of neurostimulator     Pudendal neuralgia    Pudendal neuralgia     Sleep apnea     Spinal stenosis     Starting and stopping of urinary stream during micturition     Urinary incontinence     Wears eyeglasses        Past Surgical History:   Procedure Laterality Date     SECTION       SECTION      CHOLECYSTECTOMY     [address] GASTRIC BYPASS  2004    HYSTERECTOMY  2012    INSERT / REPLACE PERIPHERAL NEUROSTIMULATOR PULSE GENERATOR /       MASS EXCISION Right 2021    Procedure: EXCISION  BIOPSY LESION/MASS LOWER NECK;  Surgeon: Merlinda Balboa, DO;  Location: [facility]IN [facility];  Service: General    OTHER SURGICAL HISTORY      Reimplantatin at [place] [address] NEUROSTIM/ Right 4/15/2020    Procedure: [facility],  RIGHT BUTTOCKS;  Surgeon: Mansoor Peres MD;  Location:  MAIN OR;  Service: Neurosurgery    Maria Guadalupe [address] 4 THYROID BIOPSY  2020    WRIST ARTHROSCOPY      with internal fixation        Current Outpatient Medications   Medication Sig Dispense Refill    Botox 200 units SOLR       busPIRone (BUSPAR) 10 mg tablet Take 1 tablet (10 mg total) by mouth 2 (two) times a day 180 tablet 1    Cholecalciferol (D3-1000) 1000 units capsule Take 1 capsule by mouth daily        cyproheptadine (PERIACTIN) 4 mg tablet 1/2- 1 tab qhs for headaches or prior to flight 30 tablet 0    dronabinol (MARINOL) 5 MG capsule Take 1 capsule (5 mg total) by mouth 2 (two) times a day before meals By Dr Menard Para 60 capsule 0    Emgality 120 MG/ML SOAJ INJECT 120 MG UNDER THE SKIN EVERY 30 DAYS 3 mL 3    gabapentin (NEURONTIN) 300 mg capsule Take 1 capsule (300 mg total) by mouth 2 (two) times a day 180 capsule 1    Iron-Vitamin C (IRON 100/C) 100-250 MG TABS Take by mouth daily      lidocaine (XYLOCAINE) 5 % ointment Apply topically 2 (two) times a day as needed for moderate pain 35 44 g 1    lisinopril (ZESTRIL) 10 mg tablet TAKE 1 TABLET DAILY 90 tablet 3    meloxicam (Mobic) 15 mg tablet Take 1 tablet (15 mg total) by mouth daily (Patient not taking: Reported on 4/6/2022 ) 90 tablet 0    mometasone (ELOCON) 0 1 % cream APPLY TOPICALLY DAILY 45 g 7    Multiple Vitamin (MULTI-VITAMIN DAILY) TABS Take 1 tablet by mouth daily      ondansetron (ZOFRAN-ODT) 4 mg disintegrating tablet Take 1 tablet (4 mg total) by mouth every 6 (six) hours as needed for nausea or vomiting 30 tablet 5    pantoprazole (PROTONIX) 40 mg tablet TAKE 1 TABLET TWICE A  tablet 3    propranolol (INDERAL) 60 mg tablet TAKE 1 TABLET TWICE A  tablet 3    QUEtiapine (SEROquel) 50 mg tablet Take 1 tablet (50 mg total) by mouth daily at bedtime 90 tablet 1    rizatriptan (MAXALT-MLT) 10 MG disintegrating tablet PLACE 1 TABLET ON TONGUE ONCE AS NEEDED FOR MIGRAINE MAY REPEAT EVERY 2 HOURS IF NEEDED, MAXIMUM 30 MG IN 24 HOURS 12 tablet 23    rosuvastatin (CRESTOR) 10 MG tablet TAKE 1 TABLET DAILY 90 tablet 3    sertraline (ZOLOFT) 100 mg tablet Take 1 tablet (100 mg total) by mouth daily 90 tablet 1     No current facility-administered medications for this visit  Allergies   Allergen Reactions    Morphine Abdominal Pain     SEVERE N/V    Cat Hair Extract Nasal Congestion     Cat Dander    Dog Epithelium Nasal Congestion    Other Other (See Comments)     Dogs- sneezing  Crab Meat- GI intolerance       Review of Systems   Constitutional: Positive for diaphoresis, fatigue and fever  Negative for chills  HENT: Positive for congestion, sinus pressure, sinus pain and sore throat  Negative for ear pain  Respiratory: Positive for cough and chest tightness  Negative for shortness of breath and wheezing  Cardiovascular: Negative for chest pain and palpitations  Gastrointestinal: Negative for constipation, diarrhea, nausea and vomiting  Genitourinary: Negative for frequency and urgency  Musculoskeletal: Positive for arthralgias and myalgias  Neurological: Positive for dizziness, light-headedness and headaches  Video Exam    There were no vitals filed for this visit  Physical Exam  Constitutional:       Appearance: She is ill-appearing  HENT:      Head: Normocephalic  Nose: Nose normal    Pulmonary:      Effort: Pulmonary effort is normal    Neurological:      General: No focal deficit present  Mental Status: She is alert  Psychiatric:         Mood and Affect: Mood normal          Behavior: Behavior normal           I spent 15 minutes directly with the patient during this visit    VIRTUAL VISIT 407[address] Mitzi Courtney verbally agrees to participate in [employer]  Pt is aware that [employer] could be limited without vital signs or the ability to perform a full hands-on physical Pari Knox understands she or the provider may request at any time to terminate the video visit and request the patient to seek care or treatment in person

## 2022-07-23 ENCOUNTER — HOSPITAL ENCOUNTER (OUTPATIENT)
Dept: INFUSION CENTER | Facility: HOSPITAL | Age: 62
Discharge: HOME | End: 2022-07-23
Payer: COMMERCIAL

## 2022-07-23 ENCOUNTER — TELEPHONE (OUTPATIENT)
Dept: INFUSION CENTER | Facility: CLINIC | Age: 62
End: 2022-07-23

## 2022-07-23 VITALS
DIASTOLIC BLOOD PRESSURE: 53 MMHG | OXYGEN SATURATION: 95 % | RESPIRATION RATE: 18 BRPM | SYSTOLIC BLOOD PRESSURE: 88 MMHG | TEMPERATURE: 99.1 F | HEART RATE: 60 BPM

## 2022-07-23 DIAGNOSIS — I10 PRIMARY HYPERTENSION: ICD-10-CM

## 2022-07-23 DIAGNOSIS — U07.1 COVID-19: ICD-10-CM

## 2022-07-23 DIAGNOSIS — G47.33 OSA (OBSTRUCTIVE SLEEP APNEA): ICD-10-CM

## 2022-07-23 DIAGNOSIS — E66.01 OBESITY, MORBID (HCC): ICD-10-CM

## 2022-07-23 DIAGNOSIS — E78.2 MIXED HYPERLIPIDEMIA: Primary | ICD-10-CM

## 2022-07-23 PROCEDURE — M0222 HB BEBTELOVIMAB INJECTION: HCPCS | Performed by: FAMILY MEDICINE

## 2022-07-23 RX ORDER — ACETAMINOPHEN 325 MG/1
650 TABLET ORAL ONCE AS NEEDED
Status: CANCELLED | OUTPATIENT
Start: 2022-07-23

## 2022-07-23 RX ORDER — SODIUM CHLORIDE 9 MG/ML
20 INJECTION, SOLUTION INTRAVENOUS ONCE
Status: CANCELLED | OUTPATIENT
Start: 2022-07-23

## 2022-07-23 RX ORDER — BEBTELOVIMAB 87.5 MG/ML
175 INJECTION, SOLUTION INTRAVENOUS ONCE
Status: COMPLETED | OUTPATIENT
Start: 2022-07-23 | End: 2022-07-23

## 2022-07-23 RX ORDER — ALBUTEROL SULFATE 90 UG/1
3 AEROSOL, METERED RESPIRATORY (INHALATION) ONCE AS NEEDED
Status: CANCELLED | OUTPATIENT
Start: 2022-07-23

## 2022-07-23 RX ORDER — BEBTELOVIMAB 87.5 MG/ML
175 INJECTION, SOLUTION INTRAVENOUS ONCE
Status: CANCELLED | OUTPATIENT
Start: 2022-07-23

## 2022-07-23 RX ORDER — ACETAMINOPHEN 325 MG/1
650 TABLET ORAL ONCE AS NEEDED
Status: DISCONTINUED | OUTPATIENT
Start: 2022-07-23 | End: 2022-07-26 | Stop reason: HOSPADM

## 2022-07-23 RX ORDER — ONDANSETRON 2 MG/ML
4 INJECTION INTRAMUSCULAR; INTRAVENOUS ONCE AS NEEDED
Status: CANCELLED | OUTPATIENT
Start: 2022-07-23

## 2022-07-23 RX ORDER — ALBUTEROL SULFATE 90 UG/1
3 AEROSOL, METERED RESPIRATORY (INHALATION) ONCE AS NEEDED
Status: DISCONTINUED | OUTPATIENT
Start: 2022-07-23 | End: 2022-07-26 | Stop reason: HOSPADM

## 2022-07-23 RX ORDER — SODIUM CHLORIDE 9 MG/ML
20 INJECTION, SOLUTION INTRAVENOUS ONCE
Status: DISCONTINUED | OUTPATIENT
Start: 2022-07-23 | End: 2022-07-26 | Stop reason: HOSPADM

## 2022-07-23 RX ORDER — ONDANSETRON 2 MG/ML
4 INJECTION INTRAMUSCULAR; INTRAVENOUS ONCE AS NEEDED
Status: DISCONTINUED | OUTPATIENT
Start: 2022-07-23 | End: 2022-07-26 | Stop reason: HOSPADM

## 2022-07-23 RX ADMIN — BEBTELOVIMAB 175 MG: 87.5 INJECTION, SOLUTION INTRAVENOUS at 09:04

## 2022-07-23 NOTE — PROGRESS NOTES
Pt here for Bebtelovimab  Pt given EUA and reviewed  This RN saw a picture of the patient's positive test result that she provided  Pt gives verbal consent to proceed with treatment  IV started with no issue  Vital signs stable  Pt resting comfortably and has no further questions or concerns  Pt in view of RN at all times

## 2022-07-23 NOTE — TELEPHONE ENCOUNTER
Cyndy Carrizales NP called this RN  and confirmed that she reviewed MAB with the patient during her virtual visit and informed her of why she would be receiving the medication

## 2022-07-31 NOTE — TELEPHONE ENCOUNTER
Pt called in stating she has been testing positive since 7/22 and tested positive agai today  She wants to know if she should still come to her appt on 8/1  She is requesting a call back

## 2022-09-07 ENCOUNTER — PROCEDURE VISIT (OUTPATIENT)
Dept: NEUROLOGY | Facility: CLINIC | Age: 62
End: 2022-09-07
Payer: COMMERCIAL

## 2022-09-07 VITALS
HEIGHT: 64 IN | WEIGHT: 237 LBS | TEMPERATURE: 97.4 F | DIASTOLIC BLOOD PRESSURE: 59 MMHG | SYSTOLIC BLOOD PRESSURE: 99 MMHG | BODY MASS INDEX: 40.46 KG/M2 | HEART RATE: 67 BPM

## 2022-09-07 DIAGNOSIS — G43.701 CHRONIC MIGRAINE WITHOUT AURA WITH STATUS MIGRAINOSUS, NOT INTRACTABLE: Primary | ICD-10-CM

## 2022-09-07 PROCEDURE — 64615 CHEMODENERV MUSC MIGRAINE: CPT | Performed by: PHYSICIAN ASSISTANT

## 2022-09-07 NOTE — PROGRESS NOTES
Universal Protocol   Consent: Verbal consent obtained  Written consent obtained    Risks and benefits: risks, benefits and alternatives were discussed  Consent given by: patient  Patient understanding: patient states understanding of the procedure being performed  Patient consent: the patient's understanding of the procedure matches consent given  Procedure consent: procedure consent matches procedure scheduled        Chemodenervation     Date/Time 9/7/2022 2:59 PM     Performed by  Les Garcia PA-C     Authorized by Les Garcai PA-C        Pre-procedure details      Prepped With: Alcohol     Procedure details     Position:  Upright   Botox     Botox Type:  Type A    Brand:  Botox    mL's of Botulinum Toxin:  185    Final Concentration per CC:  100 units    Needle Gauge:  30 G 2 5 inch   Procedures     Botox Procedures: chronic headache      Indications: migraines     Injection Location      Head / Face:  L superior trapezius, R superior trapezius, L superior cervical paraspinal, R superior cervical paraspinal, L , R , procerus, L temporalis, R temporalis, R frontalis, L frontalis, R medial occipitalis and L medial occipitalis    L  injection amount:  5 unit(s)    R  injection amount:  5 unit(s)    L lateral frontalis:  5 unit(s)    R lateral frontalis:  5 unit(s)    L medial frontalis:  5 unit(s)    R medial frontalis:  5 unit(s)    L temporalis injection amount:  20 unit(s)    R temporalis injection amount:  20 unit(s)    Procerus injection amount:  5 unit(s)    L medial occipitalis injection amount:  15 unit(s)    R medial occipitalis injection amount:  15 unit(s)    L superior cervical paraspinal injection amount:  10 unit(s)    R superior cervical paraspinal injection amount:  10 unit(s)    L superior trapezius injection amount:  15 unit(s)    R superior trapezius injection amount:  15 unit(s)   Total Units     Total units used:  185    Total units discarded:  15 Post-procedure details      Chemodenervation:  Chronic migraine    Facial Nerve Location[de-identified]  Bilateral facial nerve    Patient tolerance of procedure: Tolerated well, no immediate complications   Comments      Extra units medially necessary:  - 10 LEFT frontotemporal region  - 20 units between both occipitalis muscles       Blood pressure 99/59, pulse 67, temperature (!) 97 4 °F (36 3 °C), temperature source Temporal, height 5' 4" (1 626 m), weight 108 kg (237 lb), not currently breastfeeding  Continues to do well with 4-5 migraines per month, with a combination of botox and emgality  Pt reports >50% reduction of migraines since starting botox  She used to have them daily  Still following with urogyn re: pudendal pain (left side), with history of left ureteral injury during hysterectomy s/p ureteral reimplantation 8/2012  Follows with pain medicine as well

## 2022-10-04 ENCOUNTER — APPOINTMENT (OUTPATIENT)
Dept: LAB | Facility: HOSPITAL | Age: 62
End: 2022-10-04
Payer: COMMERCIAL

## 2022-10-04 DIAGNOSIS — Z00.00 HEALTHCARE MAINTENANCE: ICD-10-CM

## 2022-10-04 LAB
ALBUMIN SERPL BCP-MCNC: 3.3 G/DL (ref 3.5–5)
ALP SERPL-CCNC: 132 U/L (ref 46–116)
ALT SERPL W P-5'-P-CCNC: 18 U/L (ref 12–78)
ANION GAP SERPL CALCULATED.3IONS-SCNC: 5 MMOL/L (ref 4–13)
AST SERPL W P-5'-P-CCNC: 19 U/L (ref 5–45)
BACTERIA UR QL AUTO: ABNORMAL /HPF
BASOPHILS # BLD AUTO: 0.07 THOUSANDS/ΜL (ref 0–0.1)
BASOPHILS NFR BLD AUTO: 1 % (ref 0–1)
BILIRUB SERPL-MCNC: 0.34 MG/DL (ref 0.2–1)
BILIRUB UR QL STRIP: NEGATIVE
BUN SERPL-MCNC: 16 MG/DL (ref 5–25)
CALCIUM ALBUM COR SERPL-MCNC: 9.5 MG/DL (ref 8.3–10.1)
CALCIUM SERPL-MCNC: 8.9 MG/DL (ref 8.3–10.1)
CHLORIDE SERPL-SCNC: 106 MMOL/L (ref 96–108)
CHOLEST SERPL-MCNC: 171 MG/DL
CLARITY UR: CLEAR
CO2 SERPL-SCNC: 30 MMOL/L (ref 21–32)
COLOR UR: ABNORMAL
CREAT SERPL-MCNC: 0.93 MG/DL (ref 0.6–1.3)
EOSINOPHIL # BLD AUTO: 0.36 THOUSAND/ΜL (ref 0–0.61)
EOSINOPHIL NFR BLD AUTO: 6 % (ref 0–6)
ERYTHROCYTE [DISTWIDTH] IN BLOOD BY AUTOMATED COUNT: 14.5 % (ref 11.6–15.1)
EST. AVERAGE GLUCOSE BLD GHB EST-MCNC: 120 MG/DL
GFR SERPL CREATININE-BSD FRML MDRD: 65 ML/MIN/1.73SQ M
GLUCOSE P FAST SERPL-MCNC: 95 MG/DL (ref 65–99)
GLUCOSE UR STRIP-MCNC: NEGATIVE MG/DL
HBA1C MFR BLD: 5.8 %
HCT VFR BLD AUTO: 39.4 % (ref 34.8–46.1)
HDLC SERPL-MCNC: 83 MG/DL
HGB BLD-MCNC: 12.3 G/DL (ref 11.5–15.4)
HGB UR QL STRIP.AUTO: ABNORMAL
IMM GRANULOCYTES # BLD AUTO: 0.01 THOUSAND/UL (ref 0–0.2)
IMM GRANULOCYTES NFR BLD AUTO: 0 % (ref 0–2)
KETONES UR STRIP-MCNC: NEGATIVE MG/DL
LDLC SERPL CALC-MCNC: 73 MG/DL (ref 0–100)
LEUKOCYTE ESTERASE UR QL STRIP: NEGATIVE
LYMPHOCYTES # BLD AUTO: 1.66 THOUSANDS/ΜL (ref 0.6–4.47)
LYMPHOCYTES NFR BLD AUTO: 26 % (ref 14–44)
MCH RBC QN AUTO: 29.1 PG (ref 26.8–34.3)
MCHC RBC AUTO-ENTMCNC: 31.2 G/DL (ref 31.4–37.4)
MCV RBC AUTO: 93 FL (ref 82–98)
MONOCYTES # BLD AUTO: 0.42 THOUSAND/ΜL (ref 0.17–1.22)
MONOCYTES NFR BLD AUTO: 7 % (ref 4–12)
MUCOUS THREADS UR QL AUTO: ABNORMAL
NEUTROPHILS # BLD AUTO: 3.84 THOUSANDS/ΜL (ref 1.85–7.62)
NEUTS SEG NFR BLD AUTO: 60 % (ref 43–75)
NITRITE UR QL STRIP: NEGATIVE
NON-SQ EPI CELLS URNS QL MICRO: ABNORMAL /HPF
NONHDLC SERPL-MCNC: 88 MG/DL
NRBC BLD AUTO-RTO: 0 /100 WBCS
PH UR STRIP.AUTO: 6.5 [PH]
PLATELET # BLD AUTO: 239 THOUSANDS/UL (ref 149–390)
PMV BLD AUTO: 11 FL (ref 8.9–12.7)
POTASSIUM SERPL-SCNC: 4.5 MMOL/L (ref 3.5–5.3)
PROT SERPL-MCNC: 7.1 G/DL (ref 6.4–8.4)
PROT UR STRIP-MCNC: NEGATIVE MG/DL
RBC # BLD AUTO: 4.23 MILLION/UL (ref 3.81–5.12)
RBC #/AREA URNS AUTO: ABNORMAL /HPF
SODIUM SERPL-SCNC: 141 MMOL/L (ref 135–147)
SP GR UR STRIP.AUTO: 1.02 (ref 1–1.03)
TRIGL SERPL-MCNC: 74 MG/DL
TSH SERPL DL<=0.05 MIU/L-ACNC: 2.08 UIU/ML (ref 0.45–4.5)
UROBILINOGEN UR STRIP-ACNC: <2 MG/DL
WBC # BLD AUTO: 6.36 THOUSAND/UL (ref 4.31–10.16)
WBC #/AREA URNS AUTO: ABNORMAL /HPF

## 2022-10-04 PROCEDURE — 85025 COMPLETE CBC W/AUTO DIFF WBC: CPT

## 2022-10-04 PROCEDURE — 84443 ASSAY THYROID STIM HORMONE: CPT

## 2022-10-04 PROCEDURE — 36415 COLL VENOUS BLD VENIPUNCTURE: CPT

## 2022-10-04 PROCEDURE — 80053 COMPREHEN METABOLIC PANEL: CPT

## 2022-10-04 PROCEDURE — 80061 LIPID PANEL: CPT

## 2022-10-04 PROCEDURE — 83036 HEMOGLOBIN GLYCOSYLATED A1C: CPT

## 2022-10-07 ENCOUNTER — TELEMEDICINE (OUTPATIENT)
Dept: NEUROLOGY | Facility: CLINIC | Age: 62
End: 2022-10-07
Payer: COMMERCIAL

## 2022-10-07 DIAGNOSIS — G43.719 INTRACTABLE CHRONIC MIGRAINE WITHOUT AURA AND WITHOUT STATUS MIGRAINOSUS: Primary | ICD-10-CM

## 2022-10-07 PROCEDURE — 99212 OFFICE O/P EST SF 10 MIN: CPT

## 2022-10-07 NOTE — ASSESSMENT & PLAN NOTE
Marianne Hong is a 59 y o  female who is known to the practice for chronic migraine headaches  She was last seen 4/6/22 and receives Botox injections (last 9/7/22)  With Botox and monthly Emgality injections, she has had a reduction of >7 migraine days a month and now requires less abortive medication and has not required an ER visit  Rizatriptan +/- Zofran works as an abortive within 1 hour  She denies any new neurologic complaints today and is satisfied with her current migraine regimen      Plan:  Continue Botox  Continue Emgality  Continue Rizatriptan +/- Zofran prn  Follow up with PCP re: propanolol and hypotension with ocassional lightheadedness/dizziness  Follow up in 6 months with Maci Dennis (as she is preforming Botox injections, unless you would like to transition Botox and follow ups to me)

## 2022-10-07 NOTE — PROGRESS NOTES
Virtual Regular Visit    Verification of patient location:    Patient is located in the following state in which I hold an active license [place]      Assessment/Plan:    Problem List Items Addressed This Visit        Cardiovascular and Mediastinum    Migraine - Primary     Flex Dela Cruz is a 59 y o  female who is known to the practice for chronic migraine headaches  She was last seen 4/6/22 and receives Botox injections (last 9/7/22)  With Botox and monthly Emgality injections, she has had a reduction of >7 migraine days a month and now requires less abortive medication and has not required an ER visit  Rizatriptan +/- Zofran works as an abortive within 1 hour  She denies any new neurologic complaints today and is satisfied with her current migraine regimen  Plan:  Continue Botox  Continue Emgality  Continue Rizatriptan +/- Zofran prn  Follow up with PCP re: propanolol and hypotension with ocassional lightheadedness/dizziness  Follow up in 6 months with Jewelene Buerger (as she is preforming Botox injections, unless you would like to transition Botox and follow ups to me)                        Reason for visit is   Chief Complaint   Patient presents with    Virtual Regular Visit        Encounter provider Christian Negron, [facility]    Provider located at [address] [place] [address] [address] [facility]      Recent Visits  No visits were found meeting these conditions  Showing recent visits within past 7 days and meeting all other requirements  Today's Visits  Date Type Provider Dept   10/07/22 [facility]icine [facility] [address] [place] [address] Drive today's visits and meeting all other requirements  Future Appointments  No visits were found meeting these conditions  Showing future appointments within next 150 days and meeting all other requirements       The patient was identified by name and date of birth   Flex Dela Cruz was informed that this is a telemedicine visit and that the visit is being conducted through [facility] and patient was informed this is a secure, HIPAA-complaint platform  She agrees to proceed     My office door was closed  No one else was in the room  She acknowledged consent and understanding of privacy and security of the video platform  The patient has agreed to participate and understands they can discontinue the visit at any time  Patient is aware this is a billable service  Subjective  Yomaira Alvarado is a 59 y o  female who is known to the practice for chronic migraine headaches  She was last seen 4/6/22 and receives Botox injections (last 9/7/22)  With Botox she has had a reduction of >7 migraine days a month and now requires less abortive medication and has not required an ER visit  On average she now has migraine once a week or 3-4 times per month, compared to her prior frequency of daily migraines >25 per month  She continues on Emgality and denies any side effects or injection site reactions  She has not used Cyproheptadine  She continues on Propanolol 60 mg bid prescribed by her PCP however she tells me recently her BP has been running low and she ocasionally is experiencing lightheadedness and dizziness, as such she plans to discuss potentially lowering her dose with her PCP at an upcoming appt  She states Rizatriptan works well to abort her migraine  From onset it tends to work within 1 hour and sometimes combines it with Zofran for nausea  She is hydrating with at least 2 liters of decaffienated ice tea daily       How often do the headaches occur? Currently 1 time per week  Prior to the botox and Emgality it was nearly daily     Are you ever headache free? Yes     Aura/Warning and how long does it last?  No aura  Warning signing - intense nausea     Certain time of the day? Mid-afternoon       How long do the headaches last?   The rest of the day       Where is your headache located?    Bifrontal L>R and pain in eye      Describe your usual headache? Throbbing, pressure     What is the intensity of pain? Mild headaches: 3-4/10  Moderate to severe headaches: 6-7/10     Associated symptoms:   []? Decreased appetite  [x]? Nausea   []? Vomiting   []? Diarrhea  [x]? Photophobia   [x]? Phonophobia       []? Osmophobia  []? Lacrimation   [x]? rhinorrhea    [x]? Flushing of face   [x]? Stiff or sore neck   [x]? Dizziness  [x]? light headed  [x]? Problems with concentration   []? Blurred vision   []? Change in pupil size     []? Ptosis  []? Facial droop     []? Hands or feet tingle or feel numb/paresthesias  []? Tinnitus   []? Insomnia  []? Worse with lying down  [x]? better with lying down  [x]? Prefer to be in a cool, quiet, dark room    Headache triggers:      - decaf iced coffee with dairy cream     Alternative therapies used in the past for headaches? []? Daith piercing  []? Massage  []? physical therapy  []? Acupuncture  []? Acupressure []? Chiropractor  []? Yoga  []?biofeedback  []? TPI  [x]? Botox  []? Epidural injections  [x]? CBD/THC -       Sleep: How many hours do you sleep a night on average? 10 hours  Do you feel well rested when you wake up? No   Any history of CATHRYN? Yes, wears CPAP machine       Mood:  Any history of anxiety or depression? Yes especially in the beginning of COVID period  It is now much better  Do you follow with psychology or psychiatry? Was following with counseling just recently stopped     Dietary:  Not following a particular diet   No skipping meals     HPI     Past Medical History:   Diagnosis Date    Anxiety     Arthritis     Attention and concentration deficit     Blurred vision     Chronic pain     Chronic pain syndrome 1/20/2016    CPAP (continuous positive airway pressure) dependence     Depression     Diplopia     Dyspepsia     Gastric ulcer     Gastritis     GERD (gastroesophageal reflux disease)     History of transfusion 2005    Hypertension     Insomnia     Irritable bowel syndrome     Memory loss     Migraines     Osteopenia     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    Presence of neurostimulator     Pudendal neuralgia    Pudendal neuralgia     Sleep apnea     Spinal stenosis     Starting and stopping of urinary stream during micturition     Urinary incontinence     Wears eyeglasses        Past Surgical History:   Procedure Laterality Date     SECTION  1988   [place] GASTRIC BYPASS  2004    HYSTERECTOMY  2012    INSERT / REPLACE PERIPHERAL NEUROSTIMULATOR PULSE GENERATOR /       MASS EXCISION Right 2021    Procedure: EXCISION  BIOPSY LESION/MASS LOWER NECK;  Surgeon: Shaheed Browning DO;  Location: [facility];  Service: General    OTHER SURGICAL HISTORY      Reimplantatin at [facility] NEUROSTIM/ Right 4/15/2020    Procedure: REPLACEMENT IMPLANTABLE PULSE GENERATOR FOR DORSAL SPINAL COLUMN STIMULATOR,  RIGHT BUTTOCKS;  Surgeon: Carlitos Dickinson MD;  [facility]: [facility];  Service: Neurosurgery    [address] URE[address]A REPAIR      US GUIDED THYROID BIOPSY  2020    WRIST ARTHROSCOPY      with internal fixation        Current Outpatient Medications   Medication Sig Dispense Refill    Botox 200 units SOLR       busPIRone (BUSPAR) 10 mg tablet Take 1 tablet (10 mg total) by mouth 2 (two) times a day 180 tablet 1    Cholecalciferol 25 MCG (1000 UT) capsule Take 1 capsule by mouth daily        dronabinol (MARINOL) 5 MG capsule Take 1 capsule (5 mg total) by mouth 2 (two) times a day before meals By Dr Mary Salgado 60 capsule 0    Emgality 120 MG/ML SOAJ INJECT 120 MG UNDER THE SKIN EVERY 30 DAYS 3 mL 3    gabapentin (NEURONTIN) 300 mg capsule Take 1 capsule (300 mg total) by mouth 2 (two) times a day 180 capsule 1    Iron-Vitamin C 100-250 MG TABS Take by mouth daily      lidocaine (XYLOCAINE) 5 % ointment Apply topically 2 (two) times a day as needed for moderate pain 35 44 g 1    lisinopril (ZESTRIL) 10 mg tablet TAKE 1 TABLET DAILY 90 tablet 3    mometasone (ELOCON) 0 1 % cream APPLY TOPICALLY DAILY 45 g 7    Multiple Vitamin (MULTI-VITAMIN DAILY) TABS Take 1 tablet by mouth daily      ondansetron (ZOFRAN-ODT) 4 mg disintegrating tablet Take 1 tablet (4 mg total) by mouth every 6 (six) hours as needed for nausea or vomiting 30 tablet 5    pantoprazole (PROTONIX) 40 mg tablet TAKE 1 TABLET TWICE A  tablet 3    propranolol (INDERAL) 60 mg tablet TAKE 1 TABLET TWICE A  tablet 3    QUEtiapine (SEROquel) 50 mg tablet Take 1 tablet (50 mg total) by mouth daily at bedtime 90 tablet 1    rizatriptan (MAXALT-MLT) 10 MG disintegrating tablet PLACE 1 TABLET ON TONGUE ONCE AS NEEDED FOR MIGRAINE MAY REPEAT EVERY 2 HOURS IF NEEDED, MAXIMUM 30 MG IN 24 HOURS 12 tablet 23    rosuvastatin (CRESTOR) 10 MG tablet TAKE 1 TABLET DAILY 90 tablet 3    sertraline (ZOLOFT) 100 mg tablet Take 1 tablet (100 mg total) by mouth daily 90 tablet 1    cyproheptadine (PERIACTIN) 4 mg tablet 1/2- 1 tab qhs for headaches or prior to flight (Patient not taking: Reported on 10/7/2022) 30 tablet 0    meloxicam (Mobic) 15 mg tablet Take 1 tablet (15 mg total) by mouth daily (Patient not taking: No sig reported) 90 tablet 0     No current facility-administered medications for this visit  Allergies   Allergen Reactions    Morphine Abdominal Pain     SEVERE N/V    Cat Hair Extract Nasal Congestion     Cat Dander    Dog Epithelium Nasal Congestion    Other Other (See Comments)     Dogs- sneezing  Crab Meat- GI intolerance       Review of Systems   Constitutional: Negative  Negative for appetite change and fever  HENT: Negative  Negative for hearing loss, tinnitus, trouble swallowing and voice change  Eyes: Negative for photophobia, pain and visual disturbance     Respiratory: Negative  Negative for shortness of breath  Cardiovascular: Negative  Negative for palpitations  Gastrointestinal: Negative  Negative for nausea and vomiting  Endocrine: Negative  Negative for cold intolerance  Genitourinary: Negative  Negative for dysuria, frequency and urgency  Musculoskeletal: Positive for neck pain (occasionally) and neck stiffness (occassionally)  Negative for gait problem and myalgias  Skin: Negative  Negative for rash  Neurological: Positive for weakness, numbness and headaches  Negative for dizziness, tremors, seizures, syncope, facial asymmetry, speech difficulty and light-headedness  Hematological: Negative  Does not bruise/bleed easily  Psychiatric/Behavioral: Negative  Negative for confusion, hallucinations and sleep disturbance  All other systems reviewed and are negative  Reviewed ROS as entered by medical assistant  Video Exam    There were no vitals filed for this visit  Physical Exam     On neurological examination the patient was awake, alert, attentive, oriented to person, place, and time  Recent and remote memory intact to conversation with no evidence of language dysfunction  Satisfactory fund of knowledge  Normal attention span and concentration  Mood, affect and judgement are appropriate  Speech is fluent without dysarthria or aphasia  Face appears symmetric, with no obvious weakness noted  Audition is intact to casual conversation  Eye movements are intact  Tongue is midline  Able to move bilateral upper extremities antigravity without difficulty          I spent 12 minutes directly with the patient during this visit

## 2022-10-11 ENCOUNTER — OFFICE VISIT (OUTPATIENT)
Dept: FAMILY MEDICINE CLINIC | Facility: CLINIC | Age: 62
End: 2022-10-11
Payer: COMMERCIAL

## 2022-10-11 VITALS
HEART RATE: 70 BPM | OXYGEN SATURATION: 96 % | SYSTOLIC BLOOD PRESSURE: 118 MMHG | DIASTOLIC BLOOD PRESSURE: 80 MMHG | BODY MASS INDEX: 40.02 KG/M2 | HEIGHT: 64 IN | WEIGHT: 234.4 LBS | TEMPERATURE: 98.5 F

## 2022-10-11 DIAGNOSIS — G58.8 PUDENDAL NEURALGIA: ICD-10-CM

## 2022-10-11 DIAGNOSIS — N39.45 CONTINUOUS LEAKAGE OF URINE: ICD-10-CM

## 2022-10-11 DIAGNOSIS — Z96.82 PRESENCE OF NEUROSTIMULATOR: ICD-10-CM

## 2022-10-11 DIAGNOSIS — R31.9 HEMATURIA, UNSPECIFIED TYPE: ICD-10-CM

## 2022-10-11 DIAGNOSIS — M54.50 ACUTE LEFT-SIDED LOW BACK PAIN, UNSPECIFIED WHETHER SCIATICA PRESENT: ICD-10-CM

## 2022-10-11 DIAGNOSIS — G43.719 INTRACTABLE CHRONIC MIGRAINE WITHOUT AURA AND WITHOUT STATUS MIGRAINOSUS: ICD-10-CM

## 2022-10-11 DIAGNOSIS — R73.03 PREDIABETES: ICD-10-CM

## 2022-10-11 DIAGNOSIS — I10 ESSENTIAL HYPERTENSION: ICD-10-CM

## 2022-10-11 DIAGNOSIS — Z23 INFLUENZA VACCINE ADMINISTERED: Primary | ICD-10-CM

## 2022-10-11 DIAGNOSIS — G89.4 CHRONIC PAIN SYNDROME: ICD-10-CM

## 2022-10-11 DIAGNOSIS — Z00.00 HEALTHCARE MAINTENANCE: ICD-10-CM

## 2022-10-11 DIAGNOSIS — E78.2 MIXED HYPERLIPIDEMIA: ICD-10-CM

## 2022-10-11 DIAGNOSIS — K21.9 GASTROESOPHAGEAL REFLUX DISEASE WITHOUT ESOPHAGITIS: ICD-10-CM

## 2022-10-11 DIAGNOSIS — I10 PRIMARY HYPERTENSION: ICD-10-CM

## 2022-10-11 PROCEDURE — 90471 IMMUNIZATION ADMIN: CPT | Performed by: NURSE PRACTITIONER

## 2022-10-11 PROCEDURE — 99214 OFFICE O/P EST MOD 30 MIN: CPT | Performed by: NURSE PRACTITIONER

## 2022-10-11 PROCEDURE — 90682 RIV4 VACC RECOMBINANT DNA IM: CPT | Performed by: NURSE PRACTITIONER

## 2022-10-11 RX ORDER — LISINOPRIL 5 MG/1
5 TABLET ORAL DAILY
Qty: 90 TABLET | Refills: 3 | Status: SHIPPED | OUTPATIENT
Start: 2022-10-11

## 2022-10-11 NOTE — ASSESSMENT & PLAN NOTE
Will obtain lipid panel prior to next visit  Lipid panel currently stable  Continue rosuvastatin daily

## 2022-10-11 NOTE — ASSESSMENT & PLAN NOTE
Patient follows closely with Neurology and receives Botox injections for her migraines  She does feel like these help

## 2022-10-11 NOTE — PROGRESS NOTES
Assessment/Plan:     Chronic Problems:  Gastroesophageal reflux disease  Continue pantoprazole daily  Hypertension  Decrease lisinopril to 5 mg daily  Continue propanolol as she is also on this for migraine prevention  Advised to monitor blood pressures at home and let me know if they continue to drop  Migraine  Patient follows closely with Neurology and receives Botox injections for her migraines  She does feel like these help  Mixed hyperlipidemia  Will obtain lipid panel prior to next visit  Lipid panel currently stable  Continue rosuvastatin daily  Chronic pain syndrome  Given referral for pain management  Patient does have a medical marijuana card and feels like this helps with her pain  Hematuria  Chronic since hysterectomy  Visit Diagnosis:  Diagnoses and all orders for this visit:    Influenza vaccine administered  -     FLUBLOK: influenza vaccine, quadrivalent, recombinant, PF, 0 5 mL    Essential hypertension  -     lisinopril (ZESTRIL) 5 mg tablet; Take 1 tablet (5 mg total) by mouth daily    Chronic pain syndrome  -     Ambulatory Referral to Pain Management; Future    Pudendal neuralgia  -     Ambulatory Referral to Pain Management; Future    Mixed hyperlipidemia  -     Lipid panel; Future    Prediabetes  -     Hemoglobin A1C; Future    Healthcare maintenance  -     CBC and differential; Future  -     Comprehensive metabolic panel; Future  -     TSH, 3rd generation with Free T4 reflex; Future    Gastroesophageal reflux disease without esophagitis    Primary hypertension    Intractable chronic migraine without aura and without status migrainosus    Continuous leakage of urine    Hematuria, unspecified type    Acute left-sided low back pain, unspecified whether sciatica present    Presence of neurostimulator          Subjective:    Patient ID: Yolette Kirkland is a 59 y o  female  Patient presents for follow up   Continues with burning/dagger pain in the groin s/p hysterecomty in 2012 due to pudendal neuralgia  She was seeing Dr Ynes Hughes for pain management and found the injections successful  She now has a medical marijuana card which she feels is helpful for pain  She has a neurostimulator in her back  Went to Dr Mary Michelle [facility] for her pudendal neuralgia in the past  Wondering if BP is too low sometimes, she does experience some lightheadedness/dizziness  The following portions of the patient's history were reviewed and updated as appropriate: allergies, current medications, past family history, past medical history, past social history, past surgical history and problem list     Review of Systems   Constitutional: Negative for chills and fever  HENT: Negative for ear pain and sore throat  Eyes: Negative for pain and visual disturbance  Respiratory: Negative for cough and shortness of breath  Cardiovascular: Negative for chest pain and palpitations  Gastrointestinal: Negative for abdominal pain, constipation, diarrhea and vomiting  Genitourinary: Positive for difficulty urinating  Negative for dysuria and hematuria  Musculoskeletal: Positive for arthralgias and back pain  Skin: Positive for rash  Negative for color change  Neurological: Positive for numbness and headaches  Negative for seizures and syncope  Psychiatric/Behavioral: Negative for dysphoric mood  The patient is not nervous/anxious  All other systems reviewed and are negative          /80 (BP Location: Left arm, Patient Position: Sitting)   Pulse 70   Temp 98 5 °F (36 9 °C) (Tympanic)   Ht 5' 4" (1 626 m)   Wt 106 kg (234 lb 6 4 oz)   SpO2 96%   BMI 40 23 kg/m²   Social History     Socioeconomic History   • Marital status: /Civil Union     Spouse name: Not on file   • Number of children: Not on file   • Years of education: Not on file   • Highest education level: Not on file   Occupational History   • Occupation: retired   Tobacco Use   • Smoking status: Never Smoker   • Smokeless tobacco: Never Used   Vaping Use   • Vaping Use: Never used   Substance and Sexual Activity   • Alcohol use: No   • Drug use: Yes     Types: Marijuana     Comment: medical marijuana 2x a day capsules   • Sexual activity: Yes   Other Topics Concern   • Not on file   Social History Narrative    Lives in [place], with   Previously worked as a teacher        Social Determinants of Health     Financial Resource Strain: Not on file   Food Insecurity: Not on file   Transportation Needs: Not on file   Physical Activity: Not on file   Stress: Not on file   Social Connections: Not on file   Intimate Partner Violence: Not on file   Housing Stability: Not on file     Past Medical History:   Diagnosis Date   • Anxiety    • Arthritis    • Attention and concentration deficit    • Blurred vision    • Chronic pain    • Chronic pain syndrome 1/20/2016   • CPAP (continuous positive airway pressure) dependence    • Depression    • Diplopia    • Dyspepsia    • Gastric ulcer    • Gastritis    • GERD (gastroesophageal reflux disease)    • History of transfusion 2005   • Hypertension    • Insomnia    • Irritable bowel syndrome    • Memory loss    • Migraines    • Osteopenia    • 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   • Presence of neurostimulator     Pudendal neuralgia   • Pudendal neuralgia    • Sleep apnea    • Spinal stenosis    • Starting and stopping of urinary stream during micturition    • Urinary incontinence    • Wears eyeglasses      Family History   Problem Relation Age of Onset   • Other Mother         Back Disorder    • Cirrhosis Mother    • Crohn's disease Mother    • Lupus Mother         Systemic Lupus Erythematous    • Hypertension Father    • Heart disease Father    • Other Brother         Liver Transplant    • Crohn's disease Brother    • Crohn's disease Brother    • No Known Problems Sister    • No Known Problems Daughter    • No Known Problems Maternal Aunt    • No Known Problems Paternal Aunt    • No Known Problems Cousin    • No Known Problems Cousin    • No Known Problems Cousin    • No Known Problems Cousin    • No Known Problems Cousin    • Seizures Neg Hx    • Breast cancer Neg Hx      Past Surgical History:   Procedure Laterality Date   •  SECTION     •  SECTION     • CHOLECYSTECTOMY     • Andres Taylor   • GASTRIC BYPASS  2004   • HYSTERECTOMY  2012   • INSERT / REPLACE PERIPHERAL NEUROSTIMULATOR PULSE GENERATOR /      • MASS EXCISION Right 2021    Procedure: EXCISION  BIOPSY LESION/MASS LOWER NECK;  Surgeon: Cynthia Gleason DO;  Location: [facility];  Service: General   • OTHER SURGICAL HISTORY  2012    [facility]    • ID IMPLANT SPINAL NEUROSTIM/ Right 4/15/2020    Procedure: REPLACEMENT IMPLANTABLE PULSE GENERATOR FOR DORSAL SPINAL COLUMN STIMULATOR,  RIGHT BUTTOCKS;  Surgeon: Claudia Burdick MD;  Location: [facility];  Service: Neurosurgery   • RADIOFREQUENCY ABLATION NERVES     • URETER SURGERY     • URETHRAL FISTULA REPAIR     • US GUIDED THYROID BIOPSY  2020   • WRIST ARTHROSCOPY      with internal fixation        Current Outpatient Medications:   •  Botox 200 units SOLR, , Disp: , Rfl:   •  busPIRone (BUSPAR) 10 mg tablet, Take 1 tablet (10 mg total) by mouth 2 (two) times a day, Disp: 180 tablet, Rfl: 1  •  Cholecalciferol 25 MCG (1000 UT) capsule, Take 1 capsule by mouth daily  , Disp: , Rfl:   •  dronabinol (MARINOL) 5 MG capsule, Take 1 capsule (5 mg total) by mouth 2 (two) times a day before meals By Dr Kimi Huitron, Disp: 60 capsule, Rfl: 0  •  Emgality 120 MG/ML SOAJ, INJECT 120 MG UNDER THE SKIN EVERY 30 DAYS, Disp: 3 mL, Rfl: 3  •  gabapentin (NEURONTIN) 300 mg capsule, Take 1 capsule (300 mg total) by mouth 2 (two) times a day, Disp: 180 capsule, Rfl: 1  •  Iron-Vitamin C 100-250 MG TABS, Take by mouth daily, Disp: , Rfl:   •  lidocaine (XYLOCAINE) 5 % ointment, Apply topically 2 (two) times a day as needed for moderate pain, Disp: 35 44 g, Rfl: 1  •  lisinopril (ZESTRIL) 5 mg tablet, Take 1 tablet (5 mg total) by mouth daily, Disp: 90 tablet, Rfl: 3  •  mometasone (ELOCON) 0 1 % cream, APPLY TOPICALLY DAILY, Disp: 45 g, Rfl: 7  •  Multiple Vitamin (MULTI-VITAMIN DAILY) TABS, Take 1 tablet by mouth daily, Disp: , Rfl:   •  ondansetron (ZOFRAN-ODT) 4 mg disintegrating tablet, Take 1 tablet (4 mg total) by mouth every 6 (six) hours as needed for nausea or vomiting, Disp: 30 tablet, Rfl: 5  •  pantoprazole (PROTONIX) 40 mg tablet, TAKE 1 TABLET TWICE A DAY, Disp: 180 tablet, Rfl: 3  •  propranolol (INDERAL) 60 mg tablet, TAKE 1 TABLET TWICE A DAY, Disp: 180 tablet, Rfl: 3  •  QUEtiapine (SEROquel) 50 mg tablet, Take 1 tablet (50 mg total) by mouth daily at bedtime, Disp: 90 tablet, Rfl: 1  •  rizatriptan (MAXALT-MLT) 10 MG disintegrating tablet, PLACE 1 TABLET ON TONGUE ONCE AS NEEDED FOR MIGRAINE MAY REPEAT EVERY 2 HOURS IF NEEDED, MAXIMUM 30 MG IN 24 HOURS, Disp: 12 tablet, Rfl: 23  •  rosuvastatin (CRESTOR) 10 MG tablet, TAKE 1 TABLET DAILY, Disp: 90 tablet, Rfl: 3  •  sertraline (ZOLOFT) 100 mg tablet, Take 1 tablet (100 mg total) by mouth daily, Disp: 90 tablet, Rfl: 1    Allergies   Allergen Reactions   • Morphine Abdominal Pain     SEVERE N/V   • Cat Hair Extract Nasal Congestion     Cat Dander   • Dog Epithelium Nasal Congestion   • Other Other (See Comments)     Dogs- sneezing  Crab Meat- GI intolerance          Lab Review   Appointment on 10/04/2022   Component Date Value   • WBC 10/04/2022 6 36    • RBC 10/04/2022 4 23    • Hemoglobin 10/04/2022 12 3    • Hematocrit 10/04/2022 39 4    • MCV 10/04/2022 93    • MCH 10/04/2022 29 1    • MCHC 10/04/2022 31 2 (A)   • RDW 10/04/2022 14 5    • MPV 10/04/2022 11 0    • Platelets 23/50/3033 239    • nRBC 10/04/2022 0    • Neutrophils Relative 10/04/2022 60    • Immat GRANS % 10/04/2022 0    • Lymphocytes Relative 10/04/2022 26    • Monocytes Relative 10/04/2022 7    • Eosinophils Relative 10/04/2022 6    • Basophils Relative 10/04/2022 1    • Neutrophils Absolute 10/04/2022 3 84    • Immature Grans Absolute 10/04/2022 0 01    • Lymphocytes Absolute 10/04/2022 1 66    • Monocytes Absolute 10/04/2022 0 42    • Eosinophils Absolute 10/04/2022 0 36    • Basophils Absolute 10/04/2022 0 07    • Sodium 10/04/2022 141    • Potassium 10/04/2022 4 5    • Chloride 10/04/2022 106    • CO2 10/04/2022 30    • ANION GAP 10/04/2022 5    • BUN 10/04/2022 16    • Creatinine 10/04/2022 0 93    • Glucose, Fasting 10/04/2022 95    • Calcium 10/04/2022 8 9    • Corrected Calcium 10/04/2022 9 5    • AST 10/04/2022 19    • ALT 10/04/2022 18    • Alkaline Phosphatase 10/04/2022 132 (A)   • Total Protein 10/04/2022 7 1    • Albumin 10/04/2022 3 3 (A)   • Total Bilirubin 10/04/2022 0 34    • eGFR 10/04/2022 65    • Hemoglobin A1C 10/04/2022 5 8 (A)   • EAG 10/04/2022 120    • Cholesterol 10/04/2022 171    • Triglycerides 10/04/2022 74    • HDL, Direct 10/04/2022 83    • LDL Calculated 10/04/2022 73    • Non-HDL-Chol (CHOL-HDL) 10/04/2022 88    • TSH 3RD GENERATON 10/04/2022 2 082         Imaging: No results found  Objective:     Physical Exam  Constitutional:       Appearance: She is well-developed  She is obese  Cardiovascular:      Rate and Rhythm: Normal rate and regular rhythm  Heart sounds: Normal heart sounds  No murmur heard  Pulmonary:      Effort: Pulmonary effort is normal  No respiratory distress  Breath sounds: Normal breath sounds  Musculoskeletal:      Comments: Limited movement due to pain     Skin:     General: Skin is warm and dry  Neurological:      Mental Status: She is alert and oriented to person, place, and time  There are no Patient Instructions on file for this visit  GWEN Black    Portions of the record may have been created with voice recognition software    Occasional wrong word or "sound a like" substitutions may have occurred due to the inherent limitations of voice recognition software  Read the chart carefully and recognize, using context, where substitutions have occurred

## 2022-10-11 NOTE — ASSESSMENT & PLAN NOTE
Given referral for pain management  Patient does have a medical marijuana card and feels like this helps with her pain

## 2022-10-11 NOTE — ASSESSMENT & PLAN NOTE
Decrease lisinopril to 5 mg daily  Continue propanolol as she is also on this for migraine prevention  Advised to monitor blood pressures at home and let me know if they continue to drop

## 2022-10-19 ENCOUNTER — TELEPHONE (OUTPATIENT)
Dept: PAIN MEDICINE | Facility: CLINIC | Age: 62
End: 2022-10-19

## 2022-10-19 NOTE — TELEPHONE ENCOUNTER
Pt  [facility]  in for ov  under the impression that her care was transferred to Dr Judie Cowden  Looks like GERTRUDIS wasn't completed  Dr Nolberto Manning is okay with GERTRUDIS and wants to make sure pt won't be charged for todays visit  Dr Nolberto Manning   Will you transfer pt? Dr Judie Cowden   Will you accept this patient? Please advise on scheduling
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
## 2022-10-20 ENCOUNTER — TELEMEDICINE (OUTPATIENT)
Dept: PSYCHIATRY | Facility: CLINIC | Age: 62
End: 2022-10-20
Payer: COMMERCIAL

## 2022-10-20 ENCOUNTER — DOCUMENTATION (OUTPATIENT)
Dept: PAIN MEDICINE | Facility: CLINIC | Age: 62
End: 2022-10-20

## 2022-10-20 DIAGNOSIS — M79.2 NEURALGIA: ICD-10-CM

## 2022-10-20 DIAGNOSIS — R51.9 NONINTRACTABLE HEADACHE, UNSPECIFIED CHRONICITY PATTERN, UNSPECIFIED HEADACHE TYPE: ICD-10-CM

## 2022-10-20 DIAGNOSIS — F33.1 MODERATE EPISODE OF RECURRENT MAJOR DEPRESSIVE DISORDER (HCC): Primary | ICD-10-CM

## 2022-10-20 DIAGNOSIS — F41.1 GENERALIZED ANXIETY DISORDER WITH PANIC ATTACKS: ICD-10-CM

## 2022-10-20 DIAGNOSIS — F41.0 GENERALIZED ANXIETY DISORDER WITH PANIC ATTACKS: ICD-10-CM

## 2022-10-20 PROCEDURE — 90833 PSYTX W PT W E/M 30 MIN: CPT | Performed by: STUDENT IN AN ORGANIZED HEALTH CARE EDUCATION/TRAINING PROGRAM

## 2022-10-20 PROCEDURE — 99213 OFFICE O/P EST LOW 20 MIN: CPT | Performed by: STUDENT IN AN ORGANIZED HEALTH CARE EDUCATION/TRAINING PROGRAM

## 2022-10-20 RX ORDER — QUETIAPINE FUMARATE 50 MG/1
50 TABLET, FILM COATED ORAL
Qty: 90 TABLET | Refills: 1 | Status: SHIPPED | OUTPATIENT
Start: 2022-10-20 | End: 2023-04-18

## 2022-10-20 RX ORDER — GABAPENTIN 300 MG/1
300 CAPSULE ORAL 2 TIMES DAILY
Qty: 180 CAPSULE | Refills: 1 | Status: SHIPPED | OUTPATIENT
Start: 2022-10-20 | End: 2023-04-18

## 2022-10-20 RX ORDER — SERTRALINE HYDROCHLORIDE 100 MG/1
100 TABLET, FILM COATED ORAL DAILY
Qty: 90 TABLET | Refills: 1 | Status: SHIPPED | OUTPATIENT
Start: 2022-10-20 | End: 2023-04-18

## 2022-10-20 RX ORDER — BUSPIRONE HYDROCHLORIDE 10 MG/1
10 TABLET ORAL 2 TIMES DAILY
Qty: 180 TABLET | Refills: 1 | Status: SHIPPED | OUTPATIENT
Start: 2022-10-20 | End: 2023-04-18

## 2022-10-20 NOTE — BH TREATMENT PLAN
Treatment Plan done but not signed at time of office visit due to:  Plan reviewed with the patient during the virtual visit and verbal consent given  TREATMENT PLAN (Medication Management Only)        [facility]    Name and Date of Birth:  Krystal Larsen 59 y o  1960  Date of Treatment Plan: October 20, 2022  Diagnosis/Diagnoses:    1  Moderate episode of recurrent major depressive disorder ([address] )    2  Generalized anxiety disorder with panic attacks    3  Nonintractable headache, unspecified chronicity pattern, unspecified headache type    4  Neuralgia      Strengths/Personal Resources for Self-Care: supportive family  Area/Areas of need (in own words): anxiety symptoms, depression  1  Long Term Goal: maintain control of depression and anxiety sxs  Target Date:6 months - 4/20/2023  Person/Persons responsible for completion of goal: Quinn Barba  2  Short Term Objective (s) - How will we reach this goal?:   A  Provider new recommended medication/dosage changes and/or continue medication(s): continue current medications as prescribed  B  N/A   C  N/A  Target Date:6 months - 4/20/2023  Person/Persons Responsible for Completion of Goal: Quinn Barba  Progress Towards Goals: continuing treatment  Treatment Modality: medication management every 6 months  Review due 180 days from date of this plan: 6 months - 4/20/2023  Expected length of service: maintenance  My Physician/PA/NP and I have developed this plan together and I agree to work on the goals and objectives  I understand the treatment goals that were developed for my treatment

## 2022-11-08 ENCOUNTER — OFFICE VISIT (OUTPATIENT)
Dept: PAIN MEDICINE | Facility: CLINIC | Age: 62
End: 2022-11-08

## 2022-11-08 VITALS
BODY MASS INDEX: 40.75 KG/M2 | HEART RATE: 59 BPM | WEIGHT: 237.4 LBS | DIASTOLIC BLOOD PRESSURE: 66 MMHG | SYSTOLIC BLOOD PRESSURE: 95 MMHG

## 2022-11-08 DIAGNOSIS — M54.42 CHRONIC LEFT-SIDED LOW BACK PAIN WITH LEFT-SIDED SCIATICA: ICD-10-CM

## 2022-11-08 DIAGNOSIS — G89.4 CHRONIC PAIN SYNDROME: Primary | ICD-10-CM

## 2022-11-08 DIAGNOSIS — G89.29 CHRONIC LEFT-SIDED LOW BACK PAIN WITH LEFT-SIDED SCIATICA: ICD-10-CM

## 2022-11-08 DIAGNOSIS — M54.16 LUMBAR RADICULOPATHY: ICD-10-CM

## 2022-11-08 DIAGNOSIS — M48.061 SPINAL STENOSIS OF LUMBAR REGION WITHOUT NEUROGENIC CLAUDICATION: ICD-10-CM

## 2022-11-08 NOTE — H&P (VIEW-ONLY)
Pain Medicine Follow-Up Note    Assessment:  1  Chronic pain syndrome    2  Chronic left-sided low back pain with left-sided sciatica    3  Lumbar radiculopathy    4  Spinal stenosis of lumbar region without neurogenic claudication        Plan:  Orders Placed This Encounter   Procedures   • FL spine and pain procedure     Four-week follow-up after injection     Standing Status:   Future     Standing Expiration Date:   11/8/2026     Order Specific Question:   Reason for Exam:     Answer:   Left L2 and L3 transforaminal epidural steroid injection     Order Specific Question:   Anticoagulant hold needed? Answer:   Unknown     My impressions and treatment recommendations were discussed in detail with the patient who verbalized understanding and had no further questions  Given that the patient has signs and symptoms of low back pain and left lower extremity radiculopathy and what appears to be the left L2 and L3 distribution in the context of lumbar spinal stenosis, I discussed the rationale of undergoing left L2 and L3 transforaminal epidural steroid injections since this could be potentially therapeutic  The procedures, its risks, and benefits were explained in detail to the patient  Risks include but are not limited to bleeding, infection, hematoma formation, abscess formation, weakness, headache, failure the pain to improve, nerve irritation or damage, and potential worsening of the pain  The patient verbalized understanding and wished to proceed with the procedure  Follow-up is planned in 4 weeks time or sooner as warranted  Discharge instructions were provided  I personally saw and examined the patient and I agree with the above discussed plan of care  History of Present Illness:    Santhosh Henry is a 59 y o  female who presents to [facility] for interval re-evaluation of the above stated pain complaints   The patient has a past medical and chronic pain history as outlined in the assessment section  She was last seen on September 30, 2020  At today's office visit, the patient's pain score is 5/10 on the verbal numerical pain rating scale  The patient states that her symptoms are primarily in the low back and left lower extremity in what appears to be the left L2 and L3 distribution  She describes her pain as worse in the evening and night  Her pain is constant in nature  She reports the quality of her pain as burning in the groin, dull/aching, sharp, throbbing, and numbness  She is interested in undergoing an interventional pain procedure  She previously had excellent pain relief with transforaminal epidural steroid injections and would like to undergo repeat transforaminal epidural steroid injection at this time  Other than as stated above, the patient denies any interval changes in medications, medical condition, mental condition, symptoms, or allergies since the last office visit  Review of Systems:    Review of Systems   Respiratory: Negative for shortness of breath  Cardiovascular: Negative for chest pain  Gastrointestinal: Negative for constipation, diarrhea, nausea and vomiting  Musculoskeletal: Negative for arthralgias, gait problem, joint swelling and myalgias  Pain in left thigh   Skin: Negative for rash  Neurological: Negative for dizziness, seizures and weakness  All other systems reviewed and are negative          Patient Active Problem List   Diagnosis   • Continuous leakage of urine   • Mixed hyperlipidemia   • Chronic pain syndrome   • Dysesthesia of multiple sites   • Eczema   • Hematuria   • Hypertension   • Arthralgia of shoulder region, left   • Limb pain   • Lower back pain   • Lumbar facet arthropathy   • CATHRYN (obstructive sleep apnea)   • Postgastrectomy malabsorption   • Pudendal neuralgia   • Gastroesophageal reflux disease   • Iron deficiency anemia   • Osteoarthritis of left shoulder   • Sacroiliitis (HCC)   • Lumbar radiculopathy   • Mild cognitive impairment with memory loss   • Insomnia   • Migraine   • Positive FIT (fecal immunochemical test)   • Hypovitaminosis D   • Lipoma of neck   • Generalized anxiety disorder with panic attacks   • Moderate episode of recurrent major depressive disorder ([address] )   • Obesity, morbid ([facility] [address] )   • Presence of neurostimulator   • COVID-19       Past Medical History:   Diagnosis Date   • Anxiety    • Arthritis    • Attention and concentration deficit    • Blurred vision    • Chronic pain    • Chronic pain syndrome 2016   • CPAP (continuous positive airway pressure) dependence    • Depression    • Diplopia    • Dyspepsia    • Gastric ulcer    • Gastritis    • GERD (gastroesophageal reflux disease)    • Headache(784 0)    • History of transfusion    • Hypertension    • Insomnia    • Irritable bowel syndrome    • Memory loss    • Migraines    • Osteopenia    • Peripheral neuropathy 3/14/2012    Left thigh, left abdomen, left prudential nerve   • 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   • Presence of neurostimulator     Pudendal neuralgia   • Pudendal neuralgia    • Sleep apnea    • Spinal stenosis    • Starting and stopping of urinary stream during micturition    • Urinary incontinence    • Wears eyeglasses        Past Surgical History:   Procedure Laterality Date   •  SECTION     •  SECTION     • CHOLECYSTECTOMY     • [address]   • GASTRIC BYPASS     • HYSTERECTOMY  2012   • INSERT / REPLACE PERIPHERAL NEUROSTIMULATOR PULSE GENERATOR /      • MASS EXCISION Right 2021    Procedure: EXCISION  BIOPSY LESION/MASS LOWER NECK;  Surgeon: Han Man DO;  Location: [facility] OR;  Service: General   • OTHER SURGICAL HISTORY      Reimplantatin at [facility]    • [place] IMPLANT SPINAL NEUROSTIM/ Right 04/15/2020    Procedure: [address],  RIGHT BUTTOCKS; Surgeon: Jason Jennings MD;  Location: [facility];  Service: Neurosurgery   • RADIOFREQUENCY ABLATION NERVES     • SPINAL CORD STIMULATOR IMPLANT  2015   • TRIGGER POINT INJECTION     • URETER SURGERY     • URETHRAL FISTULA REPAIR     • US GUIDED THYROID BIOPSY  09/23/2020   • WRIST ARTHROSCOPY      with internal fixation        Family History   Problem Relation Age of Onset   • Other Mother         Back Disorder    • Cirrhosis Mother    • Crohn's disease Mother    • Lupus Mother         Systemic Lupus Erythematous    • Depression Mother    • Hypertension Father    • Heart disease Father    • Other Brother         Liver Transplant    • Crohn's disease Brother    • Crohn's disease Brother    • Depression Sister    • No Known Problems Daughter    • No Known Problems Maternal Aunt    • No Known Problems Paternal Aunt    • No Known Problems Cousin    • No Known Problems Cousin    • No Known Problems Cousin    • No Known Problems Cousin    • No Known Problems Cousin    • Seizures Neg Hx    • Breast cancer Neg Hx        Social History     Occupational History   • Occupation: retired   Tobacco Use   • Smoking status: Never Smoker   • Smokeless tobacco: Never Used   Vaping Use   • Vaping Use: Never used   Substance and Sexual Activity   • Alcohol use: No   • Drug use: Yes     Types: Marijuana     Comment: medical marijuana 2x a day capsules   • Sexual activity: Not Currently     Partners: Male     Birth control/protection: None         Current Outpatient Medications:   •  busPIRone (BUSPAR) 10 mg tablet, Take 1 tablet (10 mg total) by mouth 2 (two) times a day, Disp: 180 tablet, Rfl: 1  •  Cholecalciferol 25 MCG (1000 UT) capsule, Take 1 capsule by mouth daily  , Disp: , Rfl:   •  dronabinol (MARINOL) 5 MG capsule, Take 1 capsule (5 mg total) by mouth 2 (two) times a day before meals By Dr Sagrario Perry, Disp: 60 capsule, Rfl: 0  •  Emgality 120 MG/ML SOAJ, INJECT 120 MG UNDER THE SKIN EVERY 30 DAYS, Disp: 3 mL, Rfl: 3  • gabapentin (NEURONTIN) 300 mg capsule, Take 1 capsule (300 mg total) by mouth 2 (two) times a day, Disp: 180 capsule, Rfl: 1  •  Iron-Vitamin C 100-250 MG TABS, Take by mouth daily, Disp: , Rfl:   •  lidocaine (XYLOCAINE) 5 % ointment, Apply topically 2 (two) times a day as needed for moderate pain, Disp: 35 44 g, Rfl: 1  •  lisinopril (ZESTRIL) 5 mg tablet, Take 1 tablet (5 mg total) by mouth daily, Disp: 90 tablet, Rfl: 3  •  mometasone (ELOCON) 0 1 % cream, APPLY TOPICALLY DAILY, Disp: 45 g, Rfl: 7  •  Multiple Vitamin (MULTI-VITAMIN DAILY) TABS, Take 1 tablet by mouth daily, Disp: , Rfl:   •  ondansetron (ZOFRAN-ODT) 4 mg disintegrating tablet, Take 1 tablet (4 mg total) by mouth every 6 (six) hours as needed for nausea or vomiting, Disp: 30 tablet, Rfl: 5  •  pantoprazole (PROTONIX) 40 mg tablet, TAKE 1 TABLET TWICE A DAY, Disp: 180 tablet, Rfl: 3  •  propranolol (INDERAL) 60 mg tablet, TAKE 1 TABLET TWICE A DAY, Disp: 180 tablet, Rfl: 3  •  QUEtiapine (SEROquel) 50 mg tablet, Take 1 tablet (50 mg total) by mouth daily at bedtime, Disp: 90 tablet, Rfl: 1  •  rizatriptan (MAXALT-MLT) 10 MG disintegrating tablet, PLACE 1 TABLET ON TONGUE ONCE AS NEEDED FOR MIGRAINE MAY REPEAT EVERY 2 HOURS IF NEEDED, MAXIMUM 30 MG IN 24 HOURS, Disp: 12 tablet, Rfl: 23  •  rosuvastatin (CRESTOR) 10 MG tablet, TAKE 1 TABLET DAILY, Disp: 90 tablet, Rfl: 3  •  sertraline (ZOLOFT) 100 mg tablet, Take 1 tablet (100 mg total) by mouth daily, Disp: 90 tablet, Rfl: 1  •  Botox 200 units SOLR, , Disp: , Rfl:     Allergies   Allergen Reactions   • Morphine Abdominal Pain     SEVERE N/V   • Cat Hair Extract Nasal Congestion     Cat Dander   • Dog Epithelium Nasal Congestion   • Other Other (See Comments)     Dogs- sneezing  Crab Meat- GI intolerance       Physical Exam:    BP 95/66 (BP Location: Right arm, Patient Position: Sitting, Cuff Size: Standard)   Pulse 59   Wt 108 kg (237 lb 6 4 oz)   BMI 40 75 kg/m² Constitutional:obese  Eyes:anicteric  HEENT:grossly intact  Neck:supple, symmetric, trachea midline and no masses   Pulmonary:even and unlabored  Cardiovascular:No edema or pitting edema present  Skin:Normal without rashes or lesions and well hydrated  Psychiatric:Mood and affect appropriate  Neurologic:Cranial Nerves II-XII grossly intact  Musculoskeletal:antalgic      Imaging  FL spine and pain procedure    (Results Pending)         Orders Placed This Encounter   Procedures   • FL spine and pain procedure

## 2022-11-08 NOTE — PROGRESS NOTES
Pain Medicine Follow-Up Note    Assessment:  1  Chronic pain syndrome    2  Chronic left-sided low back pain with left-sided sciatica    3  Lumbar radiculopathy    4  Spinal stenosis of lumbar region without neurogenic claudication        Plan:  Orders Placed This Encounter   Procedures   • FL spine and pain procedure     Four-week follow-up after injection     Standing Status:   Future     Standing Expiration Date:   11/8/2026     Order Specific Question:   Reason for Exam:     Answer:   Left L2 and L3 transforaminal epidural steroid injection     Order Specific Question:   Anticoagulant hold needed? Answer:   Unknown     My impressions and treatment recommendations were discussed in detail with the patient who verbalized understanding and had no further questions  Given that the patient has signs and symptoms of low back pain and left lower extremity radiculopathy and what appears to be the left L2 and L3 distribution in the context of lumbar spinal stenosis, I discussed the rationale of undergoing left L2 and L3 transforaminal epidural steroid injections since this could be potentially therapeutic  The procedures, its risks, and benefits were explained in detail to the patient  Risks include but are not limited to bleeding, infection, hematoma formation, abscess formation, weakness, headache, failure the pain to improve, nerve irritation or damage, and potential worsening of the pain  The patient verbalized understanding and wished to proceed with the procedure  Follow-up is planned in 4 weeks time or sooner as warranted  Discharge instructions were provided  I personally saw and examined the patient and I agree with the above discussed plan of care  History of Present Illness:    Froilan Sifuentes is a 59 y o  female who presents to [facility] for interval re-evaluation of the above stated pain complaints   The patient has a past medical and chronic pain history as outlined in the assessment section  She was last seen on September 30, 2020  At today's office visit, the patient's pain score is 5/10 on the verbal numerical pain rating scale  The patient states that her symptoms are primarily in the low back and left lower extremity in what appears to be the left L2 and L3 distribution  She describes her pain as worse in the evening and night  Her pain is constant in nature  She reports the quality of her pain as burning in the groin, dull/aching, sharp, throbbing, and numbness  She is interested in undergoing an interventional pain procedure  She previously had excellent pain relief with transforaminal epidural steroid injections and would like to undergo repeat transforaminal epidural steroid injection at this time  Other than as stated above, the patient denies any interval changes in medications, medical condition, mental condition, symptoms, or allergies since the last office visit  Review of Systems:    Review of Systems   Respiratory: Negative for shortness of breath  Cardiovascular: Negative for chest pain  Gastrointestinal: Negative for constipation, diarrhea, nausea and vomiting  Musculoskeletal: Negative for arthralgias, gait problem, joint swelling and myalgias  Pain in left thigh   Skin: Negative for rash  Neurological: Negative for dizziness, seizures and weakness  All other systems reviewed and are negative          Patient Active Problem List   Diagnosis   • Continuous leakage of urine   • Mixed hyperlipidemia   • Chronic pain syndrome   • Dysesthesia of multiple sites   • Eczema   • Hematuria   • Hypertension   • Arthralgia of shoulder region, left   • Limb pain   • Lower back pain   • Lumbar facet arthropathy   • CATHRYN (obstructive sleep apnea)   • Postgastrectomy malabsorption   • Pudendal neuralgia   • Gastroesophageal reflux disease   • Iron deficiency anemia   • Osteoarthritis of left shoulder   • Sacroiliitis (HCC)   • Lumbar radiculopathy   • Mild cognitive impairment with memory loss   • Insomnia   • Migraine   • Positive FIT (fecal immunochemical test)   • Hypovitaminosis D   • Lipoma of neck   • Generalized anxiety disorder with panic attacks   • Moderate episode of recurrent major depressive disorder ([address] )   • Obesity, morbid ([address] )   • Presence of neurostimulator   • COVID-19       Past Medical History:   Diagnosis Date   • Anxiety    • Arthritis    • Attention and concentration deficit    • Blurred vision    • Chronic pain    • Chronic pain syndrome 2016   • CPAP (continuous positive airway pressure) dependence    • Depression    • Diplopia    • Dyspepsia    • Gastric ulcer    • Gastritis    • GERD (gastroesophageal reflux disease)    • Headache(784 0)    • History of transfusion    • Hypertension    • Insomnia    • Irritable bowel syndrome    • Memory loss    • Migraines    • Osteopenia    • Peripheral neuropathy 3/14/2012    Left thigh, left abdomen, left prudential nerve   • 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   • Presence of neurostimulator     Pudendal neuralgia   • Pudendal neuralgia    • Sleep apnea    • Spinal stenosis    • Starting and stopping of urinary stream during micturition    • Urinary incontinence    • Wears eyeglasses        Past Surgical History:   Procedure Laterality Date   •  SECTION     •  SECTION     • CHOLECYSTECTOMY     • [address]   • GASTRIC BYPASS     • HYSTERECTOMY  2012   • INSERT / REPLACE PERIPHERAL NEUROSTIMULATOR PULSE GENERATOR /      • MASS EXCISION Right 2021    Procedure: EXCISION  BIOPSY LESION/MASS LOWER NECK;  Surgeon: Giorgi Jaime DO;  Location: [facility];  Service: General   • OTHER SURGICAL HISTORY      Reimplantatin at [facility]    • [place] IMPLANT SPINAL NEUROSTIM/ Right 04/15/2020    Procedure: [address],  RIGHT BUTTOCKS; Surgeon: Jayleen Sage MD;  [facility];  Service: Neurosurgery   • RADIOFREQUENCY ABLATION NERVES     • SPINAL CORD STIMULATOR IMPLANT  2015   • TRIGGER POINT INJECTION     • URETER SURGERY     • URETHRAL FISTULA REPAIR     • US GUIDED THYROID BIOPSY  09/23/2020   • WRIST ARTHROSCOPY      with internal fixation        Family History   Problem Relation Age of Onset   • Other Mother         Back Disorder    • Cirrhosis Mother    • Crohn's disease Mother    • Lupus Mother         Systemic Lupus Erythematous    • Depression Mother    • Hypertension Father    • Heart disease Father    • Other Brother         Liver Transplant    • Crohn's disease Brother    • Crohn's disease Brother    • Depression Sister    • No Known Problems Daughter    • No Known Problems Maternal Aunt    • No Known Problems Paternal Aunt    • No Known Problems Cousin    • No Known Problems Cousin    • No Known Problems Cousin    • No Known Problems Cousin    • No Known Problems Cousin    • Seizures Neg Hx    • Breast cancer Neg Hx        Social History     Occupational History   • Occupation: retired   Tobacco Use   • Smoking status: Never Smoker   • Smokeless tobacco: Never Used   Vaping Use   • Vaping Use: Never used   Substance and Sexual Activity   • Alcohol use: No   • Drug use: Yes     Types: Marijuana     Comment: medical marijuana 2x a day capsules   • Sexual activity: Not Currently     Partners: Male     Birth control/protection: None         Current Outpatient Medications:   •  busPIRone (BUSPAR) 10 mg tablet, Take 1 tablet (10 mg total) by mouth 2 (two) times a day, Disp: 180 tablet, Rfl: 1  •  Cholecalciferol 25 MCG (1000 UT) capsule, Take 1 capsule by mouth daily  , Disp: , Rfl:   •  dronabinol (MARINOL) 5 MG capsule, Take 1 capsule (5 mg total) by mouth 2 (two) times a day before meals By Dr Bhaskar Coleman, Disp: 60 capsule, Rfl: 0  •  Emgality 120 MG/ML SOAJ, INJECT 120 MG UNDER THE SKIN EVERY 30 DAYS, Disp: 3 mL, Rfl: 3  • gabapentin (NEURONTIN) 300 mg capsule, Take 1 capsule (300 mg total) by mouth 2 (two) times a day, Disp: 180 capsule, Rfl: 1  •  Iron-Vitamin C 100-250 MG TABS, Take by mouth daily, Disp: , Rfl:   •  lidocaine (XYLOCAINE) 5 % ointment, Apply topically 2 (two) times a day as needed for moderate pain, Disp: 35 44 g, Rfl: 1  •  lisinopril (ZESTRIL) 5 mg tablet, Take 1 tablet (5 mg total) by mouth daily, Disp: 90 tablet, Rfl: 3  •  mometasone (ELOCON) 0 1 % cream, APPLY TOPICALLY DAILY, Disp: 45 g, Rfl: 7  •  Multiple Vitamin (MULTI-VITAMIN DAILY) TABS, Take 1 tablet by mouth daily, Disp: , Rfl:   •  ondansetron (ZOFRAN-ODT) 4 mg disintegrating tablet, Take 1 tablet (4 mg total) by mouth every 6 (six) hours as needed for nausea or vomiting, Disp: 30 tablet, Rfl: 5  •  pantoprazole (PROTONIX) 40 mg tablet, TAKE 1 TABLET TWICE A DAY, Disp: 180 tablet, Rfl: 3  •  propranolol (INDERAL) 60 mg tablet, TAKE 1 TABLET TWICE A DAY, Disp: 180 tablet, Rfl: 3  •  QUEtiapine (SEROquel) 50 mg tablet, Take 1 tablet (50 mg total) by mouth daily at bedtime, Disp: 90 tablet, Rfl: 1  •  rizatriptan (MAXALT-MLT) 10 MG disintegrating tablet, PLACE 1 TABLET ON TONGUE ONCE AS NEEDED FOR MIGRAINE MAY REPEAT EVERY 2 HOURS IF NEEDED, MAXIMUM 30 MG IN 24 HOURS, Disp: 12 tablet, Rfl: 23  •  rosuvastatin (CRESTOR) 10 MG tablet, TAKE 1 TABLET DAILY, Disp: 90 tablet, Rfl: 3  •  sertraline (ZOLOFT) 100 mg tablet, Take 1 tablet (100 mg total) by mouth daily, Disp: 90 tablet, Rfl: 1  •  Botox 200 units SOLR, , Disp: , Rfl:     Allergies   Allergen Reactions   • Morphine Abdominal Pain     SEVERE N/V   • Cat Hair Extract Nasal Congestion     Cat Dander   • Dog Epithelium Nasal Congestion   • Other Other (See Comments)     Dogs- sneezing  Crab Meat- GI intolerance       Physical Exam:    BP 95/66 (BP Location: Right arm, Patient Position: Sitting, Cuff Size: Standard)   Pulse 59   Wt 108 kg (237 lb 6 4 oz)   BMI 40 75 kg/m² Constitutional:obese  Eyes:anicteric  HEENT:grossly intact  Neck:supple, symmetric, trachea midline and no masses   Pulmonary:even and unlabored  Cardiovascular:No edema or pitting edema present  Skin:Normal without rashes or lesions and well hydrated  Psychiatric:Mood and affect appropriate  Neurologic:Cranial Nerves II-XII grossly intact  Musculoskeletal:antalgic      Imaging  FL spine and pain procedure    (Results Pending)         Orders Placed This Encounter   Procedures   • FL spine and pain procedure

## 2022-11-11 ENCOUNTER — OFFICE VISIT (OUTPATIENT)
Dept: FAMILY MEDICINE CLINIC | Facility: CLINIC | Age: 62
End: 2022-11-11

## 2022-11-11 VITALS
HEIGHT: 64 IN | RESPIRATION RATE: 14 BRPM | SYSTOLIC BLOOD PRESSURE: 110 MMHG | DIASTOLIC BLOOD PRESSURE: 70 MMHG | TEMPERATURE: 98.3 F | BODY MASS INDEX: 40.46 KG/M2 | WEIGHT: 237 LBS | HEART RATE: 72 BPM | OXYGEN SATURATION: 97 %

## 2022-11-11 DIAGNOSIS — N30.01 ACUTE CYSTITIS WITH HEMATURIA: Primary | ICD-10-CM

## 2022-11-11 LAB
SL AMB  POCT GLUCOSE, UA: ABNORMAL
SL AMB LEUKOCYTE ESTERASE,UA: 70
SL AMB POCT BILIRUBIN,UA: 17
SL AMB POCT BLOOD,UA: 200
SL AMB POCT CLARITY,UA: ABNORMAL
SL AMB POCT COLOR,UA: YELLOW
SL AMB POCT KETONES,UA: ABNORMAL
SL AMB POCT NITRITE,UA: ABNORMAL
SL AMB POCT PH,UA: 30
SL AMB POCT SPECIFIC GRAVITY,UA: 1.02
SL AMB POCT URINE PROTEIN: ABNORMAL
SL AMB POCT UROBILINOGEN: 3.5

## 2022-11-11 RX ORDER — CIPROFLOXACIN 500 MG/1
500 TABLET, FILM COATED ORAL EVERY 12 HOURS SCHEDULED
Qty: 10 TABLET | Refills: 0 | Status: SHIPPED | OUTPATIENT
Start: 2022-11-11 | End: 2022-11-16

## 2022-11-11 NOTE — PROGRESS NOTES
Assessment/Plan:     Chronic Problems:  No problem-specific Assessment & Plan notes found for this encounter  Visit Diagnosis:  Diagnoses and all orders for this visit:    Acute cystitis with hematuria  Comments: Will treat with Cipro  Will send culture  Orders:  -     POCT urine dip  -     Urine culture; Future  -     ciprofloxacin (CIPRO) 500 mg tablet; Take 1 tablet (500 mg total) by mouth every 12 (twelve) hours for 5 days  -     Urine culture          Subjective:    Patient ID: Heath Hassan is a 59 y o  female  Patient presents with concerns of UTI  She has pudendal neuralgia, so she does not exhibit any dysuria, frequency or urgency  She does have abd pressure/groin pain and lower back discomfort that is unusual for her  The following portions of the patient's history were reviewed and updated as appropriate: allergies, current medications, past family history, past medical history, past social history, past surgical history and problem list     Review of Systems   Constitutional: Negative for chills and fever  HENT: Negative for ear pain and sore throat  Respiratory: Negative for cough and shortness of breath  Cardiovascular: Negative for chest pain and palpitations  Gastrointestinal: Positive for abdominal pain  Negative for vomiting  Genitourinary: Negative for dysuria, frequency and hematuria  Musculoskeletal: Positive for back pain  Skin: Negative for color change and rash  All other systems reviewed and are negative          /70   Pulse 72   Temp 98 3 °F (36 8 °C)   Resp 14   Ht 5' 4" (1 626 m)   Wt 108 kg (237 lb)   SpO2 97%   BMI 40 68 kg/m²   Social History     Socioeconomic History   • Marital status: /Civil Union     Spouse name: Not on file   • Number of children: Not on file   • Years of education: Not on file   • Highest education level: Not on file   Occupational History   • Occupation: retired   Tobacco Use   • Smoking status: Never Smoker   • Smokeless tobacco: Never Used   Vaping Use   • Vaping Use: Never used   Substance and Sexual Activity   • Alcohol use: No   • Drug use: Yes     Types: Marijuana     Comment: medical marijuana 2x a day capsules   • Sexual activity: Not Currently     Partners: Male     Birth control/protection: None   Other Topics Concern   • Not on file   Social History Narrative    Lives in [place], with   Previously worked as a teacher        Social Determinants of Health     Financial Resource Strain: Not on file   Food Insecurity: Not on file   Transportation Needs: Not on file   Physical Activity: Not on file   Stress: Not on file   Social Connections: Not on file   Intimate Partner Violence: Not on file   Housing Stability: Not on file     Past Medical History:   Diagnosis Date   • Anxiety    • Arthritis    • Attention and concentration deficit    • Blurred vision    • Chronic pain    • Chronic pain syndrome 01/20/2016   • CPAP (continuous positive airway pressure) dependence    • Depression    • Diplopia    • Dyspepsia    • Gastric ulcer    • Gastritis    • GERD (gastroesophageal reflux disease)    • Headache(784 0)    • History of transfusion 2005   • Hypertension    • Insomnia    • Irritable bowel syndrome    • Memory loss    • Migraines    • Osteopenia    • Peripheral neuropathy 3/14/2012    Left thigh, left abdomen, left prudential nerve   • 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   • Presence of neurostimulator     Pudendal neuralgia   • Pudendal neuralgia    • Sleep apnea    • Spinal stenosis    • Starting and stopping of urinary stream during micturition    • Urinary incontinence    • Wears eyeglasses      Family History   Problem Relation Age of Onset   • Other Mother         Back Disorder    • Cirrhosis Mother    • Crohn's disease Mother    • Lupus Mother         Systemic Lupus Erythematous    • Depression Mother    • Hypertension Father    • Heart disease Father    • Other Brother Liver Transplant    • Crohn's disease Brother    • Crohn's disease Brother    • Depression Sister    • No Known Problems Daughter    • No Known Problems Maternal Aunt    • No Known Problems Paternal Aunt    • No Known Problems Cousin    • No Known Problems Cousin    • No Known Problems Cousin    • No Known Problems Cousin    • No Known Problems Cousin    • Seizures Neg Hx    • Breast cancer Neg Hx      Past Surgical History:   Procedure Laterality Date   •  SECTION     •  SECTION     • CHOLECYSTECTOMY     • Andres Taylor   • GASTRIC BYPASS     • HYSTERECTOMY  2012   • INSERT / REPLACE PERIPHERAL NEUROSTIMULATOR PULSE GENERATOR /      • MASS EXCISION Right 2021    Procedure: EXCISION  BIOPSY LESION/MASS LOWER NECK;  Surgeon: Venkatesh Johnson DO;  Location: [facility];  Service: General   • OTHER SURGICAL HISTORY      Reimplantatin at [address]    • [place] IMPLANT SPINAL NEUROSTIM/ Right 04/15/2020    Procedure: REPLACEMENT IMPLANTABLE PULSE GENERATOR FOR DORSAL SPINAL COLUMN STIMULATOR,  RIGHT BUTTOCKS;  Surgeon: Roberta Black MD;  Location: [facility];  Service: Neurosurgery   • RADIOFREQUENCY ABLATION NERVES     • SPINAL CORD STIMULATOR IMPLANT     • TRIGGER POINT INJECTION     • URETER SURGERY     • URETHRAL FISTULA REPAIR     • US GUIDED THYROID BIOPSY  2020   • WRIST ARTHROSCOPY      with internal fixation        Current Outpatient Medications:   •  Botox 200 units SOLR, , Disp: , Rfl:   •  busPIRone (BUSPAR) 10 mg tablet, Take 1 tablet (10 mg total) by mouth 2 (two) times a day, Disp: 180 tablet, Rfl: 1  •  Cholecalciferol 25 MCG (1000 UT) capsule, Take 1 capsule by mouth daily  , Disp: , Rfl:   •  ciprofloxacin (CIPRO) 500 mg tablet, Take 1 tablet (500 mg total) by mouth every 12 (twelve) hours for 5 days, Disp: 10 tablet, Rfl: 0  •  dronabinol (MARINOL) 5 MG capsule, Take 1 capsule (5 mg total) by mouth 2 (two) times a day before meals By Dr Parul Meier, Disp: 60 capsule, Rfl: 0  •  Emgality 120 MG/ML SOAJ, INJECT 120 MG UNDER THE SKIN EVERY 30 DAYS, Disp: 3 mL, Rfl: 3  •  gabapentin (NEURONTIN) 300 mg capsule, Take 1 capsule (300 mg total) by mouth 2 (two) times a day, Disp: 180 capsule, Rfl: 1  •  Iron-Vitamin C 100-250 MG TABS, Take by mouth daily, Disp: , Rfl:   •  lidocaine (XYLOCAINE) 5 % ointment, Apply topically 2 (two) times a day as needed for moderate pain, Disp: 35 44 g, Rfl: 1  •  lisinopril (ZESTRIL) 5 mg tablet, Take 1 tablet (5 mg total) by mouth daily, Disp: 90 tablet, Rfl: 3  •  Multiple Vitamin (MULTI-VITAMIN DAILY) TABS, Take 1 tablet by mouth daily, Disp: , Rfl:   •  pantoprazole (PROTONIX) 40 mg tablet, TAKE 1 TABLET TWICE A DAY, Disp: 180 tablet, Rfl: 3  •  propranolol (INDERAL) 60 mg tablet, TAKE 1 TABLET TWICE A DAY, Disp: 180 tablet, Rfl: 3  •  QUEtiapine (SEROquel) 50 mg tablet, Take 1 tablet (50 mg total) by mouth daily at bedtime, Disp: 90 tablet, Rfl: 1  •  rosuvastatin (CRESTOR) 10 MG tablet, TAKE 1 TABLET DAILY, Disp: 90 tablet, Rfl: 3  •  sertraline (ZOLOFT) 100 mg tablet, Take 1 tablet (100 mg total) by mouth daily, Disp: 90 tablet, Rfl: 1  •  mometasone (ELOCON) 0 1 % cream, APPLY TOPICALLY DAILY, Disp: 45 g, Rfl: 7  •  ondansetron (ZOFRAN-ODT) 4 mg disintegrating tablet, Take 1 tablet (4 mg total) by mouth every 6 (six) hours as needed for nausea or vomiting, Disp: 30 tablet, Rfl: 5  •  rizatriptan (MAXALT-MLT) 10 MG disintegrating tablet, PLACE 1 TABLET ON TONGUE ONCE AS NEEDED FOR MIGRAINE MAY REPEAT EVERY 2 HOURS IF NEEDED, MAXIMUM 30 MG IN 24 HOURS, Disp: 12 tablet, Rfl: 23    Allergies   Allergen Reactions   • Morphine Abdominal Pain     SEVERE N/V   • Cat Hair Extract Nasal Congestion     Cat Dander   • Dog Epithelium Nasal Congestion   • Other Other (See Comments)     Dogs- sneezing  Crab Meat- GI intolerance          Lab Review    Appointment on 10/04/2022   Component Date Value   • WBC 10/04/2022 6 36    • RBC 10/04/2022 4 23    • Hemoglobin 10/04/2022 12 3    • Hematocrit 10/04/2022 39 4    • MCV 10/04/2022 93    • MCH 10/04/2022 29 1    • MCHC 10/04/2022 31 2 (A)   • RDW 10/04/2022 14 5    • MPV 10/04/2022 11 0    • Platelets 00/87/6789 239    • nRBC 10/04/2022 0    • Neutrophils Relative 10/04/2022 60    • Immat GRANS % 10/04/2022 0    • Lymphocytes Relative 10/04/2022 26    • Monocytes Relative 10/04/2022 7    • Eosinophils Relative 10/04/2022 6    • Basophils Relative 10/04/2022 1    • Neutrophils Absolute 10/04/2022 3 84    • Immature Grans Absolute 10/04/2022 0 01    • Lymphocytes Absolute 10/04/2022 1 66    • Monocytes Absolute 10/04/2022 0 42    • Eosinophils Absolute 10/04/2022 0 36    • Basophils Absolute 10/04/2022 0 07    • Sodium 10/04/2022 141    • Potassium 10/04/2022 4 5    • Chloride 10/04/2022 106    • CO2 10/04/2022 30    • ANION GAP 10/04/2022 5    • BUN 10/04/2022 16    • Creatinine 10/04/2022 0 93    • Glucose, Fasting 10/04/2022 95    • Calcium 10/04/2022 8 9    • Corrected Calcium 10/04/2022 9 5    • AST 10/04/2022 19    • ALT 10/04/2022 18    • Alkaline Phosphatase 10/04/2022 132 (A)   • Total Protein 10/04/2022 7 1    • Albumin 10/04/2022 3 3 (A)   • Total Bilirubin 10/04/2022 0 34    • eGFR 10/04/2022 65    • Hemoglobin A1C 10/04/2022 5 8 (A)   • EAG 10/04/2022 120    • Cholesterol 10/04/2022 171    • Triglycerides 10/04/2022 74    • HDL, Direct 10/04/2022 83    • LDL Calculated 10/04/2022 73    • Non-HDL-Chol (CHOL-HDL) 10/04/2022 88    • TSH 3RD GENERATON 10/04/2022 2 082         Imaging: No results found  Objective:     Physical Exam  Constitutional:       Appearance: She is well-developed  Cardiovascular:      Rate and Rhythm: Normal rate and regular rhythm  Heart sounds: Normal heart sounds  No murmur heard  Pulmonary:      Effort: Pulmonary effort is normal  No respiratory distress  Breath sounds: Normal breath sounds  Abdominal:      Tenderness:  There is no right CVA tenderness or left CVA tenderness  Skin:     General: Skin is warm and dry  Neurological:      Mental Status: She is alert and oriented to person, place, and time  There are no Patient Instructions on file for this visit  GWEN Black    Portions of the record may have been created with voice recognition software  Occasional wrong word or "sound a like" substitutions may have occurred due to the inherent limitations of voice recognition software  Read the chart carefully and recognize, using context, where substitutions have occurred

## 2022-11-13 LAB — BACTERIA UR CULT: ABNORMAL

## 2022-11-15 ENCOUNTER — TELEPHONE (OUTPATIENT)
Dept: FAMILY MEDICINE CLINIC | Facility: CLINIC | Age: 62
End: 2022-11-15

## 2022-11-15 DIAGNOSIS — N30.01 ACUTE CYSTITIS WITH HEMATURIA: Primary | ICD-10-CM

## 2022-11-15 RX ORDER — DOXYCYCLINE HYCLATE 100 MG/1
100 CAPSULE ORAL EVERY 12 HOURS SCHEDULED
Qty: 10 CAPSULE | Refills: 0 | Status: SHIPPED | OUTPATIENT
Start: 2022-11-15 | End: 2022-11-20

## 2022-11-15 NOTE — TELEPHONE ENCOUNTER
Pt was seen by Mago  for a UTI  She took the antibiotic and she is still having flank pain  I offered her an appointment for today and she states she doesn't know if she can come in because she has no way of getting here  Is there any way to assist without an appointment?

## 2022-11-15 NOTE — TELEPHONE ENCOUNTER
According to culture Cipro should be helping, I will send doxy to take 2 x for 5 days   Is she is still having symptoms after return for reevaluation - thank you  Xuan

## 2022-11-16 ENCOUNTER — NURSE TRIAGE (OUTPATIENT)
Dept: OTHER | Facility: OTHER | Age: 62
End: 2022-11-16

## 2022-11-16 ENCOUNTER — HOSPITAL ENCOUNTER (OUTPATIENT)
Dept: CT IMAGING | Facility: HOSPITAL | Age: 62
Discharge: HOME | End: 2022-11-16

## 2022-11-16 ENCOUNTER — TELEPHONE (OUTPATIENT)
Dept: FAMILY MEDICINE CLINIC | Facility: CLINIC | Age: 62
End: 2022-11-16

## 2022-11-16 ENCOUNTER — APPOINTMENT (EMERGENCY)
Dept: CT IMAGING | Facility: HOSPITAL | Age: 62
End: 2022-11-16

## 2022-11-16 ENCOUNTER — HOSPITAL ENCOUNTER (EMERGENCY)
Facility: HOSPITAL | Age: 62
Discharge: HOME | End: 2022-11-17
Attending: EMERGENCY MEDICINE

## 2022-11-16 DIAGNOSIS — R10.9 FLANK PAIN: Primary | ICD-10-CM

## 2022-11-16 DIAGNOSIS — R10.9 FLANK PAIN: ICD-10-CM

## 2022-11-16 DIAGNOSIS — N30.01 ACUTE CYSTITIS WITH HEMATURIA: ICD-10-CM

## 2022-11-16 DIAGNOSIS — N12 PYELONEPHRITIS: Primary | ICD-10-CM

## 2022-11-16 LAB
ALBUMIN SERPL BCP-MCNC: 3.3 G/DL (ref 3.5–5)
ALP SERPL-CCNC: 122 U/L (ref 46–116)
ALT SERPL W P-5'-P-CCNC: 24 U/L (ref 12–78)
ANION GAP SERPL CALCULATED.3IONS-SCNC: 6 MMOL/L (ref 4–13)
APTT PPP: 26 SECONDS (ref 23–37)
AST SERPL W P-5'-P-CCNC: 25 U/L (ref 5–45)
BACTERIA UR QL AUTO: ABNORMAL /HPF
BASOPHILS # BLD AUTO: 0.11 THOUSANDS/ÂΜL (ref 0–0.1)
BASOPHILS NFR BLD AUTO: 1 % (ref 0–1)
BILIRUB SERPL-MCNC: 0.19 MG/DL (ref 0.2–1)
BILIRUB UR QL STRIP: NEGATIVE
BUN SERPL-MCNC: 17 MG/DL (ref 5–25)
CALCIUM ALBUM COR SERPL-MCNC: 9.6 MG/DL (ref 8.3–10.1)
CALCIUM SERPL-MCNC: 9 MG/DL (ref 8.3–10.1)
CHLORIDE SERPL-SCNC: 104 MMOL/L (ref 96–108)
CLARITY UR: CLEAR
CO2 SERPL-SCNC: 27 MMOL/L (ref 21–32)
COLOR UR: YELLOW
CREAT SERPL-MCNC: 1.16 MG/DL (ref 0.6–1.3)
EOSINOPHIL # BLD AUTO: 0.24 THOUSAND/ÂΜL (ref 0–0.61)
EOSINOPHIL NFR BLD AUTO: 3 % (ref 0–6)
ERYTHROCYTE [DISTWIDTH] IN BLOOD BY AUTOMATED COUNT: 13.8 % (ref 11.6–15.1)
GFR SERPL CREATININE-BSD FRML MDRD: 50 ML/MIN/1.73SQ M
GLUCOSE SERPL-MCNC: 99 MG/DL (ref 65–140)
GLUCOSE UR STRIP-MCNC: NEGATIVE MG/DL
HCT VFR BLD AUTO: 36.6 % (ref 34.8–46.1)
HGB BLD-MCNC: 11.2 G/DL (ref 11.5–15.4)
HGB UR QL STRIP.AUTO: ABNORMAL
IMM GRANULOCYTES # BLD AUTO: 0.03 THOUSAND/UL (ref 0–0.2)
IMM GRANULOCYTES NFR BLD AUTO: 0 % (ref 0–2)
INR PPP: 1.03 (ref 0.84–1.19)
KETONES UR STRIP-MCNC: NEGATIVE MG/DL
LACTATE SERPL-SCNC: 0.5 MMOL/L (ref 0.5–2)
LEUKOCYTE ESTERASE UR QL STRIP: NEGATIVE
LIPASE SERPL-CCNC: 216 U/L (ref 73–393)
LYMPHOCYTES # BLD AUTO: 2.4 THOUSANDS/ÂΜL (ref 0.6–4.47)
LYMPHOCYTES NFR BLD AUTO: 28 % (ref 14–44)
MCH RBC QN AUTO: 28.5 PG (ref 26.8–34.3)
MCHC RBC AUTO-ENTMCNC: 30.6 G/DL (ref 31.4–37.4)
MCV RBC AUTO: 93 FL (ref 82–98)
MONOCYTES # BLD AUTO: 0.52 THOUSAND/ÂΜL (ref 0.17–1.22)
MONOCYTES NFR BLD AUTO: 6 % (ref 4–12)
MUCOUS THREADS UR QL AUTO: ABNORMAL
NEUTROPHILS # BLD AUTO: 5.35 THOUSANDS/ÂΜL (ref 1.85–7.62)
NEUTS SEG NFR BLD AUTO: 62 % (ref 43–75)
NITRITE UR QL STRIP: NEGATIVE
NON-SQ EPI CELLS URNS QL MICRO: ABNORMAL /HPF
NRBC BLD AUTO-RTO: 0 /100 WBCS
PH UR STRIP.AUTO: 5.5 [PH]
PLATELET # BLD AUTO: 270 THOUSANDS/UL (ref 149–390)
PMV BLD AUTO: 10.4 FL (ref 8.9–12.7)
POTASSIUM SERPL-SCNC: 4.3 MMOL/L (ref 3.5–5.3)
PROT SERPL-MCNC: 7.1 G/DL (ref 6.4–8.4)
PROT UR STRIP-MCNC: NEGATIVE MG/DL
PROTHROMBIN TIME: 13.3 SECONDS (ref 11.6–14.5)
RBC # BLD AUTO: 3.93 MILLION/UL (ref 3.81–5.12)
RBC #/AREA URNS AUTO: ABNORMAL /HPF
SODIUM SERPL-SCNC: 137 MMOL/L (ref 135–147)
SP GR UR STRIP.AUTO: >=1.03 (ref 1–1.03)
UROBILINOGEN UR QL STRIP.AUTO: 0.2 E.U./DL
WBC # BLD AUTO: 8.65 THOUSAND/UL (ref 4.31–10.16)
WBC #/AREA URNS AUTO: ABNORMAL /HPF

## 2022-11-16 RX ORDER — FENTANYL CITRATE 50 UG/ML
50 INJECTION, SOLUTION INTRAMUSCULAR; INTRAVENOUS ONCE AS NEEDED
Status: COMPLETED | OUTPATIENT
Start: 2022-11-16 | End: 2022-11-16

## 2022-11-16 RX ADMIN — IOHEXOL 100 ML: 350 INJECTION, SOLUTION INTRAVENOUS at 23:16

## 2022-11-16 RX ADMIN — FENTANYL CITRATE 50 MCG: 50 INJECTION INTRAMUSCULAR; INTRAVENOUS at 23:25

## 2022-11-16 NOTE — TELEPHONE ENCOUNTER
Patient's daughter Alli Cowan called stating her mom is still experiencing discomfort  She stated her mom has had kidney stones in the past and believes the symptoms she is not having is similar to the symptoms she has had when she had kidney stones  Fartun would like to know if there is any imaging test she should go for to rule of kidney stones      [phone]

## 2022-11-17 ENCOUNTER — TELEPHONE (OUTPATIENT)
Dept: FAMILY MEDICINE CLINIC | Facility: CLINIC | Age: 62
End: 2022-11-17

## 2022-11-17 VITALS
SYSTOLIC BLOOD PRESSURE: 110 MMHG | TEMPERATURE: 99 F | DIASTOLIC BLOOD PRESSURE: 55 MMHG | HEART RATE: 60 BPM | OXYGEN SATURATION: 92 % | RESPIRATION RATE: 15 BRPM

## 2022-11-17 DIAGNOSIS — N28.1 CYST OF LEFT KIDNEY: ICD-10-CM

## 2022-11-17 DIAGNOSIS — N30.90 CYSTITIS: Primary | ICD-10-CM

## 2022-11-17 DIAGNOSIS — K29.80 DUODENITIS: ICD-10-CM

## 2022-11-17 RX ORDER — CEFDINIR 300 MG/1
300 CAPSULE ORAL EVERY 12 HOURS SCHEDULED
Qty: 20 CAPSULE | Refills: 0 | Status: SHIPPED | OUTPATIENT
Start: 2022-11-17 | End: 2022-11-27

## 2022-11-17 RX ORDER — PHENAZOPYRIDINE HYDROCHLORIDE 100 MG/1
100 TABLET, FILM COATED ORAL 3 TIMES DAILY PRN
Qty: 12 TABLET | Refills: 0 | Status: SHIPPED | OUTPATIENT
Start: 2022-11-17 | End: 2023-02-17

## 2022-11-17 RX ADMIN — CEFTRIAXONE SODIUM 1000 MG: 10 INJECTION, POWDER, FOR SOLUTION INTRAVENOUS at 00:57

## 2022-11-17 NOTE — TELEPHONE ENCOUNTER
Spoke with the patient regarding her CT scan results  Ultrasound of her kidneys placed  Referral to GI placed as well and patient make an appointment

## 2022-11-17 NOTE — ED PROVIDER NOTES
History  Chief Complaint   Patient presents with   • Groin Pain     Pt reports recent UTI, pt reports now having left back and and groin pain, headache, and nausea      22-year-old female presents with persistent complaint of "I have pain in my back" that has been ongoing since last week  Patient notes left-sided flank pain along with left-sided abdominal pain that also radiates into groin and her left upper leg  Patient has been treated recently for a urinary tract infection that per the medical record was from E coli  Patient was placed on ciprofloxacin, which per culture results should have been susceptible  Patient has a long history urinary tract infections from a ureteral injury from a hysterectomy many years ago  Patient states since that time she has not had typical symptoms frequency and dysuria but has pain on her left abdomen that radiates into her breast   This pain has resolved on antibiotic therapy however the flank/abdominal pain has persisted  Patient affirms nausea without vomiting  Patient denies diarrhea  Patient notes last bowel movement was earlier today and patient states it was a darker than typical but she denies melena or hematochezia  Patient is not on iron supplementation  Patient denies any current urinary symptoms  Patient denies vaginal bleeding or discharge  Patient has a history of a hysterectomy  Patient notes ongoing chronic issues with urinary symptoms since her ureteral injury however no changes today  Patient does have chronic left-sided lower back pain though the pain today is in the left flank which is atypical for her though it likely explains her left leg pain that appears to be chronic per the patient and the medical record  Patient denies any dorita urinary incontinence  Patient denies any bowel incontinence  Patient denies any sensory loss or motor weakness      ROS: Patient denies fever/chills, dyspnea, chest pain, constipation, diaphoresis, groin pain, dysuria, hematuria, melena, or back/neck pain  All other systems reviewed and negative  Patient denies contacts with similar symptoms  Patient denies any recent international travel or trauma  Patient has been on ciprofloxacin and was changed to doxycycline yesterday  Patient notes history of Venessa-en-Y gastric bypass approximately 15 years ago, cholecystectomy, multiple  sections and hysterectomy  Focused Objective Exam:  Abdomen:  Inspection of an obese abdomen with previous abdominal surgical incisions noted without erythema, rashes or ecchymosis noted  No abdominal pulsations noted  Normal bowel sounds with no bruit auscultated  Soft abdomen  Palpitation noted tenderness diffusely on the left side most prominently in the left lower quadrant; tenderness not over McBurney's point  No masses or pulsatile aorta noted on examination  No rebound or guarding noted on examination, non-peritoneal exam     Back:  No rash or signs of herpes zoster  Left-sided CVA tenderness, this is not present on the right  Patient's not have any midline tenderness  Skin:  No ecchymosis of the umbilicus (negative [place]'s sign) or flank (negative [place]'s sign)  Warm and dry  Medical Decision Making  Abdominal pain with a broad differential   Will establish IV access and make patient NPO considering possibility of surgical intervention required  Will initiate symptomatic management including intravenous fluids  Differential includes significant likelihood of intra-abdominal pathology, including concerns for potential pyelonephritis though the unilateral symptoms make this somewhat unclear  Patient had noncontrast CT imaging that did not demonstrate any obstructing stone  Patient does have left lower quadrant pain raising possibility of diverticulitis and considering her history of Venessa-en-Y, could represent complications secondary to this      Will obtain CBC to evaluate for anemia and leukocytosis  Will obtain CMP to evaluate electrolytes, renal, biliary, and hepatic function  Will obtain lipase to evaluate for potential pancreatitis  Will obtain urinalysis to evaluate for possible urinary infectious sources and ketones to evaluate potential hydration state  Based on patient's age, history, physical exam and presenting complaint, will obtain contrast enhanced CT imaging of patient's abdomen and pelvis to further evaluate for possible intraabdominal pathology  History provided by:  Patient  Flank Pain  Pain location:  L flank  Pain quality: cramping    Pain radiates to:  LLQ and L leg  Pain severity:  Moderate  Onset quality:  Sudden  Timing:  Constant  Progression:  Unchanged  Chronicity:  New  Relieved by: Supine  Worsened by: Movement  Associated symptoms: nausea    Associated symptoms: no chest pain, no chills, no constipation, no cough, no diarrhea, no dysuria, no fatigue, no fever, no hematemesis, no hematochezia, no hematuria, no melena, no shortness of breath, no sore throat and no vomiting        Prior to Admission Medications   Prescriptions Last Dose Informant Patient Reported? Taking?    Botox 200 units SOLR   Yes No   Cholecalciferol 25 MCG (1000 UT) capsule   Yes No   Sig: Take 1 capsule by mouth daily     Emgality 120 MG/ML SOAJ   No Yes   Sig: INJECT 120 MG UNDER THE SKIN EVERY 30 DAYS   Iron-Vitamin C 100-250 MG TABS   Yes Yes   Sig: Take by mouth daily   Multiple Vitamin (MULTI-VITAMIN DAILY) TABS   Yes Yes   Sig: Take 1 tablet by mouth daily   QUEtiapine (SEROquel) 50 mg tablet   No Yes   Sig: Take 1 tablet (50 mg total) by mouth daily at bedtime   busPIRone (BUSPAR) 10 mg tablet   No Yes   Sig: Take 1 tablet (10 mg total) by mouth 2 (two) times a day   ciprofloxacin (CIPRO) 500 mg tablet Not Taking  No No   Sig: Take 1 tablet (500 mg total) by mouth every 12 (twelve) hours for 5 days   Patient not taking: Reported on 11/16/2022   doxycycline hyclate (VIBRAMYCIN) 100 mg capsule   No Yes   Sig: Take 1 capsule (100 mg total) by mouth every 12 (twelve) hours for 5 days   dronabinol (MARINOL) 5 MG capsule   No Yes   Sig: Take 1 capsule (5 mg total) by mouth 2 (two) times a day before meals By Dr Cyrus Zhu   gabapentin (NEURONTIN) 300 mg capsule   No Yes   Sig: Take 1 capsule (300 mg total) by mouth 2 (two) times a day   lidocaine (XYLOCAINE) 5 % ointment   No Yes   Sig: Apply topically 2 (two) times a day as needed for moderate pain   lisinopril (ZESTRIL) 5 mg tablet   No Yes   Sig: Take 1 tablet (5 mg total) by mouth daily   mometasone (ELOCON) 0 1 % cream   No Yes   Sig: APPLY TOPICALLY DAILY   ondansetron (ZOFRAN-ODT) 4 mg disintegrating tablet   No Yes   Sig: Take 1 tablet (4 mg total) by mouth every 6 (six) hours as needed for nausea or vomiting   pantoprazole (PROTONIX) 40 mg tablet   No Yes   Sig: TAKE 1 TABLET TWICE A DAY   propranolol (INDERAL) 60 mg tablet   No Yes   Sig: TAKE 1 TABLET TWICE A DAY   rizatriptan (MAXALT-MLT) 10 MG disintegrating tablet   No Yes   Sig: PLACE 1 TABLET ON TONGUE ONCE AS NEEDED FOR MIGRAINE MAY REPEAT EVERY 2 HOURS IF NEEDED, MAXIMUM 30 MG IN 24 HOURS   rosuvastatin (CRESTOR) 10 MG tablet   No Yes   Sig: TAKE 1 TABLET DAILY   sertraline (ZOLOFT) 100 mg tablet   No Yes   Sig: Take 1 tablet (100 mg total) by mouth daily      Facility-Administered Medications: None       Past Medical History:   Diagnosis Date   • Anxiety    • Arthritis    • Attention and concentration deficit    • Blurred vision    • Chronic pain    • Chronic pain syndrome 01/20/2016   • CPAP (continuous positive airway pressure) dependence    • Depression    • Diplopia    • Dyspepsia    • Gastric ulcer    • Gastritis    • GERD (gastroesophageal reflux disease)    • Headache(784 0)    • History of transfusion 2005   • Hypertension    • Insomnia    • Irritable bowel syndrome    • Memory loss    • Migraines    • Osteopenia    • Peripheral neuropathy 3/14/2012    Left thigh, left abdomen, left prudential nerve   • 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   • Presence of neurostimulator     Pudendal neuralgia   • Pudendal neuralgia    • Sleep apnea    • Spinal stenosis    • Starting and stopping of urinary stream during micturition    • Urinary incontinence    • Wears eyeglasses        Past Surgical History:   Procedure Laterality Date   •  SECTION     •  SECTION     • CHOLECYSTECTOMY     • Mariella3 Stacey Taylor   • GASTRIC BYPASS     • HYSTERECTOMY  2012   • INSERT / REPLACE PERIPHERAL NEUROSTIMULATOR PULSE GENERATOR /      • MASS EXCISION Right 2021    Procedure: EXCISION  BIOPSY LESION/MASS LOWER NECK;  Surgeon: Kamila Haas DO;  Location: [facility];  Service: General   • OTHER SURGICAL HISTORY      [facility]implantatin at [facility]    • [place] IMPLANT SPINAL NEUROSTIM/ Right 04/15/2020    Procedure: REPLACEMENT IMPLANTABLE PULSE GENERATOR FOR DORSAL SPINAL COLUMN STIMULATOR,  RIGHT BUTTOCKS;  Surgeon: Eric De La Paz MD;  Location: [facility];  Service: Neurosurgery   • RADIOFREQUENCY ABLATION NERVES     • SPINAL CORD STIMULATOR IMPLANT     • TRIGGER POINT INJECTION     • URETER SURGERY     • URETHRAL FISTULA REPAIR     • US GUIDED THYROID BIOPSY  2020   • WRIST ARTHROSCOPY      with internal fixation        Family History   Problem Relation Age of Onset   • Other Mother         Back Disorder    • Cirrhosis Mother    • Crohn's disease Mother    • Lupus Mother         Systemic Lupus Erythematous    • Depression Mother    • Hypertension Father    • Heart disease Father    • Other Brother         Liver Transplant    • Crohn's disease Brother    • Crohn's disease Brother    • Depression Sister    • No Known Problems Daughter    • No Known Problems Maternal Aunt    • No Known Problems Paternal Aunt    • No Known Problems Cousin    • No Known Problems Cousin    • No Known Problems Cousin    • No Known Problems Cousin    • No Known Problems Cousin    • Seizures Neg Hx    • Breast cancer Neg Hx      I have reviewed and agree with the history as documented  E-Cigarette/Vaping   • E-Cigarette Use Never User      E-Cigarette/Vaping Substances   • Nicotine No    • THC No    • CBD No    • Flavoring No    • Other No    • Unknown No      Social History     Tobacco Use   • Smoking status: Never   • Smokeless tobacco: Never   Vaping Use   • Vaping Use: Never used   Substance Use Topics   • Alcohol use: No   • Drug use: Yes     Types: Marijuana     Comment: medical marijuana 2x a day capsules       Review of Systems   Constitutional: Negative for chills, fatigue and fever  HENT: Negative for sore throat  Respiratory: Negative for cough and shortness of breath  Cardiovascular: Negative for chest pain  Gastrointestinal: Positive for nausea  Negative for constipation, diarrhea, hematemesis, hematochezia, melena and vomiting  Genitourinary: Positive for flank pain and pelvic pain  Negative for dysuria and hematuria  All other systems reviewed and are negative  Physical Exam  Physical Exam  Vitals reviewed  HENT:      Head: Atraumatic  Eyes:      General: No scleral icterus  Cardiovascular:      Rate and Rhythm: Normal rate and regular rhythm  Pulmonary:      Effort: Pulmonary effort is normal    Abdominal:      General: There is no distension  Palpations: Abdomen is soft  Tenderness: There is abdominal tenderness  There is left CVA tenderness  There is no right CVA tenderness, guarding or rebound  Musculoskeletal:         General: No deformity  Cervical back: Neck supple  Skin:     General: Skin is warm and dry  Neurological:      General: No focal deficit present  Mental Status: She is alert     Psychiatric:         Mood and Affect: Mood and affect and mood normal          Vital Signs  ED Triage Vitals   Temperature Pulse Respirations Blood Pressure SpO2   11/16/22 2115 11/16/22 2115 11/16/22 2115 11/16/22 2115 11/16/22 2115   99 °F (37 2 °C) 57 18 141/75 96 %      Temp Source Heart Rate Source Patient Position - Orthostatic VS BP Location FiO2 (%)   11/16/22 2115 11/16/22 2115 11/16/22 2115 11/16/22 2115 --   Oral Monitor Sitting Left arm       Pain Score       11/16/22 2139       7           Vitals:    11/16/22 2115 11/16/22 2330 11/17/22 0000   BP: 141/75 122/60 110/55   Pulse: 57 65 60   Patient Position - Orthostatic VS: Sitting Lying Lying         Visual Acuity      ED Medications  Medications   fentanyl citrate (PF) 100 MCG/2ML 50 mcg (50 mcg Intravenous Given 11/16/22 2325)   iohexol (OMNIPAQUE) 350 MG/ML injection (SINGLE-DOSE) 100 mL (100 mL Intravenous Given 11/16/22 2316)   ceftriaxone (ROCEPHIN) 1 g/50 mL in dextrose IVPB (0 mg Intravenous Stopped 11/17/22 0124)       Diagnostic Studies  Results Reviewed     Procedure Component Value Units Date/Time    Urine culture [[id number]] Collected: 11/16/22 2212    Lab Status:  In process Specimen: Urine Updated: 11/17/22 0044    CMP [[id number]]  (Abnormal) Collected: 11/16/22 2209    Lab Status: Final result Specimen: Blood from Arm, Right Updated: 11/16/22 2310     Sodium 137 mmol/L      Potassium 4 3 mmol/L      Chloride 104 mmol/L      CO2 27 mmol/L      ANION GAP 6 mmol/L      BUN 17 mg/dL      Creatinine 1 16 mg/dL      Glucose 99 mg/dL      Calcium 9 0 mg/dL      Corrected Calcium 9 6 mg/dL      AST 25 U/L      ALT 24 U/L      Alkaline Phosphatase 122 U/L      Total Protein 7 1 g/dL      Albumin 3 3 g/dL      Total Bilirubin 0 19 mg/dL      eGFR 50 ml/min/1 73sq m     Narrative:      Ag guidelines for Chronic Kidney Disease (CKD):   •  Stage 1 with normal or high GFR (GFR > 90 mL/min/1 73 square meters)  •  Stage 2 Mild CKD (GFR = 60-89 mL/min/1 73 square meters)  •  Stage 3A Moderate CKD (GFR = 45-59 mL/min/1 73 square meters)  •  Stage 3B Moderate CKD (GFR = 30-44 mL/min/1 73 square meters)  •  Stage 4 Severe CKD (GFR = 15-29 mL/min/1 73 square meters)  •  Stage 5 End Stage CKD (GFR <15 mL/min/1 73 square meters)  Note: GFR calculation is accurate only with a steady state creatinine    Lipase [[id number]]  (Normal) Collected: 11/16/22 2209    Lab Status: Final result Specimen: Blood from Arm, Right Updated: 11/16/22 2310     Lipase 216 u/L     Urine Microscopic [[id number]]  (Abnormal) Collected: 11/16/22 2212    Lab Status: Final result Specimen: Urine Updated: 11/16/22 2300     RBC, UA 10-20 /hpf      WBC, UA 0-1 /hpf      Epithelial Cells Occasional /hpf      Bacteria, UA Occasional /hpf      MUCUS THREADS Occasional    Lactic acid, plasma [[id number]]  (Normal) Collected: 11/16/22 2209    Lab Status: Final result Specimen: Blood from Arm, Right Updated: 11/16/22 2237     LACTIC ACID 0 5 mmol/L     Narrative:      Result may be elevated if tourniquet was used during collection      Protime-INR [[id number]]  (Normal) Collected: 11/16/22 2209    Lab Status: Final result Specimen: Blood from Arm, Right Updated: 11/16/22 2229     Protime 13 3 seconds      INR 1 03    APTT [[id number]]  (Normal) Collected: 11/16/22 2209    Lab Status: Final result Specimen: Blood from Arm, Right Updated: 11/16/22 2229     PTT 26 seconds     CBC and differential [[id number]]  (Abnormal) Collected: 11/16/22 2209    Lab Status: Final result Specimen: Blood from Arm, Right Updated: 11/16/22 2222     WBC 8 65 Thousand/uL      RBC 3 93 Million/uL      Hemoglobin 11 2 g/dL      Hematocrit 36 6 %      MCV 93 fL      MCH 28 5 pg      MCHC 30 6 g/dL      RDW 13 8 %      MPV 10 4 fL      Platelets 502 Thousands/uL      nRBC 0 /100 WBCs      Neutrophils Relative 62 %      Immat GRANS % 0 %      Lymphocytes Relative 28 %      Monocytes Relative 6 %      Eosinophils Relative 3 %      Basophils Relative 1 %      Neutrophils Absolute 5 35 Thousands/µL      Immature Grans Absolute 0 03 Thousand/uL      Lymphocytes Absolute 2 40 Thousands/µL      Monocytes Absolute 0 52 Thousand/µL      Eosinophils Absolute 0 24 Thousand/µL      Basophils Absolute 0 11 Thousands/µL     UA w Reflex to Microscopic w Reflex to Culture [[id number]]  (Abnormal) Collected: 11/16/22 2212    Lab Status: Final result Specimen: Urine Updated: 11/16/22 2221     Color, UA Yellow     Clarity, UA Clear     Specific Gravity, UA >=1 030     pH, UA 5 5     Leukocytes, UA Negative     Nitrite, UA Negative     Protein, UA Negative mg/dl      Glucose, UA Negative mg/dl      Ketones, UA Negative mg/dl      Urobilinogen, UA 0 2 E U /dl      Bilirubin, UA Negative     Occult Blood, UA Large                 CT abdomen pelvis with contrast   Final Result by Sunil Buckley MD (11/16 2353)      No evidence of acute intra-abdominal or pelvic pathology            Workstation performed: [id number]                    Procedures  Procedures         ED Course  ED Course as of 11/17/22 0235   Wed Nov 16, 2022 2155 With nursing chaperone, patient intertriginous region examined with no signs of Stefanie's gangrene  There is a tiny patch on the left medial leg that could represents eczema which patient notes she has chronically but no erythema and no crepitus  No discrete tenderness to palpation  On repeat evaluation, patient states symptoms of leg appear to be more chronic in nature and appear minimally changed today, noting only that the sensitivity on her left leg is increased but it is always like this  Normal stand (S1) and site (S3)  Gait is normal  Reflexes: patellar (L4) and ankle (S1) normal  Normal resistance dorsiflex great toes bilaterally (L5)  Sensation normal on the legs including the anterior medial thigh (L2), medial epicondyle femur (L3), medial malleolus (L4), dorsal 3rd MTP (L5), lateral calcaneus (S1), popliteal fossa (S2)  Thu Nov 17, 2022   0040 Patient's CT without acute findings    Patient's urinalysis with significant blood however patient states that she has always had blood in her urinalysis since her ureteral injury  Considering no alternative diagnosis and patient's complicated  history with prior read oral injury of her left ureter in the region where the pain is currently, considerations this may reflect untreated infectious etiology with pyelonephritis  Note that this did not appear on CT imaging and patient is clinically nontoxic, afebrile without history of fever with normal white count  Discussed options with the patient and will initiate a dose of ceftriaxone while in the emergency room  Feel it would be reasonable for patient to stay for treatment failure however I also feel as patient is clinically nontoxic that close outpatient follow-up is a reasonable approach and patient is considering this  0130 After extensive discussion with the patient, she prefers outpatient continued treatment  Will prescribe course of cefdinir for the patient which per prior culture results should cover  I discussed emphasized any progressive symptoms with the patient that she should return to the emergency room  Discussed follow-up with urogynecology that she has established relationship with a primary care to review the results of urine culture  Discussed emphasized diagnostic uncertainty the need for continued follow-up  That the patient has sleep apnea so her lower pulse oximetry is secondary to her falling asleep during her stay in the emergency room  Patient denies again any melanotic stools or any hematochezia, hemoglobin stable and patient has seen Gastroenterology for colonoscopies with no problems previously  Patient's BUN negative  Unlikely represent GI bleed I suggested and emphasized continued follow-up regarding this  SBIRT 20yo+    Flowsheet Row Most Recent Value   SBIRT (23 yo +)    In order to provide better care to our patients, we are screening all of our patients for alcohol and drug use  Would it be okay to ask you these screening questions?  No Filed at: 11/16/2022 8795                    MDM    Disposition  Final diagnoses:   Pyelonephritis   Acute cystitis with hematuria - Will treat with Cipro  Will send culture  Time reflects when diagnosis was documented in both MDM as applicable and the Disposition within this note     Time User Action Codes Description Comment    11/17/2022  1:32 AM Macho Lute Add [N12] Pyelonephritis     11/17/2022  1:33 AM Macho Lute Add [N30 01] Acute cystitis with hematuria Will treat with Cipro  Will send culture  ED Disposition     ED Disposition   Discharge    Condition   Stable    Date/Time   Thu Nov 17, 2022  1:33 AM    Comment   [facility]  Follow-up Information     Follow up With Specialties Details Why Contact Info Additional Information    GWEN Thapa Nurse Practitioner Schedule an appointment as soon as possible for a visit in 2 days Follow-up and review of your urine culture  Continued follow-up with urogynecology as discussed   35 [facility] Emergency Department Emergency Medicine Go to  If symptoms worsen [address] Emergency Department, [address] [place] [address], [id number]          Discharge Medication List as of 11/17/2022  1:33 AM      START taking these medications    Details   cefdinir (OMNICEF) 300 mg capsule Take 1 capsule (300 mg total) by mouth every 12 (twelve) hours for 10 days, Starting u 11/17/2022, Until Sun 11/27/2022, Print         CONTINUE these medications which have NOT CHANGED    Details   busPIRone (BUSPAR) 10 mg tablet Take 1 tablet (10 mg total) by mouth 2 (two) times a day, Starting Thu 10/20/2022, Until Tue 4/18/2023, Normal      doxycycline hyclate (VIBRAMYCIN) 100 mg capsule Take 1 capsule (100 mg total) by mouth every 12 (twelve) hours for 5 days, Starting Tue 11/15/2022, Until Sun 11/20/2022, Normal      dronabinol (MARINOL) 5 MG capsule Take 1 capsule (5 mg total) by mouth 2 (two) times a day before meals By Dr Ynes Hughes, Starting Thu 10/25/2018, No Print      Emgality 120 MG/ML SOAJ INJECT 120 MG UNDER THE SKIN EVERY 30 DAYS, Normal      gabapentin (NEURONTIN) 300 mg capsule Take 1 capsule (300 mg total) by mouth 2 (two) times a day, Starting Thu 10/20/2022, Until Tue 4/18/2023, Normal      Iron-Vitamin C 100-250 MG TABS Take by mouth daily, Historical Med      lidocaine (XYLOCAINE) 5 % ointment Apply topically 2 (two) times a day as needed for moderate pain, Starting Fri 1/14/2022, Normal      lisinopril (ZESTRIL) 5 mg tablet Take 1 tablet (5 mg total) by mouth daily, Starting Tue 10/11/2022, Normal      mometasone (ELOCON) 0 1 % cream APPLY TOPICALLY DAILY, Starting Mon 5/16/2022, Normal      Multiple Vitamin (MULTI-VITAMIN DAILY) TABS Take 1 tablet by mouth daily, Historical Med      ondansetron (ZOFRAN-ODT) 4 mg disintegrating tablet Take 1 tablet (4 mg total) by mouth every 6 (six) hours as needed for nausea or vomiting, Starting Wed 4/28/2021, Normal      pantoprazole (PROTONIX) 40 mg tablet TAKE 1 TABLET TWICE A DAY, Normal      propranolol (INDERAL) 60 mg tablet TAKE 1 TABLET TWICE A DAY, Normal      QUEtiapine (SEROquel) 50 mg tablet Take 1 tablet (50 mg total) by mouth daily at bedtime, Starting Thu 10/20/2022, Until Tue 4/18/2023, Normal      rizatriptan (MAXALT-MLT) 10 MG disintegrating tablet PLACE 1 TABLET ON TONGUE ONCE AS NEEDED FOR MIGRAINE MAY REPEAT EVERY 2 HOURS IF NEEDED, MAXIMUM 30 MG IN 24 HOURS, Normal      rosuvastatin (CRESTOR) 10 MG tablet TAKE 1 TABLET DAILY, Normal      sertraline (ZOLOFT) 100 mg tablet Take 1 tablet (100 mg total) by mouth daily, Starting Thu 10/20/2022, Until Tue 4/18/2023, Normal      Botox 200 units SOLR Starting Mon 1/10/2022, Historical Med      Cholecalciferol 25 MCG (1000 UT) capsule Take 1 capsule by mouth daily  , Historical Med         STOP taking these medications       ciprofloxacin (CIPRO) 500 mg tablet Comments:   Reason for Stopping:               No discharge procedures on file      PDMP Review       Value Time User    PDMP Reviewed  Yes 9/7/2022  2:59 PM Camilla Rizo PA-C          ED Provider  Electronically Signed by           Sharon Espinal MD  11/17/22 8971

## 2022-11-17 NOTE — TELEPHONE ENCOUNTER
Regarding: constipation / black stool  ----- Message from [facility] sent at 11/16/2022  7:53 PM EST -----  "My wife is having Constipation, black stool and is not feeling well"

## 2022-11-17 NOTE — TELEPHONE ENCOUNTER
Advised patient to go to the ED based on her symptoms of flank pain, black stools, and left leg weakness  Patient will be going to [facility]; placed patient on ED tracking board

## 2022-11-17 NOTE — TELEPHONE ENCOUNTER
Reason for Disposition  • Black or tarry bowel movements (Exception: chronic-unchanged black-grey bowel movements AND is taking iron pills or Pepto-bismol)    Answer Assessment - Initial Assessment Questions  1  LOCATION: "Where does it hurt?" (e g , left, right)      Left flank pain, started Friday  Went to physician and had UTI; went on antibiotics  Pain radiates down leg  Has hx of kidney stones   stated that patient's daughter stated that the CT wasn't ordered correctly  2  ONSET: "When did the pain start?"      Friday  3  SEVERITY: "How bad is the pain?" (e g , Scale 1-10; mild, moderate, or severe)    - MILD (1-3): doesn't interfere with normal activities     - MODERATE (4-7): interferes with normal activities or awakens from sleep     - SEVERE (8-10): excruciating pain and patient unable to do normal activities (stays in bed)        7/10  4  PATTERN: "Does the pain come and go, or is it constant?"       Over the weekend was worse, has gotten better but still painful  Patient is also having groin pain; skin on left thigh is hyper sensitive; burning sensation  5  CAUSE: "What do you think is causing the pain?"      Unknown  6  OTHER SYMPTOMS:  "Do you have any other symptoms?" (e g , fever, abdominal pain, vomiting, leg weakness, burning with urination, blood in urine)      Pain with urination; shoots up body from left breast to groin  Denies fever  Has nausea, no vomiting  Abdominal pain; lower left abdomen and pain in lower back  Urine was pink until yesterday  Leg weakness/pain going down left leg      Stools were black today    Protocols used: RECTAL BLEEDING-ADULT-, FLANK PAIN-ADULT-

## 2022-11-18 ENCOUNTER — OFFICE VISIT (OUTPATIENT)
Dept: FAMILY MEDICINE CLINIC | Facility: CLINIC | Age: 62
End: 2022-11-18

## 2022-11-18 VITALS
BODY MASS INDEX: 40.97 KG/M2 | OXYGEN SATURATION: 96 % | SYSTOLIC BLOOD PRESSURE: 126 MMHG | DIASTOLIC BLOOD PRESSURE: 78 MMHG | WEIGHT: 240 LBS | HEART RATE: 59 BPM | HEIGHT: 64 IN

## 2022-11-18 DIAGNOSIS — N28.89 KIDNEY MASS: Primary | ICD-10-CM

## 2022-11-18 DIAGNOSIS — G43.719 INTRACTABLE CHRONIC MIGRAINE WITHOUT AURA AND WITHOUT STATUS MIGRAINOSUS: ICD-10-CM

## 2022-11-18 DIAGNOSIS — R51.9 CHRONIC DAILY HEADACHE: ICD-10-CM

## 2022-11-18 DIAGNOSIS — I10 PRIMARY HYPERTENSION: ICD-10-CM

## 2022-11-18 DIAGNOSIS — M79.2 NEURALGIA: ICD-10-CM

## 2022-11-18 DIAGNOSIS — R51.9 NONINTRACTABLE HEADACHE, UNSPECIFIED CHRONICITY PATTERN, UNSPECIFIED HEADACHE TYPE: ICD-10-CM

## 2022-11-18 DIAGNOSIS — R09.02 HYPOXIA: ICD-10-CM

## 2022-11-18 LAB — BACTERIA UR CULT: NORMAL

## 2022-11-18 RX ORDER — PROPRANOLOL HYDROCHLORIDE 60 MG/1
60 TABLET ORAL DAILY
Qty: 180 TABLET | Refills: 3
Start: 2022-11-18

## 2022-11-18 RX ORDER — GABAPENTIN 300 MG/1
300 CAPSULE ORAL 3 TIMES DAILY
Qty: 270 CAPSULE | Refills: 1 | Status: SHIPPED | OUTPATIENT
Start: 2022-11-18 | End: 2023-05-17

## 2022-11-18 NOTE — ASSESSMENT & PLAN NOTE
BP good  Instructed patient to monitor BP at home  She is afraid she is having low blood pressures  Will reduce propanolol to once daily  Follow-up in 1 month and bring blood pressure readings and blood pressure cuff with you

## 2022-11-18 NOTE — ASSESSMENT & PLAN NOTE
6 minute walk test today  Patient did dip to 92  Will do PFTs  Patient experienced O2 sats in the 90s while at hospital   Explained that this could have been because the monitor was not getting good reading  However will not ignore the fact that happen  Patient is wearing her CPAP when sleeping

## 2022-11-18 NOTE — PROGRESS NOTES
Assessment/Plan:         Problem List Items Addressed This Visit        Respiratory    Hypoxia     6 minute walk test today  Patient did dip to 92  Will do PFTs  Patient experienced O2 sats in the 90s while at hospital   Explained that this could have been because the monitor was not getting good reading  However will not ignore the fact that happen  Patient is wearing her CPAP when sleeping  Relevant Orders    Ambulatory Referral to Pulmonology       Cardiovascular and Mediastinum    Hypertension       BP good  Instructed patient to monitor BP at home  She is afraid she is having low blood pressures  Will reduce propanolol to once daily  Follow-up in 1 month and bring blood pressure readings and blood pressure cuff with you  Relevant Medications    propranolol (INDERAL) 60 mg tablet    Migraine       Migraines are good  Will decrease propranolol to once daily  Relevant Medications    gabapentin (NEURONTIN) 300 mg capsule    propranolol (INDERAL) 60 mg tablet       Other    Neuralgia       Increase gabapentin to 3 times daily  Follow-up in 1 month  Relevant Medications    gabapentin (NEURONTIN) 300 mg capsule    Nonintractable headache       Headaches good  Will decrease propranolol to once daily  Relevant Medications    gabapentin (NEURONTIN) 300 mg capsule    Kidney mass - Primary       Please have MRI will call with results  Relevant Orders    MRI abdomen and pelvis cancer staging wo and w contrast   Other Visit Diagnoses     Chronic daily headache        Relevant Medications    gabapentin (NEURONTIN) 300 mg capsule    propranolol (INDERAL) 60 mg tablet            Subjective:      Patient ID: Marianne Hong is a 59 y o  female  Nyasia Big was here last Friday and had a UTI, she was having bladder pain with urination  Since having a hysterectomy she has been having pain and numbness and a sensation of fire in the left groing         The following portions of the patient's history were reviewed and updated as appropriate:   Past Medical History:  She has a past medical history of Anxiety, Arthritis, Attention and concentration deficit, Blurred vision, Chronic pain, Chronic pain syndrome (2016), CPAP (continuous positive airway pressure) dependence, Depression, Diplopia, Dyspepsia, Gastric ulcer, Gastritis, GERD (gastroesophageal reflux disease), Headache(784 0), History of transfusion (), Hypertension, Insomnia, Irritable bowel syndrome, Memory loss, Migraines, Osteopenia, Peripheral neuropathy (3/14/2012), 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), Presence of neurostimulator, Pudendal neuralgia, Sleep apnea, Spinal stenosis, Starting and stopping of urinary stream during micturition, Urinary incontinence, and Wears eyeglasses  ,  _______________________________________________________________________  Medical Problems:  does not have any pertinent problems on file ,  _______________________________________________________________________  Past Surgical History:   has a past surgical history that includes Hysterectomy (2012); Gastric bypass (); Cholecystectomy;  section ();  section (); Gallbladder surgery (); Radiofrequency ablation nerves; Insert / replace peripheral neurostimulator pulse generator / ; Other surgical history (); Wrist arthroscopy; Urethral fistula repair; pr implant spinal neurostim/ (Right, 04/15/2020); US guided thyroid biopsy (2020); Ureter surgery; Mass excision (Right, 2021); Spinal cord stimulator implant (); and Trigger point injection  ,  _______________________________________________________________________  Family History:  family history includes Cirrhosis in her mother; Crohn's disease in her brother, brother, and mother; Depression in her mother and sister;  Heart disease in her father; Hypertension in her father; Lupus in her mother; No Known Problems in her cousin, cousin, cousin, cousin, cousin, daughter, maternal aunt, and paternal aunt; Other in her brother and mother ,  _______________________________________________________________________  Social History:   reports that she has never smoked  She has never used smokeless tobacco  She reports current drug use  Drug: Marijuana  She reports that she does not drink alcohol ,  _______________________________________________________________________  Allergies:  is allergic to morphine, cat hair extract, dog epithelium, and other     _______________________________________________________________________  Current Outpatient Medications   Medication Sig Dispense Refill   • gabapentin (NEURONTIN) 300 mg capsule Take 1 capsule (300 mg total) by mouth 3 (three) times a day 270 capsule 1   • propranolol (INDERAL) 60 mg tablet Take 1 tablet (60 mg total) by mouth in the morning 180 tablet 3   • Botox 200 units SOLR      • busPIRone (BUSPAR) 10 mg tablet Take 1 tablet (10 mg total) by mouth 2 (two) times a day 180 tablet 1   • cefdinir (OMNICEF) 300 mg capsule Take 1 capsule (300 mg total) by mouth every 12 (twelve) hours for 10 days 20 capsule 0   • Cholecalciferol 25 MCG (1000 UT) capsule Take 1 capsule by mouth daily       • doxycycline hyclate (VIBRAMYCIN) 100 mg capsule Take 1 capsule (100 mg total) by mouth every 12 (twelve) hours for 5 days 10 capsule 0   • dronabinol (MARINOL) 5 MG capsule Take 1 capsule (5 mg total) by mouth 2 (two) times a day before meals By Dr Giselle Samuel 60 capsule 0   • Emgality 120 MG/ML SOAJ INJECT 120 MG UNDER THE SKIN EVERY 30 DAYS 3 mL 3   • Iron-Vitamin C 100-250 MG TABS Take by mouth daily     • lidocaine (XYLOCAINE) 5 % ointment Apply topically 2 (two) times a day as needed for moderate pain 35 44 g 1   • lisinopril (ZESTRIL) 5 mg tablet Take 1 tablet (5 mg total) by mouth daily 90 tablet 3   • mometasone (ELOCON) 0 1 % cream APPLY TOPICALLY DAILY 45 g 7   • Multiple Vitamin (MULTI-VITAMIN DAILY) TABS Take 1 tablet by mouth daily     • ondansetron (ZOFRAN-ODT) 4 mg disintegrating tablet Take 1 tablet (4 mg total) by mouth every 6 (six) hours as needed for nausea or vomiting 30 tablet 5   • pantoprazole (PROTONIX) 40 mg tablet TAKE 1 TABLET TWICE A  tablet 3   • phenazopyridine (PYRIDIUM) 100 mg tablet Take 1 tablet (100 mg total) by mouth 3 (three) times a day as needed for bladder spasms 12 tablet 0   • QUEtiapine (SEROquel) 50 mg tablet Take 1 tablet (50 mg total) by mouth daily at bedtime 90 tablet 1   • rizatriptan (MAXALT-MLT) 10 MG disintegrating tablet PLACE 1 TABLET ON TONGUE ONCE AS NEEDED FOR MIGRAINE MAY REPEAT EVERY 2 HOURS IF NEEDED, MAXIMUM 30 MG IN 24 HOURS 12 tablet 23   • rosuvastatin (CRESTOR) 10 MG tablet TAKE 1 TABLET DAILY 90 tablet 3   • sertraline (ZOLOFT) 100 mg tablet Take 1 tablet (100 mg total) by mouth daily 90 tablet 1     No current facility-administered medications for this visit      _______________________________________________________________________  Review of Systems   Constitutional: Negative for appetite change, chills, diaphoresis, fatigue and fever  HENT: Negative for congestion, ear pain, postnasal drip, sinus pressure, sinus pain and sore throat  Eyes: Negative for pain and visual disturbance  Respiratory: Negative for cough, shortness of breath and wheezing  Cardiovascular: Negative for chest pain and palpitations  Gastrointestinal: Positive for abdominal pain (left groin and suprapubic)  Negative for constipation, diarrhea, nausea and vomiting  Genitourinary: Positive for dysuria  Negative for frequency, hematuria and urgency  Musculoskeletal: Positive for arthralgias (neuropathic pain in the left leg)  Negative for back pain and myalgias  Skin: Negative for color change and rash  Neurological: Negative for dizziness, seizures, syncope, light-headedness and headaches     Psychiatric/Behavioral: Negative for dysphoric mood and sleep disturbance  The patient is not nervous/anxious  All other systems reviewed and are negative  Objective:  Vitals:    11/18/22 0855   BP: 126/78   Pulse: 59   SpO2: 96%   Weight: 109 kg (240 lb)   Height: 5' 4" (1 626 m)     Body mass index is 41 2 kg/m²  Physical Exam  Vitals and nursing note reviewed  Constitutional:       Appearance: Normal appearance  She is not ill-appearing  HENT:      Head: Normocephalic  Right Ear: Tympanic membrane, ear canal and external ear normal  There is no impacted cerumen  Left Ear: Tympanic membrane, ear canal and external ear normal  There is no impacted cerumen  Nose: Nose normal  No congestion  Mouth/Throat:      Mouth: Mucous membranes are moist       Pharynx: No posterior oropharyngeal erythema  Eyes:      Extraocular Movements: Extraocular movements intact  Cardiovascular:      Rate and Rhythm: Normal rate and regular rhythm  Heart sounds: Normal heart sounds  No murmur heard  Pulmonary:      Effort: Pulmonary effort is normal       Breath sounds: Normal breath sounds  No wheezing  Abdominal:      Palpations: Abdomen is soft  Tenderness: There is no abdominal tenderness  Musculoskeletal:         General: Normal range of motion  Cervical back: Normal range of motion  Right lower leg: No edema  Left lower leg: No edema  Skin:     General: Skin is warm and dry  Neurological:      General: No focal deficit present  Mental Status: She is alert     Psychiatric:         Mood and Affect: Mood normal          Behavior: Behavior normal

## 2022-11-21 ENCOUNTER — HOSPITAL ENCOUNTER (OUTPATIENT)
Dept: ULTRASOUND IMAGING | Facility: CLINIC | Age: 62
Discharge: HOME | End: 2022-11-21

## 2022-11-21 DIAGNOSIS — N28.1 CYST OF LEFT KIDNEY: ICD-10-CM

## 2022-11-23 ENCOUNTER — OFFICE VISIT (OUTPATIENT)
Dept: GASTROENTEROLOGY | Facility: CLINIC | Age: 62
End: 2022-11-23

## 2022-11-23 VITALS
WEIGHT: 239.8 LBS | SYSTOLIC BLOOD PRESSURE: 118 MMHG | DIASTOLIC BLOOD PRESSURE: 70 MMHG | HEIGHT: 64 IN | HEART RATE: 68 BPM | OXYGEN SATURATION: 98 % | BODY MASS INDEX: 40.94 KG/M2

## 2022-11-23 DIAGNOSIS — K29.80 DUODENITIS: Primary | ICD-10-CM

## 2022-11-23 DIAGNOSIS — D64.9 ANEMIA, UNSPECIFIED TYPE: ICD-10-CM

## 2022-11-23 RX ORDER — SUCRALFATE 1 G/1
1 TABLET ORAL 4 TIMES DAILY
Qty: 120 TABLET | Refills: 0 | Status: SHIPPED | OUTPATIENT
Start: 2022-11-23

## 2022-11-23 NOTE — PATIENT INSTRUCTIONS
Scheduled date of EGD(as of today):12/19/22  Physician performing EGD:Anurag  Location of EGD:[place]  Instructions reviewed with patient by:Venecia BOONE  Clearances:  none

## 2022-11-23 NOTE — H&P (VIEW-ONLY)
[facility] - Outpatient Follow-up Note  Arlene Mims 59 y o  female MRN: [id number]  Encounter: [id number]          ASSESSMENT AND PLAN:      1  Duodenitis  2  Anemia, unspecified type  - She was in the ER on 11/16 with severe pelvic and neuropathic pain as well as a UTI, found to have evidence of duodenitis on a CT scan as well as reportedly dark stool and a mild drop in her Hb to 11 2 from baseline of 12 3  - Schedule EGD to rule out marginal ulcer  - Continue protonix 40 mg BID  - Add carafate 1 g QID x 1 month  - Advised her to let me know if she sees melena or has symptoms of worsening anemia as we can send her for a repeat CBC    ______________________________________________________________________    SUBJECTIVE:  Yoni Llanos is a 58 yo F with a history of gastric bypass, hysterectomy in 0160 complicated by ureter injury, CATHRYN on CPAP, HTN, migraines, presenting after she was seen in the ER on November 16th for left pelvic and flank pain radiating to her groin and with a diagnosis of a UTI a few days prior but her pain was still severe  She reports that since her ureter injury in 2012, she does not get the urge to urinate and does not get typical UTI symptoms so her symptoms are usually severe by the time she is diagnosed and she reports neuropathic pain that accompanies the UTIs  She had a CT scan while in the ER and this showed focal duodenitis so she was instructed to follow up with GI  She reported pill like stools that were very dark but not tarry  She was not eating or drinking well during this time because of the severity of her pain  She does have nausea that is somewhat chronic in typically associated with migraines as well as the chronic pain she experiences  She denies any heartburn, regurgitation, or reflux symptoms  She is on Protonix 40 mg b i d  which has controlled her symptoms well  She does have a history of an upper GI bleed many years ago as well as PUD  REVIEW OF SYSTEMS IS OTHERWISE NEGATIVE        Historical Information   Past Medical History:   Diagnosis Date   • Anxiety    • Arthritis    • Attention and concentration deficit    • Blurred vision    • Chronic pain    • Chronic pain syndrome 2016   • CPAP (continuous positive airway pressure) dependence    • Depression    • Diplopia    • Dyspepsia    • Gastric ulcer    • Gastritis    • GERD (gastroesophageal reflux disease)    • Headache(784 0)    • History of transfusion    • Hypertension    • Insomnia    • Irritable bowel syndrome    • Memory loss    • Migraines    • Osteopenia    • Peripheral neuropathy 3/14/2012    Left thigh, left abdomen, left prudential nerve   • 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   • Presence of neurostimulator     Pudendal neuralgia   • Pudendal neuralgia    • Sleep apnea    • Spinal stenosis    • Starting and stopping of urinary stream during micturition    • Urinary incontinence    • Wears eyeglasses      Past Surgical History:   Procedure Laterality Date   •  SECTION     •  SECTION     • CHOLECYSTECTOMY     • Mariella3 Stacey Taylor   • GASTRIC BYPASS     • HYSTERECTOMY  2012   • INSERT / REPLACE PERIPHERAL NEUROSTIMULATOR PULSE GENERATOR /      • MASS EXCISION Right 2021    Procedure: EXCISION  BIOPSY LESION/MASS LOWER NECK;  Surgeon: Diana Judd DO;  Location: [facility];  Service: General   • OTHER SURGICAL HISTORY  2012    [facility]implantatin at [facility]    • OR IMPLANT SPINAL NEUROSTIM/ Right 04/15/2020    Procedure: [address],  RIGHT BUTTOCKS;  Surgeon: Javier Shaffer MD;  Location:  MAIN OR;  Service: Neurosurgery   • RADIOFREQUENCY ABLATION NERVES     • SPINAL CORD STIMULATOR IMPLANT     • TRIGGER POINT INJECTION     • URETER SURGERY     • URETHRAL FISTULA REPAIR     • US GUIDED THYROID BIOPSY  2020   • WRIST ARTHROSCOPY      with internal fixation      Social History   Social History     Substance and Sexual Activity   Alcohol Use No     Social History     Substance and Sexual Activity   Drug Use Yes   • Types: Marijuana    Comment: medical marijuana 2x a day capsules     Social History     Tobacco Use   Smoking Status Never   Smokeless Tobacco Never     Family History   Problem Relation Age of Onset   • Other Mother         Back Disorder    • Cirrhosis Mother    • Crohn's disease Mother    • Lupus Mother         Systemic Lupus Erythematous    • Depression Mother    • Hypertension Father    • Heart disease Father    • Other Brother         Liver Transplant    • Crohn's disease Brother    • Crohn's disease Brother    • Depression Sister    • No Known Problems Daughter    • No Known Problems Maternal Aunt    • No Known Problems Paternal Aunt    • No Known Problems Cousin    • No Known Problems Cousin    • No Known Problems Cousin    • No Known Problems Cousin    • No Known Problems Cousin    • Seizures Neg Hx    • Breast cancer Neg Hx        Meds/Allergies       Current Outpatient Medications:   •  Botox 200 units SOLR  •  busPIRone (BUSPAR) 10 mg tablet  •  cefdinir (OMNICEF) 300 mg capsule  •  Cholecalciferol 25 MCG (1000 UT) capsule  •  dronabinol (MARINOL) 5 MG capsule  •  Emgality 120 MG/ML SOAJ  •  gabapentin (NEURONTIN) 300 mg capsule  •  Iron-Vitamin C 100-250 MG TABS  •  lidocaine (XYLOCAINE) 5 % ointment  •  lisinopril (ZESTRIL) 5 mg tablet  •  mometasone (ELOCON) 0 1 % cream  •  Multiple Vitamin (MULTI-VITAMIN DAILY) TABS  •  ondansetron (ZOFRAN-ODT) 4 mg disintegrating tablet  •  pantoprazole (PROTONIX) 40 mg tablet  •  phenazopyridine (PYRIDIUM) 100 mg tablet  •  propranolol (INDERAL) 60 mg tablet  •  QUEtiapine (SEROquel) 50 mg tablet  •  rizatriptan (MAXALT-MLT) 10 MG disintegrating tablet  •  rosuvastatin (CRESTOR) 10 MG tablet  •  sertraline (ZOLOFT) 100 mg tablet  •  sucralfate (CARAFATE) 1 g tablet    Allergies   Allergen Reactions   • Morphine Abdominal Pain     SEVERE N/V   • Cat Hair Extract Nasal Congestion     Cat Dander   • Dog Epithelium Nasal Congestion   • Other Other (See Comments)     Dogs- sneezing  Crab Meat- GI intolerance           Objective     Blood pressure 118/70, pulse 68, height 5' 4" (1 626 m), weight 109 kg (239 lb 12 8 oz), SpO2 98 %, not currently breastfeeding  Body mass index is 41 16 kg/m²  PHYSICAL EXAM:      General Appearance:   Alert, cooperative, no distress   HEENT:   Normocephalic, atraumatic, anicteric      Neck:  Supple, symmetrical, trachea midline   Lungs:   Clear to auscultation bilaterally; no rales, rhonchi or wheezing; respirations unlabored    Heart[de-identified]   Regular rate and rhythm; no murmur, rub, or gallop  Abdomen:   Soft, non-tender, non-distended; normal bowel sounds; no masses, no organomegaly    Genitalia:   Deferred    Rectal:   Deferred    Extremities:  No cyanosis, clubbing or edema    Pulses:  2+ and symmetric    Skin:  No jaundice, rashes, or lesions    Lymph nodes:  No palpable cervical lymphadenopathy        Lab Results:   No visits with results within 1 Day(s) from this visit     Latest known visit with results is:   Admission on 11/16/2022, Discharged on 11/17/2022   Component Date Value   • WBC 11/16/2022 8 65    • RBC 11/16/2022 3 93    • Hemoglobin 11/16/2022 11 2 (L)    • Hematocrit 11/16/2022 36 6    • MCV 11/16/2022 93    • MCH 11/16/2022 28 5    • MCHC 11/16/2022 30 6 (L)    • RDW 11/16/2022 13 8    • MPV 11/16/2022 10 4    • Platelets 33/96/1800 270    • nRBC 11/16/2022 0    • Neutrophils Relative 11/16/2022 62    • Immat GRANS % 11/16/2022 0    • Lymphocytes Relative 11/16/2022 28    • Monocytes Relative 11/16/2022 6    • Eosinophils Relative 11/16/2022 3    • Basophils Relative 11/16/2022 1    • Neutrophils Absolute 11/16/2022 5 35    • Immature Grans Absolute 11/16/2022 0 03    • Lymphocytes Absolute 11/16/2022 2 40    • Monocytes Absolute 11/16/2022 0 52    • Eosinophils Absolute 11/16/2022 0 24    • Basophils Absolute 11/16/2022 0 11 (H)    • Sodium 11/16/2022 137    • Potassium 11/16/2022 4 3    • Chloride 11/16/2022 104    • CO2 11/16/2022 27    • ANION GAP 11/16/2022 6    • BUN 11/16/2022 17    • Creatinine 11/16/2022 1 16    • Glucose 11/16/2022 99    • Calcium 11/16/2022 9 0    • Corrected Calcium 11/16/2022 9 6    • AST 11/16/2022 25    • ALT 11/16/2022 24    • Alkaline Phosphatase 11/16/2022 122 (H)    • Total Protein 11/16/2022 7 1    • Albumin 11/16/2022 3 3 (L)    • Total Bilirubin 11/16/2022 0 19 (L)    • eGFR 11/16/2022 50    • Lipase 11/16/2022 216    • Color, UA 11/16/2022 Yellow    • Clarity, UA 11/16/2022 Clear    • Specific Gravity, UA 11/16/2022 >=1 030    • pH, UA 11/16/2022 5 5    • Leukocytes, UA 11/16/2022 Negative    • Nitrite, UA 11/16/2022 Negative    • Protein, UA 11/16/2022 Negative    • Glucose, UA 11/16/2022 Negative    • Ketones, UA 11/16/2022 Negative    • Urobilinogen, UA 11/16/2022 0 2    • Bilirubin, UA 11/16/2022 Negative    • Occult Blood, UA 11/16/2022 Large (A)    • LACTIC ACID 11/16/2022 0 5    • Protime 11/16/2022 13 3    • INR 11/16/2022 1 03    • PTT 11/16/2022 26    • RBC, UA 11/16/2022 10-20 (A)    • WBC, UA 11/16/2022 0-1    • Epithelial Cells 11/16/2022 Occasional    • Bacteria, UA 11/16/2022 Occasional    • MUCUS THREADS 11/16/2022 Occasional (A)    • Urine Culture 11/16/2022 No Growth <1000 cfu/mL          Radiology Results:   CT abdomen pelvis wo contrast    Result Date: 11/16/2022  Narrative: CT ABDOMEN AND PELVIS WITHOUT IV CONTRAST INDICATION:   R10 9: Unspecified abdominal pain  COMPARISON:  3/11/2020  TECHNIQUE:  CT examination of the abdomen and pelvis was performed without intravenous contrast  Axial, sagittal, and coronal 2D reformatted images were created from the source data and submitted for interpretation  Radiation dose length product (DLP) for this visit:  1226 mGy-cm     This examination, like all CT scans performed in the [facility], was performed utilizing techniques to minimize radiation dose exposure, including the use of iterative reconstruction and automated exposure control  Enteric contrast was administered  FINDINGS: ABDOMEN LOWER CHEST:  No clinically significant abnormality identified in the visualized lower chest   Stable subpleural 4 mm groundglass nodular density in the right middle lobe  LIVER/BILIARY TREE:  Unremarkable  GALLBLADDER:  Gallbladder is surgically absent  SPLEEN:  Unremarkable  PANCREAS:  Unremarkable  ADRENAL GLANDS:  Unremarkable  KIDNEYS/URETERS:  2 1 cm hypodensity in the posterior midpole of the left kidney, mildly increased in size compared to the prior exam (measuring 1 5 cm on the prior study)  Recommend dedicated renal mass protocol contrast enhanced multiphase abdominal CT/MRI or renal ultrasound for better evaluation  Right kidney is unremarkable  No nephrolithiasis, hydronephrosis, perinephric fluid collections bilaterally  STOMACH AND BOWEL:  Status post gastric bypass surgery with mild distention of the gastric pouch containing heterogeneous density material   The occluded stomach is contracted and unremarkable  The small and large bowel loops appear normal in course and  caliber without evidence for obstruction  Small area of subtle hazy stranding adjacent to the distal transverse duodenal noted which may indicate mild focal duodenitis  No additional areas of mesenteric inflammation in the abdomen/pelvis  Terminal ileum appeared unremarkable  Appendix is not definitively seen but no significant pericecal inflammatory changes  Large bowel is unremarkable  APPENDIX:  No findings to suggest appendicitis  ABDOMINOPELVIC CAVITY:  No ascites  No pneumoperitoneum  No lymphadenopathy by size criteria  Nonspecific subcentimeter mesenteric lymph nodes noted  VESSELS:  Unremarkable for patient's age   PELVIS REPRODUCTIVE ORGANS:  Surgical changes of prior hysterectomy  URINARY BLADDER:  Mildly distended and grossly unremarkable  ABDOMINAL WALL/INGUINAL REGIONS:   Right posterior lower flank region no stimulator device noted with wires terminating at the T9 level in the posterior thecal sac  No subcutaneous mass, fluid collection, or hernia  OSSEOUS STRUCTURES:  No acute fracture or destructive osseous lesion  Impression: 1  Small area of subtle hazy stranding adjacent to the distal transverse duodenal noted which may indicate mild focal duodenitis  If pain or clinical concern persists, consider upper endoscopy for further evaluation and to exclude neoplasm  No additional areas of mesenteric inflammation in the abdomen/pelvis  No evidence for bowel obstruction, obstructive uropathy, free air, or free fluid  2   Status post cholecystectomy and gastric bypass surgery without apparent complications  3   2 1 cm ill-defined hypodensity in the posterior midpole of the left kidney which is mildly increased in size since the prior study of 3/11/ 2020  This could be a renal cysts but incompletely characterized on this limited noncontrast exam   Recommend  dedicated renal mass protocol contrast enhanced multiphase abdominal CT/MRI or renal ultrasound for better evaluation  Workstation performed: [id number]     CT abdomen pelvis with contrast    Result Date: 11/16/2022  Narrative: CT ABDOMEN AND PELVIS WITH IV CONTRAST INDICATION:   Left sided abd pain / flank pain  COMPARISON:  None  TECHNIQUE:  CT examination of the abdomen and pelvis was performed  Axial, sagittal, and coronal 2D reformatted images were created from the source data and submitted for interpretation  Radiation dose length product (DLP) for this visit:  1072 mGy-cm   This examination, like all CT scans performed in the [place], was performed utilizing techniques to minimize radiation dose exposure, including the use of iterative reconstruction and automated exposure control   IV Contrast:  100 mL of iohexol (OMNIPAQUE) Enteric Contrast:  Enteric contrast was not administered  FINDINGS: ABDOMEN LOWER CHEST:  No clinically significant abnormality identified in the visualized lower chest  LIVER/BILIARY TREE:  Unremarkable  GALLBLADDER:  Gallbladder is surgically absent  SPLEEN:  Unremarkable  PANCREAS:  Unremarkable  ADRENAL GLANDS:  Unremarkable  KIDNEYS/URETERS:  Unremarkable  No hydronephrosis  STOMACH AND BOWEL:  Status post gastric bypass  APPENDIX:  No findings to suggest appendicitis  ABDOMINOPELVIC CAVITY:  No ascites  No pneumoperitoneum  No lymphadenopathy  VESSELS:  Unremarkable for patient's age  PELVIS REPRODUCTIVE ORGANS:  Surgical changes of prior hysterectomy  URINARY BLADDER:  Unremarkable  ABDOMINAL WALL/INGUINAL REGIONS:  Unremarkable  OSSEOUS STRUCTURES:  No acute fracture or destructive osseous lesion  Dorsal column stimulator in place       Impression: No evidence of acute intra-abdominal or pelvic pathology Workstation performed: [id number]

## 2022-11-23 NOTE — PROGRESS NOTES
[address] [facility] - Outpatient Follow-up Note  Jairo Dueñas 59 y o  female MRN: [id number]  Encounter: [id number]          ASSESSMENT AND PLAN:      1  Duodenitis  2  Anemia, unspecified type  - She was in the ER on 11/16 with severe pelvic and neuropathic pain as well as a UTI, found to have evidence of duodenitis on a CT scan as well as reportedly dark stool and a mild drop in her Hb to 11 2 from baseline of 12 3  - Schedule EGD to rule out marginal ulcer  - Continue protonix 40 mg BID  - Add carafate 1 g QID x 1 month  - Advised her to let me know if she sees melena or has symptoms of worsening anemia as we can send her for a repeat CBC    ______________________________________________________________________    SUBJECTIVE:  Jasbir walters is a 60 yo F with a history of gastric bypass, hysterectomy in 4992 complicated by ureter injury, CATHRYN on CPAP, HTN, migraines, presenting after she was seen in the ER on November 16th for left pelvic and flank pain radiating to her groin and with a diagnosis of a UTI a few days prior but her pain was still severe  She reports that since her ureter injury in 2012, she does not get the urge to urinate and does not get typical UTI symptoms so her symptoms are usually severe by the time she is diagnosed and she reports neuropathic pain that accompanies the UTIs  She had a CT scan while in the ER and this showed focal duodenitis so she was instructed to follow up with GI  She reported pill like stools that were very dark but not tarry  She was not eating or drinking well during this time because of the severity of her pain  She does have nausea that is somewhat chronic in typically associated with migraines as well as the chronic pain she experiences  She denies any heartburn, regurgitation, or reflux symptoms  She is on Protonix 40 mg b i d  which has controlled her symptoms well  She does have a history of an upper GI bleed many years ago as well as PUD  REVIEW OF SYSTEMS IS OTHERWISE NEGATIVE        Historical Information   Past Medical History:   Diagnosis Date   • Anxiety    • Arthritis    • Attention and concentration deficit    • Blurred vision    • Chronic pain    • Chronic pain syndrome 2016   • CPAP (continuous positive airway pressure) dependence    • Depression    • Diplopia    • Dyspepsia    • Gastric ulcer    • Gastritis    • GERD (gastroesophageal reflux disease)    • Headache(784 0)    • History of transfusion    • Hypertension    • Insomnia    • Irritable bowel syndrome    • Memory loss    • Migraines    • Osteopenia    • Peripheral neuropathy 3/14/2012    Left thigh, left abdomen, left prudential nerve   • 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   • Presence of neurostimulator     Pudendal neuralgia   • Pudendal neuralgia    • Sleep apnea    • Spinal stenosis    • Starting and stopping of urinary stream during micturition    • Urinary incontinence    • Wears eyeglasses      Past Surgical History:   Procedure Laterality Date   •  SECTION     •  SECTION     • CHOLECYSTECTOMY     • Mariella3 Stacey Taylor   • GASTRIC BYPASS     • HYSTERECTOMY  2012   • INSERT / REPLACE PERIPHERAL NEUROSTIMULATOR PULSE GENERATOR /      • MASS EXCISION Right 2021    Procedure: EXCISION  BIOPSY LESION/MASS LOWER NECK;  Surgeon: Ginger Richard DO;  Location: [facility];  Service: General   • OTHER SURGICAL HISTORY  2012    Reimplantatin at [facility]    • [place] IMPLANT SPINAL NEUROSTIM/ Right 04/15/2020    Procedure: [address],  RIGHT BUTTOCKS;  Surgeon: Priscilla Boston MD;  Location:  MAIN OR;  Service: Neurosurgery   • RADIOFREQUENCY ABLATION NERVES     • SPINAL CORD STIMULATOR IMPLANT     • TRIGGER POINT INJECTION     • URETER SURGERY     • URETHRAL FISTULA REPAIR     • US GUIDED THYROID BIOPSY  2020   • WRIST ARTHROSCOPY      with internal fixation      Social History   Social History     Substance and Sexual Activity   Alcohol Use No     Social History     Substance and Sexual Activity   Drug Use Yes   • Types: Marijuana    Comment: medical marijuana 2x a day capsules     Social History     Tobacco Use   Smoking Status Never   Smokeless Tobacco Never     Family History   Problem Relation Age of Onset   • Other Mother         Back Disorder    • Cirrhosis Mother    • Crohn's disease Mother    • Lupus Mother         Systemic Lupus Erythematous    • Depression Mother    • Hypertension Father    • Heart disease Father    • Other Brother         Liver Transplant    • Crohn's disease Brother    • Crohn's disease Brother    • Depression Sister    • No Known Problems Daughter    • No Known Problems Maternal Aunt    • No Known Problems Paternal Aunt    • No Known Problems Cousin    • No Known Problems Cousin    • No Known Problems Cousin    • No Known Problems Cousin    • No Known Problems Cousin    • Seizures Neg Hx    • Breast cancer Neg Hx        Meds/Allergies       Current Outpatient Medications:   •  Botox 200 units SOLR  •  busPIRone (BUSPAR) 10 mg tablet  •  cefdinir (OMNICEF) 300 mg capsule  •  Cholecalciferol 25 MCG (1000 UT) capsule  •  dronabinol (MARINOL) 5 MG capsule  •  Emgality 120 MG/ML SOAJ  •  gabapentin (NEURONTIN) 300 mg capsule  •  Iron-Vitamin C 100-250 MG TABS  •  lidocaine (XYLOCAINE) 5 % ointment  •  lisinopril (ZESTRIL) 5 mg tablet  •  mometasone (ELOCON) 0 1 % cream  •  Multiple Vitamin (MULTI-VITAMIN DAILY) TABS  •  ondansetron (ZOFRAN-ODT) 4 mg disintegrating tablet  •  pantoprazole (PROTONIX) 40 mg tablet  •  phenazopyridine (PYRIDIUM) 100 mg tablet  •  propranolol (INDERAL) 60 mg tablet  •  QUEtiapine (SEROquel) 50 mg tablet  •  rizatriptan (MAXALT-MLT) 10 MG disintegrating tablet  •  rosuvastatin (CRESTOR) 10 MG tablet  •  sertraline (ZOLOFT) 100 mg tablet  •  sucralfate (CARAFATE) 1 g tablet    Allergies   Allergen Reactions   • Morphine Abdominal Pain     SEVERE N/V   • Cat Hair Extract Nasal Congestion     Cat Dander   • Dog Epithelium Nasal Congestion   • Other Other (See Comments)     Dogs- sneezing  Crab Meat- GI intolerance           Objective     Blood pressure 118/70, pulse 68, height 5' 4" (1 626 m), weight 109 kg (239 lb 12 8 oz), SpO2 98 %, not currently breastfeeding  Body mass index is 41 16 kg/m²  PHYSICAL EXAM:      General Appearance:   Alert, cooperative, no distress   HEENT:   Normocephalic, atraumatic, anicteric      Neck:  Supple, symmetrical, trachea midline   Lungs:   Clear to auscultation bilaterally; no rales, rhonchi or wheezing; respirations unlabored    Heart[de-identified]   Regular rate and rhythm; no murmur, rub, or gallop  Abdomen:   Soft, non-tender, non-distended; normal bowel sounds; no masses, no organomegaly    Genitalia:   Deferred    Rectal:   Deferred    Extremities:  No cyanosis, clubbing or edema    Pulses:  2+ and symmetric    Skin:  No jaundice, rashes, or lesions    Lymph nodes:  No palpable cervical lymphadenopathy        Lab Results:   No visits with results within 1 Day(s) from this visit     Latest known visit with results is:   Admission on 11/16/2022, Discharged on 11/17/2022   Component Date Value   • WBC 11/16/2022 8 65    • RBC 11/16/2022 3 93    • Hemoglobin 11/16/2022 11 2 (L)    • Hematocrit 11/16/2022 36 6    • MCV 11/16/2022 93    • MCH 11/16/2022 28 5    • MCHC 11/16/2022 30 6 (L)    • RDW 11/16/2022 13 8    • MPV 11/16/2022 10 4    • Platelets 70/80/0464 270    • nRBC 11/16/2022 0    • Neutrophils Relative 11/16/2022 62    • Immat GRANS % 11/16/2022 0    • Lymphocytes Relative 11/16/2022 28    • Monocytes Relative 11/16/2022 6    • Eosinophils Relative 11/16/2022 3    • Basophils Relative 11/16/2022 1    • Neutrophils Absolute 11/16/2022 5 35    • Immature Grans Absolute 11/16/2022 0 03    • Lymphocytes Absolute 11/16/2022 2 40    • Monocytes Absolute 11/16/2022 0 52    • Eosinophils Absolute 11/16/2022 0 24    • Basophils Absolute 11/16/2022 0 11 (H)    • Sodium 11/16/2022 137    • Potassium 11/16/2022 4 3    • Chloride 11/16/2022 104    • CO2 11/16/2022 27    • ANION GAP 11/16/2022 6    • BUN 11/16/2022 17    • Creatinine 11/16/2022 1 16    • Glucose 11/16/2022 99    • Calcium 11/16/2022 9 0    • Corrected Calcium 11/16/2022 9 6    • AST 11/16/2022 25    • ALT 11/16/2022 24    • Alkaline Phosphatase 11/16/2022 122 (H)    • Total Protein 11/16/2022 7 1    • Albumin 11/16/2022 3 3 (L)    • Total Bilirubin 11/16/2022 0 19 (L)    • eGFR 11/16/2022 50    • Lipase 11/16/2022 216    • Color, UA 11/16/2022 Yellow    • Clarity, UA 11/16/2022 Clear    • Specific Gravity, UA 11/16/2022 >=1 030    • pH, UA 11/16/2022 5 5    • Leukocytes, UA 11/16/2022 Negative    • Nitrite, UA 11/16/2022 Negative    • Protein, UA 11/16/2022 Negative    • Glucose, UA 11/16/2022 Negative    • Ketones, UA 11/16/2022 Negative    • Urobilinogen, UA 11/16/2022 0 2    • Bilirubin, UA 11/16/2022 Negative    • Occult Blood, UA 11/16/2022 Large (A)    • LACTIC ACID 11/16/2022 0 5    • Protime 11/16/2022 13 3    • INR 11/16/2022 1 03    • PTT 11/16/2022 26    • RBC, UA 11/16/2022 10-20 (A)    • WBC, UA 11/16/2022 0-1    • Epithelial Cells 11/16/2022 Occasional    • Bacteria, UA 11/16/2022 Occasional    • MUCUS THREADS 11/16/2022 Occasional (A)    • Urine Culture 11/16/2022 No Growth <1000 cfu/mL          Radiology Results:   CT abdomen pelvis wo contrast    Result Date: 11/16/2022  Narrative: CT ABDOMEN AND PELVIS WITHOUT IV CONTRAST INDICATION:   R10 9: Unspecified abdominal pain  COMPARISON:  3/11/2020  TECHNIQUE:  CT examination of the abdomen and pelvis was performed without intravenous contrast  Axial, sagittal, and coronal 2D reformatted images were created from the source data and submitted for interpretation  Radiation dose length product (DLP) for this visit:  1226 mGy-cm     This examination, like all CT scans performed in the [facility], was performed utilizing techniques to minimize radiation dose exposure, including the use of iterative reconstruction and automated exposure control  Enteric contrast was administered  FINDINGS: ABDOMEN LOWER CHEST:  No clinically significant abnormality identified in the visualized lower chest   Stable subpleural 4 mm groundglass nodular density in the right middle lobe  LIVER/BILIARY TREE:  Unremarkable  GALLBLADDER:  Gallbladder is surgically absent  SPLEEN:  Unremarkable  PANCREAS:  Unremarkable  ADRENAL GLANDS:  Unremarkable  KIDNEYS/URETERS:  2 1 cm hypodensity in the posterior midpole of the left kidney, mildly increased in size compared to the prior exam (measuring 1 5 cm on the prior study)  Recommend dedicated renal mass protocol contrast enhanced multiphase abdominal CT/MRI or renal ultrasound for better evaluation  Right kidney is unremarkable  No nephrolithiasis, hydronephrosis, perinephric fluid collections bilaterally  STOMACH AND BOWEL:  Status post gastric bypass surgery with mild distention of the gastric pouch containing heterogeneous density material   The occluded stomach is contracted and unremarkable  The small and large bowel loops appear normal in course and  caliber without evidence for obstruction  Small area of subtle hazy stranding adjacent to the distal transverse duodenal noted which may indicate mild focal duodenitis  No additional areas of mesenteric inflammation in the abdomen/pelvis  Terminal ileum appeared unremarkable  Appendix is not definitively seen but no significant pericecal inflammatory changes  Large bowel is unremarkable  APPENDIX:  No findings to suggest appendicitis  ABDOMINOPELVIC CAVITY:  No ascites  No pneumoperitoneum  No lymphadenopathy by size criteria  Nonspecific subcentimeter mesenteric lymph nodes noted  VESSELS:  Unremarkable for patient's age   PELVIS REPRODUCTIVE ORGANS:  Surgical changes of prior hysterectomy  URINARY BLADDER:  Mildly distended and grossly unremarkable  ABDOMINAL WALL/INGUINAL REGIONS:   Right posterior lower flank region no stimulator device noted with wires terminating at the T9 level in the posterior thecal sac  No subcutaneous mass, fluid collection, or hernia  OSSEOUS STRUCTURES:  No acute fracture or destructive osseous lesion  Impression: 1  Small area of subtle hazy stranding adjacent to the distal transverse duodenal noted which may indicate mild focal duodenitis  If pain or clinical concern persists, consider upper endoscopy for further evaluation and to exclude neoplasm  No additional areas of mesenteric inflammation in the abdomen/pelvis  No evidence for bowel obstruction, obstructive uropathy, free air, or free fluid  2   Status post cholecystectomy and gastric bypass surgery without apparent complications  3   2 1 cm ill-defined hypodensity in the posterior midpole of the left kidney which is mildly increased in size since the prior study of 3/11/ 2020  This could be a renal cysts but incompletely characterized on this limited noncontrast exam   Recommend  dedicated renal mass protocol contrast enhanced multiphase abdominal CT/MRI or renal ultrasound for better evaluation  Workstation performed: [id number]     CT abdomen pelvis with contrast    Result Date: 11/16/2022  Narrative: CT ABDOMEN AND PELVIS WITH IV CONTRAST INDICATION:   Left sided abd pain / flank pain  COMPARISON:  None  TECHNIQUE:  CT examination of the abdomen and pelvis was performed  Axial, sagittal, and coronal 2D reformatted images were created from the source data and submitted for interpretation  Radiation dose length product (DLP) for this visit:  1072 mGy-cm   This examination, like all CT scans performed in the [facility], was performed utilizing techniques to minimize radiation dose exposure, including the use of iterative reconstruction and automated exposure control   IV Contrast:  100 mL of iohexol (OMNIPAQUE) Enteric Contrast:  Enteric contrast was not administered  FINDINGS: ABDOMEN LOWER CHEST:  No clinically significant abnormality identified in the visualized lower chest  LIVER/BILIARY TREE:  Unremarkable  GALLBLADDER:  Gallbladder is surgically absent  SPLEEN:  Unremarkable  PANCREAS:  Unremarkable  ADRENAL GLANDS:  Unremarkable  KIDNEYS/URETERS:  Unremarkable  No hydronephrosis  STOMACH AND BOWEL:  Status post gastric bypass  APPENDIX:  No findings to suggest appendicitis  ABDOMINOPELVIC CAVITY:  No ascites  No pneumoperitoneum  No lymphadenopathy  VESSELS:  Unremarkable for patient's age  PELVIS REPRODUCTIVE ORGANS:  Surgical changes of prior hysterectomy  URINARY BLADDER:  Unremarkable  ABDOMINAL WALL/INGUINAL REGIONS:  Unremarkable  OSSEOUS STRUCTURES:  No acute fracture or destructive osseous lesion  Dorsal column stimulator in place       Impression: No evidence of acute intra-abdominal or pelvic pathology Workstation performed: [id number]

## 2022-11-25 DIAGNOSIS — N28.1 ACQUIRED RENAL CYST OF LEFT KIDNEY: Primary | ICD-10-CM

## 2022-11-30 ENCOUNTER — HOSPITAL ENCOUNTER (OUTPATIENT)
Dept: RADIOLOGY | Facility: CLINIC | Age: 62
Discharge: HOME | End: 2022-11-30

## 2022-11-30 VITALS
SYSTOLIC BLOOD PRESSURE: 124 MMHG | TEMPERATURE: 98 F | RESPIRATION RATE: 20 BRPM | DIASTOLIC BLOOD PRESSURE: 83 MMHG | HEART RATE: 60 BPM | OXYGEN SATURATION: 92 %

## 2022-11-30 DIAGNOSIS — G89.29 CHRONIC LEFT-SIDED LOW BACK PAIN WITH LEFT-SIDED SCIATICA: ICD-10-CM

## 2022-11-30 DIAGNOSIS — M48.061 SPINAL STENOSIS OF LUMBAR REGION WITHOUT NEUROGENIC CLAUDICATION: ICD-10-CM

## 2022-11-30 DIAGNOSIS — M54.16 LUMBAR RADICULOPATHY: ICD-10-CM

## 2022-11-30 DIAGNOSIS — M54.42 CHRONIC LEFT-SIDED LOW BACK PAIN WITH LEFT-SIDED SCIATICA: ICD-10-CM

## 2022-11-30 DIAGNOSIS — G89.4 CHRONIC PAIN SYNDROME: ICD-10-CM

## 2022-11-30 RX ORDER — METHYLPREDNISOLONE ACETATE 80 MG/ML
80 INJECTION, SUSPENSION INTRA-ARTICULAR; INTRALESIONAL; INTRAMUSCULAR; PARENTERAL; SOFT TISSUE ONCE
Status: COMPLETED | OUTPATIENT
Start: 2022-11-30 | End: 2022-11-30

## 2022-11-30 RX ORDER — BUPIVACAINE HCL/PF 2.5 MG/ML
5 VIAL (ML) INJECTION ONCE
Status: COMPLETED | OUTPATIENT
Start: 2022-11-30 | End: 2022-11-30

## 2022-11-30 RX ORDER — LIDOCAINE HYDROCHLORIDE 10 MG/ML
5 INJECTION, SOLUTION EPIDURAL; INFILTRATION; INTRACAUDAL; PERINEURAL ONCE
Status: DISCONTINUED | OUTPATIENT
Start: 2022-11-30 | End: 2022-12-04 | Stop reason: HOSPADM

## 2022-11-30 RX ADMIN — Medication 3 ML: at 14:33

## 2022-11-30 RX ADMIN — METHYLPREDNISOLONE ACETATE 80 MG: 80 INJECTION, SUSPENSION INTRA-ARTICULAR; INTRALESIONAL; INTRAMUSCULAR; PARENTERAL; SOFT TISSUE at 14:33

## 2022-11-30 RX ADMIN — IOHEXOL 2 ML: 300 INJECTION, SOLUTION INTRAVENOUS at 14:33

## 2022-11-30 NOTE — DISCHARGE INSTR - LAB
Epidural Steroid Injection   WHAT YOU NEED TO KNOW:   An epidural steroid injection (TREVIN) is a procedure to inject steroid medicine into the epidural space  The epidural space is between your spinal cord and vertebrae  Steroids reduce inflammation and fluid buildup in your spine that may be causing pain  You may be given pain medicine along with the steroids  ACTIVITY  Do not drive or operate machinery today  No strenuous activity today - bending, lifting, etc   You may resume normal activites starting tomorrow - start slowly and as tolerated  You may shower today, but no tub baths or hot tubs  You may have numbness for several hours from the local anesthetic  Please use caution and common sense, especially with weight-bearing activities  CARE OF THE INJECTION SITE  If you have soreness or pain, apply ice to the area today (20 minutes on/20 minutes off)  Starting tomorrow, you may use warm, moist heat or ice if needed  You may have an increase or change in your discomfort for 36-48 hours after your treatment  Apply ice and continue with any pain medication you have been prescribed  Notify the [facility] if you have any of the following: redness, drainage, swelling, headache, stiff neck or fever above 100°F     SPECIAL INSTRUCTIONS  Our office will contact you in approximately 7 days for a progress report  MEDICATIONS  Continue to take all routine medications  Our office may have instructed you to hold some medications  As no general anesthesia was used in today's procedure, you should not experience any side effects related to anesthesia  If you are diabetic, the steroids used in today's injection may temporarily increase your blood sugar levels after the first few days after your injection  Please keep a close eye on your sugars and alert the doctor who manages your diabetes if your sugars are significantly high from your baseline or you are symptomatic       If you have a problem specifically related to your procedure, please call our office at [phone]  Problems not related to your procedure should be directed to your primary care physician

## 2022-11-30 NOTE — INTERVAL H&P NOTE
Update: (This section must be completed if the H&P was completed greater than 24 hrs to procedure or admission)    H&P reviewed  After examining the patient, I find no changed to the H&P since it had been written  Patient re-evaluated   Accept as history and physical     Jessica Duke MD/November 30, 2022/2:20 PM

## 2022-12-07 ENCOUNTER — PROCEDURE VISIT (OUTPATIENT)
Dept: NEUROLOGY | Facility: CLINIC | Age: 62
End: 2022-12-07

## 2022-12-07 VITALS
BODY MASS INDEX: 41.02 KG/M2 | WEIGHT: 239 LBS | SYSTOLIC BLOOD PRESSURE: 113 MMHG | HEART RATE: 77 BPM | DIASTOLIC BLOOD PRESSURE: 55 MMHG | TEMPERATURE: 97.8 F

## 2022-12-07 DIAGNOSIS — R11.0 NAUSEA: ICD-10-CM

## 2022-12-07 DIAGNOSIS — G43.709 CHRONIC MIGRAINE WITHOUT AURA WITHOUT STATUS MIGRAINOSUS, NOT INTRACTABLE: Primary | ICD-10-CM

## 2022-12-07 DIAGNOSIS — G43.709 CHRONIC MIGRAINE WITHOUT AURA WITHOUT STATUS MIGRAINOSUS, NOT INTRACTABLE: ICD-10-CM

## 2022-12-07 RX ORDER — RIZATRIPTAN BENZOATE 10 MG/1
TABLET, ORALLY DISINTEGRATING ORAL
Qty: 12 TABLET | Refills: 5 | Status: SHIPPED | OUTPATIENT
Start: 2022-12-07

## 2022-12-07 RX ORDER — ONDANSETRON 4 MG/1
4 TABLET, ORALLY DISINTEGRATING ORAL EVERY 6 HOURS PRN
Qty: 30 TABLET | Refills: 5 | Status: CANCELLED | OUTPATIENT
Start: 2022-12-07

## 2022-12-07 RX ORDER — GALCANEZUMAB 120 MG/ML
INJECTION, SOLUTION SUBCUTANEOUS
Qty: 3 ML | Refills: 5 | Status: SHIPPED | OUTPATIENT
Start: 2022-12-07

## 2022-12-07 RX ORDER — RIZATRIPTAN BENZOATE 10 MG/1
TABLET, ORALLY DISINTEGRATING ORAL
Qty: 12 TABLET | Refills: 23 | Status: CANCELLED | OUTPATIENT
Start: 2022-12-07

## 2022-12-07 RX ORDER — ONDANSETRON 4 MG/1
4 TABLET, ORALLY DISINTEGRATING ORAL EVERY 6 HOURS PRN
Qty: 30 TABLET | Refills: 5 | Status: SHIPPED | OUTPATIENT
Start: 2022-12-07

## 2022-12-07 NOTE — PROGRESS NOTES
Universal Protocol   Consent: Verbal consent obtained  Written consent obtained    Risks and benefits: risks, benefits and alternatives were discussed  Consent given by: patient  Patient understanding: patient states understanding of the procedure being performed  Patient consent: the patient's understanding of the procedure matches consent given  Procedure consent: procedure consent matches procedure scheduled        Chemodenervation     Date/Time 12/7/2022 2:05 PM     Performed by  Sandra Hernandez PA-C     Authorized by Sandra Hernandez PA-C        Pre-procedure details      Prepped With: Alcohol     Procedure details     Position:  Upright   Botox     Botox Type:  Type A    Brand:  Botox    mL's of Botulinum Toxin:  200    Final Concentration per CC:  100 units    Needle Gauge:  30 G 2 5 inch   Procedures     Botox Procedures: chronic headache      Indications: migraines     Injection Location      Head / Face:  L superior trapezius, R superior trapezius, L superior cervical paraspinal, R superior cervical paraspinal, L , R , procerus, L temporalis, R temporalis, R frontalis, L frontalis, R medial occipitalis and L medial occipitalis    L  injection amount:  0 unit(s)    R  injection amount:  0 unit(s)    L lateral frontalis:  5 unit(s)    R lateral frontalis:  5 unit(s)    L medial frontalis:  0 unit(s)    R medial frontalis:  0 unit(s)    L temporalis injection amount:  20 unit(s)    R temporalis injection amount:  20 unit(s)    Procerus injection amount:  0 unit(s)    L medial occipitalis injection amount:  15 unit(s)    R medial occipitalis injection amount:  15 unit(s)    L superior cervical paraspinal injection amount:  10 unit(s)    R superior cervical paraspinal injection amount:  10 unit(s)    L superior trapezius injection amount:  15 unit(s)    R superior trapezius injection amount:  15 unit(s)   Total Units     Total units used:  200    Total units discarded:  0 Post-procedure details      Chemodenervation:  Chronic migraine    Facial Nerve Location[de-identified]  Bilateral facial nerve    Patient tolerance of procedure: Tolerated well, no immediate complications   Comments      Extra units medically necessary in the R and L frontotemporal regions b/l- total 45, and 25 units b/l occipitalis  Blood pressure 113/55, pulse 77, temperature 97 8 °F (36 6 °C), temperature source Temporal, weight 108 kg (239 lb), not currently breastfeeding

## 2022-12-08 ENCOUNTER — HOSPITAL ENCOUNTER (OUTPATIENT)
Dept: RADIOLOGY | Facility: HOSPITAL | Age: 62
Discharge: HOME | End: 2022-12-08

## 2022-12-08 DIAGNOSIS — N28.89 KIDNEY MASS: ICD-10-CM

## 2022-12-08 RX ADMIN — GADOBUTROL 10 ML: 604.72 INJECTION INTRAVENOUS at 12:47

## 2022-12-19 ENCOUNTER — HOSPITAL ENCOUNTER (OUTPATIENT)
Dept: GASTROENTEROLOGY | Facility: HOSPITAL | Age: 62
Setting detail: OUTPATIENT SURGERY
Discharge: HOME | End: 2022-12-19

## 2022-12-19 ENCOUNTER — ANESTHESIA EVENT (OUTPATIENT)
Dept: GASTROENTEROLOGY | Facility: HOSPITAL | Age: 62
End: 2022-12-19

## 2022-12-19 ENCOUNTER — ANESTHESIA (OUTPATIENT)
Dept: GASTROENTEROLOGY | Facility: HOSPITAL | Age: 62
End: 2022-12-19

## 2022-12-19 VITALS
RESPIRATION RATE: 16 BRPM | BODY MASS INDEX: 40.12 KG/M2 | DIASTOLIC BLOOD PRESSURE: 56 MMHG | TEMPERATURE: 98 F | HEART RATE: 64 BPM | WEIGHT: 235.01 LBS | SYSTOLIC BLOOD PRESSURE: 105 MMHG | OXYGEN SATURATION: 96 % | HEIGHT: 64 IN

## 2022-12-19 DIAGNOSIS — R19.5 POSITIVE FIT (FECAL IMMUNOCHEMICAL TEST): Primary | ICD-10-CM

## 2022-12-19 DIAGNOSIS — K29.80 DUODENITIS: ICD-10-CM

## 2022-12-19 DIAGNOSIS — D64.9 ANEMIA, UNSPECIFIED TYPE: ICD-10-CM

## 2022-12-19 RX ORDER — SODIUM CHLORIDE, SODIUM LACTATE, POTASSIUM CHLORIDE, CALCIUM CHLORIDE 600; 310; 30; 20 MG/100ML; MG/100ML; MG/100ML; MG/100ML
INJECTION, SOLUTION INTRAVENOUS CONTINUOUS PRN
Status: DISCONTINUED | OUTPATIENT
Start: 2022-12-19 | End: 2022-12-19

## 2022-12-19 RX ORDER — LIDOCAINE HYDROCHLORIDE 20 MG/ML
INJECTION, SOLUTION EPIDURAL; INFILTRATION; INTRACAUDAL; PERINEURAL AS NEEDED
Status: DISCONTINUED | OUTPATIENT
Start: 2022-12-19 | End: 2022-12-19

## 2022-12-19 RX ORDER — PROPOFOL 10 MG/ML
INJECTION, EMULSION INTRAVENOUS AS NEEDED
Status: DISCONTINUED | OUTPATIENT
Start: 2022-12-19 | End: 2022-12-19

## 2022-12-19 RX ORDER — SODIUM CHLORIDE, SODIUM LACTATE, POTASSIUM CHLORIDE, CALCIUM CHLORIDE 600; 310; 30; 20 MG/100ML; MG/100ML; MG/100ML; MG/100ML
125 INJECTION, SOLUTION INTRAVENOUS CONTINUOUS
Status: DISCONTINUED | OUTPATIENT
Start: 2022-12-19 | End: 2022-12-23 | Stop reason: HOSPADM

## 2022-12-19 RX ADMIN — LIDOCAINE HYDROCHLORIDE 100 MG: 20 INJECTION, SOLUTION EPIDURAL; INFILTRATION; INTRACAUDAL; PERINEURAL at 10:35

## 2022-12-19 RX ADMIN — PROPOFOL 130 MG: 10 INJECTION, EMULSION INTRAVENOUS at 10:35

## 2022-12-19 RX ADMIN — SODIUM CHLORIDE, SODIUM LACTATE, POTASSIUM CHLORIDE, AND CALCIUM CHLORIDE: .6; .31; .03; .02 INJECTION, SOLUTION INTRAVENOUS at 10:31

## 2022-12-19 NOTE — ANESTHESIA PREPROCEDURE EVALUATION
Procedure:  EGD    1  Duodenitis  2  Anemia, unspecified type  - She was in the ER on 11/16 with severe pelvic and neuropathic pain as well as a UTI, found to have evidence of duodenitis on a CT scan as well as reportedly dark stool and a mild drop in her Hb to 11 2 from baseline of 12 3  - Schedule EGD to rule out marginal ulcer  - Continue protonix 40 mg BID  - Add carafate 1 g QID x 1 month  - Advised her to let me know if she sees melena or has symptoms of worsening anemia as we can send her for a repeat CBC     ______________________________________________________________________     SUBJECTIVE:  Barbie Hernandez is a 58 yo F with a history of gastric bypass, hysterectomy in 2036 complicated by ureter injury, CATHRYN on CPAP, HTN, migraines, presenting after she was seen in the ER on November 16th for left pelvic and flank pain radiating to her groin and with a diagnosis of a UTI a few days prior but her pain was still severe  She reports that since her ureter injury in 2012, she does not get the urge to urinate and does not get typical UTI symptoms so her symptoms are usually severe by the time she is diagnosed and she reports neuropathic pain that accompanies the UTIs  She had a CT scan while in the ER and this showed focal duodenitis so she was instructed to follow up with GI  She reported pill like stools that were very dark but not tarry  She was not eating or drinking well during this time because of the severity of her pain  She does have nausea that is somewhat chronic in typically associated with migraines as well as the chronic pain she experiences  She denies any heartburn, regurgitation, or reflux symptoms  She is on Protonix 40 mg b i d  which has controlled her symptoms well   She does have a history of an upper GI bleed         Relevant Problems   CARDIO   (+) Hypertension   (+) Migraine   (+) Mixed hyperlipidemia      GI/HEPATIC   (+) Gastroesophageal reflux disease      HEMATOLOGY   (+) Iron deficiency anemia      MUSCULOSKELETAL   (+) Lower back pain   (+) Osteoarthritis of left shoulder   (+) Sacroiliitis (HCC)      NEURO/PSYCH   (+) Chronic pain syndrome   (+) Generalized anxiety disorder with panic attacks   (+) Migraine   (+) Moderate episode of recurrent major depressive disorder (HCC)   (+) Nonintractable headache      PULMONARY   (+) CATHRYN (obstructive sleep apnea)        Physical Exam    Airway    Mallampati score: III  TM Distance: >3 FB  Neck ROM: full     Dental       Cardiovascular  Cardiovascular exam normal    Pulmonary  Pulmonary exam normal     Other Findings       Neuralgia   Continuous leakage of urine   Mixed hyperlipidemia   Chronic pain syndrome   Dysesthesia of multiple sites   Eczema   Hematuria   Hypertension   Arthralgia of shoulder region, left   Limb pain   Lower back pain   Lumbar facet arthropathy   CATHRYN (obstructive sleep apnea)   Postgastrectomy malabsorption   Pudendal neuralgia   Gastroesophageal reflux disease   Iron deficiency anemia   Osteoarthritis of left shoulder   Sacroiliitis (HCC)   Lumbar radiculopathy   Mild cognitive impairment with memory loss   Nonintractable headache   Insomnia   Migraine   Positive FIT (fecal immunochemical test)   Hypovitaminosis D   Lipoma of neck   Generalized anxiety disorder with panic attacks   Moderate episode of recurrent major depressive disorder (HCC)   Obesity, morbid (HCC)   Presence of neurostimulator   COVID-19   Kidney mass   Hypoxia       Anesthesia Plan  ASA Score- 3     Anesthesia Type- IV sedation with anesthesia with ASA Monitors  Additional Monitors:   Airway Plan:           Plan Factors-    Chart reviewed  EKG reviewed  Imaging results reviewed  Existing labs reviewed  Patient summary reviewed  Patient is not a current smoker  Induction- intravenous  Postoperative Plan-     Informed Consent- Anesthetic plan and risks discussed with patient  I personally reviewed this patient with the CRNA   Discussed and agreed on the Anesthesia Plan with the FABIANO Ngo

## 2022-12-19 NOTE — INTERVAL H&P NOTE
H&P reviewed  After examining the patient I find no changes in the patients condition since the H&P had been written      Vitals:    12/19/22 0914   BP: 121/63   Pulse: 59   Resp: 18   Temp: 98 4 °F (36 9 °C)   SpO2: 95%

## 2022-12-19 NOTE — ANESTHESIA POSTPROCEDURE EVALUATION
Post-Op Assessment Note    CV Status:  Stable  Pain Score: 0    Pain management: adequate     Mental Status:  Alert and awake   Hydration Status:  Euvolemic   PONV Controlled:  Controlled   Airway Patency:  Patent      Post Op Vitals Reviewed: Yes      Staff: CRNA         No notable events documented      BP 97/56 (12/19/22 1046)    Temp 98 °F (36 7 °C) (12/19/22 1046)    Pulse 61 (12/19/22 1046)   Resp (!) 23 (12/19/22 1046)    SpO2 95 % (12/19/22 1046)

## 2022-12-24 DIAGNOSIS — K29.80 DUODENITIS: ICD-10-CM

## 2022-12-24 DIAGNOSIS — D64.9 ANEMIA, UNSPECIFIED TYPE: ICD-10-CM

## 2022-12-25 RX ORDER — SUCRALFATE 1 G/1
TABLET ORAL
Qty: 120 TABLET | Refills: 0 | Status: SHIPPED | OUTPATIENT
Start: 2022-12-25

## 2023-01-02 DIAGNOSIS — K21.9 GASTROESOPHAGEAL REFLUX DISEASE WITHOUT ESOPHAGITIS: ICD-10-CM

## 2023-01-02 RX ORDER — PANTOPRAZOLE SODIUM 40 MG/1
TABLET, DELAYED RELEASE ORAL
Qty: 180 TABLET | Refills: 3 | Status: SHIPPED | OUTPATIENT
Start: 2023-01-02
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
## 2023-01-12 ENCOUNTER — TELEMEDICINE (OUTPATIENT)
Dept: PSYCHIATRY | Facility: CLINIC | Age: 63
End: 2023-01-12

## 2023-01-12 ENCOUNTER — TELEPHONE (OUTPATIENT)
Dept: PSYCHIATRY | Facility: CLINIC | Age: 63
End: 2023-01-12

## 2023-01-12 DIAGNOSIS — F41.1 GENERALIZED ANXIETY DISORDER WITH PANIC ATTACKS: ICD-10-CM

## 2023-01-12 DIAGNOSIS — F33.1 MODERATE EPISODE OF RECURRENT MAJOR DEPRESSIVE DISORDER (HCC): Primary | ICD-10-CM

## 2023-01-12 DIAGNOSIS — F41.0 GENERALIZED ANXIETY DISORDER WITH PANIC ATTACKS: ICD-10-CM

## 2023-01-12 RX ORDER — QUETIAPINE FUMARATE 50 MG/1
50 TABLET, FILM COATED ORAL
Qty: 90 TABLET | Refills: 1 | Status: SHIPPED | OUTPATIENT
Start: 2023-01-12 | End: 2023-07-11

## 2023-01-12 RX ORDER — BUSPIRONE HYDROCHLORIDE 10 MG/1
10 TABLET ORAL 2 TIMES DAILY
Qty: 180 TABLET | Refills: 1 | Status: SHIPPED | OUTPATIENT
Start: 2023-01-12 | End: 2023-07-11

## 2023-01-12 RX ORDER — SERTRALINE HYDROCHLORIDE 100 MG/1
100 TABLET, FILM COATED ORAL DAILY
Qty: 90 TABLET | Refills: 1 | Status: SHIPPED | OUTPATIENT
Start: 2023-01-12 | End: 2023-07-11

## 2023-01-12 NOTE — TELEPHONE ENCOUNTER
Per Dr Evelyn Alva:     refer for individual therapy (multiple medical conditions and marital conflicts - OK with virtual sessions)

## 2023-01-19 ENCOUNTER — CONSULT (OUTPATIENT)
Dept: PULMONOLOGY | Facility: CLINIC | Age: 63
End: 2023-01-19

## 2023-01-19 VITALS
TEMPERATURE: 97.6 F | OXYGEN SATURATION: 96 % | SYSTOLIC BLOOD PRESSURE: 120 MMHG | WEIGHT: 237 LBS | HEIGHT: 64 IN | BODY MASS INDEX: 40.46 KG/M2 | HEART RATE: 52 BPM | DIASTOLIC BLOOD PRESSURE: 84 MMHG

## 2023-01-19 DIAGNOSIS — G47.33 OSA ON CPAP: ICD-10-CM

## 2023-01-19 DIAGNOSIS — J45.40 MODERATE PERSISTENT ASTHMA WITHOUT COMPLICATION: ICD-10-CM

## 2023-01-19 DIAGNOSIS — R06.02 SOB (SHORTNESS OF BREATH): Primary | ICD-10-CM

## 2023-01-19 DIAGNOSIS — R09.02 HYPOXIA: ICD-10-CM

## 2023-01-19 DIAGNOSIS — Z99.89 OSA ON CPAP: ICD-10-CM

## 2023-01-19 DIAGNOSIS — E66.01 MORBID OBESITY (HCC): ICD-10-CM

## 2023-01-19 RX ORDER — ALBUTEROL SULFATE 90 UG/1
2 AEROSOL, METERED RESPIRATORY (INHALATION) EVERY 6 HOURS PRN
Qty: 18 G | Refills: 0 | Status: SHIPPED | OUTPATIENT
Start: 2023-01-19

## 2023-01-19 NOTE — PROGRESS NOTES
Assessment/Plan:     Diagnoses and all orders for this visit:    SOB (shortness of breath)  -     Complete PFT with post Bronchodilator and Six Minute walk; Future  -     XR chest pa & lateral; Future  -     Peak Flow Meter  -     Echo complete w/ contrast if indicated; Future    Hypoxia  -     Ambulatory Referral to Pulmonology    Moderate persistent asthma without complication  -     [facility] Allergy Panel, Adult; Future  -     Peak Flow Meter  -     albuterol (Ventolin HFA) 90 mcg/act inhaler; Inhale 2 puffs every 6 (six) hours as needed for wheezing    Morbid obesity (HCC)    CATHRYN on CPAP          Plan for follow up:  Shortness of breath and dyspnea on exertion likely multifactorial likely reactive airway disease/asthma with prior history of asthmatic bronchitis/also being on immunotherapy for significant environmental allergens, also need to rule out cardiac etiology and also likely pulmonary hypertension in the setting of completely treated sleep apnea as well and secondary to morbid obesity and deconditioning  Will get respiratory allergy panel with IgE and follow-up  Chest x-ray PA and lateral view  Complete PFT with postbronchodilator and a 6-minute walk test  Also echocardiogram to rule out any evidence of pulmonary hypertension  Also given a peak flow meter discussed regarding green zone yellow zone and red zone  Given albuterol MDI 2 puffs 4 times daily as needed for shortness of breath and wheezing  MDI technique reviewed with the patient  Also with mild obstructive sleep apnea as per diagnostic study in 2016 and was titrated to a CPAP of 7 cm of water which she is is currently using  Message has been sent to her [employer] for the download compliance to see if the CPAP needs adjustment at the visit we will switch to an auto setting also with significant weight gain since her last study  Cautioned against driving when sleepy  Recommend weight loss  Return in about 3 months (around 4/19/2023)    All questions are answered to the patient's satisfaction and understanding  She verbalizes understanding  She is encouraged to call with any further questions or concerns  Portions of the record may have been created with voice recognition software  Occasional wrong word or "sound a like" substitutions may have occurred due to the inherent limitations of voice recognition software  Read the chart carefully and recognize, using context, where substitutions have occurred  a    Electronically Signed by El Gasca MD    ______________________________________________________________________    Chief Complaint:   Chief Complaint   Patient presents with   • Wheezing        Patient ID: Nadira Ramos is a 57 y o  y o  female has a past medical history of Anxiety, Arthritis, Attention and concentration deficit, Blurred vision, Chronic pain, Chronic pain syndrome (01/20/2016), CPAP (continuous positive airway pressure) dependence, Depression, Diplopia, Dyspepsia, Gastric ulcer, Gastritis, GERD (gastroesophageal reflux disease), Headache([phone]), History of transfusion (2005), Hypertension, Insomnia, Irritable bowel syndrome, Memory loss, Migraines, Osteopenia, Peripheral neuropathy (3/14/2012), 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), Presence of neurostimulator, Pudendal neuralgia, Sleep apnea, Spinal stenosis, Starting and stopping of urinary stream during micturition, Urinary incontinence, and Wears eyeglasses  1/19/2023  Patient presents today for initial visit    Nadira Ramos is a very  pleasant 58-year-old lady who has never smoked with history of significant environmental allergies and has been on immunotherapy in the past several years ago, states she did not have any need for bronchodilators except for when she had a bronchitis, never been admitted in the hospital for an asthma flareup recently for the past several years to years she states at least she has been having shortness of breath and dyspnea on exertion gradually worsening for which she is here for evaluation  She states she was admitted in the hospital in 2022 for some abdominal pain at which time they had seen her oxygen levels dropping down in the setting she did not have the need to go home on the home oxygen, for which she also is here for evaluation she states  Also has history of obstructive sleep apnea mild that was diagnosed in 2016 and has been on a CPAP 7 cm of water since then and she states she has been consistently using it also her machine was recalled and she got a new machine about 2 to 3 weeks ago which she is currently switched to the new one and has been using not connected to the modem yet with the [employer] she states  She is faithfully using her CPAP does not have any trouble falling asleep or staying asleep still has daytime tiredness and fatigue feels exhausted when she is up in the morning          Occupational/Exposure history: Currently retired  Pets/Enviroment: None  Travel history: None  Review of Systems   Constitutional: Positive for fatigue and unexpected weight change (slow weight gain in the past 5 to 6 years of about 15 pounds)  HENT: Negative  Eyes: Negative  Respiratory: Positive for shortness of breath  Cardiovascular: Negative  Gastrointestinal: Negative  Endocrine: Negative  Genitourinary: Negative  Musculoskeletal: Negative  Allergic/Immunologic: Positive for environmental allergies  Neurological: Negative  Hematological: Negative  Psychiatric/Behavioral: Positive for sleep disturbance  Social history: She reports that she has never smoked  She has never used smokeless tobacco  She reports current drug use  Drug: Marijuana  She reports that she does not drink alcohol      Past surgical history:   Past Surgical History:   Procedure Laterality Date   •  SECTION     •  SECTION     • CHOLECYSTECTOMY     • GALLBLADDER SURGERY     • GASTRIC BYPASS 2004   • HYSTERECTOMY  03/14/2012   • INSERT / REPLACE PERIPHERAL NEUROSTIMULATOR PULSE GENERATOR /      • MASS EXCISION Right 04/05/2021    Procedure: EXCISION  BIOPSY LESION/MASS LOWER NECK;  Surgeon: Mini Connelly DO;  Location: [facility];  Service: General   • OTHER SURGICAL HISTORY  2012    Reimplantatin at [facility]    • WI INSJ/RPLCMT SPI NPGR DIR/INDUXIVE COUPLING Right 04/15/2020    Procedure: REPLACEMENT IMPLANTABLE PULSE GENERATOR FOR DORSAL SPINAL COLUMN STIMULATOR,  RIGHT BUTTOCKS;  Surgeon: Buffy Ceballos MD;  Location: [facility];  Service: Neurosurgery   • RADIOFREQUENCY ABLATION NERVES     • SPINAL CORD STIMULATOR IMPLANT  2015   • TRIGGER POINT INJECTION     • URETER SURGERY     • URETHRAL FISTULA REPAIR     • US GUIDED THYROID BIOPSY  09/23/2020   • WRIST ARTHROSCOPY      with internal fixation      Family history:   Family History   Problem Relation Age of Onset   • Other Mother         Back Disorder    • Cirrhosis Mother    • Crohn's disease Mother    • Lupus Mother         Systemic Lupus Erythematous    • Depression Mother    • Hypertension Father    • Heart disease Father    • Other Brother         Liver Transplant    • Crohn's disease Brother    • Crohn's disease Brother    • Depression Sister    • No Known Problems Daughter    • No Known Problems Maternal Aunt    • No Known Problems Paternal Aunt    • No Known Problems Cousin    • No Known Problems Cousin    • No Known Problems Cousin    • No Known Problems Cousin    • No Known Problems Cousin    • Seizures Neg Hx    • Breast cancer Neg Hx        Immunization History   Administered Date(s) Administered   • COVID-19 J&J (Clemencia) vaccine 0 5 mL 03/09/2021   • COVID-19 PFIZER VACCINE 0 3 ML IM 10/26/2021, 04/08/2022   • INFLUENZA 10/26/2007, 01/25/2010, 09/20/2011, 09/21/2012, 09/13/2013, 10/01/2014, 08/31/2015, 12/05/2016, 08/20/2018, 10/11/2022   • Influenza, injectable, quadrivalent, preservative free 0 5 mL 10/05/2020   • Influenza, recombinant, quadrivalent,injectable, preservative free 08/20/2018, 09/09/2019, 10/11/2022   • Influenza, seasonal, injectable 10/26/2007, 01/25/2010, 09/20/2011, 09/21/2012, 09/13/2013, 10/01/2014, 08/31/2015   • Influenza, seasonal, injectable, preservative free 12/05/2016   • Pneumococcal Polysaccharide PPV23 10/26/2007     Current Outpatient Medications   Medication Sig Dispense Refill   • albuterol (Ventolin HFA) 90 mcg/act inhaler Inhale 2 puffs every 6 (six) hours as needed for wheezing 18 g 0   • Botox 200 units SOLR      • busPIRone (BUSPAR) 10 mg tablet Take 1 tablet (10 mg total) by mouth 2 (two) times a day 180 tablet 1   • Cholecalciferol 25 MCG (1000 UT) capsule Take 1 capsule by mouth daily       • dronabinol (MARINOL) 5 MG capsule Take 1 capsule (5 mg total) by mouth 2 (two) times a day before meals By Dr Abdirashid Ortiz 60 capsule 0   • gabapentin (NEURONTIN) 300 mg capsule Take 1 capsule (300 mg total) by mouth 3 (three) times a day 270 capsule 1   • Galcanezumab-gnlm (Emgality) 120 MG/ML SOAJ 1 sub cu injection every month 3 mL 5   • Iron-Vitamin C 100-250 MG TABS Take by mouth daily     • lidocaine (XYLOCAINE) 5 % ointment Apply topically 2 (two) times a day as needed for moderate pain 35 44 g 1   • lisinopril (ZESTRIL) 5 mg tablet Take 1 tablet (5 mg total) by mouth daily 90 tablet 3   • mometasone (ELOCON) 0 1 % cream APPLY TOPICALLY DAILY 45 g 7   • Multiple Vitamin (MULTI-VITAMIN DAILY) TABS Take 1 tablet by mouth daily     • ondansetron (ZOFRAN-ODT) 4 mg disintegrating tablet Take 1 tablet (4 mg total) by mouth every 6 (six) hours as needed for nausea or vomiting 30 tablet 5   • pantoprazole (PROTONIX) 40 mg tablet TAKE 1 TABLET TWICE A  tablet 3   • phenazopyridine (PYRIDIUM) 100 mg tablet Take 1 tablet (100 mg total) by mouth 3 (three) times a day as needed for bladder spasms 12 tablet 0   • propranolol (INDERAL) 60 mg tablet Take 1 tablet (60 mg total) by mouth in the morning 180 tablet 3 • QUEtiapine (SEROquel) 50 mg tablet Take 1 tablet (50 mg total) by mouth daily at bedtime 90 tablet 1   • rizatriptan (MAXALT-MLT) 10 mg disintegrating tablet Take at the onset of migraine; if symptoms continue or return, may take another dose at least 2 hours after first dose  Take no more than 2 doses in a day  12 tablet 5   • rosuvastatin (CRESTOR) 10 MG tablet TAKE 1 TABLET DAILY 90 tablet 3   • sertraline (ZOLOFT) 100 mg tablet Take 1 tablet (100 mg total) by mouth daily 90 tablet 1   • sucralfate (CARAFATE) 1 g tablet TAKE 1 TABLET BY MOUTH FOUR TIMES A  tablet 0     No current facility-administered medications for this visit  Allergies: Morphine, Cat hair extract, Dog epithelium, and Other    Objective:  Vitals:    01/19/23 0802   BP: 120/84   Pulse: (!) 52   Temp: 97 6 °F (36 4 °C)   SpO2: 96%   Weight: 108 kg (237 lb)   Height: 5' 4" (1 626 m)   Oxygen Therapy  SpO2: 96 %    Wt Readings from Last 3 Encounters:   01/19/23 108 kg (237 lb)   12/19/22 107 kg (235 lb 0 2 oz)   12/07/22 108 kg (239 lb)     Body mass index is 40 68 kg/m²  Physical Exam  Constitutional:       Appearance: She is well-developed  HENT:      Head: Normocephalic and atraumatic  Eyes:      Pupils: Pupils are equal, round, and reactive to light  Neck:      Comments: Short and wide neck  Cardiovascular:      Rate and Rhythm: Normal rate and regular rhythm  Heart sounds: Normal heart sounds  Pulmonary:      Effort: Pulmonary effort is normal       Breath sounds: Normal breath sounds  Musculoskeletal:         General: Normal range of motion  Cervical back: Normal range of motion and neck supple  Skin:     General: Skin is warm and dry  Neurological:      Mental Status: She is alert and oriented to person, place, and time     Psychiatric:         Behavior: Behavior normal            Diagnostics:  I have personally reviewed pertinent films in PACS  ESS: Total score: 7

## 2023-01-30 ENCOUNTER — APPOINTMENT (OUTPATIENT)
Dept: LAB | Facility: HOSPITAL | Age: 63
End: 2023-01-30

## 2023-01-30 ENCOUNTER — HOSPITAL ENCOUNTER (OUTPATIENT)
Dept: RADIOLOGY | Facility: HOSPITAL | Age: 63
Discharge: HOME | End: 2023-01-30

## 2023-01-30 ENCOUNTER — TELEPHONE (OUTPATIENT)
Dept: NEPHROLOGY | Facility: CLINIC | Age: 63
End: 2023-01-30

## 2023-01-30 DIAGNOSIS — R06.02 SOB (SHORTNESS OF BREATH): ICD-10-CM

## 2023-01-30 DIAGNOSIS — N28.1 ACQUIRED RENAL CYST OF LEFT KIDNEY: Primary | ICD-10-CM

## 2023-01-30 DIAGNOSIS — J45.40 MODERATE PERSISTENT ASTHMA WITHOUT COMPLICATION: ICD-10-CM

## 2023-01-30 DIAGNOSIS — N28.1 ACQUIRED RENAL CYST OF LEFT KIDNEY: ICD-10-CM

## 2023-01-30 LAB
ANION GAP SERPL CALCULATED.3IONS-SCNC: 3 MMOL/L (ref 4–13)
BACTERIA UR QL AUTO: ABNORMAL /HPF
BASOPHILS # BLD AUTO: 0.06 THOUSANDS/ÂΜL (ref 0–0.1)
BASOPHILS NFR BLD AUTO: 1 % (ref 0–1)
BILIRUB UR QL STRIP: NEGATIVE
BUN SERPL-MCNC: 15 MG/DL (ref 5–25)
CALCIUM SERPL-MCNC: 8.8 MG/DL (ref 8.3–10.1)
CHLORIDE SERPL-SCNC: 110 MMOL/L (ref 96–108)
CLARITY UR: CLEAR
CO2 SERPL-SCNC: 28 MMOL/L (ref 21–32)
COLOR UR: ABNORMAL
CREAT SERPL-MCNC: 0.85 MG/DL (ref 0.6–1.3)
CREAT UR-MCNC: 127 MG/DL
EOSINOPHIL # BLD AUTO: 0.2 THOUSAND/ÂΜL (ref 0–0.61)
EOSINOPHIL NFR BLD AUTO: 4 % (ref 0–6)
ERYTHROCYTE [DISTWIDTH] IN BLOOD BY AUTOMATED COUNT: 14.3 % (ref 11.6–15.1)
GFR SERPL CREATININE-BSD FRML MDRD: 72 ML/MIN/1.73SQ M
GLUCOSE SERPL-MCNC: 91 MG/DL (ref 65–140)
GLUCOSE UR STRIP-MCNC: NEGATIVE MG/DL
HCT VFR BLD AUTO: 35.9 % (ref 34.8–46.1)
HGB BLD-MCNC: 11.1 G/DL (ref 11.5–15.4)
HGB UR QL STRIP.AUTO: ABNORMAL
IMM GRANULOCYTES # BLD AUTO: 0.02 THOUSAND/UL (ref 0–0.2)
IMM GRANULOCYTES NFR BLD AUTO: 0 % (ref 0–2)
KETONES UR STRIP-MCNC: NEGATIVE MG/DL
LEUKOCYTE ESTERASE UR QL STRIP: NEGATIVE
LYMPHOCYTES # BLD AUTO: 1.57 THOUSANDS/ÂΜL (ref 0.6–4.47)
LYMPHOCYTES NFR BLD AUTO: 29 % (ref 14–44)
MCH RBC QN AUTO: 27.6 PG (ref 26.8–34.3)
MCHC RBC AUTO-ENTMCNC: 30.9 G/DL (ref 31.4–37.4)
MCV RBC AUTO: 89 FL (ref 82–98)
MONOCYTES # BLD AUTO: 0.33 THOUSAND/ÂΜL (ref 0.17–1.22)
MONOCYTES NFR BLD AUTO: 6 % (ref 4–12)
MUCOUS THREADS UR QL AUTO: ABNORMAL
NEUTROPHILS # BLD AUTO: 3.32 THOUSANDS/ÂΜL (ref 1.85–7.62)
NEUTS SEG NFR BLD AUTO: 60 % (ref 43–75)
NITRITE UR QL STRIP: NEGATIVE
NON-SQ EPI CELLS URNS QL MICRO: ABNORMAL /HPF
NRBC BLD AUTO-RTO: 0 /100 WBCS
PH UR STRIP.AUTO: 5.5 [PH]
PLATELET # BLD AUTO: 241 THOUSANDS/UL (ref 149–390)
PMV BLD AUTO: 10.6 FL (ref 8.9–12.7)
POTASSIUM SERPL-SCNC: 3.7 MMOL/L (ref 3.5–5.3)
PROT UR STRIP-MCNC: NEGATIVE MG/DL
PROT UR-MCNC: 18 MG/DL
PROT/CREAT UR: 0.14 MG/G{CREAT} (ref 0–0.1)
RBC # BLD AUTO: 4.02 MILLION/UL (ref 3.81–5.12)
RBC #/AREA URNS AUTO: ABNORMAL /HPF
SODIUM SERPL-SCNC: 141 MMOL/L (ref 135–147)
SP GR UR STRIP.AUTO: 1.02 (ref 1–1.03)
UROBILINOGEN UR STRIP-ACNC: <2 MG/DL
WBC # BLD AUTO: 5.5 THOUSAND/UL (ref 4.31–10.16)
WBC #/AREA URNS AUTO: ABNORMAL /HPF

## 2023-01-30 NOTE — TELEPHONE ENCOUNTER
Called spoke with patient advised patient to get labs done prior to 02/07 Consult appointment with Swapna Jarrett understood and is okay with it

## 2023-02-01 LAB
A ALTERNATA IGE QN: <0.1 KUA/I
A FUMIGATUS IGE QN: 0.17 KUA/I
BERMUDA GRASS IGE QN: 0.21 KUA/I
BOXELDER IGE QN: <0.1 KUA/I
C HERBARUM IGE QN: <0.1 KUA/I
CAT DANDER IGE QN: 0.61 KUA/I
CMN PIGWEED IGE QN: 0.34 KUA/I
COMMON RAGWEED IGE QN: 0.12 KUA/I
COTTONWOOD IGE QN: <0.1 KUA/I
D FARINAE IGE QN: 10.4 KUA/I
D PTERONYSS IGE QN: 9.24 KUA/I
DOG DANDER IGE QN: 5.39 KUA/I
LONDON PLANE IGE QN: <0.1 KUA/I
MOUSE URINE PROT IGE QN: <0.1 KUA/I
MT JUNIPER IGE QN: 0.21 KUA/I
MUGWORT IGE QN: <0.1 KUA/I
P NOTATUM IGE QN: 1.67 KUA/I
ROACH IGE QN: 0.39 KUA/I
SHEEP SORREL IGE QN: <0.1 KUA/I
SILVER BIRCH IGE QN: <0.1 KUA/I
TIMOTHY IGE QN: 0.95 KUA/I
TOTAL IGE SMQN RAST: 852 KU/L (ref 0–113)
WALNUT IGE QN: <0.1 KUA/I
WHITE ASH IGE QN: <0.1 KUA/I
WHITE ELM IGE QN: <0.1 KUA/I
WHITE MULBERRY IGE QN: <0.1 KUA/I
WHITE OAK IGE QN: <0.1 KUA/I

## 2023-02-06 ENCOUNTER — TELEPHONE (OUTPATIENT)
Dept: NEPHROLOGY | Facility: CLINIC | Age: 63
End: 2023-02-06

## 2023-02-07 ENCOUNTER — CONSULT (OUTPATIENT)
Dept: NEPHROLOGY | Facility: CLINIC | Age: 63
End: 2023-02-07

## 2023-02-07 VITALS
RESPIRATION RATE: 16 BRPM | HEART RATE: 61 BPM | DIASTOLIC BLOOD PRESSURE: 90 MMHG | WEIGHT: 230 LBS | TEMPERATURE: 96.9 F | HEIGHT: 64 IN | OXYGEN SATURATION: 95 % | SYSTOLIC BLOOD PRESSURE: 130 MMHG | BODY MASS INDEX: 39.27 KG/M2

## 2023-02-07 DIAGNOSIS — N28.89 KIDNEY MASS: ICD-10-CM

## 2023-02-07 DIAGNOSIS — M54.16 LUMBAR RADICULOPATHY: ICD-10-CM

## 2023-02-07 DIAGNOSIS — E66.01 OBESITY, MORBID (HCC): ICD-10-CM

## 2023-02-07 DIAGNOSIS — I10 PRIMARY HYPERTENSION: Primary | ICD-10-CM

## 2023-02-07 DIAGNOSIS — N28.1 ACQUIRED RENAL CYST OF LEFT KIDNEY: ICD-10-CM

## 2023-02-07 NOTE — ASSESSMENT & PLAN NOTE
Blood pressure is also on higher side  She claims blood pressure is much normal at home    Advised to continue monitor blood pressure at home and if it started going up she need to call me

## 2023-02-07 NOTE — PROGRESS NOTES
NEPHROLOGY OFFICE CONSULT  Elif Seen 57 y o  female MRN: [id number]    Encounter: [id number] 2/7/2023    REASON FOR VISIT: Elif Seen is a 57 y  o female who was referred by GWEN Hernandez for evaluation of Consult and Kidney Cyst       HPI:    Gaby Modi came in today for evaluation management of renal cyst   33-year-old woman with history of migraine and hypertension    Patient had a hysterectomy in 6372 which was complicated by ureteral tear which required multiple complex surgery and stent  She continued to chronic pain in that region and and buttock region since then  She also seems to have overflow incontinence  At present she does go to bathroom on regular time as she does not have any sensation    She does have chronic back pain    She claims she has a renal cyst diagnosed around that time  She had work-up done at that time and was seen by urologist also    She does get recurrent hematuria without any infection      REVIEW OF SYSTEMS:    Review of Systems   Constitutional: Negative for activity change and fatigue  HENT: Negative for congestion and ear discharge  Eyes: Negative for photophobia and pain  Respiratory: Negative for apnea and choking  Cardiovascular: Negative for chest pain and palpitations  Gastrointestinal: Negative for abdominal distention and blood in stool  Endocrine: Negative for heat intolerance and polyphagia  Genitourinary: Negative for flank pain and urgency  Musculoskeletal: Positive for arthralgias and back pain  Negative for neck pain and neck stiffness  Skin: Negative for color change and wound  Allergic/Immunologic: Negative for food allergies and immunocompromised state  Neurological: Negative for seizures and facial asymmetry  Hematological: Negative for adenopathy  Does not bruise/bleed easily  Psychiatric/Behavioral: Negative for self-injury and suicidal ideas           PAST MEDICAL HISTORY:  Past Medical History:   Diagnosis Date   • Anxiety • Arthritis    • Attention and concentration deficit    • Blurred vision    • Chronic pain    • Chronic pain syndrome 2016   • CPAP (continuous positive airway pressure) dependence    • Depression    • Diplopia    • Dyspepsia    • Gastric ulcer    • Gastritis    • GERD (gastroesophageal reflux disease)    • Headache(784 0)    • History of transfusion    • Hypertension    • Insomnia    • Irritable bowel syndrome    • Memory loss    • Migraines    • Osteopenia    • Peripheral neuropathy 3/14/2012    Left thigh, left abdomen, left prudential nerve   • 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   • Presence of neurostimulator     Pudendal neuralgia   • Pudendal neuralgia    • Sleep apnea    • Spinal stenosis    • Starting and stopping of urinary stream during micturition    • Urinary incontinence    • Wears eyeglasses        PAST SURGICAL HISTORY:  Past Surgical History:   Procedure Laterality Date   •  SECTION     •  SECTION     • CHOLECYSTECTOMY     • Andres Staceyjossy Taylor   • GASTRIC BYPASS     • HYSTERECTOMY  2012   • INSERT / REPLACE PERIPHERAL NEUROSTIMULATOR PULSE GENERATOR /      • MASS EXCISION Right 2021    Procedure: EXCISION  BIOPSY LESION/MASS LOWER NECK;  Surgeon: Buena Favre, DO;  Location: [facility];  Service: General   • OTHER SURGICAL HISTORY      Reimplantatin at [address]    • MS INSJ/RPLCMT SPI NPGR DIR/INDUXIVE COUPLING Right 04/15/2020    Procedure: REPLACEMENT IMPLANTABLE [address],  RIGHT BUTTOCKS;  Surgeon: Ирина Siddiqui MD;  Location:  MAIN OR;  Service: Neurosurgery   • RADIOFREQUENCY ABLATION NERVES     • SPINAL CORD STIMULATOR IMPLANT     • TRIGGER POINT INJECTION     • URETER SURGERY     • URETHRAL FISTULA REPAIR     • Jesus Alberto [address]34 THYROID BIOPSY  2020   • WRIST ARTHROSCOPY      with internal fixation        SOCIAL HISTORY:  Social History     Substance and Sexual Activity Alcohol Use No     Social History     Substance and Sexual Activity   Drug Use Yes   • Types: Marijuana    Comment: medical marijuana 2x a day capsules     Social History     Tobacco Use   Smoking Status Never   Smokeless Tobacco Never       FAMILY HISTORY:  Family History   Problem Relation Age of Onset   • Other Mother         Back Disorder    • Cirrhosis Mother    • Crohn's disease Mother    • Lupus Mother         Systemic Lupus Erythematous    • Depression Mother    • Hypertension Father    • Heart disease Father    • Other Brother         Liver Transplant    • Crohn's disease Brother    • Crohn's disease Brother    • Depression Sister    • No Known Problems Daughter    • No Known Problems Maternal Aunt    • No Known Problems Paternal Aunt    • No Known Problems Cousin    • No Known Problems Cousin    • No Known Problems Cousin    • No Known Problems Cousin    • No Known Problems Cousin    • Seizures Neg Hx    • Breast cancer Neg Hx        MEDICATIONS:    Current Outpatient Medications:   •  albuterol (Ventolin HFA) 90 mcg/act inhaler, Inhale 2 puffs every 6 (six) hours as needed for wheezing, Disp: 18 g, Rfl: 0  •  Botox 200 units SOLR, , Disp: , Rfl:   •  busPIRone (BUSPAR) 10 mg tablet, Take 1 tablet (10 mg total) by mouth 2 (two) times a day, Disp: 180 tablet, Rfl: 1  •  Cholecalciferol 25 MCG (1000 UT) capsule, Take 1 capsule by mouth daily  , Disp: , Rfl:   •  dronabinol (MARINOL) 5 MG capsule, Take 1 capsule (5 mg total) by mouth 2 (two) times a day before meals By Dr Jazmín Carmona, Disp: 60 capsule, Rfl: 0  •  gabapentin (NEURONTIN) 300 mg capsule, Take 1 capsule (300 mg total) by mouth 3 (three) times a day, Disp: 270 capsule, Rfl: 1  •  Galcanezumab-gnlm (Emgality) 120 MG/ML SOAJ, 1 sub cu injection every month, Disp: 3 mL, Rfl: 5  •  Iron-Vitamin C 100-250 MG TABS, Take by mouth daily, Disp: , Rfl:   •  lidocaine (XYLOCAINE) 5 % ointment, Apply topically 2 (two) times a day as needed for moderate pain, Disp: 35 44 g, Rfl: 1  •  lisinopril (ZESTRIL) 5 mg tablet, Take 1 tablet (5 mg total) by mouth daily, Disp: 90 tablet, Rfl: 3  •  mometasone (ELOCON) 0 1 % cream, APPLY TOPICALLY DAILY, Disp: 45 g, Rfl: 7  •  Multiple Vitamin (MULTI-VITAMIN DAILY) TABS, Take 1 tablet by mouth daily, Disp: , Rfl:   •  ondansetron (ZOFRAN-ODT) 4 mg disintegrating tablet, Take 1 tablet (4 mg total) by mouth every 6 (six) hours as needed for nausea or vomiting, Disp: 30 tablet, Rfl: 5  •  pantoprazole (PROTONIX) 40 mg tablet, TAKE 1 TABLET TWICE A DAY, Disp: 180 tablet, Rfl: 3  •  phenazopyridine (PYRIDIUM) 100 mg tablet, Take 1 tablet (100 mg total) by mouth 3 (three) times a day as needed for bladder spasms, Disp: 12 tablet, Rfl: 0  •  propranolol (INDERAL) 60 mg tablet, Take 1 tablet (60 mg total) by mouth in the morning, Disp: 180 tablet, Rfl: 3  •  QUEtiapine (SEROquel) 50 mg tablet, Take 1 tablet (50 mg total) by mouth daily at bedtime, Disp: 90 tablet, Rfl: 1  •  rizatriptan (MAXALT-MLT) 10 mg disintegrating tablet, Take at the onset of migraine; if symptoms continue or return, may take another dose at least 2 hours after first dose  Take no more than 2 doses in a day  (Patient taking differently: as needed Take at the onset of migraine; if symptoms continue or return, may take another dose at least 2 hours after first dose   Take no more than 2 doses in a day ), Disp: 12 tablet, Rfl: 5  •  rosuvastatin (CRESTOR) 10 MG tablet, TAKE 1 TABLET DAILY, Disp: 90 tablet, Rfl: 3  •  sertraline (ZOLOFT) 100 mg tablet, Take 1 tablet (100 mg total) by mouth daily, Disp: 90 tablet, Rfl: 1  •  sucralfate (CARAFATE) 1 g tablet, TAKE 1 TABLET BY MOUTH FOUR TIMES A DAY, Disp: 120 tablet, Rfl: 0    PHYSICAL EXAM:  Vitals:    02/07/23 0900   BP: 130/90   BP Location: Right arm   Patient Position: Sitting   Pulse: 61   Resp: 16   Temp: (!) 96 9 °F (36 1 °C)   TempSrc: Temporal   SpO2: 95%   Weight: 104 kg (230 lb)   Height: 5' 4" (1 626 m)     Body mass index is 39 48 kg/m²  Physical Exam  Constitutional:       General: She is not in acute distress  Appearance: She is well-developed  She is obese  HENT:      Head: Normocephalic  Eyes:      General: No scleral icterus  Conjunctiva/sclera: Conjunctivae normal       Pupils: Pupils are equal, round, and reactive to light  Neck:      Vascular: No JVD  Cardiovascular:      Rate and Rhythm: Normal rate  Heart sounds: Normal heart sounds  Pulmonary:      Effort: Pulmonary effort is normal       Breath sounds: Normal breath sounds  No wheezing  Abdominal:      General: Bowel sounds are normal       Palpations: Abdomen is soft  Tenderness: There is no abdominal tenderness  Musculoskeletal:         General: No swelling  Normal range of motion  Cervical back: Neck supple  Skin:     General: Skin is warm  Findings: No rash  Neurological:      General: No focal deficit present  Mental Status: She is alert and oriented to person, place, and time  Psychiatric:         Behavior: Behavior normal          LAB RESULTS:  Results for orders placed or performed in visit on 01/30/23   [facility] Allergy Panel, Adult   Result Value Ref Range    A  ALTERNATA <0 10 0 00 - 0 09 kUA/I    A  FUMIGATUS 0 17 (H) 0 00 - 0 09 kUA/I    [place] Grass 0 21 (H) 0 00 - 0 09 kUA/I    [place]  <0 10 0 00 - 0 09 kUA/I    Cat Epithellium-Dander 0 61 (H) 0 00 - 0 09 kUA/I    C HERBARUM <0 10 0 00 - 0 09 kUA/I    Cockroach 0 39 (H) 0 00 - 0 09 kUA/I    Common Silver Birch <0 10 0 00 - 0 09 kUA/I    Crisp <0 10 0 00 - 0 09 kUA/I    D  farinae 10 40 (H) 0 00 - 0 09 kUA/I    D  pteronyssinus 9 24 (H) 0 00 - 0 09 kUA/I    Dog Dander 5 39 (H) 0 00 - 0 09 kUA/I    Elm IgE <0 10 0 00 - 0 09 kUA/I    Mountain [place] Tree 0 21 (H) 0 00 - 0 09 kUA/I    Mugwort <0 10 0 00 - 0 09 kUA/I    [place] Tree <0 10 0 00 - 0 09 kUA/I    Oak <0 10 0 00 - 0 09 kUA/I    P CHRYSOGENUM 1 67 (H) 0 00 - 0 09 kUA/I    Rough Pigweed  IgE 0 34 (H) 0 00 - 0 09 kUA/I    Common Ragweed 0 12 (H) 0 00 - 0 09 kUA/I    Sheep Sorrel IgE <0 10 0 00 - 0 09 kUA/I    [place] Tree <0 10 0 00 - 0 09 kUA/I    [place] Grass 0 95 (H) 0 00 - 0 09 kUA/I    [place] Tree <0 10 0 00 - 0 09 kUA/I    White Richie Tree <0 10 0 00 - 0 09 kUA/I    IgE 852 (H) 0 - 113 kU/l    MOUSE URINE <0 10 0 00 - 0 09 kUA/I   Basic metabolic panel   Result Value Ref Range    Sodium 141 135 - 147 mmol/L    Potassium 3 7 3 5 - 5 3 mmol/L    Chloride 110 (H) 96 - 108 mmol/L    CO2 28 21 - 32 mmol/L    ANION GAP 3 (L) 4 - 13 mmol/L    BUN 15 5 - 25 mg/dL    Creatinine 0 85 0 60 - 1 30 mg/dL    Glucose 91 65 - 140 mg/dL    Calcium 8 8 8 3 - 10 1 mg/dL    eGFR 72 ml/min/1 73sq m   CBC and differential   Result Value Ref Range    WBC 5 50 4 31 - 10 16 Thousand/uL    RBC 4 02 3 81 - 5 12 Million/uL    Hemoglobin 11 1 (L) 11 5 - 15 4 g/dL    Hematocrit 35 9 34 8 - 46 1 %    MCV 89 82 - 98 fL    MCH 27 6 26 8 - 34 3 pg    MCHC 30 9 (L) 31 4 - 37 4 g/dL    RDW 14 3 11 6 - 15 1 %    MPV 10 6 8 9 - 12 7 fL    Platelets 788 418 - 436 Thousands/uL    nRBC 0 /100 WBCs    Neutrophils Relative 60 43 - 75 %    Immat GRANS % 0 0 - 2 %    Lymphocytes Relative 29 14 - 44 %    Monocytes Relative 6 4 - 12 %    Eosinophils Relative 4 0 - 6 %    Basophils Relative 1 0 - 1 %    Neutrophils Absolute 3 32 1 85 - 7 62 Thousands/µL    Immature Grans Absolute 0 02 0 00 - 0 20 Thousand/uL    Lymphocytes Absolute 1 57 0 60 - 4 47 Thousands/µL    Monocytes Absolute 0 33 0 17 - 1 22 Thousand/µL    Eosinophils Absolute 0 20 0 00 - 0 61 Thousand/µL    Basophils Absolute 0 06 0 00 - 0 10 Thousands/µL       ASSESSMENT and PLAN:    Acquired renal cyst of left kidney  She does have a renal cyst which seems to be stable over a period of time    CAT scan with contrast and MRI also supported diagnosis of renal cyst   Her renal function is quite normal   She does have recurrent hematuria which is likely because of ureteral surgery    Everything discussed at length with the patient  I think cyst is quite benign and may require regular monitoring by ultrasound once a year or every 2-year  I discussed that at length with her  I do not think the cyst is causing any of her symptoms    Obesity, morbid ([address] )  Patient need to reduce weight and she is well aware of it    Hypertension  Blood pressure is also on higher side  She claims blood pressure is much normal at home  Advised to continue monitor blood pressure at home and if it started going up she need to call me    Everything discussed at length with the patient  I will see her back in 1 year  We will repeat blood work and kidney ultrasound at that time  Advised to monitor blood pressure at home  Thank you very much for the consultation I will monitor the patient with you        Portions of the record may have been created with voice recognition software  Occasional wrong word or "sound a like" substitutions may have occurred due to the inherent limitations of voice recognition software  Read the chart carefully and recognize, using context, where substitutions have occurred  If you have any questions, please contact the dictating provider

## 2023-02-07 NOTE — ASSESSMENT & PLAN NOTE
She does have a renal cyst which seems to be stable over a period of time  CAT scan with contrast and MRI also supported diagnosis of renal cyst   Her renal function is quite normal   She does have recurrent hematuria which is likely because of ureteral surgery    Everything discussed at length with the patient  I think cyst is quite benign and may require regular monitoring by ultrasound once a year or every 2-year  I discussed that at length with her    I do not think the cyst is causing any of her symptoms

## 2023-02-09 ENCOUNTER — HOSPITAL ENCOUNTER (OUTPATIENT)
Dept: PULMONOLOGY | Facility: HOSPITAL | Age: 63
Discharge: HOME | End: 2023-02-09
Attending: INTERNAL MEDICINE

## 2023-02-09 DIAGNOSIS — R06.02 SOB (SHORTNESS OF BREATH): ICD-10-CM

## 2023-02-09 RX ORDER — ALBUTEROL SULFATE 2.5 MG/3ML
2.5 SOLUTION RESPIRATORY (INHALATION) ONCE
Status: COMPLETED | OUTPATIENT
Start: 2023-02-09 | End: 2023-02-09

## 2023-02-09 RX ADMIN — ALBUTEROL SULFATE 2.5 MG: 2.5 SOLUTION RESPIRATORY (INHALATION) at 14:16

## 2023-02-17 ENCOUNTER — ANESTHESIA EVENT (OUTPATIENT)
Dept: PERIOP | Facility: HOSPITAL | Age: 63
End: 2023-02-17

## 2023-02-17 NOTE — PRE-PROCEDURE INSTRUCTIONS
Pre-Surgery Instructions:   Medication Instructions   • albuterol (Ventolin HFA) 90 mcg/act inhaler Uses PRN- OK to take day of surgery   • Botox 200 units SOLR Hold day of surgery  • busPIRone (BUSPAR) 10 mg tablet Take day of surgery  • Cholecalciferol 25 MCG (1000 UT) capsule Stop taking 7 days prior to surgery  • dronabinol (MARINOL) 5 MG capsule Hold day of surgery  • Galcanezumab-gnlm (Emgality) 120 MG/ML SOAJ Hold day of surgery  • Iron-Vitamin C 100-250 MG TABS Stop taking 7 days prior to surgery  • lidocaine (XYLOCAINE) 5 % ointment Hold day of surgery  • lisinopril (ZESTRIL) 5 mg tablet Hold day of surgery  • mometasone (ELOCON) 0 1 % cream Hold day of surgery  • Multiple Vitamin (MULTI-VITAMIN DAILY) TABS Stop taking 7 days prior to surgery  • ondansetron (ZOFRAN-ODT) 4 mg disintegrating tablet Uses PRN- OK to take day of surgery   • pantoprazole (PROTONIX) 40 mg tablet Take day of surgery  • propranolol (INDERAL) 60 mg tablet Take day of surgery  • QUEtiapine (SEROquel) 50 mg tablet Take night before surgery   • rizatriptan (MAXALT-MLT) 10 mg disintegrating tablet Hold day of surgery  • rosuvastatin (CRESTOR) 10 MG tablet Take night before surgery   • sertraline (ZOLOFT) 100 mg tablet Take night before surgery    Spoke with patient medication list reviewed  Npo after midnight  Stop all vitamins and nsaids 1 week prior to surgery  Pt will take inderal and protonix only DOS with sm sip of water  Patient has surgical soap and understands shower instructions  Patient has been vaccinated and denies covid symptoms 1 vaccinated adult allowed with pt  Pt must have ride home after surgery  Bring photo id and insurance card  No jewelry or contacts  Pt has stim and will bring remote   [facility] will call 2/22 with arrival time and directions

## 2023-02-22 NOTE — ANESTHESIA PREPROCEDURE EVALUATION
Procedure:  PLACEMENT OF PERMANENT LEADWIRE AND PG FOR SNM AT S3 FORAMEN, COMPLEX PROGRAMMING OF PULSE GENERATOR (Bladder)    Relevant Problems   CARDIO   (+) Hypertension   (+) Mixed hyperlipidemia      GI/HEPATIC   (+) Gastroesophageal reflux disease      HEMATOLOGY   (+) Iron deficiency anemia      MUSCULOSKELETAL   (+) Lower back pain      NEURO/PSYCH   (+) Chronic pain syndrome   (+) Generalized anxiety disorder with panic attacks   (+) Moderate episode of recurrent major depressive disorder (HCC)      PULMONARY   (+) CATHRYN (obstructive sleep apnea)      Other   (+) Continuous leakage of urine   (+) Obesity, morbid ([address] )      Patient had a hysterectomy in 8560 which was complicated by ureteral tear which required multiple complex surgery and stent  She continued to chronic pain in that region and and buttock region since then  She also seems to have overflow incontinence  At present she does go to bathroom on regular time as she does not have any sensation    Compliant with CPAP    Denies recent fever, cough or other symptom of upper respiratory tract infection  Confirmed NPO appropriate    Physical Exam    Airway    Mallampati score: III  TM Distance: >3 FB  Neck ROM: full     Dental   No notable dental hx     Cardiovascular      Pulmonary      Other Findings        1/19/23 PFTs demonstrated normal spirometry, FEV1 98% of predicted  Patient tolerated 6-minute walk test without desaturation  Echo ordered by pulm to rule out pulmonary hypertension in setting of shortness of breath and known obstructive sleep apnea, still pending  Anesthesia Plan  ASA Score- 3     Anesthesia Type- general with ASA Monitors  Additional Monitors:   Airway Plan: ETT  Plan Factors-Exercise tolerance (METS): >4 METS  Exercise comment: Able to climb flight of stairs without cardiopulmonary limitation  Chart reviewed  EKG reviewed  Existing labs reviewed  Patient summary reviewed              Induction- intravenous  Postoperative Plan- Plan for postoperative opioid use  Planned trial extubation    Informed Consent- Anesthetic plan and risks discussed with patient

## 2023-02-23 ENCOUNTER — HOSPITAL ENCOUNTER (OUTPATIENT)
Facility: HOSPITAL | Age: 63
Setting detail: OUTPATIENT SURGERY
Discharge: HOME | End: 2023-02-23
Attending: OBSTETRICS & GYNECOLOGY | Admitting: OBSTETRICS & GYNECOLOGY
Payer: COMMERCIAL

## 2023-02-23 ENCOUNTER — ANESTHESIA (OUTPATIENT)
Dept: PERIOP | Facility: HOSPITAL | Age: 63
End: 2023-02-23

## 2023-02-23 ENCOUNTER — HOSPITAL ENCOUNTER (OUTPATIENT)
Dept: RADIOLOGY | Facility: HOSPITAL | Age: 63
Setting detail: OUTPATIENT SURGERY
Discharge: HOME | End: 2023-02-23

## 2023-02-23 VITALS
DIASTOLIC BLOOD PRESSURE: 63 MMHG | RESPIRATION RATE: 15 BRPM | TEMPERATURE: 97.9 F | OXYGEN SATURATION: 95 % | BODY MASS INDEX: 39.27 KG/M2 | HEART RATE: 50 BPM | SYSTOLIC BLOOD PRESSURE: 104 MMHG | WEIGHT: 230 LBS | HEIGHT: 64 IN

## 2023-02-23 DIAGNOSIS — N39.41 URGE INCONTINENCE: ICD-10-CM

## 2023-02-23 LAB
ATRIAL RATE: 52 BPM
P AXIS: 63 DEGREES
PR INTERVAL: 156 MS
QRS AXIS: 19 DEGREES
QRSD INTERVAL: 94 MS
QT INTERVAL: 452 MS
QTC INTERVAL: 420 MS
T WAVE AXIS: 29 DEGREES
VENTRICULAR RATE: 52 BPM

## 2023-02-23 PROCEDURE — 93010 ELECTROCARDIOGRAM REPORT: CPT | Performed by: INTERNAL MEDICINE

## 2023-02-23 PROCEDURE — C1787 PATIENT PROGR, NEUROSTIM: HCPCS | Performed by: OBSTETRICS & GYNECOLOGY

## 2023-02-23 PROCEDURE — C1820 GENERATOR NEURO RECHG BAT SY: HCPCS | Performed by: OBSTETRICS & GYNECOLOGY

## 2023-02-23 PROCEDURE — C1778 LEAD, NEUROSTIMULATOR: HCPCS | Performed by: OBSTETRICS & GYNECOLOGY

## 2023-02-23 DEVICE — NEUROSTIMULATOR
Type: IMPLANTABLE DEVICE | Site: SACRUM | Status: FUNCTIONAL
Brand: AXONICS

## 2023-02-23 DEVICE — TINED LEAD KIT
Type: IMPLANTABLE DEVICE | Site: SACRUM | Status: FUNCTIONAL
Brand: AXONICS

## 2023-02-23 RX ORDER — ONDANSETRON 2 MG/ML
4 INJECTION INTRAMUSCULAR; INTRAVENOUS ONCE AS NEEDED
Status: DISCONTINUED | OUTPATIENT
Start: 2023-02-23 | End: 2023-02-23 | Stop reason: HOSPADM

## 2023-02-23 RX ORDER — ONDANSETRON 2 MG/ML
INJECTION INTRAMUSCULAR; INTRAVENOUS AS NEEDED
Status: DISCONTINUED | OUTPATIENT
Start: 2023-02-23 | End: 2023-02-23

## 2023-02-23 RX ORDER — CEFAZOLIN SODIUM 2 G/50ML
2000 SOLUTION INTRAVENOUS ONCE
Status: COMPLETED | OUTPATIENT
Start: 2023-02-23 | End: 2023-02-23

## 2023-02-23 RX ORDER — EPHEDRINE SULFATE 50 MG/ML
INJECTION INTRAVENOUS AS NEEDED
Status: DISCONTINUED | OUTPATIENT
Start: 2023-02-23 | End: 2023-02-23

## 2023-02-23 RX ORDER — LIDOCAINE HYDROCHLORIDE 10 MG/ML
INJECTION, SOLUTION EPIDURAL; INFILTRATION; INTRACAUDAL; PERINEURAL AS NEEDED
Status: DISCONTINUED | OUTPATIENT
Start: 2023-02-23 | End: 2023-02-23

## 2023-02-23 RX ORDER — OXYCODONE HYDROCHLORIDE AND ACETAMINOPHEN 5; 325 MG/1; MG/1
1 TABLET ORAL EVERY 4 HOURS PRN
Status: DISCONTINUED | OUTPATIENT
Start: 2023-02-23 | End: 2023-02-23 | Stop reason: HOSPADM

## 2023-02-23 RX ORDER — PROPOFOL 10 MG/ML
INJECTION, EMULSION INTRAVENOUS AS NEEDED
Status: DISCONTINUED | OUTPATIENT
Start: 2023-02-23 | End: 2023-02-23

## 2023-02-23 RX ORDER — BUPIVACAINE HYDROCHLORIDE 5 MG/ML
INJECTION, SOLUTION PERINEURAL AS NEEDED
Status: DISCONTINUED | OUTPATIENT
Start: 2023-02-23 | End: 2023-02-23 | Stop reason: HOSPADM

## 2023-02-23 RX ORDER — DEXAMETHASONE SODIUM PHOSPHATE 10 MG/ML
INJECTION, SOLUTION INTRAMUSCULAR; INTRAVENOUS AS NEEDED
Status: DISCONTINUED | OUTPATIENT
Start: 2023-02-23 | End: 2023-02-23

## 2023-02-23 RX ORDER — LIDOCAINE HYDROCHLORIDE 10 MG/ML
0.5 INJECTION, SOLUTION EPIDURAL; INFILTRATION; INTRACAUDAL; PERINEURAL ONCE AS NEEDED
Status: DISCONTINUED | OUTPATIENT
Start: 2023-02-23 | End: 2023-02-23 | Stop reason: HOSPADM

## 2023-02-23 RX ORDER — SODIUM CHLORIDE, SODIUM LACTATE, POTASSIUM CHLORIDE, CALCIUM CHLORIDE 600; 310; 30; 20 MG/100ML; MG/100ML; MG/100ML; MG/100ML
125 INJECTION, SOLUTION INTRAVENOUS CONTINUOUS
Status: DISCONTINUED | OUTPATIENT
Start: 2023-02-23 | End: 2023-02-23 | Stop reason: HOSPADM

## 2023-02-23 RX ORDER — MIDAZOLAM HYDROCHLORIDE 2 MG/2ML
INJECTION, SOLUTION INTRAMUSCULAR; INTRAVENOUS AS NEEDED
Status: DISCONTINUED | OUTPATIENT
Start: 2023-02-23 | End: 2023-02-23

## 2023-02-23 RX ORDER — FENTANYL CITRATE 50 UG/ML
INJECTION, SOLUTION INTRAMUSCULAR; INTRAVENOUS AS NEEDED
Status: DISCONTINUED | OUTPATIENT
Start: 2023-02-23 | End: 2023-02-23

## 2023-02-23 RX ORDER — FENTANYL CITRATE/PF 50 MCG/ML
25 SYRINGE (ML) INJECTION
Status: DISCONTINUED | OUTPATIENT
Start: 2023-02-23 | End: 2023-02-23 | Stop reason: HOSPADM

## 2023-02-23 RX ADMIN — CEFAZOLIN SODIUM 2000 MG: 2 SOLUTION INTRAVENOUS at 09:40

## 2023-02-23 RX ADMIN — ONDANSETRON 4 MG: 2 INJECTION INTRAMUSCULAR; INTRAVENOUS at 10:20

## 2023-02-23 RX ADMIN — FENTANYL CITRATE 50 MCG: 50 INJECTION, SOLUTION INTRAMUSCULAR; INTRAVENOUS at 09:43

## 2023-02-23 RX ADMIN — MIDAZOLAM 1 MG: 1 INJECTION INTRAMUSCULAR; INTRAVENOUS at 09:35

## 2023-02-23 RX ADMIN — LIDOCAINE HYDROCHLORIDE 50 MG: 10 INJECTION, SOLUTION EPIDURAL; INFILTRATION; INTRACAUDAL; PERINEURAL at 09:43

## 2023-02-23 RX ADMIN — DEXAMETHASONE SODIUM PHOSPHATE 10 MG: 10 INJECTION, SOLUTION INTRAMUSCULAR; INTRAVENOUS at 10:20

## 2023-02-23 RX ADMIN — EPHEDRINE SULFATE 10 MG: 50 INJECTION INTRAVENOUS at 09:48

## 2023-02-23 RX ADMIN — SODIUM CHLORIDE, SODIUM LACTATE, POTASSIUM CHLORIDE, AND CALCIUM CHLORIDE 125 ML/HR: .6; .31; .03; .02 INJECTION, SOLUTION INTRAVENOUS at 08:47

## 2023-02-23 RX ADMIN — PROPOFOL 150 MG: 10 INJECTION, EMULSION INTRAVENOUS at 09:43

## 2023-02-23 RX ADMIN — FENTANYL CITRATE 25 MCG: 50 INJECTION INTRAMUSCULAR; INTRAVENOUS at 12:01

## 2023-02-23 NOTE — OP NOTE
OPERATIVE REPORT  PATIENT NAME: Rodrigo Beckwith    :  1960  MRN: [id number]  Pt Location: [facility] ROOM 03    SURGERY DATE: 2023    Surgeon(s) and Role:     * Patrick Hatfield MD - Primary    Preop Diagnosis:  Urge incontinence [N39 41]    Post-Op Diagnosis Codes:     * Urge incontinence [N39 41]    Procedure(s):  Left - PLACEMENT OF PERMANENT LEADWIRE AND PG FOR SNM AT S3 FORAMEN  COMPLEX PROGRAMMING OF PULSE GENERATOR    Specimen(s):  none  Estimated Blood Loss:   Minimal    Drains:  none    Anesthesia Type:   General    Operative Indications:  Urge incontinence [N39 41]       Operative Findings: excellent left great toe dorsiflexion and gonzalo with all leads        Complications:   none    Procedure and Technique:  ***   {Op note attestation:14933}    Patient Disposition:  {op note disposition:73227}        SIGNATURE: Patrick Hatfield MD  DATE: 2023  TIME: 11:05 AM

## 2023-02-23 NOTE — ANESTHESIA POSTPROCEDURE EVALUATION
Post-Op Assessment Note    CV Status:  Stable    Pain management: adequate     Mental Status:  Alert and awake   Hydration Status:  Euvolemic   PONV Controlled:  Controlled   Airway Patency:  Patent      Post Op Vitals Reviewed: Yes      Staff: Anesthesiologist         No notable events documented      BP   140/65   Temp     Pulse  56   Resp   22   SpO2   100% on 6 lpm simple mask

## 2023-02-23 NOTE — DISCHARGE INSTRUCTIONS
Keep dressing on for 24 hours  Upon removal of dressing wash with soap and water, pat dry    Ice may be used 20 mins on 20 mins off

## 2023-02-23 NOTE — INTERVAL H&P NOTE
H&P reviewed  After examining the patient I find no changes in the patients condition since the H&P had been written  Vitals:    02/23/23 0749   BP: 117/75   Pulse: 60   Resp: 18   Temp: (!) 97 4 °F (36 3 °C)   SpO2: 97%   Pt was alert and in no distress  Chronic pain stable Lumbar pacemaker turned off  Exam unchanged  Informed consent approved again

## 2023-02-23 NOTE — INTERVAL H&P NOTE
H&P reviewed  After examining the patient I find no changes in the patients condition since the H&P had been written      Vitals:    02/23/23 0749   BP: 117/75   Pulse: 60   Resp: 18   Temp: (!) 97 4 °F (36 3 °C)   SpO2: 97%   Ext: No CCE bilaterally at UE and LE

## 2023-03-30 ENCOUNTER — TELEMEDICINE (OUTPATIENT)
Dept: PSYCHIATRY | Facility: CLINIC | Age: 63
End: 2023-03-30

## 2023-03-30 DIAGNOSIS — M79.2 NEURALGIA: ICD-10-CM

## 2023-03-30 DIAGNOSIS — R51.9 NONINTRACTABLE HEADACHE, UNSPECIFIED CHRONICITY PATTERN, UNSPECIFIED HEADACHE TYPE: ICD-10-CM

## 2023-03-30 DIAGNOSIS — F41.0 GENERALIZED ANXIETY DISORDER WITH PANIC ATTACKS: ICD-10-CM

## 2023-03-30 DIAGNOSIS — F41.1 GENERALIZED ANXIETY DISORDER WITH PANIC ATTACKS: ICD-10-CM

## 2023-03-30 DIAGNOSIS — F33.1 MODERATE EPISODE OF RECURRENT MAJOR DEPRESSIVE DISORDER (HCC): Primary | ICD-10-CM

## 2023-03-30 DIAGNOSIS — G47.00 INSOMNIA, UNSPECIFIED TYPE: ICD-10-CM

## 2023-03-30 RX ORDER — BUSPIRONE HYDROCHLORIDE 10 MG/1
10 TABLET ORAL 2 TIMES DAILY
Qty: 180 TABLET | Refills: 1 | Status: SHIPPED | OUTPATIENT
Start: 2023-03-30 | End: 2023-09-26

## 2023-03-30 RX ORDER — GABAPENTIN 300 MG/1
300 CAPSULE ORAL 2 TIMES DAILY
Qty: 180 CAPSULE | Refills: 1 | Status: SHIPPED | OUTPATIENT
Start: 2023-03-30 | End: 2023-09-26

## 2023-03-30 RX ORDER — QUETIAPINE FUMARATE 50 MG/1
50 TABLET, FILM COATED ORAL
Qty: 90 TABLET | Refills: 1 | Status: SHIPPED | OUTPATIENT
Start: 2023-03-30 | End: 2023-09-26

## 2023-03-30 RX ORDER — SERTRALINE HYDROCHLORIDE 100 MG/1
100 TABLET, FILM COATED ORAL DAILY
Qty: 90 TABLET | Refills: 1 | Status: SHIPPED | OUTPATIENT
Start: 2023-03-30 | End: 2023-09-26

## 2023-03-30 RX ORDER — FLUCONAZOLE 150 MG/1
TABLET ORAL
COMMUNITY
Start: 2023-02-18

## 2023-03-30 NOTE — BH TREATMENT PLAN
"Treatment Plan done but not signed at time of office visit due to:  Plan reviewed with the patient during the virtual visit and verbal consent given  TREATMENT PLAN (Medication Management Only)        [facility]    Name and Date of Birth:  Kaya Paul 57 y o  1960  Date of Treatment Plan: March 30, 2023  Diagnosis/Diagnoses:    1  Moderate episode of recurrent major depressive disorder ([address] )    2  Generalized anxiety disorder with panic attacks    3  Insomnia, unspecified type    4  Nonintractable headache, unspecified chronicity pattern, unspecified headache type    5  Neuralgia      Strengths/Personal Resources for Self-Care: \"supportive family, hobbies, traveling\"  Area/Areas of need (in own words): anxiety, depression  1  Long Term Goal: maintain improvement in depression and anxiety sxs and prevent panic attacks  Target Date:6 months - 9/30/2023  Person/Persons responsible for completion of goal: Marquise Avelar  2  Short Term Objective (s) - How will we reach this goal?:   A  Provider new recommended medication/dosage changes and/or continue medication(s): continue current medications as prescribed  B  pending therapy  C  N/A  Target Date:6 months - 9/30/2023  Person/Persons Responsible for Completion of Goal: Marquise Avelar  Progress Towards Goals: continuing treatment  Treatment Modality: medication management every 6 months, referral for individual psychotherapy  Review due 180 days from date of this plan: 6 months - 9/30/2023  Expected length of service: maintenance  My Physician/PA/NP and I have developed this plan together and I agree to work on the goals and objectives  I understand the treatment goals that were developed for my treatment      "
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
## 2023-04-03 ENCOUNTER — TELEPHONE (OUTPATIENT)
Dept: NEUROLOGY | Facility: CLINIC | Age: 63
End: 2023-04-03

## 2023-04-03 NOTE — TELEPHONE ENCOUNTER
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

## 2023-04-04 ENCOUNTER — TELEPHONE (OUTPATIENT)
Age: 63
End: 2023-04-04

## 2023-04-04 NOTE — TELEPHONE ENCOUNTER
Left message for patient to bring in their new cpap since [facility] is unable to provide a compliance report

## 2023-04-06 ENCOUNTER — TELEPHONE (OUTPATIENT)
Age: 63
End: 2023-04-06

## 2023-04-06 ENCOUNTER — OFFICE VISIT (OUTPATIENT)
Dept: NEUROLOGY | Facility: CLINIC | Age: 63
End: 2023-04-06

## 2023-04-06 ENCOUNTER — TELEPHONE (OUTPATIENT)
Dept: NEUROLOGY | Facility: CLINIC | Age: 63
End: 2023-04-06

## 2023-04-06 ENCOUNTER — OFFICE VISIT (OUTPATIENT)
Age: 63
End: 2023-04-06

## 2023-04-06 VITALS
HEART RATE: 77 BPM | HEIGHT: 64 IN | WEIGHT: 229 LBS | BODY MASS INDEX: 39.09 KG/M2 | OXYGEN SATURATION: 95 % | TEMPERATURE: 97.6 F | SYSTOLIC BLOOD PRESSURE: 104 MMHG | DIASTOLIC BLOOD PRESSURE: 68 MMHG

## 2023-04-06 VITALS
WEIGHT: 229 LBS | SYSTOLIC BLOOD PRESSURE: 106 MMHG | BODY MASS INDEX: 39.09 KG/M2 | HEART RATE: 76 BPM | HEIGHT: 64 IN | DIASTOLIC BLOOD PRESSURE: 72 MMHG

## 2023-04-06 DIAGNOSIS — G47.33 OSA (OBSTRUCTIVE SLEEP APNEA): ICD-10-CM

## 2023-04-06 DIAGNOSIS — G43.709 CHRONIC MIGRAINE WITHOUT AURA WITHOUT STATUS MIGRAINOSUS, NOT INTRACTABLE: Primary | ICD-10-CM

## 2023-04-06 DIAGNOSIS — Z99.89 OSA ON CPAP: Primary | ICD-10-CM

## 2023-04-06 DIAGNOSIS — G47.33 OSA ON CPAP: Primary | ICD-10-CM

## 2023-04-06 DIAGNOSIS — R06.02 SOB (SHORTNESS OF BREATH): ICD-10-CM

## 2023-04-06 DIAGNOSIS — E66.9 OBESITY (BMI 30-39.9): ICD-10-CM

## 2023-04-06 DIAGNOSIS — J45.40 MODERATE PERSISTENT ASTHMA WITHOUT COMPLICATION: ICD-10-CM

## 2023-04-06 LAB
DME PARACHUTE DELIVERY DATE REQUESTED: NORMAL
DME PARACHUTE ITEM DESCRIPTION: NORMAL
DME PARACHUTE ORDER STATUS: NORMAL
DME PARACHUTE SUPPLIER NAME: NORMAL
DME PARACHUTE SUPPLIER PHONE: NORMAL

## 2023-04-06 NOTE — TELEPHONE ENCOUNTER
Called patient and I offered sooner appointment today 04/06/23 at 03:00pm in the  with [facility]

## 2023-04-06 NOTE — PROGRESS NOTES
"Assessment/Plan:   Diagnoses and all orders for this visit:    CATHRYN on CPAP  -     PAP DME Pressure Change    SOB (shortness of breath)    Moderate persistent asthma without complication    Obesity (BMI 30-39  9)    Other orders  -     PAP Pressure Change        Shortness of breath and dyspnea on exertion likely multifactorial likely secondary to anemia as well, PFTs did not demonstrate any evidence of obstructive or restrictive lung disease the DLCO has been normal   Discussed with the patient to use the albuterol MDI 2 puffs 4 times daily as needed only  Respiratory allergy panel with IgE has been significantly elevated at 867  She used to follow-up with an allergy immunology in the past with immunotherapy shots several years ago  Currently given not many symptoms respiratory symptoms no significant allergies rhinitis symptoms as well wants to hold off on following up with allergist currently  She was ordered for an echocardiogram which is still pending to look for any evidence of pulmonary hypertension  CATHRYN on CPAP currently on 6 cm of water reviewed prior compliance study with 100% compliance the residual AHI was 6 4 for which I did bump up the pressure to 7 cm  Send it to her DME company will follow-up with the download compliance in 30 days and to look for any the residual AHI if acceptable  Recommend weight loss  Cautioned against driving when sleepy  Follow-up in 6 months or as needed earlier as needed  Return in about 6 months (around 10/6/2023)  All questions are answered to the patient's satisfaction and understanding  She verbalizes understanding  She is encouraged to call with any further questions or concerns  Portions of the record may have been created with voice recognition software  Occasional wrong word or \"sound a like\" substitutions may have occurred due to the inherent limitations of voice recognition software    Read the chart carefully and recognize, using context, where " substitutions have occurred  Electronically Signed by Eliza Chawla MD    ______________________________________________________________________    Chief Complaint: No chief complaint on file  Patient ID: Valentina Cabello is a 57 y o  y o  female has a past medical history of Anxiety, Arthritis, Attention and concentration deficit, Blurred vision, Chronic pain, Chronic pain syndrome (01/20/2016), CPAP (continuous positive airway pressure) dependence, Depression, Diplopia, Dyspepsia, Gastric ulcer, Gastritis, GERD (gastroesophageal reflux disease), Headache(784 0), History of transfusion (2005), Hypertension, Insomnia, Irritable bowel syndrome, Memory loss, Migraines, Osteopenia, Peripheral neuropathy (3/14/2012), 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), Presence of neurostimulator, Pudendal neuralgia, Sleep apnea, Spinal stenosis, Starting and stopping of urinary stream during micturition, Urinary incontinence, and Wears eyeglasses  4/6/2023  Patient presents today for follow-up visit  Valentina Cabello is a very  pleasant 58-year-old lady who has never smoked with history of significant environmental allergies and has been on immunotherapy in the past several years ago, currently with shortness of breath and dyspnea on exertion for which a PFT has been ordered and she is here for follow-up she also got a albuterol MDI to be used as needed which she states she only used it once or twice    Also has history of obstructive sleep apnea mild that was diagnosed in 2016 and has been on a CPAP 7 cm of water since then and she states she has been consistently using it also her machine was recalled and she got a new machine about 2 to 3 weeks ago which she is currently switched to the new one and has been using not connected to the [employer] yet with the [employer] company she states    She is faithfully using her CPAP does not have any trouble falling asleep or staying asleep still has daytime tiredness and fatigue feels exhausted when she is up in the morning          Associated symptoms include headaches and myalgias  Pertinent negatives include no chest pain, fever or sore throat  Review of Systems   Constitutional: Negative  Negative for appetite change and fever  HENT: Negative  Negative for ear pain, postnasal drip, rhinorrhea, sneezing, sore throat and trouble swallowing  Eyes: Negative  Respiratory: Positive for shortness of breath  Cardiovascular: Negative  Negative for chest pain  Gastrointestinal: Negative  Endocrine: Negative  Genitourinary: Negative  Musculoskeletal: Positive for myalgias  Allergic/Immunologic: Negative  Neurological: Positive for headaches  Hematological: Negative  Psychiatric/Behavioral: Negative  Smoking history: She reports that she has never smoked   She has never used smokeless tobacco     The following portions of the patient's history were reviewed and updated as appropriate: allergies, current medications, past family history, past medical history, past social history, past surgical history and problem list     Immunization History   Administered Date(s) Administered   • COVID-19 J&J ([facility]) vaccine 0 5 mL 03/09/2021   • COVID-19 PFIZER VACCINE 0 3 ML IM 10/26/2021, 04/08/2022   • INFLUENZA 10/26/2007, 01/25/2010, 09/20/2011, 09/21/2012, 09/13/2013, 10/01/2014, 08/31/2015, 12/05/2016, 08/20/2018, 10/11/2022   • Influenza, injectable, quadrivalent, preservative free 0 5 mL 10/05/2020   • Influenza, recombinant, quadrivalent,injectable, preservative free 08/20/2018, 09/09/2019, 10/11/2022   • Influenza, seasonal, injectable 10/26/2007, 01/25/2010, 09/20/2011, 09/21/2012, 09/13/2013, 10/01/2014, 08/31/2015   • Influenza, seasonal, injectable, preservative free 12/05/2016   • Pneumococcal Polysaccharide PPV23 10/26/2007     Current Outpatient Medications   Medication Sig Dispense Refill   • albuterol (Ventolin HFA) 90 mcg/act inhaler Inhale 2 puffs every 6 (six) hours as needed for wheezing 18 g 0   • Botox 200 units SOLR      • busPIRone (BUSPAR) 10 mg tablet Take 1 tablet (10 mg total) by mouth 2 (two) times a day 180 tablet 1   • dronabinol (MARINOL) 5 MG capsule Take 1 capsule (5 mg total) by mouth 2 (two) times a day before meals By Dr Ami Gastelum 60 capsule 0   • fluconazole (DIFLUCAN) 150 mg tablet TAKE 1 TABLET BY MOUTH FOR 1 DOSE  MAY REPEAT IN 1 WEEK     • gabapentin (NEURONTIN) 300 mg capsule Take 1 capsule (300 mg total) by mouth 2 (two) times a day 180 capsule 1   • Galcanezumab-gnlm (Emgality) 120 MG/ML SOAJ 1 sub cu injection every month 3 mL 5   • Iron-Vitamin C 100-250 MG TABS Take by mouth daily     • lidocaine (XYLOCAINE) 5 % ointment Apply topically 2 (two) times a day as needed for moderate pain 35 44 g 1   • lisinopril (ZESTRIL) 5 mg tablet Take 1 tablet (5 mg total) by mouth daily 90 tablet 3   • mometasone (ELOCON) 0 1 % cream APPLY TOPICALLY DAILY 45 g 7   • Multiple Vitamin (MULTI-VITAMIN DAILY) TABS Take 1 tablet by mouth daily     • ondansetron (ZOFRAN-ODT) 4 mg disintegrating tablet Take 1 tablet (4 mg total) by mouth every 6 (six) hours as needed for nausea or vomiting 30 tablet 5   • pantoprazole (PROTONIX) 40 mg tablet TAKE 1 TABLET TWICE A  tablet 3   • propranolol (INDERAL) 60 mg tablet Take 1 tablet (60 mg total) by mouth in the morning 180 tablet 3   • QUEtiapine (SEROquel) 50 mg tablet Take 1 tablet (50 mg total) by mouth daily at bedtime 90 tablet 1   • rizatriptan (MAXALT-MLT) 10 mg disintegrating tablet Take at the onset of migraine; if symptoms continue or return, may take another dose at least 2 hours after first dose  Take no more than 2 doses in a day  (Patient taking differently: as needed Take at the onset of migraine; if symptoms continue or return, may take another dose at least 2 hours after first dose   Take no more than 2 doses in a day ) 12 tablet 5   • rosuvastatin (CRESTOR) 10 MG tablet TAKE 1 TABLET DAILY 90 tablet 3   • "sertraline (ZOLOFT) 100 mg tablet Take 1 tablet (100 mg total) by mouth daily 90 tablet 1   • Cholecalciferol 25 MCG (1000 UT) capsule Take 1 capsule by mouth daily   (Patient not taking: Reported on 4/6/2023)       No current facility-administered medications for this visit  Allergies: Morphine, Cat hair extract, Dog epithelium, and Other    Objective:  Vitals:    04/06/23 1300   BP: 104/68   Pulse: 77   Temp: 97 6 °F (36 4 °C)   SpO2: 95%   Weight: 104 kg (229 lb)   Height: 5' 4\" (1 626 m)   Oxygen Therapy  SpO2: 95 %    Wt Readings from Last 3 Encounters:   04/06/23 104 kg (229 lb)   02/23/23 104 kg (230 lb)   02/07/23 104 kg (230 lb)     Body mass index is 39 31 kg/m²  Physical Exam  Constitutional:       Appearance: She is well-developed  HENT:      Head: Normocephalic and atraumatic  Eyes:      Pupils: Pupils are equal, round, and reactive to light  Neck:      Comments: Short and wide neck  Cardiovascular:      Rate and Rhythm: Normal rate and regular rhythm  Heart sounds: Normal heart sounds  Pulmonary:      Effort: Pulmonary effort is normal       Breath sounds: Normal breath sounds  Musculoskeletal:         General: Normal range of motion  Cervical back: Normal range of motion and neck supple  Skin:     General: Skin is warm and dry  Neurological:      Mental Status: She is alert and oriented to person, place, and time     Psychiatric:         Behavior: Behavior normal            Diagnostics:  I have personally reviewed pertinent films in PACS  ESS: Total score: 2    Answers for HPI/ROS submitted by the patient on 4/3/2023  Do you have chest tightness?: Yes  Chronicity: recurrent  When did you first notice your symptoms?: more than 1 year ago  How often do your symptoms occur?: intermittently  Since you first noticed this problem, how has it changed?: gradually improving  Do you have shortness of breath that occurs with effort or exertion?: Yes  Do you have ear congestion?: " Yes  Do you have heartburn?: Yes  Do you have fatigue?: Yes  Do you have nasal congestion?: No  Do you have shortness of breath when lying flat?: No  Do you have shortness of breath when you wake up?: No  Do you have sweats?: No  Have you experienced weight loss?: No  Which of the following makes your symptoms worse?: animal exposure, climbing stairs, exercise, pollen

## 2023-04-06 NOTE — PROGRESS NOTES
Patient ID: Ruiz Mancilla is a 57 y o  female  Assessment/Plan:       Diagnoses and all orders for this visit:    Chronic migraine without aura without status migrainosus, not intractable    CATHRYN (obstructive sleep apnea)       She reports a significant reduction of migraine headaches  Continue a combination of Emgality injection q  monthly and Botox injections every 3 months for prevention of chronic migraine  Both therapies together, in combination, work better than either therapy alone  In addition, since starting botox, the patient reports greater than 7 days of migraine relief from baseline, correlated with headache diary, decreased abortive medication use and decreased ER visits  At the onset of a migraine continue Maxalt, add Zofran for nausea if needed  She also takes propranolol and lisinopril, per patient she is taking these for migraine prevention  She actually had to decrease the doses in the past due to low blood pressure  If she finds that the migraine headaches are further reduced with continuation of Botox and Emgality, she may benefit from removing the propranolol or lisinopril to avoid polypharmacy  I can leave that up to her family provider, GWEN Phillips, since she is the prescriber  She will continue CPAP nightly for CATHRYN  Continue with psychiatry on a regular basis, on 100 mg Zoloft daily, 50 mg Seroquel nightly, gabapentin 300 mg every 12 hours, 10 mg BuSpar every 12 hours  The patient should not hesitate to call me prior to her follow up with any questions or concerns  Subjective:    HPI    Ms Ruiz Mancilla is a very pleasant 56 yo female here for follow up for migraines  She has pudendal neuralgia and has a stimulator follows with [facility]on urogynecologist   -- She had a hysterectomy in 5714 which was complicated by ureteral tear  Since then has had back/ buttock pain and incontinence   She is now s/p SECOND stim placement 2/23/23 for urge incontinence and she has noticed improvement  She sees us for chronic migraines which have been reduced with botox  Since starting botox, the patient reports greater than 7 days of migraine relief from baseline, correlated with headache diary, decreased abortive medication use and decreased ER visits  Actually a combination of the botox and emgality works better than either therapy alone  She denies s/e to either therapy  Past meds tried/failed:  Depakote, propranolol, duloxetine, trazodone, lisinopril, topiramate, gabapentin, pregabalin, venlafaxine and amitriptyline would be contraindicated due to interaction with duloxetine, aimovig - would avoid at this time due to history of constipation     How often do the headaches occur? Currently 1-2 times per week  Prior to the botox and Emgality it was nearly daily     Are you ever headache free? Yes     Aura/Warning and how long does it last?  No aura  Warning signing - intense nausea     Certain time of the day? Mid-afternoon       How long do the headaches last?   The rest of the day       Where is your headache located? Bifrontal L>R and pain in eye      Describe your usual headache? Throbbing, pressure     What is the intensity of pain? Mild headaches: 3-4/10  Moderate to severe headaches: 6-7/10     Associated symptoms:   []? Decreased appetite  [x]? Nausea   []? Vomiting   []? Diarrhea  [x]? Photophobia   [x]? Phonophobia       []? Osmophobia  []? Lacrimation   [x]? rhinorrhea    [x]? Flushing of face   [x]? Stiff or sore neck   [x]? Dizziness  [x]? light headed  [x]? Problems with concentration   []? Blurred vision   []? Change in pupil size     []? Ptosis  []? Facial droop     []? Hands or feet tingle or feel numb/paresthesias  []? Tinnitus   []? Insomnia  []? Worse with lying down  [x]? better with lying down  [x]? Prefer to be in a cool, quiet, dark room     Do headaches get worse with  []? coughing   []?sneezing []? bending  []?exertion      Number of days missed per month because of headaches:  Work (or school) days: Retired  Social or Family activities: estimates missing 25% of events per monthly      Headache triggers:      - decaf iced coffee with dairy cream     Alternative therapies used in the past for headaches? []? Daith piercing  []? Massage  []? physical therapy  []? Acupuncture  []? Acupressure []? Chiropractor  []? Yoga  []?biofeedback  []? TPI  [x]? Botox  []? Epidural injections  [x]? CBD/THC -     How many caffeine products to drink a day? Iced tea - 60 oz   How much water to drink a day? None      Sleep: How many hours do you sleep a night on average? 10 hours  Do you feel well rested when you wake up? No   Any history of CATHRYN? Yes, wears CPAP machine       Mood:  Any history of anxiety or depression? Yes especially in the beginning of COVID period  It is now much better  Do you follow with psychology or psychiatry? Was following with counseling just recently stopped     Dietary:  Not following a particular diet  No skipping meals      Jaw:  History of TMJ, grinding  She is currently wearing mouth piece       She has a family history of migraines and her twin sister, who currently lives in [place]  She likes to visit her when possible  Diagnostics:  - MRI brain with without contrast 08/07/2020: Normal MRI of the brain   No pathologic intracranial enhancement   - MRI brain neuroquant  01/25/2019   1   Normal MRI of the brain  2   Neuroquant was performed and is a normal study; Does not support neurodegeneration    - MRI brain neuroquant 07/26/2016: No acute disease   Neuro Quant imaging normal, no indication of Alzheimer's type, medial temporal lobe neuro degenerative disease    The following portions of the patient's history were reviewed and updated as appropriate:   She  has a past medical history of Anxiety, Arthritis, Attention and concentration deficit, Blurred vision, Chronic pain, Chronic pain syndrome (01/20/2016), CPAP (continuous positive airway pressure) dependence, CTS (carpal tunnel syndrome) (> 6 years), Depression, Difficulty walking (> 6 years), Diplopia, Dyspepsia, Gastric ulcer, Gastritis, GERD (gastroesophageal reflux disease), Head injury (> 25 years), Headache(784 0), Headache, tension-type (> 1 year), History of transfusion (2005), Hypertension, Insomnia, Irritable bowel syndrome, Memory loss, Migraines, Osteopenia, Peripheral neuropathy (3/14/2012), 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), Presence of neurostimulator, Pudendal neuralgia, Shingles (> 30 years ago), Sleep apnea, Spinal stenosis, Starting and stopping of urinary stream during micturition, Syncope (> 10 years), Trigeminal neuralgia (> 30 years), Urinary incontinence, and Wears eyeglasses    She   Patient Active Problem List    Diagnosis Date Noted   • CATHRYN on CPAP 04/06/2023   • Urinary incontinence, urge 02/23/2023   • Acquired renal cyst of left kidney 02/07/2023   • Kidney mass 11/18/2022   • Hypoxia 11/18/2022   • COVID-19 07/22/2022   • Presence of neurostimulator    • Obesity, morbid ([address] ) 04/26/2021   • Generalized anxiety disorder with panic attacks 04/09/2021   • Moderate episode of recurrent major depressive disorder ([address] ) 04/09/2021   • Lipoma of neck 03/22/2021   • SOB (shortness of breath) 03/06/2021   • Hypovitaminosis D 02/12/2021   • Positive FIT (fecal immunochemical test) 11/17/2020   • Migraine 08/21/2020   • Nonintractable headache 06/23/2020   • Insomnia 06/23/2020   • Mild cognitive impairment with memory loss    • Lumbar radiculopathy 08/20/2018   • Sacroiliitis ([address] ) 07/25/2018   • Osteoarthritis of left shoulder    • Neuralgia 02/20/2018   • Continuous leakage of urine 02/20/2018   • Mixed hyperlipidemia 12/19/2017   • Eczema 12/19/2017   • Hematuria 04/28/2017   • Arthralgia of shoulder region, left 12/14/2016   • 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   • CATHRYN (obstructive sleep apnea) 08/02/2016   • Hypertension 06/24/2016   • Dysesthesia of multiple sites 06/10/2016   • 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   • Chronic pain syndrome 2016   • Lumbar facet arthropathy 2016   • Gastroesophageal reflux disease 10/20/2014   • Iron deficiency anemia 10/20/2014   • Limb pain 2013   • Lower back pain 2013     She  has a past surgical history that includes Hysterectomy (2012); Gastric bypass (); Cholecystectomy;  section ();  section (); Gallbladder surgery (); Radiofrequency ablation nerves; Insert / replace peripheral neurostimulator pulse generator / ; Other surgical history (); Wrist arthroscopy; Urethral fistula repair; pr insj/rplcmt spi npgr dir/induxive coupling (Right, 04/15/2020); US guided thyroid biopsy (2020); Ureter surgery; Mass excision (Right, 2021); Spinal cord stimulator implant (); Trigger point injection; Colonoscopy; and pr insertion/rplcmt peripheral/gastric npgr (Left, 2023)  Her family history includes Cirrhosis in her mother; Crohn's disease in her brother, brother, and mother; Dementia in her brother and mother; Depression in her mother and sister; Heart disease in her father; Hypertension in her father; Lupus in her mother; Neuropathy in her mother; No Known Problems in her cousin, cousin, cousin, cousin, cousin, daughter, maternal aunt, and paternal aunt; Other in her brother and mother  She  reports that she has never smoked  She has never used smokeless tobacco  She reports current drug use  Drug: Marijuana  She reports that she does not drink alcohol    Current Outpatient Medications   Medication Sig Dispense Refill   • albuterol (Ventolin HFA) 90 mcg/act inhaler Inhale 2 puffs every 6 (six) hours as needed for wheezing 18 g 0   • Botox 200 units SOLR      • busPIRone (BUSPAR) 10 mg tablet Take 1 tablet (10 mg total) by mouth 2 (two) times a day 180 tablet 1   • Cholecalciferol 25 MCG (1000 UT) capsule Take 1 capsule by mouth daily     • dronabinol (MARINOL) 5 MG capsule Take 1 capsule (5 mg total) by mouth 2 (two) times a day before meals By Dr Teresa Nguyễn (Patient taking differently: Take 2 5 mg by mouth in the morning By Dr Teresa Nguyễn) 60 capsule 0   • fluconazole (DIFLUCAN) 150 mg tablet TAKE 1 TABLET BY MOUTH FOR 1 DOSE  MAY REPEAT IN 1 WEEK     • gabapentin (NEURONTIN) 300 mg capsule Take 1 capsule (300 mg total) by mouth 2 (two) times a day 180 capsule 1   • Galcanezumab-gnlm (Emgality) 120 MG/ML SOAJ 1 sub cu injection every month 3 mL 5   • Iron-Vitamin C 100-250 MG TABS Take 1 tablet by mouth daily     • lidocaine (XYLOCAINE) 5 % ointment Apply topically 2 (two) times a day as needed for moderate pain 35 44 g 1   • lisinopril (ZESTRIL) 5 mg tablet Take 1 tablet (5 mg total) by mouth daily 90 tablet 3   • mometasone (ELOCON) 0 1 % cream APPLY TOPICALLY DAILY (Patient taking differently: Apply 1 application  topically if needed) 45 g 7   • Multiple Vitamin (MULTI-VITAMIN DAILY) TABS Take 1 tablet by mouth daily     • ondansetron (ZOFRAN-ODT) 4 mg disintegrating tablet Take 1 tablet (4 mg total) by mouth every 6 (six) hours as needed for nausea or vomiting 30 tablet 5   • pantoprazole (PROTONIX) 40 mg tablet TAKE 1 TABLET TWICE A DAY (Patient taking differently: Take 40 mg by mouth 2 (two) times a day) 180 tablet 3   • propranolol (INDERAL) 60 mg tablet Take 1 tablet (60 mg total) by mouth in the morning 180 tablet 3   • QUEtiapine (SEROquel) 50 mg tablet Take 1 tablet (50 mg total) by mouth daily at bedtime 90 tablet 1   • rizatriptan (MAXALT-MLT) 10 mg disintegrating tablet Take at the onset of migraine; if symptoms continue or return, may take another dose at least 2 hours after first dose  Take no more than 2 doses in a day  (Patient taking differently: as needed Take at the onset of migraine; if symptoms continue or return, may take another dose at least 2 hours after first dose   Take no more than 2 doses in a day ) 12 tablet 5   • rosuvastatin (CRESTOR) 10 "MG tablet TAKE 1 TABLET DAILY 90 tablet 3   • sertraline (ZOLOFT) 100 mg tablet Take 1 tablet (100 mg total) by mouth daily 90 tablet 1     No current facility-administered medications for this visit  She is allergic to morphine, cat hair extract, dog epithelium, and other            Objective:    Blood pressure 106/72, pulse 76, height 5' 4\" (1 626 m), weight 104 kg (229 lb), not currently breastfeeding  Body mass index is 39 31 kg/m²  Physical Exam    Neurological Exam  Vital signs reviewed  Well developed, well nourished  Speech is fluent and articulate  Mood and affect are pleasant  Head: Normocephalic, atraumatic  CN 7-82: intact and symmetric, including EOMs which are normal b/l and PERRL  Fundi b/l are normal to crude ophthalmological examination  + cataracts , minor, left side  MSK: 5/5 t/o  ROM normal x all 4 extr  Reflexes: 2+ and symmetric in all 4 extr  Coordination: Nml x4 extr  Gait: Steady normal gait  ROS:    Review of Systems  Constitutional: Negative  HENT: Negative  Eyes: Negative  Respiratory: Negative  Cardiovascular: Negative  Gastrointestinal: Negative  Endocrine: Negative  Genitourinary: Negative  Musculoskeletal: Negative  Skin: Negative  Allergic/Immunologic: Negative  Neurological: Positive for headaches  Hematological: Negative  Psychiatric/Behavioral: Negative  The following portions of the patient's history were reviewed and updated as appropriate: allergies, current medications/ medication history, past family history, past medical history, past social history, past surgical history and problem list     Review of systems was reviewed and otherwise unremarkable from a neurological perspective    I have spent a total time of 30 minutes on 4/6/23 in caring for this patient including Instructions for management, Counseling / Coordination of care, Documenting in the medical record, Reviewing / ordering tests, medicine, procedures " and Obtaining or reviewing history

## 2023-04-07 NOTE — TELEPHONE ENCOUNTER
After per checking [facility] website, pt Botox order is still processing  Writer contacted pt advised [facility] will be calling to receive consent in order to ship the Botox medication  Pt agreed and stated will answer when they call  Will continue to follow up with [facility]  Pt has an upcoming appointment 4/27/2023

## 2023-04-07 NOTE — PATIENT INSTRUCTIONS
Botox Therapy  Important Information    Our goal is to make sure you fully understand how Botox Therapy treatment may benefit you and to help you understand how you can play an active role in your treatments and ongoing care  Please review the following information below  Call our office IMMEDIATELY @ [phone] and speak to one of our Botox Coordinators if you have a change in insurance  (a prior authorization is required and accurate information is vital)  Please call at least 24 hours in advance if you can't make your appointment  Appointments are scheduled every 91 days (this will be scheduled in advance before leaving the office)  You must allow for at least 2-3 treatments to determine if Botox is right for you  It may take a few weeks to see a response from treatment  No hair dye or scalp massage or treatment within 24 hours of treatment  We encourage you to use a headache diary or journal to document your headache frequency and severity  You can also utilize the Migraine Jose Guadalupe ravi (downloadable on [employer])  Sign up for the Botox Savings Program  (commercial insurance patients may qualify) Sign up at [employer] or call [phone] Option: 4  To get more information on Botox therapy for Chronic Migraines and see frequently asked questions, please visit [employer]  If you have any questions or concerns, please speak to one of our Botox Coordinators at [phone]  We look forward to servicing you! Headache management instructions  - When patient has a moderate to severe headache, they should seek rest, initiate relaxation and apply cold compresses to the head  - Maintain regular sleep schedule  Adults need at least 7-8 hours of uninterrupted a night  - Limit over the counter medications such as Tylenol, Ibuprofen, Aleve, Excedrin  (No more than 2- 3 times a week or max 10 a month)  - Maintain headache diary    Free RAVI for a smart phone, which can be "used is \"Migraine sergo\"  - Limit caffeine to 1-2 cups 8 to 16 oz a day or less  - Avoid dietary trigger  (aged cheese, peanuts, MSG, aspartame and nitrates)  - Patient is to have regular frequent meals to prevent headache onset  - Please drink at least 64 ounces of water a day to help remain hydrated     "

## 2023-04-07 NOTE — OP NOTE
OPERATIVE REPORT  PATIENT NAME: Mynor Salgado    :  1960  MRN: [id number]  Pt Location: BE OR ROOM 03    SURGERY DATE: 2023    Surgeon(s) and Role:     * Kandace Davila MD - Primary    Preop Diagnosis:  Urge incontinence [N39 41]    Post-Op Diagnosis Codes:     * Urge incontinence [N39 41]    Procedure(s):  Left - PLACEMENT OF PERMANENT LEADWIRE AND PG FOR SNM AT S3 FORAMEN  COMPLEX PROGRAMMING OF PULSE GENERATOR    Specimen(s):  None    Estimated Blood Loss:   Minimal    Drains:  none    Anesthesia Type:   General    Operative Indications:  Urge incontinence [N39 41]       Operative Findings:  Strong left lead wire repsonses of rectal gonzalo and ipsilateral great toe dorsiflexion with all  4 electrodes upon stimulation  Complications:   none    Procedure and Technique:  The patient was transferred to the OR and introduced to the staff  General anesthesia with ETT was performed on the stretcher  She was transferred to the OR table in prone position with appropriate cushioning to head/face, chest, abdomen,knee and ankle  She was taped at buttocks to expose her anus and prepped and draped  A timeout was called and all OR staff were in agreement with the operative info and plan  The patient was marked at the midline 9 cm from her coccyx and 2 cm lateral on L and  R sides of the midline  Analgesia was injected at the skin level and down to the sacrum on the left  The short needle and trocar were inserted at a 45 degree angle at this site and into the S4 foramen  Flouroscopy and electrical stimulation confirmed the S 4 foramina site and the needle was removed  The skin and deeper tissues were injected with analgesia 1 cm above the previous site on the L  The needle and trocar were then inserted into the S3 foramen as confirmed with fluoroscopy and stimulation  The trocar was removed and the sound was inserted and the needle was removed over the sound    The skin insertion site was widened to 1 cm with a scalpel  The sleeve with hollow trocar were slipped over the sound and down through the S3 foramen  Proper depth and positioning was confirmed with AP and lateral fluoroscopy  The inner trocar and sound were removed leaving the outer trocar    The lead wire was inserted into the trocar with the proximal angled tip angled laterally  Depth was confirmed with fluoroscopy and electrode stimulation  The trocar was removed with care not to dislodge the electrode  The electrode position was again confirmed with fluoroscopy and lead wire electrical stimulation of all 4 electrodes with great responses  Attention was turned to the left hip: 2 cm below the posterior crest a 3 cm incision was made with a scalpel after local analgesia was injected superficially and deep  Electrocautery coagulation and blunt dissection was performed to form a pocket that was irrigated with antibiotic solution  Good hemostasis was noted  Local analgesia was injected into deep tissue along the path from the lead wire insertion site to the hip pocket  A sleeve with trocar and cutting tip was inserted at lead wire insertion site to hip pocket  The trocar with tip were removed and the lead wire was threaded through the sleeve  The sleeve was removed  Next the generator was attached to the distal end of the lead wire and placed into the hip pocket after confirmation of proper functioning of all 4 electrodes of the lead wire  And again confirmation of proper functioning was confirmed after placement of the generator into the pocket  Good hemostasis was noted at all surgical sites which were then closed with deep interrupted 3-O vicryl suture and running 3-O vicryl suture at skin level and covered with steri strips and dressings  The patient was cleansed of skin prep  All counts were correct    Patient Disposition:  The patient tolerated the procedure well   She was returned to the supine position on the stretcher, awakened, extubated, and transferred to the recovery room in stable condition          SIGNATURE: Penny Mata MD  DATE: April 7, 2023  TIME: 4:45 PM

## 2023-04-11 PROBLEM — R09.02 HYPOXIA: Status: RESOLVED | Noted: 2022-11-18 | Resolved: 2023-04-11

## 2023-04-11 NOTE — TELEPHONE ENCOUNTER
Received fax from [employer] requesting PA information  Completed forms and sent back via fax  No new update from [address]

## 2023-04-12 NOTE — TELEPHONE ENCOUNTER
Received approval details from [employer] automated services below  Approved   Botox 200 UNITS  Qty  1  Auth# [id number]  Valid:3/12/2023-4/10/2024  Visits: 4    Please use [facility]

## 2023-04-12 NOTE — TELEPHONE ENCOUNTER
Contacted [facility] given all approval information needed; received paid claim consent needed  Rep advised they will reach out to pt to receive consent in order to set up delivery

## 2023-04-27 ENCOUNTER — PROCEDURE VISIT (OUTPATIENT)
Dept: NEUROLOGY | Facility: CLINIC | Age: 63
End: 2023-04-27

## 2023-04-27 VITALS
BODY MASS INDEX: 39.44 KG/M2 | HEIGHT: 64 IN | TEMPERATURE: 97.2 F | SYSTOLIC BLOOD PRESSURE: 101 MMHG | HEART RATE: 76 BPM | WEIGHT: 231 LBS | DIASTOLIC BLOOD PRESSURE: 64 MMHG

## 2023-04-27 DIAGNOSIS — G43.709 CHRONIC MIGRAINE WITHOUT AURA WITHOUT STATUS MIGRAINOSUS, NOT INTRACTABLE: Primary | ICD-10-CM

## 2023-04-27 NOTE — PROGRESS NOTES
Universal Protocol   Consent: Verbal consent obtained  Written consent obtained    Risks and benefits: risks, benefits and alternatives were discussed  Consent given by: patient  Patient understanding: patient states understanding of the procedure being performed  Patient consent: the patient's understanding of the procedure matches consent given  Procedure consent: procedure consent matches procedure scheduled        Chemodenervation     Date/Time 4/27/2023 10:00 AM     Performed by  Hugo Hahn PA-C     Authorized by Hugo Hahn PA-C        Pre-procedure details      Prepped With: Alcohol     Procedure details     Position:  Upright   Botox     Botox Type:  Type A    Brand:  Botox    mL's of Botulinum Toxin:  200    Final Concentration per CC:  100 units    Needle Gauge:  30 G 2 5 inch   Procedures     Botox Procedures: chronic headache      Indications: migraines     Injection Location      Head / Face:  L superior trapezius, R superior trapezius, L superior cervical paraspinal, R superior cervical paraspinal, L , R , procerus, L temporalis, R temporalis, R frontalis, L frontalis, R medial occipitalis and L medial occipitalis    L  injection amount:  5 unit(s)    R  injection amount:  5 unit(s)    L lateral frontalis:  5 unit(s)    R lateral frontalis:  5 unit(s)    L medial frontalis:  5 unit(s)    R medial frontalis:  5 unit(s)    L temporalis injection amount:  20 unit(s)    R temporalis injection amount:  20 unit(s)    Procerus injection amount:  5 unit(s)    L medial occipitalis injection amount:  15 unit(s)    R medial occipitalis injection amount:  15 unit(s)    L superior cervical paraspinal injection amount:  10 unit(s)    R superior cervical paraspinal injection amount:  10 unit(s)    L superior trapezius injection amount:  15 unit(s)    R superior trapezius injection amount:  15 unit(s)   Total Units     Total units used:  200    Total units discarded: "0   Post-procedure details      Chemodenervation:  Chronic migraine    Facial Nerve Location[de-identified]  Bilateral facial nerve    Patient tolerance of procedure: Tolerated well, no immediate complications   Comments      Extra units medically necessary in the R and L frontotemporal regions b/l- total 45  Blood pressure 101/64, pulse 76, temperature (!) 97 2 °F (36 2 °C), temperature source Temporal, height 5' 4\" (1 626 m), weight 105 kg (231 lb), not currently breastfeeding  Body mass index is 39 65 kg/m²  HAs L>R frontotemporal region  Pt losing weight, feels this is reducing some migraine headaches      "

## 2023-04-27 NOTE — PATIENT INSTRUCTIONS
Botox Therapy  Important Information    Our goal is to make sure you fully understand how Botox Therapy treatment may benefit you and to help you understand how you can play an active role in your treatments and ongoing care  Please review the following information below  Call our office IMMEDIATELY @ [phone] and speak to one of our Botox Coordinators if you have a change in insurance  (a prior authorization is required and accurate information is vital)  Please call at least 24 hours in advance if you can't make your appointment  Appointments are scheduled every 91 days (this will be scheduled in advance before leaving the office)  You must allow for at least 2-3 treatments to determine if Botox is right for you  It may take a few weeks to see a response from treatment  No hair dye or scalp massage or treatment within 24 hours of treatment  We encourage you to use a headache diary or journal to document your headache frequency and severity  You can also utilize the Migraine Jose Guadalupe ravi (downloadable on [employer])  Sign up for the Botox Savings Program  (commercial insurance patients may qualify) Sign up at [employer] or call [phone] Option: 4  To get more information on Botox therapy for Chronic Migraines and see frequently asked questions, please visit [employer]  If you have any questions or concerns, please speak to one of our Botox Coordinators at [phone]  We look forward to servicing you! Headache management instructions  - When patient has a moderate to severe headache, they should seek rest, initiate relaxation and apply cold compresses to the head  - Maintain regular sleep schedule  Adults need at least 7-8 hours of uninterrupted a night  - Limit over the counter medications such as Tylenol, Ibuprofen, Aleve, Excedrin  (No more than 2- 3 times a week or max 10 a month)  - Maintain headache diary    Free RAVI for a smart phone, which can be "used is \"Migraine sergo\"  - Limit caffeine to 1-2 cups 8 to 16 oz a day or less  - Avoid dietary trigger  (aged cheese, peanuts, MSG, aspartame and nitrates)  - Patient is to have regular frequent meals to prevent headache onset  - Please drink at least 64 ounces of water a day to help remain hydrated     "

## 2023-05-02 ENCOUNTER — HOSPITAL ENCOUNTER (OUTPATIENT)
Age: 63
Discharge: HOME | End: 2023-05-02

## 2023-05-02 VITALS — BODY MASS INDEX: 42.25 KG/M2 | HEIGHT: 62 IN

## 2023-05-02 DIAGNOSIS — Z12.31 SCREENING MAMMOGRAM FOR BREAST CANCER: ICD-10-CM

## 2023-05-08 ENCOUNTER — EVALUATION (OUTPATIENT)
Dept: PHYSICAL THERAPY | Facility: CLINIC | Age: 63
End: 2023-05-08

## 2023-05-08 DIAGNOSIS — R29.898 WEAKNESS OF BOTH LOWER EXTREMITIES: Primary | ICD-10-CM

## 2023-05-08 NOTE — PROGRESS NOTES
PT Evaluation     Today's date: 2023  Patient name: Louisa Harmon  : 1960  MRN: [id number]  Referring provider: GWEN Hoyt  Dx:   Encounter Diagnosis     ICD-10-CM    1  Weakness of both lower extremities  R29 898                      Assessment  Assessment details: Patient was provided a home exercise program and demonstrated an understanding of exercises  Patient was advised to stop performing home exercise program if symptoms increase or new complaints developed  Verbal understanding demonstrated regarding home exercise program instructions  Patient would benefit from skilled physical therapy services for prescribed exercises, manual interventions, neuromuscular re-education, education, and modalities as deemed appropriate to assist patient in achieving their maximum level of function  Patient returns to our department today with c/o bilateral le weakness - particularly the inability to ascend steps without pulling herself up and moving with a step to pattern  Evaluation reveals: weakness bilateral hips/ core, decreased endurance  Decline in functional movements/ walking  She presents motivated to address and improve the above  Impairments: abnormal or restricted ROM, activity intolerance, impaired physical strength, lacks appropriate home exercise program and poor posture   Understanding of Dx/Px/POC: good  Goals  STG   1  Patient will demonstrate independence and competence with HEP 2 -4 weeks  2  Patient will demonstrate improved postural awareness and self correction  3-5 weeks  LTG   1  Patient will report improvements with both functional and recreational abilities  4-6 weeks  2  Patient will demonstrate improved motor function  4-6 weeks  3   Patient will be able to ascend flight steps without step-thru pattern, no handrail assist   6-10 weeks       Plan  Plan details: Patient response to treatment will be monitored each session and progressed accordingly    Thank "you for this referral    Patient would benefit from: skilled physical therapy  Planned therapy interventions: IADL retraining, manual therapy, patient education, postural training, strengthening, stretching, therapeutic exercise, flexibility, home exercise program, neuromuscular re-education, balance and gait training  Frequency: 2x week  Duration in weeks: 8  Treatment plan discussed with: patient        Subjective Evaluation    History of Present Illness  Mechanism of injury: Patient reports that her legs are very weak -  \"I cannot climb the steps\"  Patient reports that she has joined [employer] and has lost 15# in the past couple months -( overall she is feeling better with less pain )  She is trying to walk and keep moving  She cannot get up from a squatted position  Denies pain   She does feel weakness in her arm, she has chronic left shoulder pain / weakness  \" but I realize I have to live with that \"     Since last attendance in PT - she had another implant performed \" sacral implant \"    Helps with incontinence/ pain somewhat she states  Pain  Aggravating factors: walking    Social Support  Lives with: spouse    Patient Goals  Patient goals for therapy: increased strength and independence with ADLs/IADLs          Objective     Lumbar Screen  Lumbar range of motion within normal limits with the following exceptions:FF - minimal loss  Bb - wfl    slr = 75 degrees bilaterally (-) neural tension     Neurological Testing     Sensation     Hip   Left Hip   Intact: light touch    Right Hip   Intact: light touch    Active Range of Motion   Left Hip   Flexion: WFL  Extension: WFL  Abduction: WFL  Adduction: WFL  [facility]  Internal rotation (90/90): WFL    Right Hip   Flexion: WFL  Extension: WFL  Abduction: WFL  Adduction: WFL  External rotation (90/90): [facility] [place]  Internal rotation (90/90):  [facility]    Strength/Myotome Testing     Left Hip   Planes of Motion   Flexion: 4-  Extension: 4-  Abduction: " 4  Adduction: 4+  External rotation: 4  Internal rotation: 4    Right Hip   Planes of Motion   Flexion: 4-  Extension: 4-  Abduction: 4  Adduction: 4+  External rotation: 4  Internal rotation: 4    Additional Strength Details  Knee flexion/ ext/ DF = 5/5 bilaterally     General Comments:      Hip Comments   Steps - (+) handrail use, step to pattern    Gait - slowed tee , heel -toe progression              Precautions: bilateral LE weakness     [facility] [employer]  Access Code: [email or web address]          Manuals 5/8            FOTO db                                                   Neuro Re-Ed                          GS, Add sets, QS 3s x 20            Prone QS, GS                          HL TA w/roll             HL TB hip abd btb x 20            TB Clamshells btb x 20             Reverse Clamshells                                                    Ther Ex             bike             SLR 20x            saq             S/l abd 20x             Prone hip ext w/ knee flexed             Quad hydrants                          Side stepping TB             Step ups f/l             slb hip 90/90 abd                                                                  Ther Activity                                       Gait Training                                       Modalities

## 2023-05-09 ENCOUNTER — TELEPHONE (OUTPATIENT)
Dept: FAMILY MEDICINE CLINIC | Facility: CLINIC | Age: 63
End: 2023-05-09

## 2023-05-09 DIAGNOSIS — Z99.89 OSA ON CPAP: Primary | ICD-10-CM

## 2023-05-09 DIAGNOSIS — G47.33 OSA ON CPAP: Primary | ICD-10-CM

## 2023-05-11 ENCOUNTER — OFFICE VISIT (OUTPATIENT)
Dept: PHYSICAL THERAPY | Facility: CLINIC | Age: 63
End: 2023-05-11

## 2023-05-11 DIAGNOSIS — R29.898 WEAKNESS OF BOTH LOWER EXTREMITIES: Primary | ICD-10-CM

## 2023-05-11 NOTE — PROGRESS NOTES
Daily Note     Today's date: 2023  Patient name: Carmella Powers  : 1960  MRN: [id number]  Referring provider: GWEN Woodard  Dx:   Encounter Diagnosis     ICD-10-CM    1  Weakness of both lower extremities  R29 898                      Subjective: Patient's chief complaint is inability to climb stairs step over step without using railing for assistance and LE weakness  Objective: See treatment diary below      Assessment: Tolerated treatment with marked fatigue appearing challenged by current program/progressions today  Patient demonstrated fatigue post treatment, exhibited good technique with therapeutic exercises and would benefit from continued PT      Plan: Continue per plan of care  Progress treatment as tolerated  Precautions: bilateral LE weakness     [facility] [employer]  Access Code: [email or web address]          Manuals            FOTO db                                                   Neuro Re-Ed                          GS, Add sets, QS 3s x 20 :03  20x each           Prone QS, GS  :03  20x each                        HL TA w/roll  :05  20x           HL TB hip abd btb x 20 BTB  20x           TB Clamshells btb x 20  BTB  20x           Reverse Clamshells  BTB  20x                                                  Ther Ex             bike             SLR 20x 20x           saq             S/l abd 20x             Prone B  hip ext w/ knee flexed  20x           Quad hydrants                          Side stepping TB             Step ups f/l             slb hip 90/90 abd                                                                  Ther Activity                                       Gait Training                                       Modalities

## 2023-05-17 ENCOUNTER — APPOINTMENT (OUTPATIENT)
Dept: PHYSICAL THERAPY | Facility: CLINIC | Age: 63
End: 2023-05-17
Payer: COMMERCIAL

## 2023-05-17 ENCOUNTER — TELEPHONE (OUTPATIENT)
Dept: PSYCHIATRY | Facility: CLINIC | Age: 63
End: 2023-05-17

## 2023-05-18 ENCOUNTER — APPOINTMENT (OUTPATIENT)
Dept: PHYSICAL THERAPY | Facility: CLINIC | Age: 63
End: 2023-05-18
Payer: COMMERCIAL

## 2023-05-18 DIAGNOSIS — R51.9 CHRONIC DAILY HEADACHE: ICD-10-CM

## 2023-05-18 RX ORDER — PROPRANOLOL HYDROCHLORIDE 60 MG/1
TABLET ORAL
Qty: 180 TABLET | Refills: 3 | Status: SHIPPED | OUTPATIENT
Start: 2023-05-18

## 2023-05-24 ENCOUNTER — TELEPHONE (OUTPATIENT)
Dept: PULMONOLOGY | Facility: CLINIC | Age: 63
End: 2023-05-24

## 2023-05-24 ENCOUNTER — APPOINTMENT (OUTPATIENT)
Dept: PHYSICAL THERAPY | Facility: CLINIC | Age: 63
End: 2023-05-24
Payer: COMMERCIAL

## 2023-05-25 NOTE — TELEPHONE ENCOUNTER
S/destiney Rae at [facility] he stated they do not have any notes auth is needed or any open orders  Called pt to confirm she was requesting supplies and she stated she is, so I advised we will process an order now in their portal so she can get her supplies   Order place in [place]

## 2023-05-26 ENCOUNTER — APPOINTMENT (OUTPATIENT)
Dept: PHYSICAL THERAPY | Facility: CLINIC | Age: 63
End: 2023-05-26
Payer: COMMERCIAL

## 2023-05-26 LAB

## 2023-05-30 ENCOUNTER — APPOINTMENT (OUTPATIENT)
Dept: PHYSICAL THERAPY | Facility: CLINIC | Age: 63
End: 2023-05-30
Payer: COMMERCIAL

## 2023-06-01 ENCOUNTER — APPOINTMENT (OUTPATIENT)
Dept: PHYSICAL THERAPY | Facility: CLINIC | Age: 63
End: 2023-06-01
Payer: COMMERCIAL

## 2023-06-05 ENCOUNTER — APPOINTMENT (OUTPATIENT)
Dept: PHYSICAL THERAPY | Facility: CLINIC | Age: 63
End: 2023-06-05
Payer: COMMERCIAL

## 2023-06-08 ENCOUNTER — OFFICE VISIT (OUTPATIENT)
Dept: PHYSICAL THERAPY | Facility: CLINIC | Age: 63
End: 2023-06-08
Payer: COMMERCIAL

## 2023-06-08 ENCOUNTER — APPOINTMENT (OUTPATIENT)
Dept: PHYSICAL THERAPY | Facility: CLINIC | Age: 63
End: 2023-06-08
Payer: COMMERCIAL

## 2023-06-08 DIAGNOSIS — R29.898 WEAKNESS OF BOTH LOWER EXTREMITIES: Primary | ICD-10-CM

## 2023-06-08 PROCEDURE — 97110 THERAPEUTIC EXERCISES: CPT | Performed by: PHYSICAL THERAPIST

## 2023-06-08 PROCEDURE — 97112 NEUROMUSCULAR REEDUCATION: CPT | Performed by: PHYSICAL THERAPIST

## 2023-06-08 NOTE — PROGRESS NOTES
Daily Note     Today's date: 2023  Patient name: Dexter Mayo  : 1960  MRN: [id number]  Referring provider: GWEN Bonner  Dx:   Encounter Diagnosis     ICD-10-CM    1  Weakness of both lower extremities  R29 898                      Subjective: Patient notes that her daughter had accident and she needed to be out of town to assist in her care   Notes not feeling too bad despite not being in PT for a couple weeks  Objective: See treatment diary below      Assessment: Tolerated treatment well overall - added 1# with good tolerance  Plan: Continue per plan of care  Progress treatment as tolerated  Precautions: bilateral LE weakness     [facility] [employer]  Access Code: [email or web address]    POC expires Auth Status Unit limit Start date  Expiration date PT/OT + Visit Limit?   23 na na na na 60 cy                                     Visit/Unit Tracking  AUTH Status:  Date             NA Used 1 2 3 foto done             Remaining                          Manuals           FOTO db                                                   Neuro Re-Ed                          GS, Add sets, QS 3s x 20 :03  20x each 3s x 20 ea          Prone QS, GS  :03  20x each 3s x 20 ea          bridging   3s x 20           HL TA w/roll  :05  20x 5s x 20          HL TB hip abd btb x 20 BTB  20x btb x 20          TB Clamshells btb x 20  BTB  20x btb x 20          Reverse Clamshells  BTB  20x btb x 20                       TG L10    20 x 2                       Ther Ex             bike   6'           SLR 20x 20x 1# x 20          saq   1# x 20          S/l abd 20x   1# x 20           Prone B  hip ext w/ knee flexed  20x 1# x 20           Laq/ hip flexion   1# x 20           Quad hydrants                          Side stepping TB             Step ups f/l             slb hip 90/90 abd                                                                  Ther Activity Gait Training                                       Modalities

## 2023-06-09 ENCOUNTER — OFFICE VISIT (OUTPATIENT)
Dept: PHYSICAL THERAPY | Facility: CLINIC | Age: 63
End: 2023-06-09
Payer: COMMERCIAL

## 2023-06-09 DIAGNOSIS — R29.898 WEAKNESS OF BOTH LOWER EXTREMITIES: Primary | ICD-10-CM

## 2023-06-09 PROCEDURE — 97110 THERAPEUTIC EXERCISES: CPT

## 2023-06-09 PROCEDURE — 97112 NEUROMUSCULAR REEDUCATION: CPT

## 2023-06-09 NOTE — PROGRESS NOTES
Daily Note     Today's date: 2023  Patient name: Jaylin Rios  : 1960  MRN: [id number]  Referring provider: GWEN Gray  Dx:   Encounter Diagnosis     ICD-10-CM    1  Weakness of both lower extremities  R29 898                      Subjective: Patient noted muscular soreness since last visit  Objective: See treatment diary below      Assessment: Tolerated treatment well  Patient exhibited good technique with therapeutic exercises and would benefit from continued PT      Plan: Continue per plan of care  Progress treatment as tolerated  Precautions: bilateral LE weakness     [facility]  Access Code: [email or web address]    POC expires Auth Status Unit limit Start date  Expiration date PT/OT + Visit Limit?   23 na na na na 60 cy                                     Visit/Unit Tracking  AUTH Status:  Date            NA Used 1 2 3 foto done 4            Remaining                          Manuals          FOTO db  DB                                                 Neuro Re-Ed                          GS, Add sets, QS 3s x 20 :03  20x each 3s x 20 ea :03  20x each         Prone QS, GS  :03  20x each 3s x 20 ea :03  20x each         bridging   3s x 20  :03  20x         HL TA w/roll  :05  20x 5s x 20 :05  20x         HL TB hip abd btb x 20 BTB  20x btb x 20 BTB  20x         TB Clamshells btb x 20  BTB  20x btb x 20 BTB  20x         Reverse Clamshells  BTB  20x btb x 20 BTB  20x                      TG L10    20 x 2 20x2                      Ther Ex             bike   6'  7'         SLR 20x 20x 1# x 20 1#  20x         saq   1# x 20 1#  20x         S/l abd 20x   1# x 20  1#  20x         Prone B  hip ext w/ knee flexed  20x 1# x 20  1#  20x         Laq/ hip flexion   1# x 20  1#  20x         Quad hydrants                          Side stepping TB             Step ups f/l             slb hip 90/90 abd Ther Activity                                       Gait Training                                       Modalities

## 2023-06-12 ENCOUNTER — OFFICE VISIT (OUTPATIENT)
Dept: PHYSICAL THERAPY | Facility: CLINIC | Age: 63
End: 2023-06-12
Payer: COMMERCIAL

## 2023-06-12 DIAGNOSIS — R29.898 WEAKNESS OF BOTH LOWER EXTREMITIES: Primary | ICD-10-CM

## 2023-06-12 PROCEDURE — 97110 THERAPEUTIC EXERCISES: CPT | Performed by: PHYSICAL THERAPIST

## 2023-06-12 PROCEDURE — 97112 NEUROMUSCULAR REEDUCATION: CPT | Performed by: PHYSICAL THERAPIST

## 2023-06-12 NOTE — PROGRESS NOTES
Daily Note     Today's date: 2023  Patient name: Antonia Ghosh  : 1960  MRN: [id number]  Referring provider: GWEN Conrad  Dx:   Encounter Diagnosis     ICD-10-CM    1  Weakness of both lower extremities  R29 898                      Subjective: Patient notes that additions today are challenging but she is pleased with her ability to perform them without discomfort  Objective: See treatment diary below      Assessment: Tolerated treatment well  Patient exhibited good technique with therapeutic exercises and would benefit from continued PT  Increased weight tolerated well     Plan: Continue per plan of care  Progress treatment as tolerated  Precautions: bilateral LE weakness     [facility]  Access Code: [email or web address]    POC expires Auth Status Unit limit Start date  Expiration date PT/OT + Visit Limit?   23 na na na na 60 cy                                     Visit/Unit Tracking  AUTH Status:  Date           NA Used 1 2 3 foto done 4            Remaining                          Manuals         FOTO db  DB                                                 Neuro Re-Ed                          GS, Add sets, QS 3s x 20 :03  20x each 3s x 20 ea :03  20x each HEP        Prone QS, GS  :03  20x each 3s x 20 ea :03  20x each HEP        bridging   3s x 20  :03  20x 3s x 20         HL TA w/roll  :05  20x 5s x 20 :05  20x 5s x 20        HL TB hip abd btb x 20 BTB  20x btb x 20 BTB  20x HEP        TB Clamshells btb x 20  BTB  20x btb x 20 BTB  20x HEP        Reverse Clamshells  BTB  20x btb x 20 BTB  20x HEP        TRX Squats      20x         TG L10    20 x 2 20x2 20 x 2                     Ther Ex             bike   6'  7' 8'        SLR 20x 20x 1# x 20 1#  20x 1 5# x 20        saq   1# x 20 1#  20x 1 5# x 20        S/l abd 20x   1# x 20  1#  20x 1 5# x 20         Prone B  hip ext w/ knee flexed  20x 1# x 20  1#  20x 1 5# x 20        Laq/ "hip flexion   1# x 20  1#  20x 1 5# x 20         Quad hydrants                          Side stepping TB     gtb x 2  25'         Step ups f/l     6\" x 20 ea         slb hip 90/90 abd              Stand hip abd, HR, ext, hs curls     1 5# x 20 ea bilat                                               Ther Activity                                       Gait Training                                       Modalities                                            "

## 2023-06-15 ENCOUNTER — OFFICE VISIT (OUTPATIENT)
Dept: PHYSICAL THERAPY | Facility: CLINIC | Age: 63
End: 2023-06-15
Payer: COMMERCIAL

## 2023-06-15 DIAGNOSIS — R29.898 WEAKNESS OF BOTH LOWER EXTREMITIES: Primary | ICD-10-CM

## 2023-06-15 PROCEDURE — 97112 NEUROMUSCULAR REEDUCATION: CPT | Performed by: PHYSICAL THERAPIST

## 2023-06-15 PROCEDURE — 97110 THERAPEUTIC EXERCISES: CPT | Performed by: PHYSICAL THERAPIST

## 2023-06-15 NOTE — PROGRESS NOTES
Daily Note     Today's date: 6/15/2023  Patient name: Michael Lewis  : 1960  MRN: [id number]  Referring provider: GWEN Robert  Dx:   Encounter Diagnosis     ICD-10-CM    1  Weakness of both lower extremities  R29 898                      Subjective: Patient notes that she is feeling good -  Pleased to report that she has lost 23# and is feeling motivated and happy with program thus far  Objective: See treatment diary below      Assessment: Tolerated treatment well  Patient exhibited good technique with therapeutic exercises and would benefit from continued PT  Patient is moving well, performing tasks easier each week       Plan: Continue per plan of care  Progress treatment as tolerated  Precautions: bilateral LE weakness     [facility]  Access Code: [email or web address]    POC expires Auth Status Unit limit Start date  Expiration date PT/OT + Visit Limit?   23 na na na na 60 cy                                     Visit/Unit Tracking  AUTH Status:  Date          NA Used 1 2 3 foto done 4 5 6          Remaining                          Manuals        FOTO db  DB                                                 Neuro Re-Ed                          GS, Add sets, QS 3s x 20 :03  20x each 3s x 20 ea :03  20x each HEP        Prone QS, GS  :03  20x each 3s x 20 ea :03  20x each HEP        bridging   3s x 20  :03  20x 3s x 20  3s x 20       HL TA w/roll  :05  20x 5s x 20 :05  20x 5s x 20 5s x 20        TRX Squats      20x  20x       TG L10    20 x 2 20x2 20 x 2 20 x 2                     Ther Ex             bike   6'  7' 8' 9'       SLR 20x 20x 1# x 20 1#  20x 1 5# x 20 1 5# x 20       saq   1# x 20 1#  20x 1 5# x 20 1 5# x 20 ea       S/l abd 20x   1# x 20  1#  20x 1 5# x 20  1 5# x 20       Prone B  hip ext w/ knee flexed  20x 1# x 20  1#  20x 1 5# x 20 1 5# x 20       Laq/ hip flexion   1# x 20  1#  20x 1 5# x 20  1 5# x 20        Quad "hydrants                          Side stepping TB     gtb x 2  25'  gtb x 2 30'        Step ups f/l     6\" x 20 ea  1 5# x 20 6\"        slb hip 90/90 abd       1 5#  X 10        Stand hip abd, HR, ext, hs curls     1 5# x 20 ea bilat 1 5# x 20 ea bilat foam                                              Ther Activity                                       Gait Training                                       Modalities                                            "

## 2023-06-19 ENCOUNTER — OFFICE VISIT (OUTPATIENT)
Dept: PHYSICAL THERAPY | Facility: CLINIC | Age: 63
End: 2023-06-19
Payer: COMMERCIAL

## 2023-06-19 DIAGNOSIS — R29.898 WEAKNESS OF BOTH LOWER EXTREMITIES: Primary | ICD-10-CM

## 2023-06-19 PROCEDURE — 97110 THERAPEUTIC EXERCISES: CPT | Performed by: PHYSICAL THERAPIST

## 2023-06-19 PROCEDURE — 97112 NEUROMUSCULAR REEDUCATION: CPT | Performed by: PHYSICAL THERAPIST

## 2023-06-19 NOTE — PROGRESS NOTES
Daily Note     Today's date: 2023  Patient name: Alka Foss  : 1960  MRN: [id number]  Referring provider: GWEN [place]  Dx:   Encounter Diagnosis     ICD-10-CM    1  Weakness of both lower extremities  R29 898                      Subjective: Patient notes that she continues to feel good -  Reports fatigue, but no difficulty with increased weights today  Objective: See treatment diary below      Assessment: Tolerated treatment well  Patient exhibited good technique with therapeutic exercises and would benefit from continued PT  Patient is moving well, performing tasks easier each week       Plan: Continue per plan of care  Progress treatment as tolerated  Initiate [place] Walk outs next session        Precautions: bilateral LE weakness     [facility]  Access Code: [email or web address]    POC expires Auth Status Unit limit Start date  Expiration date PT/OT + Visit Limit?   23 na na na na 60 cy                                     Visit/Unit Tracking  AUTH Status:  Date         NA Used 1 2 3 foto done 4 5 6 7         Remaining                          Manuals       FOTO db  DB                                                 Neuro Re-Ed                                                    bridging   3s x 20  :03  20x 3s x 20  3s x 20 3s x 20       HL TA w/roll  :05  20x 5s x 20 :05  20x 5s x 20 5s x 20  5s x 20       TRX Squats      20x  20x 20x       TG L10    20 x 2 20x2 20 x 2 20 x 2  20' x 2                   Ther Ex             bike   6'  7' 8' 9' 10'       SLR 20x 20x 1# x 20 1#  20x 1 5# x 20 1 5# x 20 2# x 20      saq   1# x 20 1#  20x 1 5# x 20 1 5# x 20 ea 2# x 20       S/l abd 20x   1# x 20  1#  20x 1 5# x 20  1 5# x 20 2# x 20       Prone B  hip ext w/ knee flexed  20x 1# x 20  1#  20x 1 5# x 20 1 5# x 20 2# x 20       Laq/ hip flexion   1# x 20  1#  20x 1 5# x 20  1 5# x 20  2# x 20 ea       Quad hydrants "2# 10 x 2                   Side stepping TB     gtb x 2  25'  gtb x 2 30'  gtb x 2 30'       Step ups f/l     6\" x 20 ea  1 5# x 20 6\"  20x ea       slb hip 90/90 abd       1 5#  X 10  20x  5s hold      Stand hip abd, HR, ext, hs curls     1 5# x 20 ea bilat 1 5# x 20x B foam Foam 2# x 20 ea      Box sit-stand       5# x 20 20\"      [facility]        [place]                   Ther Activity                                       Gait Training                                       Modalities                                            "

## 2023-06-22 ENCOUNTER — TELEMEDICINE (OUTPATIENT)
Dept: PSYCHIATRY | Facility: CLINIC | Age: 63
End: 2023-06-22
Payer: COMMERCIAL

## 2023-06-22 ENCOUNTER — OFFICE VISIT (OUTPATIENT)
Dept: PHYSICAL THERAPY | Facility: CLINIC | Age: 63
End: 2023-06-22
Payer: COMMERCIAL

## 2023-06-22 DIAGNOSIS — F41.0 GENERALIZED ANXIETY DISORDER WITH PANIC ATTACKS: ICD-10-CM

## 2023-06-22 DIAGNOSIS — F41.1 GENERALIZED ANXIETY DISORDER WITH PANIC ATTACKS: ICD-10-CM

## 2023-06-22 DIAGNOSIS — G47.00 INSOMNIA, UNSPECIFIED TYPE: ICD-10-CM

## 2023-06-22 DIAGNOSIS — R51.9 NONINTRACTABLE HEADACHE, UNSPECIFIED CHRONICITY PATTERN, UNSPECIFIED HEADACHE TYPE: ICD-10-CM

## 2023-06-22 DIAGNOSIS — R29.898 WEAKNESS OF BOTH LOWER EXTREMITIES: Primary | ICD-10-CM

## 2023-06-22 DIAGNOSIS — F33.1 MODERATE EPISODE OF RECURRENT MAJOR DEPRESSIVE DISORDER (HCC): Primary | ICD-10-CM

## 2023-06-22 DIAGNOSIS — M79.2 NEURALGIA: ICD-10-CM

## 2023-06-22 DIAGNOSIS — E78.2 MIXED HYPERLIPIDEMIA: ICD-10-CM

## 2023-06-22 PROCEDURE — 97112 NEUROMUSCULAR REEDUCATION: CPT

## 2023-06-22 PROCEDURE — 90833 PSYTX W PT W E/M 30 MIN: CPT | Performed by: STUDENT IN AN ORGANIZED HEALTH CARE EDUCATION/TRAINING PROGRAM

## 2023-06-22 PROCEDURE — 99214 OFFICE O/P EST MOD 30 MIN: CPT | Performed by: STUDENT IN AN ORGANIZED HEALTH CARE EDUCATION/TRAINING PROGRAM

## 2023-06-22 PROCEDURE — 97110 THERAPEUTIC EXERCISES: CPT

## 2023-06-22 RX ORDER — ROSUVASTATIN CALCIUM 10 MG/1
TABLET, COATED ORAL
Qty: 90 TABLET | Refills: 3 | Status: SHIPPED | OUTPATIENT
Start: 2023-06-22

## 2023-06-22 RX ORDER — SERTRALINE HYDROCHLORIDE 100 MG/1
100 TABLET, FILM COATED ORAL DAILY
Qty: 90 TABLET | Refills: 1 | Status: SHIPPED | OUTPATIENT
Start: 2023-06-22 | End: 2023-12-19

## 2023-06-22 RX ORDER — GABAPENTIN 300 MG/1
300 CAPSULE ORAL 2 TIMES DAILY
Qty: 180 CAPSULE | Refills: 1 | Status: SHIPPED | OUTPATIENT
Start: 2023-06-22 | End: 2023-12-19

## 2023-06-22 RX ORDER — BUSPIRONE HYDROCHLORIDE 10 MG/1
10 TABLET ORAL 2 TIMES DAILY
Qty: 180 TABLET | Refills: 1 | Status: SHIPPED | OUTPATIENT
Start: 2023-06-22 | End: 2023-12-19

## 2023-06-22 RX ORDER — QUETIAPINE FUMARATE 50 MG/1
50 TABLET, FILM COATED ORAL
Qty: 90 TABLET | Refills: 1 | Status: SHIPPED | OUTPATIENT
Start: 2023-06-22 | End: 2023-12-19

## 2023-06-22 NOTE — PSYCH
Virtual Regular Visit    Verification of patient location: [place]    Patient is located in the following state in which I hold an active license: [place]    Assessment/Plan:    Problem List Items Addressed This Visit        Other    Insomnia    Generalized anxiety disorder with panic attacks    Moderate episode of recurrent major depressive disorder ([facility] [address] ) - Primary            Reason for visit is   Chief Complaint   Patient presents with   • Medication Management   • Depression   • Anxiety   • Panic Attack   • Insomnia        Visit Time  Visit Start Time: 1:25 PM  Visit Stop Time: 1:50 PM  Total Visit Duration: 25 minutes    Encounter provider Rita Arriaza MD    Provider located at: [address], [address] [place] [address]    Recent Visits  No visits were found meeting these conditions  Showing recent visits within past 7 days and meeting all other requirements  Today's Visits  Date Type Provider Dept   06/22/23 Telemedicine Rita Arriaza MD [facility]   Showing today's visits and meeting all other requirements  Future Appointments  No visits were found meeting these conditions  Showing future appointments within next 150 days and meeting all other requirements       The patient was identified by name and date of birth  Lito Correa was informed that this is a telemedicine visit and that the visit is being conducted through the [address] [employer] platform  She agrees to proceed  My office door was closed  No one else was in the room  She acknowledged consent and understanding of privacy and security of the video platform  The patient has agreed to participate and understands they can discontinue the visit at any time  Patient is aware this is a billable service         MEDICATION MANAGEMENT NOTE        [facility]      Name and Date of Birth:  Lito Correa 57 y o  1960 MRN: "[id number]    Date of Visit: June 22, 2023    Reason for Visit:   Chief Complaint   Patient presents with   • Medication Management   • Depression   • Anxiety   • Panic Attack   • Insomnia       SUBJECTIVE:  The patient was visited virtually for medication management and follow up visit for panic attacks, anxiety, depression and insomnia  Presented calm, and cooperative  Reported feeling \"really good\"  She recently visited her sister in [place] and she joined weight watchers (her sister has been already enrolled) and she adjusted to the program very well; she has lost 25 lbs and has been feeling less pain with better ambulation  She continues to go to the PT  She looks forwards to her upcoming cruise on July 15th  She is worried about her  getting retired soon (next April) and stated that he does \"micro-managing\" and she does \"not function well under pressure\"  She expatiated on her marital conflicts and related stressors  She noted that she does things in her own way, \"longer but perfect\"; paying more attention to the details  She denied any hoarding issues lately, but noted that when she was younger she had a hard time letting things go  Sleeps about 10 hours nightly  Endorsed good energy level  Denied any changes in appetite, concentration, or daily activities  Denied feelings of anhedonia, hopelessness, helplessness, worthlessness or guilt and appeared to be future oriented  There was no thought constriction related to death  Denied SI/HI, intent or plan upon direct inquiry at this time  Denied AV/H  No anxiety sxs, specific phobia or panic attacks reported  No manic sxs, paranoid ideations or fixed delusions were elicited  Endorsed good compliance with the medications and denied any side effects  Denied smoking cigarettes, drinking alcohol or other illicit substance use  Given this presentation, medications are maintained at the same dosage   The patient was educated to call 911 or go to the nearest " emergency room if the symptoms become overwhelming or unable to remain in control  Verbalized understanding and agreed to seek help in case of distress or concern for safety  Review Of Systems:  Pertinent items are noted in HPI; all others are negative; no recent changes in medications or health status reported        PHQ-2/9 Depression Screening                 Past Psychiatric History Update:   - No inpatient psychiatric admission since last encounter  - No SA or SIB since last encounter  - No incidence of violent behavior since last encounter    Past Trauma History Update:    - No new onset of abuse or traumatic events since last encounter     Past Medical History:    Past Medical History:   Diagnosis Date   • Anxiety    • Arthritis    • Attention and concentration deficit    • Blurred vision    • Chronic pain    • Chronic pain syndrome 01/20/2016   • CPAP (continuous positive airway pressure) dependence    • CTS (carpal tunnel syndrome) > 6 years    Left wrist   • Depression    • Difficulty walking > 6 years    Left nerve leg pain   • Diplopia    • Dyspepsia    • Gastric ulcer    • Gastritis    • GERD (gastroesophageal reflux disease)    • Head injury > 25 years    Radha Samano off 12 foot retaining wall   • Headache(784 0)    • Headache, tension-type > 1 year    Pain behind left eyeAnd stiff neck with pressure   • History of transfusion 2005   • Hypertension    • Insomnia    • Irritable bowel syndrome    • Memory loss    • Migraines    • Osteopenia    • Peripheral neuropathy 3/14/2012    Left thigh, left abdomen, left prudential nerve   • 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   • Presence of neurostimulator     Pudendal neuralgia   • Pudendal neuralgia    • Shingles > 30 years ago    Left facial nerve   • Sleep apnea    • Spinal stenosis    • Starting and stopping of urinary stream during micturition    • Syncope > 10 years    When I get up quickly   • Trigeminal neuralgia > 30 years    Associated with shingles on left face   • Urinary incontinence    • Wears eyeglasses         Past Surgical History:   Procedure Laterality Date   •  SECTION     •  SECTION     • CHOLECYSTECTOMY     • COLONOSCOPY     • [address]   • GASTRIC BYPASS  2004   • HYSTERECTOMY  2012   • INSERT / REPLACE PERIPHERAL NEUROSTIMULATOR PULSE GENERATOR /      • MASS EXCISION Right 2021    Procedure: EXCISION  BIOPSY LESION/MASS LOWER NECK;  Surgeon: Gianna Gunter DO;  Location: [facility];  Service: General   • OTHER SURGICAL HISTORY      Reimplantatin at [facility]    • OR INSERTION/RPLCMT PERIPHERAL/GASTRIC NPGR Left 2023    Procedure: PLACEMENT OF PERMANENT LEADWIRE AND PG FOR SNM AT S3 FORAMEN, COMPLEX PROGRAMMING OF PULSE GENERATOR;  Surgeon: Jazmin Bernardo MD;  Location: [facility];  Service: UroGynecology          • OR INSJ/RPLCMT SPI NPGR DIR/INDUXIVE COUPLING Right 04/15/2020    Procedure: REPLACEMENT IMPLANTABLE PULSE GENERATOR FOR DORSAL SPINAL COLUMN STIMULATOR,  RIGHT BUTTOCKS;  Surgeon: Marco Antonio Vinson MD;  Location: [facility];  Service: Neurosurgery   • RADIOFREQUENCY ABLATION NERVES     • SPINAL CORD STIMULATOR IMPLANT     • TRIGGER POINT INJECTION     • URETER SURGERY     • URETHRAL FISTULA REPAIR     • US GUIDED THYROID BIOPSY  2020   • WRIST ARTHROSCOPY      with internal fixation      Allergies   Allergen Reactions   • Morphine Abdominal Pain     SEVERE N/V   • Cat Hair Extract Nasal Congestion     Cat Dander   • Dog Epithelium Nasal Congestion   • Other Other (See Comments)     Dogs- sneezing  Crab Meat- GI intolerance       Substance Abuse History:    Social History     Substance and Sexual Activity   Alcohol Use No     Social History     Substance and Sexual Activity   Drug Use Yes   • Types: Marijuana    Comment: Capsules used for chronic pain    Medical       Social History:    Social History     Socioeconomic History   • Marital status: /Civil Union Spouse name: Not on file   • Number of children: Not on file   • Years of education: Not on file   • Highest education level: Not on file   Occupational History   • Occupation: retired   Tobacco Use   • Smoking status: Never   • Smokeless tobacco: Never   Vaping Use   • Vaping Use: Never used   Substance and Sexual Activity   • Alcohol use: No   • Drug use: Yes     Types: Marijuana     Comment: Capsules used for chronic pain  Medical   • Sexual activity: Not Currently     Partners: Male     Birth control/protection: None   Other Topics Concern   • Not on file   Social History Narrative    Lives in [place], with   Previously worked as a teacher  Social Determinants of Health     Financial Resource Strain: Not on file   Food Insecurity: Not on file   Transportation Needs: Not on file   Physical Activity: Not on file   Stress: Not on file   Social Connections: Not on file   Intimate Partner Violence: Not on file   Housing Stability: Not on file       Family Psychiatric History:     Family History   Problem Relation Age of Onset   • Other Mother         Back Disorder    • Cirrhosis Mother    • Crohn's disease Mother    • Lupus Mother         Systemic Lupus Erythematous    • Depression Mother    • Dementia Mother         Caused by liver disease   • Neuropathy Mother         Fibromyalgia   • Hypertension Father    • Heart disease Father    • Other Brother         Liver Transplant    • Crohn's disease Brother    • Crohn's disease Brother    • Depression Sister    • No Known Problems Daughter    • No Known Problems Maternal Aunt    • No Known Problems Paternal Aunt    • No Known Problems Cousin    • No Known Problems Cousin    • No Known Problems Cousin    • No Known Problems Cousin    • No Known Problems Cousin    • Dementia Brother         Caused by liver disease   • Seizures Neg Hx    • Breast cancer Neg Hx        History Review:  The following portions of the patient's history were reviewed and "updated as appropriate: allergies, current medications, past family history, past medical history, past social history, past surgical history and problem list        OBJECTIVE:     Vital signs in last 24 hours:    Vitals:       Mental Status Evaluation:  Appearance and attitude: appeared as stated age, cooperative and attentive, casually dressed with good hygiene  Eye contact: good  Motor Function: within normal limits, No PMA/PMR  Gait/station: Not observed  Speech: normal for rate, rhythm, volume, latency, amount  Language: No overt abnormality  Mood/affect: \"much better\" / Affect was euthymic, reactive, in full range, normal intensity and mood congruent  Thought Processes: sequential and goal-directed  Thought content: denies suicidal ideation or homicidal ideation; no delusions or first rank symptoms  Associations: intact associations  Perceptual disturbances: denies Auditory/Visual/Tactile Hallucinations  Orientation: oriented to time, person, place and to the situational context  Cognitive Function: intact  Memory: recent and remote memory grossly intact  Intellect: average  Fund of knowledge: aware of current events, aware of past history and vocabulary average  Impulse control: good  Insight/judgment: fair/good      Laboratory Results: I have personally reviewed all pertinent laboratory/tests results    Recent Labs (last 2 months):   Telephone on 05/24/2023   Component Date Value   • Supplier Name 05/25/2023 [employer]    • Supplier Phone Number 05/25/2023 [phone]    • Order Status 05/25/2023 Delivery Successful    • Delivery Request Date 05/25/2023 05/25/2023    • Date Delivered  05/25/2023 05/25/2023    • Item Description 05/25/2023 PAP Accessory    • Item Description 05/25/2023 PAP Mask, Full Face, Fit Upon Setup, N/A, 1 per 3 months    • Item Description 05/25/2023 Humidifier Water Chamber, 1 per 6 months    • Item Description 05/25/2023 PAP Headgear, 1 per 6 months    • " Item Description 05/25/2023 PAP Chinstrap, 1 per 6 months    • Item Description 05/25/2023 PAP Humidifier, Heated    • Item Description 05/25/2023 Standard PAP Tubing, Non-Heated, 1 per 3 months    • Item Description 05/25/2023 Heated PAP Tubing, 1 per 3 months    • Item Description 05/25/2023 Disposable PAP Filter, 2 per 1 month    • Item Description 05/25/2023 Non-Disposable PAP Filter, 1 per 6 months    • Item Description 05/25/2023 PAP Mask Interface Cushion, Full Face, 1 per 1 month          Assessment/Plan:   A 63 y/o  female,  (two adult children 32 and 29 y/o), domiciled w/ , retired teacher, w/ PMH of multiple medical conditions including obesity, HTN, HLD, GERD, post-gastrectomy malabsorption, CATHRYN (on CPAP), migraine, neuralgia, dysesthesia of multiple sites, lumbar radiculopathy, OA of L shoulder, cervicalgia, mild cognitive impairment w/ memory loss, BRIANA, TIA (2 5 yrs ago), abnormal sleep deprived EEG, TBI (Jazmín  off 12 foot retaining wall more than 25 yrs ago), long-term use of opiate analgesic, BRIANA, vit D def and PPH of depression and anxiety, recent ED visit on 2/11/2021 due to worsening of depression and SI lead to inpatient psychiatric admission at [facility] (for 11 days) and then to the PHP (finished on 3/11/2021), one prior SA (~40 yrs ago via OD), no h/o self-injurious behavior, on Xanax 1 mg HS, Buspar 10 mg BID, Neurontin 300 mg BID, Seroquel 50 mg nightly, Zoloft 100 mg daily, who presented to the mental health clinic for the initial intake and psychiatric evaluation on 3/12/2021  Presented w/ increased depression and anxiety over past year in the context of chronic pain, multiple medical conditions, and isolation due to COVID-19 pandemic, which has markedly improved since recent inpatient hospitalization and finishing PHP  Denied SI/HI, intent or plan upon direct inquiry at this time  PHQ-9: 7; YANY-7: 1   Her current presentation meets criteria for MDD and YANY w/ panic attacks  Maintained on Zoloft 100 mg daily, Seroquel 50 mg nightly, Buspar 10 mg BID and Neurontin 300 mg BID, and Xanax was tapered off successfully as tolerated; started individual therapy  PHQ-9: 1; YANY-7: 1 on 6/28/2021  Will follow with her PCP regarding fixing her CPAP  Upon f/u on 5/5/22, the patient remained stable and noted that she finished individual therapy a couple of month ago, and has good support system  Maintained on the same regimen  Upon f/u on 1/12/23, presented w/ stable mood and slight increased anxiety in the context of marital conflicts; maintained on the same regimen and referred for individual psychotherapy        Diagnoses and all orders for this visit:    Moderate episode of recurrent major depressive disorder ([facility] [address] )    Generalized anxiety disorder with panic attacks    Insomnia, unspecified type          Impression:  1  Moderate episode of recurrent major depressive disorder ([facility] [address] )        2  Generalized anxiety disorder with panic attacks        3  Insomnia, unspecified type            Treatment Recommendations/Precautions:  - Pain management as per primary medical team  - f/u w/ PCP regarding fixing her CPAP machine and w/u for frequent falls  - f/u with neurology regarding recent weakness in LE and memory issues as well as recent frequent falls  - Continue Zoloft 100 mg po daily for anxiety and depression  - Continue Buspar 10 mg BID for anxiety  - Continue Neurontin 300 mg BID for pain and anxiety  - Continue Seroquel 50 mg po nightly as adjunct treatment  - Pending therapy    - Medications sent to patient's pharmacy for 90 day supply x1 refill    - Psychoeducation provided to the patient and benefits, potential risks and side effects discussed; importance of compliance with the psychiatric treatment reiterated, and the patient verbalized understanding of the matter     - RTC in 12 weeks  - Educated about healthy life style, risk of falls/sedation and addiction   Patient was receptive to education   - The patient was educated about 24 hour and weekend coverage for urgent situations accessed by calling [facility] [address] main practice number  - Patient was educated to call [address] ([phone] [[phone]]) for behavioral crisis at anytime or 911 for any safety concerns, or go to nearest ER if her symptoms become overwhelming or unmanageable  Current Outpatient Medications   Medication Sig Dispense Refill   • albuterol (Ventolin HFA) 90 mcg/act inhaler Inhale 2 puffs every 6 (six) hours as needed for wheezing 18 g 0   • Botox 200 units SOLR      • busPIRone (BUSPAR) 10 mg tablet Take 1 tablet (10 mg total) by mouth 2 (two) times a day 180 tablet 1   • Cholecalciferol 25 MCG (1000 UT) capsule Take 1 capsule by mouth daily     • dronabinol (MARINOL) 5 MG capsule Take 1 capsule (5 mg total) by mouth 2 (two) times a day before meals By Dr Endy De La Cruz (Patient taking differently: Take 2 5 mg by mouth in the morning By Dr Endy De La Cruz) 60 capsule 0   • gabapentin (NEURONTIN) 300 mg capsule Take 1 capsule (300 mg total) by mouth 2 (two) times a day 180 capsule 1   • Galcanezumab-gnlm (Emgality) 120 MG/ML SOAJ 1 sub cu injection every month 3 mL 5   • Iron-Vitamin C 100-250 MG TABS Take 1 tablet by mouth daily     • lidocaine (XYLOCAINE) 5 % ointment Apply topically 2 (two) times a day as needed for moderate pain 35 44 g 1   • lisinopril (ZESTRIL) 5 mg tablet Take 1 tablet (5 mg total) by mouth daily 90 tablet 3   • mometasone (ELOCON) 0 1 % cream APPLY TOPICALLY DAILY (Patient taking differently: Apply 1 application   topically if needed) 45 g 7   • Multiple Vitamin (MULTI-VITAMIN DAILY) TABS Take 1 tablet by mouth daily     • ondansetron (ZOFRAN-ODT) 4 mg disintegrating tablet Take 1 tablet (4 mg total) by mouth every 6 (six) hours as needed for nausea or vomiting 30 tablet 5   • pantoprazole (PROTONIX) 40 mg tablet TAKE 1 TABLET TWICE A DAY (Patient taking differently: Take 40 mg by mouth 2 (two) times a day) 180 tablet 3   • propranolol (INDERAL) 60 mg tablet TAKE 1 TABLET TWICE A  tablet 3   • QUEtiapine (SEROquel) 50 mg tablet Take 1 tablet (50 mg total) by mouth daily at bedtime 90 tablet 1   • rizatriptan (MAXALT-MLT) 10 mg disintegrating tablet Take at the onset of migraine; if symptoms continue or return, may take another dose at least 2 hours after first dose  Take no more than 2 doses in a day  (Patient taking differently: as needed Take at the onset of migraine; if symptoms continue or return, may take another dose at least 2 hours after first dose  Take no more than 2 doses in a day ) 12 tablet 5   • rosuvastatin (CRESTOR) 10 MG tablet TAKE 1 TABLET DAILY 90 tablet 3   • sertraline (ZOLOFT) 100 mg tablet Take 1 tablet (100 mg total) by mouth daily 90 tablet 1     No current facility-administered medications for this visit  Medications Risks/Benefits      Risks, Benefits And Possible Side Effects Of Medications:    Risks, benefits, and possible side effects of medications explained to Carlos Kwok and she verbalizes understanding and agreement for treatment  Controlled Medication Discussion:     Not applicable    Psychotherapy Provided:     Individual psychotherapy provided: Yes  Counseling was provided during the session today for 16 minutes  Psychoeducation provided to the patient and was educated about the importance of compliance with the medications and psychiatric treatment  Supportive psychotherapy provided to the patient  Solution Focused Brief Therapy (SFBT) provided  Patient's emotions were validated and specific labeled praise provided  Wingate suggestions were offered in a supportive non-critical way       Treatment Plan:    Completed and signed during the session: Not applicable - Treatment Plan not due at this session    Miko Soto MD 06/22/23

## 2023-06-22 NOTE — PROGRESS NOTES
"Daily Note     Today's date: 2023  Patient name: Yariel Lagos  : 1960  MRN: [id number]  Referring provider: GWEN Paz  Dx:   Encounter Diagnosis     ICD-10-CM    1  Weakness of both lower extremities  [id number]                      Subjective: Patient reported gradual gains in strength  Patient reported she uses stair climbing as her assessment for improved strength and endurance  \"I'm walking better, better balance and more confident\"  Objective: See treatment diary below      Assessment: Tolerated treatment well  Patient demonstrated fatigue post treatment, exhibited good technique with therapeutic exercises and would benefit from continued PT      Plan: Continue per plan of care  Progress treatment as tolerated  Precautions: bilateral LE weakness     [employer] HR  Access Code: [email or web address]    POC expires Auth Status Unit limit Start date  Expiration date PT/OT + Visit Limit?   23 na na na na 60 cy                                     Visit/Unit Tracking  AUTH Status:  Date        NA Used 1 2 3 foto done 4 5 6 7 6        Remaining         46                 Manuals      FOTO db  DB                                                 Neuro Re-Ed                                                    bridging   3s x 20  :03  20x 3s x 20  3s x 20 3s x 20  :03  20x     HL TA w/roll  :05  20x 5s x 20 :05  20x 5s x 20 5s x 20  5s x 20  :05  20x     TRX Squats      20x  20x 20x  20x     TG L10    20 x 2 20x2 20 x 2 20 x 2  20' x 2 20x2                  Ther Ex             bike   6'  7' 8' 9' 10'  10'     SLR 20x 20x 1# x 20 1#  20x 1 5# x 20 1 5# x 20 2# x 20 2#  20x     saq   1# x 20 1#  20x 1 5# x 20 1 5# x 20 ea 2# x 20  2#  20x     S/l abd 20x   1# x 20  1#  20x 1 5# x 20  1 5# x 20 2# x 20  2#  20x     Prone B  hip ext w/ knee flexed  20x 1# x 20  1#  20x 1 5# x 20 1 5# x 20 2# x 20  2#  20x     Laq/ hip " "flexion   1# x 20  1#  20x 1 5# x 20  1 5# x 20  2# x 20 ea  2#  20x each     Quad hydrants       2# 10 x 2 2#  10x2                  Side stepping TB     gtb x 2  25'  gtb x 2 30'  gtb x 2 30'  GTB  30'x2     Step ups f/l     6\" x 20 ea  1 5# x 20 6\"  20x ea  8\"  2#  20x each     slb hip 90/90 abd       1 5#  X 10  20x  5s hold :05  20x      Stand hip abd, HR, ext, hs curls     1 5# x 20 ea bilat 1 5# x 20x B foam Foam 2# x 20 ea Foam  2#  20x each     Box sit-stand       5# x 20 20\" 5#  20\"  20x     [employer] Walk Outs        NV 8 lbs  5 laps each                  Ther Activity                                       Gait Training                                       Modalities                                            "

## 2023-06-26 ENCOUNTER — OFFICE VISIT (OUTPATIENT)
Dept: PHYSICAL THERAPY | Facility: CLINIC | Age: 63
End: 2023-06-26
Payer: COMMERCIAL

## 2023-06-26 DIAGNOSIS — R29.898 WEAKNESS OF BOTH LOWER EXTREMITIES: Primary | ICD-10-CM

## 2023-06-26 PROCEDURE — 97112 NEUROMUSCULAR REEDUCATION: CPT

## 2023-06-26 PROCEDURE — 97110 THERAPEUTIC EXERCISES: CPT

## 2023-06-26 PROCEDURE — 97112 NEUROMUSCULAR REEDUCATION: CPT | Performed by: PHYSICAL THERAPIST

## 2023-06-26 NOTE — PROGRESS NOTES
Daily Note     Today's date: 2023  Patient name: Zohreh Burgos  : 1960  MRN: [id number]  Referring provider: GWEN Mancilla  Dx:   Encounter Diagnosis     ICD-10-CM    1  Weakness of both lower extremities  R29 898               1:1 with PT SC from 11:19-11:45  1:1 with PT KS 11:46-11:57  1:1 with PTA CM 11:57-12:05  Unbilled 12:05- 12:25  Subjective: Able to navigate steps with more ease over the weekend but still requires handrail  Objective: See treatment diary below      Assessment: Tolerated treatment well  Appropriate fatigue with progressed resistance  Remained pain free throughout session  Patient demonstrated fatigue post treatment, exhibited good technique with therapeutic exercises and would benefit from continued PT      Plan: Continue per plan of care  Progress treatment as tolerated  Precautions: bilateral LE weakness     [email or web address]  Access Code: [email or web address]    POC expires Auth Status Unit limit Start date  Expiration date PT/OT + Visit Limit?   23 na na na na 60 cy                                     Visit/Unit Tracking  AUTH Status:  Date       NA Used 1 2 3 foto done 4 5 6 7 6 5       Remaining         52 51                Manuals     FOTO db  DB                                                 Neuro Re-Ed                                                    bridging   3s x 20  :03  20x 3s x 20  3s x 20 3s x 20  :03  20x :03  20x    HL TA w/roll  :05  20x 5s x 20 :05  20x 5s x 20 5s x 20  5s x 20  :05  20x :05  20x    TRX Squats      20x  20x 20x  20x 20x    TG L10    20 x 2 20x2 20 x 2 20 x 2  20' x 2 20x2 20x2                 Ther Ex             bike   6'  7' 8' 9' 10'  10' 10'     SLR 20x 20x 1# x 20 1#  20x 1 5# x 20 1 5# x 20 2# x 20 2#  20x 3#  20x    saq   1# x 20 1#  20x 1 5# x 20 1 5# x 20 ea 2# x 20  2#  20x 3#  20x    S/l abd 20x   1# x 20  1#  20x "1 5# x 20  1 5# x 20 2# x 20  2#  20x 3#  20x    Prone B  hip ext w/ knee flexed  20x 1# x 20  1#  20x 1 5# x 20 1 5# x 20 2# x 20  2#  20x 3#  20x    Laq/ hip flexion   1# x 20  1#  20x 1 5# x 20  1 5# x 20  2# x 20 ea  2#  20x each 3#  20x ea    Quad hydrants       2# 10 x 2 2#  10x2 3#   10x2                 Side stepping TB     gtb x 2  25'  gtb x 2 30'  gtb x 2 30'  GTB  30'x2 GTB  30'x2    Step ups f/l     6\" x 20 ea  1 5# x 20 6\"  20x ea  8\"  2#  20x each 6\" step   3#  20 ea     slb hip 90/90 abd       1 5#  X 10  20x  5s hold :05  20x  :5  20x    Stand hip abd, HR, ext, hs curls     1 5# x 20 ea bilat 1 5# x 20x B foam Foam 2# x 20 ea Foam  2#  20x each Foam  3#  20x ea    Box sit-stand       5# x 20 20\" 5#  20\"  20x 5#  20\" 20x    S[employer] Walk Outs        NV 8 lbs  5 laps each 8 lbs  5 laps each                 Ther Activity                                       Gait Training                                       Modalities                                            " unsure

## 2023-06-29 ENCOUNTER — APPOINTMENT (OUTPATIENT)
Dept: PHYSICAL THERAPY | Facility: CLINIC | Age: 63
End: 2023-06-29
Payer: COMMERCIAL

## 2023-07-03 ENCOUNTER — OFFICE VISIT (OUTPATIENT)
Dept: PHYSICAL THERAPY | Facility: CLINIC | Age: 63
End: 2023-07-03
Payer: COMMERCIAL

## 2023-07-03 DIAGNOSIS — R29.898 WEAKNESS OF BOTH LOWER EXTREMITIES: Primary | ICD-10-CM

## 2023-07-03 PROCEDURE — 97110 THERAPEUTIC EXERCISES: CPT | Performed by: PHYSICAL THERAPIST

## 2023-07-03 PROCEDURE — 97112 NEUROMUSCULAR REEDUCATION: CPT | Performed by: PHYSICAL THERAPIST

## 2023-07-03 NOTE — PROGRESS NOTES
Daily Note     Today's date: 7/3/2023  Patient name: Elena Abrams  : 1960  MRN: [id number]  Referring provider: GWEN Liu  Dx:   Encounter Diagnosis     ICD-10-CM    1. Weakness of both lower extremities  R29.898                     Subjective: patient reports that she was away and noticed that her Left LE felt weaker when she was climbing steps repeatedly         Objective: See treatment diary below      Assessment: Tolerated treatment well. Patient demonstrating good knowledge of HEP     Plan: Continue per plan of care. Progress treatment as tolerated. Tentative dc after next session      Precautions: bilateral LE weakness     [email or web address]  Access Code: [email or web address]    POC expires Auth Status Unit limit Start date  Expiration date PT/OT + Visit Limit?   23 na na na na 60 cy                                     Visit/Unit Tracking  AUTH Status:  Date 5/8 5/11 6/8 6/9 6/12 6/16 6/19 6/22 6/26 7/3     NA Used 1 2 3 foto done 4 5 6 7 8 9 10      Remaining         52 51 50               Manuals 5/8 5/11 6/8 6/9 6/12 6/16 6/19 6/22 6/26 7/3   FOTO db  DB                                                 Neuro Re-Ed                                                    bridging   3s x 20  :03  20x 3s x 20  3s x 20 3s x 20  :03  20x :03  20x 3s x 20   HL TA w/roll  :05  20x 5s x 20 :05  20x 5s x 20 5s x 20  5s x 20  :05  20x :05  20x 5s x 20   TRX Squats      20x  20x 20x  20x 20x 20x    TG L10    20 x 2 20x2 20 x 2 20 x 2  20' x 2 20x2 20x2 20 x 2                Ther Ex             bike   6'  7' 8' 9' 10'  10' 10'  10'    SLR 20x 20x 1# x 20 1#  20x 1.5# x 20 1.5# x 20 2# x 20 2#  20x 3#  20x 3# x 20   saq   1# x 20 1#  20x 1.5# x 20 1.5# x 20 ea 2# x 20  2#  20x 3#  20x 3# x 20   S/l abd 20x   1# x 20  1#  20x 1.5# x 20  1.5# x 20 2# x 20  2#  20x 3#  20x 3# x 20   Prone B  hip ext w/ knee flexed  20x 1# x 20  1#  20x 1.5# x 20 1.5# x 20 2# x 20  2#  20x 3#  20x 3# x 20   Laq/ hip flexion   1# x 20  1#  20x 1.5# x 20  1.5# x 20  2# x 20 ea  2#  20x each 3#  20x ea 3# x 20   Quad hydrants       2# 10 x 2 2#  10x2 3#   10x2 3# x 20                Side stepping TB     gtb x 2  25'  gtb x 2 30'  gtb x 2 30'  GTB  30'x2 GTB  30'x2 gtb 30' x 3    Step ups f/l     6" x 20 ea  1.5# x 20 6"  20x ea  8"  2#  20x each 6" step   3#  20 ea.  8" x 20 ea    slb hip 90/90 abd       1.5#  X 10  20x  5s hold :05  20x  :5  20x 20x 5s    Stand hip abd, HR, ext, hs curls     1.5# x 20 ea bilat 1.5# x 20x B foam Foam 2# x 20 ea Foam  2#  20x each Foam  3#  20x ea 3# x 20 ea foam    Box sit-stand       5# x 20 20" 5#  20"  20x 5#  20" 20x 5# x 20  20"   [employer] Walk Outs        NV 8 lbs  5 laps each 8 lbs  5 laps each 10# x 5 ea                 Ther Activity                                       Gait Training                                       Modalities

## 2023-07-05 DIAGNOSIS — R21 RASH: ICD-10-CM

## 2023-07-06 ENCOUNTER — OFFICE VISIT (OUTPATIENT)
Dept: PHYSICAL THERAPY | Facility: CLINIC | Age: 63
End: 2023-07-06
Payer: COMMERCIAL

## 2023-07-06 DIAGNOSIS — R29.898 WEAKNESS OF BOTH LOWER EXTREMITIES: Primary | ICD-10-CM

## 2023-07-06 PROCEDURE — 97110 THERAPEUTIC EXERCISES: CPT

## 2023-07-06 PROCEDURE — 97112 NEUROMUSCULAR REEDUCATION: CPT

## 2023-07-06 RX ORDER — MOMETASONE FUROATE 1 MG/G
CREAM TOPICAL DAILY
Qty: 45 G | Refills: 7 | Status: SHIPPED | OUTPATIENT
Start: 2023-07-06

## 2023-07-06 NOTE — PROGRESS NOTES
Daily Note     Today's date: 2023  Patient name: Ngoc Gonzales  : 1960  MRN: [id number]  Referring provider: WGEN Tanner  Dx:   Encounter Diagnosis     ICD-10-CM    1. Weakness of both lower extremities  R29.898                      Subjective: Patient continues to report progress in strengthening. Patient's chief complaint is inability to navigate up stairs with reciprocal gait. Objective: See treatment diary below      Assessment: Tolerated treatment exceptionally well demonstrating good understanding of benefits of consistency with current HEP. Updated and distributed written HEP; assisted patient with online access to [employer]. Patient exhibited good technique with therapeutic exercises      Plan: Discontinue therapeutic intervention as per plan of care. Patient plans to continue with current HEP and utilize local gym/pool to continue her program     Precautions: bilateral LE weakness     [email or web address]  Access Code: [email or web address]    POC expires Auth Status Unit limit Start date  Expiration date PT/OT + Visit Limit?   23 na na na na 60 cy                                     Visit/Unit Tracking  AUTH Status:  Date 5/8 5/11 6/8 6/9 6/12 6/16 6/19 6/22 6/26 7/3 7/6    NA Used 1 2 3 foto done 4 5 6 7 8 9 10 11     Remaining         52 51 50 49              Manuals 7/6    6/12 6/16 6/19 6/22 6/26 7/3   FOTO                                                    Neuro Re-Ed                                                    bridging :03  20x    3s x 20  3s x 20 3s x 20  :03  20x :03  20x 3s x 20   HL TA w/roll :05  20x    5s x 20 5s x 20  5s x 20  :05  20x :05  20x 5s x 20   TRX Squats  20x    20x  20x 20x  20x 20x 20x    TG L10  20x2    20 x 2 20 x 2  20' x 2 20x2 20x2 20 x 2                Ther Ex             bike 10'    8' 9' 10'  10' 10'  10'    SLR 3#  20x    1.5# x 20 1.5# x 20 2# x 20 2#  20x 3#  20x 3# x 20   saq 3#  20x    1.5# x 20 1.5# x 20 ea 2# x 20  2#  20x 3#  20x 3# x 20   S/l abd 3#  20x     1.5# x 20  1.5# x 20 2# x 20  2#  20x 3#  20x 3# x 20   Prone B  hip ext w/ knee flexed 3#  20x each    1.5# x 20 1.5# x 20 2# x 20  2#  20x 3#  20x 3# x 20   Laq/ hip flexion 3#  20x     1.5# x 20  1.5# x 20  2# x 20 ea  2#  20x each 3#  20x ea 3# x 20   Quad hydrants 3#  20x      2# 10 x 2 2#  10x2 3#   10x2 3# x 20                Side stepping TB BTB & 3#  30'x3    gtb x 2  25'  gtb x 2 30'  gtb x 2 30'  GTB  30'x2 GTB  30'x2 gtb 30' x 3    Step ups f/l 8"  20x each    6" x 20 ea  1.5# x 20 6"  20x ea  8"  2#  20x each 6" step   3#  20 ea.  8" x 20 ea    slb hip 90/90 abd  :05  20x     1.5#  X 10  20x  5s hold :05  20x  :5  20x 20x 5s    Stand hip abd, HR, ext, hs curls 3#  Foam  20x each    1.5# x 20 ea bilat 1.5# x 20x B foam Foam 2# x 20 ea Foam  2#  20x each Foam  3#  20x ea 3# x 20 ea foam    Box sit-stand 5# DB  20"  20x      5# x 20 20" 5#  20"  20x 5#  20" 20x 5# x 20  20"   S[employer] Walk Outs  11 lbs  5 laps each      NV 8 lbs  5 laps each 8 lbs  5 laps each 10# x 5 ea                 Ther Activity                                       Gait Training                                       Modalities

## 2023-07-10 ENCOUNTER — TELEPHONE (OUTPATIENT)
Dept: NEUROLOGY | Facility: CLINIC | Age: 63
End: 2023-07-10

## 2023-07-10 NOTE — TELEPHONE ENCOUNTER
Patient is scheduled for BINJ on 7/27. Contacted [facility] at ph# [phone] and spoke to Chelsea. Botox 200 UNITS  Qty. 1  Scheduled: 7/13  Location: [facility]  Via: [employer]       Please let us know if Botox does not arrive. Thanks!

## 2023-07-13 NOTE — TELEPHONE ENCOUNTER
Botox number of units: 200 units  Botox quantity: 1  Arrived at what location: [facility]  Botox at Correct Administering Location: Yes  [address] number: [id number]  Lot number: [id number]  Expiration Date: 02/01/2026  Appt notes indicate correct medication: Yes

## 2023-07-27 NOTE — PROGRESS NOTES
Cpap supply order has been processed via [employer] to [facility] in [address] [place] [address] 15.

## 2023-07-28 LAB

## 2023-08-07 ENCOUNTER — PROCEDURE VISIT (OUTPATIENT)
Dept: NEUROLOGY | Facility: CLINIC | Age: 63
End: 2023-08-07

## 2023-08-07 VITALS
DIASTOLIC BLOOD PRESSURE: 82 MMHG | HEART RATE: 98 BPM | HEIGHT: 62 IN | SYSTOLIC BLOOD PRESSURE: 122 MMHG | BODY MASS INDEX: 42.51 KG/M2 | TEMPERATURE: 97.8 F | WEIGHT: 231 LBS

## 2023-08-07 DIAGNOSIS — G43.709 CHRONIC MIGRAINE WITHOUT AURA WITHOUT STATUS MIGRAINOSUS, NOT INTRACTABLE: Primary | ICD-10-CM

## 2023-08-07 NOTE — PROGRESS NOTES
Universal Protocol   Consent: Verbal consent obtained. Written consent obtained.   Risks and benefits: risks, benefits and alternatives were discussed  Consent given by: patient  Patient understanding: patient states understanding of the procedure being performed  Patient consent: the patient's understanding of the procedure matches consent given  Procedure consent: procedure consent matches procedure scheduled        Chemodenervation     Date/Time 8/7/2023 11:00 AM     Performed by  Shemar Pereira PA-C   Authorized by Shemar Pereira PA-C       Pre-procedure details      Prepped With: Alcohol     Procedure details     Position:  Upright   Botox     Botox Type:  Type A    Brand:  Botox    mL's of Botulinum Toxin:  200    Final Concentration per CC:  100 units    Needle Gauge:  30 G 2.5 inch   Procedures     Botox Procedures: chronic headache      Indications: migraines     Injection Location      Head / Face:  L superior trapezius, R superior trapezius, L superior cervical paraspinal, R superior cervical paraspinal, L , R , procerus, L temporalis, R temporalis, R frontalis, L frontalis, R medial occipitalis and L medial occipitalis    L  injection amount:  5 unit(s)    R  injection amount:  5 unit(s)    L lateral frontalis:  5 unit(s)    R lateral frontalis:  5 unit(s)    L medial frontalis:  5 unit(s)    R medial frontalis:  5 unit(s)    L temporalis injection amount:  20 unit(s)    R temporalis injection amount:  20 unit(s)    Procerus injection amount:  5 unit(s)    L medial occipitalis injection amount:  15 unit(s)    R medial occipitalis injection amount:  15 unit(s)    L superior cervical paraspinal injection amount:  10 unit(s)    R superior cervical paraspinal injection amount:  10 unit(s)    L superior trapezius injection amount:  15 unit(s)    R superior trapezius injection amount:  15 unit(s)   Total Units     Total units used:  200    Total units discarded:  0 Post-procedure details      Chemodenervation:  Chronic migraine    Facial Nerve Location[de-identified]  Bilateral facial nerve    Patient tolerance of procedure: Tolerated well, no immediate complications   Comments      Extra units medically necessary in the L>R frontotemporal regions b/l- total 45. Blood pressure 122/82, pulse 98, temperature 97.8 °F (36.6 °C), temperature source Temporal, height 5' 2" (1.575 m), weight 105 kg (231 lb), not currently breastfeeding.

## 2023-09-20 DIAGNOSIS — I10 ESSENTIAL HYPERTENSION: ICD-10-CM

## 2023-09-20 RX ORDER — LISINOPRIL 5 MG/1
5 TABLET ORAL DAILY
Qty: 90 TABLET | Refills: 3 | Status: SHIPPED | OUTPATIENT
Start: 2023-09-20

## 2023-09-25 NOTE — TELEPHONE ENCOUNTER
1st attempt  Lm to cb with % improvement and pain level 
MD parker 
Pt  states her pain is 4/10and  50% improvement 
Alert and oriented to person, place and time

## 2023-09-29 ENCOUNTER — TELEMEDICINE (OUTPATIENT)
Dept: PSYCHIATRY | Facility: CLINIC | Age: 63
End: 2023-09-29
Payer: COMMERCIAL

## 2023-09-29 DIAGNOSIS — F41.0 GENERALIZED ANXIETY DISORDER WITH PANIC ATTACKS: ICD-10-CM

## 2023-09-29 DIAGNOSIS — G47.00 INSOMNIA, UNSPECIFIED TYPE: ICD-10-CM

## 2023-09-29 DIAGNOSIS — F33.1 MODERATE EPISODE OF RECURRENT MAJOR DEPRESSIVE DISORDER (HCC): Primary | ICD-10-CM

## 2023-09-29 DIAGNOSIS — F41.1 GENERALIZED ANXIETY DISORDER WITH PANIC ATTACKS: ICD-10-CM

## 2023-09-29 PROCEDURE — 90833 PSYTX W PT W E/M 30 MIN: CPT | Performed by: STUDENT IN AN ORGANIZED HEALTH CARE EDUCATION/TRAINING PROGRAM

## 2023-09-29 PROCEDURE — 99214 OFFICE O/P EST MOD 30 MIN: CPT | Performed by: STUDENT IN AN ORGANIZED HEALTH CARE EDUCATION/TRAINING PROGRAM

## 2023-09-29 RX ORDER — QUETIAPINE FUMARATE 25 MG/1
25 TABLET, FILM COATED ORAL
Qty: 30 TABLET | Refills: 0 | Status: SHIPPED | OUTPATIENT
Start: 2023-09-29 | End: 2023-10-29
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
## 2023-09-29 NOTE — BH TREATMENT PLAN
Treatment Plan done but not signed at time of office visit due to:  Plan reviewed with the patient during the virtual visit and verbal consent given. TREATMENT PLAN (Medication Management Only)        [address] [facility]    Name and Date of Birth:  Jovita Lees 57 y.o. 1960  Date of Treatment Plan: September 29, 2023  Diagnosis/Diagnoses:    1. Moderate episode of recurrent major depressive disorder ([address])    2. Generalized anxiety disorder with panic attacks    3. Insomnia, unspecified type      Strengths/Personal Resources for Self-Care: "supportive family, hobbies, traveling". Area/Areas of need (in own words): anxiety, depression  1. Long Term Goal: maintain control of anxiety and mood stability; prevent panic attacks. Target Date:6 months - 3/29/2024  Person/Persons responsible for completion of goal: Shalonda Khanna  2. Short Term Objective (s) - How will we reach this goal?:   A. Provider new recommended medication/dosage changes and/or continue medication(s): continue current medications as prescribed. B. N/A.  C. N/A. Target Date:6 months - 3/29/2024  Person/Persons Responsible for Completion of Goal: Shalonda Khanna  Progress Towards Goals: continuing treatment  Treatment Modality: medication management every 6 months, referral for individual psychotherapy  Review due 180 days from date of this plan: 6 months - 3/29/2024  Expected length of service: maintenance  My Physician/PA/NP and I have developed this plan together and I agree to work on the goals and objectives. I understand the treatment goals that were developed for my treatment.

## 2023-10-11 ENCOUNTER — TELEPHONE (OUTPATIENT)
Dept: NEPHROLOGY | Facility: CLINIC | Age: 63
End: 2023-10-11

## 2023-10-11 NOTE — TELEPHONE ENCOUNTER
I called and left a message on machine for patient to return our call about scheduling her 12 month nephrology follow up appointment with Dr. Ezio Royal from our recall list for on or after 2/7/2024.

## 2023-10-12 ENCOUNTER — OFFICE VISIT (OUTPATIENT)
Dept: FAMILY MEDICINE CLINIC | Facility: CLINIC | Age: 63
End: 2023-10-12
Payer: COMMERCIAL

## 2023-10-12 VITALS
SYSTOLIC BLOOD PRESSURE: 100 MMHG | HEART RATE: 82 BPM | OXYGEN SATURATION: 93 % | HEIGHT: 62 IN | TEMPERATURE: 99.1 F | BODY MASS INDEX: 38.05 KG/M2 | DIASTOLIC BLOOD PRESSURE: 70 MMHG | WEIGHT: 206.8 LBS

## 2023-10-12 DIAGNOSIS — Z23 ENCOUNTER FOR IMMUNIZATION: ICD-10-CM

## 2023-10-12 DIAGNOSIS — Z78.0 POSTMENOPAUSAL: ICD-10-CM

## 2023-10-12 DIAGNOSIS — F33.1 MODERATE EPISODE OF RECURRENT MAJOR DEPRESSIVE DISORDER (HCC): ICD-10-CM

## 2023-10-12 DIAGNOSIS — G89.4 CHRONIC PAIN SYNDROME: ICD-10-CM

## 2023-10-12 DIAGNOSIS — L30.9 ECZEMA, UNSPECIFIED TYPE: ICD-10-CM

## 2023-10-12 DIAGNOSIS — F41.1 GENERALIZED ANXIETY DISORDER WITH PANIC ATTACKS: ICD-10-CM

## 2023-10-12 DIAGNOSIS — G43.719 INTRACTABLE CHRONIC MIGRAINE WITHOUT AURA AND WITHOUT STATUS MIGRAINOSUS: ICD-10-CM

## 2023-10-12 DIAGNOSIS — M54.50 ACUTE LEFT-SIDED LOW BACK PAIN, UNSPECIFIED WHETHER SCIATICA PRESENT: ICD-10-CM

## 2023-10-12 DIAGNOSIS — M54.16 LUMBAR RADICULOPATHY: ICD-10-CM

## 2023-10-12 DIAGNOSIS — M46.1 SACROILIITIS (HCC): Primary | ICD-10-CM

## 2023-10-12 DIAGNOSIS — I10 PRIMARY HYPERTENSION: ICD-10-CM

## 2023-10-12 DIAGNOSIS — Z00.00 HEALTHCARE MAINTENANCE: ICD-10-CM

## 2023-10-12 DIAGNOSIS — F41.0 GENERALIZED ANXIETY DISORDER WITH PANIC ATTACKS: ICD-10-CM

## 2023-10-12 DIAGNOSIS — E66.01 OBESITY, MORBID (HCC): ICD-10-CM

## 2023-10-12 PROBLEM — R06.02 SOB (SHORTNESS OF BREATH): Status: RESOLVED | Noted: 2021-03-06 | Resolved: 2023-10-12

## 2023-10-12 PROCEDURE — 90686 IIV4 VACC NO PRSV 0.5 ML IM: CPT

## 2023-10-12 PROCEDURE — 90471 IMMUNIZATION ADMIN: CPT

## 2023-10-12 PROCEDURE — 99214 OFFICE O/P EST MOD 30 MIN: CPT | Performed by: NURSE PRACTITIONER

## 2023-10-12 RX ORDER — TRIAMCINOLONE ACETONIDE 1 MG/G
CREAM TOPICAL 2 TIMES DAILY
Qty: 80 G | Refills: 1 | Status: SHIPPED | OUTPATIENT
Start: 2023-10-12

## 2023-10-12 NOTE — PROGRESS NOTES
BMI Counseling: Body mass index is 37.82 kg/m². The BMI is above normal. Nutrition recommendations include decreasing portion sizes, encouraging healthy choices of fruits and vegetables and moderation in carbohydrate intake. Exercise recommendations include moderate physical activity 150 minutes/week and exercising 3-5 times per week. Rationale for BMI follow-up plan is due to patient being overweight or obese. Assessment/Plan:     Chronic Problems:  Hypertension  Blood pressure is well controlled with lisinopril daily. Eczema  Will refer to dermatology. We will try triamcinolone cream.    Generalized anxiety disorder with panic attacks  Continue care with psychiatry. Visit Diagnosis:  Diagnoses and all orders for this visit:    Sacroiliitis [facility])  -     Ambulatory referral to Spine & Pain Management; Future  -     Ambulatory Referral to Physical Therapy; Future    Eczema, unspecified type  -     Ambulatory Referral to Dermatology; Future  -     triamcinolone (KENALOG) 0.1 % cream; Apply topically 2 (two) times a day    Acute left-sided low back pain, unspecified whether sciatica present  -     Ambulatory Referral to Physical Therapy; Future    Postmenopausal  -     DXA bone density spine hip and pelvis; Future    Healthcare maintenance  -     CBC and differential; Future  -     Comprehensive metabolic panel; Future  -     Lipid panel; Future  -     Hemoglobin A1C; Future  -     TSH, 3rd generation with Free T4 reflex;  Future    Encounter for immunization  -     influenza vaccine, quadrivalent, 0.5 mL, preservative-free, for adult and pediatric patients 6 mos+ (AFLURIA, FLUARIX, FLULAVAL, FLUZONE)    Primary hypertension    Intractable chronic migraine without aura and without status migrainosus    Lumbar radiculopathy    Chronic pain syndrome    Generalized anxiety disorder with panic attacks    Moderate episode of recurrent major depressive disorder (HCC)    Obesity, morbid [facility])          Subjective:    Patient ID: Leocadia Cowden is a 57 y.o. female. Patient presents for routine follow-up. She lost another 25 pounds in the last 2 months. She is feeling a lot better since the weight loss. The following portions of the patient's history were reviewed and updated as appropriate: allergies, current medications, past family history, past medical history, past social history, past surgical history and problem list.    Review of Systems   Constitutional:  Negative for chills, diaphoresis and fever. HENT:  Negative for ear pain and sore throat. Eyes:  Negative for pain and visual disturbance. Respiratory:  Negative for cough and shortness of breath. Cardiovascular:  Negative for chest pain and palpitations. Gastrointestinal:  Negative for abdominal pain and vomiting. Genitourinary:  Negative for dysuria and hematuria. Musculoskeletal:  Positive for back pain. Negative for arthralgias. Skin:  Negative for color change and rash. Neurological:  Negative for dizziness, light-headedness and headaches. All other systems reviewed and are negative. /70   Pulse 82   Temp 99.1 °F (37.3 °C)   Ht 5' 2" (1.575 m)   Wt 93.8 kg (206 lb 12.8 oz)   SpO2 93%   BMI 37.82 kg/m²   Social History     Socioeconomic History    Marital status: /Civil Union     Spouse name: Not on file    Number of children: Not on file    Years of education: Not on file    Highest education level: Not on file   Occupational History    Occupation: retired   Tobacco Use    Smoking status: Never    Smokeless tobacco: Never   Vaping Use    Vaping Use: Never used   Substance and Sexual Activity    Alcohol use: No    Drug use: Yes     Types: Marijuana     Comment: Capsules used for chronic pain.   Medical    Sexual activity: Not Currently     Partners: Male     Birth control/protection: None   Other Topics Concern    Not on file   Social History Narrative    Lives in [place] [facility] [place], with . Previously worked as a teacher.       Social Determinants of Health     Financial Resource Strain: Not on file   Food Insecurity: Not on file   Transportation Needs: Not on file   Physical Activity: Not on file   Stress: Not on file   Social Connections: Not on file   Intimate Partner Violence: Not on file   Housing Stability: Not on file     Past Medical History:   Diagnosis Date    Anxiety     Arthritis     Attention and concentration deficit     Blurred vision     Chronic pain     Chronic pain syndrome 01/20/2016    CPAP (continuous positive airway pressure) dependence     CTS (carpal tunnel syndrome) > 6 years    Left wrist    Depression     Difficulty walking > 6 years    Left nerve leg pain    Diplopia     Dyspepsia     Gastric ulcer     Gastritis     GERD (gastroesophageal reflux disease)     Head injury > 25 years    Naseem Manual off 12 foot retaining wall    Headache(784.0)     Headache, tension-type > 1 year    Pain behind left eyeAnd stiff neck with pressure    History of transfusion 2005    Hypertension     Insomnia     Irritable bowel syndrome     Memory loss     Migraines     Osteopenia     Peripheral neuropathy 3/14/2012    Left thigh, left abdomen, left prudential nerve    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    Presence of neurostimulator     Pudendal neuralgia    Pudendal neuralgia     Shingles > 30 years ago    Left facial nerve    Sleep apnea     Spinal stenosis     Starting and stopping of urinary stream during micturition     Syncope > 10 years    When I get up quickly    Trigeminal neuralgia > 30 years    Associated with shingles on left face    Urinary incontinence     Wears eyeglasses      Family History   Problem Relation Age of Onset    Other Mother         Back Disorder     Cirrhosis Mother     Crohn's disease Mother     Lupus Mother         Systemic Lupus Erythematous     Depression Mother     Dementia Mother         Caused by liver disease    Neuropathy Mother Fibromyalgia    Hypertension Father     Heart disease Father     Other Brother         Liver Transplant     Crohn's disease Brother     Crohn's disease Brother     Depression Sister     No Known Problems Daughter     No Known Problems Maternal Aunt     No Known Problems Paternal Aunt     No Known Problems Cousin     No Known Problems Cousin     No Known Problems Cousin     No Known Problems Cousin     No Known Problems Cousin     Dementia Brother         Caused by liver disease    Seizures Neg Hx     Breast cancer Neg Hx      Past Surgical History:   Procedure Laterality Date     SECTION  [address]    GASTRIC BYPASS  2004    HYSTERECTOMY  2012    INSERT / REPLACE PERIPHERAL NEUROSTIMULATOR PULSE GENERATOR /       MASS EXCISION Right 2021    Procedure: EXCISION  BIOPSY LESION/MASS LOWER NECK;  Surgeon: Gib Babinski, DO;  [facility]: [facility];  Service: General    OTHER SURGICAL HISTORY      Reimplantatin at [address] [facility] INSERTION/RPLCMT PERIPHERAL/GASTRIC NPGR Left 2023    Procedure: PLACEMENT OF PERMANENT LEADWIRE AND PG FOR SNM AT [facility], COMPLEX PROGRAMMING OF PULSE GENERATOR;  Surgeon: Ami Infante MD;  [facility]: [facility];  Service: UroGynecology           [place] INSJ/RPLCMT SPI NPGR DIR/INDUXIVE COUPLING Right 04/15/2020    Procedure: REPLACEMENT IMPLANTABLE PULSE GENERATOR FOR DORSAL SPINAL COLUMN STIMULATOR,  RIGHT BUTTOCKS;  Surgeon: Zoie Bateman MD;  Location: [facility];  Service: Neurosurgery    RADIOFREQUENCY ABLATION NERVES      SPINAL CORD STIMULATOR IMPLANT      TRIGGER POINT INJECTION      URETER SURGERY      URETHRAL FISTULA REPAIR      US GUIDED THYROID BIOPSY  2020    WRIST ARTHROSCOPY      with internal fixation        Current Outpatient Medications:     albuterol (Ventolin HFA) 90 mcg/act inhaler, Inhale 2 puffs every 6 (six) hours as needed for wheezing, Disp: 18 g, Rfl: 0    Botox 200 units SOLR, , Disp: , Rfl:     busPIRone (BUSPAR) 10 mg tablet, Take 1 tablet (10 mg total) by mouth 2 (two) times a day, Disp: 180 tablet, Rfl: 1    Cholecalciferol 25 MCG (1000 UT) capsule, Take 1 capsule by mouth daily, Disp: , Rfl:     dronabinol (MARINOL) 5 MG capsule, Take 1 capsule (5 mg total) by mouth 2 (two) times a day before meals By Dr. Kelly Mooney (Patient taking differently: Take 2.5 mg by mouth in the morning By Dr. Kelly Mooney), Disp: 60 capsule, Rfl: 0    gabapentin (NEURONTIN) 300 mg capsule, Take 1 capsule (300 mg total) by mouth 2 (two) times a day, Disp: 180 capsule, Rfl: 1    Galcanezumab-gnlm (Emgality) 120 MG/ML SOAJ, 1 sub cu injection every month, Disp: 3 mL, Rfl: 5    Iron-Vitamin C 100-250 MG TABS, Take 1 tablet by mouth daily, Disp: , Rfl:     lidocaine (XYLOCAINE) 5 % ointment, Apply topically 2 (two) times a day as needed for moderate pain, Disp: 35.44 g, Rfl: 1    lisinopril (ZESTRIL) 5 mg tablet, TAKE 1 TABLET DAILY, Disp: 90 tablet, Rfl: 3    Multiple Vitamin (MULTI-VITAMIN DAILY) TABS, Take 1 tablet by mouth daily, Disp: , Rfl:     ondansetron (ZOFRAN-ODT) 4 mg disintegrating tablet, Take 1 tablet (4 mg total) by mouth every 6 (six) hours as needed for nausea or vomiting, Disp: 30 tablet, Rfl: 5    pantoprazole (PROTONIX) 40 mg tablet, TAKE 1 TABLET TWICE A DAY (Patient taking differently: Take 40 mg by mouth 2 (two) times a day), Disp: 180 tablet, Rfl: 3    propranolol (INDERAL) 60 mg tablet, TAKE 1 TABLET TWICE A DAY, Disp: 180 tablet, Rfl: 3    QUEtiapine (SEROquel) 25 mg tablet, Take 1 tablet (25 mg total) by mouth daily at bedtime, Disp: 30 tablet, Rfl: 0    rizatriptan (MAXALT-MLT) 10 mg disintegrating tablet, Take at the onset of migraine; if symptoms continue or return, may take another dose at least 2 hours after first dose. Take no more than 2 doses in a day.  (Patient taking differently: as needed Take at the onset of migraine; if symptoms continue or return, may take another dose at least 2 hours after first dose. Take no more than 2 doses in a day.), Disp: 12 tablet, Rfl: 5    rosuvastatin (CRESTOR) 10 MG tablet, TAKE 1 TABLET DAILY, Disp: 90 tablet, Rfl: 3    sertraline (ZOLOFT) 100 mg tablet, Take 1 tablet (100 mg total) by mouth daily, Disp: 90 tablet, Rfl: 1    triamcinolone (KENALOG) 0.1 % cream, Apply topically 2 (two) times a day, Disp: 80 g, Rfl: 1    Allergies   Allergen Reactions    Morphine Abdominal Pain     SEVERE N/V    Cat Hair Extract Nasal Congestion     Cat Dander    Dog Epithelium Nasal Congestion    Other Other (See Comments)     Dogs- sneezing  Crab Meat- GI intolerance          Lab Review   No visits with results within 2 Month(s) from this visit. Latest known visit with results is:   Orders Only on 07/27/2023   Component Date Value    Supplier Name 07/27/2023 [employer] - [address]     Supplier Phone Number 07/27/2023 [phone]     Order Status 07/27/2023 Delivery Successful     Delivery Request Date 07/27/2023 07/27/2023     Date Delivered  07/27/2023 07/27/2023     Item Description 07/27/2023 PAP Accessory     Item Description 07/27/2023 PAP Mask, Full Face, Fit Upon Setup, N/A, 1 per 3 months     Item Description 07/27/2023 Humidifier Water Chamber, 1 per 6 months     Item Description 07/27/2023 PAP Headgear, 1 per 6 months     Item Description 07/27/2023 PAP Chinstrap, 1 per 6 months     Item Description 07/27/2023 PAP Humidifier, Heated     Item Description 07/27/2023 Heated PAP Tubing, 1 per 3 months     Item Description 07/27/2023 Disposable PAP Filter, 2 per 1 month     Item Description 07/27/2023 Non-Disposable PAP Filter, 1 per 6 months     Item Description 07/27/2023 PAP Mask Interface Cushion, Full Face, 1 per 1 month         Imaging: No results found. Objective:     Physical Exam  Constitutional:       Appearance: She is well-developed. She is obese.    Cardiovascular:      Rate and Rhythm: Normal rate and regular rhythm. Heart sounds: Normal heart sounds. No murmur heard. Pulmonary:      Effort: Pulmonary effort is normal. No respiratory distress. Breath sounds: Normal breath sounds. Skin:     General: Skin is warm and dry. Neurological:      Mental Status: She is alert and oriented to person, place, and time. There are no Patient Instructions on file for this visit. GWEN Black    Portions of the record may have been created with voice recognition software. Occasional wrong word or "sound a like" substitutions may have occurred due to the inherent limitations of voice recognition software. Read the chart carefully and recognize, using context, where substitutions have occurred.

## 2023-10-16 ENCOUNTER — HOSPITAL ENCOUNTER (OUTPATIENT)
Dept: NON INVASIVE DIAGNOSTICS | Facility: CLINIC | Age: 63
Discharge: HOME | End: 2023-10-16
Payer: COMMERCIAL

## 2023-10-16 VITALS
SYSTOLIC BLOOD PRESSURE: 100 MMHG | DIASTOLIC BLOOD PRESSURE: 70 MMHG | HEIGHT: 62 IN | BODY MASS INDEX: 37.91 KG/M2 | HEART RATE: 57 BPM | WEIGHT: 206 LBS

## 2023-10-16 DIAGNOSIS — R06.02 SOB (SHORTNESS OF BREATH): ICD-10-CM

## 2023-10-16 LAB
AORTIC ROOT: 2.9 CM
AORTIC VALVE MEAN VELOCITY: 8.1 M/S
APICAL FOUR CHAMBER EJECTION FRACTION: 61 %
AV LVOT MEAN GRADIENT: 1 MMHG
AV LVOT PEAK GRADIENT: 2 MMHG
AV MEAN GRADIENT: 3 MMHG
AV PEAK GRADIENT: 6 MMHG
AV VELOCITY RATIO: 0.63
DOP CALC AO PEAK VEL: 1.21 M/S
DOP CALC AO VTI: 27.44 CM
DOP CALC LVOT PEAK VEL VTI: 17.16 CM
DOP CALC LVOT PEAK VEL: 0.76 M/S
E WAVE DECELERATION TIME: 257 MS
E/A RATIO: 0.88
FRACTIONAL SHORTENING: 46 (ref 28–44)
INTERVENTRICULAR SEPTUM IN DIASTOLE (PARASTERNAL SHORT AXIS VIEW): 0.8 CM
INTERVENTRICULAR SEPTUM: 0.8 CM (ref 0.6–1.1)
LAAS-AP2: 21.5 CM2
LAAS-AP4: 18.2 CM2
LEFT ATRIUM AREA SYSTOLE SINGLE PLANE A4C: 17.4 CM2
LEFT ATRIUM SIZE: 4.1 CM
LEFT ATRIUM VOLUME (MOD BIPLANE): 56 ML
LEFT ATRIUM VOLUME INDEX (MOD BIPLANE): 28.9 ML/M2
LEFT INTERNAL DIMENSION IN SYSTOLE: 3 CM (ref 2.1–4)
LEFT VENTRICULAR INTERNAL DIMENSION IN DIASTOLE: 5.6 CM (ref 3.5–6)
LEFT VENTRICULAR POSTERIOR WALL IN END DIASTOLE: 0.6 CM
LEFT VENTRICULAR STROKE VOLUME: 121 ML
LVSV (TEICH): 121 ML
MV E'TISSUE VEL-LAT: 14 CM/S
MV E'TISSUE VEL-SEP: 9 CM/S
MV PEAK A VEL: 0.83 M/S
MV PEAK E VEL: 73 CM/S
MV STENOSIS PRESSURE HALF TIME: 75 MS
MV VALVE AREA P 1/2 METHOD: 2.93
RIGHT ATRIUM AREA SYSTOLE A4C: 11.2 CM2
RIGHT VENTRICLE ID DIMENSION: 3.5 CM
SL CV LEFT ATRIUM LENGTH A2C: 5.7 CM
SL CV LV EF: 55
SL CV PED ECHO LEFT VENTRICLE DIASTOLIC VOLUME (MOD BIPLANE) 2D: 157 ML
SL CV PED ECHO LEFT VENTRICLE SYSTOLIC VOLUME (MOD BIPLANE) 2D: 35 ML
TR MAX PG: 15 MMHG
TR PEAK VELOCITY: 1.9 M/S
TRICUSPID VALVE PEAK REGURGITATION VELOCITY: 1.92 M/S

## 2023-10-16 PROCEDURE — 93306 TTE W/DOPPLER COMPLETE: CPT | Performed by: INTERNAL MEDICINE

## 2023-10-16 PROCEDURE — 93306 TTE W/DOPPLER COMPLETE: CPT

## 2023-10-17 ENCOUNTER — TELEPHONE (OUTPATIENT)
Dept: NEUROLOGY | Facility: CLINIC | Age: 63
End: 2023-10-17

## 2023-10-17 NOTE — TELEPHONE ENCOUNTER
Patient is scheduled for BINJ on 11/6 at . Contacted [facility] and spoke to [place]. Delivery scheduled. Botox 200 UNITS  Qty. 1  Scheduled: 10/26  Location:    Via: [employer]       Please let us know if Botox does not arrive. Thanks!

## 2023-10-18 NOTE — H&P (VIEW-ONLY)
Assessment:  1. Lumbar radiculopathy    2. Sacroiliitis ([address])        Plan: This is a 57-year-old female who returns her office following left L2 and L3 transforaminal epidural steroid injection with Dr. Chante Clark in November 2022. She reported close to 80% relief for 9 months. She has recurrence of similar symptoms today with pain in the left lumbar region, left buttock region radiating to the left anterior thigh. We discussed repeating left L2-L3 transforaminal epidural steroid injection fluoroscopic guidance and with her symptoms were given previous improvement. The patient is agreeable to repeating the procedure under fluoroscopic guidance. [place] Prescription Drug Monitoring Program report was reviewed and was appropriate     Complete risks and benefits including bleeding, infection, tissue reaction, nerve injury and allergic reaction were discussed. The approach was demonstrated using models and literature was provided. Verbal and written consent was obtained. My impressions and treatment recommendations were discussed in detail with the patient who verbalized understanding and had no further questions. Discharge instructions were provided. I personally saw and examined the patient and I agree with the above discussed plan of care. Orders Placed This Encounter   Procedures    FL spine and pain procedure     Standing Status:   Future     Standing Expiration Date:   10/23/2027     Order Specific Question:   Reason for Exam:     Answer:   left L2 and L3 TFESI     Order Specific Question:   Anticoagulant hold needed? Answer:   NO     No orders of the defined types were placed in this encounter. History of Present Illness:  Mulu Barbosa is a 57 y.o. female who presents for a follow up office visit in regards to Back Pain (Lower back pain going into the thigh). The patient’s current symptoms include left low back pain and neuropathic pain in the left anterior thigh.  Also numbness in the right knee down. Last procedure was left L2 and L3 TFESI with 9 months of relief with 75% relief. She had reported less pain in buttock and thigh area. Was able to stand for longer with less pain. I have personally reviewed and/or updated the patient's past medical history, past surgical history, family history, social history, current medications, allergies, and vital signs today.      Review of Systems   Musculoskeletal:         Pain in Extremity - Legs       Patient Active Problem List   Diagnosis    Neuralgia    Continuous leakage of urine    Mixed hyperlipidemia    Chronic pain syndrome    Dysesthesia of multiple sites    Eczema    Hematuria    Hypertension    Arthralgia of shoulder region, left    Limb pain    Lower back pain    Lumbar facet arthropathy    CATHRYN (obstructive sleep apnea)    Postgastrectomy malabsorption    Pudendal neuralgia    Gastroesophageal reflux disease    Iron deficiency anemia    Osteoarthritis of left shoulder    Sacroiliitis (HCC)    Lumbar radiculopathy    Mild cognitive impairment with memory loss    Nonintractable headache    Insomnia    Migraine    Positive FIT (fecal immunochemical test)    Hypovitaminosis D    Lipoma of neck    Generalized anxiety disorder with panic attacks    Moderate episode of recurrent major depressive disorder (HCC)    Obesity, morbid (HCC)    Presence of neurostimulator    COVID-19    Kidney mass    Acquired renal cyst of left kidney    Urinary incontinence, urge    CATHRYN on CPAP       Past Medical History:   Diagnosis Date    Anxiety     Arthritis     Attention and concentration deficit     Blurred vision     Chronic pain     Chronic pain syndrome 01/20/2016    CPAP (continuous positive airway pressure) dependence     CTS (carpal tunnel syndrome) > 6 years    Left wrist    Depression     Difficulty walking > 6 years    Left nerve leg pain    Diplopia     Dyspepsia     Gastric ulcer     Gastritis     GERD (gastroesophageal reflux disease)     Head injury > 25 years    Daphney Ahumada off 12 foot retaining wall    Headache(784.0)     Headache, tension-type > 1 year    Pain behind left eyeAnd stiff neck with pressure    History of transfusion 2005    Hypertension     Insomnia     Irritable bowel syndrome     Memory loss     Migraines     Osteopenia     Peripheral neuropathy 3/14/2012    Left thigh, left abdomen, left prudential nerve    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    Presence of neurostimulator     Pudendal neuralgia    Pudendal neuralgia     Shingles > 30 years ago    Left facial nerve    Sleep apnea     Spinal stenosis     Starting and stopping of urinary stream during micturition     Syncope > 10 years    When I get up quickly    Trigeminal neuralgia > 30 years    Associated with shingles on left face    Urinary incontinence     Wears eyeglasses        Past Surgical History:   Procedure Laterality Date    [facility]    GASTRIC BYPASS  2004    HYSTERECTOMY  03/14/2012    INSERT / REPLACE PERIPHERAL NEUROSTIMULATOR PULSE GENERATOR /       MASS EXCISION Right 04/05/2021    Procedure: EXCISION  BIOPSY LESION/MASS LOWER NECK;  Surgeon: Mann Alonso DO;  Location: [facility];  Service: General    OTHER SURGICAL HISTORY  2012    Reimplantatin at [facility] INSERTION/RPLCMT PERIPHERAL/GASTRIC NPGR Left 2/23/2023    Procedure: PLACEMENT OF PERMANENT LEADWIRE AND PG FOR SNM AT [facility], COMPLEX PROGRAMMING OF PULSE GENERATOR;  Surgeon: Karen Davenport MD;  [facility]: [facility];  Service: UroGynecology           [place] INSJ/RPLCMT SPI NPGR DIR/INDUXIVE COUPLING Right 04/15/2020    Procedure: REPLACEMENT IMPLANTABLE PULSE GENERATOR FOR DORSAL SPINAL COLUMN STIMULATOR,  RIGHT BUTTOCKS;  Surgeon: Amanda Buchanan MD;  Location: [facility];  Service: Neurosurgery    [address] [place] [address] IMPLANT  2015    TRIGGER POINT INJECTION      URETER SURGERY      URETHRAL FISTULA REPAIR      US GUIDED THYROID BIOPSY  09/23/2020    WRIST ARTHROSCOPY      with internal fixation        Family History   Problem Relation Age of Onset    Other Mother         Back Disorder     Cirrhosis Mother     Crohn's disease Mother     Lupus Mother         Systemic Lupus Erythematous     Depression Mother     Dementia Mother         Caused by liver disease    Neuropathy Mother         Fibromyalgia    Hypertension Father     Heart disease Father     Other Brother         Liver Transplant     Crohn's disease Brother     Crohn's disease Brother     Depression Sister     No Known Problems Daughter     No Known Problems Maternal Aunt     No Known Problems Paternal Aunt     No Known Problems Cousin     No Known Problems Cousin     No Known Problems Cousin     No Known Problems Cousin     No Known Problems Cousin     Dementia Brother         Caused by liver disease    Seizures Neg Hx     Breast cancer Neg Hx        Social History     Occupational History    Occupation: retired   Tobacco Use    Smoking status: Never    Smokeless tobacco: Never   Vaping Use    Vaping Use: Never used   Substance and Sexual Activity    Alcohol use: No    Drug use: Yes     Types: Marijuana     Comment: Capsules used for chronic pain.   Medical    Sexual activity: Not Currently     Partners: Male     Birth control/protection: None       Current Outpatient Medications on File Prior to Visit   Medication Sig    albuterol (Ventolin HFA) 90 mcg/act inhaler Inhale 2 puffs every 6 (six) hours as needed for wheezing    Botox 200 units SOLR     busPIRone (BUSPAR) 10 mg tablet Take 1 tablet (10 mg total) by mouth 2 (two) times a day    Cholecalciferol 25 MCG (1000 UT) capsule Take 1 capsule by mouth daily    dronabinol (MARINOL) 5 MG capsule Take 1 capsule (5 mg total) by mouth 2 (two) times a day before meals By Dr. Ella Hernandez (Patient taking differently: Take 2.5 mg by mouth in the morning By  Roland Price    gabapentin (NEURONTIN) 300 mg capsule Take 1 capsule (300 mg total) by mouth 2 (two) times a day    Galcanezumab-gnlm (Emgality) 120 MG/ML SOAJ 1 sub cu injection every month    Iron-Vitamin C 100-250 MG TABS Take 1 tablet by mouth daily    lidocaine (XYLOCAINE) 5 % ointment Apply topically 2 (two) times a day as needed for moderate pain    lisinopril (ZESTRIL) 5 mg tablet TAKE 1 TABLET DAILY    Multiple Vitamin (MULTI-VITAMIN DAILY) TABS Take 1 tablet by mouth daily    ondansetron (ZOFRAN-ODT) 4 mg disintegrating tablet Take 1 tablet (4 mg total) by mouth every 6 (six) hours as needed for nausea or vomiting    pantoprazole (PROTONIX) 40 mg tablet TAKE 1 TABLET TWICE A DAY (Patient taking differently: Take 40 mg by mouth 2 (two) times a day)    propranolol (INDERAL) 60 mg tablet TAKE 1 TABLET TWICE A DAY    QUEtiapine (SEROquel) 25 mg tablet Take 1 tablet (25 mg total) by mouth daily at bedtime    rizatriptan (MAXALT-MLT) 10 mg disintegrating tablet Take at the onset of migraine; if symptoms continue or return, may take another dose at least 2 hours after first dose. Take no more than 2 doses in a day. (Patient taking differently: as needed Take at the onset of migraine; if symptoms continue or return, may take another dose at least 2 hours after first dose. Take no more than 2 doses in a day.)    rosuvastatin (CRESTOR) 10 MG tablet TAKE 1 TABLET DAILY    sertraline (ZOLOFT) 100 mg tablet Take 1 tablet (100 mg total) by mouth daily    triamcinolone (KENALOG) 0.1 % cream Apply topically 2 (two) times a day     No current facility-administered medications on file prior to visit.        Allergies   Allergen Reactions    Morphine Nausea Only and Abdominal Pain     SEVERE N/V    Cat Hair Extract Nasal Congestion     Cat Dander    Dog Epithelium Nasal Congestion    Other Other (See Comments)     Dogs- sneezing  Crab Meat- GI intolerance       Physical Exam:    /77   Pulse 55   Ht 5' 2" (1.575 m)   Wt 94.1 kg (207 lb 6.4 oz)   BMI 37.93 kg/m²     Constitutional:normal, well developed, well nourished, alert, in no distress and non-toxic and no overt pain behavior.   Eyes:anicteric  HEENT:grossly intact  Neck:supple, symmetric, trachea midline and no masses   Pulmonary:even and unlabored  Cardiovascular:No edema or pitting edema present  Skin:Normal without rashes or lesions and well hydrated  Psychiatric:Mood and affect appropriate  Neurologic:Cranial Nerves II-XII grossly intact  Musculoskeletal:normal    Imaging

## 2023-10-18 NOTE — PROGRESS NOTES
Assessment:  1. Lumbar radiculopathy    2. Sacroiliitis ([address])        Plan: This is a 64-year-old female who returns her office following left L2 and L3 transforaminal epidural steroid injection with Dr. Slava Gray in November 2022. She reported close to 80% relief for 9 months. She has recurrence of similar symptoms today with pain in the left lumbar region, left buttock region radiating to the left anterior thigh. We discussed repeating left L2-L3 transforaminal epidural steroid injection fluoroscopic guidance and with her symptoms were given previous improvement. The patient is agreeable to repeating the procedure under fluoroscopic guidance. [place] Prescription Drug Monitoring Program report was reviewed and was appropriate     Complete risks and benefits including bleeding, infection, tissue reaction, nerve injury and allergic reaction were discussed. The approach was demonstrated using models and literature was provided. Verbal and written consent was obtained. My impressions and treatment recommendations were discussed in detail with the patient who verbalized understanding and had no further questions. Discharge instructions were provided. I personally saw and examined the patient and I agree with the above discussed plan of care. Orders Placed This Encounter   Procedures    FL spine and pain procedure     Standing Status:   Future     Standing Expiration Date:   10/23/2027     Order Specific Question:   Reason for Exam:     Answer:   left L2 and L3 TFESI     Order Specific Question:   Anticoagulant hold needed? Answer:   NO     No orders of the defined types were placed in this encounter. History of Present Illness:  Leeroy Tolbert is a 57 y.o. female who presents for a follow up office visit in regards to Back Pain (Lower back pain going into the thigh). The patient’s current symptoms include left low back pain and neuropathic pain in the left anterior thigh.  Also numbness in the right knee down. Last procedure was left L2 and L3 TFESI with 9 months of relief with 75% relief. She had reported less pain in buttock and thigh area. Was able to stand for longer with less pain. I have personally reviewed and/or updated the patient's past medical history, past surgical history, family history, social history, current medications, allergies, and vital signs today.      Review of Systems   Musculoskeletal:         Pain in Extremity - Legs       Patient Active Problem List   Diagnosis    Neuralgia    Continuous leakage of urine    Mixed hyperlipidemia    Chronic pain syndrome    Dysesthesia of multiple sites    Eczema    Hematuria    Hypertension    Arthralgia of shoulder region, left    Limb pain    Lower back pain    Lumbar facet arthropathy    CATHRYN (obstructive sleep apnea)    Postgastrectomy malabsorption    Pudendal neuralgia    Gastroesophageal reflux disease    Iron deficiency anemia    Osteoarthritis of left shoulder    Sacroiliitis (HCC)    Lumbar radiculopathy    Mild cognitive impairment with memory loss    Nonintractable headache    Insomnia    Migraine    Positive FIT (fecal immunochemical test)    Hypovitaminosis D    Lipoma of neck    Generalized anxiety disorder with panic attacks    Moderate episode of recurrent major depressive disorder (HCC)    Obesity, morbid (HCC)    Presence of neurostimulator    COVID-19    Kidney mass    Acquired renal cyst of left kidney    Urinary incontinence, urge    CATHRYN on CPAP       Past Medical History:   Diagnosis Date    Anxiety     Arthritis     Attention and concentration deficit     Blurred vision     Chronic pain     Chronic pain syndrome 01/20/2016    CPAP (continuous positive airway pressure) dependence     CTS (carpal tunnel syndrome) > 6 years    Left wrist    Depression     Difficulty walking > 6 years    Left nerve leg pain    Diplopia     Dyspepsia     Gastric ulcer     Gastritis     GERD (gastroesophageal reflux disease)     Head injury > 25 years    Estefanía Villanueva off 12 foot retaining wall    Headache(784.0)     Headache, tension-type > 1 year    Pain behind left eyeAnd stiff neck with pressure    History of transfusion 2005    Hypertension     Insomnia     Irritable bowel syndrome     Memory loss     Migraines     Osteopenia     Peripheral neuropathy 3/14/2012    Left thigh, left abdomen, left prudential nerve    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    Presence of neurostimulator     Pudendal neuralgia    Pudendal neuralgia     Shingles > 30 years ago    Left facial nerve    Sleep apnea     Spinal stenosis     Starting and stopping of urinary stream during micturition     Syncope > 10 years    When I get up quickly    Trigeminal neuralgia > 30 years    Associated with shingles on left face    Urinary incontinence     Wears eyeglasses        Past Surgical History:   Procedure Laterality Date    [address]    GASTRIC BYPASS  2004    HYSTERECTOMY  03/14/2012    INSERT / REPLACE PERIPHERAL NEUROSTIMULATOR PULSE GENERATOR /       MASS EXCISION Right 04/05/2021    Procedure: EXCISION  BIOPSY LESION/MASS LOWER NECK;  Surgeon: Shantel Pacheco DO;  Location: [facility];  Service: General    OTHER SURGICAL HISTORY  2012    Reimplantatin at [facility] INSERTION/RPLCMT PERIPHERAL/GASTRIC NPGR Left 2/23/2023    Procedure: PLACEMENT OF PERMANENT LEADWIRE AND PG FOR SNM AT [address], COMPLEX PROGRAMMING OF PULSE GENERATOR;  Surgeon: Sabiha Knox MD;  Location: [facility];  Service: UroGynecology           [facility] INSJ/RPLCMT SPI NPGR DIR/INDUXIVE COUPLING Right 04/15/2020    Procedure: REPLACEMENT IMPLANTABLE PULSE GENERATOR FOR DORSAL SPINAL COLUMN STIMULATOR,  RIGHT BUTTOCKS;  Surgeon: Ami Braga MD;  Location: [facility];  Service: Neurosurgery    [address] [place] [address] IMPLANT  2015    TRIGGER POINT INJECTION      URETER SURGERY      URETHRAL FISTULA REPAIR      US GUIDED THYROID BIOPSY  09/23/2020    WRIST ARTHROSCOPY      with internal fixation        Family History   Problem Relation Age of Onset    Other Mother         Back Disorder     Cirrhosis Mother     Crohn's disease Mother     Lupus Mother         Systemic Lupus Erythematous     Depression Mother     Dementia Mother         Caused by liver disease    Neuropathy Mother         Fibromyalgia    Hypertension Father     Heart disease Father     Other Brother         Liver Transplant     Crohn's disease Brother     Crohn's disease Brother     Depression Sister     No Known Problems Daughter     No Known Problems Maternal Aunt     No Known Problems Paternal Aunt     No Known Problems Cousin     No Known Problems Cousin     No Known Problems Cousin     No Known Problems Cousin     No Known Problems Cousin     Dementia Brother         Caused by liver disease    Seizures Neg Hx     Breast cancer Neg Hx        Social History     Occupational History    Occupation: retired   Tobacco Use    Smoking status: Never    Smokeless tobacco: Never   Vaping Use    Vaping Use: Never used   Substance and Sexual Activity    Alcohol use: No    Drug use: Yes     Types: Marijuana     Comment: Capsules used for chronic pain.   Medical    Sexual activity: Not Currently     Partners: Male     Birth control/protection: None       Current Outpatient Medications on File Prior to Visit   Medication Sig    albuterol (Ventolin HFA) 90 mcg/act inhaler Inhale 2 puffs every 6 (six) hours as needed for wheezing    Botox 200 units SOLR     busPIRone (BUSPAR) 10 mg tablet Take 1 tablet (10 mg total) by mouth 2 (two) times a day    Cholecalciferol 25 MCG (1000 UT) capsule Take 1 capsule by mouth daily    dronabinol (MARINOL) 5 MG capsule Take 1 capsule (5 mg total) by mouth 2 (two) times a day before meals By Dr. Toña Sellers (Patient taking differently: Take 2.5 mg by mouth in the morning By  Pamela Saleem    gabapentin (NEURONTIN) 300 mg capsule Take 1 capsule (300 mg total) by mouth 2 (two) times a day    Galcanezumab-gnlm (Emgality) 120 MG/ML SOAJ 1 sub cu injection every month    Iron-Vitamin C 100-250 MG TABS Take 1 tablet by mouth daily    lidocaine (XYLOCAINE) 5 % ointment Apply topically 2 (two) times a day as needed for moderate pain    lisinopril (ZESTRIL) 5 mg tablet TAKE 1 TABLET DAILY    Multiple Vitamin (MULTI-VITAMIN DAILY) TABS Take 1 tablet by mouth daily    ondansetron (ZOFRAN-ODT) 4 mg disintegrating tablet Take 1 tablet (4 mg total) by mouth every 6 (six) hours as needed for nausea or vomiting    pantoprazole (PROTONIX) 40 mg tablet TAKE 1 TABLET TWICE A DAY (Patient taking differently: Take 40 mg by mouth 2 (two) times a day)    propranolol (INDERAL) 60 mg tablet TAKE 1 TABLET TWICE A DAY    QUEtiapine (SEROquel) 25 mg tablet Take 1 tablet (25 mg total) by mouth daily at bedtime    rizatriptan (MAXALT-MLT) 10 mg disintegrating tablet Take at the onset of migraine; if symptoms continue or return, may take another dose at least 2 hours after first dose. Take no more than 2 doses in a day. (Patient taking differently: as needed Take at the onset of migraine; if symptoms continue or return, may take another dose at least 2 hours after first dose. Take no more than 2 doses in a day.)    rosuvastatin (CRESTOR) 10 MG tablet TAKE 1 TABLET DAILY    sertraline (ZOLOFT) 100 mg tablet Take 1 tablet (100 mg total) by mouth daily    triamcinolone (KENALOG) 0.1 % cream Apply topically 2 (two) times a day     No current facility-administered medications on file prior to visit.        Allergies   Allergen Reactions    Morphine Nausea Only and Abdominal Pain     SEVERE N/V    Cat Hair Extract Nasal Congestion     Cat Dander    Dog Epithelium Nasal Congestion    Other Other (See Comments)     Dogs- sneezing  Crab Meat- GI intolerance       Physical Exam:    /77   Pulse 55   Ht 5' 2" (1.575 m)   Wt 94.1 kg (207 lb 6.4 oz)   BMI 37.93 kg/m²     Constitutional:normal, well developed, well nourished, alert, in no distress and non-toxic and no overt pain behavior.   Eyes:anicteric  HEENT:grossly intact  Neck:supple, symmetric, trachea midline and no masses   Pulmonary:even and unlabored  Cardiovascular:No edema or pitting edema present  Skin:Normal without rashes or lesions and well hydrated  Psychiatric:Mood and affect appropriate  Neurologic:Cranial Nerves II-XII grossly intact  Musculoskeletal:normal    Imaging

## 2023-10-19 ENCOUNTER — EVALUATION (OUTPATIENT)
Dept: PHYSICAL THERAPY | Facility: CLINIC | Age: 63
End: 2023-10-19
Payer: COMMERCIAL

## 2023-10-19 DIAGNOSIS — M54.50 ACUTE LEFT-SIDED LOW BACK PAIN, UNSPECIFIED WHETHER SCIATICA PRESENT: ICD-10-CM

## 2023-10-19 DIAGNOSIS — M46.1 SACROILIITIS (HCC): Primary | ICD-10-CM

## 2023-10-19 PROCEDURE — 97110 THERAPEUTIC EXERCISES: CPT | Performed by: PHYSICAL THERAPIST

## 2023-10-19 PROCEDURE — 97161 PT EVAL LOW COMPLEX 20 MIN: CPT | Performed by: PHYSICAL THERAPIST

## 2023-10-19 NOTE — PROGRESS NOTES
PT Evaluation     Today's date: 10/19/2023  Patient name: Nathan Crane  : 1960  MRN: [id number]  Referring provider: GWEN Liang  Dx:   Encounter Diagnosis     ICD-10-CM    1. Sacroiliitis ([address])  M46.1 Ambulatory Referral to Physical Therapy      2. Acute left-sided low back pain, unspecified whether sciatica present  M54.50 Ambulatory Referral to Physical Therapy                     Assessment  Assessment details: Patient was provided a home exercise program and demonstrated an understanding of exercises. Patient was advised to stop performing home exercise program if symptoms increase or new complaints developed. Verbal understanding demonstrated regarding home exercise program instructions. Patient would benefit from skilled physical therapy services for prescribed exercises, manual interventions, neuromuscular re-education, education, and modalities as deemed appropriate to assist patient in achieving their maximum level of function. Patient is not new to our department having been seen with similar symptoms over the years. She is doing well overall - having lost 35#, gaining self confidence and improved function and moving. Approximately 1 month ago - after returning from cruise abroad she started with left sacroiliitis type symptoms with left groin pain and intermittent discomfort left thigh. Evaluation reveals:  min loss of trunk mobility, bilateral hip weakness/ core, decreased flexibility , hypersensitivity to palpation left SI, piriformis/ glut region, Proximal Itband and greater trochanteric region. She presents highly motivated to reduce/abolish her pain and symptoms. Impairments: abnormal or restricted ROM, activity intolerance, impaired physical strength, lacks appropriate home exercise program and pain with function  Understanding of Dx/Px/POC: good  Goals  STG   1. Patient will demonstrate independence and competence with HEP 2 -4 weeks  2.   Patient will report > 25-50% reduced pain 2-4 weeks    LTG   1. Patient will report improvements with both functional and recreational abilities  4-6 weeks  2. Patient will demonstrate improved motor function  4-6 weeks. 3.  Restoration of full/ painfree trunk mobility  4-6 weeks  4. Improved tolerance to climbing steps without exacerbation of pain  4-8 weeks. Plan  Plan details: Patient response to treatment will be monitored each session and progressed accordingly    Thank you for this referral.   Patient would benefit from: skilled physical therapy  Planned modality interventions: thermotherapy: hydrocollator packs and cryotherapy  Planned therapy interventions: IADL retraining, manual therapy, patient education, postural training, strengthening, stretching, therapeutic exercise, flexibility, home exercise program and neuromuscular re-education  Frequency: 2x week  Plan of Care expiration date: 2023  Treatment plan discussed with: patient    Subjective Evaluation    History of Present Illness  Mechanism of injury: Patient has been feeling good, doing well and has lost 35#. Her history included left sacroiliitis -  she got injection 1 year ago which offered significant relief   She presents with left LS pain / groin soreness with intermittent left radicular pain - knee x approximately 1 month. Right leg gets numb from knee down after prolonged standing. Ascending steps (+) P!    Patient Goals  Patient goal: " i want to walk up steps better"   "id like groin/ back pain to go away "  Pain  Current pain rating: 3  At worst pain ratin  Quality: radiating and throbbing  Relieving factors: change in position  Aggravating factors: stair climbing, standing and walking  Progression: improved    Social Support  Lives with: spouse    Treatments  Previous treatment: physical therapy and injection treatment      Objective     Concurrent Complaints  Negative for night pain, disturbed sleep, bladder dysfunction, bowel dysfunction and saddle (S4) numbness    Postural Observations  Seated posture: fair  Standing posture: fair  Correction of posture: has no consistent effect      Palpation   Left   Tenderness of the lumbar paraspinals and quadratus lumborum. Additional Palpation Details  (+) tenderness left piriformis, left glut    Tenderness     Lumbar Spine  Tenderness in the spinous process. Left Hip   Tenderness in the PSIS. Right Hip   No tenderness in the PSIS. Neurological Testing     Sensation     Lumbar   Left   Intact: light touch    Right   Intact: light touch    Active Range of Motion     Lumbar   Flexion:  Restriction level: minimal  Extension:  WFL  Left lateral flexion:  with pain Restriction level: minimal  Right lateral flexion:  with pain Restriction level: minimal    Joint Play     Hypomobile: L3, L4, L5 and S1     Pain: L3, L4 and L5   Mechanical Assessment    Cervical      Thoracic      Lumbar    Standing flexion: repeated movements   Pain location:no change  Pain level: increased  Lying flexion: repeated movements  Pain location: no change  Standing extension: repeated movements  Pain location: no change  Pain level: decreased  Lying extension: repeated movements  Pain location: no change  Pain level: decreased    Strength/Myotome Testing     Left Hip   Planes of Motion   Flexion: 4  Extension: 4  Abduction: 4  External rotation: 4+  Internal rotation: 4+    Right Hip   Planes of Motion   Flexion: 4  Extension: 4  Abduction: 4  External rotation: 4+  Internal rotation: 4+    Left Knee   Flexion: 4+  Extension: 4+    Right Knee   Flexion: 4+  Extension: 4+    Left Ankle/Foot   Dorsiflexion: 5    Right Ankle/Foot   Dorsiflexion: 5    Tests     Lumbar   Negative SIJ compression and sacroiliac distraction.      Left   Negative crossed SLR, femoral stretch, passive SLR and slump test.     Right   Negative crossed SLR, femoral stretch, passive SLR and slump test.     Left Hip   Negative MANASA and FADIR.     Right Hip   Negative MANASA and FADIR. Precautions: Left Sacroilitis,  LBP      [email or web address]  Access Code: [email or web address]    POC expires Unit limit Auth Expiration date PT/OT + Visit Limit?   12/19/ 23 3 na 52 visits                           Visit/Unit Tracking  AUTH Status:  Date 10/19              na Used 1               Remaining  48                          Manuals 10/19                                                                Neuro Re-Ed                          HL TA Marching             Bridging w/ hip add 3s x 20            HL TB Hip abd Gtb x 20            TB clamshells Gtb x 20             Seated physioball TB rows/ ext/ lpd             HL Physioball ktc                                                    Ther Ex             bike             Fig 4 ir/er stretch 20s x 3            Hss w/ strap                                       Step ups f/l                           Prone gs             Prone hip ext             Prone hip ext w/ knee flexed                          90/90 stand hip abd                          TB side stepping             STS box                                                                                                        Ther Activity                                       Gait Training                                       Modalities

## 2023-10-23 ENCOUNTER — OFFICE VISIT (OUTPATIENT)
Dept: PAIN MEDICINE | Facility: CLINIC | Age: 63
End: 2023-10-23
Payer: COMMERCIAL

## 2023-10-23 ENCOUNTER — TELEMEDICINE (OUTPATIENT)
Dept: PSYCHIATRY | Facility: CLINIC | Age: 63
End: 2023-10-23
Payer: COMMERCIAL

## 2023-10-23 ENCOUNTER — TELEPHONE (OUTPATIENT)
Dept: PAIN MEDICINE | Facility: CLINIC | Age: 63
End: 2023-10-23

## 2023-10-23 VITALS
HEART RATE: 55 BPM | WEIGHT: 207.4 LBS | SYSTOLIC BLOOD PRESSURE: 106 MMHG | HEIGHT: 62 IN | BODY MASS INDEX: 38.16 KG/M2 | DIASTOLIC BLOOD PRESSURE: 77 MMHG

## 2023-10-23 DIAGNOSIS — F41.0 GENERALIZED ANXIETY DISORDER WITH PANIC ATTACKS: ICD-10-CM

## 2023-10-23 DIAGNOSIS — G47.00 INSOMNIA, UNSPECIFIED TYPE: ICD-10-CM

## 2023-10-23 DIAGNOSIS — M46.1 SACROILIITIS (HCC): ICD-10-CM

## 2023-10-23 DIAGNOSIS — F33.1 MODERATE EPISODE OF RECURRENT MAJOR DEPRESSIVE DISORDER (HCC): Primary | ICD-10-CM

## 2023-10-23 DIAGNOSIS — M54.16 LUMBAR RADICULOPATHY: Primary | ICD-10-CM

## 2023-10-23 DIAGNOSIS — F41.1 GENERALIZED ANXIETY DISORDER WITH PANIC ATTACKS: ICD-10-CM

## 2023-10-23 PROCEDURE — 90833 PSYTX W PT W E/M 30 MIN: CPT | Performed by: STUDENT IN AN ORGANIZED HEALTH CARE EDUCATION/TRAINING PROGRAM

## 2023-10-23 PROCEDURE — 99214 OFFICE O/P EST MOD 30 MIN: CPT | Performed by: STUDENT IN AN ORGANIZED HEALTH CARE EDUCATION/TRAINING PROGRAM

## 2023-10-23 RX ORDER — QUETIAPINE FUMARATE 25 MG/1
25 TABLET, FILM COATED ORAL
Qty: 90 TABLET | Refills: 0 | Status: SHIPPED | OUTPATIENT
Start: 2023-10-23 | End: 2024-01-21
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
## 2023-10-23 NOTE — TELEPHONE ENCOUNTER
Patient was scheduled while in office, all pre procedure instructions were reviewed. As per patient she is not on blood thinners.

## 2023-10-25 ENCOUNTER — OFFICE VISIT (OUTPATIENT)
Dept: PHYSICAL THERAPY | Facility: CLINIC | Age: 63
End: 2023-10-25
Payer: COMMERCIAL

## 2023-10-25 DIAGNOSIS — M54.50 ACUTE LEFT-SIDED LOW BACK PAIN, UNSPECIFIED WHETHER SCIATICA PRESENT: ICD-10-CM

## 2023-10-25 DIAGNOSIS — M46.1 SACROILIITIS (HCC): Primary | ICD-10-CM

## 2023-10-25 PROCEDURE — 97112 NEUROMUSCULAR REEDUCATION: CPT | Performed by: PHYSICAL THERAPIST

## 2023-10-25 PROCEDURE — 97110 THERAPEUTIC EXERCISES: CPT | Performed by: PHYSICAL THERAPIST

## 2023-10-25 NOTE — PROGRESS NOTES
Daily Note     Today's date: 10/25/2023  Patient name: Oksana Richardson  : 1960  MRN: [id number]  Referring provider: GWEN Ruiz  Dx:   Encounter Diagnosis     ICD-10-CM    1. Sacroiliitis ([address])  M46.1       2. Acute left-sided low back pain, unspecified whether sciatica present  M54.50                        Subjective: " I feel good " patient reports after exercise program today       Objective: See treatment diary below      Assessment: Tolerated treatment well. Patient exhibited good technique with therapeutic exercises and would benefit from continued PT      Plan: Continue per plan of care. Progress treatment as tolerated. Precautions: Left Sacroilitis,  LBP      [email or web address]  Access Code: [email or web address]    POC expires Unit limit Auth Expiration date PT/OT + Visit Limit?    3 na 49 visits                           Visit/Unit Tracking  AUTH Status:  Date 10/19 10/25             na Used 1 2              Remaining  48 52                         Manuals 10/19 10/25                                                               Neuro Re-Ed                          HL TA Marching  20x            Bridging w/ hip add 3s x 20 3s x 20           HL TA w/ roll  5s x 20            HL TB Hip abd Gtb x 20 Btb x 20           TB clamshells Gtb x 20  Btb x 20            Seated physioball TB rows/ ext/ lpd  Gtb x 20 ea            HL Physioball ktc  Opb x 20                                                     Ther Ex             bike  10 min            Fig 4 ir/er stretch 20s x 3            Hss w/ strap  20s x 3 bilat                                     Step ups f/l   6" x 20 ea                         Prone gs  3s x 20            Prone hip ext  10 x 2            Prone hip ext w/ knee flexed  10 x 2            Prone quad stretch  20s x 3 bilat           90/90 stand hip abd  5s x 20                         TB side stepping  Gtb 30' x 3           STS box  20x Ther Activity                                       Gait Training                                       Modalities

## 2023-10-26 ENCOUNTER — OFFICE VISIT (OUTPATIENT)
Dept: PHYSICAL THERAPY | Facility: CLINIC | Age: 63
End: 2023-10-26
Payer: COMMERCIAL

## 2023-10-26 DIAGNOSIS — M46.1 SACROILIITIS (HCC): Primary | ICD-10-CM

## 2023-10-26 DIAGNOSIS — M54.50 ACUTE LEFT-SIDED LOW BACK PAIN, UNSPECIFIED WHETHER SCIATICA PRESENT: ICD-10-CM

## 2023-10-26 PROCEDURE — 97112 NEUROMUSCULAR REEDUCATION: CPT

## 2023-10-26 PROCEDURE — 97110 THERAPEUTIC EXERCISES: CPT

## 2023-10-26 NOTE — PROGRESS NOTES
Daily Note     Today's date: 10/26/2023  Patient name: Isatu Skinner  : 1960  MRN: [id number]  Referring provider: GWEN Nunez  Dx:   Encounter Diagnosis     ICD-10-CM    1. Sacroiliitis ([address])  M46.1       2. Acute left-sided low back pain, unspecified whether sciatica present  M54.50                      Subjective: Patient indicated moderate "soreness", although she stated she walked 2 miles after last session. SPR=0/10 post today's visit. Objective: See treatment diary below      Assessment: Tolerated treatment  with excellent understanding of technique, pacing and posture with TE performance today. Patient noted decline in initial subjective complaints of 'soreness' post today's intervention. Patient appears very motivated . Patient would benefit from continued PT      Plan: Continue per plan of care. Progress treatment as tolerated. Precautions: Left Sacroilitis,  LBP      [email or web address]  Access Code: [email or web address]    POC expires Unit limit Auth Expiration date PT/OT + Visit Limit?    3 na 49 visits                           Visit/Unit Tracking  AUTH Status:  Date 10/19 10/25 10/26            na Used 1 2 3             Remaining  48 47 55                        Manuals 10/19 10/25 10/26                                                              Neuro Re-Ed                          HL TA Marching  20x  20x          Bridging w/ hip add 3s x 20 3s x 20 :03  20x          HL TA w/ roll  5s x 20  :05  20x          HL TB Hip abd Gtb x 20 Btb x 20 BTB  20x          TB clamshells Gtb x 20  Btb x 20  BTB  20x          Seated physioball TB rows/ ext/ lpd  Gtb x 20 ea  GTB  20x each          HL Physioball ktc  Opb x 20    OPB  20x                                                 Ther Ex             bike  10 min  10'          Fig 4 ir/er stretch 20s x 3            Hss w/ strap  20s x 3 bilat :20  3x each                                    Step ups f/l   6" x 20 ea  6"  20x each Prone gs  3s x 20  :03  20x          Prone hip ext  10 x 2  10x2          Prone hip ext w/ knee flexed  10 x 2  10x2          Prone quad stretch  20s x 3 bilat :20  3x each          90/90 stand hip abd  5s x 20  :05  20x                       TB side stepping  Gtb 30' x 3 GTB  30'x3          STS box  20x  20x                                                                                                     Ther Activity                                       Gait Training                                       Modalities

## 2023-10-26 NOTE — TELEPHONE ENCOUNTER
Botox number of units: 200  Botox quantity: 1  Arrived at what location: [facility]  Botox at Correct Administering Location: yes  NDC number: 6068-7345-08  Lot number: [id number]  Expiration Date: 4/30/26  Appt notes indicate correct medication: yes

## 2023-11-01 ENCOUNTER — OFFICE VISIT (OUTPATIENT)
Dept: PHYSICAL THERAPY | Facility: CLINIC | Age: 63
End: 2023-11-01
Payer: COMMERCIAL

## 2023-11-01 DIAGNOSIS — M54.50 ACUTE LEFT-SIDED LOW BACK PAIN, UNSPECIFIED WHETHER SCIATICA PRESENT: ICD-10-CM

## 2023-11-01 DIAGNOSIS — M46.1 SACROILIITIS (HCC): Primary | ICD-10-CM

## 2023-11-01 PROCEDURE — 97110 THERAPEUTIC EXERCISES: CPT | Performed by: PHYSICAL THERAPIST

## 2023-11-01 PROCEDURE — 97112 NEUROMUSCULAR REEDUCATION: CPT | Performed by: PHYSICAL THERAPIST

## 2023-11-01 NOTE — PROGRESS NOTES
Daily Note     Today's date: 2023  Patient name: Iman Gregory  : 1960  MRN: [id number]  Referring provider: GWEN Mosquera  Dx:   Encounter Diagnosis     ICD-10-CM    1. Sacroiliitis ([address])  M46.1       2. Acute left-sided low back pain, unspecified whether sciatica present  M54.50                      Subjective: patient continues to report feeling good - pleased with her overall progress. Objective: See treatment diary below      Assessment: Tolerated treatment well -   she has firm  of her HEP to date. Added weight without issue today. Plan: Continue per plan of care. Progress treatment as tolerated. Precautions: Left Sacroilitis,  LBP      [email or web address]  Access Code: [email or web address]    POC expires Unit limit Auth Expiration date PT/OT + Visit Limit?    3 na 52 visits                           Visit/Unit Tracking  AUTH Status:  Date 10/19 10/25 10/26 11/1           na Used 1 2 3 4            Remaining  48 52 46 39                       Manuals 10/19 10/25 10/26 11/1                                                             Neuro Re-Ed                          HL TA Marching  20x  20x 1# x 20         Bridging w/ hip add 3s x 20 3s x 20 :03  20x 3s x 20         HL TA w/ roll  5s x 20  :05  20x 5s x 20          HL TB Hip abd Gtb x 20 Btb x 20 BTB  20x Btb x 20         TB clamshells Gtb x 20  Btb x 20  BTB  20x Btb x 20          Seated physioball TB rows/ ext/ lpd  Gtb x 20 ea  GTB  20x each Dbl foam x 20 ea          HL Physioball ktc  Opb x 20    OPB  20x OPB 1# x 20                                                 Ther Ex             bike  10 min  10' 10'          Fig 4 ir/er stretch 20s x 3            Hss w/ strap  20s x 3 bilat :20  3x each 20s x 3 ea                                    Step ups f/l   6" x 20 ea  6"  20x each 8" x 20 ea                      Prone gs  3s x 20  :03  20x 3s x 20         Prone hip ext  10 x 2  10x2 1#  10 x 2         Prone hip ext w/ knee flexed  10 x 2  10x2 1# 10 x 2         Prone quad stretch  20s x 3 bilat :20  3x each 20s x 3 ea          90/90 stand hip abd  5s x 20  :05  20x 5s x 20                       TB side stepping  Gtb 30' x 3 GTB  30'x3 Gtb x  30' x 3         STS box  20x  20x 5# x 20         TB monster walks    Gtb x 3 30'                                                                                        Ther Activity                                       Gait Training                                       Modalities

## 2023-11-02 ENCOUNTER — HOSPITAL ENCOUNTER (OUTPATIENT)
Age: 63
Discharge: HOME | End: 2023-11-02
Payer: COMMERCIAL

## 2023-11-02 DIAGNOSIS — Z78.0 POSTMENOPAUSAL: ICD-10-CM

## 2023-11-02 PROCEDURE — 77080 DXA BONE DENSITY AXIAL: CPT

## 2023-11-03 ENCOUNTER — OFFICE VISIT (OUTPATIENT)
Dept: PHYSICAL THERAPY | Facility: CLINIC | Age: 63
End: 2023-11-03
Payer: COMMERCIAL

## 2023-11-03 DIAGNOSIS — M46.1 SACROILIITIS (HCC): Primary | ICD-10-CM

## 2023-11-03 DIAGNOSIS — M54.50 ACUTE LEFT-SIDED LOW BACK PAIN, UNSPECIFIED WHETHER SCIATICA PRESENT: ICD-10-CM

## 2023-11-03 PROCEDURE — 97110 THERAPEUTIC EXERCISES: CPT

## 2023-11-03 PROCEDURE — 97112 NEUROMUSCULAR REEDUCATION: CPT

## 2023-11-03 NOTE — PROGRESS NOTES
Daily Note     Today's date: 11/3/2023  Patient name: Monique Pozo  : 1960  MRN: [id number]  Referring provider: GWEN Gerardo  Dx:   Encounter Diagnosis     ICD-10-CM    1. Sacroiliitis ([address])  M46.1       2. Acute left-sided low back pain, unspecified whether sciatica present  M54.50                      Subjective: SPR=0/10. Objective: See treatment diary below      Assessment: Tolerated treatment  without restrictions or reported exacerbation of LBP. Patient continues to report improved ability to perform ADL's and strength/mobility gains. . Patient demonstrated fatigue post treatment, exhibited good technique with therapeutic exercises, and would benefit from continued PT      Plan: Continue per plan of care. Progress treatment as tolerated. Precautions: Left Sacroilitis,  LBP      [email or web address]  Access Code: [email or web address]    POC expires Unit limit Auth Expiration date PT/OT + Visit Limit?    3 na 49 visits                           Visit/Unit Tracking  AUTH Status:  Date 10/19 10/25 10/26 11/1 11/3          na Used 1 2 3 4            Remaining  48 52 46 39                       Manuals 10/19 10/25 10/26 11/1 11/3                                                            Neuro Re-Ed                          HL TA Marching  20x  20x 1# x 20 1#  20x        Bridging w/ hip add 3s x 20 3s x 20 :03  20x 3s x 20 :03  20x        HL TA w/ roll  5s x 20  :05  20x 5s x 20  :05  20x         HL TB Hip abd Gtb x 20 Btb x 20 BTB  20x Btb x 20 BTB  20x        TB clamshells Gtb x 20  Btb x 20  BTB  20x Btb x 20  B  BTB  20x        Seated physioball TB rows/ ext/ lpd  Gtb x 20 ea  GTB  20x each Dbl foam x 20 ea  Doubled foam  Standing-no PB  B  20x each        HL Physioball ktc  Opb x 20    OPB  20x OPB 1# x 20  OPB  1#  20x                                               Ther Ex             bike  10 min  10' 10'  10'        Fig 4 ir/er stretch 20s x 3            Hss w/ strap  20s x 3 bilat :20  3x each 20s x 3 ea  B  :20  3x                                  Step ups f/l   6" x 20 ea  6"  20x each 8" x 20 ea 8"   B  (Biodex)  20x each                     Prone gs  3s x 20  :03  20x 3s x 20 :03  20x        Prone hip ext  10 x 2  10x2 1#  10 x 2 B  1#  20x        Prone hip ext w/ knee flexed  10 x 2  10x2 1# 10 x 2 B  1#  20x         Prone quad stretch  20s x 3 bilat :20  3x each 20s x 3 ea  B  :20  3x         90/90 stand hip abd  5s x 20  :05  20x 5s x 20  :05  20x                     TB side stepping  Gtb 30' x 3 GTB  30'x3 Gtb x  30' x 3 GTB  30'x3        STS box  20x  20x 5# x 20 5#  20x        TB monster walks    Gtb x 3 30'  GTB  30'x3                                                                                      Ther Activity                                       Gait Training                                       Modalities

## 2023-11-06 ENCOUNTER — PROCEDURE VISIT (OUTPATIENT)
Dept: NEUROLOGY | Facility: CLINIC | Age: 63
End: 2023-11-06
Payer: COMMERCIAL

## 2023-11-06 VITALS
HEART RATE: 60 BPM | TEMPERATURE: 98.6 F | WEIGHT: 207 LBS | BODY MASS INDEX: 38.09 KG/M2 | HEIGHT: 62 IN | DIASTOLIC BLOOD PRESSURE: 68 MMHG | SYSTOLIC BLOOD PRESSURE: 135 MMHG

## 2023-11-06 DIAGNOSIS — G43.709 CHRONIC MIGRAINE WITHOUT AURA WITHOUT STATUS MIGRAINOSUS, NOT INTRACTABLE: Primary | ICD-10-CM

## 2023-11-06 PROCEDURE — 64615 CHEMODENERV MUSC MIGRAINE: CPT | Performed by: PHYSICIAN ASSISTANT

## 2023-11-06 NOTE — PROGRESS NOTES
Universal Protocol   Consent: Verbal consent obtained. Written consent obtained.   Risks and benefits: risks, benefits and alternatives were discussed  Consent given by: patient  Patient understanding: patient states understanding of the procedure being performed  Patient consent: the patient's understanding of the procedure matches consent given  Procedure consent: procedure consent matches procedure scheduled      Chemodenervation     Date/Time  11/6/2023 11:00 AM     Performed by  April Wright PA-C   Authorized by  April Wright PA-C     Pre-procedure details      Prepped With: Alcohol     Procedure details      Position:  Upright   Botox      Botox Type:  Type A    Brand:  Botox    mL's of Botulinum Toxin:  200    Final Concentration per CC:  100 units    Needle Gauge:  30 G 2.5 inch   Procedures      Botox Procedures: chronic headache      Indications: migraines     Injection Location      Head / Face:  L superior trapezius, R superior trapezius, L superior cervical paraspinal, R superior cervical paraspinal, L , R , procerus, L temporalis, R temporalis, R frontalis, L frontalis, R medial occipitalis and L medial occipitalis    L  injection amount:  5 unit(s)    R  injection amount:  5 unit(s)    L lateral frontalis:  5 unit(s)    R lateral frontalis:  5 unit(s)    L medial frontalis:  5 unit(s)    R medial frontalis:  5 unit(s)    L temporalis injection amount:  20 unit(s)    R temporalis injection amount:  20 unit(s)    Procerus injection amount:  5 unit(s)    L medial occipitalis injection amount:  15 unit(s)    R medial occipitalis injection amount:  15 unit(s)    L superior cervical paraspinal injection amount:  10 unit(s)    R superior cervical paraspinal injection amount:  10 unit(s)    L superior trapezius injection amount:  15 unit(s)    R superior trapezius injection amount:  15 unit(s)   Total Units      Total units used:  200    Total units discarded:  0 Post-procedure details      Chemodenervation:  Chronic migraine    Facial Nerve Location[de-identified]  Bilateral facial nerve    Patient tolerance of procedure: Tolerated well, no immediate complications   Comments       Extra units medically necessary in the L>R frontotemporal regions b/l- total 45. Blood pressure 135/68, pulse 60, temperature 98.6 °F (37 °C), temperature source Temporal, height 5' 2" (1.575 m), weight 93.9 kg (207 lb), not currently breastfeeding. Body mass index is 37.86 kg/m².

## 2023-11-08 ENCOUNTER — OFFICE VISIT (OUTPATIENT)
Dept: PHYSICAL THERAPY | Facility: CLINIC | Age: 63
End: 2023-11-08
Payer: COMMERCIAL

## 2023-11-08 DIAGNOSIS — M46.1 SACROILIITIS (HCC): Primary | ICD-10-CM

## 2023-11-08 DIAGNOSIS — M54.50 ACUTE LEFT-SIDED LOW BACK PAIN, UNSPECIFIED WHETHER SCIATICA PRESENT: ICD-10-CM

## 2023-11-08 PROCEDURE — 97110 THERAPEUTIC EXERCISES: CPT

## 2023-11-08 PROCEDURE — 97112 NEUROMUSCULAR REEDUCATION: CPT

## 2023-11-08 NOTE — PROGRESS NOTES
Daily Note     Today's date: 2023  Patient name: Nathan Crane  : 1960  MRN: [id number]  Referring provider: GWEN Liang  Dx:   Encounter Diagnosis     ICD-10-CM    1. Sacroiliitis ([address])  M46.1       2. Acute left-sided low back pain, unspecified whether sciatica present  M54.50                      Subjective: Patient continues to report gains in functional mobility and strength. Patient noted daily  compliance with current HEP and self walking program.      Objective: See treatment diary below      Assessment: Tolerated treatment well. Patient demonstrated fatigue post treatment, exhibited good technique with therapeutic exercises, and would benefit from continued PT      Plan: Continue per plan of care. Precautions: Left Sacroilitis,  LBP      [email or web address]  Access Code: [email or web address]    POC expires Unit limit Auth Expiration date PT/OT + Visit Limit?    3 na 49 visits                           Visit/Unit Tracking  AUTH Status:  Date 10/19 10/25 10/26 11/1 11/3          na Used 1 2 3 4            Remaining  48 52 46 39                       Manuals 10/19 10/25 10/26 11/1 11/3 11/8                                                           Neuro Re-Ed                          HL TA Marching  20x  20x 1# x 20 1#  20x 1.5#  20x       Bridging w/ hip add 3s x 20 3s x 20 :03  20x 3s x 20 :03  20x :03  20x       HL TA w/ roll  5s x 20  :05  20x 5s x 20  :05  20x  :05  20x       HL TB Hip abd Gtb x 20 Btb x 20 BTB  20x Btb x 20 BTB  20x BTB  20x       TB clamshells Gtb x 20  Btb x 20  BTB  20x Btb x 20  B  BTB  20x B  BTB  20x       Seated physioball TB rows/ ext/ lpd  Gtb x 20 ea  GTB  20x each Dbl foam x 20 ea  Doubled foam  Standing-no PB  B  20x each doubled foam  Standing  No PB  GTB  20x each       HL Physioball ktc  Opb x 20    OPB  20x OPB 1# x 20  OPB  1#  20x OPB  1.5#  20x                                              Ther Ex             bike  10 min  10' 10'  10' 10' Fig 4 ir/er stretch 20s x 3            Hss w/ strap  20s x 3 bilat :20  3x each 20s x 3 ea  B  :20  3x B  :20  3x                                 Step ups f/l   6" x 20 ea  6"  20x each 8" x 20 ea 8"   B  (Biodex)  20x each 8" (Biodex)  20x each                    Prone gs  3s x 20  :03  20x 3s x 20 :03  20x :03  20x       Prone hip ext  10 x 2  10x2 1#  10 x 2 B  1#  20x B  1.5#  20x       Prone hip ext w/ knee flexed  10 x 2  10x2 1# 10 x 2 B  1#  20x  B  1.5#  20x       Prone quad stretch  20s x 3 bilat :20  3x each 20s x 3 ea  B  :20  3x  B  :20  3x       90/90 stand hip abd  5s x 20  :05  20x 5s x 20  :05  20x :05  20x                    TB side stepping  Gtb 30' x 3 GTB  30'x3 Gtb x  30' x 3 GTB  30'x3 GTB  1.5#  30'x3       STS box  20x  20x 5# x 20 5#  20x 20"  5#  20x       TB monster walks    Gtb x 3 30'  GTB  30'x3 GTB  1.5#  30'x3                                                                                     Ther Activity                                       Gait Training                                       Modalities

## 2023-11-10 ENCOUNTER — APPOINTMENT (OUTPATIENT)
Dept: PHYSICAL THERAPY | Facility: CLINIC | Age: 63
End: 2023-11-10
Payer: COMMERCIAL

## 2023-11-14 ENCOUNTER — TELEPHONE (OUTPATIENT)
Dept: RADIOLOGY | Facility: CLINIC | Age: 63
End: 2023-11-14

## 2023-11-14 NOTE — TELEPHONE ENCOUNTER
Called patient to let her know that Dr. Estefania Wayne will be in a little later tomorrow . She is okay to wait.

## 2023-11-15 ENCOUNTER — APPOINTMENT (OUTPATIENT)
Dept: PHYSICAL THERAPY | Facility: CLINIC | Age: 63
End: 2023-11-15
Payer: COMMERCIAL

## 2023-11-15 ENCOUNTER — HOSPITAL ENCOUNTER (OUTPATIENT)
Dept: RADIOLOGY | Facility: CLINIC | Age: 63
Discharge: HOME | End: 2023-11-15
Payer: COMMERCIAL

## 2023-11-15 ENCOUNTER — TELEPHONE (OUTPATIENT)
Dept: NEPHROLOGY | Facility: CLINIC | Age: 63
End: 2023-11-15

## 2023-11-15 VITALS
SYSTOLIC BLOOD PRESSURE: 127 MMHG | TEMPERATURE: 97.7 F | OXYGEN SATURATION: 95 % | RESPIRATION RATE: 20 BRPM | HEART RATE: 55 BPM | DIASTOLIC BLOOD PRESSURE: 76 MMHG

## 2023-11-15 DIAGNOSIS — M54.16 LUMBAR RADICULOPATHY: ICD-10-CM

## 2023-11-15 PROCEDURE — 64483 NJX AA&/STRD TFRM EPI L/S 1: CPT | Performed by: STUDENT IN AN ORGANIZED HEALTH CARE EDUCATION/TRAINING PROGRAM

## 2023-11-15 PROCEDURE — 64484 NJX AA&/STRD TFRM EPI L/S EA: CPT | Performed by: STUDENT IN AN ORGANIZED HEALTH CARE EDUCATION/TRAINING PROGRAM

## 2023-11-15 RX ORDER — BUPIVACAINE HCL/PF 2.5 MG/ML
2 VIAL (ML) INJECTION ONCE
Status: COMPLETED | OUTPATIENT
Start: 2023-11-15 | End: 2023-11-15

## 2023-11-15 RX ORDER — PAPAVERINE HCL 150 MG
20 CAPSULE, EXTENDED RELEASE ORAL ONCE
Status: COMPLETED | OUTPATIENT
Start: 2023-11-15 | End: 2023-11-15

## 2023-11-15 RX ADMIN — Medication 2 ML: at 09:13

## 2023-11-15 RX ADMIN — IOHEXOL 1 ML: 300 INJECTION, SOLUTION INTRAVENOUS at 09:12

## 2023-11-15 RX ADMIN — DEXAMETHASONE SODIUM PHOSPHATE 20 MG: 10 INJECTION, SOLUTION INTRAMUSCULAR; INTRAVENOUS at 09:13

## 2023-11-15 NOTE — INTERVAL H&P NOTE
Update: (This section must be completed if the H&P was completed greater than 24 hrs to procedure or admission)    H&P reviewed. After examining the patient, I find no changed to the H&P since it had been written. Patient re-evaluated.  Accept as history and physical.    Merari Gurrola MD/November 15, 2023/9:01 AM

## 2023-11-15 NOTE — DISCHARGE INSTR - LAB
Epidural Steroid Injection   WHAT YOU NEED TO KNOW:   An epidural steroid injection (TREVIN) is a procedure to inject steroid medicine into the epidural space. The epidural space is between your spinal cord and vertebrae. Steroids reduce inflammation and fluid buildup in your spine that may be causing pain. You may be given pain medicine along with the steroids. ACTIVITY  Do not drive or operate machinery today. No strenuous activity today - bending, lifting, etc.  You may resume normal activites starting tomorrow - start slowly and as tolerated. You may shower today, but no tub baths or hot tubs. You may have numbness for several hours from the local anesthetic. Please use caution and common sense, especially with weight-bearing activities. CARE OF THE INJECTION SITE  If you have soreness or pain, apply ice to the area today (20 minutes on/20 minutes off). Starting tomorrow, you may use warm, moist heat or ice if needed. You may have an increase or change in your discomfort for 36-48 hours after your treatment. Apply ice and continue with any pain medication you have been prescribed. Notify the [facility] if you have any of the following: redness, drainage, swelling, headache, stiff neck or fever above 100°F.    SPECIAL INSTRUCTIONS  Our office will contact you in approximately 7 days for a progress report. MEDICATIONS  Continue to take all routine medications. Our office may have instructed you to hold some medications. As no general anesthesia was used in today's procedure, you should not experience any side effects related to anesthesia. If you are diabetic, the steroids used in today's injection may temporarily increase your blood sugar levels after the first few days after your injection. Please keep a close eye on your sugars and alert the doctor who manages your diabetes if your sugars are significantly high from your baseline or you are symptomatic.      If you have a problem specifically related to your procedure, please call our office at [phone]. Problems not related to your procedure should be directed to your primary care physician.

## 2023-11-15 NOTE — TELEPHONE ENCOUNTER
I called and spoke to the patient and schedule her 12 month nephrology follow up appointment with Dr. Joan Blanton. The patient understood and was okay with the day and time of her next appointment.

## 2023-11-17 ENCOUNTER — APPOINTMENT (OUTPATIENT)
Dept: PHYSICAL THERAPY | Facility: CLINIC | Age: 63
End: 2023-11-17
Payer: COMMERCIAL

## 2023-11-20 ENCOUNTER — OFFICE VISIT (OUTPATIENT)
Dept: PHYSICAL THERAPY | Facility: CLINIC | Age: 63
End: 2023-11-20
Payer: COMMERCIAL

## 2023-11-20 DIAGNOSIS — M54.50 ACUTE LEFT-SIDED LOW BACK PAIN, UNSPECIFIED WHETHER SCIATICA PRESENT: ICD-10-CM

## 2023-11-20 DIAGNOSIS — M46.1 SACROILIITIS (HCC): Primary | ICD-10-CM

## 2023-11-20 PROCEDURE — 97112 NEUROMUSCULAR REEDUCATION: CPT | Performed by: PHYSICAL THERAPIST

## 2023-11-20 PROCEDURE — 97110 THERAPEUTIC EXERCISES: CPT | Performed by: PHYSICAL THERAPIST

## 2023-11-20 NOTE — PROGRESS NOTES
Daily Note     Today's date: 2023  Patient name: Christy Paz  : 1960  MRN: [id number]  Referring provider: GWEN Paulino  Dx:   Encounter Diagnosis     ICD-10-CM    1. Sacroiliitis ([address])  M46.1       2. Acute left-sided low back pain, unspecified whether sciatica present  M54.50                      Subjective: Patient continues to report feeling good      Objective: See treatment diary below      Assessment: Tolerated treatment well. Patient is demonstrating good knowledge of HEP   increased weight tolerated without issue. Plan: Continue per plan of care. Precautions: Left Sacroilitis,  LBP      [email or web address]  Access Code: [email or web address]    POC expires Unit limit Auth Expiration date PT/OT + Visit Limit?    3 na 52 visits                           Visit/Unit Tracking  AUTH Status:  Date 10/19 10/25 10/26 11/1 11/3 11/8 11/20        na Used 1 2 3 4 5 6 7         Remaining  48 47 46 39 44 37 42                    Manuals 10/19 10/25 10/26 11/1 11/3 11/8 11/20                                                          Neuro Re-Ed                          HL TA Marching  20x  20x 1# x 20 1#  20x 1.5#  20x 2# x 20      Bridging w/ hip add 3s x 20 3s x 20 :03  20x 3s x 20 :03  20x :03  20x 3s x 20      HL TA w/ roll  5s x 20  :05  20x 5s x 20  :05  20x  :05  20x 5s x 20      HL TB Hip abd Gtb x 20 Btb x 20 BTB  20x Btb x 20 BTB  20x BTB  20x Btb x 20      TB clamshells Gtb x 20  Btb x 20  BTB  20x Btb x 20  B  BTB  20x B  BTB  20x Btb x 20       Seated physioball TB rows/ ext/ lpd  Gtb x 20 ea  GTB  20x each Dbl foam x 20 ea  Doubled foam  Standing-no PB  B  20x each doubled foam  Standing  No PB  GTB  20x each Gtb x 20 ea on BOSU      HL Physioball ktc  Opb x 20    OPB  20x OPB 1# x 20  OPB  1#  20x OPB  1.5#  20x 2# x 20                                              Ther Ex             bike  10 min  10' 10'  10' 10' 10'       Fig 4 ir/er stretch 20s x 3            Hss w/ strap 20s x 3 bilat :20  3x each 20s x 3 ea  B  :20  3x B  :20  3x 20s x 3                                Step ups f/l   6" x 20 ea  6"  20x each 8" x 20 ea 8"   B  (Biodex)  20x each 8" (Biodex)  20x each 8" x 20                    Prone gs  3s x 20  :03  20x 3s x 20 :03  20x :03  20x 3s x 20      Prone hip ext  10 x 2  10x2 1#  10 x 2 B  1#  20x B  1.5#  20x 2# x 20      Prone hip ext w/ knee flexed  10 x 2  10x2 1# 10 x 2 B  1#  20x  B  1.5#  20x 2# x 20      Prone quad stretch  20s x 3 bilat :20  3x each 20s x 3 ea  B  :20  3x  B  :20  3x 20s x 3      90/90 stand hip abd  5s x 20  :05  20x 5s x 20  :05  20x :05  20x 2#  5s x 20                   TB side stepping  Gtb 30' x 3 GTB  30'x3 Gtb x  30' x 3 GTB  30'x3 GTB  1.5#  30'x3 Btb x 3 30'  2#      STS box  20x  20x 5# x 20 5#  20x 20"  5#  20x 20" 5# x 20 foam      TB monster walks    Gtb x 3 30'  GTB  30'x3 GTB  1.5#  30'x3 Btb 2# x 3  30'                                                                                     Ther Activity                                       Gait Training                                       Modalities

## 2023-11-22 ENCOUNTER — OFFICE VISIT (OUTPATIENT)
Dept: PHYSICAL THERAPY | Facility: CLINIC | Age: 63
End: 2023-11-22
Payer: COMMERCIAL

## 2023-11-22 DIAGNOSIS — M46.1 SACROILIITIS (HCC): Primary | ICD-10-CM

## 2023-11-22 DIAGNOSIS — M54.50 ACUTE LEFT-SIDED LOW BACK PAIN, UNSPECIFIED WHETHER SCIATICA PRESENT: ICD-10-CM

## 2023-11-22 PROCEDURE — 97110 THERAPEUTIC EXERCISES: CPT

## 2023-11-22 PROCEDURE — 97112 NEUROMUSCULAR REEDUCATION: CPT

## 2023-11-22 NOTE — PROGRESS NOTES
Daily Note     Today's date: 2023  Patient name: Willem Dhillon  : 1960  MRN: [id number]  Referring provider: GWEN Shah  Dx:   Encounter Diagnosis     ICD-10-CM    1. Sacroiliitis ([address])  M46.1       2. Acute left-sided low back pain, unspecified whether sciatica present  M54.50           Start Time: 1058          Subjective: SPR=0/10. Objective: See treatment diary below      Assessment: Tolerated treatment well, appearing challenged by last visits dynamic progressions and quadriceps fatigue. Patient demonstrated fatigue post treatment, exhibited good technique with therapeutic exercises, and would benefit from continued PT      Plan: Continue per plan of care. Precautions: Left Sacroilitis,  LBP      [email or web address]  Access Code: [email or web address]    POC expires Unit limit Auth Expiration date PT/OT + Visit Limit?    3 na 49 visits                           Visit/Unit Tracking  AUTH Status:  Date 10/19 10/25 10/26 11/1 11/3 11/8 11/20 11/22       na Used 1 2 3 4 5 6 7 8        Remaining  48 52 46 39 44 37 42 39                   Manuals 10/19 10/25 10/26 11/1 11/3 11/8 11/20 11/22                                                         Neuro Re-Ed                          HL TA Marching  20x  20x 1# x 20 1#  20x 1.5#  20x 2# x 20 2#  20x     Bridging w/ hip add 3s x 20 3s x 20 :03  20x 3s x 20 :03  20x :03  20x 3s x 20 :03  20x     HL TA w/ roll  5s x 20  :05  20x 5s x 20  :05  20x  :05  20x 5s x 20 :05  20x     HL TB Hip abd Gtb x 20 Btb x 20 BTB  20x Btb x 20 BTB  20x BTB  20x Btb x 20 BTB  20x     TB clamshells Gtb x 20  Btb x 20  BTB  20x Btb x 20  B  BTB  20x B  BTB  20x Btb x 20  BTB  20x     Seated physioball TB rows/ ext/ lpd  Gtb x 20 ea  GTB  20x each Dbl foam x 20 ea  Doubled foam  Standing-no PB  B  20x each doubled foam  Standing  No PB  GTB  20x each Gtb x 20 ea on BOSU GTB  20x each on BOSU     HL Physioball ktc  Opb x 20    OPB  20x OPB 1# x 20  OPB  1#  20x OPB  1.5#  20x 2# x 20  2#  20x                                             Ther Ex             bike  10 min  10' 10'  10' 10' 10'  10'     Fig 4 ir/er stretch 20s x 3            Hss w/ strap  20s x 3 bilat :20  3x each 20s x 3 ea  B  :20  3x B  :20  3x 20s x 3 :20  3x                               Step ups f/l   6" x 20 ea  6"  20x each 8" x 20 ea 8"   B  (Biodex)  20x each 8" (Biodex)  20x each 8" x 20  8"  20x each                  Prone gs  3s x 20  :03  20x 3s x 20 :03  20x :03  20x 3s x 20 :03  20x     Prone hip ext  10 x 2  10x2 1#  10 x 2 B  1#  20x B  1.5#  20x 2# x 20 2#  20x     Prone hip ext w/ knee flexed  10 x 2  10x2 1# 10 x 2 B  1#  20x  B  1.5#  20x 2# x 20 2#  20x     Prone quad stretch  20s x 3 bilat :20  3x each 20s x 3 ea  B  :20  3x  B  :20  3x 20s x 3 :20  3x     90/90 stand hip abd  5s x 20  :05  20x 5s x 20  :05  20x :05  20x 2#  5s x 20 2#  :05  20x                  TB side stepping  Gtb 30' x 3 GTB  30'x3 Gtb x  30' x 3 GTB  30'x3 GTB  1.5#  30'x3 Btb x 3 30'  2# BTB  2#  30'x3     STS box  20x  20x 5# x 20 5#  20x 20"  5#  20x 20" 5# x 20 foam 20"  Foam  5#  20x     TB monster walks    Gtb x 3 30'  GTB  30'x3 GTB  1.5#  30'x3 Btb 2# x 3  30'  BTB  2#  30'x3                                                                                   Ther Activity                                       Gait Training                                       Modalities

## 2023-11-27 ENCOUNTER — TELEPHONE (OUTPATIENT)
Dept: FAMILY MEDICINE CLINIC | Facility: CLINIC | Age: 63
End: 2023-11-27

## 2023-11-27 ENCOUNTER — OFFICE VISIT (OUTPATIENT)
Dept: PHYSICAL THERAPY | Facility: CLINIC | Age: 63
End: 2023-11-27
Payer: COMMERCIAL

## 2023-11-27 DIAGNOSIS — M46.1 SACROILIITIS (HCC): Primary | ICD-10-CM

## 2023-11-27 DIAGNOSIS — M54.50 ACUTE LEFT-SIDED LOW BACK PAIN, UNSPECIFIED WHETHER SCIATICA PRESENT: ICD-10-CM

## 2023-11-27 PROCEDURE — 97110 THERAPEUTIC EXERCISES: CPT | Performed by: PHYSICAL THERAPIST

## 2023-11-27 PROCEDURE — 97112 NEUROMUSCULAR REEDUCATION: CPT | Performed by: PHYSICAL THERAPIST

## 2023-11-27 NOTE — PROGRESS NOTES
Daily Note     Today's date: 2023  Patient name: Swetha Banerjee  : 1960  MRN: [id number]  Referring provider: GWEN Jarrett  Dx:   Encounter Diagnosis     ICD-10-CM    1. Sacroiliitis ([address])  M46.1       2. Acute left-sided low back pain, unspecified whether sciatica present  M54.50                      Subjective: SPR=0/10. Patient reports feeling good pre/ post session       Objective: See treatment diary below      Assessment: Tolerated treatment well    patient fell off bosu when attempting to adjust her feet - denies injury to any body part. She landed softly on her bottom, (-) head strike - report submitted in epic. Plan: Continue per plan of care. Precautions: Left Sacroilitis,  LBP      [email or web address]  Access Code: [email or web address]    POC expires Unit limit Auth Expiration date PT/OT + Visit Limit?    3 na 49 visits                           Visit/Unit Tracking  AUTH Status:  Date 10/19 10/25 10/26 11/1 11/3 11/8 11/20 11/22 11/27      na Used 1 2 3 4 5 6 7 8 9       Remaining  48 47 46 39 44 37 42 39 40                  Manuals 10/19 10/25 10/26 11/1 11/3 11/8 11/20 11/22 11/27                                                        Neuro Re-Ed                          HL TA Marching  20x  20x 1# x 20 1#  20x 1.5#  20x 2# x 20 2#  20x 2.5# x 20    Bridging w/ hip add 3s x 20 3s x 20 :03  20x 3s x 20 :03  20x :03  20x 3s x 20 :03  20x 3s x 20    HL TA w/ roll  5s x 20  :05  20x 5s x 20  :05  20x  :05  20x 5s x 20 :05  20x 5s x 20    HL TB Hip abd Gtb x 20 Btb x 20 BTB  20x Btb x 20 BTB  20x BTB  20x Btb x 20 BTB  20x Btb x 20    TB clamshells Gtb x 20  Btb x 20  BTB  20x Btb x 20  B  BTB  20x B  BTB  20x Btb x 20  BTB  20x Btb x 20     Seated physioball TB rows/ ext/ lpd  Gtb x 20 ea  GTB  20x each Dbl foam x 20 ea  Doubled foam  Standing-no PB  B  20x each doubled foam  Standing  No PB  GTB  20x each Gtb x 20 ea on BOSU GTB  20x each on BOSU Gtb x 20 ea bosu    HL Physioball ktc  Opb x 20    OPB  20x OPB 1# x 20  OPB  1#  20x OPB  1.5#  20x 2# x 20  2#  20x  2.5# x 20                                           Ther Ex             bike  10 min  10' 10'  10' 10' 10'  10' 10'     Fig 4 ir/er stretch 20s x 3            Hss w/ strap  20s x 3 bilat :20  3x each 20s x 3 ea  B  :20  3x B  :20  3x 20s x 3 :20  3x 20s x 3                              Step ups f/l   6" x 20 ea  6"  20x each 8" x 20 ea 8"   B  (Biodex)  20x each 8" (Biodex)  20x each 8" x 20  8"  20x each 8" 20x                 Prone gs  3s x 20  :03  20x 3s x 20 :03  20x :03  20x 3s x 20 :03  20x 3s x 20    Prone hip ext  10 x 2  10x2 1#  10 x 2 B  1#  20x B  1.5#  20x 2# x 20 2#  20x 2.5# x 20    Prone hip ext w/ knee flexed  10 x 2  10x2 1# 10 x 2 B  1#  20x  B  1.5#  20x 2# x 20 2#  20x 2.5# x 20    Prone quad stretch  20s x 3 bilat :20  3x each 20s x 3 ea  B  :20  3x  B  :20  3x 20s x 3 :20  3x 20s x 3    90/90 stand hip abd  5s x 20  :05  20x 5s x 20  :05  20x :05  20x 2#  5s x 20 2#  :05  20x 2.5# x 20  5s                 TB side stepping  Gtb 30' x 3 GTB  30'x3 Gtb x  30' x 3 GTB  30'x3 GTB  1.5#  30'x3 Btb x 3 30'  2# BTB  2#  30'x3 Btb 2.5# x 3 30'    STS box  20x  20x 5# x 20 5#  20x 20"  5#  20x 20" 5# x 20 foam 20"  Foam  5#  20x 20" x 20  5# foam     TB monster walks    Gtb x 3 30'  GTB  30'x3 GTB  1.5#  30'x3 Btb 2# x 3  30'  BTB  2#  30'x3 Btb  2.5# x 20                                                                                   Ther Activity                                       Gait Training                                       Modalities

## 2023-11-27 NOTE — TELEPHONE ENCOUNTER
Patient returned call and was made aware of  Jazmin's message.   She did schedule an in office visit on 12/26/23 @ 7:40 am to discuss treatment with Jazmin.  thanks

## 2023-11-29 ENCOUNTER — OFFICE VISIT (OUTPATIENT)
Dept: PHYSICAL THERAPY | Facility: CLINIC | Age: 63
End: 2023-11-29
Payer: COMMERCIAL

## 2023-11-29 DIAGNOSIS — M46.1 SACROILIITIS (HCC): Primary | ICD-10-CM

## 2023-11-29 DIAGNOSIS — M54.50 ACUTE LEFT-SIDED LOW BACK PAIN, UNSPECIFIED WHETHER SCIATICA PRESENT: ICD-10-CM

## 2023-11-29 PROCEDURE — 97110 THERAPEUTIC EXERCISES: CPT

## 2023-11-29 PROCEDURE — 97112 NEUROMUSCULAR REEDUCATION: CPT

## 2023-11-29 NOTE — PROGRESS NOTES
Daily Note     Today's date: 2023  Patient name: Kang Rod  : 1960  MRN: [id number]  Referring provider: GWEN Morales  Dx:   Encounter Diagnosis     ICD-10-CM    1. Sacroiliitis ([address])  M46.1       2. Acute left-sided low back pain, unspecified whether sciatica present  M54.50                      Subjective: Patient denied aggravation of symptoms with TE performance today. Objective: See treatment diary below      Assessment: Tolerated treatment well. Patient demonstrated fatigue post treatment and exhibited good technique with therapeutic exercises      Plan: Continue per plan of care. Precautions: Left Sacroilitis,  LBP      [email or web address]  Access Code: [email or web address]    POC expires Unit limit Auth Expiration date PT/OT + Visit Limit?    3 na 49 visits                           Visit/Unit Tracking  AUTH Status:  Date 10/19 10/25 10/26 11/1 11/3 11/8 11/20 11/22 11/27 11/29     na Used 1 2 3 4 5 6 7 8 9 10      Remaining  48 47 46 39 44 37 42 41 40 44                 Manuals 10/19 10/25 10/26 11/1 11/3 11/8 11/20 11/22 11/27 11/20                                          Neuro Re-Ed                          HL TA Marching  20x  20x 1# x 20 1#  20x 1.5#  20x 2# x 20 2#  20x 2.5# x 20 2.5#  20x   Bridging w/ hip add 3s x 20 3s x 20 :03  20x 3s x 20 :03  20x :03  20x 3s x 20 :03  20x 3s x 20 :03  20x   HL TA w/ roll  5s x 20  :05  20x 5s x 20  :05  20x  :05  20x 5s x 20 :05  20x 5s x 20 :05  20x   HL TB Hip abd Gtb x 20 Btb x 20 BTB  20x Btb x 20 BTB  20x BTB  20x Btb x 20 BTB  20x Btb x 20 BTB  20x   TB clamshells Gtb x 20  Btb x 20  BTB  20x Btb x 20  B  BTB  20x B  BTB  20x Btb x 20  BTB  20x Btb x 20  BTB  20x   Seated physioball TB rows/ ext/ lpd  Gtb x 20 ea  GTB  20x each Dbl foam x 20 ea  Doubled foam  Standing-no PB  B  20x each doubled foam  Standing  No PB  GTB  20x each Gtb x 20 ea on BOSU GTB  20x each on BOSU Gtb x 20 ea bosu GTB  BOSU  20x each   HL Physioball ktc  Opb x 20    OPB  20x OPB 1# x 20  OPB  1#  20x OPB  1.5#  20x 2# x 20  2#  20x  2.5# x 20 2.5#  20x                                          Ther Ex             bike  10 min  10' 10'  10' 10' 10'  10' 10'  10'   Fig 4 ir/er stretch 20s x 3            Hss w/ strap  20s x 3 bilat :20  3x each 20s x 3 ea  B  :20  3x B  :20  3x 20s x 3 :20  3x 20s x 3 :20  3x                             Step ups f/l   6" x 20 ea  6"  20x each 8" x 20 ea 8"   B  (Biodex)  20x each 8" (Biodex)  20x each 8" x 20  8"  20x each 8" 20x 8"  20x                Prone gs  3s x 20  :03  20x 3s x 20 :03  20x :03  20x 3s x 20 :03  20x 3s x 20 :03  20x   Prone hip ext  10 x 2  10x2 1#  10 x 2 B  1#  20x B  1.5#  20x 2# x 20 2#  20x 2.5# x 20 2.5#  20x   Prone hip ext w/ knee flexed  10 x 2  10x2 1# 10 x 2 B  1#  20x  B  1.5#  20x 2# x 20 2#  20x 2.5# x 20 2.5#  20x   Prone quad stretch  20s x 3 bilat :20  3x each 20s x 3 ea  B  :20  3x  B  :20  3x 20s x 3 :20  3x 20s x 3 :20  3x   90/90 stand hip abd  5s x 20  :05  20x 5s x 20  :05  20x :05  20x 2#  5s x 20 2#  :05  20x 2.5# x 20  5s 2.5#  :05  20x each                TB side stepping  Gtb 30' x 3 GTB  30'x3 Gtb x  30' x 3 GTB  30'x3 GTB  1.5#  30'x3 Btb x 3 30'  2# BTB  2#  30'x3 Btb 2.5# x 3 30' BTB  2.5#  30'x3   STS box  20x  20x 5# x 20 5#  20x 20"  5#  20x 20" 5# x 20 foam 20"  Foam  5#  20x 20" x 20  5# foam  20"  5# DB  Foam  20x   TB monster walks    Gtb x 3 30'  GTB  30'x3 GTB  1.5#  30'x3 Btb 2# x 3  30'  BTB  2#  30'x3 Btb  2.5# x 20  BTB  2.5#  20x                                                                                 Ther Activity                                       Gait Training                                       Modalities

## 2023-12-01 ENCOUNTER — OFFICE VISIT (OUTPATIENT)
Age: 63
End: 2023-12-01
Payer: COMMERCIAL

## 2023-12-01 ENCOUNTER — TELEPHONE (OUTPATIENT)
Age: 63
End: 2023-12-01

## 2023-12-01 VITALS
OXYGEN SATURATION: 93 % | DIASTOLIC BLOOD PRESSURE: 80 MMHG | BODY MASS INDEX: 38.94 KG/M2 | HEIGHT: 62 IN | HEART RATE: 64 BPM | TEMPERATURE: 97.6 F | WEIGHT: 211.6 LBS | SYSTOLIC BLOOD PRESSURE: 114 MMHG

## 2023-12-01 DIAGNOSIS — E66.9 OBESITY (BMI 30-39.9): ICD-10-CM

## 2023-12-01 DIAGNOSIS — J45.40 MODERATE PERSISTENT ASTHMA WITHOUT COMPLICATION: ICD-10-CM

## 2023-12-01 DIAGNOSIS — G47.33 OSA ON CPAP: Primary | ICD-10-CM

## 2023-12-01 PROCEDURE — 99214 OFFICE O/P EST MOD 30 MIN: CPT | Performed by: INTERNAL MEDICINE

## 2023-12-01 NOTE — PROGRESS NOTES
Assessment/Plan:   Diagnoses and all orders for this visit:    CATHRYN on CPAP  -     PAP DME Resupply/Reorder    Moderate persistent asthma without complication    Obesity (BMI 30-39. 9)        PFTs normal spirometry normal lung volumes and moderately decreased DLCO  Discussed with the patient to continue with albuterol MDI 2 puffs 4 times daily as needed only. CATHRYN on CPAP continue with current settings still she did bring the CPAP machine to the office it still shows that she is on the 6 cm. I could only get the residual AHI for last night with residual of 4.1 but I have sent a message to [employer] to get the download compliance for the past 90 days I believe she has to take the chip to the company which she patient states she would take to the company today's if the residual AHI on an average is still high I will bump up the pressure to 7 cm at the base we will just leave it at the 6 cm. We will let and patient know  Cleaning of the supplies and change of PAP supplies discussed  Recommend weight loss  Caution against driving when sleepy. Up-to-date on her vaccinations  Follow-up in 1 year or earlier as needed  No follow-ups on file. All questions are answered to the patient's satisfaction and understanding. She verbalizes understanding. She is encouraged to call with any further questions or concerns. Portions of the record may have been created with voice recognition software. Occasional wrong word or "sound a like" substitutions may have occurred due to the inherent limitations of voice recognition software. Read the chart carefully and recognize, using context, where substitutions have occurred.     Electronically Signed by Sandra Teixeira MD    ______________________________________________________________________    Chief Complaint:   Chief Complaint   Patient presents with    Follow-up    Sleep Apnea       Patient ID: Lisa Cervantes is a 70 y.o. y.o. female has a past medical history of Anxiety, Arthritis, Attention and concentration deficit, Blurred vision, Chronic pain, Chronic pain syndrome (01/20/2016), CPAP (continuous positive airway pressure) dependence, CTS (carpal tunnel syndrome) (> 6 years), Depression, Difficulty walking (> 6 years), Diplopia, Dyspepsia, Gastric ulcer, Gastritis, GERD (gastroesophageal reflux disease), Head injury (> 25 years), Headache(784.0), Headache, tension-type (> 1 year), History of transfusion (2005), Hypertension, Insomnia, Irritable bowel syndrome, Memory loss, Migraines, Osteopenia, Peripheral neuropathy (3/14/2012), 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 12/1/2023  Patient presents today for follow-up visit. Patient with history of obstructive sleep apnea on CPAP did get a replacement machine after the recall, has been consistently using it last visit when seen her CPAP pressure was bumped up from 6 to 7 cm given the residual AHI was still high at that time. Not sure if the [employer] company has changed the pressure yet. She states she has been feeling okay and does feel well rested when she is up in the morning. With regards to her asthma she states she is doing very well not having any shortness of breath currently not having the need to use her rescue inhaler she states she has been going for physical therapy and doing well    primary symptoms  Pertinent negatives include no chest pain, fever, headaches, myalgias or sore throat. Review of Systems   Constitutional: Negative. Negative for appetite change and fever. HENT: Negative. Negative for ear pain, postnasal drip, rhinorrhea, sneezing, sore throat and trouble swallowing. Eyes: Negative.     Respiratory: Negative. Cardiovascular: Negative. Negative for chest pain. Gastrointestinal: Negative. Endocrine: Negative. Genitourinary: Negative. Musculoskeletal: Negative. Negative for myalgias. Allergic/Immunologic: Negative. Neurological: Negative. Negative for headaches. Hematological: Negative. Psychiatric/Behavioral: Negative. Smoking history: She reports that she has never smoked.  She has never used smokeless tobacco.    The following portions of the patient's history were reviewed and updated as appropriate: allergies, current medications, past family history, past medical history, past social history, past surgical history, and problem list.    Immunization History   Administered Date(s) Administered    COVID-19 [employer] ([employer]) vaccine 0.5 mL 03/09/2021    COVID-19 PFIZER VACCINE 0.3 ML IM 10/26/2021, 04/08/2022    INFLUENZA 10/26/2007, 01/25/2010, 09/20/2011, 09/21/2012, 09/13/2013, 10/01/2014, 08/31/2015, 12/05/2016, 08/20/2018, 10/11/2022    Influenza, injectable, quadrivalent, preservative free 0.5 mL 10/05/2020, 10/12/2023    Influenza, recombinant, quadrivalent,injectable, preservative free 08/20/2018, 09/09/2019, 10/11/2022    Influenza, seasonal, injectable 10/26/2007, 01/25/2010, 09/20/2011, 09/21/2012, 09/13/2013, 10/01/2014, 08/31/2015    Influenza, seasonal, injectable, preservative free 12/05/2016    Pneumococcal Polysaccharide PPV23 10/26/2007     Current Outpatient Medications   Medication Sig Dispense Refill    albuterol (Ventolin HFA) 90 mcg/act inhaler Inhale 2 puffs every 6 (six) hours as needed for wheezing 18 g 0    Botox 200 units SOLR       busPIRone (BUSPAR) 10 mg tablet Take 1 tablet (10 mg total) by mouth 2 (two) times a day 180 tablet 1    Cholecalciferol 25 MCG (1000 UT) capsule Take 1 capsule by mouth daily      dronabinol (MARINOL) 5 MG capsule Take 1 capsule (5 mg total) by mouth 2 (two) times a day before meals By Dr. Rea Small (Patient taking differently: Take 2.5 mg by mouth in the morning By Dr. Regis Walker) 60 capsule 0    gabapentin (NEURONTIN) 300 mg capsule Take 1 capsule (300 mg total) by mouth 2 (two) times a day 180 capsule 1    Galcanezumab-gnlm (Emgality) 120 MG/ML SOAJ 1 sub cu injection every month 3 mL 5    Iron-Vitamin C 100-250 MG TABS Take 1 tablet by mouth daily      lidocaine (XYLOCAINE) 5 % ointment Apply topically 2 (two) times a day as needed for moderate pain 35.44 g 1    lisinopril (ZESTRIL) 5 mg tablet TAKE 1 TABLET DAILY 90 tablet 3    Multiple Vitamin (MULTI-VITAMIN DAILY) TABS Take 1 tablet by mouth daily      ondansetron (ZOFRAN-ODT) 4 mg disintegrating tablet Take 1 tablet (4 mg total) by mouth every 6 (six) hours as needed for nausea or vomiting 30 tablet 5    pantoprazole (PROTONIX) 40 mg tablet TAKE 1 TABLET TWICE A DAY (Patient taking differently: Take 40 mg by mouth 2 (two) times a day) 180 tablet 3    propranolol (INDERAL) 60 mg tablet TAKE 1 TABLET TWICE A  tablet 3    QUEtiapine (SEROquel) 25 mg tablet Take 1 tablet (25 mg total) by mouth daily at bedtime 90 tablet 0    rizatriptan (MAXALT-MLT) 10 mg disintegrating tablet Take at the onset of migraine; if symptoms continue or return, may take another dose at least 2 hours after first dose. Take no more than 2 doses in a day. (Patient taking differently: as needed Take at the onset of migraine; if symptoms continue or return, may take another dose at least 2 hours after first dose. Take no more than 2 doses in a day.) 12 tablet 5    rosuvastatin (CRESTOR) 10 MG tablet TAKE 1 TABLET DAILY 90 tablet 3    sertraline (ZOLOFT) 100 mg tablet Take 1 tablet (100 mg total) by mouth daily 90 tablet 1    triamcinolone (KENALOG) 0.1 % cream Apply topically 2 (two) times a day 80 g 1     No current facility-administered medications for this visit.      Allergies: Morphine, Cat hair extract, Dog epithelium, and Other    Objective:  Vitals:    12/01/23 1038   BP: 114/80   Pulse: 64   Temp: 97.6 °F (36.4 °C)   SpO2: 93%   Weight: 96 kg (211 lb 9.6 oz)   Height: 5' 2" (1.575 m)   Oxygen Therapy  SpO2: 93 %  . Wt Readings from Last 3 Encounters:   12/01/23 96 kg (211 lb 9.6 oz)   11/06/23 93.9 kg (207 lb)   10/23/23 94.1 kg (207 lb 6.4 oz)     Body mass index is 38.7 kg/m². Physical Exam  Constitutional:       Appearance: She is well-developed. HENT:      Head: Normocephalic and atraumatic. Eyes:      Pupils: Pupils are equal, round, and reactive to light. Neck:      Comments: Short and wide neck  Cardiovascular:      Rate and Rhythm: Normal rate and regular rhythm. Heart sounds: Normal heart sounds. Pulmonary:      Effort: Pulmonary effort is normal.      Breath sounds: Normal breath sounds. Musculoskeletal:         General: Normal range of motion. Cervical back: Normal range of motion and neck supple. Skin:     General: Skin is warm and dry. Neurological:      Mental Status: She is alert and oriented to person, place, and time. Psychiatric:         Behavior: Behavior normal.         Lab Review:   Hospital Outpatient Visit on 10/16/2023   Component Date Value    Triscuspid Valve Regurgi* 10/16/2023 15.0     RAA A4C 10/16/2023 11.2     CASSANDRA A4C 10/16/2023 17.4     LA Volume Index (BP) 10/16/2023 28.9     MV Peak A Suman 10/16/2023 0.83     MV stenosis pressure 1/2* 10/16/2023 75     MV Peak E Suman 10/16/2023 73     AV peak gradient 10/16/2023 6     Ao VTI 10/16/2023 27.44     Aortic valve peak veloci* 10/16/2023 1.21     LVOT peak VTI 10/16/2023 17.16     LVOT peak suman 10/16/2023 0.76     E wave deceleration time 10/16/2023 257     E/A ratio 10/16/2023 0.88     MV valve area p 1/2 meth* 10/16/2023 2.93     AV LVOT peak gradient 10/16/2023 2     AV mean gradient 10/16/2023 3     TR Peak Suman 10/16/2023 1.9     LVOT mn grad 10/16/2023 1.0     RVID d 10/16/2023 3.5     A4C EF 10/16/2023 61     Aortic valve mean veloci* 10/16/2023 8. 10     Tricuspid valve peak reg* 10/16/2023 1.92 Left ventricular stroke * 10/16/2023 121.00     IVSd 10/16/2023 0.80     Ao root 10/16/2023 2.90     LVPWd 10/16/2023 0.60     LA size 10/16/2023 4.1     LA volume (BP) 10/16/2023 56     FS 10/16/2023 46     LVIDS 10/16/2023 3.00     IVS 10/16/2023 0.8     LVIDd 10/16/2023 5.60     LA length (A2C) 10/16/2023 5.70     LEFT VENTRICLE SYSTOLIC * 55/81/8682 35     LV DIASTOLIC VOLUME (MOD* 68/06/6012 157     Left Atrium Area-systoli* 10/16/2023 18.2     Left Atrium Area-systoli* 10/16/2023 21.5     MV E' Tissue Velocity La* 10/16/2023 14     MV E' Tissue Velocity Se* 10/16/2023 9     LVSV, 2D 10/16/2023 121     DVI 10/16/2023 0.63     LV EF 10/16/2023 55    Orders Only on 07/27/2023   Component Date Value    Supplier Name 07/27/2023 [employer] - [address]     Supplier Phone Number 07/27/2023 [phone]     Order Status 07/27/2023 Delivery Successful     Delivery Request Date 07/27/2023 07/27/2023     Date Delivered  07/27/2023 07/27/2023     Item Description 07/27/2023 PAP Accessory     Item Description 07/27/2023 PAP Mask, Full Face, Fit Upon Setup, N/A, 1 per 3 months     Item Description 07/27/2023 Humidifier Water Chamber, 1 per 6 months     Item Description 07/27/2023 PAP Headgear, 1 per 6 months     Item Description 07/27/2023 PAP Chinstrap, 1 per 6 months     Item Description 07/27/2023 PAP Humidifier, Heated     Item Description 07/27/2023 Heated PAP Tubing, 1 per 3 months     Item Description 07/27/2023 Disposable PAP Filter, 2 per 1 month     Item Description 07/27/2023 Non-Disposable PAP Filter, 1 per 6 months     Item Description 07/27/2023 PAP Mask Interface Cushion, Full Face, 1 per 1 month        Diagnostics:  I have personally reviewed pertinent reports.     Echocardiogram: Normal without any evidence of pulmonary hypertension  ESS: Total score: 0  Answers submitted by the patient for this visit:  Pulmonology Questionnaire (Submitted on 12/1/2023)  Chief Complaint: Primary symptoms  Do you have shortness of breath that occurs with effort or exertion?: No  Do you have ear congestion?: No  Do you have heartburn?: No  Do you have fatigue?: No  Do you have nasal congestion?: No  Do you have shortness of breath when lying flat?: No  Do you have shortness of breath when you wake up?: No  Do you have sweats?: No  Have you experienced weight loss?: No

## 2023-12-04 ENCOUNTER — TELEPHONE (OUTPATIENT)
Age: 63
End: 2023-12-04

## 2023-12-04 DIAGNOSIS — G47.33 OSA ON CPAP: Primary | ICD-10-CM

## 2023-12-04 LAB
DME PARACHUTE DELIVERY DATE ACTUAL: NORMAL
DME PARACHUTE DELIVERY DATE REQUESTED: NORMAL
DME PARACHUTE DELIVERY DATE REQUESTED: NORMAL
DME PARACHUTE ITEM DESCRIPTION: NORMAL
DME PARACHUTE ORDER STATUS: NORMAL
DME PARACHUTE ORDER STATUS: NORMAL
DME PARACHUTE SUPPLIER NAME: NORMAL
DME PARACHUTE SUPPLIER NAME: NORMAL
DME PARACHUTE SUPPLIER PHONE: NORMAL
DME PARACHUTE SUPPLIER PHONE: NORMAL

## 2023-12-04 NOTE — TELEPHONE ENCOUNTER
Called and spoke to the patient and discussed the download compliance with residual AHI still 7.3  Put in a change of pressure for the cpap switched to auto CPAP between 4 to 10 cm please send it to the [facility] company thank you  She will take her machine to [facility] [address] in 2 to 3 days and will get the change of pressure in the machine her machine is not linked.

## 2023-12-05 ENCOUNTER — APPOINTMENT (OUTPATIENT)
Dept: PHYSICAL THERAPY | Facility: CLINIC | Age: 63
End: 2023-12-05
Payer: COMMERCIAL

## 2023-12-08 ENCOUNTER — APPOINTMENT (OUTPATIENT)
Dept: PHYSICAL THERAPY | Facility: CLINIC | Age: 63
End: 2023-12-08
Payer: COMMERCIAL

## 2023-12-11 ENCOUNTER — TELEMEDICINE (OUTPATIENT)
Dept: PSYCHIATRY | Facility: CLINIC | Age: 63
End: 2023-12-11
Payer: COMMERCIAL

## 2023-12-11 ENCOUNTER — OFFICE VISIT (OUTPATIENT)
Dept: FAMILY MEDICINE CLINIC | Facility: CLINIC | Age: 63
End: 2023-12-11
Payer: COMMERCIAL

## 2023-12-11 ENCOUNTER — HOSPITAL ENCOUNTER (OUTPATIENT)
Dept: RADIOLOGY | Facility: HOSPITAL | Age: 63
Discharge: HOME | End: 2023-12-11
Payer: COMMERCIAL

## 2023-12-11 VITALS
HEART RATE: 75 BPM | HEIGHT: 62 IN | DIASTOLIC BLOOD PRESSURE: 76 MMHG | BODY MASS INDEX: 37.13 KG/M2 | WEIGHT: 201.8 LBS | TEMPERATURE: 98.2 F | SYSTOLIC BLOOD PRESSURE: 118 MMHG | OXYGEN SATURATION: 96 %

## 2023-12-11 DIAGNOSIS — J40 BRONCHITIS: ICD-10-CM

## 2023-12-11 DIAGNOSIS — F41.1 GENERALIZED ANXIETY DISORDER WITH PANIC ATTACKS: ICD-10-CM

## 2023-12-11 DIAGNOSIS — M79.2 NEURALGIA: ICD-10-CM

## 2023-12-11 DIAGNOSIS — F33.1 MODERATE EPISODE OF RECURRENT MAJOR DEPRESSIVE DISORDER (HCC): Primary | ICD-10-CM

## 2023-12-11 DIAGNOSIS — R51.9 NONINTRACTABLE HEADACHE, UNSPECIFIED CHRONICITY PATTERN, UNSPECIFIED HEADACHE TYPE: ICD-10-CM

## 2023-12-11 DIAGNOSIS — G47.00 INSOMNIA, UNSPECIFIED TYPE: ICD-10-CM

## 2023-12-11 DIAGNOSIS — J40 BRONCHITIS: Primary | ICD-10-CM

## 2023-12-11 DIAGNOSIS — F41.0 GENERALIZED ANXIETY DISORDER WITH PANIC ATTACKS: ICD-10-CM

## 2023-12-11 PROCEDURE — 99214 OFFICE O/P EST MOD 30 MIN: CPT | Performed by: STUDENT IN AN ORGANIZED HEALTH CARE EDUCATION/TRAINING PROGRAM

## 2023-12-11 PROCEDURE — 99214 OFFICE O/P EST MOD 30 MIN: CPT

## 2023-12-11 PROCEDURE — 71046 X-RAY EXAM CHEST 2 VIEWS: CPT

## 2023-12-11 PROCEDURE — 90833 PSYTX W PT W E/M 30 MIN: CPT | Performed by: STUDENT IN AN ORGANIZED HEALTH CARE EDUCATION/TRAINING PROGRAM

## 2023-12-11 RX ORDER — BUSPIRONE HYDROCHLORIDE 10 MG/1
10 TABLET ORAL 2 TIMES DAILY
Qty: 180 TABLET | Refills: 1 | Status: SHIPPED | OUTPATIENT
Start: 2023-12-11 | End: 2024-06-08

## 2023-12-11 RX ORDER — PREDNISONE 10 MG/1
TABLET ORAL
Qty: 30 TABLET | Refills: 0 | Status: SHIPPED | OUTPATIENT
Start: 2023-12-11 | End: 2023-12-20

## 2023-12-11 RX ORDER — SERTRALINE HYDROCHLORIDE 100 MG/1
100 TABLET, FILM COATED ORAL DAILY
Qty: 90 TABLET | Refills: 1 | Status: SHIPPED | OUTPATIENT
Start: 2023-12-11 | End: 2024-06-08

## 2023-12-11 RX ORDER — GABAPENTIN 300 MG/1
300 CAPSULE ORAL 2 TIMES DAILY
Qty: 180 CAPSULE | Refills: 1 | Status: SHIPPED | OUTPATIENT
Start: 2023-12-11 | End: 2024-06-08

## 2023-12-11 RX ORDER — FLUTICASONE PROPIONATE 50 MCG
1 SPRAY, SUSPENSION (ML) NASAL DAILY
Qty: 15.8 ML | Refills: 1 | Status: SHIPPED | OUTPATIENT
Start: 2023-12-11

## 2023-12-11 RX ORDER — BENZONATATE 200 MG/1
200 CAPSULE ORAL 3 TIMES DAILY PRN
Qty: 20 CAPSULE | Refills: 0 | Status: SHIPPED | OUTPATIENT
Start: 2023-12-11

## 2023-12-11 RX ORDER — AMOXICILLIN AND CLAVULANATE POTASSIUM 875; 125 MG/1; MG/1
1 TABLET, FILM COATED ORAL EVERY 12 HOURS SCHEDULED
Qty: 20 TABLET | Refills: 0 | Status: SHIPPED | OUTPATIENT
Start: 2023-12-11 | End: 2023-12-21

## 2023-12-11 RX ORDER — QUETIAPINE FUMARATE 25 MG/1
25 TABLET, FILM COATED ORAL
Qty: 90 TABLET | Refills: 1 | Status: SHIPPED | OUTPATIENT
Start: 2023-12-11 | End: 2024-06-08

## 2023-12-11 RX ORDER — DEXTROMETHORPHAN HYDROBROMIDE AND PROMETHAZINE HYDROCHLORIDE 15; 6.25 MG/5ML; MG/5ML
5 SYRUP ORAL 4 TIMES DAILY PRN
Qty: 240 ML | Refills: 0 | Status: SHIPPED | OUTPATIENT
Start: 2023-12-11

## 2023-12-11 RX ORDER — FLUTICASONE PROPIONATE 50 MCG
1 BLISTER, WITH INHALATION DEVICE INHALATION 2 TIMES DAILY
Qty: 60 BLISTER | Refills: 0 | Status: SHIPPED | OUTPATIENT
Start: 2023-12-11 | End: 2023-12-11

## 2023-12-11 NOTE — PSYCH
Virtual Regular Visit    Verification of patient location: [place]    Patient is located in the following state in which I hold an active license: [place]    Assessment/Plan:    Problem List Items Addressed This Visit          Other    Neuralgia    Relevant Medications    gabapentin (NEURONTIN) 300 mg capsule    Nonintractable headache    Relevant Medications    gabapentin (NEURONTIN) 300 mg capsule    Insomnia    Generalized anxiety disorder with panic attacks    Relevant Medications    busPIRone (BUSPAR) 10 mg tablet    QUEtiapine (SEROquel) 25 mg tablet    sertraline (ZOLOFT) 100 mg tablet    Moderate episode of recurrent major depressive disorder (HCC) - Primary    Relevant Medications    busPIRone (BUSPAR) 10 mg tablet    QUEtiapine (SEROquel) 25 mg tablet    sertraline (ZOLOFT) 100 mg tablet            Reason for visit is   Chief Complaint   Patient presents with    Medication Management    Depression    Anxiety    Insomnia        Visit Time  Visit Start Time: 1:55 PM  Visit Stop Time: 2:20 PM  Total Visit Duration: 25 minutes    Encounter provider Johny Brunner, MD    Provider located at: [address] [place] [address]    Recent Visits  No visits were found meeting these conditions. Showing recent visits within past 7 days and meeting all other requirements  Today's Visits  Date Type Provider Dept   12/11/23 Telemedicine Johny Brunner, MD [facility]   Showing today's visits and meeting all other requirements  Future Appointments  No visits were found meeting these conditions. Showing future appointments within next 150 days and meeting all other requirements       The patient was identified by name and date of birth. Johnie Dean was informed that this is a telemedicine visit and that the visit is being conducted through the [address] [place] [address] platform. She agrees to proceed. My office door was closed.  No one else was in the room. She acknowledged consent and understanding of privacy and security of the video platform. The patient has agreed to participate and understands they can discontinue the visit at any time. Patient is aware this is a billable service. MEDICATION MANAGEMENT NOTE        [facility]      Name and Date of Birth:  Johanne Kay 70 y.o. 1960 MRN: [id number]    Date of Visit: December 11, 2023    Reason for Visit:   Chief Complaint   Patient presents with    Medication Management    Depression    Anxiety    Insomnia       SUBJECTIVE:  The patient was visited virtually for medication management and follow up visit for depression, anxiety symptoms and insomnia. Presented calm, and cooperative. Reported feeling ok. She noted that she enjoyed her time with her family during [address] in [facility] [place] and Thanksgiving in [facility], but got URI afterwards. She is going to spend time with her family during the [place] and will cook 7-fish soup as her speciality. Sleeps for 10 hours and denied any initial/middle or terminal insomnia. Denied any changes in appetite, concentration, energy level, or daily activities. Denied feelings of anhedonia, hopelessness, helplessness, worthlessness or guilt and appeared to be future oriented. There was no thought constriction related to death. Denied SI/HI, intent or plan upon direct inquiry at this time. Denied AV/H. No intense anxiety sxs, specific phobia or panic attacks reported. No manic sxs, paranoid ideations or fixed delusions were elicited. Endorsed good compliance with the medications and denied any side effects. Denied smoking cigarettes, drinking alcohol or other illicit substance use. Given this presentation, and upon patient's request, Seroquel could be down-titrated to 12.5 mg nightly and other medications are maintained at the same dosage.  The patient was educated to call 911 or go to the nearest emergency room if the symptoms become overwhelming or unable to remain in control. Verbalized understanding and agreed to seek help in case of distress or concern for safety. Review Of Systems:  Pertinent items are noted in HPI; all others are negative; no recent changes in medications or health status reported.       Past Psychiatric History Update:   - No inpatient psychiatric admission since last encounter  - No SA or SIB since last encounter  - No incidence of violent behavior since last encounter    Past Trauma History Update:    - No new onset of abuse or traumatic events since last encounter     Past Medical History:    Past Medical History:   Diagnosis Date    Anxiety     Arthritis     Attention and concentration deficit     Blurred vision     Chronic pain     Chronic pain syndrome 01/20/2016    CPAP (continuous positive airway pressure) dependence     CTS (carpal tunnel syndrome) > 6 years    Left wrist    Depression     Difficulty walking > 6 years    Left nerve leg pain    Diplopia     Dyspepsia     Gastric ulcer     Gastritis     GERD (gastroesophageal reflux disease)     Head injury > 25 years    [place] off 12 foot retaining wall    Headache(784.0)     Headache, tension-type > 1 year    Pain behind left eyeAnd stiff neck with pressure    History of transfusion 2005    Hypertension     Insomnia     Irritable bowel syndrome     Memory loss     Migraines     Osteopenia     Peripheral neuropathy 3/14/2012    Left thigh, left abdomen, left prudential nerve    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    Presence of neurostimulator     Pudendal neuralgia    Pudendal neuralgia     Shingles > 30 years ago    Left facial nerve    Sleep apnea     Sleep apnea, obstructive     Spinal stenosis     Starting and stopping of urinary stream during micturition     Syncope > 10 years    When I get up quickly    Trigeminal neuralgia > 30 years    Associated with shingles on left face    Urinary incontinence     Wears eyeglasses         Past Surgical History:   Procedure Laterality Date    [facility]    GASTRIC BYPASS  2004    HYSTERECTOMY  03/14/2012    INSERT / REPLACE PERIPHERAL NEUROSTIMULATOR PULSE GENERATOR /       MASS EXCISION Right 04/05/2021    Procedure: EXCISION  BIOPSY LESION/MASS LOWER NECK;  Surgeon: Minh Johnson DO;  Location: [facility];  Service: General    OTHER SURGICAL HISTORY  2012    Reimplantatin at [facility] INSERTION/RPLCMT PERIPHERAL/GASTRIC NPGR Left 2/23/2023    Procedure: PLACEMENT OF PERMANENT LEADWIRE AND PG FOR SNM AT S3 FORAMEN, COMPLEX PROGRAMMING OF PULSE GENERATOR;  Surgeon: Donovan Rod MD;  Location: [facility];  Service: UroGynecology           MA INSJ/RPLCMT SPI NPGR DIR/INDUXIVE COUPLING Right 04/15/2020    Procedure: REPLACEMENT IMPLANTABLE PULSE GENERATOR FOR DORSAL SPINAL COLUMN STIMULATOR,  RIGHT BUTTOCKS;  Surgeon: Bari Caceres MD;  Location: [facility];  Service: Neurosurgery    RADIOFREQUENCY ABLATION NERVES      SPINAL CORD STIMULATOR IMPLANT  2015    TRIGGER POINT INJECTION      URETER SURGERY      URETHRAL FISTULA REPAIR      US GUIDED THYROID BIOPSY  09/23/2020    WRIST ARTHROSCOPY      with internal fixation      Allergies   Allergen Reactions    Morphine Nausea Only and Abdominal Pain     SEVERE N/V    Cat Hair Extract Nasal Congestion     Cat Dander    Dog Epithelium Nasal Congestion    Other Other (See Comments)     Dogs- sneezing  Crab Meat- GI intolerance       Substance Abuse History:    Social History     Substance and Sexual Activity   Alcohol Use No     Social History     Substance and Sexual Activity   Drug Use Yes    Types: Marijuana    Comment: Capsules used for chronic pain.   Medical       Social History:    Social History     Socioeconomic History    Marital status: /Civil Union     Spouse name: Not on file    Number of children: Not on file    Years of education: Not on file    Highest education level: Not on file   Occupational History    Occupation: retired   Tobacco Use    Smoking status: Never    Smokeless tobacco: Never   Vaping Use    Vaping Use: Never used   Substance and Sexual Activity    Alcohol use: No    Drug use: Yes     Types: Marijuana     Comment: Capsules used for chronic pain. Medical    Sexual activity: Not Currently     Partners: Male     Birth control/protection: None   Other Topics Concern    Not on file   Social History Narrative    Lives in [facility], with . Previously worked as a teacher. Social Determinants of Health     Financial Resource Strain: Not on file   Food Insecurity: Not on file   Transportation Needs: Not on file   Physical Activity: Not on file   Stress: Not on file   Social Connections: Not on file   Intimate Partner Violence: Not on file   Housing Stability: Not on file       Family Psychiatric History:     Family History   Problem Relation Age of Onset    Other Mother         Back Disorder     Cirrhosis Mother     Crohn's disease Mother     Lupus Mother         Systemic Lupus Erythematous     Depression Mother     Dementia Mother         Caused by liver disease    Neuropathy Mother         Fibromyalgia    Hypertension Father     Heart disease Father     Other Brother         Liver Transplant     Crohn's disease Brother     Crohn's disease Brother     Depression Sister     No Known Problems Daughter     No Known Problems Maternal Aunt     No Known Problems Paternal Aunt     No Known Problems Cousin     No Known Problems Cousin     No Known Problems Cousin     No Known Problems Cousin     No Known Problems Cousin     Dementia Brother         Caused by liver disease    Seizures Neg Hx     Breast cancer Neg Hx        History Review:  The following portions of the patient's history were reviewed and updated as appropriate: allergies, current medications, past family history, past medical history, past social history, past surgical history and problem list.       OBJECTIVE:     Vital signs in last 24 hours: Not checked - virtual visit    Mental Status Evaluation:  Appearance and attitude: appeared as stated age, cooperative and attentive, casually dressed with good hygiene  Eye contact: good  Motor Function: within normal limits, No PMA/PMR  Gait/station: Not observed  Speech: normal for rate, rhythm, volume, latency, amount  Language: No overt abnormality  Mood/affect: euthymic / Affect was euthymic, reactive, in full range, normal intensity and mood congruent  Thought Processes: sequential and goal-directed  Thought content: denies suicidal ideation or homicidal ideation; no delusions or first rank symptoms  Associations: intact associations  Perceptual disturbances: denies Auditory/Visual/Tactile Hallucinations  Orientation: oriented to time, person, place and to the situational context  Cognitive Function: intact  Memory: recent and remote memory grossly intact  Intellect: average  Fund of knowledge: aware of current events, aware of past history, and vocabulary average  Impulse control: good  Insight/judgment: fair/good    Laboratory Results: I have personally reviewed all pertinent laboratory/tests results    Recent Labs (last 2 months):   Orders Only on 12/04/2023   Component Date Value    Supplier Name 12/04/2023 [employer] - [address]     Supplier Phone Number 12/04/2023 [phone]     Order Status 12/04/2023 Pending Further Review     Delivery Request Date 12/04/2023 12/04/2023     Item Description 12/04/2023 Pressure Change    Orders Only on 12/01/2023   Component Date Value    Supplier Name 12/01/2023 [employer] - [address]     Supplier Phone Number 12/01/2023 [phone]     Order Status 12/01/2023 Delivery Successful     Delivery Request Date 12/01/2023 12/01/2023     Date Delivered  12/01/2023 12/04/2023     Item Description 12/01/2023 PAP Accessory     Item Description 12/01/2023 PAP Mask, Full Face, Fit Upon Setup, N/A, 1 per 3 months     Item Description 12/01/2023 Humidifier Water Chamber, 1 per 6 months     Item Description 12/01/2023 PAP Headgear, 1 per 6 months     Item Description 12/01/2023 PAP Chinstrap, 1 per 6 months     Item Description 12/01/2023 PAP Humidifier, Heated     Item Description 12/01/2023 Standard PAP Tubing, Non-Heated, 1 per 3 months     Item Description 12/01/2023 Heated PAP Tubing, 1 per 3 months     Item Description 12/01/2023 Disposable PAP Filter, 2 per 1 month     Item Description 12/01/2023 Non-Disposable PAP Filter, 1 per 6 months     Item Description 12/01/2023 PAP Mask Interface Cushion, Full Face, 1 per 1 month    Hospital Outpatient Visit on 10/16/2023   Component Date Value    Triscuspid Valve Regurgi* 10/16/2023 15.0     RAA A4C 10/16/2023 11.2     CASSANDRA A4C 10/16/2023 17.4     LA Volume Index (BP) 10/16/2023 28.9     MV Peak A Suman 10/16/2023 0.83     MV stenosis pressure 1/2* 10/16/2023 75     MV Peak E Suman 10/16/2023 73     AV peak gradient 10/16/2023 6     Ao VTI 10/16/2023 27.44     Aortic valve peak veloci* 10/16/2023 1.21     LVOT peak VTI 10/16/2023 17.16     LVOT peak suman 10/16/2023 0.76     E wave deceleration time 10/16/2023 257     E/A ratio 10/16/2023 0.88     MV valve area p 1/2 meth* 10/16/2023 2.93     AV LVOT peak gradient 10/16/2023 2     AV mean gradient 10/16/2023 3     TR Peak Suman 10/16/2023 1.9     LVOT mn grad 10/16/2023 1.0     RVID d 10/16/2023 3.5     A4C EF 10/16/2023 61     Aortic valve mean veloci* 10/16/2023 8. 10     Tricuspid valve peak reg* 10/16/2023 1.92     Left ventricular stroke * 10/16/2023 121.00     IVSd 10/16/2023 0.80     Ao root 10/16/2023 2.90     LVPWd 10/16/2023 0.60     LA size 10/16/2023 4.1     LA volume (BP) 10/16/2023 56     FS 10/16/2023 46     LVIDS 10/16/2023 3.00     IVS 10/16/2023 0.8     LVIDd 10/16/2023 5.60     LA length (A2C) 10/16/2023 5.70     LEFT VENTRICLE SYSTOLIC * 46/27/9809 35 LV DIASTOLIC VOLUME (MOD* 85/05/2353 157     Left Atrium Area-systoli* 10/16/2023 18.2     Left Atrium Area-systoli* 10/16/2023 21.5     MV E' Tissue Velocity La* 10/16/2023 14     MV E' Tissue Velocity Se* 10/16/2023 9     LVSV, 2D 10/16/2023 121     DVI 10/16/2023 0.63     LV EF 10/16/2023 55          Assessment/Plan:   A 69 y/o  female,  (two adult children 32 and 29 y/o), domiciled w/ , retired teacher, w/ PMH of multiple medical conditions including obesity, HTN, HLD, GERD, post-gastrectomy malabsorption, CATHRYN (on CPAP), migraine, neuralgia, dysesthesia of multiple sites, lumbar radiculopathy, OA of L shoulder, cervicalgia, mild cognitive impairment w/ memory loss, BRIANA, TIA (2.5 yrs ago), abnormal sleep deprived EEG, 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 Presented w/ increased depression and anxiety over past year in the context of chronic pain, multiple medical conditions, and isolation due to COVID-19 pandemic, which has markedly improved since recent inpatient hospitalization and finishing PHP. Denied SI/HI, intent or plan upon direct inquiry at this time. PHQ-9: 7; YANY-7: 1. Her current presentation meets criteria for MDD and YANY w/ panic attacks.  Maintained on Zoloft 100 mg daily, Seroquel 50 mg nightly, Buspar 10 mg BID and Neurontin 300 mg BID, and Xanax was tapered off successfully as tolerated; started individual therapy. PHQ-9: 1; YANY-7: 1 on 6/28/2021. Will follow with her PCP regarding fixing her CPAP. Upon f/u on 5/5/22, the patient remained stable and noted that she finished individual therapy a couple of month ago, and has good support system. Maintained on the same regimen. Upon f/u on 1/12/23, presented w/ stable mood and slight increased anxiety in the context of marital conflicts; maintained on the same regimen and referred for individual psychotherapy. On 9/29/23, presented with improved anxiety and stable mood issa since started weight watcher program and lost 30 lbs over 6 months. Seroquel decreased to 25 mg nightly, and then to 12.5 mg on 12/11/23 (to be tapered off as tolerated to minimize polypharmacy); other meds maintained the same dose. Diagnoses and all orders for this visit:    Moderate episode of recurrent major depressive disorder (HCC)  -     QUEtiapine (SEROquel) 25 mg tablet; Take 1 tablet (25 mg total) by mouth daily at bedtime  -     sertraline (ZOLOFT) 100 mg tablet; Take 1 tablet (100 mg total) by mouth daily    Generalized anxiety disorder with panic attacks  -     busPIRone (BUSPAR) 10 mg tablet; Take 1 tablet (10 mg total) by mouth 2 (two) times a day    Insomnia, unspecified type    Neuralgia  -     gabapentin (NEURONTIN) 300 mg capsule; Take 1 capsule (300 mg total) by mouth 2 (two) times a day    Nonintractable headache, unspecified chronicity pattern, unspecified headache type  -     gabapentin (NEURONTIN) 300 mg capsule; Take 1 capsule (300 mg total) by mouth 2 (two) times a day          Impression:  1. Moderate episode of recurrent major depressive disorder (HCC)  QUEtiapine (SEROquel) 25 mg tablet    sertraline (ZOLOFT) 100 mg tablet      2. Generalized anxiety disorder with panic attacks  busPIRone (BUSPAR) 10 mg tablet      3. Insomnia, unspecified type        4. Neuralgia  gabapentin (NEURONTIN) 300 mg capsule      5.  Nonintractable headache, unspecified chronicity pattern, unspecified headache type  gabapentin (NEURONTIN) 300 mg capsule          Treatment Recommendations/Precautions:  - Pain management as per primary medical team  - f/u w/ PCP regarding fixing her CPAP machine and w/u for frequent falls  - f/u with neurology regarding recent weakness in LE and memory issues as well as recent frequent falls  - Continue Zoloft 100 mg po daily for anxiety and depression  - Continue Buspar 10 mg BID for anxiety  - Continue Neurontin 300 mg BID for pain and anxiety  - Decrease Seroquel 25 mg to 12.5 mg nightly (to be tapered off as tolerated to minimize polypharmacy)   - Pending therapy    - Medications sent to patient's pharmacy for 30 day supply x1 refill    - Psychoeducation provided to the patient and benefits, potential risks and side effects discussed; importance of compliance with the psychiatric treatment reiterated, and the patient verbalized understanding of the matter     - RTC in 8 weeks  - Educated about healthy life style, risk of falls/sedation and addiction. Patient was receptive to education.  - The patient was educated about 24 hour and weekend coverage for urgent situations accessed by calling [facility] practice number  - Patient was educated to call [place] ([phone] [[phone]]) for behavioral crisis at anytime or 911 for any safety concerns, or go to nearest ER if her symptoms become overwhelming or unmanageable.     Current Outpatient Medications   Medication Sig Dispense Refill    busPIRone (BUSPAR) 10 mg tablet Take 1 tablet (10 mg total) by mouth 2 (two) times a day 180 tablet 1    gabapentin (NEURONTIN) 300 mg capsule Take 1 capsule (300 mg total) by mouth 2 (two) times a day 180 capsule 1    QUEtiapine (SEROquel) 25 mg tablet Take 1 tablet (25 mg total) by mouth daily at bedtime 90 tablet 1    sertraline (ZOLOFT) 100 mg tablet Take 1 tablet (100 mg total) by mouth daily 90 tablet 1    albuterol (Ventolin HFA) 90 mcg/act inhaler Inhale 2 puffs every 6 (six) hours as needed for wheezing 18 g 0    amoxicillin-clavulanate (AUGMENTIN) 875-125 mg per tablet Take 1 tablet by mouth every 12 (twelve) hours for 10 days 20 tablet 0    benzonatate (TESSALON) 200 MG capsule Take 1 capsule (200 mg total) by mouth 3 (three) times a day as needed for cough 20 capsule 0    Botox 200 units SOLR       Cholecalciferol 25 MCG (1000 UT) capsule Take 1 capsule by mouth daily      dronabinol (MARINOL) 5 MG capsule Take 1 capsule (5 mg total) by mouth 2 (two) times a day before meals By Dr. Jorge Bhat (Patient taking differently: Take 2.5 mg by mouth in the morning By Dr. Jorge Bhat) 60 capsule 0    fluticasone (FLONASE) 50 mcg/act nasal spray 1 spray into each nostril daily 15.8 mL 1    fluticasone 50 mcg/actuation diskus inhaler INHALE 1 PUFF 2 TIMES A DAY RINSE MOUTH AFTER USE. 60 each 0    Galcanezumab-gnlm (Emgality) 120 MG/ML SOAJ 1 sub cu injection every month 3 mL 5    Iron-Vitamin C 100-250 MG TABS Take 1 tablet by mouth daily      lidocaine (XYLOCAINE) 5 % ointment Apply topically 2 (two) times a day as needed for moderate pain 35.44 g 1    lisinopril (ZESTRIL) 5 mg tablet TAKE 1 TABLET DAILY 90 tablet 3    Multiple Vitamin (MULTI-VITAMIN DAILY) TABS Take 1 tablet by mouth daily      ondansetron (ZOFRAN-ODT) 4 mg disintegrating tablet Take 1 tablet (4 mg total) by mouth every 6 (six) hours as needed for nausea or vomiting 30 tablet 5    pantoprazole (PROTONIX) 40 mg tablet TAKE 1 TABLET TWICE A DAY (Patient taking differently: Take 40 mg by mouth 2 (two) times a day) 180 tablet 3    predniSONE 10 mg tablet Take 5 tablets (50 mg total) by mouth daily for 2 days, THEN 4 tablets (40 mg total) daily for 2 days, THEN 3 tablets (30 mg total) daily for 2 days, THEN 2 tablets (20 mg total) daily for 2 days, THEN 1 tablet (10 mg total) daily for 2 days.  30 tablet 0    promethazine-dextromethorphan (PHENERGAN-DM) 6.25-15 mg/5 mL oral syrup Take 5 mL by mouth 4 (four) times a day as needed for cough 240 mL 0    propranolol (INDERAL) 60 mg tablet TAKE 1 TABLET TWICE A  tablet 3    rizatriptan (MAXALT-MLT) 10 mg disintegrating tablet Take at the onset of migraine; if symptoms continue or return, may take another dose at least 2 hours after first dose. Take no more than 2 doses in a day. (Patient taking differently: as needed Take at the onset of migraine; if symptoms continue or return, may take another dose at least 2 hours after first dose. Take no more than 2 doses in a day.) 12 tablet 5    rosuvastatin (CRESTOR) 10 MG tablet TAKE 1 TABLET DAILY 90 tablet 3    triamcinolone (KENALOG) 0.1 % cream Apply topically 2 (two) times a day 80 g 1     No current facility-administered medications for this visit. Medications Risks/Benefits      Risks, Benefits And Possible Side Effects Of Medications:    Risks, benefits, and possible side effects of medications explained to Yarelis Lewis and she verbalizes understanding and agreement for treatment. Controlled Medication Discussion:     Not applicable    Psychotherapy Provided:     Individual psychotherapy provided: Yes  Counseling was provided during the session today for 16 minutes. Psychoeducation provided to the patient and was educated about the importance of compliance with the medications and psychiatric treatment  Supportive psychotherapy provided to the patient  Solution Focused Brief Therapy (SFBT) provided  Patient's emotions were validated and specific labeled praise provided. [place] suggestions were offered in a supportive non-critical way.      Treatment Plan:    Completed and signed during the session: Not applicable - Treatment Plan not due at this session    Sylvie Billy MD 12/11/23

## 2023-12-11 NOTE — PROGRESS NOTES
Assessment/Plan:         Problem List Items Addressed This Visit    None  Visit Diagnoses     Bronchitis    -  Primary    Please have chest xray, will treat with augmentin and steroid taper, inhalers and antitussives as prescribed. vit c, d and zinc. increase hydration/steam.    Relevant Medications    predniSONE 10 mg tablet    amoxicillin-clavulanate (AUGMENTIN) 875-125 mg per tablet    fluticasone (FLONASE) 50 mcg/act nasal spray    benzonatate (TESSALON) 200 MG capsule    promethazine-dextromethorphan (PHENERGAN-DM) 6.25-15 mg/5 mL oral syrup    fluticasone (Flovent Diskus) 50 mcg/actuation diskus inhaler    Other Relevant Orders    XR chest pa & lateral              Subjective:      Patient ID: Iman Gregory is a 70 y.o. female. Camden Gonzalez was at her grandchildren's a week ago and has a productive cough since. This happened on a separate episode when she was there as well. She did realize that when she is there she uses tap water in cpap instead of distilled water. She has been taking niquil and did a covid and it was negative. No one else is sick. When she lays down the cough is worse. Cough  Associated symptoms include headaches and wheezing. Pertinent negatives include no chest pain, chills, ear pain, fever, myalgias, rash, sore throat or shortness of breath.        The following portions of the patient's history were reviewed and updated as appropriate:   Past Medical History:  She has a past medical history of Anxiety, Arthritis, Attention and concentration deficit, Blurred vision, Chronic pain, Chronic pain syndrome (01/20/2016), CPAP (continuous positive airway pressure) dependence, CTS (carpal tunnel syndrome) (> 6 years), Depression, Difficulty walking (> 6 years), Diplopia, Dyspepsia, Gastric ulcer, Gastritis, GERD (gastroesophageal reflux disease), Head injury (> 25 years), Headache(784.0), Headache, tension-type (> 1 year), History of transfusion (2005), Hypertension, Insomnia, Irritable bowel syndrome, Memory loss, Migraines, Osteopenia, Peripheral neuropathy (3/14/2012), 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 ,  _______________________________________________________________________  Medical Problems:  does not have any pertinent problems on file.,  _______________________________________________________________________  Past Surgical History:   has a past surgical history that includes Hysterectomy (2012); Gastric bypass (); Cholecystectomy;  section ();  section (); Gallbladder surgery (); Radiofrequency ablation nerves; Insert / replace peripheral neurostimulator pulse generator / ; Other surgical history (); Wrist arthroscopy; Urethral fistula repair; pr insj/rplcmt spi npgr dir/induxive coupling (Right, 04/15/2020); US guided thyroid biopsy (2020); Ureter surgery; Mass excision (Right, 2021); Spinal cord stimulator implant (); Trigger point injection; Colonoscopy; and pr insertion/rplcmt peripheral/gastric npgr (Left, 2023). ,  _______________________________________________________________________  Family History:  family history includes Cirrhosis in her mother; Crohn's disease in her brother, brother, and mother; Dementia in her brother and mother; Depression in her mother and sister; Heart disease in her father; Hypertension in her father; Lupus in her mother; Neuropathy in her mother; No Known Problems in her cousin, cousin, cousin, cousin, cousin, daughter, maternal aunt, and paternal aunt;  Other in her brother and mother.,  _______________________________________________________________________  Social History:   reports that she has never smoked. She has never used smokeless tobacco. She reports current drug use. Drug: Marijuana. She reports that she does not drink alcohol.,  _______________________________________________________________________  Allergies:  is allergic to morphine, cat hair extract, dog epithelium, and other. .  _______________________________________________________________________  Current Outpatient Medications   Medication Sig Dispense Refill   • albuterol (Ventolin HFA) 90 mcg/act inhaler Inhale 2 puffs every 6 (six) hours as needed for wheezing 18 g 0   • amoxicillin-clavulanate (AUGMENTIN) 875-125 mg per tablet Take 1 tablet by mouth every 12 (twelve) hours for 10 days 20 tablet 0   • benzonatate (TESSALON) 200 MG capsule Take 1 capsule (200 mg total) by mouth 3 (three) times a day as needed for cough 20 capsule 0   • Botox 200 units SOLR      • busPIRone (BUSPAR) 10 mg tablet Take 1 tablet (10 mg total) by mouth 2 (two) times a day 180 tablet 1   • Cholecalciferol 25 MCG (1000 UT) capsule Take 1 capsule by mouth daily     • dronabinol (MARINOL) 5 MG capsule Take 1 capsule (5 mg total) by mouth 2 (two) times a day before meals By Dr. Bharath Portillo (Patient taking differently: Take 2.5 mg by mouth in the morning By Dr. Bharath Portillo) 60 capsule 0   • fluticasone (FLONASE) 50 mcg/act nasal spray 1 spray into each nostril daily 15.8 mL 1   • fluticasone (Flovent Diskus) 50 mcg/actuation diskus inhaler Inhale 1 puff 2 (two) times a day Rinse mouth after use.  60 blister 0   • gabapentin (NEURONTIN) 300 mg capsule Take 1 capsule (300 mg total) by mouth 2 (two) times a day 180 capsule 1   • Galcanezumab-gnlm (Emgality) 120 MG/ML SOAJ 1 sub cu injection every month 3 mL 5   • Iron-Vitamin C 100-250 MG TABS Take 1 tablet by mouth daily     • lidocaine (XYLOCAINE) 5 % ointment Apply topically 2 (two) times a day as needed for moderate pain 35.44 g 1   • lisinopril (ZESTRIL) 5 mg tablet TAKE 1 TABLET DAILY 90 tablet 3   • Multiple Vitamin (MULTI-VITAMIN DAILY) TABS Take 1 tablet by mouth daily     • ondansetron (ZOFRAN-ODT) 4 mg disintegrating tablet Take 1 tablet (4 mg total) by mouth every 6 (six) hours as needed for nausea or vomiting 30 tablet 5   • pantoprazole (PROTONIX) 40 mg tablet TAKE 1 TABLET TWICE A DAY (Patient taking differently: Take 40 mg by mouth 2 (two) times a day) 180 tablet 3   • predniSONE 10 mg tablet Take 5 tablets (50 mg total) by mouth daily for 2 days, THEN 4 tablets (40 mg total) daily for 2 days, THEN 3 tablets (30 mg total) daily for 2 days, THEN 2 tablets (20 mg total) daily for 2 days, THEN 1 tablet (10 mg total) daily for 2 days. 30 tablet 0   • promethazine-dextromethorphan (PHENERGAN-DM) 6.25-15 mg/5 mL oral syrup Take 5 mL by mouth 4 (four) times a day as needed for cough 240 mL 0   • propranolol (INDERAL) 60 mg tablet TAKE 1 TABLET TWICE A  tablet 3   • QUEtiapine (SEROquel) 25 mg tablet Take 1 tablet (25 mg total) by mouth daily at bedtime 90 tablet 0   • rizatriptan (MAXALT-MLT) 10 mg disintegrating tablet Take at the onset of migraine; if symptoms continue or return, may take another dose at least 2 hours after first dose. Take no more than 2 doses in a day. (Patient taking differently: as needed Take at the onset of migraine; if symptoms continue or return, may take another dose at least 2 hours after first dose. Take no more than 2 doses in a day.) 12 tablet 5   • rosuvastatin (CRESTOR) 10 MG tablet TAKE 1 TABLET DAILY 90 tablet 3   • sertraline (ZOLOFT) 100 mg tablet Take 1 tablet (100 mg total) by mouth daily 90 tablet 1   • triamcinolone (KENALOG) 0.1 % cream Apply topically 2 (two) times a day 80 g 1     No current facility-administered medications for this visit.     _______________________________________________________________________  Review of Systems   Constitutional:  Positive for fatigue. Negative for chills, diaphoresis and fever.    HENT:  Negative for congestion, ear pain and sore throat. Eyes:  Negative for pain and visual disturbance. Respiratory:  Positive for cough, chest tightness and wheezing. Negative for shortness of breath. Cardiovascular:  Negative for chest pain and palpitations. Gastrointestinal:  Negative for abdominal pain, constipation, diarrhea, nausea and vomiting. Genitourinary:  Negative for dysuria, frequency and hematuria. Musculoskeletal:  Negative for arthralgias, back pain and myalgias. Skin:  Negative for color change and rash. Neurological:  Positive for headaches. Negative for seizures and syncope. All other systems reviewed and are negative. Objective:  Vitals:    12/11/23 1137   BP: 118/76   BP Location: Right arm   Patient Position: Sitting   Cuff Size: Standard   Pulse: 75   Temp: 98.2 °F (36.8 °C)   SpO2: 96%   Weight: 91.5 kg (201 lb 12.8 oz)   Height: 5' 2" (1.575 m)     Body mass index is 36.91 kg/m². Physical Exam  Vitals and nursing note reviewed. Constitutional:       Appearance: Normal appearance. HENT:      Head: Normocephalic. Right Ear: Tympanic membrane, ear canal and external ear normal. There is no impacted cerumen. Left Ear: Tympanic membrane, ear canal and external ear normal. There is no impacted cerumen. Nose: Nose normal. No congestion. Mouth/Throat:      Mouth: Mucous membranes are moist.      Pharynx: Posterior oropharyngeal erythema present. Eyes:      Extraocular Movements: Extraocular movements intact. Conjunctiva/sclera: Conjunctivae normal.      Pupils: Pupils are equal, round, and reactive to light. Cardiovascular:      Rate and Rhythm: Normal rate and regular rhythm. Heart sounds: Normal heart sounds. No murmur heard. Pulmonary:      Effort: Pulmonary effort is normal.      Breath sounds: Wheezing present. Abdominal:      Palpations: Abdomen is soft. Tenderness: There is no abdominal tenderness. Musculoskeletal:         General: Normal range of motion. Cervical back: Normal range of motion. Right lower leg: No edema. Left lower leg: No edema. Lymphadenopathy:      Cervical: No cervical adenopathy. Skin:     General: Skin is warm and dry. Neurological:      General: No focal deficit present. Mental Status: She is alert.    Psychiatric:         Mood and Affect: Mood normal.         Behavior: Behavior normal.

## 2023-12-12 ENCOUNTER — APPOINTMENT (OUTPATIENT)
Dept: PHYSICAL THERAPY | Facility: CLINIC | Age: 63
End: 2023-12-12
Payer: COMMERCIAL

## 2023-12-14 ENCOUNTER — OFFICE VISIT (OUTPATIENT)
Dept: PHYSICAL THERAPY | Facility: CLINIC | Age: 63
End: 2023-12-14
Payer: COMMERCIAL

## 2023-12-14 DIAGNOSIS — M46.1 SACROILIITIS (HCC): Primary | ICD-10-CM

## 2023-12-14 DIAGNOSIS — M54.50 ACUTE LEFT-SIDED LOW BACK PAIN, UNSPECIFIED WHETHER SCIATICA PRESENT: ICD-10-CM

## 2023-12-14 PROCEDURE — 97110 THERAPEUTIC EXERCISES: CPT | Performed by: PHYSICAL THERAPIST

## 2023-12-14 PROCEDURE — 97112 NEUROMUSCULAR REEDUCATION: CPT | Performed by: PHYSICAL THERAPIST

## 2023-12-14 NOTE — PROGRESS NOTES
Daily Note     Today's date: 2023  Patient name: Pradeep Cuellar  : 1960  MRN: [id number]  Referring provider: GWEN Todd  Dx:   Encounter Diagnosis     ICD-10-CM    1. Sacroiliitis ([address])  M46.1       2. Acute left-sided low back pain, unspecified whether sciatica present  M54.50                      Subjective: Patient reports that she was sick earlier this week - feeling better now, felt ready to return to PT but not ready to participate in complete program.       Objective: See treatment diary below      Assessment: Tolerated treatment fair due to limited endurance from illness recently       Plan: Continue per plan of care. Precautions: Left Sacroilitis,  LBP      [email or web address]  Access Code: [email or web address]    POC expires Unit limit Auth Expiration date PT/OT + Visit Limit?    3 na 49 visits                           Visit/Unit Tracking  AUTH Status:  Date 10/19 10/25 10/26 11/1 11/3 11/8 11/20 11/22 11/27 11/29 12/14    na Used 1 2 3 4 5 6 7 8 9 10 11     Remaining  48 47 46 45 44 43 42 41 40 39 38                Manuals 12/14    11/3 11/8 11/20 11/22 11/27 11/20                                          Neuro Re-Ed                          HL TA Marching 1# x 20     1#  20x 1.5#  20x 2# x 20 2#  20x 2.5# x 20 2.5#  20x   Bridging w/ hip add 3s x 20    :03  20x :03  20x 3s x 20 :03  20x 3s x 20 :03  20x   HL TA w/ roll 5s x 20    :05  20x  :05  20x 5s x 20 :05  20x 5s x 20 :05  20x   HL TB Hip abd Btb x 20    BTB  20x BTB  20x Btb x 20 BTB  20x Btb x 20 BTB  20x   TB clamshells Btb x 20    B  BTB  20x B  BTB  20x Btb x 20  BTB  20x Btb x 20  BTB  20x   Seated physioball TB rows/ ext/ lpd     Doubled foam  Standing-no PB  B  20x each doubled foam  Standing  No PB  GTB  20x each Gtb x 20 ea on BOSU GTB  20x each on BOSU Gtb x 20 ea bosu GTB  BOSU  20x each   HL Physioball ktc 1# x 20    OPB  1#  20x OPB  1.5#  20x 2# x 20  2#  20x  2.5# x 20 2.5#  20x   TG L10 20 x 2 Ther Ex             bike 10'     10' 10' 10'  10' 10'  10'   Fig 4 ir/er stretch             Hss w/ strap 20s x 5    B  :20  3x B  :20  3x 20s x 3 :20  3x 20s x 3 :20  3x                             Step ups f/l      8"   B  (Biodex)  20x each 8" (Biodex)  20x each 8" x 20  8"  20x each 8" 20x 8"  20x                Prone gs 3s x 20    :03  20x :03  20x 3s x 20 :03  20x 3s x 20 :03  20x   Prone hip ext 1# x 20    B  1#  20x B  1.5#  20x 2# x 20 2#  20x 2.5# x 20 2.5#  20x   Prone hip ext w/ knee flexed 1# x 20    B  1#  20x  B  1.5#  20x 2# x 20 2#  20x 2.5# x 20 2.5#  20x   Prone quad stretch 20s x 3     B  :20  3x  B  :20  3x 20s x 3 :20  3x 20s x 3 :20  3x   90/90 stand hip abd     :05  20x :05  20x 2#  5s x 20 2#  :05  20x 2.5# x 20  5s 2.5#  :05  20x each                TB side stepping held    GTB  30'x3 GTB  1.5#  30'x3 Btb x 3 30'  2# BTB  2#  30'x3 Btb 2.5# x 3 30' BTB  2.5#  30'x3   STS box     5#  20x 20"  5#  20x 20" 5# x 20 foam 20"  Foam  5#  20x 20" x 20  5# foam  20"  5# DB  Foam  20x   TB monster walks held    GTB  30'x3 GTB  1.5#  30'x3 Btb 2# x 3  30'  BTB  2#  30'x3 Btb  2.5# x 20  BTB  2.5#  20x                                                                                 Ther Activity                                       Gait Training                                       Modalities

## 2023-12-18 DIAGNOSIS — R11.0 NAUSEA: ICD-10-CM

## 2023-12-18 DIAGNOSIS — G43.709 CHRONIC MIGRAINE WITHOUT AURA WITHOUT STATUS MIGRAINOSUS, NOT INTRACTABLE: ICD-10-CM

## 2023-12-18 RX ORDER — ONDANSETRON 4 MG/1
TABLET, ORALLY DISINTEGRATING ORAL
Qty: 30 TABLET | Refills: 5 | Status: SHIPPED | OUTPATIENT
Start: 2023-12-18

## 2023-12-18 RX ORDER — RIZATRIPTAN BENZOATE 10 MG/1
TABLET, ORALLY DISINTEGRATING ORAL
Qty: 9 TABLET | Refills: 5 | Status: SHIPPED | OUTPATIENT
Start: 2023-12-18 | End: 2023-12-18

## 2023-12-18 RX ORDER — RIZATRIPTAN BENZOATE 10 MG/1
TABLET, ORALLY DISINTEGRATING ORAL
Qty: 12 TABLET | Refills: 5 | Status: SHIPPED | OUTPATIENT
Start: 2023-12-18

## 2023-12-20 ENCOUNTER — OFFICE VISIT (OUTPATIENT)
Dept: PHYSICAL THERAPY | Facility: CLINIC | Age: 63
End: 2023-12-20
Payer: COMMERCIAL

## 2023-12-20 DIAGNOSIS — M54.50 ACUTE LEFT-SIDED LOW BACK PAIN, UNSPECIFIED WHETHER SCIATICA PRESENT: ICD-10-CM

## 2023-12-20 DIAGNOSIS — M46.1 SACROILIITIS (HCC): Primary | ICD-10-CM

## 2023-12-20 PROCEDURE — 97110 THERAPEUTIC EXERCISES: CPT | Performed by: PHYSICAL THERAPIST

## 2023-12-20 PROCEDURE — 97112 NEUROMUSCULAR REEDUCATION: CPT | Performed by: PHYSICAL THERAPIST

## 2023-12-20 NOTE — PROGRESS NOTES
Daily Note     Today's date: 2023  Patient name: Caryn Velasquez  : 1960  MRN: [id number]  Referring provider: Jazmin August CRNP  Dx:   Encounter Diagnosis     ICD-10-CM    1. Sacroiliitis (HCC)  M46.1       2. Acute left-sided low back pain, unspecified whether sciatica present  M54.50                      Subjective: Patient reports that she is feeling much better today getting over her illness.       Objective: See treatment diary below      Assessment: Tolerated treatment well      Plan: Continue per plan of care.      Precautions: Left Sacroilitis,  LBP      [email or web address]  Access Code: [email or web address]    POC expires Unit limit Auth Expiration date PT/OT + Visit Limit?    3 na 49 visits                           Visit/Unit Tracking  AUTH Status:  Date 10/19 10/25 10/26 11/1 11/3 11/8 11/20 11/22 11/27 11/29 12/14 12/20   na Used 1 2 3 4 5 6 7 8 9 10 11 12    Remaining  48 47 46 45 44 43 42 41 40 39 38 37               Manuals 12/14 12/20   11/3 11/8 11/20 11/22 11/27 11/20                                          Neuro Re-Ed                          HL TA Marching 1# x 20  2# x 20   1#  20x 1.5#  20x 2# x 20 2#  20x 2.5# x 20 2.5#  20x   Bridging w/ hip add 3s x 20 3s x 20   :03  20x :03  20x 3s x 20 :03  20x 3s x 20 :03  20x   HL TA w/ roll 5s x 20 5s x 20   :05  20x  :05  20x 5s x 20 :05  20x 5s x 20 :05  20x   HL TB Hip abd Btb x 20 Btb x 20   BTB  20x BTB  20x Btb x 20 BTB  20x Btb x 20 BTB  20x   TB clamshells Btb x 20 Btb x 20   B  BTB  20x B  BTB  20x Btb x 20  BTB  20x Btb x 20  BTB  20x   Seated physioball TB rows/ ext/ lpd  NV   Doubled foam  Standing-no PB  B  20x each doubled foam  Standing  No PB  GTB  20x each Gtb x 20 ea on BOSU GTB  20x each on BOSU Gtb x 20 ea bosu GTB  BOSU  20x each   HL Physioball ktc 1# x 20 2# x 20    OPB  1#  20x OPB  1.5#  20x 2# x 20  2#  20x  2.5# x 20 2.5#  20x   TG L10 20 x 2  20 x  2 1#                                     Ther Ex            "  bike 10'  10'    10' 10' 10'  10' 10'  10'   Fig 4 ir/er stretch             Hss w/ strap 20s x 5 20s x 5    B  :20  3x B  :20  3x 20s x 3 :20  3x 20s x 3 :20  3x                             Step ups f/l   8\" x 20 1#    8\"   B  (Biodex)  20x each 8\" (Biodex)  20x each 8\" x 20  8\"  20x each 8\" 20x 8\"  20x                Prone gs 3s x 20 3s x 20   :03  20x :03  20x 3s x 20 :03  20x 3s x 20 :03  20x   Prone hip ext 1# x 20 2# x 20   B  1#  20x B  1.5#  20x 2# x 20 2#  20x 2.5# x 20 2.5#  20x   Prone hip ext w/ knee flexed 1# x 20 2# x 20   B  1#  20x  B  1.5#  20x 2# x 20 2#  20x 2.5# x 20 2.5#  20x   Prone quad stretch 20s x 3  20s x 3    B  :20  3x  B  :20  3x 20s x 3 :20  3x 20s x 3 :20  3x   90/90 stand hip abd  2#  x 20    :05  20x :05  20x 2#  5s x 20 2#  :05  20x 2.5# x 20  5s 2.5#  :05  20x each                TB side stepping held Gtb   2# x 3  30'   GTB  30'x3 GTB  1.5#  30'x3 Btb x 3 30'  2# BTB  2#  30'x3 Btb 2.5# x 3 30' BTB  2.5#  30'x3   STS box  5#  20\"  20x    5#  20x 20\"  5#  20x 20\" 5# x 20 foam 20\"  Foam  5#  20x 20\" x 20  5# foam  20\"  5# DB  Foam  20x   TB monster walks held Gtb 2# x 20    GTB  30'x3 GTB  1.5#  30'x3 Btb 2# x 3  30'  BTB  2#  30'x3 Btb  2.5# x 20  BTB  2.5#  20x                                                                                 Ther Activity                                       Gait Training                                    12/20   Modalities                                                      "

## 2023-12-21 ENCOUNTER — TELEPHONE (OUTPATIENT)
Dept: NEUROLOGY | Facility: CLINIC | Age: 63
End: 2023-12-21

## 2023-12-21 NOTE — TELEPHONE ENCOUNTER
Received fax from [employer]. Emgality req PA  [place] [id number]     PA initiated on CMM    CaseId:[id number];Status:Approved;Review Type:Prior Auth;Coverage Start Date:11/21/2023;Coverage End Date:12/20/2024;

## 2023-12-22 ENCOUNTER — OFFICE VISIT (OUTPATIENT)
Dept: PHYSICAL THERAPY | Facility: CLINIC | Age: 63
End: 2023-12-22
Payer: COMMERCIAL

## 2023-12-22 DIAGNOSIS — M54.50 ACUTE LEFT-SIDED LOW BACK PAIN, UNSPECIFIED WHETHER SCIATICA PRESENT: ICD-10-CM

## 2023-12-22 DIAGNOSIS — M46.1 SACROILIITIS (HCC): Primary | ICD-10-CM

## 2023-12-22 PROCEDURE — 97112 NEUROMUSCULAR REEDUCATION: CPT

## 2023-12-22 PROCEDURE — 97110 THERAPEUTIC EXERCISES: CPT

## 2023-12-22 NOTE — PROGRESS NOTES
Daily Note     Today's date: 2023  Patient name: Caryn Velasquez  : 1960  MRN: [id number]  Referring provider: Jazmin August CRNP  Dx:   Encounter Diagnosis     ICD-10-CM    1. Sacroiliitis (HCC)  M46.1       2. Acute left-sided low back pain, unspecified whether sciatica present  M54.50                      Subjective: SPR of lumbar spine=0/10.      Objective: See treatment diary below      Assessment: Tolerated treatment  demonstrating SOB and quick to fatigue suspect recovery from being ill . Patient demonstrated fatigue post treatment, exhibited good technique with therapeutic exercises, and would benefit from continued PT      Plan: Continue per plan of care.      Precautions: Left Sacroilitis,  LBP      [email or web address]  Access Code: [email or web address]    POC expires Unit limit Auth Expiration date PT/OT + Visit Limit?    3 na 49 visits                           Visit/Unit Tracking  AUTH Status:  Date 12/22    11/3 11/8 11/20 11/22 11/27 11/29 12/14 12/20   na Used 13    5 6 7 8 9 10 11 12    Remaining  36    44 43 42 41 40 39 38 37               Manuals 12/14 12/20 12/22  11/3 11/8 11/20 11/22 11/27 11/20                                          Neuro Re-Ed                          HL TA Marching 1# x 20  2# x 20 2#  20x  1#  20x 1.5#  20x 2# x 20 2#  20x 2.5# x 20 2.5#  20x   Bridging w/ hip add 3s x 20 3s x 20 :03  20x  :03  20x :03  20x 3s x 20 :03  20x 3s x 20 :03  20x   HL TA w/ roll 5s x 20 5s x 20 :05  20x  :05  20x  :05  20x 5s x 20 :05  20x 5s x 20 :05  20x   HL TB Hip abd Btb x 20 Btb x 20 BTB  20x  BTB  20x BTB  20x Btb x 20 BTB  20x Btb x 20 BTB  20x   TB clamshells Btb x 20 Btb x 20 BTB  20x  B  BTB  20x B  BTB  20x Btb x 20  BTB  20x Btb x 20  BTB  20x   Seated physioball TB rows/ ext/ lpd  NV BOSU  GTB  20x each  Doubled foam  Standing-no PB  B  20x each doubled foam  Standing  No PB  GTB  20x each Gtb x 20 ea on BOSU GTB  20x each on BOSU Gtb x 20 ea bosu GTB  BOSU  20x each  "  HL Physioball ktc 1# x 20 2# x 20  2#  20x  OPB  1#  20x OPB  1.5#  20x 2# x 20  2#  20x  2.5# x 20 2.5#  20x   TG L10 20 x 2  20 x  2 1# 20x2                                    Ther Ex             bike 10'  10'  10'  10' 10' 10'  10' 10'  10'   Fig 4 ir/er stretch             Hss w/ strap 20s x 5 20s x 5  :20  5x  B  :20  3x B  :20  3x 20s x 3 :20  3x 20s x 3 :20  3x                             Step ups f/l   8\" x 20 1#  8\"  20x  8\"   B  (Biodex)  20x each 8\" (Biodex)  20x each 8\" x 20  8\"  20x each 8\" 20x 8\"  20x                Prone gs 3s x 20 3s x 20 :03  20x  :03  20x :03  20x 3s x 20 :03  20x 3s x 20 :03  20x   Prone hip ext 1# x 20 2# x 20 2#  20x  B  1#  20x B  1.5#  20x 2# x 20 2#  20x 2.5# x 20 2.5#  20x   Prone hip ext w/ knee flexed 1# x 20 2# x 20 2#  20x  B  1#  20x  B  1.5#  20x 2# x 20 2#  20x 2.5# x 20 2.5#  20x   Prone quad stretch 20s x 3  20s x 3  :20  3x  B  :20  3x  B  :20  3x 20s x 3 :20  3x 20s x 3 :20  3x   90/90 stand hip abd  2#  x 20  2#  20x  :05  20x :05  20x 2#  5s x 20 2#  :05  20x 2.5# x 20  5s 2.5#  :05  20x each                TB side stepping held Gtb   2# x 3  30' GTB  2#  30'x3  GTB  30'x3 GTB  1.5#  30'x3 Btb x 3 30'  2# BTB  2#  30'x3 Btb 2.5# x 3 30' BTB  2.5#  30'x3   STS box  5#  20\"  20x  5#  20\" box  20x  5#  20x 20\"  5#  20x 20\" 5# x 20 foam 20\"  Foam  5#  20x 20\" x 20  5# foam  20\"  5# DB  Foam  20x   TB monster walks held Gtb 2# x 20  BTB  2#  30'x3  GTB  30'x3 GTB  1.5#  30'x3 Btb 2# x 3  30'  BTB  2#  30'x3 Btb  2.5# x 20  BTB  2.5#  20x                                                                                 Ther Activity                                       Gait Training                                    12/20   Modalities                                                        "

## 2023-12-26 ENCOUNTER — OFFICE VISIT (OUTPATIENT)
Dept: FAMILY MEDICINE CLINIC | Facility: CLINIC | Age: 63
End: 2023-12-26
Payer: COMMERCIAL

## 2023-12-26 VITALS
TEMPERATURE: 98.3 F | HEIGHT: 62 IN | SYSTOLIC BLOOD PRESSURE: 120 MMHG | DIASTOLIC BLOOD PRESSURE: 78 MMHG | OXYGEN SATURATION: 96 % | HEART RATE: 68 BPM | WEIGHT: 211.4 LBS | BODY MASS INDEX: 38.9 KG/M2

## 2023-12-26 DIAGNOSIS — M81.0 AGE-RELATED OSTEOPOROSIS WITHOUT CURRENT PATHOLOGICAL FRACTURE: ICD-10-CM

## 2023-12-26 DIAGNOSIS — K21.9 GASTROESOPHAGEAL REFLUX DISEASE WITHOUT ESOPHAGITIS: Primary | ICD-10-CM

## 2023-12-26 PROCEDURE — 99214 OFFICE O/P EST MOD 30 MIN: CPT | Performed by: NURSE PRACTITIONER

## 2023-12-26 NOTE — ASSESSMENT & PLAN NOTE
Discussed treatment options with patient.  Discussed DEXA scan results with patient.  Would recommend starting on Prolia every 6 months.  Recent GFR is within normal limits.  Discussed starting calcium supplement of 1300 mg to 1500 mg daily.  Will start on prior Auth for Prolia.  Will repeat DEXA scan in 2 years.

## 2023-12-26 NOTE — PROGRESS NOTES
Name: Caryn Velasquez      : 1960      MRN: [id number]  Encounter Provider: GWEN Black  Encounter Date: 2023   Encounter department: [facility] [address]    Assessment & Plan     1. Gastroesophageal reflux disease without esophagitis  Assessment & Plan:  Discussed with patient that I would not recommend bisphosphonates as patient does suffer from reflux.  Patient is currently on pantoprazole twice daily.      2. Age-related osteoporosis without current pathological fracture  Assessment & Plan:  Discussed treatment options with patient.  Discussed DEXA scan results with patient.  Would recommend starting on Prolia every 6 months.  Recent GFR is within normal limits.  Discussed starting calcium supplement of 1300 mg to 1500 mg daily.  Will start on prior Auth for Prolia.  Will repeat DEXA scan in 2 years.             Subjective      Presents for follow-up on DEXA scan.  Patient was found to have osteoporosis.  Patient has not been treated in the past.  Patient does have a history of GERD.      Review of Systems   Constitutional:  Negative for chills and fever.   HENT:  Negative for ear pain and sore throat.    Eyes:  Negative for pain and visual disturbance.   Respiratory:  Negative for cough and shortness of breath.    Cardiovascular:  Negative for chest pain and palpitations.   Gastrointestinal:  Negative for abdominal pain and vomiting.   Genitourinary:  Negative for dysuria and hematuria.   Neurological:  Negative for light-headedness and headaches.   All other systems reviewed and are negative.      Current Outpatient Medications on File Prior to Visit   Medication Sig    albuterol (Ventolin HFA) 90 mcg/act inhaler Inhale 2 puffs every 6 (six) hours as needed for wheezing    benzonatate (TESSALON) 200 MG capsule Take 1 capsule (200 mg total) by mouth 3 (three) times a day as needed for cough    Botox 200 units SOLR     busPIRone (BUSPAR) 10 mg tablet Take  1 tablet (10 mg total) by mouth 2 (two) times a day    Cholecalciferol 25 MCG (1000 UT) capsule Take 1 capsule by mouth daily    dronabinol (MARINOL) 5 MG capsule Take 1 capsule (5 mg total) by mouth 2 (two) times a day before meals By Dr. Carrillo (Patient taking differently: Take 2.5 mg by mouth in the morning By Dr. Carrillo)    fluticasone (FLONASE) 50 mcg/act nasal spray 1 spray into each nostril daily    fluticasone 50 mcg/actuation diskus inhaler INHALE 1 PUFF 2 TIMES A DAY RINSE MOUTH AFTER USE.    gabapentin (NEURONTIN) 300 mg capsule Take 1 capsule (300 mg total) by mouth 2 (two) times a day    Galcanezumab-gnlm (Emgality) 120 MG/ML SOAJ 1 sub cu injection every month    Iron-Vitamin C 100-250 MG TABS Take 1 tablet by mouth daily    lidocaine (XYLOCAINE) 5 % ointment Apply topically 2 (two) times a day as needed for moderate pain    lisinopril (ZESTRIL) 5 mg tablet TAKE 1 TABLET DAILY    Multiple Vitamin (MULTI-VITAMIN DAILY) TABS Take 1 tablet by mouth daily    ondansetron (ZOFRAN-ODT) 4 mg disintegrating tablet PLACE 1 TABLET ON THE TONGUE EVERY 6 HOURS AS NEEDED FOR NAUSEA OR VOMITING    pantoprazole (PROTONIX) 40 mg tablet TAKE 1 TABLET TWICE A DAY (Patient taking differently: Take 40 mg by mouth 2 (two) times a day)    promethazine-dextromethorphan (PHENERGAN-DM) 6.25-15 mg/5 mL oral syrup Take 5 mL by mouth 4 (four) times a day as needed for cough    propranolol (INDERAL) 60 mg tablet TAKE 1 TABLET TWICE A DAY    QUEtiapine (SEROquel) 25 mg tablet Take 1 tablet (25 mg total) by mouth daily at bedtime    rizatriptan (MAXALT-MLT) 10 mg disintegrating tablet Take at the onset of migraine; if symptoms continue or return, may take another dose at least 2 hours after first dose. Take no more than 2 doses in a day.    rosuvastatin (CRESTOR) 10 MG tablet TAKE 1 TABLET DAILY    sertraline (ZOLOFT) 100 mg tablet Take 1 tablet (100 mg total) by mouth daily    triamcinolone (KENALOG) 0.1 % cream Apply topically 2  "(two) times a day       Objective     /78 (BP Location: Left arm, Patient Position: Sitting, Cuff Size: Large)   Pulse 68   Temp 98.3 °F (36.8 °C) (Tympanic)   Ht 5' 2\" (1.575 m)   Wt 95.9 kg (211 lb 6.4 oz)   SpO2 96%   BMI 38.67 kg/m²     Physical Exam  Constitutional:       Appearance: She is well-developed. She is obese.   Cardiovascular:      Rate and Rhythm: Normal rate and regular rhythm.      Heart sounds: Normal heart sounds. No murmur heard.  Pulmonary:      Effort: Pulmonary effort is normal. No respiratory distress.      Breath sounds: Normal breath sounds.   Skin:     General: Skin is warm and dry.   Neurological:      Mental Status: She is alert and oriented to person, place, and time.       GWEN Black    "

## 2023-12-26 NOTE — ASSESSMENT & PLAN NOTE
Discussed with patient that I would not recommend bisphosphonates as patient does suffer from reflux.  Patient is currently on pantoprazole twice daily.

## 2023-12-27 ENCOUNTER — OFFICE VISIT (OUTPATIENT)
Dept: PHYSICAL THERAPY | Facility: CLINIC | Age: 63
End: 2023-12-27
Payer: COMMERCIAL

## 2023-12-27 DIAGNOSIS — M46.1 SACROILIITIS (HCC): Primary | ICD-10-CM

## 2023-12-27 DIAGNOSIS — M54.50 ACUTE LEFT-SIDED LOW BACK PAIN, UNSPECIFIED WHETHER SCIATICA PRESENT: ICD-10-CM

## 2023-12-27 PROCEDURE — 97110 THERAPEUTIC EXERCISES: CPT | Performed by: PHYSICAL THERAPIST

## 2023-12-27 NOTE — PROGRESS NOTES
"Daily Note     Today's date: 2023  Patient name: Caryn Velasquez  : 1960  MRN: [id number]  Referring provider: Jazmin August CRNP  Dx:   Encounter Diagnosis     ICD-10-CM    1. Sacroiliitis (HCC)  M46.1       2. Acute left-sided low back pain, unspecified whether sciatica present  M54.50                      Subjective: \"I'm good\"   Patient reports that she is ready for discharge - setting up her work out area at home so she can continue with her HEP  Feels her endurance is improving, she is managing steps now without issue.     Objective: See treatment diary below    Goals  STG   1.  Patient will demonstrate independence and competence with HEP 2 -4 weeks  MET  2.  Patient will report > 25-50% reduced pain 2-4 weeks  MET     LTG   1.  Patient will report improvements with both functional and recreational abilities  4-6 weeks  MET  2.  Patient will demonstrate improved motor function  4-6 weeks.   MET  3.  Restoration of full/ painfree trunk mobility  4-6 weeks  MET  4.  Improved tolerance to climbing steps without exacerbation of pain  4-8 weeks.   MET        Assessment: original goals achieved    patient has done well, she is competent with hep and will be discharged to such at this time      Plan: discharge skilled PT at this time.        Precautions: Left Sacroilitis,  LBP      [email or web address]  Access Code: [email or web address]    POC expires Unit limit Auth Expiration date PT/OT + Visit Limit?    3 na 49 visits                           Visit/Unit Tracking  AUTH Status:  Date 12/22 12/27   11/3 11/8 11/20 11/22 11/27 11/29 12/14 12/20   na Used 13 14   5 6 7 8 9 10 11 12    Remaining  36 35   44 43 42 41 40 39 38 37               Manuals                                           Neuro Re-Ed                          HL TA Marching 1# x 20  2# x 20 2#  20x 2.5# x 20      2.5# x 20 2.5#  20x   Bridging w/ hip add 3s x 20 3s x 20 :03  20x 3s x 20     3s x 20 " ":03  20x   HL TA w/ roll 5s x 20 5s x 20 :05  20x 5s x 20     5s x 20 :05  20x   HL TB Hip abd Btb x 20 Btb x 20 BTB  20x Btb x 20     Btb x 20 BTB  20x   TB clamshells Btb x 20 Btb x 20 BTB  20x Btb x 20      Btb x 20  BTB  20x   Seated physioball TB rows/ ext/ lpd  NV BOSU  GTB  20x each Bodu gtb x 20      Gtb x 20 ea bosu GTB  BOSU  20x each   HL Physioball ktc 1# x 20 2# x 20  2#  20x 2.5# x 20      2.5# x 20 2.5#  20x   TG L10 20 x 2  20 x  2 1# 20x2 20 x 2                                   Ther Ex             bike 10'  10'  10' 10'     10'  10'                Hss w/ strap 20s x 5 20s x 5  :20  5x 20s x 5 bilat     20s x 3 :20  3x                             Step ups f/l   8\" x 20 1#  8\"  20x 8\" x 20  2.5#      8\" 20x 8\"  20x                Prone gs 3s x 20 3s x 20 :03  20x 3s x 20     3s x 20 :03  20x   Prone hip ext 1# x 20 2# x 20 2#  20x 2.5# x 20     2.5# x 20 2.5#  20x   Prone hip ext w/ knee flexed 1# x 20 2# x 20 2#  20x 2.5# x 20     2.5# x 20 2.5#  20x   Prone quad stretch 20s x 3  20s x 3  :20  3x 20s x 3 bilat     20s x 3 :20  3x   90/90 stand hip abd  2#  x 20  2#  20x 2.5# x 20      2.5# x 20  5s 2.5#  :05  20x each                TB side stepping held Gtb   2# x 3  30' GTB  2#  30'x3 Btb  2.5# x 3  30'      Btb 2.5# x 3 30' BTB  2.5#  30'x3   STS box  5#  20\"  20x  5#  20\" box  20x 5# x 20 20\" box      20\" x 20  5# foam  20\"  5# DB  Foam  20x   TB monster walks held Gtb 2# x 20  BTB  2#  30'x3 Btb 2.5# x 3 30'      Btb  2.5# x 20  BTB  2.5#  20x                                                                                 Ther Activity                                       Gait Training                                    12/20   Modalities                                                        "

## 2023-12-28 ENCOUNTER — APPOINTMENT (OUTPATIENT)
Dept: PHYSICAL THERAPY | Facility: CLINIC | Age: 63
End: 2023-12-28
Payer: COMMERCIAL

## 2023-12-28 DIAGNOSIS — K21.9 GASTROESOPHAGEAL REFLUX DISEASE WITHOUT ESOPHAGITIS: ICD-10-CM

## 2023-12-28 RX ORDER — PANTOPRAZOLE SODIUM 40 MG/1
TABLET, DELAYED RELEASE ORAL
Qty: 180 TABLET | Refills: 3 | Status: SHIPPED | OUTPATIENT
Start: 2023-12-28

## 2023-12-30 DIAGNOSIS — G43.709 CHRONIC MIGRAINE WITHOUT AURA WITHOUT STATUS MIGRAINOSUS, NOT INTRACTABLE: ICD-10-CM

## 2024-01-01 RX ORDER — GALCANEZUMAB 120 MG/ML
INJECTION, SOLUTION SUBCUTANEOUS
Qty: 3 ML | Refills: 3 | Status: SHIPPED | OUTPATIENT
Start: 2024-01-01

## 2024-01-19 ENCOUNTER — TELEPHONE (OUTPATIENT)
Dept: NEUROLOGY | Facility: CLINIC | Age: 64
End: 2024-01-19

## 2024-01-19 NOTE — TELEPHONE ENCOUNTER
Botox number of units: 200  Botox quantity: 1  Arrived at what location: [facility]  Botox at Correct Administering Location: Yes  NDC number: 9867-0914-03  Lot number: [id number]   Expiration Date: 06/2026  Appt notes indicate correct medication: Yes

## 2024-01-26 ENCOUNTER — TELEPHONE (OUTPATIENT)
Age: 64
End: 2024-01-26

## 2024-01-26 NOTE — TELEPHONE ENCOUNTER
Neo from [facility]     Requesting chart note by Dr. Platt on or after medicare start date.    Note should state that the patient is still benefiting from CPAP.    Please fax:   [phone]

## 2024-01-27 ENCOUNTER — APPOINTMENT (OUTPATIENT)
Age: 64
End: 2024-01-27
Payer: COMMERCIAL

## 2024-01-27 DIAGNOSIS — I10 PRIMARY HYPERTENSION: ICD-10-CM

## 2024-01-27 DIAGNOSIS — N28.1 ACQUIRED RENAL CYST OF LEFT KIDNEY: ICD-10-CM

## 2024-01-27 DIAGNOSIS — N28.89 KIDNEY MASS: ICD-10-CM

## 2024-01-27 DIAGNOSIS — M54.16 LUMBAR RADICULOPATHY: ICD-10-CM

## 2024-01-27 LAB
ANION GAP SERPL CALCULATED.3IONS-SCNC: 9 MMOL/L
BACTERIA UR QL AUTO: ABNORMAL /HPF
BILIRUB UR QL STRIP: NEGATIVE
BUN SERPL-MCNC: 19 MG/DL (ref 5–25)
CALCIUM SERPL-MCNC: 10.2 MG/DL (ref 8.4–10.2)
CHLORIDE SERPL-SCNC: 103 MMOL/L (ref 96–108)
CLARITY UR: CLEAR
CO2 SERPL-SCNC: 28 MMOL/L (ref 21–32)
COLOR UR: YELLOW
CREAT SERPL-MCNC: 0.8 MG/DL (ref 0.6–1.3)
CREAT UR-MCNC: 168.9 MG/DL
GFR SERPL CREATININE-BSD FRML MDRD: 77 ML/MIN/1.73SQ M
GLUCOSE P FAST SERPL-MCNC: 92 MG/DL (ref 65–99)
GLUCOSE UR STRIP-MCNC: NEGATIVE MG/DL
HGB UR QL STRIP.AUTO: ABNORMAL
KETONES UR STRIP-MCNC: NEGATIVE MG/DL
LEUKOCYTE ESTERASE UR QL STRIP: ABNORMAL
MUCOUS THREADS UR QL AUTO: ABNORMAL
NITRITE UR QL STRIP: NEGATIVE
NON-SQ EPI CELLS URNS QL MICRO: ABNORMAL /HPF
PH UR STRIP.AUTO: 6.5 [PH]
POTASSIUM SERPL-SCNC: 4.1 MMOL/L (ref 3.5–5.3)
PROT UR STRIP-MCNC: ABNORMAL MG/DL
PROT UR-MCNC: 14 MG/DL
PROT/CREAT UR: 0.08 MG/G{CREAT} (ref 0–0.1)
RBC #/AREA URNS AUTO: ABNORMAL /HPF
SODIUM SERPL-SCNC: 140 MMOL/L (ref 135–147)
SP GR UR STRIP.AUTO: 1.03 (ref 1–1.03)
UROBILINOGEN UR STRIP-ACNC: <2 MG/DL
WBC #/AREA URNS AUTO: ABNORMAL /HPF

## 2024-01-27 PROCEDURE — 81001 URINALYSIS AUTO W/SCOPE: CPT

## 2024-01-27 PROCEDURE — 36415 COLL VENOUS BLD VENIPUNCTURE: CPT

## 2024-01-27 PROCEDURE — 84156 ASSAY OF PROTEIN URINE: CPT

## 2024-01-27 PROCEDURE — 82570 ASSAY OF URINE CREATININE: CPT

## 2024-01-27 PROCEDURE — 80048 BASIC METABOLIC PNL TOTAL CA: CPT

## 2024-01-29 ENCOUNTER — TELEPHONE (OUTPATIENT)
Dept: FAMILY MEDICINE CLINIC | Facility: CLINIC | Age: 64
End: 2024-01-29

## 2024-01-29 NOTE — TELEPHONE ENCOUNTER
January 29, 2024  Mattie Rosales   to [facility] [place] [facility]       1/29/24  8:30 AM  Dr Platt's note faxed

## 2024-01-29 NOTE — TELEPHONE ENCOUNTER
Called [employer] medicare through patient plan. Prolia was approved from 1/29/2024 to 1/29/2025 auth # [id number]

## 2024-01-29 NOTE — TELEPHONE ENCOUNTER
received vm from 1/26 at 9:29am on neurology jessica-    A hello, my name is J and I'm calling from [employer]. on a recorded line. This is in regards to a mutual patient, joon dupree, date of birth of 1960. I just wanted to know on the last office visit and who is the doctor for this assigned for this patient? And please do give us a call back with this number, which is [phone]. Thank you and have a great day.  ---------------------------------------  Already addressed

## 2024-02-01 ENCOUNTER — APPOINTMENT (OUTPATIENT)
Dept: LAB | Facility: HOSPITAL | Age: 64
End: 2024-02-01
Attending: INTERNAL MEDICINE
Payer: COMMERCIAL

## 2024-02-01 ENCOUNTER — HOSPITAL ENCOUNTER (OUTPATIENT)
Dept: ULTRASOUND IMAGING | Facility: HOSPITAL | Age: 64
End: 2024-02-01
Attending: INTERNAL MEDICINE
Payer: COMMERCIAL

## 2024-02-01 DIAGNOSIS — N28.1 ACQUIRED RENAL CYST OF LEFT KIDNEY: ICD-10-CM

## 2024-02-01 DIAGNOSIS — M54.16 LUMBAR RADICULOPATHY: ICD-10-CM

## 2024-02-01 DIAGNOSIS — N28.89 KIDNEY MASS: ICD-10-CM

## 2024-02-01 DIAGNOSIS — I10 PRIMARY HYPERTENSION: ICD-10-CM

## 2024-02-01 LAB
BASOPHILS # BLD AUTO: 0.1 THOUSANDS/ÂΜL (ref 0–0.1)
BASOPHILS NFR BLD AUTO: 2 % (ref 0–1)
EOSINOPHIL # BLD AUTO: 0.25 THOUSAND/ÂΜL (ref 0–0.61)
EOSINOPHIL NFR BLD AUTO: 4 % (ref 0–6)
ERYTHROCYTE [DISTWIDTH] IN BLOOD BY AUTOMATED COUNT: 15 % (ref 11.6–15.1)
HCT VFR BLD AUTO: 34.4 % (ref 34.8–46.1)
HGB BLD-MCNC: 10.4 G/DL (ref 11.5–15.4)
IMM GRANULOCYTES # BLD AUTO: 0.01 THOUSAND/UL (ref 0–0.2)
IMM GRANULOCYTES NFR BLD AUTO: 0 % (ref 0–2)
LYMPHOCYTES # BLD AUTO: 1.93 THOUSANDS/ÂΜL (ref 0.6–4.47)
LYMPHOCYTES NFR BLD AUTO: 30 % (ref 14–44)
MCH RBC QN AUTO: 25.7 PG (ref 26.8–34.3)
MCHC RBC AUTO-ENTMCNC: 30.2 G/DL (ref 31.4–37.4)
MCV RBC AUTO: 85 FL (ref 82–98)
MONOCYTES # BLD AUTO: 0.47 THOUSAND/ÂΜL (ref 0.17–1.22)
MONOCYTES NFR BLD AUTO: 7 % (ref 4–12)
NEUTROPHILS # BLD AUTO: 3.59 THOUSANDS/ÂΜL (ref 1.85–7.62)
NEUTS SEG NFR BLD AUTO: 57 % (ref 43–75)
NRBC BLD AUTO-RTO: 0 /100 WBCS
PLATELET # BLD AUTO: 222 THOUSANDS/UL (ref 149–390)
PMV BLD AUTO: 11.3 FL (ref 8.9–12.7)
RBC # BLD AUTO: 4.04 MILLION/UL (ref 3.81–5.12)
WBC # BLD AUTO: 6.35 THOUSAND/UL (ref 4.31–10.16)

## 2024-02-01 PROCEDURE — 85025 COMPLETE CBC W/AUTO DIFF WBC: CPT

## 2024-02-01 PROCEDURE — 76775 US EXAM ABDO BACK WALL LIM: CPT

## 2024-02-01 PROCEDURE — 36415 COLL VENOUS BLD VENIPUNCTURE: CPT

## 2024-02-02 ENCOUNTER — TELEPHONE (OUTPATIENT)
Dept: FAMILY MEDICINE CLINIC | Facility: CLINIC | Age: 64
End: 2024-02-02

## 2024-02-02 DIAGNOSIS — M81.0 AGE-RELATED OSTEOPOROSIS WITHOUT CURRENT PATHOLOGICAL FRACTURE: Primary | ICD-10-CM

## 2024-02-02 NOTE — TELEPHONE ENCOUNTER
Jazmin brannon is okay with going ahead with Prolia can we please send a script to [facility] for her to start the shipping process

## 2024-02-05 ENCOUNTER — TELEMEDICINE (OUTPATIENT)
Dept: PSYCHIATRY | Facility: CLINIC | Age: 64
End: 2024-02-05

## 2024-02-05 DIAGNOSIS — F41.0 GENERALIZED ANXIETY DISORDER WITH PANIC ATTACKS: ICD-10-CM

## 2024-02-05 DIAGNOSIS — R51.9 NONINTRACTABLE HEADACHE, UNSPECIFIED CHRONICITY PATTERN, UNSPECIFIED HEADACHE TYPE: ICD-10-CM

## 2024-02-05 DIAGNOSIS — F41.1 GENERALIZED ANXIETY DISORDER WITH PANIC ATTACKS: ICD-10-CM

## 2024-02-05 DIAGNOSIS — F33.1 MODERATE EPISODE OF RECURRENT MAJOR DEPRESSIVE DISORDER (HCC): Primary | ICD-10-CM

## 2024-02-05 DIAGNOSIS — M79.2 NEURALGIA: ICD-10-CM

## 2024-02-05 DIAGNOSIS — G47.00 INSOMNIA, UNSPECIFIED TYPE: ICD-10-CM

## 2024-02-05 NOTE — BH CRISIS PLAN
"Client Name: Caryn Velasquez       Client YOB: 1960    [employer] Safety Plan      Creation Date: 2/5/24 Update Date: 2/5/24   Created By: Padmaja Flowers MD Last Updated By: Padmaja Flowers MD      Step 1: Warning Signs:   Warning Signs   \"nervousness\"; \"feeling anxious\"; \"hyperventilation\"            Step 2: Internal Coping Strategies:   Internal Coping Strategies   \"breathing techniques\"   \"taking a break\"   \"going for a walk\"   \"listening to audio books\"            Step 3: People and social settings that provide distraction:   Name Contact Information   My sister Contact saved in phone   My  Contact saved in phone    Places   \"the park\"           Step 4: People whom I can ask for help during a crisis:      Name Contact Information    My  Contact saved in phone      Step 5: Professionals or agencies I can contact during a crisis:      Clinican/Agency Name Phone Emergency Contact    Padmaja Flowers -[phone]       Local Emergency Department Emergency Department Phone Emergency Department Address    [facility] [phone] [address] [place] [id number]        Crisis Phone Numbers:   Suicide Prevention Lifeline: Call or Text  642 Crisis Text Line: Text HOME to [phone]   Please note: Some [facility] do not have a separate number for Child/Adolescent specific crisis. If your county is not listed under Child/Adolescent, please call the adult number for your county      Adult Crisis Numbers: Child/Adolescent Crisis Numbers   [facility]: [phone] [facility]: [phone]   [facility]: [phone] [facility]: [phone]   [facility]: [phone] [place]: [phone]   [facility]: [phone] [facility]: [phone]   [place]: [phone]   [facility]: [phone]   [facility]: [phone]   [facility]: [phone] (daytime) [phone] (after hours, " weekends, holidays)      Step 6: Making the environment safer (plan for lethal means safety):   Patient did not identify any lethal methods: Yes     Optional: What is most important to me and worth living for?      [place] Safety Plan. Virginia Herring and Pasquale Weber. Used with permission of the authors.
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
## 2024-02-05 NOTE — BH TREATMENT PLAN
"Treatment Plan done but not signed at time of office visit due to:  Plan reviewed with the patient during the virtual visit and verbal consent given.    TREATMENT PLAN (Medication Management Only)        [facility]    Name and Date of Birth:  Caryn Velasquez 63 y.o. 1960  Date of Treatment Plan: February 5, 2024  Diagnosis/Diagnoses:    1. Moderate episode of recurrent major depressive disorder (HCC)    2. Generalized anxiety disorder with panic attacks    3. Insomnia, unspecified type    4. Neuralgia    5. Nonintractable headache, unspecified chronicity pattern, unspecified headache type      Strengths/Personal Resources for Self-Care: \"supportive family, hobbies and travelling\".  Area/Areas of need (in own words): anxiety symptoms, depression  1. Long Term Goal: improve control of anxiety and prevent panic attacks; maintain mood stability  Target Date:6 months - 8/5/2024  Person/Persons responsible for completion of goal: Caryn  2.  Short Term Objective (s) - How will we reach this goal?:   A. Provider new recommended medication/dosage changes and/or continue medication(s): continue current medications as prescribed.  B. N/A.  C. N/A.  Target Date:6 months - 8/5/2024  Person/Persons Responsible for Completion of Goal: Caryn  Progress Towards Goals: stable, continuing treatment  Treatment Modality: medication management every 6 months  Review due 180 days from date of this plan: 6 months - 8/5/2024  Expected length of service: maintenance  My Physician/PA/NP and I have developed this plan together and I agree to work on the goals and objectives. I understand the treatment goals that were developed for my treatment.      "

## 2024-02-07 ENCOUNTER — PROCEDURE VISIT (OUTPATIENT)
Dept: NEUROLOGY | Facility: CLINIC | Age: 64
End: 2024-02-07
Payer: COMMERCIAL

## 2024-02-07 VITALS — DIASTOLIC BLOOD PRESSURE: 60 MMHG | SYSTOLIC BLOOD PRESSURE: 117 MMHG | HEART RATE: 61 BPM | TEMPERATURE: 98.1 F

## 2024-02-07 DIAGNOSIS — G43.709 CHRONIC MIGRAINE WITHOUT AURA WITHOUT STATUS MIGRAINOSUS, NOT INTRACTABLE: Primary | ICD-10-CM

## 2024-02-07 PROCEDURE — 64615 CHEMODENERV MUSC MIGRAINE: CPT | Performed by: PHYSICIAN ASSISTANT

## 2024-02-07 NOTE — PROGRESS NOTES
Universal Protocol   Consent: Verbal consent obtained. Written consent obtained.  Risks and benefits: risks, benefits and alternatives were discussed  Consent given by: patient  Patient understanding: patient states understanding of the procedure being performed  Patient consent: the patient's understanding of the procedure matches consent given  Procedure consent: procedure consent matches procedure scheduled      Chemodenervation     Date/Time  2/7/2024 1:30 PM     Performed by  Shilpi Nicholas PA-C   Authorized by  Shilpi Nicholas PA-C     Pre-procedure details      Prepped With: Alcohol     Procedure details      Position:  Upright   Botox      Botox Type:  Type A    Brand:  Botox    mL's of Botulinum Toxin:  200    Final Concentration per CC:  100 units    Needle Gauge:  30 G 2.5 inch   Procedures      Botox Procedures: chronic headache      Indications: migraines     Injection Location      Head / Face:  L superior trapezius, R superior trapezius, L superior cervical paraspinal, R superior cervical paraspinal, L , R , procerus, L temporalis, R temporalis, R frontalis, L frontalis, R medial occipitalis and L medial occipitalis    L  injection amount:  5 unit(s)    R  injection amount:  5 unit(s)    L lateral frontalis:  5 unit(s)    R lateral frontalis:  5 unit(s)    L medial frontalis:  5 unit(s)    R medial frontalis:  5 unit(s)    L temporalis injection amount:  20 unit(s)    R temporalis injection amount:  20 unit(s)    Procerus injection amount:  5 unit(s)    L medial occipitalis injection amount:  15 unit(s)    R medial occipitalis injection amount:  15 unit(s)    L superior cervical paraspinal injection amount:  10 unit(s)    R superior cervical paraspinal injection amount:  10 unit(s)    L superior trapezius injection amount:  15 unit(s)    R superior trapezius injection amount:  15 unit(s)   Total Units      Total units used:  200    Total units discarded:  0    Post-procedure details      Chemodenervation:  Chronic migraine    Facial Nerve Location::  Bilateral facial nerve    Patient tolerance of procedure:  Tolerated well, no immediate complications   Comments       Extra units medically necessary in the L>R frontotemporal regions b/l- total 45.       Blood pressure 117/60, pulse 61, temperature 98.1 °F (36.7 °C), temperature source Temporal, not currently breastfeeding.

## 2024-03-24 DIAGNOSIS — L30.9 ECZEMA, UNSPECIFIED TYPE: ICD-10-CM

## 2024-03-25 ENCOUNTER — CLINICAL SUPPORT (OUTPATIENT)
Dept: FAMILY MEDICINE CLINIC | Facility: CLINIC | Age: 64
End: 2024-03-25
Payer: COMMERCIAL

## 2024-03-25 ENCOUNTER — APPOINTMENT (OUTPATIENT)
Dept: LAB | Facility: HOSPITAL | Age: 64
End: 2024-03-25
Payer: COMMERCIAL

## 2024-03-25 DIAGNOSIS — Z00.00 HEALTHCARE MAINTENANCE: ICD-10-CM

## 2024-03-25 DIAGNOSIS — M81.0 AGE-RELATED OSTEOPOROSIS WITHOUT CURRENT PATHOLOGICAL FRACTURE: Primary | ICD-10-CM

## 2024-03-25 LAB
ALBUMIN SERPL BCP-MCNC: 3.9 G/DL (ref 3.5–5)
ALP SERPL-CCNC: 91 U/L (ref 34–104)
ALT SERPL W P-5'-P-CCNC: 17 U/L (ref 7–52)
ANION GAP SERPL CALCULATED.3IONS-SCNC: 4 MMOL/L (ref 4–13)
AST SERPL W P-5'-P-CCNC: 25 U/L (ref 13–39)
BASOPHILS # BLD AUTO: 0.06 THOUSANDS/ÂΜL (ref 0–0.1)
BASOPHILS NFR BLD AUTO: 1 % (ref 0–1)
BILIRUB SERPL-MCNC: 0.29 MG/DL (ref 0.2–1)
BUN SERPL-MCNC: 12 MG/DL (ref 5–25)
CALCIUM SERPL-MCNC: 8.9 MG/DL (ref 8.4–10.2)
CHLORIDE SERPL-SCNC: 106 MMOL/L (ref 96–108)
CHOLEST SERPL-MCNC: 135 MG/DL
CO2 SERPL-SCNC: 30 MMOL/L (ref 21–32)
CREAT SERPL-MCNC: 0.65 MG/DL (ref 0.6–1.3)
EOSINOPHIL # BLD AUTO: 0.13 THOUSAND/ÂΜL (ref 0–0.61)
EOSINOPHIL NFR BLD AUTO: 2 % (ref 0–6)
ERYTHROCYTE [DISTWIDTH] IN BLOOD BY AUTOMATED COUNT: 17.1 % (ref 11.6–15.1)
GFR SERPL CREATININE-BSD FRML MDRD: 93 ML/MIN/1.73SQ M
GLUCOSE P FAST SERPL-MCNC: 90 MG/DL (ref 65–99)
HCT VFR BLD AUTO: 35.4 % (ref 34.8–46.1)
HDLC SERPL-MCNC: 59 MG/DL
HGB BLD-MCNC: 10.6 G/DL (ref 11.5–15.4)
IMM GRANULOCYTES # BLD AUTO: 0.01 THOUSAND/UL (ref 0–0.2)
IMM GRANULOCYTES NFR BLD AUTO: 0 % (ref 0–2)
LDLC SERPL CALC-MCNC: 58 MG/DL (ref 0–100)
LYMPHOCYTES # BLD AUTO: 2.24 THOUSANDS/ÂΜL (ref 0.6–4.47)
LYMPHOCYTES NFR BLD AUTO: 38 % (ref 14–44)
MCH RBC QN AUTO: 25.1 PG (ref 26.8–34.3)
MCHC RBC AUTO-ENTMCNC: 29.9 G/DL (ref 31.4–37.4)
MCV RBC AUTO: 84 FL (ref 82–98)
MONOCYTES # BLD AUTO: 0.4 THOUSAND/ÂΜL (ref 0.17–1.22)
MONOCYTES NFR BLD AUTO: 7 % (ref 4–12)
NEUTROPHILS # BLD AUTO: 3.04 THOUSANDS/ÂΜL (ref 1.85–7.62)
NEUTS SEG NFR BLD AUTO: 52 % (ref 43–75)
NONHDLC SERPL-MCNC: 76 MG/DL
NRBC BLD AUTO-RTO: 0 /100 WBCS
PLATELET # BLD AUTO: 226 THOUSANDS/UL (ref 149–390)
PMV BLD AUTO: 11.1 FL (ref 8.9–12.7)
POTASSIUM SERPL-SCNC: 4.6 MMOL/L (ref 3.5–5.3)
PROT SERPL-MCNC: 6.6 G/DL (ref 6.4–8.4)
RBC # BLD AUTO: 4.22 MILLION/UL (ref 3.81–5.12)
SODIUM SERPL-SCNC: 140 MMOL/L (ref 135–147)
TRIGL SERPL-MCNC: 88 MG/DL
TSH SERPL DL<=0.05 MIU/L-ACNC: 1.43 UIU/ML (ref 0.45–4.5)
WBC # BLD AUTO: 5.88 THOUSAND/UL (ref 4.31–10.16)

## 2024-03-25 PROCEDURE — 85025 COMPLETE CBC W/AUTO DIFF WBC: CPT

## 2024-03-25 PROCEDURE — 36415 COLL VENOUS BLD VENIPUNCTURE: CPT

## 2024-03-25 PROCEDURE — 80061 LIPID PANEL: CPT

## 2024-03-25 PROCEDURE — 83036 HEMOGLOBIN GLYCOSYLATED A1C: CPT

## 2024-03-25 PROCEDURE — 96372 THER/PROPH/DIAG INJ SC/IM: CPT

## 2024-03-25 PROCEDURE — 84443 ASSAY THYROID STIM HORMONE: CPT

## 2024-03-25 PROCEDURE — 80053 COMPREHEN METABOLIC PANEL: CPT

## 2024-03-25 RX ORDER — TRIAMCINOLONE ACETONIDE 1 MG/G
1 CREAM TOPICAL 2 TIMES DAILY
Qty: 80 G | Refills: 8 | Status: SHIPPED | OUTPATIENT
Start: 2024-03-25

## 2024-03-26 LAB
EST. AVERAGE GLUCOSE BLD GHB EST-MCNC: 117 MG/DL
HBA1C MFR BLD: 5.7 %

## 2024-04-01 ENCOUNTER — TELEPHONE (OUTPATIENT)
Dept: NEPHROLOGY | Facility: CLINIC | Age: 64
End: 2024-04-01

## 2024-04-02 ENCOUNTER — OFFICE VISIT (OUTPATIENT)
Dept: NEPHROLOGY | Facility: CLINIC | Age: 64
End: 2024-04-02
Payer: COMMERCIAL

## 2024-04-02 VITALS
WEIGHT: 204 LBS | TEMPERATURE: 96.4 F | RESPIRATION RATE: 16 BRPM | HEART RATE: 63 BPM | SYSTOLIC BLOOD PRESSURE: 120 MMHG | HEIGHT: 62 IN | DIASTOLIC BLOOD PRESSURE: 80 MMHG | OXYGEN SATURATION: 96 % | BODY MASS INDEX: 37.54 KG/M2

## 2024-04-02 DIAGNOSIS — N28.1 ACQUIRED RENAL CYST OF LEFT KIDNEY: Primary | ICD-10-CM

## 2024-04-02 DIAGNOSIS — I10 PRIMARY HYPERTENSION: ICD-10-CM

## 2024-04-02 PROCEDURE — 99213 OFFICE O/P EST LOW 20 MIN: CPT | Performed by: INTERNAL MEDICINE

## 2024-04-02 NOTE — PROGRESS NOTES
NEPHROLOGY OFFICE FOLLOW UP  Caryn Velasquez 63 y.o. female MRN: [id number]    Encounter: [id number] 4/2/2024    REASON FOR VISIT: Caryn Velasquez is a 63 y.o. female who is here on 4/2/2024 for Follow-up and Hypertension (Follow up)  .    HPI:    Caryn came in today for follow-up of renal cyst.  Patient is doing well overall    Does have back pain no other acute complaint    No chest pain no palpitation    No urinary complaint        REVIEW OF SYSTEMS:    Review of Systems   Constitutional:  Negative for fatigue.   HENT:  Negative for congestion.    Eyes:  Negative for photophobia and pain.   Respiratory:  Negative for chest tightness and shortness of breath.    Cardiovascular:  Negative for chest pain and palpitations.   Gastrointestinal:  Negative for abdominal distention, abdominal pain and blood in stool.   Endocrine: Negative for polydipsia.   Genitourinary:  Negative for difficulty urinating, dysuria, flank pain, hematuria and urgency.   Musculoskeletal:  Negative for arthralgias and back pain.   Skin:  Negative for rash.   Neurological:  Negative for dizziness, light-headedness and headaches.   Hematological:  Does not bruise/bleed easily.   Psychiatric/Behavioral:  Negative for behavioral problems. The patient is not nervous/anxious.          PAST MEDICAL HISTORY:  Past Medical History:   Diagnosis Date    Anxiety     Arthritis     Attention and concentration deficit     Blurred vision     Chronic pain     Chronic pain syndrome 01/20/2016    CPAP (continuous positive airway pressure) dependence     CTS (carpal tunnel syndrome) > 6 years    Left wrist    Depression     Difficulty walking > 6 years    Left nerve leg pain    Diplopia     Dyspepsia     Gastric ulcer     Gastritis     GERD (gastroesophageal reflux disease)     Head injury > 25 years    Fell off 12 foot retaining wall    Headache(784.0)     Headache, tension-type > 1 year    Pain behind left eyeAnd stiff neck with pressure    History of transfusion  2005    Hypertension     Insomnia     Irritable bowel syndrome     Memory loss     Migraines     Osteopenia     Peripheral neuropathy 3/14/2012    Left thigh, left abdomen, left prudential nerve    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    Presence of neurostimulator     Pudendal neuralgia    Pudendal neuralgia     Shingles > 30 years ago    Left facial nerve    Sleep apnea     Sleep apnea, obstructive     Spinal stenosis     Starting and stopping of urinary stream during micturition     Syncope > 10 years    When I get up quickly    Trigeminal neuralgia > 30 years    Associated with shingles on left face    Urinary incontinence     Wears eyeglasses        PAST SURGICAL HISTORY:  Past Surgical History:   Procedure Laterality Date     SECTION       SECTION      CHOLECYSTECTOMY      COLONOSCOPY      GALLBLADDER SURGERY      GASTRIC BYPASS  2004    HYSTERECTOMY  2012    INSERT / REPLACE PERIPHERAL NEUROSTIMULATOR PULSE GENERATOR /       MASS EXCISION Right 2021    Procedure: EXCISION  BIOPSY LESION/MASS LOWER NECK;  Surgeon: Brennon Browning DO;  Location: [facility];  Service: General    OTHER SURGICAL HISTORY      Reimplantatin at [facility]     IL INS/RPLC PERPH SAC/GSTRC NPG/RCVR PCKT CRTJ&CONN Left 2023    Procedure: PLACEMENT OF PERMANENT LEADWIRE AND PG FOR SNM AT S3 FORAMEN, COMPLEX PROGRAMMING OF PULSE GENERATOR;  Surgeon: Jess Shaw MD;  Location: [facility];  Service: UroGynecology           IL INSJ/RPLCMT SPINAL NPG/RCVR POCKET CRTJ&CONNJ Right 04/15/2020    Procedure: REPLACEMENT IMPLANTABLE PULSE GENERATOR FOR DORSAL SPINAL COLUMN STIMULATOR,  RIGHT BUTTOCKS;  Surgeon: Fili Mckeon MD;  Location: [facility];  Service: Neurosurgery    RADIOFREQUENCY ABLATION NERVES      SPINAL CORD STIMULATOR IMPLANT      TRIGGER POINT INJECTION      URETER SURGERY      URETHRAL FISTULA REPAIR      US GUIDED THYROID BIOPSY  2020    WRIST ARTHROSCOPY       with internal fixation        SOCIAL HISTORY:  Social History     Substance and Sexual Activity   Alcohol Use No     Social History     Substance and Sexual Activity   Drug Use Yes    Types: Marijuana    Comment: Capsules used for chronic pain.  Medical     Social History     Tobacco Use   Smoking Status Never   Smokeless Tobacco Never       FAMILY HISTORY:  Family History   Problem Relation Age of Onset    Other Mother         Back Disorder     Cirrhosis Mother     Crohn's disease Mother     Lupus Mother         Systemic Lupus Erythematous     Depression Mother     Dementia Mother         Caused by liver disease    Neuropathy Mother         Fibromyalgia    Hypertension Father     Heart disease Father     Other Brother         Liver Transplant     Crohn's disease Brother     Crohn's disease Brother     Depression Sister     No Known Problems Daughter     No Known Problems Maternal Aunt     No Known Problems Paternal Aunt     No Known Problems Cousin     No Known Problems Cousin     No Known Problems Cousin     No Known Problems Cousin     No Known Problems Cousin     Dementia Brother         Caused by liver disease    Seizures Neg Hx     Breast cancer Neg Hx        MEDICATIONS:    Current Outpatient Medications:     albuterol (Ventolin HFA) 90 mcg/act inhaler, Inhale 2 puffs every 6 (six) hours as needed for wheezing, Disp: 18 g, Rfl: 0    benzonatate (TESSALON) 200 MG capsule, Take 1 capsule (200 mg total) by mouth 3 (three) times a day as needed for cough, Disp: 20 capsule, Rfl: 0    Botox 200 units SOLR, , Disp: , Rfl:     busPIRone (BUSPAR) 10 mg tablet, Take 1 tablet (10 mg total) by mouth 2 (two) times a day, Disp: 180 tablet, Rfl: 1    Cholecalciferol 25 MCG (1000 UT) capsule, Take 1 capsule by mouth daily, Disp: , Rfl:     denosumab (PROLIA) 60 mg/mL, Inject 1 mL (60 mg total) under the skin once for 1 dose, Disp: 1 mL, Rfl: 0    dronabinol (MARINOL) 5 MG capsule, Take 1 capsule (5 mg total) by mouth 2 (two)  times a day before meals By Dr. Carrillo (Patient taking differently: Take 2.5 mg by mouth in the morning By Dr. Carrillo), Disp: 60 capsule, Rfl: 0    fluticasone (FLONASE) 50 mcg/act nasal spray, SPRAY 1 SPRAY INTO EACH NOSTRIL EVERY DAY, Disp: 16 mL, Rfl: 1    fluticasone 50 mcg/actuation diskus inhaler, INHALE 1 PUFF 2 TIMES A DAY RINSE MOUTH AFTER USE., Disp: 60 each, Rfl: 0    gabapentin (NEURONTIN) 300 mg capsule, Take 1 capsule (300 mg total) by mouth 2 (two) times a day, Disp: 180 capsule, Rfl: 1    Galcanezumab-gnlm (Emgality) 120 MG/ML SOAJ, USE 1 INJECTION UNDER THE SKIN EVERY MONTH, Disp: 3 mL, Rfl: 3    Iron-Vitamin C 100-250 MG TABS, Take 1 tablet by mouth daily, Disp: , Rfl:     lidocaine (XYLOCAINE) 5 % ointment, Apply topically 2 (two) times a day as needed for moderate pain, Disp: 35.44 g, Rfl: 1    lisinopril (ZESTRIL) 5 mg tablet, TAKE 1 TABLET DAILY, Disp: 90 tablet, Rfl: 3    Multiple Vitamin (MULTI-VITAMIN DAILY) TABS, Take 1 tablet by mouth daily, Disp: , Rfl:     ondansetron (ZOFRAN-ODT) 4 mg disintegrating tablet, PLACE 1 TABLET ON THE TONGUE EVERY 6 HOURS AS NEEDED FOR NAUSEA OR VOMITING, Disp: 30 tablet, Rfl: 5    pantoprazole (PROTONIX) 40 mg tablet, TAKE 1 TABLET TWICE A DAY, Disp: 180 tablet, Rfl: 3    promethazine-dextromethorphan (PHENERGAN-DM) 6.25-15 mg/5 mL oral syrup, Take 5 mL by mouth 4 (four) times a day as needed for cough, Disp: 240 mL, Rfl: 0    propranolol (INDERAL) 60 mg tablet, TAKE 1 TABLET TWICE A DAY (Patient taking differently: in the morning), Disp: 180 tablet, Rfl: 3    QUEtiapine (SEROquel) 25 mg tablet, Take 1 tablet (25 mg total) by mouth daily at bedtime, Disp: 90 tablet, Rfl: 1    rizatriptan (MAXALT-MLT) 10 mg disintegrating tablet, Take at the onset of migraine; if symptoms continue or return, may take another dose at least 2 hours after first dose. Take no more than 2 doses in a day., Disp: 12 tablet, Rfl: 5    rosuvastatin (CRESTOR) 10 MG tablet, TAKE 1 TABLET  "DAILY, Disp: 90 tablet, Rfl: 3    sertraline (ZOLOFT) 100 mg tablet, Take 1 tablet (100 mg total) by mouth daily, Disp: 90 tablet, Rfl: 1    triamcinolone (KENALOG) 0.1 % cream, APPLY TOPICALLY TWICE A DAY, Disp: 80 g, Rfl: 8    PHYSICAL EXAM:  Vitals:    04/02/24 1530   BP: 120/80   BP Location: Right arm   Patient Position: Sitting   Pulse: 63   Resp: 16   Temp: (!) 96.4 °F (35.8 °C)   TempSrc: Temporal   SpO2: 96%   Weight: 92.5 kg (204 lb)   Height: 5' 2\" (1.575 m)     Body mass index is 37.31 kg/m².    Physical Exam  Constitutional:       General: She is not in acute distress.     Appearance: She is well-developed.   HENT:      Head: Normocephalic.      Mouth/Throat:      Mouth: Mucous membranes are moist.   Eyes:      General: No scleral icterus.     Conjunctiva/sclera: Conjunctivae normal.   Neck:      Vascular: No JVD.   Cardiovascular:      Rate and Rhythm: Normal rate.      Heart sounds: Normal heart sounds.   Pulmonary:      Effort: Pulmonary effort is normal.      Breath sounds: No wheezing.   Abdominal:      Palpations: Abdomen is soft.      Tenderness: There is no abdominal tenderness.   Musculoskeletal:         General: Normal range of motion.      Cervical back: Neck supple.   Skin:     General: Skin is warm.      Findings: No rash.   Neurological:      Mental Status: She is alert and oriented to person, place, and time.   Psychiatric:         Behavior: Behavior normal.         LAB RESULTS:  Results for orders placed or performed in visit on 03/25/24   CBC and differential   Result Value Ref Range    WBC 5.88 4.31 - 10.16 Thousand/uL    RBC 4.22 3.81 - 5.12 Million/uL    Hemoglobin 10.6 (L) 11.5 - 15.4 g/dL    Hematocrit 35.4 34.8 - 46.1 %    MCV 84 82 - 98 fL    MCH 25.1 (L) 26.8 - 34.3 pg    MCHC 29.9 (L) 31.4 - 37.4 g/dL    RDW 17.1 (H) 11.6 - 15.1 %    MPV 11.1 8.9 - 12.7 fL    Platelets 226 149 - 390 Thousands/uL    nRBC 0 /100 WBCs    Neutrophils Relative 52 43 - 75 %    Immature Grans % 0 0 - " "2 %    Lymphocytes Relative 38 14 - 44 %    Monocytes Relative 7 4 - 12 %    Eosinophils Relative 2 0 - 6 %    Basophils Relative 1 0 - 1 %    Neutrophils Absolute 3.04 1.85 - 7.62 Thousands/µL    Absolute Immature Grans 0.01 0.00 - 0.20 Thousand/uL    Absolute Lymphocytes 2.24 0.60 - 4.47 Thousands/µL    Absolute Monocytes 0.40 0.17 - 1.22 Thousand/µL    Eosinophils Absolute 0.13 0.00 - 0.61 Thousand/µL    Basophils Absolute 0.06 0.00 - 0.10 Thousands/µL   Comprehensive metabolic panel   Result Value Ref Range    Sodium 140 135 - 147 mmol/L    Potassium 4.6 3.5 - 5.3 mmol/L    Chloride 106 96 - 108 mmol/L    CO2 30 21 - 32 mmol/L    ANION GAP 4 4 - 13 mmol/L    BUN 12 5 - 25 mg/dL    Creatinine 0.65 0.60 - 1.30 mg/dL    Glucose, Fasting 90 65 - 99 mg/dL    Calcium 8.9 8.4 - 10.2 mg/dL    AST 25 13 - 39 U/L    ALT 17 7 - 52 U/L    Alkaline Phosphatase 91 34 - 104 U/L    Total Protein 6.6 6.4 - 8.4 g/dL    Albumin 3.9 3.5 - 5.0 g/dL    Total Bilirubin 0.29 0.20 - 1.00 mg/dL    eGFR 93 ml/min/1.73sq m   Lipid panel   Result Value Ref Range    Cholesterol 135 See Comment mg/dL    Triglycerides 88 See Comment mg/dL    HDL, Direct 59 >=50 mg/dL    LDL Calculated 58 0 - 100 mg/dL    Non-HDL-Chol (CHOL-HDL) 76 mg/dl   Hemoglobin A1C   Result Value Ref Range    Hemoglobin A1C 5.7 (H) Normal 4.0-5.6%; PreDiabetic 5.7-6.4%; Diabetic >=6.5%; Glycemic control for adults with diabetes <7.0% %     mg/dl   TSH, 3rd generation with Free T4 reflex   Result Value Ref Range    TSH 3RD GENERATON 1.426 0.450 - 4.500 uIU/mL       ASSESSMENT and PLAN:      Acquired renal cyst of left kidney  Benign in nature with recurrent ultrasound.  Can be followed by primary doctor    Hypertension  Well-controlled without medication          Patient is quite stable.  Will see her back on a as needed basis at your request    Portions of the record may have been created with voice recognition software. Occasional wrong word or \"sound a like\" " substitutions may have occurred due to the inherent limitations of voice recognition software. Read the chart carefully and recognize, using context, where substitutions have occurred.If you have any questions, please contact the dictating provider.

## 2024-04-08 ENCOUNTER — HOSPITAL ENCOUNTER (OUTPATIENT)
Dept: RADIOLOGY | Facility: HOSPITAL | Age: 64
Discharge: HOME | End: 2024-04-08
Payer: COMMERCIAL

## 2024-04-08 ENCOUNTER — OFFICE VISIT (OUTPATIENT)
Dept: FAMILY MEDICINE CLINIC | Facility: CLINIC | Age: 64
End: 2024-04-08
Payer: COMMERCIAL

## 2024-04-08 VITALS
OXYGEN SATURATION: 99 % | SYSTOLIC BLOOD PRESSURE: 120 MMHG | BODY MASS INDEX: 37.43 KG/M2 | HEART RATE: 68 BPM | RESPIRATION RATE: 16 BRPM | DIASTOLIC BLOOD PRESSURE: 80 MMHG | WEIGHT: 203.4 LBS | HEIGHT: 62 IN

## 2024-04-08 DIAGNOSIS — I10 PRIMARY HYPERTENSION: ICD-10-CM

## 2024-04-08 DIAGNOSIS — M81.0 AGE-RELATED OSTEOPOROSIS WITHOUT CURRENT PATHOLOGICAL FRACTURE: ICD-10-CM

## 2024-04-08 DIAGNOSIS — F33.1 MODERATE EPISODE OF RECURRENT MAJOR DEPRESSIVE DISORDER (HCC): ICD-10-CM

## 2024-04-08 DIAGNOSIS — E78.2 MIXED HYPERLIPIDEMIA: ICD-10-CM

## 2024-04-08 DIAGNOSIS — K21.9 GASTROESOPHAGEAL REFLUX DISEASE WITHOUT ESOPHAGITIS: ICD-10-CM

## 2024-04-08 DIAGNOSIS — Z12.83 SCREENING FOR MALIGNANT NEOPLASM OF SKIN: ICD-10-CM

## 2024-04-08 DIAGNOSIS — G43.719 INTRACTABLE CHRONIC MIGRAINE WITHOUT AURA AND WITHOUT STATUS MIGRAINOSUS: ICD-10-CM

## 2024-04-08 DIAGNOSIS — M25.552 LEFT HIP PAIN: ICD-10-CM

## 2024-04-08 DIAGNOSIS — E66.01 OBESITY, MORBID (HCC): ICD-10-CM

## 2024-04-08 DIAGNOSIS — D50.9 IRON DEFICIENCY ANEMIA, UNSPECIFIED IRON DEFICIENCY ANEMIA TYPE: ICD-10-CM

## 2024-04-08 DIAGNOSIS — Z00.00 HEALTHCARE MAINTENANCE: ICD-10-CM

## 2024-04-08 DIAGNOSIS — Z90.3 POSTGASTRECTOMY MALABSORPTION: ICD-10-CM

## 2024-04-08 DIAGNOSIS — M46.1 SACROILIITIS (HCC): ICD-10-CM

## 2024-04-08 DIAGNOSIS — K91.2 POSTGASTRECTOMY MALABSORPTION: ICD-10-CM

## 2024-04-08 DIAGNOSIS — Z12.31 ENCOUNTER FOR SCREENING MAMMOGRAM FOR MALIGNANT NEOPLASM OF BREAST: Primary | ICD-10-CM

## 2024-04-08 PROBLEM — U07.1 COVID-19: Status: RESOLVED | Noted: 2022-07-22 | Resolved: 2024-04-08

## 2024-04-08 PROCEDURE — 73502 X-RAY EXAM HIP UNI 2-3 VIEWS: CPT

## 2024-04-08 PROCEDURE — 99214 OFFICE O/P EST MOD 30 MIN: CPT | Performed by: NURSE PRACTITIONER

## 2024-04-08 NOTE — PROGRESS NOTES
Name: Caryn Velasquez      : 1960      MRN: [id number]  Encounter Provider: GWEN Black  Encounter Date: 2024   Encounter department: [facility] [address] [place]    Assessment & Plan     1. Encounter for screening mammogram for malignant neoplasm of breast  -     Mammo screening bilateral w 3d & cad; Future    2. Screening for malignant neoplasm of skin  -     Ambulatory Referral to Dermatology; Future    3. Healthcare maintenance  -     CBC and differential; Future  -     Comprehensive metabolic panel; Future  -     Hemoglobin A1C; Future  -     Lipid panel; Future  -     TSH, 3rd generation with Free T4 reflex; Future    4. Iron deficiency anemia, unspecified iron deficiency anemia type  -     Iron Panel (Includes Ferritin, Iron Sat%, Iron, and TIBC); Future    5. Sacroiliitis (HCC)    6. Moderate episode of recurrent major depressive disorder (HCC)  Assessment & Plan:  Doing well with current dose of Zoloft.      7. Obesity, morbid (HCC)  Assessment & Plan:  Great job with weight loss.  Continue exercise as tolerated and healthy diet.      8. Left hip pain  -     XR hip/pelv 2-3 vws left if performed; Future; Expected date: 2024    9. Primary hypertension  Assessment & Plan:  Blood pressure is well-managed with lisinopril daily.      10. Intractable chronic migraine without aura and without status migrainosus  Assessment & Plan:  Fairly well-managed with current meds.      11. Gastroesophageal reflux disease without esophagitis    12. Postgastrectomy malabsorption    13. Age-related osteoporosis without current pathological fracture  Assessment & Plan:  Continues on Prolia.      14. Mixed hyperlipidemia  Assessment & Plan:  Lipid panel well-managed with rosuvastatin daily.             Subjective      Patient presents for routine follow up. She is concerned with left hip pain since coming home from her sister's house in [place].       Review of Systems    Constitutional:  Negative for chills and fever.   HENT:  Negative for ear pain and sore throat.    Eyes:  Negative for pain and visual disturbance.   Respiratory:  Negative for cough and shortness of breath.    Cardiovascular:  Negative for chest pain and palpitations.   Gastrointestinal:  Negative for abdominal pain and vomiting.   Genitourinary:  Negative for dysuria and hematuria.   Musculoskeletal:  Positive for arthralgias (left hip). Negative for back pain.   Skin:  Negative for color change and rash.   Neurological:  Negative for dizziness, seizures, syncope, light-headedness and headaches.   Psychiatric/Behavioral:  Negative for dysphoric mood. The patient is not nervous/anxious.    All other systems reviewed and are negative.      Current Outpatient Medications on File Prior to Visit   Medication Sig    albuterol (Ventolin HFA) 90 mcg/act inhaler Inhale 2 puffs every 6 (six) hours as needed for wheezing    Botox 200 units SOLR     busPIRone (BUSPAR) 10 mg tablet Take 1 tablet (10 mg total) by mouth 2 (two) times a day    Cholecalciferol 25 MCG (1000 UT) capsule Take 1 capsule by mouth daily    dronabinol (MARINOL) 5 MG capsule Take 1 capsule (5 mg total) by mouth 2 (two) times a day before meals By Dr. Carrillo (Patient taking differently: Take 2.5 mg by mouth in the morning By Dr. Carrillo)    fluticasone (FLONASE) 50 mcg/act nasal spray SPRAY 1 SPRAY INTO EACH NOSTRIL EVERY DAY    fluticasone 50 mcg/actuation diskus inhaler INHALE 1 PUFF 2 TIMES A DAY RINSE MOUTH AFTER USE.    gabapentin (NEURONTIN) 300 mg capsule Take 1 capsule (300 mg total) by mouth 2 (two) times a day    Galcanezumab-gnlm (Emgality) 120 MG/ML SOAJ USE 1 INJECTION UNDER THE SKIN EVERY MONTH    Iron-Vitamin C 100-250 MG TABS Take 1 tablet by mouth daily    lisinopril (ZESTRIL) 5 mg tablet TAKE 1 TABLET DAILY    Multiple Vitamin (MULTI-VITAMIN DAILY) TABS Take 1 tablet by mouth daily    pantoprazole (PROTONIX) 40 mg tablet TAKE 1 TABLET  "TWICE A DAY    propranolol (INDERAL) 60 mg tablet TAKE 1 TABLET TWICE A DAY (Patient taking differently: in the morning)    QUEtiapine (SEROquel) 25 mg tablet Take 1 tablet (25 mg total) by mouth daily at bedtime    rizatriptan (MAXALT-MLT) 10 mg disintegrating tablet Take at the onset of migraine; if symptoms continue or return, may take another dose at least 2 hours after first dose. Take no more than 2 doses in a day.    rosuvastatin (CRESTOR) 10 MG tablet TAKE 1 TABLET DAILY    sertraline (ZOLOFT) 100 mg tablet Take 1 tablet (100 mg total) by mouth daily    triamcinolone (KENALOG) 0.1 % cream APPLY TOPICALLY TWICE A DAY    benzonatate (TESSALON) 200 MG capsule Take 1 capsule (200 mg total) by mouth 3 (three) times a day as needed for cough    denosumab (PROLIA) 60 mg/mL Inject 1 mL (60 mg total) under the skin once for 1 dose    lidocaine (XYLOCAINE) 5 % ointment Apply topically 2 (two) times a day as needed for moderate pain    ondansetron (ZOFRAN-ODT) 4 mg disintegrating tablet PLACE 1 TABLET ON THE TONGUE EVERY 6 HOURS AS NEEDED FOR NAUSEA OR VOMITING    [DISCONTINUED] promethazine-dextromethorphan (PHENERGAN-DM) 6.25-15 mg/5 mL oral syrup Take 5 mL by mouth 4 (four) times a day as needed for cough (Patient not taking: Reported on 4/8/2024)       Objective     /80   Pulse 68   Resp 16   Ht 5' 2\" (1.575 m)   Wt 92.3 kg (203 lb 6.4 oz)   SpO2 99%   BMI 37.20 kg/m²     Physical Exam  Constitutional:       Appearance: She is well-developed.   Cardiovascular:      Rate and Rhythm: Normal rate and regular rhythm.      Heart sounds: Normal heart sounds. No murmur heard.  Pulmonary:      Effort: Pulmonary effort is normal. No respiratory distress.      Breath sounds: Normal breath sounds.   Skin:     General: Skin is warm and dry.   Neurological:      Mental Status: She is alert and oriented to person, place, and time.       GWEN Black    "

## 2024-04-09 DIAGNOSIS — M25.552 LEFT HIP PAIN: Primary | ICD-10-CM

## 2024-04-17 ENCOUNTER — EVALUATION (OUTPATIENT)
Dept: PHYSICAL THERAPY | Facility: CLINIC | Age: 64
End: 2024-04-17
Payer: MEDICARE

## 2024-04-17 DIAGNOSIS — M25.552 LEFT HIP PAIN: Primary | ICD-10-CM

## 2024-04-17 PROCEDURE — 97110 THERAPEUTIC EXERCISES: CPT | Performed by: PHYSICAL THERAPIST

## 2024-04-17 PROCEDURE — 97161 PT EVAL LOW COMPLEX 20 MIN: CPT | Performed by: PHYSICAL THERAPIST

## 2024-04-17 NOTE — PROGRESS NOTES
PT Evaluation     Today's date: 2024  Patient name: Caryn Velasquez  : 1960  MRN: [id number]  Referring provider: Jazmin August CRNP  Dx:   Encounter Diagnosis     ICD-10-CM    1. Left hip pain  M25.552 Ambulatory Referral to Physical Therapy                     Assessment  Assessment details: Patient was provided a home exercise program and demonstrated an understanding of exercises.  Patient was advised to stop performing home exercise program if symptoms increase or new complaints developed.  Verbal understanding demonstrated regarding home exercise program instructions.  Patient would benefit from skilled physical therapy services for prescribed exercises, manual interventions, neuromuscular re-education, education, and modalities as deemed appropriate to assist patient in achieving their maximum level of function.    Patient presents with c/o Left central LS pain as well as left lateral hip pain and intermittent tingling anterior thigh - knee.   Evaluation reveals:  loss of trunk mobility, core weakness, bilateral hip weakness, left leg length discrepancy , decreased postural awareness and self correction, (+) tenderness left greater trochanter ( likely bursitis left hip as well )     She presents motivated to reduce/ abolish her pain and symptoms and to return to full/ painfree state.     Impairments: abnormal or restricted ROM, activity intolerance, impaired physical strength, lacks appropriate home exercise program, pain with function and poor posture   Understanding of Dx/Px/POC: good  Goals  STG   1.  Patient will demonstrate independence and competence with HEP 2 -4 weeks  2.  Patient will report > 25-50% reduced pain 2-4 weeks    LTG   1.  Patient will report improvements with both functional and recreational abilities  4-6 weeks  2.  Patient will demonstrate improved motor function  4-6 weeks.   3.  Restoration of painfree trunk mobility   4-8 weeks  4.  Improved core stabilization  4-8 weeks.  "    Plan  Plan details: Patient response to treatment will be monitored each session and progressed accordingly    Thank you for this referral.   Patient would benefit from: skilled physical therapy  Planned modality interventions: thermotherapy: hydrocollator packs and cryotherapy  Planned therapy interventions: IADL retraining, joint mobilization, manual therapy, patient education, postural training, strengthening, stretching, therapeutic exercise, flexibility, home exercise program, IASTM and neuromuscular re-education  Frequency: 2x week  Plan of Care expiration date: 6/17/2024  Treatment plan discussed with: patient    Subjective Evaluation    History of Present Illness  Mechanism of injury: Patient reports that she went to [place] approximately 1 month after 6 week visit   while there, she felt great.   [place]- she started having pain central LS/ lateral hip region ( 2 locations )    she would get occasional sharp pains in same region.     Now - she is slighlty improved from a month ago.    Symptoms are left ls - hip with radicular symptoms anterior to left knee.   Left groin - adductor with numbness adductor, left leg lateral aspect (these symptoms have been going on for quite awhile)    Losing weight - has helped to lessen the pain all over.     Sleep (+) disruption .  B/b - long standing disruption     Recent diagnosed with Osteoporosis  - has lost 3\" prompting bone density scan .  Patient Goals  Patient goals for therapy: decreased pain, return to sport/leisure activities and independence with ADLs/IADLs    Pain  Quality: dull ache and radiating (\"electrical down the leg\")  Relieving factors: change in position (tylenol)  Aggravating factors: sitting (heavy cleaning)  Progression: improved    Social Support  Lives with: spouse        Objective     Concurrent Complaints  Positive for disturbed sleep.     Additional Special Questions  Patient has long standing bladder " issues  - no change with recent symptoms    Postural Observations  Seated posture: fair  Standing posture: fair  Correction of posture: has no consistent effect    Additional Postural Observation Details   slight shirt right ( recent x-ray revealed mild scoliosis )     Palpation   Left   Tenderness of the lumbar paraspinals.     Right   Tenderness of the lumbar paraspinals.     Tenderness     Lumbar Spine  Tenderness in the spinous process.     Left Hip   Tenderness in the PSIS.     Right Hip   Tenderness in the PSIS.     Additional Tenderness Details  Hypersensitive to gentle spinal palpation , palpation L5-S1 region     Neurological Testing     Sensation     Lumbar   Left   Intact: light touch    Right   Intact: light touch    Active Range of Motion     Lumbar   Flexion:  Restriction level: moderate  Extension:  Restriction level: minimal    Joint Play     Hypomobile: L2, L3, L4, L5 and S1     Pain: L2, L3, L4, L5 and S1   Mechanical Assessment    Cervical      Thoracic      Lumbar    Standing flexion: repeated movements   Pain location:no change  Pain level: produced  Lying flexion: repeated movements  Pain location: no change  Standing extension: repeated movements  Pain location: no change  Lying extension: repeated movements  Pain location: no change  Pain level: produced  Left Sidegliding: repeated movements  Pain location: no change  Pain level: increased  Right sidegliding: repeated movements  Pain location: no change  Pain level: decreased    Strength/Myotome Testing     Left Hip   Planes of Motion   Flexion: 4  Extension: 4  Abduction: 4  External rotation: 4+  Internal rotation: 4+    Right Hip   Planes of Motion   Flexion: 4  Extension: 4  Abduction: 4  External rotation: 4+  Internal rotation: 4+    Left Knee   Flexion: 5  Extension: 5    Right Knee   Flexion: 5  Extension: 5    Left Ankle/Foot   Dorsiflexion: 5    Right Ankle/Foot   Dorsiflexion: 5    Tests     Lumbar     Left   Negative  crossed SLR, femoral stretch, passive SLR and slump test.     Right   Negative crossed SLR, femoral stretch, passive SLR and slump test.     Left Hip   Negative MANASA and FADIR.     Right Hip   Positive MANASA and FADIR.     Additional Tests Details  Leg length supine - sit  left equal - long,  MET - corrected to equal     General Comments:      Lumbar Comments  Gait - slowed, heel - toe progression            Precautions: Left hip pain   PMH: recently dx with osteoporosis     [email or web address]  Access Code: [email or web address]    POC expires Unit limit Auth Expiration date PT/OT/ST + Visit Limit?   6/17/24 4 na bomn                           Visit/Unit Tracking  AUTH Status:  Date 4/17              na Used 1               Remaining                          Manuals 4/17                                                                Neuro Re-Ed                          Leg length check db            MET L-ext, R - add 10s x 3             MET - self w/ cane 10s x 3                         HL TA w/ roll 5s x 20            HL TB hip abd Grn x 20            TB clamshells Grn x 20             HL TA marching                                                                 Ther Ex             bike             Prone GS 3s x 20             Prone alt hip ext 20x            Prop on elbows 1 min            Press ups  10x                                                                                                                                  Ther Activity                                       Gait Training                                       Modalities

## 2024-04-24 ENCOUNTER — APPOINTMENT (OUTPATIENT)
Dept: PHYSICAL THERAPY | Facility: CLINIC | Age: 64
End: 2024-04-24
Payer: COMMERCIAL

## 2024-04-26 ENCOUNTER — OFFICE VISIT (OUTPATIENT)
Dept: PHYSICAL THERAPY | Facility: CLINIC | Age: 64
End: 2024-04-26
Payer: COMMERCIAL

## 2024-04-26 DIAGNOSIS — M25.552 LEFT HIP PAIN: Primary | ICD-10-CM

## 2024-04-26 PROCEDURE — 97112 NEUROMUSCULAR REEDUCATION: CPT | Performed by: PHYSICAL THERAPIST

## 2024-04-26 PROCEDURE — 97110 THERAPEUTIC EXERCISES: CPT | Performed by: PHYSICAL THERAPIST

## 2024-04-26 NOTE — PROGRESS NOTES
"Daily Note     Today's date: 2024  Patient name: Caryn Velasquez  : 1960    MRN: [id number]  Referring provider: Jazmin August CRNP  Dx:   Encounter Diagnosis     ICD-10-CM    1. Left hip pain  M25.552                      Subjective: patient reports that when she left 1st appointment - she had 2 painful days with left hip/ lbp.    Today, she is \"achey and stiff \"   unsure of any particular exercise that was the irritant.  Notes random \"pinch or grab\" left LS region       Objective: See treatment diary below      Assessment: Tolerated treatment fair. Patient would benefit from continued PT      Plan: Continue per plan of care.  Progress treatment as tolerated.             Precautions: Left hip pain   PMH: recently dx with osteoporosis     [email or web address] [email or web address]  Access Code: [email or web address]    POC expires Unit limit Auth Expiration date PT/OT/ST + Visit Limit?   24 4 na bomn                           Visit/Unit Tracking  AUTH Status:  Date              na Used 1 2              Remaining                          Manuals                                      Left long leg traction  3x P!                          Neuro Re-Ed                          Leg length check db Even            MET L-ext, R - add 10s x 3  HELD           MET - self w/ cane 10s x 3 HELD                         HL TA w/ roll 5s x 20 5s x 20            HL TB hip abd Grn x 20 Grn x 20            TB clamshells Grn x 20  Grn x 20            HL TA marching  Alt x 20                                                                Ther Ex             bike  7'            Prone GS 3s x 20  3s x 20            Prone alt hip ext 20x 20x            Prop on elbows 1 min W/ ex           Press ups  10x  10x                         Stand SG Left  5 x 2           Stand back ext  5 x 2                        Stand hip abd, hr  20x ea                                                                Ther Activity                                 "       Gait Training                                       Modalities             Mhp prone ls  10'

## 2024-04-29 ENCOUNTER — TELEMEDICINE (OUTPATIENT)
Dept: PSYCHIATRY | Facility: CLINIC | Age: 64
End: 2024-04-29

## 2024-04-29 DIAGNOSIS — R51.9 NONINTRACTABLE HEADACHE, UNSPECIFIED CHRONICITY PATTERN, UNSPECIFIED HEADACHE TYPE: ICD-10-CM

## 2024-04-29 DIAGNOSIS — F33.1 MODERATE EPISODE OF RECURRENT MAJOR DEPRESSIVE DISORDER (HCC): Primary | ICD-10-CM

## 2024-04-29 DIAGNOSIS — F41.0 GENERALIZED ANXIETY DISORDER WITH PANIC ATTACKS: ICD-10-CM

## 2024-04-29 DIAGNOSIS — F41.1 GENERALIZED ANXIETY DISORDER WITH PANIC ATTACKS: ICD-10-CM

## 2024-04-29 DIAGNOSIS — G47.00 INSOMNIA, UNSPECIFIED TYPE: ICD-10-CM

## 2024-04-29 DIAGNOSIS — M79.2 NEURALGIA: ICD-10-CM

## 2024-04-29 RX ORDER — QUETIAPINE FUMARATE 25 MG/1
25 TABLET, FILM COATED ORAL
Qty: 90 TABLET | Refills: 1 | Status: SHIPPED | OUTPATIENT
Start: 2024-04-29 | End: 2024-10-26

## 2024-04-29 RX ORDER — BUSPIRONE HYDROCHLORIDE 10 MG/1
10 TABLET ORAL 2 TIMES DAILY
Qty: 180 TABLET | Refills: 1 | Status: SHIPPED | OUTPATIENT
Start: 2024-04-29 | End: 2024-10-26

## 2024-04-29 RX ORDER — KETOCONAZOLE 20 MG/ML
SHAMPOO TOPICAL
COMMUNITY
Start: 2024-04-24

## 2024-04-29 RX ORDER — TACROLIMUS 1 MG/G
OINTMENT TOPICAL
COMMUNITY
Start: 2024-04-24

## 2024-04-29 RX ORDER — SERTRALINE HYDROCHLORIDE 100 MG/1
100 TABLET, FILM COATED ORAL DAILY
Qty: 90 TABLET | Refills: 1 | Status: SHIPPED | OUTPATIENT
Start: 2024-04-29 | End: 2024-10-26

## 2024-04-29 RX ORDER — KETOCONAZOLE 20 MG/G
CREAM TOPICAL
COMMUNITY
Start: 2024-04-24

## 2024-04-29 RX ORDER — CLOBETASOL PROPIONATE 0.5 MG/G
OINTMENT TOPICAL
COMMUNITY
Start: 2024-04-24

## 2024-04-29 RX ORDER — GABAPENTIN 300 MG/1
300 CAPSULE ORAL 2 TIMES DAILY
Qty: 180 CAPSULE | Refills: 1 | Status: SHIPPED | OUTPATIENT
Start: 2024-04-29 | End: 2024-10-26
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
## 2024-04-30 ENCOUNTER — OFFICE VISIT (OUTPATIENT)
Dept: PHYSICAL THERAPY | Facility: CLINIC | Age: 64
End: 2024-04-30
Payer: COMMERCIAL

## 2024-04-30 DIAGNOSIS — M25.552 LEFT HIP PAIN: Primary | ICD-10-CM

## 2024-04-30 PROCEDURE — 97110 THERAPEUTIC EXERCISES: CPT

## 2024-04-30 PROCEDURE — 97112 NEUROMUSCULAR REEDUCATION: CPT

## 2024-04-30 NOTE — PROGRESS NOTES
Daily Note     Today's date: 2024  Patient name: Caryn Velasquez  : 1960  MRN: [id number]  Referring provider: Jazmin August CRNP  Dx:   Encounter Diagnosis     ICD-10-CM    1. Left hip pain  M25.552                      Subjective: Patient reported compliance with current program without limitations or reported exacerbation of discomfort.      Objective: See treatment diary below      Assessment: Tolerated treatment well. Patient exhibited good technique with therapeutic exercises and would benefit from continued PT      Plan: Continue per plan of care.  Progress treatment as tolerated.       Precautions: Left hip pain   PMH: recently dx with osteoporosis     [email or web address]  Access Code: [email or web address]    POC expires Unit limit Auth Expiration date PT/OT/ST + Visit Limit?   24 4 na bomn                           Visit/Unit Tracking  AUTH Status:  Date             na Used 1 2 3             Remaining                          Manuals                                     Left long leg traction  3x P!                          Neuro Re-Ed                          Leg length check db Even  even          MET L-ext, R - add 10s x 3  HELD           MET - self w/ cane 10s x 3 HELD                         HL TA w/ roll 5s x 20 5s x 20  :05  20x          HL TB hip abd Grn x 20 Grn x 20  GTB  20x          TB clamshells Grn x 20  Grn x 20  GTB  20x          HL TA marching  Alt x 20  Alt  20x                                                              Ther Ex             bike  7'  7'          Prone GS 3s x 20  3s x 20  :03  20x          Prone alt hip ext 20x 20x            Prop on elbows 1 min W/ ex W/ TE          Press ups  10x  10x  10x2                       Stand SG Left  5 x 2 10x2          Stand back ext  5 x 2 10x2                       Stand hip abd, hr  20x ea  20x each                                                              Ther Activity                                        Gait Training                                       Modalities             Mhp prone ls  10'

## 2024-05-03 ENCOUNTER — OFFICE VISIT (OUTPATIENT)
Dept: PHYSICAL THERAPY | Facility: CLINIC | Age: 64
End: 2024-05-03
Payer: COMMERCIAL

## 2024-05-03 DIAGNOSIS — M25.552 LEFT HIP PAIN: Primary | ICD-10-CM

## 2024-05-03 PROCEDURE — 97110 THERAPEUTIC EXERCISES: CPT

## 2024-05-03 PROCEDURE — 97112 NEUROMUSCULAR REEDUCATION: CPT

## 2024-05-03 NOTE — PROGRESS NOTES
Daily Note     Today's date: 5/3/2024  Patient name: Caryn Velasquez  : 1960  MRN: [id number]  Referring provider: Jazmin August CRNP  Dx:   Encounter Diagnosis     ICD-10-CM    1. Left hip pain  M25.552                      Subjective: Patient offers no new complaints.       Objective: See treatment diary below      Assessment: Patient doing well with program as charted. Performed with min hip discomfort. Patient demonstrated fatigue post treatment and would benefit from continued PT      Plan: Progress treatment as tolerated.       Precautions: Left hip pain   PMH: recently dx with osteoporosis     [email or web address]  Access Code: [email or web address]    POC expires Unit limit Auth Expiration date PT/OT/ST + Visit Limit?   24 4 na bomn                           Visit/Unit Tracking  AUTH Status:  Date 4/17 4/26 4/30 5/3 DO FOTO          na Used 1 2 3 4            Remaining                          Manuals 4/17 4/26 4/30 5/3                                   Left long leg traction  3x P!                          Neuro Re-Ed                          Leg length check db Even  even even         MET L-ext, R - add 10s x 3  HELD           MET - self w/ cane 10s x 3 HELD                         HL TA w/ roll 5s x 20 5s x 20  :05  20x :05 20          HL TB hip abd Grn x 20 Grn x 20  GTB  20x GTB 20x         TB clamshells Grn x 20  Grn x 20  GTB  20x GTB 20x          HL TA marching  Alt x 20  Alt  20x Alt 20x                                                              Ther Ex             bike  7'  7' 7'         Prone GS 3s x 20  3s x 20  :03  20x :03 20x          Prone alt hip ext 20x 20x   20x         Prop on elbows 1 min W/ ex W/ TE W TE         Press ups  10x  10x  10x2 10x2                      Stand SG Left  5 x 2 10x2 10x2         Stand back ext  5 x 2 10x2 10x2                      Stand hip abd, hr  20x ea  20x each 20x ea                                                             Ther Activity                                        Gait Training                                       Modalities             Mhp prone ls  10'   10'

## 2024-05-07 ENCOUNTER — OFFICE VISIT (OUTPATIENT)
Dept: PHYSICAL THERAPY | Facility: CLINIC | Age: 64
End: 2024-05-07
Payer: COMMERCIAL

## 2024-05-07 DIAGNOSIS — M25.552 LEFT HIP PAIN: Primary | ICD-10-CM

## 2024-05-07 PROCEDURE — 97112 NEUROMUSCULAR REEDUCATION: CPT

## 2024-05-07 PROCEDURE — 97110 THERAPEUTIC EXERCISES: CPT

## 2024-05-07 NOTE — PROGRESS NOTES
Daily Note     Today's date: 2024  Patient name: Caryn Velasquez  : 1960  MRN: [id number]  Referring provider: Jazmin August CRNP  Dx:   Encounter Diagnosis     ICD-10-CM    1. Left hip pain  M25.552                      Subjective: Patient noted elevated activity/ADL performance at home.  Initially patient noted increased left hip discomfort secondary to increased motion, although post intervention, patient denied pain.      Objective: See treatment diary below      Assessment: Tolerated treatment well. Patient demonstrated fatigue post treatment, exhibited good technique with therapeutic exercises, and would benefit from continued PT      Plan: Continue per plan of care.  Progress treatment as tolerated.       Precautions: Left hip pain   PMH: recently dx with osteoporosis     [email or web address]  Access Code: [email or web address]    POC expires Unit limit Auth Expiration date PT/OT/ST + Visit Limit?   24 4 na bomn                           Visit/Unit Tracking  AUTH Status:  Date 4/17 4/26 4/30 5/3 5/7          na Used 1 2 3 4 5  FOTO           Remaining                          Manuals 4/17 4/26 4/30 5/3 5/7                                  Left long leg traction  3x P!                          Neuro Re-Ed                          Leg length check db Even  even even even        MET L-ext, R - add 10s x 3  HELD           MET - self w/ cane 10s x 3 HELD                         HL TA w/ roll 5s x 20 5s x 20  :05  20x :05 20  :05  20x        HL TB hip abd Grn x 20 Grn x 20  GTB  20x GTB 20x BTB  20x        TB clamshells Grn x 20  Grn x 20  GTB  20x GTB 20x  B  BTB  20x        HL TA marching  Alt x 20  Alt  20x Alt 20x  Alt  20x                                                            Ther Ex             bike  7'  7' 7' 10'        Prone GS 3s x 20  3s x 20  :03  20x :03 20x  :03  20x        Prone alt hip ext 20x 20x   20x 20x        Prop on elbows 1 min W/ ex W/ TE W TE         Press ups  10x  10x  10x2 10x2  10x2                     Stand SG Left  5 x 2 10x2 10x2 10x2        Stand back ext  5 x 2 10x2 10x2 10x2                     Stand hip abd, hr  20x ea  20x each 20x ea B  20x each #                                                           Ther Activity                                       Gait Training                                       Modalities             Mhp prone ls  10'   10'

## 2024-05-08 ENCOUNTER — TELEPHONE (OUTPATIENT)
Dept: NEUROLOGY | Facility: CLINIC | Age: 64
End: 2024-05-08

## 2024-05-08 NOTE — TELEPHONE ENCOUNTER
Called [facility] and scheduled delivery with Danielle for:     Botox-200 Units  Qty:1  Delivery to [place]  Scheduled for 05/10/2024  Via: [employer]     Please advise if medication doesn't arrive.

## 2024-05-10 ENCOUNTER — OFFICE VISIT (OUTPATIENT)
Dept: PHYSICAL THERAPY | Facility: CLINIC | Age: 64
End: 2024-05-10
Payer: COMMERCIAL

## 2024-05-10 DIAGNOSIS — M25.552 LEFT HIP PAIN: Primary | ICD-10-CM

## 2024-05-10 PROCEDURE — 97110 THERAPEUTIC EXERCISES: CPT

## 2024-05-10 PROCEDURE — 97112 NEUROMUSCULAR REEDUCATION: CPT

## 2024-05-10 NOTE — TELEPHONE ENCOUNTER
Botox number of units: 200 units  Botox quantity: 1  Arrived at what location: [place]  Botox at Correct Administering Location: Yes  NDC number: 1315-4094-66  Lot number: [id number]  Expiration Date: 11/01/2026  Appt notes indicate correct medication: Yes

## 2024-05-10 NOTE — PROGRESS NOTES
Daily Note     Today's date: 5/10/2024  Patient name: Caryn Velasquez  : 1960  MRN: [id number]  Referring provider: Jazmin August CRNP  Dx:   Encounter Diagnosis     ICD-10-CM    1. Left hip pain  M25.552           Start Time: 1035          Subjective: Patient noted inclement weather is aggravating her hip discomfort.      Objective: See treatment diary below      Assessment: Tolerated treatment well. Patient demonstrated fatigue post treatment, exhibited good technique with therapeutic exercises, and would benefit from continued PT      Plan: Continue per plan of care.  Progress treatment as tolerated.       Precautions: Left hip pain   PMH: recently dx with osteoporosis     [email or web address]  Access Code: [email or web address]    POC expires Unit limit Auth Expiration date PT/OT/ST + Visit Limit?   24 4 na bomn                           Visit/Unit Tracking  AUTH Status:  Date 4/17 4/26 4/30 5/3 5/7 5/10         na Used 1 2 3 4 5  FOTO 6          Remaining                          Manuals 4/17 4/26 4/30 5/3 5/7 5/10                                 Left long leg traction  3x P!                          Neuro Re-Ed                          Leg length check db Even  even even even        MET L-ext, R - add 10s x 3  HELD           MET - self w/ cane 10s x 3 HELD                         HL TA w/ roll 5s x 20 5s x 20  :05  20x :05 20  :05  20x :05  20x       HL TB hip abd Grn x 20 Grn x 20  GTB  20x GTB 20x BTB  20x BTB  20x       TB clamshells Grn x 20  Grn x 20  GTB  20x GTB 20x  B  BTB  20x B  BTB  20x       HL TA marching  Alt x 20  Alt  20x Alt 20x  Alt  20x Alt  20x                                                           Ther Ex             bike  7'  7' 7' 10' 10'       Prone GS 3s x 20  3s x 20  :03  20x :03 20x  :03  20x :03  20x        Prone alt hip ext 20x 20x   20x 20x        Prop on elbows 1 min W/ ex W/ TE W TE         Press ups  10x  10x  10x2 10x2 10x2 8x2                    Stand SG Left  5 x 2  10x2 10x2 10x2 10x2       Stand back ext  5 x 2 10x2 10x2 10x2 10x2                    Stand hip abd, hr  20x ea  20x each 20x ea B  20x each B  20x each #                                                          Ther Activity                                       Gait Training                                       Modalities             Mhp prone ls  10'   10'

## 2024-05-13 ENCOUNTER — TELEPHONE (OUTPATIENT)
Dept: NEUROLOGY | Facility: CLINIC | Age: 64
End: 2024-05-13

## 2024-05-13 DIAGNOSIS — R51.9 CHRONIC DAILY HEADACHE: ICD-10-CM

## 2024-05-13 NOTE — TELEPHONE ENCOUNTER
Submitted & Received the following Auth-Approval info via [employer] through [employer] Pharmacy Benefits:     Approved: Under Pharmacy Benefits.  Botox-200 units. QTY:1, Q3 Months.  DX: G43.709, Chronic Migraine.  Auth/Case-ID# [id number]  Valid: 04/03/2024 until 05/03/2025  4 Visits     Please use [facility].    [id number] for CPT: 54565.

## 2024-05-14 ENCOUNTER — OFFICE VISIT (OUTPATIENT)
Dept: PHYSICAL THERAPY | Facility: CLINIC | Age: 64
End: 2024-05-14
Payer: COMMERCIAL

## 2024-05-14 DIAGNOSIS — M25.552 LEFT HIP PAIN: Primary | ICD-10-CM

## 2024-05-14 PROCEDURE — 97112 NEUROMUSCULAR REEDUCATION: CPT

## 2024-05-14 PROCEDURE — 97110 THERAPEUTIC EXERCISES: CPT

## 2024-05-14 RX ORDER — PROPRANOLOL HYDROCHLORIDE 60 MG/1
TABLET ORAL
Qty: 180 TABLET | Refills: 1 | Status: SHIPPED | OUTPATIENT
Start: 2024-05-14

## 2024-05-14 NOTE — PROGRESS NOTES
"Daily Note     Today's date: 2024  Patient name: Caryn Velasquez  : 1960  MRN: [id number]  Referring provider: Jazmin August CRNP  Dx:   Encounter Diagnosis     ICD-10-CM    1. Left hip pain  M25.552                      Subjective:  \"I am really struggling at night with the hip, I can't sleep\".  Upon presentation, SPR=3/10. \"Once I get moving it gets a lot better, during the day its ok, it's the transition stuff  (sit to stand after  extended seated duration).  Patient also indicated inclement weather aggravates her pain symptoms.  Post intervention, SPR decreased to 2/10.      Objective: See treatment diary below      Assessment: Tolerated treatment well. Patient exhibited good technique with therapeutic exercises and would benefit from continued PT      Plan: Continue per plan of care.  Progress treatment as tolerated.       Precautions: Left hip pain   PMH: recently dx with osteoporosis     [email or web address]  Access Code: [email or web address]    POC expires Unit limit Auth Expiration date PT/OT/ST + Visit Limit?   24 4 na bomn                           Visit/Unit Tracking  AUTH Status:  Date 4/17 4/26 4/30 5/3 5/7 5/10 5/14        na Used 1 2 3 4 5  FOTO 6 7         Remaining                          Manuals 4/17 4/26 4/30 5/3 57 5/10 5/14                                Left long leg traction  3x P!                          Neuro Re-Ed                          Leg length check db Even  even even even        MET L-ext, R - add 10s x 3  HELD           MET - self w/ cane 10s x 3 HELD                         HL TA w/ roll 5s x 20 5s x 20  :05  20x :05 20  :05  20x :05  20x :05  20x      HL TB hip abd Grn x 20 Grn x 20  GTB  20x GTB 20x BTB  20x BTB  20x BTB  20x      TB clamshells Grn x 20  Grn x 20  GTB  20x GTB 20x  B  BTB  20x B  BTB  20x B  BTB  20x      HL TA marching  Alt x 20  Alt  20x Alt 20x  Alt  20x Alt  20x Alt  1.5#  20x                                                          Ther Ex        "      bike  7'  7' 7' 10' 10' 10'      Prone GS 3s x 20  3s x 20  :03  20x :03 20x  :03  20x :03  20x  :03  20x      Prone alt hip ext 20x 20x   20x 20x  B  1.5#  20x      Prop on elbows 1 min W/ ex W/ TE W TE         Press ups  10x  10x  10x2 10x2 10x2 8x2 10x2                   Stand SG Left  5 x 2 10x2 10x2 10x2 10x2 10x2      Stand back ext  5 x 2 10x2 10x2 10x2 10x2 10x2                   Stand hip abd, hr  20x ea  20x each 20x ea B  20x each B  20x each 1.5#  20x each                                                          Ther Activity                                       Gait Training                                       Modalities             Mhp prone ls  10'   10'

## 2024-05-15 ENCOUNTER — PROCEDURE VISIT (OUTPATIENT)
Dept: NEUROLOGY | Facility: CLINIC | Age: 64
End: 2024-05-15
Payer: COMMERCIAL

## 2024-05-15 VITALS
WEIGHT: 203 LBS | TEMPERATURE: 97.9 F | HEART RATE: 72 BPM | OXYGEN SATURATION: 96 % | DIASTOLIC BLOOD PRESSURE: 70 MMHG | SYSTOLIC BLOOD PRESSURE: 118 MMHG | BODY MASS INDEX: 37.36 KG/M2 | HEIGHT: 62 IN

## 2024-05-15 DIAGNOSIS — G43.709 CHRONIC MIGRAINE WITHOUT AURA WITHOUT STATUS MIGRAINOSUS, NOT INTRACTABLE: Primary | ICD-10-CM

## 2024-05-15 PROCEDURE — 64615 CHEMODENERV MUSC MIGRAINE: CPT | Performed by: PHYSICIAN ASSISTANT

## 2024-05-15 NOTE — PROGRESS NOTES
Universal Protocol   Consent: Verbal consent obtained. Written consent obtained.  Risks and benefits: risks, benefits and alternatives were discussed  Consent given by: patient  Patient understanding: patient states understanding of the procedure being performed  Patient consent: the patient's understanding of the procedure matches consent given  Procedure consent: procedure consent matches procedure scheduled      Chemodenervation     Date/Time  5/15/2024 1:00 PM     Performed by  Shilpi Nicholas PA-C   Authorized by  Shilpi Nicholas PA-C     Pre-procedure details      Prepped With: Alcohol     Procedure details      Position:  Upright   Botox      Botox Type:  Type A    Brand:  Botox    mL's of Botulinum Toxin:  200    Final Concentration per CC:  100 units    Needle Gauge:  30 G 2.5 inch   Procedures      Botox Procedures: chronic headache      Indications: migraines     Injection Location      Head / Face:  L superior trapezius, R superior trapezius, L superior cervical paraspinal, R superior cervical paraspinal, L , R , procerus, L temporalis, R temporalis, R frontalis, L frontalis, R medial occipitalis and L medial occipitalis    L  injection amount:  5 unit(s)    R  injection amount:  5 unit(s)    L lateral frontalis:  5 unit(s)    R lateral frontalis:  5 unit(s)    L medial frontalis:  5 unit(s)    R medial frontalis:  5 unit(s)    L temporalis injection amount:  20 unit(s)    R temporalis injection amount:  20 unit(s)    Procerus injection amount:  5 unit(s)    L medial occipitalis injection amount:  15 unit(s)    R medial occipitalis injection amount:  15 unit(s)    L superior cervical paraspinal injection amount:  10 unit(s)    R superior cervical paraspinal injection amount:  10 unit(s)    L superior trapezius injection amount:  15 unit(s)    R superior trapezius injection amount:  15 unit(s)   Total Units      Total units used:  200    Total units discarded:  0  "  Post-procedure details      Chemodenervation:  Chronic migraine    Facial Nerve Location::  Bilateral facial nerve    Patient tolerance of procedure:  Tolerated well, no immediate complications   Comments       Extra units medically necessary in the L>R frontotemporal regions b/l- total 45.       Blood pressure 118/70, pulse 72, temperature 97.9 °F (36.6 °C), temperature source Temporal, height 5' 2\" (1.575 m), weight 92.1 kg (203 lb), SpO2 96%, not currently breastfeeding.    "

## 2024-05-17 ENCOUNTER — OFFICE VISIT (OUTPATIENT)
Dept: PHYSICAL THERAPY | Facility: CLINIC | Age: 64
End: 2024-05-17
Payer: COMMERCIAL

## 2024-05-17 DIAGNOSIS — M25.552 LEFT HIP PAIN: Primary | ICD-10-CM

## 2024-05-17 PROCEDURE — 97110 THERAPEUTIC EXERCISES: CPT

## 2024-05-17 PROCEDURE — 97112 NEUROMUSCULAR REEDUCATION: CPT

## 2024-05-17 NOTE — PROGRESS NOTES
Daily Note     Today's date: 2024  Patient name: Caryn Velasquez  : 1960  MRN: [id number]  Referring provider: Jazmin August CRNP  Dx:   Encounter Diagnosis     ICD-10-CM    1. Left hip pain  M25.552           Start Time: 1037          Subjective: Upon presentation, patient reported central -> right SI pain which increases with activity. Post intervention, patient denied pain.      Objective: See treatment diary below      Assessment: Tolerated treatment  with modifications of treatment to tolerance . Patient exhibited good technique with therapeutic exercises and would benefit from continued PT      Plan: Continue per plan of care.  Progress treatment as tolerated.       Precautions: Left hip pain   PMH: recently dx with osteoporosis     [email or web address]  Access Code: [email or web address]    POC expires Unit limit Auth Expiration date PT/OT/ST + Visit Limit?   24 4 na bomn                           Visit/Unit Tracking  AUTH Status:  Date 4/17 4/26 4/30 5/3 5/7 5/10 5/14 5/17       na Used 1 2 3 4 5  FOTO 6 7 8        Remaining                          Manuals  5/3 5/7 5/10 5/14 5/17                               Left long leg traction  3x P!                          Neuro Re-Ed                          Leg length check db Even  even even even   even     MET L-ext, R - add 10s x 3  HELD           MET - self w/ cane 10s x 3 HELD                         HL TA w/ roll 5s x 20 5s x 20  :05  20x :05 20  :05  20x :05  20x :05  20x :05  20x     HL TB hip abd Grn x 20 Grn x 20  GTB  20x GTB 20x BTB  20x BTB  20x BTB  20x BTB  20x     TB clamshells Grn x 20  Grn x 20  GTB  20x GTB 20x  B  BTB  20x B  BTB  20x B  BTB  20x B  BTB  20x     HL TA marching  Alt x 20  Alt  20x Alt 20x  Alt  20x Alt  20x Alt  1.5#  20x 1.5#  20x     PPT        :03  20x                                            Ther Ex             bike  7'  7' 7' 10' 10' 10' 10'     Prone GS 3s x 20  3s x 20  :03  20x :03 20x  :03  20x  :03  20x  :03  20x :03  20x     Prone alt hip ext 20x 20x   20x 20x  B  1.5#  20x B  1.5#  20x     Prop on elbows 1 min W/ ex W/ TE W TE    1'     Press ups  10x  10x  10x2 10x2 10x2 8x2 10x2 10x2                  Stand SG Left  5 x 2 10x2 10x2 10x2 10x2 10x2 10x2     Stand back ext  5 x 2 10x2 10x2 10x2 10x2 10x2 10x3                  Stand hip abd, hr  20x ea  20x each 20x ea B  20x each B  20x each 1.5#  20x each 1.5#  20x each                                                         Ther Activity                                       Gait Training                                       Modalities             Mhp prone ls  10'   10'

## 2024-05-21 ENCOUNTER — OFFICE VISIT (OUTPATIENT)
Dept: PHYSICAL THERAPY | Facility: CLINIC | Age: 64
End: 2024-05-21
Payer: COMMERCIAL

## 2024-05-21 DIAGNOSIS — M25.552 LEFT HIP PAIN: Primary | ICD-10-CM

## 2024-05-21 PROCEDURE — 97110 THERAPEUTIC EXERCISES: CPT | Performed by: PHYSICAL THERAPIST

## 2024-05-21 NOTE — PROGRESS NOTES
"Daily Note     Today's date: 2024  Patient name: Caryn Velasquez  : 1960  MRN: [id number]  Referring provider: Jazmin August CRNP  Dx:   Encounter Diagnosis     ICD-10-CM    1. Left hip pain  M25.552                      Subjective: patient frustrated with continued soreness    stating that she may go back to pain management for an injection    4/10 vps    notes left foot pain reporting left eczema which currently is in a flared state ( this may be contributing to some of her pain- difficulty standing )   Reports feeling \"ok\" post session - no increase in irritation     Objective: See treatment diary below      Assessment: Tolerated treatment better today     Plan: Continue per plan of care.  Progress treatment as tolerated.       Precautions: Left hip pain   PMH: recently dx with osteoporosis     [email or web address]  Access Code: [email or web address]    POC expires Unit limit Auth Expiration date PT/OT/ST + Visit Limit?   24 4 na bomn                           Visit/Unit Tracking  AUTH Status:  Date 4/17 4/26 4/30 5/3 57 5/10 5/14 5/17 5/21      na Used 1 2 3 4 5  FOTO 6 7 8 9       Remaining                          Manuals 4/17 4/26 4/30 5/3 5/7 5/10 5/14 5/17 5/21                              Left long leg traction  3x P!                          Neuro Re-Ed                          Leg length check db Even  even even even   even     MET L-ext, R - add 10s x 3  HELD           MET - self w/ cane 10s x 3 HELD                         HL TA w/ roll 5s x 20 5s x 20  :05  20x :05 20  :05  20x :05  20x :05  20x :05  20x 5s x 20    HL TB hip abd Grn x 20 Grn x 20  GTB  20x GTB 20x BTB  20x BTB  20x BTB  20x BTB  20x Btb  x20    TB clamshells Grn x 20  Grn x 20  GTB  20x GTB 20x  B  BTB  20x B  BTB  20x B  BTB  20x B  BTB  20x Btb x 20     HL TA marching  Alt x 20  Alt  20x Alt 20x  Alt  20x Alt  20x Alt  1.5#  20x 1.5#  20x 1.5# x 20     PPT        :03  20x 3s x 20                  TG L10         20 x 3        " Nephrology "          Ther Ex             bike  7'  7' 7' 10' 10' 10' 10' 10'     Prone GS 3s x 20  3s x 20  :03  20x :03 20x  :03  20x :03  20x  :03  20x :03  20x 3s x 20     Prone QS         3s x 20     Prone alt hip ext 20x 20x   20x 20x  B  1.5#  20x B  1.5#  20x 1.5# x 20     Prop on elbows 1 min W/ ex W/ TE W TE    1'     Press ups  10x  10x  10x2 10x2 10x2 8x2 10x2 10x2 10 x 2                 Stand SG Left  5 x 2 10x2 10x2 10x2 10x2 10x2 10x2 10 x 2    Stand back ext  5 x 2 10x2 10x2 10x2 10x2 10x2 10x3 10 x 2                  Stand hip abd, hr  20x ea  20x each 20x ea B  20x each B  20x each 1.5#  20x each 1.5#  20x each 1.5# x 20 ea     Stand tb sidesteps         1.5# / gtb x 2  25'     STS         20\" x 20                                                                      Ther Activity                                       Gait Training                                       Modalities             Mhp prone ls  10'   10'                                        "

## 2024-05-24 ENCOUNTER — OFFICE VISIT (OUTPATIENT)
Dept: PHYSICAL THERAPY | Facility: CLINIC | Age: 64
End: 2024-05-24
Payer: COMMERCIAL

## 2024-05-24 DIAGNOSIS — M25.552 LEFT HIP PAIN: Primary | ICD-10-CM

## 2024-05-24 PROCEDURE — 97110 THERAPEUTIC EXERCISES: CPT

## 2024-05-24 PROCEDURE — 97112 NEUROMUSCULAR REEDUCATION: CPT

## 2024-05-24 NOTE — PROGRESS NOTES
Daily Note     Today's date: 2024  Patient name: Caryn Velasquez  : 1960  MRN: [id number]  Referring provider: Jazmin August CRNP  Dx:   Encounter Diagnosis     ICD-10-CM    1. Left hip pain  M25.552                      Subjective: Patient denied any change in lumbar discomfort since last visit.      Objective: See treatment diary below      Assessment: Tolerated treatment well without report of aggravation of lumbar spine discomfort/symptoms. Today gait appeared more fluid and marked decline in guarding with supine <--> sit <--> stand transfers. Note improved heel strike and hip rotation during ambulation compared to last visit. Patient demonstrated fatigue post treatment and exhibited good technique with therapeutic exercises      Plan: Continue per plan of care.       Precautions: Left hip pain   PMH: recently dx with osteoporosis     [email or web address]  Access Code: [email or web address]    POC expires Unit limit Auth Expiration date PT/OT/ST + Visit Limit?   24 4 na bomn                           Visit/Unit Tracking  AUTH Status:  Date  5/3 5/7 5/10 5/14 5/17 5/21 5/24     na Used 1 2 3 4 5  FOTO 6 7 8 9 10      Remaining                          Manuals 4/17 4/26 4/30 5/3 5/7 5/10 5/14 5/17 5/21 5/24                             Left long leg traction  3x P!                          Neuro Re-Ed                          Leg length check db Even  even even even   even     MET L-ext, R - add 10s x 3  HELD           MET - self w/ cane 10s x 3 HELD                         HL TA w/ roll 5s x 20 5s x 20  :05  20x :05 20  :05  20x :05  20x :05  20x :05  20x 5s x 20 :05  20x   HL TB hip abd Grn x 20 Grn x 20  GTB  20x GTB 20x BTB  20x BTB  20x BTB  20x BTB  20x Btb  x20 BTB  20x   TB clamshells Grn x 20  Grn x 20  GTB  20x GTB 20x  B  BTB  20x B  BTB  20x B  BTB  20x B  BTB  20x Btb x 20  BTB  20x   HL TA marching  Alt x 20  Alt  20x Alt 20x  Alt  20x Alt  20x Alt  1.5#  20x 1.5#  20x 1.5# x 20   "1.5#  20x   PPT        :03  20x 3s x 20  :03  20x                TG L10         20 x 3  20x3                Ther Ex             bike  7'  7' 7' 10' 10' 10' 10' 10'  10'   Prone GS 3s x 20  3s x 20  :03  20x :03 20x  :03  20x :03  20x  :03  20x :03  20x 3s x 20  :03  20x    Prone QS         3s x 20  :03  20x   Prone alt hip ext 20x 20x   20x 20x  B  1.5#  20x B  1.5#  20x 1.5# x 20  1.5#  20x   Prop on elbows 1 min W/ ex W/ TE W TE    1'     Press ups  10x  10x  10x2 10x2 10x2 8x2 10x2 10x2 10 x 2 10x2                Stand SG Left  5 x 2 10x2 10x2 10x2 10x2 10x2 10x2 10 x 2 10x2   Stand back ext  5 x 2 10x2 10x2 10x2 10x2 10x2 10x3 10 x 2  10x2                Stand hip abd, hr  20x ea  20x each 20x ea B  20x each B  20x each 1.5#  20x each 1.5#  20x each 1.5# x 20 ea  1.5#  20x each   Stand tb sidesteps         1.5# / gtb x 2  25'  1.5#  GTB  40'x3   STS         20\" x 20  20\"  20x                                                                    Ther Activity                                       Gait Training                                       Modalities             Mhp prone ls  10'   10'                                          "

## 2024-05-29 ENCOUNTER — OFFICE VISIT (OUTPATIENT)
Dept: PHYSICAL THERAPY | Facility: CLINIC | Age: 64
End: 2024-05-29
Payer: COMMERCIAL

## 2024-05-29 DIAGNOSIS — M25.552 LEFT HIP PAIN: Primary | ICD-10-CM

## 2024-05-29 PROCEDURE — 97112 NEUROMUSCULAR REEDUCATION: CPT

## 2024-05-29 PROCEDURE — 97110 THERAPEUTIC EXERCISES: CPT

## 2024-05-29 NOTE — PROGRESS NOTES
Daily Note     Today's date: 2024  Patient name: Caryn Velasquez  : 1960  MRN: [id number]  Referring provider: Jazmin August CRNP  Dx:   Encounter Diagnosis     ICD-10-CM    1. Left hip pain  M25.552                      Subjective: Patient reported seated activities for extended duration appear to aggravate her lumbar and radicular symptoms.  Patient denied radicular symptoms upon presentation/post session today.      Objective: See treatment diary below      Assessment: Tolerated treatment well. Gait appeared more fluid with less guarding/exaggerated weight shift noted. VC's for avoiding/minimizing seated posturing when performing ADL's. Patient demonstrated fatigue post treatment, exhibited good technique with therapeutic exercises, and would benefit from continued PT      Plan: Continue per plan of care.  Progress treatment as tolerated.       Precautions: Left hip pain   PMH: recently dx with osteoporosis     [email or web address]  Access Code: [email or web address]    POC expires Unit limit Auth Expiration date PT/OT/ST + Visit Limit?   24 4 na bomn                           Visit/Unit Tracking  AUTH Status:  Date  5/3 5/7 5/10 5/14 5/17 5/21 5/24 5/29    na Used 1 2 3 4 5  FOTO 6 7 8 9 10 11     Remaining                          Manuals 5/29    5/7 5/10 5/14 5/17 5/21 5/24                             Left long leg traction                            Neuro Re-Ed                          Leg length check     even   even     MET L-ext, R - add             MET - self w/ cane                          HL TA w/ roll 5s x 20    :05  20x :05  20x :05  20x :05  20x 5s x 20 :05  20x   HL TB hip abd BTB  20x    BTB  20x BTB  20x BTB  20x BTB  20x Btb  x20 BTB  20x   TB clamshells BTB  20x    B  BTB  20x B  BTB  20x B  BTB  20x B  BTB  20x Btb x 20  BTB  20x   HL TA marching 2#  20x    Alt  20x Alt  20x Alt  1.5#  20x 1.5#  20x 1.5# x 20  1.5#  20x   PPT :03  20x       :03  20x 3s x 20  :03  20x        "         TG L10 20x3        20 x 3  20x3                Ther Ex             bike 10'    10' 10' 10' 10' 10'  10'   Prone GS :03  20x    :03  20x :03  20x  :03  20x :03  20x 3s x 20  :03  20x    Prone QS :03  20x        3s x 20  :03  20x   Prone alt hip ext 2#  20x    20x  B  1.5#  20x B  1.5#  20x 1.5# x 20  1.5#  20x   Prop on elbows        1'     Press ups  10x2    10x2 8x2 10x2 10x2 10 x 2 10x2                Stand SG Left 10x2    10x2 10x2 10x2 10x2 10 x 2 10x2   Stand back ext 10x2    10x2 10x2 10x2 10x3 10 x 2  10x2                Stand hip abd, hr B  2#  20x each    B  20x each B  20x each 1.5#  20x each 1.5#  20x each 1.5# x 20 ea  1.5#  20x each   Stand tb sidesteps 2#  GTB  40'x3        1.5# / gtb x 2  25'  1.5#  GTB  40'x3   STS 20\"  20x        20\" x 20  20\"  20x                                                                    Ther Activity                                       Gait Training                                       Modalities             Mhp prone ls                                               "

## 2024-05-31 ENCOUNTER — OFFICE VISIT (OUTPATIENT)
Dept: PHYSICAL THERAPY | Facility: CLINIC | Age: 64
End: 2024-05-31
Payer: COMMERCIAL

## 2024-05-31 DIAGNOSIS — M25.552 LEFT HIP PAIN: Primary | ICD-10-CM

## 2024-05-31 PROCEDURE — 97110 THERAPEUTIC EXERCISES: CPT | Performed by: PHYSICAL THERAPIST

## 2024-05-31 PROCEDURE — 97112 NEUROMUSCULAR REEDUCATION: CPT | Performed by: PHYSICAL THERAPIST

## 2024-05-31 NOTE — PROGRESS NOTES
"Daily Note     Today's date: 2024  Patient name: Cayrn Velasquez  : 1960  MRN: [id number]  Referring provider: Jazmin August CRNP  Dx:   Encounter Diagnosis     ICD-10-CM    1. Left hip pain  M25.552                      Subjective: \"I feel good\" patient reports upon arrival for session      Objective: See treatment diary below      Assessment: Tolerated treatment well.  Patients endurance continues to improve   she is demonstrating competency with HEP       Plan: Continue per plan of care.  Progress treatment as tolerated.       Precautions: Left hip pain   PMH: recently dx with osteoporosis     [email or web address]  Access Code: [email or web address]    POC expires Unit limit Auth Expiration date PT/OT/ST + Visit Limit?   24 4 na bomn                           Visit/Unit Tracking  AUTH Status:  Date 4/17 4/26 4/30 5/3 5/7 5/10 5/14 5/17 5/21 5/24 5/29 5/31   na Used 1 2 3 4 5  FOTO 6 7 8 9 10 11 12    Remaining                          Manuals 10                              Left long leg traction                            Neuro Re-Ed                          Leg length check     even   even     MET L-ext, R - add             MET - self w/ cane                          HL TA w/ roll 5s x 20 5s x 20   :05  20x :05  20x :05  20x :05  20x 5s x 20 :05  20x   HL TB hip abd BTB  20x Btbx 20   BTB  20x BTB  20x BTB  20x BTB  20x Btb  x20 BTB  20x   TB clamshells BTB  20x Btb x 20    B  BTB  20x B  BTB  20x B  BTB  20x B  BTB  20x Btb x 20  BTB  20x   HL TA marching 2#  20x 2# x 20 slow   Alt  20x Alt  20x Alt  1.5#  20x 1.5#  20x 1.5# x 20  1.5#  20x   PPT :03  20x 3s x 20      :03  20x 3s x 20  :03  20x                TG L10 20x3 20 x 3       20 x 3  20x3                Ther Ex             bike 10' 10'    10' 10' 10' 10' 10'  10'   Prone GS :03  20x 3s x 20   :03  20x :03  20x  :03  20x :03  20x 3s x 20  :03  20x    Prone QS :03  20x 3s x 20       3s x 20  :03  20x   Prone " "alt hip ext 2#  20x 2# x 20   20x  B  1.5#  20x B  1.5#  20x 1.5# x 20  1.5#  20x   Prop on elbows        1'     Press ups  10x2 10 x 2    10x2 8x2 10x2 10x2 10 x 2 10x2                Stand SG Left 10x2 10 x2   10x2 10x2 10x2 10x2 10 x 2 10x2   Stand back ext 10x2 10 x 2   10x2 10x2 10x2 10x3 10 x 2  10x2                Stand hip abd, hr B  2#  20x each Bilat 2# foam x 20 ea   B  20x each B  20x each 1.5#  20x each 1.5#  20x each 1.5# x 20 ea  1.5#  20x each   Stand tb sidesteps 2#  GTB  40'x3 2#  gtb 40' x 3        1.5# / gtb x 2  25'  1.5#  GTB  40'x3   STS 20\"  20x Foam 20\" x 20        20\" x 20  20\"  20x                                                                    Ther Activity                                       Gait Training                                       Modalities             Mhp prone ls                                               "

## 2024-06-03 ENCOUNTER — TELEMEDICINE (OUTPATIENT)
Dept: PSYCHIATRY | Facility: CLINIC | Age: 64
End: 2024-06-03
Payer: COMMERCIAL

## 2024-06-03 DIAGNOSIS — F33.1 MODERATE EPISODE OF RECURRENT MAJOR DEPRESSIVE DISORDER (HCC): Primary | ICD-10-CM

## 2024-06-03 DIAGNOSIS — F41.1 GENERALIZED ANXIETY DISORDER WITH PANIC ATTACKS: ICD-10-CM

## 2024-06-03 DIAGNOSIS — F41.0 GENERALIZED ANXIETY DISORDER WITH PANIC ATTACKS: ICD-10-CM

## 2024-06-03 DIAGNOSIS — G47.00 INSOMNIA, UNSPECIFIED TYPE: ICD-10-CM

## 2024-06-03 PROCEDURE — 90833 PSYTX W PT W E/M 30 MIN: CPT | Performed by: STUDENT IN AN ORGANIZED HEALTH CARE EDUCATION/TRAINING PROGRAM

## 2024-06-03 PROCEDURE — 99214 OFFICE O/P EST MOD 30 MIN: CPT | Performed by: STUDENT IN AN ORGANIZED HEALTH CARE EDUCATION/TRAINING PROGRAM

## 2024-06-03 NOTE — PSYCH
Virtual Regular Visit    Verification of [place] location: [place]    Patient is located in the following state in which I hold an active license: [place]    Assessment/Plan:    Problem List Items Addressed This Visit          Behavioral [facility]    Generalized anxiety disorder with panic attacks    Moderate episode of recurrent major depressive disorder (HCC) - Primary       Neurology/Sleep    Insomnia            Reason for visit is   Chief Complaint   Patient presents with    Medication Management    Anxiety    Depression    Insomnia        Visit Time  Visit Start Time: 2:26 PM  Visit Stop Time: 2:51 PM  Total Visit Duration: 25 minutes    Encounter provider Padmaja Flowers MD    Provider located at: [facility] [address], [address]  [place] [address]    Recent Visits  No visits were found meeting these conditions.  Showing recent visits within past 7 days and meeting all other requirements  Today's Visits  Date Type Provider Dept   06/03/24 [facility]medicine [facility]   Showing today's visits and meeting all other requirements  Future Appointments  No visits were found meeting these conditions.  Showing future appointments within next 150 days and meeting all other requirements       The patient was identified by name and date of birth. Caryn Velasquez was informed that this is a telemedicine visit and that the visit is being conducted through the [employer] platform. She agrees to proceed. My office door was closed. No one else was in the room. She acknowledged consent and understanding of privacy and security of the video platform. The patient has agreed to participate and understands they can discontinue the visit at any time. Patient is aware this is a billable service.       MEDICATION MANAGEMENT NOTE        [facility]      Name and Date of Birth:  Caryn Velasquez 63 y.o. 1960  MRN: [id number]    Date of Visit: Es 3, 2024    Reason for Visit:   Chief Complaint   Patient presents with    Medication Management    Anxiety    Depression    Insomnia       SUBJECTIVE:  The patient was visited virtually for Medication Management, Anxiety, Depression, and Insomnia. Presented calm, and cooperative. Reported feeling good. She is leaving on Wed to the wedding preparation. She made covers for the wagon seats, the veil is ready, and a 55 ft long runner for table and other stuff prepared for the wedding on Saturday. She will leave on Wed to stay with her sister, and then the wedding is on Sat. She has done a lot, but thinks that there are still a lot to do. She has been able to handle the stressors very well and has not been feeling overwhelmed. Denied any changes in sleep, appetite, concentration, energy level, or daily activities.  Denied feelings of anhedonia, hopelessness, helplessness, worthlessness or guilt and appeared to be future oriented.  There was no thought constriction related to death.  Denied SI/HI, intent or plan upon direct inquiry at this time. No intense anxiety sxs, specific phobia or panic attacks reported. Denied AV/H.  Endorsed good compliance with the medications and denied any side effects. Denied smoking cigarettes, binge drinking alcohol or other illicit substance use.    Given this presentation, medications are maintained at the same dosage.  Pending individual therapy.  The patient was educated to call 911 or go to the nearest emergency room if the symptoms become overwhelming or unable to remain in control. Verbalized understanding and agreed to seek help in case of distress or concern for safety.    Review Of Systems:  Pertinent items are noted in HPI; all others are negative; no recent changes in medications or health status reported.  The patient did not fill out the questionnaires prior to the virtual visit.      Past Psychiatric History Update:   - No inpatient  psychiatric admission since last encounter  - No SA or SIB since last encounter  - No incidence of violent behavior since last encounter    Past Trauma History Update:    - No new onset of abuse or traumatic events since last encounter     Past Medical History:    Past Medical History:   Diagnosis Date    Anxiety     Arthritis     Attention and concentration deficit     Blurred vision     Chronic pain     Chronic pain syndrome 2016    CPAP (continuous positive airway pressure) dependence     CTS (carpal tunnel syndrome) > 6 years    Left wrist    Depression     Difficulty walking > 6 years    Left nerve leg pain    Diplopia     Dyspepsia     Gastric ulcer     Gastritis     GERD (gastroesophageal reflux disease)     Head injury > 25 years    Fell off 12 foot retaining wall    Headache(784.0)     Headache, tension-type > 1 year    Pain behind left eyeAnd stiff neck with pressure    History of transfusion     Hypertension     Insomnia     Irritable bowel syndrome     Memory loss     Migraines     Osteopenia     Peripheral neuropathy 3/14/2012    Left thigh, left abdomen, left prudential nerve    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    Presence of neurostimulator     Pudendal neuralgia    Pudendal neuralgia     Shingles > 30 years ago    Left facial nerve    Sleep apnea     Sleep apnea, obstructive     Spinal stenosis     Starting and stopping of urinary stream during micturition     Syncope > 10 years    When I get up quickly    Trigeminal neuralgia > 30 years    Associated with shingles on left face    Urinary incontinence     Wears eyeglasses         Past Surgical History:   Procedure Laterality Date     SECTION       SECTION      CHOLECYSTECTOMY      COLONOSCOPY      GALLBLADDER SURGERY      GASTRIC BYPASS  2004    HYSTERECTOMY  2012    INSERT / REPLACE PERIPHERAL NEUROSTIMULATOR PULSE GENERATOR /       MASS EXCISION Right 2021    Procedure: EXCISION   BIOPSY LESION/MASS LOWER NECK;  Surgeon: Brennon Browning DO;  Location: [facility];  Service: General    OTHER SURGICAL HISTORY  2012    Reimplantatin at [facility]     SD INS/RPLC PERPH SAC/GSTRC NPG/RCVR PCKT CRTJ&CONN Left 2/23/2023    Procedure: PLACEMENT OF PERMANENT LEADWIRE AND PG FOR SNM AT S3 FORAMEN, COMPLEX PROGRAMMING OF PULSE GENERATOR;  Surgeon: Jess Shaw MD;  Location: [facility];  Service: UroGynecology           SD INSJ/RPLCMT SPINAL NPG/RCVR POCKET CRTJ&CONNJ Right 04/15/2020    Procedure: REPLACEMENT IMPLANTABLE PULSE GENERATOR FOR DORSAL SPINAL COLUMN STIMULATOR,  RIGHT BUTTOCKS;  Surgeon: Fili Mckeon MD;  Location: [facility];  Service: Neurosurgery    RADIOFREQUENCY ABLATION NERVES      SPINAL CORD STIMULATOR IMPLANT  2015    TRIGGER POINT INJECTION      URETER SURGERY      URETHRAL FISTULA REPAIR      US GUIDED THYROID BIOPSY  09/23/2020    WRIST ARTHROSCOPY      with internal fixation      Allergies   Allergen Reactions    Morphine Nausea Only and Abdominal Pain     SEVERE N/V    Cat Hair Extract Nasal Congestion     Cat Dander    Dog Epithelium Nasal Congestion    Other Other (See Comments)     Dogs- sneezing  Crab Meat- GI intolerance       Substance Abuse History:    Social History     Substance and Sexual Activity   Alcohol Use No     Social History     Substance and Sexual Activity   Drug Use Yes    Types: Marijuana    Comment: Capsules used for chronic pain.  Medical       Social History:    Social History     Socioeconomic History    Marital status: /Civil Union     Spouse name: Not on file    Number of children: Not on file    Years of education: Not on file    Highest education level: Not on file   Occupational History    Occupation: retired   Tobacco Use    Smoking status: Never    Smokeless tobacco: Never   Vaping Use    Vaping status: Never Used   Substance and Sexual Activity    Alcohol use: No    Drug use: Yes     Types: Marijuana     Comment: Capsules used for chronic  pain.  Medical    Sexual activity: Not Currently     Partners: Male     Birth control/protection: None   Other Topics Concern    Not on file   Social History Narrative    Lives in [place], with . Previously worked as a teacher.      Social Determinants of Health     Financial Resource Strain: Not on file   Food Insecurity: Not on file   Transportation Needs: Not on file   Physical Activity: Not on file   Stress: Not on file   Social Connections: Not on file   Intimate Partner Violence: Not on file   Housing Stability: Not on file       Family Psychiatric History:     Family History   Problem Relation Age of Onset    Other Mother         Back Disorder     Cirrhosis Mother     Crohn's disease Mother     Lupus Mother         Systemic Lupus Erythematous     Depression Mother     Dementia Mother         Caused by liver disease    Neuropathy Mother         Fibromyalgia    Hypertension Father     Heart disease Father     Other Brother         Liver Transplant     Crohn's disease Brother     Crohn's disease Brother     Depression Sister     No Known Problems Daughter     No Known Problems Maternal Aunt     No Known Problems Paternal Aunt     No Known Problems Cousin     No Known Problems Cousin     No Known Problems Cousin     No Known Problems Cousin     No Known Problems Cousin     Dementia Brother         Caused by liver disease    Seizures Neg Hx     Breast cancer Neg Hx        History Review: The following portions of the patient's history were reviewed and updated as appropriate: allergies, current medications, past family history, past medical history, past social history, past surgical history and problem list.       OBJECTIVE:     Vital signs in last 24 hours: Not checked - virtual visit    Mental Status Evaluation:  Appearance and attitude: appeared as stated age, cooperative and attentive, casually dressed with good hygiene  Eye contact: good  Motor Function: within normal limits, No  PMA/PMR  Gait/station: Not observed  Speech: normal for rate, rhythm, volume, latency, amount  Language: No overt abnormality  Mood/affect: euthymic / Affect was euthymic, reactive, in full range, normal intensity and mood congruent  Thought Processes: sequential and goal-directed  Thought content: denies suicidal ideation or homicidal ideation; no delusions or first rank symptoms  Associations: intact associations  Perceptual disturbances: denies Auditory/Visual/Tactile Hallucinations  Orientation: oriented to time, person, place and to the situational context  Cognitive Function: intact  Memory: recent and remote memory grossly intact  Intellect: average  Fund of knowledge: aware of current events, aware of past history, and vocabulary average  Impulse control: good  Insight/judgment: fair/good    Laboratory Results: I have personally reviewed all pertinent laboratory/tests results    Recent Labs (last 2 months):   No visits with results within 2 Month(s) from this visit.   Latest known visit with results is:   Appointment on 03/25/2024   Component Date Value    WBC 03/25/2024 5.88     RBC 03/25/2024 4.22     Hemoglobin 03/25/2024 10.6 (L)     Hematocrit 03/25/2024 35.4     MCV 03/25/2024 84     MCH 03/25/2024 25.1 (L)     MCHC 03/25/2024 29.9 (L)     RDW 03/25/2024 17.1 (H)     MPV 03/25/2024 11.1     Platelets 03/25/2024 226     nRBC 03/25/2024 0     Segmented % 03/25/2024 52     Immature Grans % 03/25/2024 0     Lymphocytes % 03/25/2024 38     Monocytes % 03/25/2024 7     Eosinophils Relative 03/25/2024 2     Basophils Relative 03/25/2024 1     Absolute Neutrophils 03/25/2024 3.04     Absolute Immature Grans 03/25/2024 0.01     Absolute Lymphocytes 03/25/2024 2.24     Absolute Monocytes 03/25/2024 0.40     Eosinophils Absolute 03/25/2024 0.13     Basophils Absolute 03/25/2024 0.06     Sodium 03/25/2024 140     Potassium 03/25/2024 4.6     Chloride 03/25/2024 106     CO2 03/25/2024 30     ANION GAP 03/25/2024 4      BUN 03/25/2024 12     Creatinine 03/25/2024 0.65     Glucose, Fasting 03/25/2024 90     Calcium 03/25/2024 8.9     AST 03/25/2024 25     ALT 03/25/2024 17     Alkaline Phosphatase 03/25/2024 91     Total Protein 03/25/2024 6.6     Albumin 03/25/2024 3.9     Total Bilirubin 03/25/2024 0.29     eGFR 03/25/2024 93     Cholesterol 03/25/2024 135     Triglycerides 03/25/2024 88     HDL, Direct 03/25/2024 59     LDL Calculated 03/25/2024 58     Non-HDL-Chol (CHOL-HDL) 03/25/2024 76     Hemoglobin A1C 03/25/2024 5.7 (H)     EAG 03/25/2024 117     TSH 3RD GENERATON 03/25/2024 1.426          Assessment/Plan:   A 64 y/o  female,  (two adult children 31 and 31 y/o), domiciled w/ , retired teacher, w/ PMH of multiple medical conditions including obesity, HTN, HLD, GERD, post-gastrectomy malabsorption, CATHRYN (on CPAP), migraine, neuralgia, dysesthesia of multiple sites, lumbar radiculopathy, OA of L shoulder, cervicalgia, mild cognitive impairment w/ memory loss, BRIANA, TIA (2.5 yrs ago), abnormal sleep deprived EEG, 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 12 foot retaining wall more than 25 yrs ago), long-term use of opiate analgesic, BRIANA, vit D def and PPH of depression and anxiety, recent ED visit on 2/11/2021 due to worsening of depression and SI lead to inpatient psychiatric admission at [facility] (for 11 days) and then to the PHP (finished on 3/11/2021), one prior SA (~40 yrs ago via OD), no h/o self-injurious behavior, on Xanax 1 mg HS, Buspar 10 mg BID, Neurontin 300 mg BID, Seroquel 50 mg nightly, Zoloft 100 mg daily, who presented to the mental health clinic for the initial intake and psychiatric evaluation on 3/12/2021. Presented w/ increased depression and anxiety over past year in the context of chronic pain, multiple medical conditions, and isolation due to COVID-19 pandemic, which has markedly improved since recent inpatient hospitalization and finishing PHP. Denied SI/HI, intent or plan upon direct inquiry at  this time. PHQ-9: 7; YANY-7: 1. Her current presentation meets criteria for MDD and YANY w/ panic attacks. Maintained on Zoloft 100 mg daily, Seroquel 50 mg nightly, Buspar 10 mg BID and Neurontin 300 mg BID, and Xanax was tapered off successfully as tolerated; started individual therapy. PHQ-9: 1; YANY-7: 1 on 6/28/2021. Will follow with her PCP regarding fixing her CPAP. Upon f/u on 5/5/22, the patient remained stable and noted that she finished individual therapy a couple of month ago, and has good support system. Maintained on the same regimen. Upon f/u on 1/12/23, presented w/ stable mood and slight increased anxiety in the context of marital conflicts; maintained on the same regimen and referred for individual psychotherapy. On 9/29/23, presented with improved anxiety and stable mood issa since started weight watcher program and lost 30 lbs over 6 months. Seroquel decreased to 25 mg nightly, and then to 12.5 mg on 12/11/23 (to be tapered off as tolerated to minimize polypharmacy); other meds maintained the same dose.      Diagnoses and all orders for this visit:    Moderate episode of recurrent major depressive disorder (HCC)    Generalized anxiety disorder with panic attacks    Insomnia, unspecified type          Impression:  1. Moderate episode of recurrent major depressive disorder (HCC)        2. Generalized anxiety disorder with panic attacks        3. Insomnia, unspecified type            Treatment Recommendations/Precautions:  - Pain management as per primary medical team  - f/u w/ PCP regarding fixing her CPAP machine and w/u for frequent falls  - f/u with neurology regarding recent weakness in LE and memory issues as well as recent frequent falls  - Continue Zoloft 100 mg po daily for anxiety and depression  - Continue Buspar 10 mg BID for anxiety  - Continue Neurontin 300 mg BID for pain and anxiety  - Continue Seroquel 25 mg nightly (could be tapered off as tolerated to minimize polypharmacy when the  patient agrees)   - Pending therapy  - The patient has enough medication supply until the next appointment.     - Psychoeducation provided to the patient and benefits, potential risks and side effects discussed; importance of compliance with the psychiatric treatment reiterated, and the patient verbalized understanding of the matter     - RTC in 6 weeks     - Educated about healthy life style, risk of falls/sedation and addiction. Patient was receptive to education.  - The patient was educated about 24 hour and weekend coverage for urgent situations accessed by calling [facility] main practice number   - Patient was educated to call [facility] (6-732-623-GUMT [[phone]]) for behavioral crisis at anytime or 911 for any safety concerns, or go to nearest ER if her symptoms become overwhelming or unmanageable.    Current Outpatient Medications   Medication Sig Dispense Refill    albuterol (Ventolin HFA) 90 mcg/act inhaler Inhale 2 puffs every 6 (six) hours as needed for wheezing 18 g 0    Botox 200 units SOLR       busPIRone (BUSPAR) 10 mg tablet Take 1 tablet (10 mg total) by mouth 2 (two) times a day 180 tablet 1    Cholecalciferol 25 MCG (1000 UT) capsule Take 1 capsule by mouth daily      clobetasol (TEMOVATE) 0.05 % ointment       denosumab (PROLIA) 60 mg/mL Inject 1 mL (60 mg total) under the skin once for 1 dose 1 mL 0    fluticasone (FLONASE) 50 mcg/act nasal spray SPRAY 1 SPRAY INTO EACH NOSTRIL EVERY DAY 16 mL 1    fluticasone 50 mcg/actuation diskus inhaler INHALE 1 PUFF 2 TIMES A DAY RINSE MOUTH AFTER USE. 60 each 0    gabapentin (NEURONTIN) 300 mg capsule Take 1 capsule (300 mg total) by mouth 2 (two) times a day 180 capsule 1    Galcanezumab-gnlm (Emgality) 120 MG/ML SOAJ USE 1 INJECTION UNDER THE SKIN EVERY MONTH 3 mL 3    Iron-Vitamin C 100-250 MG TABS Take 1 tablet by mouth daily      ketoconazole (NIZORAL) 2 % cream       lidocaine (XYLOCAINE) 5 % ointment  Apply topically 2 (two) times a day as needed for moderate pain 35.44 g 1    lisinopril (ZESTRIL) 5 mg tablet TAKE 1 TABLET DAILY 90 tablet 3    Multiple Vitamin (MULTI-VITAMIN DAILY) TABS Take 1 tablet by mouth daily      ondansetron (ZOFRAN-ODT) 4 mg disintegrating tablet PLACE 1 TABLET ON THE TONGUE EVERY 6 HOURS AS NEEDED FOR NAUSEA OR VOMITING 30 tablet 5    pantoprazole (PROTONIX) 40 mg tablet TAKE 1 TABLET TWICE A  tablet 3    propranolol (INDERAL) 60 mg tablet TAKE 1 TABLET TWICE A  tablet 1    QUEtiapine (SEROquel) 25 mg tablet Take 1 tablet (25 mg total) by mouth daily at bedtime 90 tablet 1    rizatriptan (MAXALT-MLT) 10 mg disintegrating tablet Take at the onset of migraine; if symptoms continue or return, may take another dose at least 2 hours after first dose. Take no more than 2 doses in a day. 12 tablet 5    rosuvastatin (CRESTOR) 10 MG tablet TAKE 1 TABLET DAILY 90 tablet 3    sertraline (ZOLOFT) 100 mg tablet Take 1 tablet (100 mg total) by mouth daily 90 tablet 1    tacrolimus (PROTOPIC) 0.1 % ointment       triamcinolone (KENALOG) 0.1 % cream APPLY TOPICALLY TWICE A DAY 80 g 8     No current facility-administered medications for this visit.         Medications Risks/Benefits      Risks, Benefits And Possible Side Effects Of Medications:    Risks, benefits, and possible side effects of medications explained to Caryn and she verbalizes understanding and agreement for treatment.    Controlled Medication Discussion:     Not applicable    Psychotherapy Provided:     Individual psychotherapy provided: Yes  Counseling was provided during the session today for 16 minutes.   Psychoeducation provided to the patient and was educated about the importance of compliance with the medications and psychiatric treatment  Solution Focused Brief Therapy (SFBT) provided  Patient's emotions were validated and specific labeled praise provided.   [place] suggestions were offered in a supportive  non-critical way.   Cognitive Behavioral Therapy and supportive expressive interventions    Treatment Plan:    Completed and signed during the session: Not applicable - Treatment Plan not due at this session    Padmaja Flowers MD 06/03/24      This note was completed in part utilizing Dragon dictation Software. Grammatical, translation, syntax errors, random word insertions, spelling mistakes, and incomplete sentences may be an occasional consequence of this system secondary to software limitations with voice recognition, ambient noise, and hardware issues. If you have any questions or concerns about the content, text, or information contained within the body of this dictation, please contact the provider for clarification.

## 2024-06-04 ENCOUNTER — OFFICE VISIT (OUTPATIENT)
Dept: PHYSICAL THERAPY | Facility: CLINIC | Age: 64
End: 2024-06-04
Payer: COMMERCIAL

## 2024-06-04 DIAGNOSIS — M25.552 LEFT HIP PAIN: Primary | ICD-10-CM

## 2024-06-04 PROCEDURE — 97110 THERAPEUTIC EXERCISES: CPT

## 2024-06-04 PROCEDURE — 97112 NEUROMUSCULAR REEDUCATION: CPT

## 2024-06-04 NOTE — PROGRESS NOTES
Daily Note     Today's date: 2024  Patient name: Caryn Velasquez  : 1960  MRN: [id number]  Referring provider: Jazmin August CRNP  Dx:   Encounter Diagnosis     ICD-10-CM    1. Left hip pain  M25.552                      Subjective: Patient denied left hip pain. Patient notes compliance with HEP.      Objective: See treatment diary below      Assessment: Tolerated treatment well. Patient exhibited good technique with therapeutic exercises and would benefit from continued PT      Plan: Continue per plan of care.      Precautions: Left hip pain   PMH: recently dx with osteoporosis     [email or web address]  Access Code: [email or web address]    POC expires Unit limit Auth Expiration date PT/OT/ST + Visit Limit?   24 4 na bomn                           Visit/Unit Tracking  AUTH Status:  Date 6/4    5/7 5/10 5/14 5/17 5/21 5/24 5/29 5/31   na Used 13    5  FOTO 6 7 8 9 10 11 12    Remaining                          Manuals                              Left long leg traction                          Neuro Re-Ed                          Leg length check        even     MET L-ext, R - add             MET - self w/ cane                          HL TA w/ roll 5s x 20 5s x 20 :05  20x    :05  20x :05  20x 5s x 20 :05  20x   HL TB hip abd BTB  20x Btbx 20 BTB  20x    BTB  20x BTB  20x Btb  x20 BTB  20x   TB clamshells BTB  20x Btb x 20  B  BTB  20x     B  BTB  20x B  BTB  20x Btb x 20  BTB  20x   HL TA marching 2#  20x 2# x 20 slow 2.5#  SLOW  20x    Alt  1.5#  20x 1.5#  20x 1.5# x 20  1.5#  20x   PPT :03  20x 3s x 20 :03  20x     :03  20x 3s x 20  :03  20x                TG L10 20x3 20 x 3 20x3      20 x 3  20x3                Ther Ex             bike 10' 10'  10'    10' 10' 10'  10'   Prone GS :03  20x 3s x 20 :03  20x    :03  20x :03  20x 3s x 20  :03  20x    Prone QS :03  20x 3s x 20 :03  20x      3s x 20  :03  20x   Prone alt hip ext 2#  20x 2# x 20 2.5#  20x     B  1.5#  20x  "B  1.5#  20x 1.5# x 20  1.5#  20x   Prop on elbows        1'     Press ups  10x2 10 x 2  10x2    10x2 10x2 10 x 2 10x2                Stand SG Left 10x2 10 x2 10x2    10x2 10x2 10 x 2 10x2   Stand back ext 10x2 10 x 2 10x2    10x2 10x3 10 x 2  10x2                Stand hip abd, hr B  2#  20x each Bilat 2# foam x 20 ea B  2.5#  Foam  20x each     1.5#  20x each 1.5#  20x each 1.5# x 20 ea  1.5#  20x each   Stand tb sidesteps 2#  GTB  40'x3 2#  gtb 40' x 3  2.5#  BTB  40'x3      1.5# / gtb x 2  25'  1.5#  GTB  40'x3   STS 20\"  20x Foam 20\" x 20  Foam  20\"  20x       20\" x 20  20\"  20x                                                                    Ther Activity                                       Gait Training                                       Modalities             Mhp prone ls                                                 "

## 2024-06-05 ENCOUNTER — APPOINTMENT (OUTPATIENT)
Dept: PHYSICAL THERAPY | Facility: CLINIC | Age: 64
End: 2024-06-05
Payer: COMMERCIAL

## 2024-06-11 ENCOUNTER — OFFICE VISIT (OUTPATIENT)
Dept: PHYSICAL THERAPY | Facility: CLINIC | Age: 64
End: 2024-06-11
Payer: COMMERCIAL

## 2024-06-11 DIAGNOSIS — M25.552 LEFT HIP PAIN: Primary | ICD-10-CM

## 2024-06-11 PROCEDURE — 97110 THERAPEUTIC EXERCISES: CPT

## 2024-06-11 PROCEDURE — 97112 NEUROMUSCULAR REEDUCATION: CPT

## 2024-06-11 NOTE — PROGRESS NOTES
Daily Note     Today's date: 2024  Patient name: Caryn Velasquez  : 1960  MRN: [id number]  Referring provider: Jazmin August CRNP  Dx:   Encounter Diagnosis     ICD-10-CM    1. Left hip pain  M25.552                      Subjective: L hip SPR=0/10.      Objective: See treatment diary below      Assessment: Tolerated treatment well. Patient demonstrated fatigue post treatment, exhibited good technique with therapeutic exercises, and would benefit from continued PT      Plan: Continue per plan of care.  Progress treatment as tolerated.       Precautions: Left hip pain   PMH: recently dx with osteoporosis     [email or web address]  Access Code: [email or web address]    POC expires Unit limit Auth Expiration date PT/OT/ST + Visit Limit?   24 4 na bomn                           Visit/Unit Tracking  AUTH Status:  Date 6/4 6/11    5/10 5/14 5/17 5/21 5/24 5/29 5/31   na Used 13 14    6 7 8 9 10 11 12    Remaining                          Manuals                              Left long leg traction                          Neuro Re-Ed                          Leg length check        even     MET L-ext, R - add             MET - self w/ cane                          HL TA w/ roll 5s x 20 5s x 20 :05  20x :05   20x   :05  20x :05  20x 5s x 20 :05  20x   HL TB hip abd BTB  20x Btbx 20 BTB  20x BTB  20x    BTB  20x BTB  20x Btb  x20 BTB  20x   TB clamshells BTB  20x Btb x 20  B  BTB  20x  B  BTB  20x   B  BTB  20x B  BTB  20x Btb x 20  BTB  20x   HL TA marching 2#  20x 2# x 20 slow 2.5#  SLOW  20x 2.5#  Slow  20x   Alt  1.5#  20x 1.5#  20x 1.5# x 20  1.5#  20x   PPT :03  20x 3s x 20 :03  20x :03  20x    :03  20x 3s x 20  :03  20x                TG L10 20x3 20 x 3 20x3 20x2     20 x 3  20x3                Ther Ex             bike 10' 10'  10' 10'   10' 10' 10'  10'   Prone GS :03  20x 3s x 20 :03  20x :03  20x   :03  20x :03  20x 3s x 20  :03  20x    Prone QS :03  20x 3s x 20 :03  20x  ":03  20x     3s x 20  :03  20x   Prone alt hip ext 2#  20x 2# x 20 2.5#  20x  2.5#  20x   B  1.5#  20x B  1.5#  20x 1.5# x 20  1.5#  20x   Prop on elbows        1'     Press ups  10x2 10 x 2  10x2 10x2   10x2 10x2 10 x 2 10x2                Stand SG Left 10x2 10 x2 10x2 10x2   10x2 10x2 10 x 2 10x2   Stand back ext 10x2 10 x 2 10x2 10x2   10x2 10x3 10 x 2  10x2                Stand hip abd, hr B  2#  20x each Bilat 2# foam x 20 ea B  2.5#  Foam  20x each  B  2.5#  Foam  20x ech    1.5#  20x each 1.5#  20x each 1.5# x 20 ea  1.5#  20x each   Stand tb sidesteps 2#  GTB  40'x3 2#  gtb 40' x 3  2.5#  BTB  40'x3 2.5#  BTB       1.5# / gtb x 2  25'  1.5#  GTB  40'x3   STS 20\"  20x Foam 20\" x 20  Foam  20\"  20x  Foam  20\" box  5lb DB  20x     20\" x 20  20\"  20x                                                                    Ther Activity                                       Gait Training                                       Modalities             Mhp prone ls                                                   "

## 2024-06-13 ENCOUNTER — OFFICE VISIT (OUTPATIENT)
Dept: PHYSICAL THERAPY | Facility: CLINIC | Age: 64
End: 2024-06-13
Payer: COMMERCIAL

## 2024-06-13 DIAGNOSIS — M25.552 LEFT HIP PAIN: Primary | ICD-10-CM

## 2024-06-13 PROCEDURE — 97110 THERAPEUTIC EXERCISES: CPT

## 2024-06-13 PROCEDURE — 97112 NEUROMUSCULAR REEDUCATION: CPT

## 2024-06-13 NOTE — PROGRESS NOTES
Daily Note     Today's date: 2024  Patient name: Caryn Velasquez  : 1960  MRN: [id number]  Referring provider: Jazmin August CRNP  Dx:   Encounter Diagnosis     ICD-10-CM    1. Left hip pain  M25.552           Start Time: 945  Stop Time: 1030  Total time in clinic (min): 45 minutes    Subjective: Denies any medical updates since her last session denies any pain.     Objective: See treatment diary below      Assessment: Caryn tolerated treatment well with consistent cuing throughout. TE's were performed with the same resistance and reps. No new TE's were performed today. Following treatment, the patient demonstrated fatigue post treatment, exhibited good technique with therapeutic exercises, and would benefit from continued PT.         Plan: Continue per plan of care.      Precautions: Left hip pain   PMH: recently dx with osteoporosis     [email or web address]  Access Code: [email or web address]    POC expires Unit limit Auth Expiration date PT/OT/ST + Visit Limit?   24 4 na bomn                           Visit/Unit Tracking  AUTH Status:  Date 6/4 6/11 6/13   5/10 5/14 5/17 5/21 5/24 5/29 5/31   na Used 13 14 15   6 7 8 9 10 11 12    Remaining                          Manuals                              Left long leg traction                          Neuro Re-Ed                          Leg length check        even     MET L-ext, R - add             MET - self w/ cane                          HL TA w/ roll 5s x 20 5s x 20 :05  20x :05   20x   :05  20x :05  20x 5s x 20 :05  20x   HL TB hip abd BTB  20x Btbx 20 BTB  20x BTB  20x  BTB  20x   BTB  20x BTB  20x Btb  x20 BTB  20x   TB clamshells BTB  20x Btb x 20  B  BTB  20x  B  BTB  20x B  BTB  20x  B  BTB  20x B  BTB  20x Btb x 20  BTB  20x   HL TA marching 2#  20x 2# x 20 slow 2.5#  SLOW  20x 2.5#  Slow  20x 2.5#  Slow  20x  Alt  1.5#  20x 1.5#  20x 1.5# x 20  1.5#  20x   PPT :03  20x 3s x 20 :03  20x :03  20x  ":03  20x   :03  20x 3s x 20  :03  20x                TG L10 20x3 20 x 3 20x3 20x2 20x2    20 x 3  20x3                Ther Ex             bike 10' 10'  10' 10' 10'  10' 10' 10'  10'   Prone GS :03  20x 3s x 20 :03  20x :03  20x :03  20x  :03  20x :03  20x 3s x 20  :03  20x    Prone QS :03  20x 3s x 20 :03  20x :03  20x     3s x 20  :03  20x   Prone alt hip ext 2#  20x 2# x 20 2.5#  20x  2.5#  20x 2.5#  20x  B  1.5#  20x B  1.5#  20x 1.5# x 20  1.5#  20x   Prop on elbows        1'     Press ups  10x2 10 x 2  10x2 10x2 10x2  10x2 10x2 10 x 2 10x2                Stand SG Left 10x2 10 x2 10x2 10x2 10x2  10x2 10x2 10 x 2 10x2   Stand back ext 10x2 10 x 2 10x2 10x2 10x2  10x2 10x3 10 x 2  10x2                Stand hip abd, hr B  2#  20x each Bilat 2# foam x 20 ea B  2.5#  Foam  20x each  B  2.5#  Foam  20x ech  B  2.5#  Foam  20x ech   1.5#  20x each 1.5#  20x each 1.5# x 20 ea  1.5#  20x each   Stand tb sidesteps 2#  GTB  40'x3 2#  gtb 40' x 3  2.5#  BTB  40'x3 2.5#  BTB   2.5#  BTB  40'x3    1.5# / gtb x 2  25'  1.5#  GTB  40'x3   STS 20\"  20x Foam 20\" x 20  Foam  20\"  20x  Foam  20\" box  5lb DB  20x Foam  20\" box  5lb DB  20x    20\" x 20  20\"  20x                                                                    Ther Activity                                       Gait Training                                       Modalities             Mhp prone ls                                                     "

## 2024-06-17 DIAGNOSIS — E78.2 MIXED HYPERLIPIDEMIA: ICD-10-CM

## 2024-06-17 RX ORDER — ROSUVASTATIN CALCIUM 10 MG/1
TABLET, COATED ORAL
Qty: 90 TABLET | Refills: 1 | Status: SHIPPED | OUTPATIENT
Start: 2024-06-17

## 2024-06-18 ENCOUNTER — APPOINTMENT (OUTPATIENT)
Dept: PHYSICAL THERAPY | Facility: CLINIC | Age: 64
End: 2024-06-18
Payer: COMMERCIAL

## 2024-06-20 ENCOUNTER — OFFICE VISIT (OUTPATIENT)
Dept: PHYSICAL THERAPY | Facility: CLINIC | Age: 64
End: 2024-06-20
Payer: COMMERCIAL

## 2024-06-20 DIAGNOSIS — M25.552 LEFT HIP PAIN: Primary | ICD-10-CM

## 2024-06-20 PROCEDURE — 97112 NEUROMUSCULAR REEDUCATION: CPT | Performed by: PHYSICAL THERAPIST

## 2024-06-20 PROCEDURE — 97110 THERAPEUTIC EXERCISES: CPT | Performed by: PHYSICAL THERAPIST

## 2024-06-20 NOTE — PROGRESS NOTES
Daily Note     Today's date: 2024  Patient name: Caryn Velasquez  : 1960  MRN: [id number]  Referring provider: Jazmin August CRNP  Dx:   Encounter Diagnosis     ICD-10-CM    1. Left hip pain  M25.552                      Subjective: patient reports feeling good !    Objective: See treatment diary below      Assessment: patient is progressing nicely -  demonstrating independence and competence with HEP       Plan: Continue per plan of care.    Tentative dc end of next week.        Precautions: Left hip pain   PMH: recently dx with osteoporosis     [email or web address]  Access Code: [email or web address]    POC expires Unit limit Auth Expiration date PT/OT/ST + Visit Limit?   24 4 na bomn                           Visit/Unit Tracking  AUTH Status:  Date 6/4 6/11 6/13 6/20      5/24 5/29 5/31   na Used 13 14 15 16      10 11 12    Remaining                          Manuals                                  Left long leg traction                          Neuro Re-Ed                          Leg length check             MET L-ext, R - add             MET - self w/ cane                          HL TA w/ roll 5s x 20 5s x 20 :05  20x :05   20x         HL TB hip abd BTB  20x Btbx 20 BTB  20x BTB  20x  BTB  20x  Btb x 20       TB clamshells BTB  20x Btb x 20  B  BTB  20x  B  BTB  20x B  BTB  20x Btb x 20        HL TA marching 2#  20x 2# x 20 slow 2.5#  SLOW  20x 2.5#  Slow  20x 2.5#  Slow  20x 3# x 20 slow       PPT :03  20x 3s x 20 :03  20x :03  20x :03  20x 3s x 20                     TG L10 20x3 20 x 3 20x3 20x2 20x2 20 x 2                     Ther Ex             bike 10' 10'  10' 10' 10' 10'        Prone GS :03  20x 3s x 20 :03  20x :03  20x :03  20x 3s x 20       Prone QS :03  20x 3s x 20 :03  20x :03  20x         Prone alt hip ext 2#  20x 2# x 20 2.5#  20x  2.5#  20x 2.5#  20x 3# x 20        Prop on elbows             Press ups  10x2 10 x 2  10x2 10x2 10x2 10x 2                    "  Stand SG Left 10x2 10 x2 10x2 10x2 10x2 10x 2       Stand back ext 10x2 10 x 2 10x2 10x2 10x2 10x 2                    Stand hip abd, hr B  2#  20x each Bilat 2# foam x 20 ea B  2.5#  Foam  20x each  B  2.5#  Foam  20x ech  B  2.5#  Foam  20x ech  Ryan  3# x 20 ea        Stand tb sidesteps 2#  GTB  40'x3 2#  gtb 40' x 3  2.5#  BTB  40'x3 2.5#  BTB   2.5#  BTB  40'x3 3# btb 40' x 3        STS 20\"  20x Foam 20\" x 20  Foam  20\"  20x  Foam  20\" box  5lb DB  20x Foam  20\" box  5lb DB  20x Foam 20\" box 5#  db 20x                                                                         Ther Activity                                       Gait Training                                       Modalities             Mhp prone ls                                                     "

## 2024-06-25 ENCOUNTER — APPOINTMENT (OUTPATIENT)
Dept: PHYSICAL THERAPY | Facility: CLINIC | Age: 64
End: 2024-06-25
Payer: COMMERCIAL

## 2024-06-27 ENCOUNTER — OFFICE VISIT (OUTPATIENT)
Dept: PHYSICAL THERAPY | Facility: CLINIC | Age: 64
End: 2024-06-27
Payer: COMMERCIAL

## 2024-06-27 DIAGNOSIS — M25.552 LEFT HIP PAIN: Primary | ICD-10-CM

## 2024-06-27 PROCEDURE — 97110 THERAPEUTIC EXERCISES: CPT | Performed by: PHYSICAL THERAPIST

## 2024-06-27 PROCEDURE — 97112 NEUROMUSCULAR REEDUCATION: CPT | Performed by: PHYSICAL THERAPIST

## 2024-06-27 NOTE — PROGRESS NOTES
DISCHARGE SUMMARY   Daily Note     Today's date: 2024  Patient name: Caryn Velasquez  : 1960  MRN: [id number]  Referring provider: Jazmin August CRNP  Dx:   Encounter Diagnosis     ICD-10-CM    1. Left hip pain  M25.552                      Subjective: patient reports that she feels better than Tuesday when she had a migraine    feels that she is ready for discharge at this time     Patient ranks pain/ discomfort 3/10 VPS @ worst - noting much time now without pain    Objective: See treatment diary below    Goals  STG   MET  1.  Patient will demonstrate independence and competence with HEP 2 -4 weeks  2.  Patient will report > 25-50% reduced pain 2-4 weeks    LTG   MET  1.  Patient will report improvements with both functional and recreational abilities  4-6 weeks  2.  Patient will demonstrate improved motor function  4-6 weeks.   3.  Restoration of painfree trunk mobility   4-8 weeks  4.  Improved core stabilization  4-8 weeks.         Assessment: patient is progressing nicely -  demonstrating independence and competence with HEP    she has achieved original goals and appears to have benefited maximally from PT at this time.        Plan: discharge skilled PT at this time   thank you for this referral.        Precautions: Left hip pain   PMH: recently dx with osteoporosis     [email or web address]  Access Code: [email or web address]    POC expires Unit limit Auth Expiration date PT/OT/ST + Visit Limit?   24 4 na bomn                           Visit/Unit Tracking  AUTH Status:  Date    na Used 13 14 15 16 17     10 11 12    Remaining                          Manuals                                 Left long leg traction                          Neuro Re-Ed                          Leg length check             MET L-ext, R - add             MET - self w/ cane                          HL TA w/ roll 5s x 20 5s x 20 :05  20x :05   20x        "  HL TB hip abd BTB  20x Btbx 20 BTB  20x BTB  20x  BTB  20x  Btb x 20 Btb x 20      TB clamshells BTB  20x Btb x 20  B  BTB  20x  B  BTB  20x B  BTB  20x Btb x 20  Btb x20       HL TA marching 2#  20x 2# x 20 slow 2.5#  SLOW  20x 2.5#  Slow  20x 2.5#  Slow  20x 3# x 20 slow 3# x 20 slow      PPT :03  20x 3s x 20 :03  20x :03  20x :03  20x 3s x 20  3s x 20                    TG L10 20x3 20 x 3 20x3 20x2 20x2 20 x 2  20x 2                   Ther Ex             bike 10' 10'  10' 10' 10' 10'  10'       Prone GS :03  20x 3s x 20 :03  20x :03  20x :03  20x 3s x 20 3s x 20      Prone QS :03  20x 3s x 20 :03  20x :03  20x         Prone alt hip ext 2#  20x 2# x 20 2.5#  20x  2.5#  20x 2.5#  20x 3# x 20  3# x 20      Prop on elbows             Press ups  10x2 10 x 2  10x2 10x2 10x2 10x 2  10x 2                   Stand SG Left 10x2 10 x2 10x2 10x2 10x2 10x 2 10 x 2      Stand back ext 10x2 10 x 2 10x2 10x2 10x2 10x 2 10 x2                   Stand hip abd, hr B  2#  20x each Bilat 2# foam x 20 ea B  2.5#  Foam  20x each  B  2.5#  Foam  20x ech  B  2.5#  Foam  20x ech  Ryan  3# x 20 ea  Bilat foam 3# x 20 ea       Stand tb sidesteps 2#  GTB  40'x3 2#  gtb 40' x 3  2.5#  BTB  40'x3 2.5#  BTB   2.5#  BTB  40'x3 3# btb 40' x 3  3#  btb 40' x 3       STS 20\"  20x Foam 20\" x 20  Foam  20\"  20x  Foam  20\" box  5lb DB  20x Foam  20\" box  5lb DB  20x Foam 20\" box 5#  db 20x  Foam 20\"  5# db x 20                                                                       Ther Activity                                       Gait Training                                       Modalities             Mhp prone ls                                                     "

## 2024-07-11 ENCOUNTER — TELEMEDICINE (OUTPATIENT)
Dept: PSYCHIATRY | Facility: CLINIC | Age: 64
End: 2024-07-11
Payer: COMMERCIAL

## 2024-07-11 DIAGNOSIS — F33.1 MODERATE EPISODE OF RECURRENT MAJOR DEPRESSIVE DISORDER (HCC): Primary | ICD-10-CM

## 2024-07-11 DIAGNOSIS — F41.1 GENERALIZED ANXIETY DISORDER WITH PANIC ATTACKS: ICD-10-CM

## 2024-07-11 DIAGNOSIS — G47.00 INSOMNIA, UNSPECIFIED TYPE: ICD-10-CM

## 2024-07-11 DIAGNOSIS — F41.0 GENERALIZED ANXIETY DISORDER WITH PANIC ATTACKS: ICD-10-CM

## 2024-07-11 PROCEDURE — 90833 PSYTX W PT W E/M 30 MIN: CPT | Performed by: STUDENT IN AN ORGANIZED HEALTH CARE EDUCATION/TRAINING PROGRAM

## 2024-07-11 PROCEDURE — 99214 OFFICE O/P EST MOD 30 MIN: CPT | Performed by: STUDENT IN AN ORGANIZED HEALTH CARE EDUCATION/TRAINING PROGRAM

## 2024-07-11 NOTE — BH TREATMENT PLAN
"Treatment Plan done but not signed at time of office visit due to:  Plan reviewed with the patient during the virtual visit and verbal consent given.    TREATMENT PLAN (Medication Management Only)        [facility]    Name and Date of Birth:  Caryn Velasquez 63 y.o. 1960  Date of Treatment Plan: July 11, 2024  Diagnosis/Diagnoses:    1. Moderate episode of recurrent major depressive disorder (HCC)    2. Generalized anxiety disorder with panic attacks    3. Insomnia, unspecified type      Strengths/Personal Resources for Self-Care: \"supportive family, hobbies and travelling\".  Area/Areas of need (in own words): anxiety symptoms, depression  1. Long Term Goal: improve control of anxiety and prevent panic attacks; maintain mood stability  Target Date:6 months - 1/11/2025  Person/Persons responsible for completion of goal: Caryn  2.  Short Term Objective (s) - How will we reach this goal?:   A. Provider new recommended medication/dosage changes and/or continue medication(s): continue current medications as prescribed.  B. Attend psychotherapy regularly.  C. N/A.  Target Date:6 months - 1/11/2025  Person/Persons Responsible for Completion of Goal: Caryn  Progress Towards Goals: stable, continuing treatment  Treatment Modality: medication management every 6 months, referral for individual psychotherapy  Review due 180 days from date of this plan: 6 months - 1/11/2025  Expected length of service: maintenance  My Physician/PA/NP and I have developed this plan together and I agree to work on the goals and objectives. I understand the treatment goals that were developed for my treatment.      "

## 2024-07-11 NOTE — PSYCH
Virtual Regular Visit    Verification of [place] location: [place]    Patient is located in the following state in which I hold an active license: [place]    Assessment/Plan:    Problem List Items Addressed This Visit          Behavioral [facility]    Generalized anxiety disorder with panic attacks    Moderate episode of recurrent major depressive disorder (HCC) - Primary       Neurology/Sleep    Insomnia            Reason for visit is   Chief Complaint   Patient presents with    Medication Management    Depression    Anxiety    Insomnia        Visit Time  Visit Start Time: 1:55 PM  Visit Stop Time: 2:20 PM  Total Visit Duration: 25 minutes    Encounter provider Padmaja Flowers MD    Provider located at: [facility] [address], [address]  [place] [address]    Recent Visits  No visits were found meeting these conditions.  Showing recent visits within past 7 days and meeting all other requirements  Today's Visits  Date Type Provider Dept   07/11/24 [facility]medicine [facility]   Showing today's visits and meeting all other requirements  Future Appointments  No visits were found meeting these conditions.  Showing future appointments within next 150 days and meeting all other requirements       The patient was identified by name and date of birth. Caryn Velasquez was informed that this is a telemedicine visit and that the visit is being conducted through the [employer] platform. She agrees to proceed. My office door was closed. No one else was in the room. She acknowledged consent and understanding of privacy and security of the video platform. The patient has agreed to participate and understands they can discontinue the visit at any time. Patient is aware this is a billable service.       MEDICATION MANAGEMENT NOTE        [facility]      Name and Date of Birth:  Caryn Velasquez 63 y.o. 1960  "MRN: [id number]    Date of Visit: July 11, 2024    Reason for Visit:   Chief Complaint   Patient presents with    Medication Management    Depression    Anxiety    Insomnia       Subjective    The patient was visited virtually for Medication Management, Depression, Anxiety, and Insomnia. Presented calm, and cooperative. Reported feeling good. The wedding was \"great\" and everything was as planned. She noted that she was \"obsessed\" about things she does not have control over. She noted that when they came back from the wedding, she realized that her condo smells like cigarettes and found out that her neighbor smokes, and the connection of the fan brings the smoke into her place, and it made her upset, and could not sleep at night as she thought she was smelling that and had no control over it. She noted that the neighbor is the president of association, and she thinks that she has no chance to change the situation.  She Denied any changes in sleep, appetite, concentration, energy level, or daily activities.  Denied feelings of anhedonia, hopelessness, helplessness, worthlessness or guilt and appeared to be future oriented.  There was no thought constriction related to death.  Denied SI/HI, intent or plan upon direct inquiry at this time. No specific phobia or panic attacks reported. Denied AV/H.  Endorsed good compliance with the medications and denied any side effects. Denied smoking cigarettes, binge drinking alcohol or other illicit substance use.    Given this presentation, medications are maintained at the same dosage.  Pending individual therapy.  The patient was educated to call 911 or go to the nearest emergency room if the symptoms become overwhelming or unable to remain in control. Verbalized understanding and agreed to seek help in case of distress or concern for safety.    Review Of Systems:  Pertinent items are noted in HPI; all others are negative; no recent changes in medications or health status " reported.    PHQ-2/9 Depression Screening    Little interest or pleasure in doing things: 0 - not at all  Feeling down, depressed, or hopeless: 1 - several days  Trouble falling or staying asleep, or sleeping too much: 0 - not at all  Feeling tired or having little energy: 0 - not at all  Poor appetite or overeatin - not at all  Feeling bad about yourself - or that you are a failure or have let yourself or your family down: 0 - not at all  Trouble concentrating on things, such as reading the newspaper or watching television: 1 - several days  Moving or speaking so slowly that other people could have noticed. Or the opposite - being so fidgety or restless that you have been moving around a lot more than usual: 0 - not at all  Thoughts that you would be better off dead, or of hurting yourself in some way: 0 - not at all  PHQ-9 Score: 2  PHQ-9 Interpretation: No or Minimal depression         YANY-7 Flowsheet Screening      Flowsheet Row Most Recent Value   Over the last two weeks, how often have you been bothered by the following problems?     Feeling nervous, anxious, or on edge 1    Not being able to stop or control worrying 2    Worrying too much about different things 2    Trouble relaxing  1    Being so restless that it's hard to sit still 0    Becoming easily annoyed or irritable  0    Feeling afraid as if something awful might happen 0    How difficult have these problems made it for you to do your work, take care of things at home, or get along with other people?  Not difficult at all    YANY Score  6             Historical Information    Past Psychiatric History Update:   - No inpatient psychiatric admission since last encounter  - No SA or SIB since last encounter  - No incidence of violent behavior since last encounter    Past Trauma History Update:    - No new onset of abuse or traumatic events since last encounter     Past Medical History:    Past Medical History:   Diagnosis Date    Anxiety      Arthritis     Attention and concentration deficit     Blurred vision     Chronic pain     Chronic pain syndrome 2016    CPAP (continuous positive airway pressure) dependence     CTS (carpal tunnel syndrome) > 6 years    Left wrist    Depression     Difficulty walking > 6 years    Left nerve leg pain    Diplopia     Dyspepsia     Gastric ulcer     Gastritis     GERD (gastroesophageal reflux disease)     Head injury > 25 years    Fell off 12 foot retaining wall    Headache(784.0)     Headache, tension-type > 1 year    Pain behind left eyeAnd stiff neck with pressure    History of transfusion     Hypertension     Insomnia     Irritable bowel syndrome     Memory loss     Migraines     Osteopenia     Peripheral neuropathy 3/14/2012    Left thigh, left abdomen, left prudential nerve    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    Presence of neurostimulator     Pudendal neuralgia    Pudendal neuralgia     Shingles > 30 years ago    Left facial nerve    Sleep apnea     Sleep apnea, obstructive     Spinal stenosis     Starting and stopping of urinary stream during micturition     Syncope > 10 years    When I get up quickly    Trigeminal neuralgia > 30 years    Associated with shingles on left face    Urinary incontinence     Wears eyeglasses         Past Surgical History:   Procedure Laterality Date     SECTION       SECTION      CHOLECYSTECTOMY      COLONOSCOPY      GALLBLADDER SURGERY      GASTRIC BYPASS  2004    HYSTERECTOMY  2012    INSERT / REPLACE PERIPHERAL NEUROSTIMULATOR PULSE GENERATOR /       MASS EXCISION Right 2021    Procedure: EXCISION  BIOPSY LESION/MASS LOWER NECK;  Surgeon: Brennon Browning DO;  Location: [facility];  Service: General    OTHER SURGICAL HISTORY      Reimplantatin at [facility]     LA INS/RPLC PERPH SAC/GSTRC NPG/RCVR PCKT CRTJ&CONN Left 2023    Procedure: PLACEMENT OF PERMANENT LEADWIRE AND PG FOR SNM AT S3 FORAMEN, COMPLEX  PROGRAMMING OF PULSE GENERATOR;  Surgeon: Jess Shaw MD;  Location: [facility];  Service: UroGynecology           UT INSJ/RPLCMT SPINAL NPG/RCVR POCKET CRTJ&CONNJ Right 04/15/2020    Procedure: REPLACEMENT IMPLANTABLE PULSE GENERATOR FOR DORSAL SPINAL COLUMN STIMULATOR,  RIGHT BUTTOCKS;  Surgeon: Fili Mckeon MD;  Location: [facility];  Service: Neurosurgery    RADIOFREQUENCY ABLATION NERVES      SPINAL CORD STIMULATOR IMPLANT  2015    TRIGGER POINT INJECTION      URETER SURGERY      URETHRAL FISTULA REPAIR      US GUIDED THYROID BIOPSY  09/23/2020    WRIST ARTHROSCOPY      with internal fixation      Allergies   Allergen Reactions    Morphine Nausea Only and Abdominal Pain     SEVERE N/V    Cat Hair Extract Nasal Congestion     Cat Dander    Dog Epithelium Nasal Congestion    Other Other (See Comments)     Dogs- sneezing  Crab Meat- GI intolerance       Substance Abuse History:    Social History     Substance and Sexual Activity   Alcohol Use No     Social History     Substance and Sexual Activity   Drug Use Yes    Types: Marijuana    Comment: Capsules used for chronic pain.  Medical       Social History:    Social History     Socioeconomic History    Marital status: /Civil Union     Spouse name: Not on file    Number of children: Not on file    Years of education: Not on file    Highest education level: Not on file   Occupational History    Occupation: retired   Tobacco Use    Smoking status: Never    Smokeless tobacco: Never   Vaping Use    Vaping status: Never Used   Substance and Sexual Activity    Alcohol use: No    Drug use: Yes     Types: Marijuana     Comment: Capsules used for chronic pain.  Medical    Sexual activity: Not Currently     Partners: Male     Birth control/protection: None   Other Topics Concern    Not on file   Social History Narrative    Lives in [place], with . Previously worked as a teacher.      Social Determinants of Health     Financial Resource Strain: Not on  "file   Food Insecurity: Not on file   Transportation Needs: Not on file   Physical Activity: Not on file   Stress: Not on file   Social Connections: Unknown (6/18/2024)    Received from [facility]     How often do you feel lonely or isolated from those around you? (Adult - for ages 18 years and over): Not on file   Intimate Partner Violence: Not on file   Housing Stability: Not on file       Family Psychiatric History:     Family History   Problem Relation Age of Onset    Other Mother         Back Disorder     Cirrhosis Mother     Crohn's disease Mother     Lupus Mother         Systemic Lupus Erythematous     Depression Mother     Dementia Mother         Caused by liver disease    Neuropathy Mother         Fibromyalgia    Hypertension Father     Heart disease Father     Other Brother         Liver Transplant     Crohn's disease Brother     Crohn's disease Brother     Depression Sister     No Known Problems Daughter     No Known Problems Maternal Aunt     No Known Problems Paternal Aunt     No Known Problems Cousin     No Known Problems Cousin     No Known Problems Cousin     No Known Problems Cousin     No Known Problems Cousin     Dementia Brother         Caused by liver disease    Seizures Neg Hx     Breast cancer Neg Hx        History Review: The following portions of the patient's history were reviewed and updated as appropriate: allergies, current medications, past family history, past medical history, past social history, past surgical history and problem list.       Objective      Vital signs in last 24 hours: Not checked - virtual visit    Mental Status Evaluation:  Appearance and attitude: appeared as stated age, cooperative and attentive, casually dressed with good hygiene  Eye contact: good  Motor Function: within normal limits, No PMA/PMR  Gait/station: Not observed  Speech: normal for rate, rhythm, volume, latency, amount  Language: No overt abnormality  Mood/affect: \"good\" / Affect " was constricted but reactive, mood congruent, brighter  Thought Processes: ruminating  Thought content: denied suicidal ideations or homicidal ideations, no overt delusions elicited, obsessive thoughts, negative thinking, ruminations  Associations: intact associations  Perceptual disturbances: denies Auditory/Visual/Tactile Hallucinations  Orientation: oriented to time, person, place and to the situational context  Cognitive Function: intact  Memory: recent and remote memory grossly intact  Intellect: average  Fund of knowledge: aware of current events, aware of past history, and vocabulary average  Impulse control: good  Insight/judgment: fair/good          Laboratory Results: I have personally reviewed all pertinent laboratory/tests results    Recent Labs (last 2 months):   No visits with results within 2 Month(s) from this visit.   Latest known visit with results is:   Appointment on 03/25/2024   Component Date Value    WBC 03/25/2024 5.88     RBC 03/25/2024 4.22     Hemoglobin 03/25/2024 10.6 (L)     Hematocrit 03/25/2024 35.4     MCV 03/25/2024 84     MCH 03/25/2024 25.1 (L)     MCHC 03/25/2024 29.9 (L)     RDW 03/25/2024 17.1 (H)     MPV 03/25/2024 11.1     Platelets 03/25/2024 226     nRBC 03/25/2024 0     Segmented % 03/25/2024 52     Immature Grans % 03/25/2024 0     Lymphocytes % 03/25/2024 38     Monocytes % 03/25/2024 7     Eosinophils Relative 03/25/2024 2     Basophils Relative 03/25/2024 1     Absolute Neutrophils 03/25/2024 3.04     Absolute Immature Grans 03/25/2024 0.01     Absolute Lymphocytes 03/25/2024 2.24     Absolute Monocytes 03/25/2024 0.40     Eosinophils Absolute 03/25/2024 0.13     Basophils Absolute 03/25/2024 0.06     Sodium 03/25/2024 140     Potassium 03/25/2024 4.6     Chloride 03/25/2024 106     CO2 03/25/2024 30     ANION GAP 03/25/2024 4     BUN 03/25/2024 12     Creatinine 03/25/2024 0.65     Glucose, Fasting 03/25/2024 90     Calcium 03/25/2024 8.9     AST 03/25/2024 25     ALT  03/25/2024 17     Alkaline Phosphatase 03/25/2024 91     Total Protein 03/25/2024 6.6     Albumin 03/25/2024 3.9     Total Bilirubin 03/25/2024 0.29     eGFR 03/25/2024 93     Cholesterol 03/25/2024 135     Triglycerides 03/25/2024 88     HDL, Direct 03/25/2024 59     LDL Calculated 03/25/2024 58     Non-HDL-Chol (CHOL-HDL) 03/25/2024 76     Hemoglobin A1C 03/25/2024 5.7 (H)     EAG 03/25/2024 117     TSH 3RD GENERATON 03/25/2024 1.426          Assessment & Plan    A 62 y/o  female,  (two adult children 31 and 33 y/o), domiciled w/ , retired teacher, w/ PMH of multiple medical conditions including obesity, HTN, HLD, GERD, post-gastrectomy malabsorption, CATHRYN (on CPAP), migraine, neuralgia, dysesthesia of multiple sites, lumbar radiculopathy, OA of L shoulder, cervicalgia, mild cognitive impairment w/ memory loss, BRIANA, TIA (2.5 yrs ago), abnormal sleep deprived EEG, 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 Presented w/ increased depression and anxiety over past year in the context of chronic pain, multiple medical conditions, and isolation due to COVID-19 pandemic, which has markedly improved since recent inpatient hospitalization and finishing PHP. Denied SI/HI, intent or plan upon direct inquiry at this time. PHQ-9: 7; YANY-7: 1. Her current presentation meets criteria for MDD and YANY w/ panic attacks. Maintained on Zoloft 100 mg daily,  Seroquel 50 mg nightly, Buspar 10 mg BID and Neurontin 300 mg BID, and Xanax was tapered off successfully as tolerated; started individual therapy. PHQ-9: 1; YANY-7: 1 on 6/28/2021. Will follow with her PCP regarding fixing her CPAP. Upon f/u on 5/5/22, the patient remained stable and noted that she finished individual therapy a couple of month ago, and has good support system. Maintained on the same regimen. Upon f/u on 1/12/23, presented w/ stable mood and slight increased anxiety in the context of marital conflicts; maintained on the same regimen and referred for individual psychotherapy. On 9/29/23, presented with improved anxiety and stable mood issa since started weight watcher program and lost 30 lbs over 6 months. Seroquel decreased to 25 mg nightly; other meds maintained the same dose.      Diagnoses and all orders for this visit:    Moderate episode of recurrent major depressive disorder (HCC)    Generalized anxiety disorder with panic attacks    Insomnia, unspecified type          Impression:  1. Moderate episode of recurrent major depressive disorder (HCC)        2. Generalized anxiety disorder with panic attacks        3. Insomnia, unspecified type            Treatment Recommendations/Precautions:  - Pain management as per primary medical team  - f/u w/ PCP regarding fixing her CPAP machine and w/u for frequent falls  - f/u with neurology regarding recent weakness in LE and memory issues as well as recent frequent falls  - Continue Zoloft 100 mg po daily for anxiety and depression  - Continue Buspar 10 mg BID for anxiety  - Continue Neurontin 300 mg BID for pain and anxiety  - Continue Seroquel 25 mg nightly (could be tapered off as tolerated to minimize polypharmacy when the patient agrees)   - Pending therapy  - The patient has enough medication supply until the next appointment.     - Psychoeducation provided to the patient and benefits, potential risks and side effects discussed; importance of  compliance with the psychiatric treatment reiterated, and the patient verbalized understanding of the matter     - RTC in 8 weeks     - Educated about healthy life style, risk of falls/sedation and addiction. Patient was receptive to education.  - The patient was educated about 24 hour and weekend coverage for urgent situations accessed by calling [facility] main practice number   - Patient was educated to call [employer] Suicide Prevention Lifeline (5-990-455-XBOM [0610]) for behavioral crisis at anytime or 911 for any safety concerns, or go to nearest ER if her symptoms become overwhelming or unmanageable.    Current Outpatient Medications   Medication Sig Dispense Refill    albuterol (Ventolin HFA) 90 mcg/act inhaler Inhale 2 puffs every 6 (six) hours as needed for wheezing 18 g 0    Botox 200 units SOLR       busPIRone (BUSPAR) 10 mg tablet Take 1 tablet (10 mg total) by mouth 2 (two) times a day 180 tablet 1    Cholecalciferol 25 MCG (1000 UT) capsule Take 1 capsule by mouth daily      clobetasol (TEMOVATE) 0.05 % ointment       denosumab (PROLIA) 60 mg/mL Inject 1 mL (60 mg total) under the skin once for 1 dose 1 mL 0    fluticasone (FLONASE) 50 mcg/act nasal spray SPRAY 1 SPRAY INTO EACH NOSTRIL EVERY DAY 16 mL 1    fluticasone 50 mcg/actuation diskus inhaler INHALE 1 PUFF 2 TIMES A DAY RINSE MOUTH AFTER USE. 60 each 0    gabapentin (NEURONTIN) 300 mg capsule Take 1 capsule (300 mg total) by mouth 2 (two) times a day 180 capsule 1    Galcanezumab-gnlm (Emgality) 120 MG/ML SOAJ USE 1 INJECTION UNDER THE SKIN EVERY MONTH 3 mL 3    Iron-Vitamin C 100-250 MG TABS Take 1 tablet by mouth daily      ketoconazole (NIZORAL) 2 % cream       lidocaine (XYLOCAINE) 5 % ointment Apply topically 2 (two) times a day as needed for moderate pain 35.44 g 1    lisinopril (ZESTRIL) 5 mg tablet TAKE 1 TABLET DAILY 90 tablet 3    Multiple Vitamin (MULTI-VITAMIN DAILY) TABS Take 1 tablet by mouth daily       ondansetron (ZOFRAN-ODT) 4 mg disintegrating tablet PLACE 1 TABLET ON THE TONGUE EVERY 6 HOURS AS NEEDED FOR NAUSEA OR VOMITING 30 tablet 5    pantoprazole (PROTONIX) 40 mg tablet TAKE 1 TABLET TWICE A  tablet 3    propranolol (INDERAL) 60 mg tablet TAKE 1 TABLET TWICE A  tablet 1    QUEtiapine (SEROquel) 25 mg tablet Take 1 tablet (25 mg total) by mouth daily at bedtime 90 tablet 1    rizatriptan (MAXALT-MLT) 10 mg disintegrating tablet Take at the onset of migraine; if symptoms continue or return, may take another dose at least 2 hours after first dose. Take no more than 2 doses in a day. 12 tablet 5    rosuvastatin (CRESTOR) 10 MG tablet TAKE 1 TABLET DAILY 90 tablet 1    sertraline (ZOLOFT) 100 mg tablet Take 1 tablet (100 mg total) by mouth daily 90 tablet 1    tacrolimus (PROTOPIC) 0.1 % ointment       triamcinolone (KENALOG) 0.1 % cream APPLY TOPICALLY TWICE A DAY 80 g 8     No current facility-administered medications for this visit.         Medications Risks/Benefits      Risks, Benefits And Possible Side Effects Of Medications:    Risks, benefits, and possible side effects of medications explained to Caryn and she verbalizes understanding and agreement for treatment.    Controlled Medication Discussion:     Not applicable    Psychotherapy Provided:     Individual psychotherapy provided: Yes  Counseling was provided during the session today for 16 minutes.   Psychoeducation provided to the patient and was educated about the importance of compliance with the medications and psychiatric treatment  Psycho-spiritual therapy provided to the patient, and the importance of simultaneously engaging the body, mind, and spirit in healing mental health issues, moving beyond problematic life patterns, and overcoming traumatic life experiences reiterated. The patient was educated about the breathing techniques, guided imagery meditation and healthy life-style  Solution Focused Brief Therapy (SFBT)  provided  Patient's emotions were validated and specific labeled praise provided.   [place] suggestions were offered in a supportive non-critical way.   Cognitive Behavioral Therapy and supportive expressive interventions    Treatment Plan:    Completed and signed during the session: Yes - with Caryn Flowers MD 07/11/24      This note was completed in part utilizing Dragon dictation Software. Grammatical, translation, syntax errors, random word insertions, spelling mistakes, and incomplete sentences may be an occasional consequence of this system secondary to software limitations with voice recognition, ambient noise, and hardware issues. If you have any questions or concerns about the content, text, or information contained within the body of this dictation, please contact the provider for clarification.

## 2024-07-25 NOTE — ED NOTES
[facility]     Referring Provider: Lisa Johnson MD     Chief complaint: Follow-up breast imaging, in office biopsy   Subjective   HPI:   5/15/2024:   Ms. Verito العراقي is a 73 yo woman, seen at the request of Dr. Lisa Johnson, for a new diagnosis of left breast atypical lobular hyperplasia (ALH). This was initially detected as an imaging abnormality on routine screening. Her work-up is detailed in the breast history section below. Prior to the biopsy, she denies any breast lumps, pain, skin changes, or nipple discharge.  She has right breast calcifications that were in imaging surveillance from 0239-2405.  She has prepectoral saline implants that were placed in her 40s.  She denies any prior breast biopsies. She denies any family history of breast or ovarian cancer.     6/13/2024:  At her last visit we discussed the likely nonoperative management of her left breast ALH.  She underwent breast MRI which was negative on the left side, however showed 2 suspicious right breast masses.  She is here today to review the results.    7/25/2024, Interval History:  Ultrasound showed 2 correlates for the MRI masses.  One of them is overlying the implant.        Breast history/imaging (updated 7/25/2024):    12/20/2021, Screening MMG with Froilan ([facility]):  There are scattered areas of fibroglandular density.  There are bilateral pre-pectoral saline implants.  There are calcifications with grouped distribution seen in the CC view only seen in the sub areolar region of the right breast. These are seen best on the implant displaced CC view.  In the left breast, no suspicious masses, significant calcifications, or other abnormalities are seen.  BI-RADS 0: Incomplete     02/02/2022, Right Diagnostic MMG with Froilan ([facility]):  There are scattered areas of fibroglandular density.  There are bilateral pre-pectoral saline implants.  On the present examination, there are round calcifications with grouped distribution in the sub areolar  Informed patient of accepting facility in transport time, and she indicated that she no longer wants to go and wants to leave and go home  CW informed her that she had signed a 201 last evening and that everything has been completed with her insurance and transport is already arranged  Patient is concerned that her  is not aware, and this writer indicated that she would give him a call  Call placed to patient's , and informed him that she was about to be leaving an asked if he was en route  Patient reports that he is about 20 minutes away      Tim Esqueda LMSW  02/12/21  0900 region of the right breast on the CC magnification view. These are favored to correlate with layering milk of calcium calcifications seen in the ML projection, localizing the group to 9:00. Vascular and implant capsule calcifications are also noted. No additional calcifications are seen. There are no suspicious masses noted.  BI-RADS 3: Probably Benign     08/04/2022, Right Diagnostic MMG with Froilan ([facility]):  On the present examination, there are stable round, milk of calcium and very faint calcifications with grouped distribution in the anterior one-third sub-areolar region of the right breast. No suspicious forms are seen at this time. There has been no significant change from the prior exam(s).  BI-RADS 3: Probably Benign     02/16/2023, Bilateral Diagnostic MMG with Froilan ([facility]):  There are scattered areas of fibroglandular density.  There are bilateral pre-pectoral saline implants.  1. On the present examination, there are stable milk of calcium calcifications with grouped distribution in the sub areolar region of the right breast.  2. There are round calcifications with diffuse distribution seen in both breasts. The parenchyma includes stable nodular asymmetries. There are no new suspicious masses, calcifications, or areas of architectural distortion.  3. There are areas of calcifications noted along both implant capsules.  BI-RADS 2: Benign Findings     02/28/2024, Screening MMG with Froilan ([facility]):  There are scattered areas of fibroglandular density.  There are bilateral pre-pectoral saline implants.  1. There are areas of calcification noted in both implants. There has been no significant change from the prior exam(s).  2. There are a few punctate calcifications with grouped distribution seen in the anterior one third region of the left breast at 3 o'clock.  BI-RADS 0: Incomplete     03/20/2024, Left breast Digital Diagnostic MMG with Froilan ([facility]):  Additional evaluation was performed for the calcification in the  left breast, 3 o'clock seen on 02/28/2024. On the present examination, there are amorphous calcifications with grouped distribution in the anterior one third region of the left breast at 3 o'clock.  BI-RADS 4B: Suspicious     04/15/2024, Left Breast, Stereotactic Biopsy ([facility]):  1.  Breast, left, 3:00, for calcifications: Breast tissue with               -A.  Incidental atypical lobular hyperplasia               -B.  Clustered microcysts with calcifications               -C.  Columnar cell change               -D.  Cholesterol clefting, hemosiderin and fat necrosis               -E.  Clustered apocrine cysts               -F.   Florid ductal hyperplasia of the usual type               -G.  Fibroadenomatoid hyperplasia  -Tophat clip. Concordant.      6/10/2024, [facility]  There is scattered fibroglandular tissue. There is mild background parenchymal enhancement. There are bilateral prepectoral  saline breast implants.  RIGHT BREAST:    There is mild asymmetric upper inner right breast skin thickening and edema. At 9:00 in the middle right breast, 8.5 cm posterior to the nipple, there is a 1.2 x 0.9 x 1.2 cm enhancing mass. There is a possible correlate in the outer right breast on the XCCL view from 2/28/2024, which has been marked. At 8:00 in the posterior right breast, 9 cm posterior to the nipple, there is a similar-appearing 1.0 x 0.7 x 0.8 cm enhancing mass, which is in close proximity with the underlying implant capsule. These are suspicious. The visualized axilla is within normal limits.  LEFT BREAST:    At 3:00 in the left breast, 3.4 cm posterior to the nipple, there is a focus of susceptibility from a biopsy clip marking the site of recent biopsy-proven atypical lobular hyperplasia. No suspicious enhancing mass or area of non-mass enhancement is identified at the biopsy site or elsewhere within the left breast. The visualized axilla is within normal limits.  EXTRAMAMMARY  FINDINGS:  There are no pathologically enlarged internal mammary chain lymph nodes on either side.    BI-RADS 4: Suspicious     7/9/2024, Right Diagnostic MMG with Froilan & [facility]  MMG:  The more anteriorly positioned 1.2 cm mass in the mid to posterior depth outer right breast is definitively seen only on the implant displaced extended CC view. The masses are otherwise likely obscured by the breast implant on the views without implant displacement. Partially imaged breast implant. No concerning microcalcification or architectural distortion.  US:  Multiplanar grayscale and color-flow imaging of the right breast was performed. Correlating with the MRI abnormality at the 9 o'clock position of the right breast 8 cm from the nipple, there is a well-circumscribed ovoid wider than tall heterogeneous probably hypoechoic mass measuring 1.2 cm x 0.9 cm x 0.5 cm superficial to the partially imaged breast implant. At the 9:30 position approximately 9 cm from the nipple, there is an additional hypoechoic mass measuring 0.6 cm x 0.6 cm x 0.5 cm, also superficial to the breast implant. No significant shadowing.  IMPRESSION:  The 2 small masses seen on the prior MRI study in the lateral right breast superficial to the breast implant are visualized on ultrasound.  Recommend ultrasound-guided right breast biopsy.  BI-RADS 4. Suspicious.      Review of Systems:  See interval history.       Medications:    Current Outpatient Medications:     atorvastatin (LIPITOR) 20 MG tablet, TAKE 1 TABLET DAILY, Disp: 90 tablet, Rfl: 3    busPIRone (BUSPAR) 5 MG tablet, TAKE 1 TABLET THREE TIMES A DAY, Disp: 270 tablet, Rfl: 3    calcium carbonate-vitamin d 600-400 MG-UNIT per tablet, Take 1 tablet by mouth Daily As Needed., Disp: , Rfl:     Cetirizine HCl (ZYRTEC PO), Take 1 tablet by mouth daily., Disp: , Rfl:     fluticasone (FLONASE) 50 MCG/ACT nasal spray, 2 sprays by Each Nare route Daily., Disp: 16 mL, Rfl: 11     glucosamine-chondroitin 500-400 MG capsule capsule, Take 2 capsules by mouth Daily., Disp: , Rfl:     Multiple Vitamins tablet, Take by mouth., Disp: , Rfl:     vitamin C (ASCORBIC ACID) 500 MG tablet, Take 1 tablet by mouth Daily., Disp: , Rfl:     vitamin E 400 UNIT capsule, Take 1 capsule by mouth Daily., Disp: , Rfl:       Allergies   Allergen Reactions    Ciprofloxacin        Family History   Problem Relation Age of Onset    Macular degeneration Mother     Hypertension Mother     Dementia Mother     Diabetes Father     Heart disease Father     Prostate cancer Father     Heart disease Maternal Grandmother     Heart disease Paternal Grandfather        Objective   PHYSICAL EXAMINATION:   Vitals:    07/25/24 1204   BP: 140/80   Pulse: 92   Resp: 17   SpO2: 99%   ECOG 0 - Asymptomatic  General: NAD, well appearing  Psych: a&o x 3, normal mood and affect  Eyes: EOMI, no scleral icterus  ENMT: neck supple without masses or thyromegaly, mucus membranes moist  MSK: normal gait, normal ROM in bilateral shoulders  Lymph nodes: no cervical, supraclavicular or axillary lymphadenopathy  Breast: symmetric, moderate size, grade 3 ptosis, saline implants  Right: IMF scar, otherwise no visible abnormalities on inspection while seated, with arms raised or hands on hips. No obvious masses or nipple abnormalities.  Left: IMF scar, otherwise no visible abnormalities on inspection while seated, with arms raised or hands on hips. No masses or nipple abnormalities.      I have independently reviewed her imaging and here are my findings:   In the left breast at 3:00, there initially was a 3 mm cluster of calcifications which represents biopsy-proven ALH.  MRI does not show any suspicious enhancement at this location.  In the lateral right breast on ultrasound there is a 12 mm mass in 6 mm mass.  The larger mass is abutting the implant capsule.      PROCEDURE: Percutaneous ultrasound-guided vacuum-assisted core breast biopsy x  2  Indication: Ultrasound-visible breast masses  Location:   Right breast, 10:00, 9 cm from the nipple  Right breast, 9:30, 8 cm from the nipple  Consent: The risks, benefits, and alternatives to the procedure were discussed with the patient, who understood and wished to proceed. The risks described included, but were not limited to, implant rupture, bleeding, infection, pneumothorax, and inadequate sampling requiring either repeat percutaneous or open excisional biopsy.  Description of Right Breast Diagnostic Ultrasound: After the patient was positioned supine on the procedure table, I located the lesions using ultrasound. Using a 10MHz linear transducer, I scanned the breast at the area of interest. At 10:00, 9 cm FN, there is an irregular hypoechoic mass.  At 930, 8 cm FN, there is a large irregular hypoechoic mass, located just above the implant capsule and possibly partially within it.  These are both amenable to ultrasound-guided core biopsy.   Description of Percutaneous Biopsies:   Right breast, 10:00, 9 cm from the nipple:  The area was prepped with alcohol and draped in sterile fashion. I anesthetized the breast skin at the site of anticipated mammotomy with 1% lidocaine with epinephrine. I then anesthetized the underlying subcutaneous tissue and breast parenchyma surrounding the lesion with 1% lidocaine with epinephrine under ultrasound guidance. In total, 12 mL of local anesthetic was used. I then made a nicking incision with an 11 blade and inserted the 14 G Bard Marquee biopsy device through the lesion from lateral to medial under ultrasound guidance. I took 3 core samples of the lesion under direct visualization with ultrasound. I withdrew the biopsy device and placed a hydromark marker into the biopsy site. The marker was visualized within the lesion after placement.   2.   Right breast, 9:30, 8 cm from the nipple  The area was prepped with alcohol and draped in sterile fashion. I anesthetized the  breast skin at the site of anticipated mammotomy with 1% lidocaine with epinephrine. I then anesthetized the underlying subcutaneous tissue and breast parenchyma surrounding the lesion with 1% lidocaine with epinephrine under ultrasound guidance. In total, 20 mL of local anesthetic was used. I then made a nicking incision with an 11 blade and inserted the 14 G Bard Marquee biopsy device through the lesion from lateral to medial under ultrasound guidance.  I initially attempted 2 core samples, however this was technically very difficult since I was unable to displace her implant.  Neither sample was adequate and neither sample appeared to contain the lesion.  I then made an additional nicking incision more laterally and reinserted the biopsy device coming in at a more shallow angle.  With this approach the needle appeared to chow the lesion.  I successfully took 1 core sample under ultrasound visualization into this appeared to contain the mass. I withdrew the biopsy device and placed a hydromark marker into the biopsy site. The marker was visualized within the lesion after placement.   We held manual compression at each site for 10 minutes, placed steri-strips at the mammotomy sites, and wrapped the patient in a 6 inch ace wrap and a cold pack.  Marker placed: Hydromark  Tolerance: The patient tolerated the procedure well.  Disposition: See below.      Assessment & Plan   Assessment:   1. 72 y.o. F with 2 suspicious right breast masses seen on MRI and ultrasound.  2.  She also has a diagnosis of left breast incidental atypical lobular hyperplasia, for which nonoperative management is recommended.  3. She has an increased lifetime risk of breast cancer (19%, TCv8, calculated 5/15/24) versus the average 75yo at 4%.      Plan:  -Biopsies performed today.  I will call her with results in 2 days and determine follow-up/management after that.    Darlene Garrett MD      CC:  MD Christa Dowling APRN

## 2024-07-30 ENCOUNTER — TELEPHONE (OUTPATIENT)
Dept: NEUROLOGY | Facility: CLINIC | Age: 64
End: 2024-07-30

## 2024-07-30 ENCOUNTER — HOSPITAL ENCOUNTER (OUTPATIENT)
Age: 64
Discharge: HOME | End: 2024-07-30
Payer: COMMERCIAL

## 2024-07-30 VITALS — BODY MASS INDEX: 37.36 KG/M2 | WEIGHT: 203 LBS | HEIGHT: 62 IN

## 2024-07-30 DIAGNOSIS — Z12.31 ENCOUNTER FOR SCREENING MAMMOGRAM FOR MALIGNANT NEOPLASM OF BREAST: ICD-10-CM

## 2024-07-30 PROCEDURE — 77067 SCR MAMMO BI INCL CAD: CPT

## 2024-07-30 PROCEDURE — 77063 BREAST TOMOSYNTHESIS BI: CPT

## 2024-07-30 NOTE — TELEPHONE ENCOUNTER
Botox number of units: 200  Botox quantity: 1  Arrived at what location: [facility]  Botox at Correct Administering Location: yes  NDC number: [id number]  Lot number: [id number]  Expiration Date: 12/2026  Appt notes indicate correct medication: yes

## 2024-08-05 ENCOUNTER — TELEPHONE (OUTPATIENT)
Age: 64
End: 2024-08-05

## 2024-08-05 NOTE — TELEPHONE ENCOUNTER
Called pt to schedule appt for recommended fu with dr stiles for late November   pt [place] December   placed pt back on recall list for December scheduling as dr stiles December schedule not yet established

## 2024-08-15 ENCOUNTER — PROCEDURE VISIT (OUTPATIENT)
Dept: NEUROLOGY | Facility: CLINIC | Age: 64
End: 2024-08-15
Payer: COMMERCIAL

## 2024-08-15 VITALS — DIASTOLIC BLOOD PRESSURE: 66 MMHG | SYSTOLIC BLOOD PRESSURE: 121 MMHG | HEART RATE: 58 BPM | TEMPERATURE: 98.3 F

## 2024-08-15 DIAGNOSIS — G43.709 CHRONIC MIGRAINE WITHOUT AURA WITHOUT STATUS MIGRAINOSUS, NOT INTRACTABLE: Primary | ICD-10-CM

## 2024-08-15 PROCEDURE — 64615 CHEMODENERV MUSC MIGRAINE: CPT | Performed by: PHYSICIAN ASSISTANT

## 2024-08-15 NOTE — PROGRESS NOTES
Universal Protocol   Consent: Verbal consent obtained. Written consent obtained.  Risks and benefits: risks, benefits and alternatives were discussed  Consent given by: patient  Patient understanding: patient states understanding of the procedure being performed  Patient consent: the patient's understanding of the procedure matches consent given  Procedure consent: procedure consent matches procedure scheduled      Chemodenervation     Date/Time  8/15/2024 3:00 PM     Performed by  Shilpi Nicholas PA-C   Authorized by  Shilpi Nicholas PA-C     Pre-procedure details      Prepped With: Alcohol     Procedure details      Position:  Upright   Botox      Botox Type:  Type A    Brand:  Botox    mL's of Botulinum Toxin:  200    Final Concentration per CC:  100 units    Needle Gauge:  30 G 2.5 inch   Procedures      Botox Procedures: chronic headache      Indications: migraines     Injection Location      Head / Face:  L superior trapezius, R superior trapezius, L superior cervical paraspinal, R superior cervical paraspinal, L , R , procerus, L temporalis, R temporalis, R frontalis, L frontalis, R medial occipitalis and L medial occipitalis    L  injection amount:  5 unit(s)    R  injection amount:  5 unit(s)    L lateral frontalis:  5 unit(s)    R lateral frontalis:  5 unit(s)    L medial frontalis:  5 unit(s)    R medial frontalis:  5 unit(s)    L temporalis injection amount:  20 unit(s)    R temporalis injection amount:  20 unit(s)    Procerus injection amount:  5 unit(s)    L medial occipitalis injection amount:  15 unit(s)    R medial occipitalis injection amount:  15 unit(s)    L superior cervical paraspinal injection amount:  10 unit(s)    R superior cervical paraspinal injection amount:  10 unit(s)    L superior trapezius injection amount:  15 unit(s)    R superior trapezius injection amount:  15 unit(s)   Total Units      Total units used:  200    Total units discarded:  0    Post-procedure details      Chemodenervation:  Chronic migraine    Facial Nerve Location::  Bilateral facial nerve    Patient tolerance of procedure:  Tolerated well, no immediate complications   Comments       45 extra units L frontal, scalp and occipital regions, all medically necessary.       Blood pressure 121/66, pulse 58, temperature 98.3 °F (36.8 °C), temperature source Temporal, not currently breastfeeding.

## 2024-08-22 ENCOUNTER — TELEMEDICINE (OUTPATIENT)
Dept: PSYCHIATRY | Facility: CLINIC | Age: 64
End: 2024-08-22
Payer: COMMERCIAL

## 2024-08-22 DIAGNOSIS — F33.1 MODERATE EPISODE OF RECURRENT MAJOR DEPRESSIVE DISORDER (HCC): Primary | ICD-10-CM

## 2024-08-22 DIAGNOSIS — R51.9 NONINTRACTABLE HEADACHE, UNSPECIFIED CHRONICITY PATTERN, UNSPECIFIED HEADACHE TYPE: ICD-10-CM

## 2024-08-22 DIAGNOSIS — G47.00 INSOMNIA, UNSPECIFIED TYPE: ICD-10-CM

## 2024-08-22 DIAGNOSIS — F41.1 GENERALIZED ANXIETY DISORDER WITH PANIC ATTACKS: ICD-10-CM

## 2024-08-22 DIAGNOSIS — F41.0 GENERALIZED ANXIETY DISORDER WITH PANIC ATTACKS: ICD-10-CM

## 2024-08-22 DIAGNOSIS — M79.2 NEURALGIA: ICD-10-CM

## 2024-08-22 PROCEDURE — 99214 OFFICE O/P EST MOD 30 MIN: CPT | Performed by: STUDENT IN AN ORGANIZED HEALTH CARE EDUCATION/TRAINING PROGRAM

## 2024-08-22 PROCEDURE — 90833 PSYTX W PT W E/M 30 MIN: CPT | Performed by: STUDENT IN AN ORGANIZED HEALTH CARE EDUCATION/TRAINING PROGRAM

## 2024-08-22 RX ORDER — SERTRALINE HYDROCHLORIDE 100 MG/1
100 TABLET, FILM COATED ORAL DAILY
Qty: 90 TABLET | Refills: 1 | Status: SHIPPED | OUTPATIENT
Start: 2024-08-22 | End: 2025-02-18

## 2024-08-22 RX ORDER — GABAPENTIN 300 MG/1
300 CAPSULE ORAL 2 TIMES DAILY
Qty: 180 CAPSULE | Refills: 1 | Status: SHIPPED | OUTPATIENT
Start: 2024-08-22 | End: 2025-02-18

## 2024-08-22 RX ORDER — QUETIAPINE FUMARATE 25 MG/1
25 TABLET, FILM COATED ORAL
Qty: 90 TABLET | Refills: 1 | Status: SHIPPED | OUTPATIENT
Start: 2024-08-22 | End: 2025-02-18

## 2024-08-22 RX ORDER — BUSPIRONE HYDROCHLORIDE 10 MG/1
10 TABLET ORAL 2 TIMES DAILY
Qty: 180 TABLET | Refills: 1 | Status: SHIPPED | OUTPATIENT
Start: 2024-08-22 | End: 2025-02-18

## 2024-08-22 NOTE — ASSESSMENT & PLAN NOTE
Orders:    gabapentin (NEURONTIN) 300 mg capsule; Take 1 capsule (300 mg total) by mouth 2 (two) times a day

## 2024-08-22 NOTE — ASSESSMENT & PLAN NOTE
Orders:    busPIRone (BUSPAR) 10 mg tablet; Take 1 tablet (10 mg total) by mouth 2 (two) times a day

## 2024-08-22 NOTE — ASSESSMENT & PLAN NOTE
Orders:    QUEtiapine (SEROquel) 25 mg tablet; Take 1 tablet (25 mg total) by mouth daily at bedtime    sertraline (ZOLOFT) 100 mg tablet; Take 1 tablet (100 mg total) by mouth daily

## 2024-08-22 NOTE — PSYCH
Virtual Regular Visit    Verification of patient location: [place]    Patient is located in the following state in which I hold an active license: [place]    Assessment & Plan  Moderate episode of recurrent major depressive disorder (HCC)    Orders:    QUEtiapine (SEROquel) 25 mg tablet; Take 1 tablet (25 mg total) by mouth daily at bedtime    sertraline (ZOLOFT) 100 mg tablet; Take 1 tablet (100 mg total) by mouth daily    Generalized anxiety disorder with panic attacks    Orders:    busPIRone (BUSPAR) 10 mg tablet; Take 1 tablet (10 mg total) by mouth 2 (two) times a day    Insomnia, unspecified type         Neuralgia    Orders:    gabapentin (NEURONTIN) 300 mg capsule; Take 1 capsule (300 mg total) by mouth 2 (two) times a day    Nonintractable headache, unspecified chronicity pattern, unspecified headache type    Orders:    gabapentin (NEURONTIN) 300 mg capsule; Take 1 capsule (300 mg total) by mouth 2 (two) times a day              Reason for visit is   Chief Complaint   Patient presents with    Medication Management    Depression    Anxiety    Insomnia        Visit Time  Visit Start Time: 1:55 PM  Visit Stop Time: 2:20 PM  Total Visit Duration: 25 minutes    Encounter provider Padmaja Flowers MD    Provider located at: [facility] [address], [address]  [place] [address]    Recent Visits  No visits were found meeting these conditions.  Showing recent visits within past 7 days and meeting all other requirements  Today's Visits  Date Type Provider Dept   08/22/24 Telemedicine Padmaja Flowers MD  [facility]   Showing today's visits and meeting all other requirements  Future Appointments  No visits were found meeting these conditions.  Showing future appointments within next 150 days and meeting all other requirements       The patient was identified by name and date of birth. Caryn Velasquez was informed that this is a telemedicine visit and that  the visit is being conducted through the [employer] platform. She agrees to proceed. My office door was closed. No one else was in the room. She acknowledged consent and understanding of privacy and security of the video platform. The patient has agreed to participate and understands they can discontinue the visit at any time. Patient is aware this is a billable service.       MEDICATION MANAGEMENT NOTE        [facility]    Name and Date of Birth:  Caryn Velasquez 63 y.o. 1960 MRN: [id number]    Date of Visit: August 22, 2024    Subjective    The patient was visited virtually for Medication Management, Depression, Anxiety, and Insomnia. Presented calm, and cooperative. Reported feeling better and things has calmed down since the wedding. She ordered an air purifier for each room, and is expecting to get them. She noted that she spent a lot of time looking online and finally ordered last night. Her  has been home more, and they have a good time. She reportedly has gained weight after the wedding since her  has been home and cooking. She enjoys spending time with her , going to day trips. She is going to see one of her friends tomorrow who retired at the same time. She is going to start beginner water color classes in September. She noted that she has always been creative growing up which was redirected to sewing and now wants to try drawing and other art work. She will go to [place] for a month in the end of October and will come back before Thanksgiving. Denied any changes in sleep, appetite, concentration, energy level, or daily activities.  Denied feelings of anhedonia, hopelessness, helplessness, worthlessness or guilt and appeared to be future oriented.  There was no thought constriction related to death.  Denied SI/HI, intent or plan upon direct inquiry at this time. No intense anxiety sxs, specific phobia or panic attacks reported. Denied  AV/H.  Endorsed good compliance with the medications and denied any side effects. Denied smoking cigarettes, binge drinking alcohol or other illicit substance use.    Given this presentation, medications are maintained at the same dosage.  Pending individual therapy.  The patient was educated to call 911 or go to the nearest emergency room if the symptoms become overwhelming or unable to remain in control. Verbalized understanding and agreed to seek help in case of distress or concern for safety.    Review Of Systems:  Pertinent items are noted in HPI; all others are negative; no recent changes in medications or health status reported.    PHQ-2/9 Depression Screening    Little interest or pleasure in doing things: 0 - not at all  Feeling down, depressed, or hopeless: 0 - not at all  Trouble falling or staying asleep, or sleeping too much: 1 - several days  Feeling tired or having little energy: 0 - not at all  Poor appetite or overeatin - several days  Feeling bad about yourself - or that you are a failure or have let yourself or your family down: 1 - several days  Trouble concentrating on things, such as reading the newspaper or watching television: 0 - not at all  Moving or speaking so slowly that other people could have noticed. Or the opposite - being so fidgety or restless that you have been moving around a lot more than usual: 0 - not at all  Thoughts that you would be better off dead, or of hurting yourself in some way: 0 - not at all  PHQ-9 Score: 3  PHQ-9 Interpretation: No or Minimal depression         YANY-7 Flowsheet Screening      Flowsheet Row Most Recent Value   Over the last two weeks, how often have you been bothered by the following problems?     Feeling nervous, anxious, or on edge 0    Not being able to stop or control worrying 0    Worrying too much about different things 0    Trouble relaxing  0    Being so restless that it's hard to sit still 0    Becoming easily annoyed or irritable  0     Feeling afraid as if something awful might happen 0    How difficult have these problems made it for you to do your work, take care of things at home, or get along with other people?  Not difficult at all    YANY Score  0             Historical Information    Past Psychiatric History Update:   - No inpatient psychiatric admission since last encounter  - No SA or SIB since last encounter  - No incidence of violent behavior since last encounter    Past Trauma History Update:    - No new onset of abuse or traumatic events since last encounter     Past Medical History:    Past Medical History:   Diagnosis Date    Anxiety     Arthritis     Attention and concentration deficit     Blurred vision     Chronic pain     Chronic pain syndrome 01/20/2016    CPAP (continuous positive airway pressure) dependence     CTS (carpal tunnel syndrome) > 6 years    Left wrist    Depression     Difficulty walking > 6 years    Left nerve leg pain    Diplopia     Dyspepsia     Gastric ulcer     Gastritis     GERD (gastroesophageal reflux disease)     Head injury > 25 years    Fell off 12 foot retaining wall    Headache(784.0)     Headache, tension-type > 1 year    Pain behind left eyeAnd stiff neck with pressure    History of transfusion 2005    Hypertension     Insomnia     Irritable bowel syndrome     Memory loss     Migraines     Osteopenia     Peripheral neuropathy 3/14/2012    Left thigh, left abdomen, left prudential nerve    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    Presence of neurostimulator     Pudendal neuralgia    Pudendal neuralgia     Shingles > 30 years ago    Left facial nerve    Sleep apnea     Sleep apnea, obstructive     Spinal stenosis     Starting and stopping of urinary stream during micturition     Syncope > 10 years    When I get up quickly    Trigeminal neuralgia > 30 years    Associated with shingles on left face    Urinary incontinence     Wears eyeglasses         Past Surgical History:   Procedure Laterality  Date     SECTION       SECTION      CHOLECYSTECTOMY      COLONOSCOPY      GALLBLADDER SURGERY      GASTRIC BYPASS  2004    HYSTERECTOMY  2012    INSERT / REPLACE PERIPHERAL NEUROSTIMULATOR PULSE GENERATOR /       MASS EXCISION Right 2021    Procedure: EXCISION  BIOPSY LESION/MASS LOWER NECK;  Surgeon: Brennon Browning DO;  Location: [facility];  Service: General    OTHER SURGICAL HISTORY      Reimplantatin at [facility]     HI INS/RPLC PERPH SAC/GSTRC NPG/RCVR PCKT CRTJ&CONN Left 2023    Procedure: PLACEMENT OF PERMANENT LEADWIRE AND PG FOR SNM AT S3 FORAMEN, COMPLEX PROGRAMMING OF PULSE GENERATOR;  Surgeon: Jess Shaw MD;  Location: [facility];  Service: UroGynecology           HI INSJ/RPLCMT SPINAL NPG/RCVR POCKET CRTJ&CONNJ Right 04/15/2020    Procedure: REPLACEMENT IMPLANTABLE PULSE GENERATOR FOR DORSAL SPINAL COLUMN STIMULATOR,  RIGHT BUTTOCKS;  Surgeon: Fili Mckeon MD;  Location: [facility];  Service: Neurosurgery    RADIOFREQUENCY ABLATION NERVES      SPINAL CORD STIMULATOR IMPLANT      TRIGGER POINT INJECTION      URETER SURGERY      URETHRAL FISTULA REPAIR      US GUIDED THYROID BIOPSY  2020    WRIST ARTHROSCOPY      with internal fixation      Allergies   Allergen Reactions    Morphine Nausea Only and Abdominal Pain     SEVERE N/V    Cat Hair Extract Nasal Congestion     Cat Dander    Dog Epithelium Nasal Congestion    Other Other (See Comments)     Dogs- sneezing  Crab Meat- GI intolerance       Substance Abuse History:    Social History     Substance and Sexual Activity   Alcohol Use No     Social History     Substance and Sexual Activity   Drug Use Yes    Types: Marijuana    Comment: Capsules used for chronic pain.  Medical       Social History:    Social History     Socioeconomic History    Marital status: /Civil Union     Spouse name: Not on file    Number of children: Not on file    Years of education: Not on file    Highest  education level: Not on file   Occupational History    Occupation: retired   Tobacco Use    Smoking status: Never    Smokeless tobacco: Never   Vaping Use    Vaping status: Never Used   Substance and Sexual Activity    Alcohol use: No    Drug use: Yes     Types: Marijuana     Comment: Capsules used for chronic pain.  Medical    Sexual activity: Not Currently     Partners: Male     Birth control/protection: None   Other Topics Concern    Not on file   Social History Narrative    Lives in [place], with . Previously worked as a teacher.      Social Determinants of Health     Financial Resource Strain: Not on file   Food Insecurity: Not on file   Transportation Needs: Not on file   Physical Activity: Not on file   Stress: Not on file   Social Connections: Unknown (6/18/2024)    Received from [employer]     How often do you feel lonely or isolated from those around you? (Adult - for ages 18 years and over): Not on file   Intimate Partner Violence: Not on file   Housing Stability: Not on file       Family Psychiatric History:     Family History   Problem Relation Age of Onset    Other Mother         Back Disorder     Cirrhosis Mother     Crohn's disease Mother     Lupus Mother         Systemic Lupus Erythematous     Depression Mother     Dementia Mother         Caused by liver disease    Neuropathy Mother         Fibromyalgia    Hypertension Father     Heart disease Father     Depression Sister     No Known Problems Daughter     Other Brother         Liver Transplant     Cirrhosis Brother     Dementia Brother     Crohn's disease Brother     No Known Problems Maternal Aunt     No Known Problems Paternal Aunt     No Known Problems Cousin     No Known Problems Cousin     No Known Problems Cousin     No Known Problems Cousin     No Known Problems Cousin     Seizures Neg Hx     Breast cancer Neg Hx        History Review: The following portions of the patient's history were reviewed and  updated as appropriate: allergies, current medications, past family history, past medical history, past social history, past surgical history and problem list.       Objective      Vital signs in last 24 hours: Not checked - virtual visit    Mental Status Evaluation:  Appearance and attitude: appeared as stated age, cooperative and attentive, casually dressed with good hygiene  Eye contact: good  Motor Function: within normal limits, No PMA/PMR  Gait/station: Not observed  Speech: normal for rate, rhythm, volume, latency, amount  Language: No overt abnormality  Mood/affect: euthymic / Affect was euthymic, reactive, in full range, normal intensity and mood congruent  Thought Processes: sequential and goal-directed  Thought content: denied suicidal ideations or homicidal ideations, no overt delusions elicited, ruminating thoughts  Associations: intact associations  Perceptual disturbances: denies Auditory/Visual/Tactile Hallucinations  Orientation: oriented to time, person, place and to the situational context  Cognitive Function: intact  Memory: recent and remote memory grossly intact  Intellect: average  Fund of knowledge: aware of current events, aware of past history, and vocabulary average  Impulse control: good  Insight/judgment: good/good          Laboratory Results: I have personally reviewed all pertinent laboratory/tests results    Recent Labs (last 2 months):   No visits with results within 2 Month(s) from this visit.   Latest known visit with results is:   Appointment on 03/25/2024   Component Date Value    WBC 03/25/2024 5.88     RBC 03/25/2024 4.22     Hemoglobin 03/25/2024 10.6 (L)     Hematocrit 03/25/2024 35.4     MCV 03/25/2024 84     MCH 03/25/2024 25.1 (L)     MCHC 03/25/2024 29.9 (L)     RDW 03/25/2024 17.1 (H)     MPV 03/25/2024 11.1     Platelets 03/25/2024 226     nRBC 03/25/2024 0     Segmented % 03/25/2024 52     Immature Grans % 03/25/2024 0     Lymphocytes % 03/25/2024 38     Monocytes %  03/25/2024 7     Eosinophils Relative 03/25/2024 2     Basophils Relative 03/25/2024 1     Absolute Neutrophils 03/25/2024 3.04     Absolute Immature Grans 03/25/2024 0.01     Absolute Lymphocytes 03/25/2024 2.24     Absolute Monocytes 03/25/2024 0.40     Eosinophils Absolute 03/25/2024 0.13     Basophils Absolute 03/25/2024 0.06     Sodium 03/25/2024 140     Potassium 03/25/2024 4.6     Chloride 03/25/2024 106     CO2 03/25/2024 30     ANION GAP 03/25/2024 4     BUN 03/25/2024 12     Creatinine 03/25/2024 0.65     Glucose, Fasting 03/25/2024 90     Calcium 03/25/2024 8.9     AST 03/25/2024 25     ALT 03/25/2024 17     Alkaline Phosphatase 03/25/2024 91     Total Protein 03/25/2024 6.6     Albumin 03/25/2024 3.9     Total Bilirubin 03/25/2024 0.29     eGFR 03/25/2024 93     Cholesterol 03/25/2024 135     Triglycerides 03/25/2024 88     HDL, Direct 03/25/2024 59     LDL Calculated 03/25/2024 58     Non-HDL-Chol (CHOL-HDL) 03/25/2024 76     Hemoglobin A1C 03/25/2024 5.7 (H)     EAG 03/25/2024 117     TSH 3RD GENERATON 03/25/2024 1.426          Assessment & Plan    A 62 y/o  female,  (two adult children 31 and 33 y/o), domiciled w/ , retired teacher, w/ PMH of multiple medical conditions including obesity, HTN, HLD, GERD, post-gastrectomy malabsorption, CATHRYN (on CPAP), migraine, neuralgia, dysesthesia of multiple sites, lumbar radiculopathy, OA of L shoulder, cervicalgia, mild cognitive impairment w/ memory loss, BRIANA, TIA (2.5 yrs ago), abnormal sleep deprived EEG, 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 12 foot retaining wall more than 25 yrs ago), long-term use of opiate analgesic, BRIANA, vit D def and PPH of depression and anxiety, recent ED visit on 2/11/2021 due to worsening of depression and SI lead to inpatient psychiatric admission at [facility] (for 11 days) and then to the PHP (finished on 3/11/2021), one prior SA (~40 yrs ago via OD), no h/o self-injurious behavior, on Xanax 1 mg HS, Buspar 10 mg BID,  Neurontin 300 mg BID, Seroquel 50 mg nightly, Zoloft 100 mg daily, who presented to the mental health clinic for the initial intake and psychiatric evaluation on 3/12/2021. Presented w/ increased depression and anxiety over past year in the context of chronic pain, multiple medical conditions, and isolation due to COVID-19 pandemic, which has markedly improved since recent inpatient hospitalization and finishing PHP. Denied SI/HI, intent or plan upon direct inquiry at this time. PHQ-9: 7; YANY-7: 1. Her current presentation meets criteria for MDD and YANY w/ panic attacks. Maintained on Zoloft 100 mg daily, Seroquel 50 mg nightly, Buspar 10 mg BID and Neurontin 300 mg BID, and Xanax was tapered off successfully as tolerated; started individual therapy. PHQ-9: 1; YANY-7: 1 on 6/28/2021. Will follow with her PCP regarding fixing her CPAP. Upon f/u on 5/5/22, the patient remained stable and noted that she finished individual therapy a couple of month ago, and has good support system. Maintained on the same regimen. Upon f/u on 1/12/23, presented w/ stable mood and slight increased anxiety in the context of marital conflicts; maintained on the same regimen and referred for individual psychotherapy. On 9/29/23, presented with improved anxiety and stable mood issa since started weight watcher program and lost 30 lbs over 6 months. Seroquel decreased to 25 mg nightly; other meds maintained the same dose.      Diagnoses and all orders for this visit:    Moderate episode of recurrent major depressive disorder (HCC)  -     QUEtiapine (SEROquel) 25 mg tablet; Take 1 tablet (25 mg total) by mouth daily at bedtime  -     sertraline (ZOLOFT) 100 mg tablet; Take 1 tablet (100 mg total) by mouth daily    Generalized anxiety disorder with panic attacks  -     busPIRone (BUSPAR) 10 mg tablet; Take 1 tablet (10 mg total) by mouth 2 (two) times a day    Insomnia, unspecified type    Neuralgia  -     gabapentin (NEURONTIN) 300 mg capsule;  Take 1 capsule (300 mg total) by mouth 2 (two) times a day    Nonintractable headache, unspecified chronicity pattern, unspecified headache type  -     gabapentin (NEURONTIN) 300 mg capsule; Take 1 capsule (300 mg total) by mouth 2 (two) times a day          Treatment Recommendations/Precautions:  - Pain management as per primary medical team  - f/u w/ PCP regarding fixing her CPAP machine and w/u for frequent falls  - f/u with neurology regarding recent weakness in LE and memory issues as well as recent frequent falls  - Continue Zoloft 100 mg po daily for anxiety and depression  - Continue Buspar 10 mg BID for anxiety  - Continue Neurontin 300 mg BID for pain and anxiety  - Continue Seroquel 25 mg nightly (could be tapered off as tolerated to minimize polypharmacy when the patient agrees)   - Pending therapy  - Medications sent to the patient's pharmacy for 90 day supply x1 refill     - Psychoeducation provided to the patient and benefits, potential risks and side effects discussed; importance of compliance with the psychiatric treatment reiterated, and the patient verbalized understanding of the matter     - RTC in 14 weeks     - Educated about healthy life style, risk of falls/sedation and addiction. Patient was receptive to education.  - The patient was educated about 24 hour and weekend coverage for urgent situations accessed by calling [facility] main practice number   - Patient was educated to call National Suicide Prevention Lifeline (3-905-841-ARPR [5546]) for behavioral crisis at anytime or 911 for any safety concerns, or go to nearest ER if her symptoms become overwhelming or unmanageable.      Medications Risks/Benefits      Risks, Benefits And Possible Side Effects Of Medications:    Risks, benefits, and possible side effects of medications explained to Caryn and she verbalizes understanding and agreement for treatment.    Controlled Medication Discussion:     Not  applicable    Psychotherapy Provided:     Individual psychotherapy provided: Yes  Counseling was provided during the session today for 16 minutes.   Psychoeducation provided to the patient and was educated about the importance of compliance with the medications and psychiatric treatment  Psycho-spiritual therapy provided to the patient, and the importance of simultaneously engaging the body, mind, and spirit in healing mental health issues, moving beyond problematic life patterns, and overcoming traumatic life experiences reiterated. The patient was educated about the breathing techniques, guided imagery meditation and healthy life-style  Solution Focused Brief Therapy (SFBT) provided  Patient's emotions were validated and specific labeled praise provided.   Banks suggestions were offered in a supportive non-critical way.   Cognitive Behavioral Therapy and supportive expressive interventions    Treatment Plan:    Completed and signed during the session: Not applicable - Treatment Plan not due at this session    Padmaja Flowers MD 08/22/24      This note was completed in part utilizing Dragon dictation Software. Grammatical, translation, syntax errors, random word insertions, spelling mistakes, and incomplete sentences may be an occasional consequence of this system secondary to software limitations with voice recognition, ambient noise, and hardware issues. If you have any questions or concerns about the content, text, or information contained within the body of this dictation, please contact the provider for clarification.

## 2024-08-26 ENCOUNTER — OFFICE VISIT (OUTPATIENT)
Dept: FAMILY MEDICINE CLINIC | Facility: CLINIC | Age: 64
End: 2024-08-26
Payer: COMMERCIAL

## 2024-08-26 ENCOUNTER — APPOINTMENT (OUTPATIENT)
Dept: LAB | Facility: HOSPITAL | Age: 64
End: 2024-08-26
Payer: COMMERCIAL

## 2024-08-26 ENCOUNTER — HOSPITAL ENCOUNTER (OUTPATIENT)
Dept: CT IMAGING | Facility: HOSPITAL | Age: 64
Discharge: HOME | End: 2024-08-26
Payer: COMMERCIAL

## 2024-08-26 VITALS
OXYGEN SATURATION: 97 % | BODY MASS INDEX: 39.49 KG/M2 | DIASTOLIC BLOOD PRESSURE: 80 MMHG | SYSTOLIC BLOOD PRESSURE: 112 MMHG | HEIGHT: 62 IN | WEIGHT: 214.6 LBS | HEART RATE: 73 BPM

## 2024-08-26 DIAGNOSIS — R10.9 FLANK PAIN: ICD-10-CM
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
## 2024-08-26 DIAGNOSIS — R10.9 FLANK PAIN: Primary | ICD-10-CM

## 2024-08-26 LAB
ALBUMIN SERPL BCG-MCNC: 3.8 G/DL (ref 3.5–5)
ALP SERPL-CCNC: 74 U/L (ref 34–104)
ALT SERPL W P-5'-P-CCNC: 20 U/L (ref 7–52)
ANION GAP SERPL CALCULATED.3IONS-SCNC: 2 MMOL/L (ref 4–13)
AST SERPL W P-5'-P-CCNC: 23 U/L (ref 13–39)
BASOPHILS # BLD AUTO: 0.06 THOUSANDS/ÂΜL (ref 0–0.1)
BASOPHILS NFR BLD AUTO: 1 % (ref 0–1)
BILIRUB SERPL-MCNC: 0.33 MG/DL (ref 0.2–1)
BUN SERPL-MCNC: 13 MG/DL (ref 5–25)
CALCIUM SERPL-MCNC: 8.8 MG/DL (ref 8.4–10.2)
CHLORIDE SERPL-SCNC: 106 MMOL/L (ref 96–108)
CO2 SERPL-SCNC: 32 MMOL/L (ref 21–32)
CREAT SERPL-MCNC: 0.75 MG/DL (ref 0.6–1.3)
EOSINOPHIL # BLD AUTO: 0.13 THOUSAND/ÂΜL (ref 0–0.61)
EOSINOPHIL NFR BLD AUTO: 2 % (ref 0–6)
ERYTHROCYTE [DISTWIDTH] IN BLOOD BY AUTOMATED COUNT: 15.2 % (ref 11.6–15.1)
GFR SERPL CREATININE-BSD FRML MDRD: 83 ML/MIN/1.73SQ M
GLUCOSE SERPL-MCNC: 82 MG/DL (ref 65–140)
HCT VFR BLD AUTO: 38.8 % (ref 34.8–46.1)
HGB BLD-MCNC: 12.2 G/DL (ref 11.5–15.4)
IMM GRANULOCYTES # BLD AUTO: 0.02 THOUSAND/UL (ref 0–0.2)
IMM GRANULOCYTES NFR BLD AUTO: 0 % (ref 0–2)
LYMPHOCYTES # BLD AUTO: 1.5 THOUSANDS/ÂΜL (ref 0.6–4.47)
LYMPHOCYTES NFR BLD AUTO: 24 % (ref 14–44)
MCH RBC QN AUTO: 29.7 PG (ref 26.8–34.3)
MCHC RBC AUTO-ENTMCNC: 31.4 G/DL (ref 31.4–37.4)
MCV RBC AUTO: 94 FL (ref 82–98)
MONOCYTES # BLD AUTO: 0.41 THOUSAND/ÂΜL (ref 0.17–1.22)
MONOCYTES NFR BLD AUTO: 7 % (ref 4–12)
NEUTROPHILS # BLD AUTO: 4.08 THOUSANDS/ÂΜL (ref 1.85–7.62)
NEUTS SEG NFR BLD AUTO: 66 % (ref 43–75)
NRBC BLD AUTO-RTO: 0 /100 WBCS
PLATELET # BLD AUTO: 210 THOUSANDS/UL (ref 149–390)
PMV BLD AUTO: 10 FL (ref 8.9–12.7)
POTASSIUM SERPL-SCNC: 4.5 MMOL/L (ref 3.5–5.3)
PROT SERPL-MCNC: 6.5 G/DL (ref 6.4–8.4)
RBC # BLD AUTO: 4.11 MILLION/UL (ref 3.81–5.12)
SL AMB  POCT GLUCOSE, UA: ABNORMAL
SL AMB LEUKOCYTE ESTERASE,UA: ABNORMAL
SL AMB POCT BILIRUBIN,UA: ABNORMAL
SL AMB POCT BLOOD,UA: ABNORMAL
SL AMB POCT CLARITY,UA: CLEAR
SL AMB POCT COLOR,UA: YELLOW
SL AMB POCT KETONES,UA: ABNORMAL
SL AMB POCT NITRITE,UA: ABNORMAL
SL AMB POCT PH,UA: 6
SL AMB POCT SPECIFIC GRAVITY,UA: 1.02
SL AMB POCT URINE PROTEIN: ABNORMAL
SL AMB POCT UROBILINOGEN: ABNORMAL
SODIUM SERPL-SCNC: 140 MMOL/L (ref 135–147)
WBC # BLD AUTO: 6.2 THOUSAND/UL (ref 4.31–10.16)

## 2024-08-26 PROCEDURE — 36415 COLL VENOUS BLD VENIPUNCTURE: CPT

## 2024-08-26 PROCEDURE — 86618 LYME DISEASE ANTIBODY: CPT

## 2024-08-26 PROCEDURE — 85025 COMPLETE CBC W/AUTO DIFF WBC: CPT

## 2024-08-26 PROCEDURE — 74178 CT ABD&PLV WO CNTR FLWD CNTR: CPT

## 2024-08-26 PROCEDURE — 99214 OFFICE O/P EST MOD 30 MIN: CPT | Performed by: NURSE PRACTITIONER

## 2024-08-26 PROCEDURE — 81002 URINALYSIS NONAUTO W/O SCOPE: CPT | Performed by: NURSE PRACTITIONER

## 2024-08-26 PROCEDURE — 87086 URINE CULTURE/COLONY COUNT: CPT | Performed by: NURSE PRACTITIONER

## 2024-08-26 PROCEDURE — 80053 COMPREHEN METABOLIC PANEL: CPT

## 2024-08-26 RX ORDER — OXYCODONE AND ACETAMINOPHEN 5; 325 MG/1; MG/1
1 TABLET ORAL EVERY 4 HOURS PRN
Qty: 20 TABLET | Refills: 0 | Status: SHIPPED | OUTPATIENT
Start: 2024-08-26 | End: 2024-08-27 | Stop reason: SDUPTHER

## 2024-08-26 RX ADMIN — IOHEXOL 100 ML: 350 INJECTION, SOLUTION INTRAVENOUS at 16:30

## 2024-08-26 NOTE — PROGRESS NOTES
Ambulatory Visit  Name: Caryn Velasquez      : 1960      MRN: [id number]  Encounter Provider: GWEN Black  Encounter Date: 2024   Encounter department: [facility] [address]    Assessment & Plan   1. Tick bite, unspecified site, sequela  -     Lyme Total AB W Reflex to IGM/IGG; Future  2. Flank pain  -     POCT urine dip  -     Urine culture  -     oxyCODONE-acetaminophen (Percocet) 5-325 mg per tablet; Take 1 tablet by mouth every 4 (four) hours as needed for moderate pain Max Daily Amount: 6 tablets  -     CT renal protocol; Future; Expected date: 2024  -     CBC and differential; Future  -     Comprehensive metabolic panel; Future       History of Present Illness     Patient presents for concerns of flank pain mostly on the right side. She has her normal left groin pain. She did have a tick on her back. This was about 3 months.     Back Pain  This is a new problem. The current episode started in the past 7 days. The problem occurs daily. The problem has been gradually worsening since onset. The pain is present in the lumbar spine. The quality of the pain is described as aching. The pain does not radiate. The pain is at a severity of 6/10. The pain is Worse during the day. The symptoms are aggravated by bending, position, sitting, standing and twisting. Stiffness is present All day. Associated symptoms include headaches. Pertinent negatives include no abdominal pain, chest pain, dysuria or fever. Risk factors include history of osteoporosis, obesity and sedentary lifestyle.   Flank Pain  Associated symptoms include headaches. Pertinent negatives include no abdominal pain, chest pain, dysuria or fever.       Review of Systems   Constitutional:  Positive for fatigue. Negative for chills, diaphoresis and fever.   HENT:  Negative for ear pain and sore throat.    Eyes:  Negative for pain and visual disturbance.   Respiratory:  Negative for cough and shortness  of breath.    Cardiovascular:  Negative for chest pain and palpitations.   Gastrointestinal:  Negative for abdominal pain and vomiting.   Genitourinary:  Positive for flank pain. Negative for dysuria and hematuria.   Musculoskeletal:  Positive for back pain. Negative for arthralgias.   Skin:  Negative for color change and rash.   Neurological:  Positive for headaches. Negative for seizures and syncope.   All other systems reviewed and are negative.    Current Outpatient Medications on File Prior to Visit   Medication Sig Dispense Refill    albuterol (Ventolin HFA) 90 mcg/act inhaler Inhale 2 puffs every 6 (six) hours as needed for wheezing 18 g 0    Botox 200 units SOLR       busPIRone (BUSPAR) 10 mg tablet Take 1 tablet (10 mg total) by mouth 2 (two) times a day 180 tablet 1    Cholecalciferol 25 MCG (1000 UT) capsule Take 1 capsule by mouth daily      clobetasol (TEMOVATE) 0.05 % ointment       fluticasone (FLONASE) 50 mcg/act nasal spray SPRAY 1 SPRAY INTO EACH NOSTRIL EVERY DAY 16 mL 1    fluticasone 50 mcg/actuation diskus inhaler INHALE 1 PUFF 2 TIMES A DAY RINSE MOUTH AFTER USE. 60 each 0    gabapentin (NEURONTIN) 300 mg capsule Take 1 capsule (300 mg total) by mouth 2 (two) times a day 180 capsule 1    Galcanezumab-gnlm (Emgality) 120 MG/ML SOAJ USE 1 INJECTION UNDER THE SKIN EVERY MONTH 3 mL 3    Iron-Vitamin C 100-250 MG TABS Take 1 tablet by mouth daily      ketoconazole (NIZORAL) 2 % cream       lidocaine (XYLOCAINE) 5 % ointment Apply topically 2 (two) times a day as needed for moderate pain 35.44 g 1    lisinopril (ZESTRIL) 5 mg tablet TAKE 1 TABLET DAILY 90 tablet 3    Multiple Vitamin (MULTI-VITAMIN DAILY) TABS Take 1 tablet by mouth daily      ondansetron (ZOFRAN-ODT) 4 mg disintegrating tablet PLACE 1 TABLET ON THE TONGUE EVERY 6 HOURS AS NEEDED FOR NAUSEA OR VOMITING 30 tablet 5    pantoprazole (PROTONIX) 40 mg tablet TAKE 1 TABLET TWICE A  tablet 3    propranolol (INDERAL) 60 mg tablet  "TAKE 1 TABLET TWICE A  tablet 1    QUEtiapine (SEROquel) 25 mg tablet Take 1 tablet (25 mg total) by mouth daily at bedtime 90 tablet 1    rizatriptan (MAXALT-MLT) 10 mg disintegrating tablet Take at the onset of migraine; if symptoms continue or return, may take another dose at least 2 hours after first dose. Take no more than 2 doses in a day. 12 tablet 5    rosuvastatin (CRESTOR) 10 MG tablet TAKE 1 TABLET DAILY 90 tablet 1    sertraline (ZOLOFT) 100 mg tablet Take 1 tablet (100 mg total) by mouth daily 90 tablet 1    tacrolimus (PROTOPIC) 0.1 % ointment       triamcinolone (KENALOG) 0.1 % cream APPLY TOPICALLY TWICE A DAY 80 g 8    denosumab (PROLIA) 60 mg/mL Inject 1 mL (60 mg total) under the skin once for 1 dose 1 mL 0     No current facility-administered medications on file prior to visit.      Objective     /80 (BP Location: Right arm, Patient Position: Sitting, Cuff Size: Large)   Pulse 73   Ht 5' 2\" (1.575 m)   Wt 97.3 kg (214 lb 9.6 oz)   SpO2 97%   BMI 39.25 kg/m²     Physical Exam  Vitals and nursing note reviewed.   Constitutional:       General: She is not in acute distress.     Appearance: She is well-developed.   HENT:      Head: Normocephalic and atraumatic.   Eyes:      Conjunctiva/sclera: Conjunctivae normal.   Cardiovascular:      Rate and Rhythm: Normal rate and regular rhythm.      Heart sounds: No murmur heard.  Pulmonary:      Effort: Pulmonary effort is normal. No respiratory distress.      Breath sounds: Normal breath sounds.   Abdominal:      Palpations: Abdomen is soft.      Tenderness: There is no abdominal tenderness. There is right CVA tenderness. There is no left CVA tenderness.   Musculoskeletal:         General: No swelling.      Cervical back: Neck supple.   Skin:     General: Skin is warm and dry.      Capillary Refill: Capillary refill takes less than 2 seconds.   Neurological:      Mental Status: She is alert.   Psychiatric:         Mood and Affect: Mood " normal.       Administrative Statements   I have spent a total time of 15 minutes in caring for this patient on the day of the visit/encounter including Counseling / Coordination of care, Documenting in the medical record, Reviewing / ordering tests, medicine, procedures  , and Obtaining or reviewing history  .

## 2024-08-27 ENCOUNTER — TELEPHONE (OUTPATIENT)
Age: 64
End: 2024-08-27

## 2024-08-27 DIAGNOSIS — R10.9 FLANK PAIN: ICD-10-CM

## 2024-08-27 DIAGNOSIS — R10.9 FLANK PAIN: Primary | ICD-10-CM

## 2024-08-27 LAB
B BURGDOR IGG+IGM SER QL IA: NEGATIVE
BACTERIA UR CULT: NORMAL

## 2024-08-27 RX ORDER — OXYCODONE AND ACETAMINOPHEN 5; 325 MG/1; MG/1
1 TABLET ORAL EVERY 4 HOURS PRN
Qty: 20 TABLET | Refills: 0 | Status: SHIPPED | OUTPATIENT
Start: 2024-08-27

## 2024-08-27 RX ORDER — METHOCARBAMOL 500 MG/1
500 TABLET, FILM COATED ORAL 4 TIMES DAILY
Qty: 20 TABLET | Refills: 0 | Status: SHIPPED | OUTPATIENT
Start: 2024-08-27

## 2024-08-27 NOTE — TELEPHONE ENCOUNTER
Patient called to request if prescription for Oxycodone - acetaminophen 5-325 mg can be sent to : [id number] - [address] [place].    Patient stated [facility] in [place] is out of stock and will not have prescription for another week.     Patient also called to review tests results. The following message was relayed:    Please let patient know that there is no kidney stones.  She does have a simple renal cyst which is not causing her pain.  Her urine culture is also negative.  I would imagine her pain is more muscular since her workup is negative.I sent in a muscle relaxer for her to use as needed to help with pain.  Still pending her Lyme test   GWEN Black  8/27/2024  1:04 PM EDT     Patient had no further questions.

## 2024-09-11 DIAGNOSIS — I10 ESSENTIAL HYPERTENSION: ICD-10-CM

## 2024-09-11 RX ORDER — LISINOPRIL 5 MG/1
5 TABLET ORAL DAILY
Qty: 100 TABLET | Refills: 1 | Status: SHIPPED | OUTPATIENT
Start: 2024-09-11

## 2024-10-01 NOTE — PROGRESS NOTES
Pain Medicine Follow-Up Note    Assessment:  1. Lumbar radiculopathy    2. Sacroiliitis (HCC)    3. Status post insertion of spinal cord stimulator        Plan:  Orders Placed This Encounter   Procedures    FL spine and pain procedure     Standing Status:   Future     Standing Expiration Date:   10/2/2028     Order Specific Question:   Reason for Exam:     Answer:   L3-4 LESI     Order Specific Question:   Anticoagulant hold needed?     Answer:   NO       No orders of the defined types were placed in this encounter.      My impressions and treatment recommendations were discussed in detail with the patient who verbalized understanding and had no further questions.      This is 63-year-old female returns her office following left L2 and L3 TFESI with substantial relief in the left anterior thigh pain.  She returns today with bilateral symptoms, she has recurrent left anterior thigh pain which was previously relieved with TFESI, however she is also describing what appears to be L4 radicular features in the right lower extremity which have worsened.  There is lateral recess stenosis at the L3-4 level on the right based on previous imaging.  We discussed L3-4 lumbar epidural steroid injection under fluoroscopic guidance.  I did discuss this this may help with both issues.  However if there is still persistent left anterior thigh issues, then we can repeat TFESI.    [place] Prescription Drug Monitoring Program report was reviewed and was appropriate     Complete risks and benefits including bleeding, infection, tissue reaction, nerve injury and allergic reaction were discussed. The approach was demonstrated using models and literature was provided. Verbal and written consent was obtained.    Discharge instructions were provided. I personally saw and examined the patient and I agree with the above discussed plan of care.    History of Present Illness:    Caryn Velasquez is a 63 y.o. female who presents to [facility] for interval re-evaluation of the above stated pain complaints. The patient has a past medical and chronic pain history as outlined in the assessment section. She was last seen on 11/15/2023 for left L2 and L3 TFESI with significant relief.  Returns today with recurrent symptoms.  Pain score 7 out of 10.  Worse in the evening.  Intermittent, burning, dull/aching, sharp, throbbing, shooting, pins-and-needles.    Today she is reporting bilateral symptoms. Left anterior thigh and groin burning pain which is typical of that which improves with TFESI. She has a new issue of right back and buttock which radiates intot he right leg- right anterior thihg, through knee and into the shin.       Other than as stated above, the patient denies any interval changes in medications, medical condition, mental condition, symptoms, or allergies since the last office visit.         Review of Systems:    Review of Systems   Musculoskeletal:  Positive for arthralgias and back pain.   Neurological:  Positive for weakness and numbness.         Past Medical History:   Diagnosis Date    Anxiety     Arthritis     Attention and concentration deficit     Blurred vision     Chronic pain     Chronic pain syndrome 01/20/2016    CPAP (continuous positive airway pressure) dependence     CTS (carpal tunnel syndrome) > 6 years    Left wrist    Depression     Difficulty walking > 6 years    Left nerve leg pain    Diplopia     Dyspepsia     Gastric ulcer     Gastritis     GERD (gastroesophageal reflux disease)     Head injury > 25 years    Fell off 12 foot retaining wall    Headache(784.0)     Headache, tension-type > 1 year    Pain behind left eyeAnd stiff neck with pressure    History of transfusion 2005    Hypertension     Insomnia     Irritable bowel syndrome     Memory loss     Migraines     Osteopenia     Peripheral neuropathy 3/14/2012    Left thigh, left abdomen, left prudential nerve    Postgastrectomy malabsorption  10/24/2016    Presence of neurostimulator     Pudendal neuralgia    Pudendal neuralgia     Shingles > 30 years ago    Left facial nerve    Sleep apnea     Sleep apnea, obstructive     Spinal stenosis     Starting and stopping of urinary stream during micturition     Syncope > 10 years    When I get up quickly    Trigeminal neuralgia > 30 years    Associated with shingles on left face    Urinary incontinence     Wears eyeglasses        Past Surgical History:   Procedure Laterality Date     SECTION       SECTION      CHOLECYSTECTOMY      COLONOSCOPY      GALLBLADDER SURGERY      GASTRIC BYPASS  2004    HYSTERECTOMY  2012    INSERT / REPLACE PERIPHERAL NEUROSTIMULATOR PULSE GENERATOR /       MASS EXCISION Right 2021    Procedure: EXCISION  BIOPSY LESION/MASS LOWER NECK;  Surgeon: Brennon Browning DO;  Location: [facility];  Service: General    OTHER SURGICAL HISTORY      Reimplantatin at [facility]     MS INS/RPLC PERPH SAC/GSTRC NPG/RCVR PCKT CRTJ&CONN Left 2023    Procedure: PLACEMENT OF PERMANENT LEADWIRE AND PG FOR SNM AT S3 FORAMEN, COMPLEX PROGRAMMING OF PULSE GENERATOR;  Surgeon: Jess Shaw MD;  Location: [facility];  Service: UroGynecology           MS INSJ/RPLCMT SPINAL NPG/RCVR POCKET CRTJ&CONNJ Right 04/15/2020    Procedure: REPLACEMENT IMPLANTABLE PULSE GENERATOR FOR DORSAL SPINAL COLUMN STIMULATOR,  RIGHT BUTTOCKS;  Surgeon: Fili Mckeon MD;  Location: [facility];  Service: Neurosurgery    RADIOFREQUENCY ABLATION NERVES      SPINAL CORD STIMULATOR IMPLANT      TRIGGER POINT INJECTION      URETER SURGERY      URETHRAL FISTULA REPAIR      US GUIDED THYROID BIOPSY  2020    WRIST ARTHROSCOPY      with internal fixation        Family History   Problem Relation Age of Onset    Other Mother         Back Disorder     Cirrhosis Mother     Crohn's disease Mother     Lupus Mother         Systemic Lupus Erythematous     Depression Mother     Dementia  Mother         Caused by liver disease    Neuropathy Mother         Fibromyalgia    Hypertension Father     Heart disease Father     Depression Sister     No Known Problems Daughter     Other Brother         Liver Transplant     Cirrhosis Brother     Dementia Brother     Crohn's disease Brother     No Known Problems Maternal Aunt     No Known Problems Paternal Aunt     No Known Problems Cousin     No Known Problems Cousin     No Known Problems Cousin     No Known Problems Cousin     No Known Problems Cousin     Seizures Neg Hx     Breast cancer Neg Hx        Social History     Occupational History    Occupation: retired   Tobacco Use    Smoking status: Never    Smokeless tobacco: Never   Vaping Use    Vaping status: Never Used   Substance and Sexual Activity    Alcohol use: No    Drug use: Yes     Types: Marijuana     Comment: Capsules used for chronic pain.  Medical    Sexual activity: Not Currently     Partners: Male     Birth control/protection: None         Current Outpatient Medications:     Botox 200 units SOLR, , Disp: , Rfl:     busPIRone (BUSPAR) 10 mg tablet, Take 1 tablet (10 mg total) by mouth 2 (two) times a day, Disp: 180 tablet, Rfl: 1    Cholecalciferol 25 MCG (1000 UT) capsule, Take 1 capsule by mouth daily, Disp: , Rfl:     clobetasol (TEMOVATE) 0.05 % ointment, , Disp: , Rfl:     gabapentin (NEURONTIN) 300 mg capsule, Take 1 capsule (300 mg total) by mouth 2 (two) times a day, Disp: 180 capsule, Rfl: 1    Galcanezumab-gnlm (Emgality) 120 MG/ML SOAJ, USE 1 INJECTION UNDER THE SKIN EVERY MONTH, Disp: 3 mL, Rfl: 3    Iron-Vitamin C 100-250 MG TABS, Take 1 tablet by mouth daily, Disp: , Rfl:     ketoconazole (NIZORAL) 2 % cream, , Disp: , Rfl:     lisinopril (ZESTRIL) 5 mg tablet, Take 1 tablet (5 mg total) by mouth daily, Disp: 100 tablet, Rfl: 1    Multiple Vitamin (MULTI-VITAMIN DAILY) TABS, Take 1 tablet by mouth daily, Disp: , Rfl:     ondansetron (ZOFRAN-ODT) 4 mg disintegrating tablet, PLACE 1  TABLET ON THE TONGUE EVERY 6 HOURS AS NEEDED FOR NAUSEA OR VOMITING, Disp: 30 tablet, Rfl: 5    pantoprazole (PROTONIX) 40 mg tablet, TAKE 1 TABLET TWICE A DAY, Disp: 180 tablet, Rfl: 3    propranolol (INDERAL) 60 mg tablet, TAKE 1 TABLET TWICE A DAY, Disp: 180 tablet, Rfl: 1    QUEtiapine (SEROquel) 25 mg tablet, Take 1 tablet (25 mg total) by mouth daily at bedtime, Disp: 90 tablet, Rfl: 1    rizatriptan (MAXALT-MLT) 10 mg disintegrating tablet, Take at the onset of migraine; if symptoms continue or return, may take another dose at least 2 hours after first dose. Take no more than 2 doses in a day., Disp: 12 tablet, Rfl: 5    rosuvastatin (CRESTOR) 10 MG tablet, TAKE 1 TABLET DAILY, Disp: 90 tablet, Rfl: 1    sertraline (ZOLOFT) 100 mg tablet, Take 1 tablet (100 mg total) by mouth daily, Disp: 90 tablet, Rfl: 1    tacrolimus (PROTOPIC) 0.1 % ointment, , Disp: , Rfl:     triamcinolone (KENALOG) 0.1 % cream, APPLY TOPICALLY TWICE A DAY, Disp: 80 g, Rfl: 8    albuterol (Ventolin HFA) 90 mcg/act inhaler, Inhale 2 puffs every 6 (six) hours as needed for wheezing (Patient not taking: Reported on 10/2/2024), Disp: 18 g, Rfl: 0    denosumab (PROLIA) 60 mg/mL, Inject 1 mL (60 mg total) under the skin once for 1 dose, Disp: 1 mL, Rfl: 0    fluticasone (FLONASE) 50 mcg/act nasal spray, SPRAY 1 SPRAY INTO EACH NOSTRIL EVERY DAY (Patient not taking: Reported on 10/2/2024), Disp: 16 mL, Rfl: 1    fluticasone 50 mcg/actuation diskus inhaler, INHALE 1 PUFF 2 TIMES A DAY RINSE MOUTH AFTER USE. (Patient not taking: Reported on 10/2/2024), Disp: 60 each, Rfl: 0    lidocaine (XYLOCAINE) 5 % ointment, Apply topically 2 (two) times a day as needed for moderate pain (Patient not taking: Reported on 10/2/2024), Disp: 35.44 g, Rfl: 1    methocarbamol (ROBAXIN) 500 mg tablet, Take 1 tablet (500 mg total) by mouth 4 (four) times a day (Patient not taking: Reported on 10/2/2024), Disp: 20 tablet, Rfl: 0    oxyCODONE-acetaminophen (Percocet)  "5-325 mg per tablet, Take 1 tablet by mouth every 4 (four) hours as needed for moderate pain Max Daily Amount: 6 tablets (Patient not taking: Reported on 10/2/2024), Disp: 20 tablet, Rfl: 0    Allergies   Allergen Reactions    Morphine Nausea Only and Abdominal Pain     SEVERE N/V    Cat Hair Extract Nasal Congestion     Cat Dander    Dog Epithelium Nasal Congestion    Other Other (See Comments)     Dogs- sneezing  Crab Meat- GI intolerance       Physical Exam:    BP 96/66   Pulse 60   Ht 5' 2\" (1.575 m)   Wt 95.7 kg (211 lb)   BMI 38.59 kg/m²     Constitutional:normal, well developed, well nourished, alert, in no distress and non-toxic and no overt pain behavior.  Eyes:anicteric  HEENT:grossly intact  Neck:supple, symmetric, trachea midline and no masses   Pulmonary:even and unlabored  Cardiovascular:No edema or pitting edema present  Skin:Normal without rashes or lesions and well hydrated  Psychiatric:Mood and affect appropriate  Neurologic:Cranial Nerves II-XII grossly intact  Musculoskeletal: ttp right lumbar paraspinal region out of proportion to level of stimulation.       Imaging  FL spine and pain procedure    (Results Pending)         Orders Placed This Encounter   Procedures    FL spine and pain procedure     "

## 2024-10-01 NOTE — H&P (VIEW-ONLY)
Pain Medicine Follow-Up Note    Assessment:  1. Lumbar radiculopathy    2. Sacroiliitis (HCC)    3. Status post insertion of spinal cord stimulator        Plan:  Orders Placed This Encounter   Procedures    FL spine and pain procedure     Standing Status:   Future     Standing Expiration Date:   10/2/2028     Order Specific Question:   Reason for Exam:     Answer:   L3-4 LESI     Order Specific Question:   Anticoagulant hold needed?     Answer:   NO       No orders of the defined types were placed in this encounter.      My impressions and treatment recommendations were discussed in detail with the patient who verbalized understanding and had no further questions.      This is 63-year-old female returns her office following left L2 and L3 TFESI with substantial relief in the left anterior thigh pain.  She returns today with bilateral symptoms, she has recurrent left anterior thigh pain which was previously relieved with TFESI, however she is also describing what appears to be L4 radicular features in the right lower extremity which have worsened.  There is lateral recess stenosis at the L3-4 level on the right based on previous imaging.  We discussed L3-4 lumbar epidural steroid injection under fluoroscopic guidance.  I did discuss this this may help with both issues.  However if there is still persistent left anterior thigh issues, then we can repeat TFESI.    [place] Prescription Drug Monitoring Program report was reviewed and was appropriate     Complete risks and benefits including bleeding, infection, tissue reaction, nerve injury and allergic reaction were discussed. The approach was demonstrated using models and literature was provided. Verbal and written consent was obtained.    Discharge instructions were provided. I personally saw and examined the patient and I agree with the above discussed plan of care.    History of Present Illness:    Caryn Velasquez is a 63 y.o. female who presents to [facility] for interval re-evaluation of the above stated pain complaints. The patient has a past medical and chronic pain history as outlined in the assessment section. She was last seen on 11/15/2023 for left L2 and L3 TFESI with significant relief.  Returns today with recurrent symptoms.  Pain score 7 out of 10.  Worse in the evening.  Intermittent, burning, dull/aching, sharp, throbbing, shooting, pins-and-needles.    Today she is reporting bilateral symptoms. Left anterior thigh and groin burning pain which is typical of that which improves with TFESI. She has a new issue of right back and buttock which radiates intot he right leg- right anterior thihg, through knee and into the shin.       Other than as stated above, the patient denies any interval changes in medications, medical condition, mental condition, symptoms, or allergies since the last office visit.         Review of Systems:    Review of Systems   Musculoskeletal:  Positive for arthralgias and back pain.   Neurological:  Positive for weakness and numbness.         Past Medical History:   Diagnosis Date    Anxiety     Arthritis     Attention and concentration deficit     Blurred vision     Chronic pain     Chronic pain syndrome 01/20/2016    CPAP (continuous positive airway pressure) dependence     CTS (carpal tunnel syndrome) > 6 years    Left wrist    Depression     Difficulty walking > 6 years    Left nerve leg pain    Diplopia     Dyspepsia     Gastric ulcer     Gastritis     GERD (gastroesophageal reflux disease)     Head injury > 25 years    Fell off 12 foot retaining wall    Headache(784.0)     Headache, tension-type > 1 year    Pain behind left eyeAnd stiff neck with pressure    History of transfusion 2005    Hypertension     Insomnia     Irritable bowel syndrome     Memory loss     Migraines     Osteopenia     Peripheral neuropathy 3/14/2012    Left thigh, left abdomen, left prudential nerve    Postgastrectomy malabsorption  10/24/2016    Presence of neurostimulator     Pudendal neuralgia    Pudendal neuralgia     Shingles > 30 years ago    Left facial nerve    Sleep apnea     Sleep apnea, obstructive     Spinal stenosis     Starting and stopping of urinary stream during micturition     Syncope > 10 years    When I get up quickly    Trigeminal neuralgia > 30 years    Associated with shingles on left face    Urinary incontinence     Wears eyeglasses        Past Surgical History:   Procedure Laterality Date     SECTION       SECTION      CHOLECYSTECTOMY      COLONOSCOPY      GALLBLADDER SURGERY      GASTRIC BYPASS  2004    HYSTERECTOMY  2012    INSERT / REPLACE PERIPHERAL NEUROSTIMULATOR PULSE GENERATOR /       MASS EXCISION Right 2021    Procedure: EXCISION  BIOPSY LESION/MASS LOWER NECK;  Surgeon: Brennon Browning DO;  Location: [facility];  Service: General    OTHER SURGICAL HISTORY      Reimplantatin at [facility]     NM INS/RPLC PERPH SAC/GSTRC NPG/RCVR PCKT CRTJ&CONN Left 2023    Procedure: PLACEMENT OF PERMANENT LEADWIRE AND PG FOR SNM AT S3 FORAMEN, COMPLEX PROGRAMMING OF PULSE GENERATOR;  Surgeon: Jess Shaw MD;  Location: [facility];  Service: UroGynecology           NM INSJ/RPLCMT SPINAL NPG/RCVR POCKET CRTJ&CONNJ Right 04/15/2020    Procedure: REPLACEMENT IMPLANTABLE PULSE GENERATOR FOR DORSAL SPINAL COLUMN STIMULATOR,  RIGHT BUTTOCKS;  Surgeon: Fili Mckeon MD;  Location: [facility];  Service: Neurosurgery    RADIOFREQUENCY ABLATION NERVES      SPINAL CORD STIMULATOR IMPLANT      TRIGGER POINT INJECTION      URETER SURGERY      URETHRAL FISTULA REPAIR      US GUIDED THYROID BIOPSY  2020    WRIST ARTHROSCOPY      with internal fixation        Family History   Problem Relation Age of Onset    Other Mother         Back Disorder     Cirrhosis Mother     Crohn's disease Mother     Lupus Mother         Systemic Lupus Erythematous     Depression Mother     Dementia  Mother         Caused by liver disease    Neuropathy Mother         Fibromyalgia    Hypertension Father     Heart disease Father     Depression Sister     No Known Problems Daughter     Other Brother         Liver Transplant     Cirrhosis Brother     Dementia Brother     Crohn's disease Brother     No Known Problems Maternal Aunt     No Known Problems Paternal Aunt     No Known Problems Cousin     No Known Problems Cousin     No Known Problems Cousin     No Known Problems Cousin     No Known Problems Cousin     Seizures Neg Hx     Breast cancer Neg Hx        Social History     Occupational History    Occupation: retired   Tobacco Use    Smoking status: Never    Smokeless tobacco: Never   Vaping Use    Vaping status: Never Used   Substance and Sexual Activity    Alcohol use: No    Drug use: Yes     Types: Marijuana     Comment: Capsules used for chronic pain.  Medical    Sexual activity: Not Currently     Partners: Male     Birth control/protection: None         Current Outpatient Medications:     Botox 200 units SOLR, , Disp: , Rfl:     busPIRone (BUSPAR) 10 mg tablet, Take 1 tablet (10 mg total) by mouth 2 (two) times a day, Disp: 180 tablet, Rfl: 1    Cholecalciferol 25 MCG (1000 UT) capsule, Take 1 capsule by mouth daily, Disp: , Rfl:     clobetasol (TEMOVATE) 0.05 % ointment, , Disp: , Rfl:     gabapentin (NEURONTIN) 300 mg capsule, Take 1 capsule (300 mg total) by mouth 2 (two) times a day, Disp: 180 capsule, Rfl: 1    Galcanezumab-gnlm (Emgality) 120 MG/ML SOAJ, USE 1 INJECTION UNDER THE SKIN EVERY MONTH, Disp: 3 mL, Rfl: 3    Iron-Vitamin C 100-250 MG TABS, Take 1 tablet by mouth daily, Disp: , Rfl:     ketoconazole (NIZORAL) 2 % cream, , Disp: , Rfl:     lisinopril (ZESTRIL) 5 mg tablet, Take 1 tablet (5 mg total) by mouth daily, Disp: 100 tablet, Rfl: 1    Multiple Vitamin (MULTI-VITAMIN DAILY) TABS, Take 1 tablet by mouth daily, Disp: , Rfl:     ondansetron (ZOFRAN-ODT) 4 mg disintegrating tablet, PLACE 1  TABLET ON THE TONGUE EVERY 6 HOURS AS NEEDED FOR NAUSEA OR VOMITING, Disp: 30 tablet, Rfl: 5    pantoprazole (PROTONIX) 40 mg tablet, TAKE 1 TABLET TWICE A DAY, Disp: 180 tablet, Rfl: 3    propranolol (INDERAL) 60 mg tablet, TAKE 1 TABLET TWICE A DAY, Disp: 180 tablet, Rfl: 1    QUEtiapine (SEROquel) 25 mg tablet, Take 1 tablet (25 mg total) by mouth daily at bedtime, Disp: 90 tablet, Rfl: 1    rizatriptan (MAXALT-MLT) 10 mg disintegrating tablet, Take at the onset of migraine; if symptoms continue or return, may take another dose at least 2 hours after first dose. Take no more than 2 doses in a day., Disp: 12 tablet, Rfl: 5    rosuvastatin (CRESTOR) 10 MG tablet, TAKE 1 TABLET DAILY, Disp: 90 tablet, Rfl: 1    sertraline (ZOLOFT) 100 mg tablet, Take 1 tablet (100 mg total) by mouth daily, Disp: 90 tablet, Rfl: 1    tacrolimus (PROTOPIC) 0.1 % ointment, , Disp: , Rfl:     triamcinolone (KENALOG) 0.1 % cream, APPLY TOPICALLY TWICE A DAY, Disp: 80 g, Rfl: 8    albuterol (Ventolin HFA) 90 mcg/act inhaler, Inhale 2 puffs every 6 (six) hours as needed for wheezing (Patient not taking: Reported on 10/2/2024), Disp: 18 g, Rfl: 0    denosumab (PROLIA) 60 mg/mL, Inject 1 mL (60 mg total) under the skin once for 1 dose, Disp: 1 mL, Rfl: 0    fluticasone (FLONASE) 50 mcg/act nasal spray, SPRAY 1 SPRAY INTO EACH NOSTRIL EVERY DAY (Patient not taking: Reported on 10/2/2024), Disp: 16 mL, Rfl: 1    fluticasone 50 mcg/actuation diskus inhaler, INHALE 1 PUFF 2 TIMES A DAY RINSE MOUTH AFTER USE. (Patient not taking: Reported on 10/2/2024), Disp: 60 each, Rfl: 0    lidocaine (XYLOCAINE) 5 % ointment, Apply topically 2 (two) times a day as needed for moderate pain (Patient not taking: Reported on 10/2/2024), Disp: 35.44 g, Rfl: 1    methocarbamol (ROBAXIN) 500 mg tablet, Take 1 tablet (500 mg total) by mouth 4 (four) times a day (Patient not taking: Reported on 10/2/2024), Disp: 20 tablet, Rfl: 0    oxyCODONE-acetaminophen (Percocet)  "5-325 mg per tablet, Take 1 tablet by mouth every 4 (four) hours as needed for moderate pain Max Daily Amount: 6 tablets (Patient not taking: Reported on 10/2/2024), Disp: 20 tablet, Rfl: 0    Allergies   Allergen Reactions    Morphine Nausea Only and Abdominal Pain     SEVERE N/V    Cat Hair Extract Nasal Congestion     Cat Dander    Dog Epithelium Nasal Congestion    Other Other (See Comments)     Dogs- sneezing  Crab Meat- GI intolerance       Physical Exam:    BP 96/66   Pulse 60   Ht 5' 2\" (1.575 m)   Wt 95.7 kg (211 lb)   BMI 38.59 kg/m²     Constitutional:normal, well developed, well nourished, alert, in no distress and non-toxic and no overt pain behavior.  Eyes:anicteric  HEENT:grossly intact  Neck:supple, symmetric, trachea midline and no masses   Pulmonary:even and unlabored  Cardiovascular:No edema or pitting edema present  Skin:Normal without rashes or lesions and well hydrated  Psychiatric:Mood and affect appropriate  Neurologic:Cranial Nerves II-XII grossly intact  Musculoskeletal: ttp right lumbar paraspinal region out of proportion to level of stimulation.       Imaging  FL spine and pain procedure    (Results Pending)         Orders Placed This Encounter   Procedures    FL spine and pain procedure     "

## 2024-10-02 ENCOUNTER — APPOINTMENT (OUTPATIENT)
Dept: LAB | Facility: HOSPITAL | Age: 64
End: 2024-10-02
Payer: COMMERCIAL

## 2024-10-02 ENCOUNTER — PATIENT MESSAGE (OUTPATIENT)
Dept: RADIOLOGY | Facility: CLINIC | Age: 64
End: 2024-10-02

## 2024-10-02 ENCOUNTER — OFFICE VISIT (OUTPATIENT)
Dept: PAIN MEDICINE | Facility: CLINIC | Age: 64
End: 2024-10-02
Payer: COMMERCIAL

## 2024-10-02 VITALS
WEIGHT: 211 LBS | HEIGHT: 62 IN | BODY MASS INDEX: 38.83 KG/M2 | SYSTOLIC BLOOD PRESSURE: 96 MMHG | DIASTOLIC BLOOD PRESSURE: 66 MMHG | HEART RATE: 60 BPM

## 2024-10-02 DIAGNOSIS — D50.9 IRON DEFICIENCY ANEMIA, UNSPECIFIED IRON DEFICIENCY ANEMIA TYPE: ICD-10-CM

## 2024-10-02 DIAGNOSIS — M54.16 LUMBAR RADICULOPATHY: Primary | ICD-10-CM

## 2024-10-02 DIAGNOSIS — Z00.00 HEALTHCARE MAINTENANCE: ICD-10-CM

## 2024-10-02 DIAGNOSIS — Z96.89 STATUS POST INSERTION OF SPINAL CORD STIMULATOR: ICD-10-CM

## 2024-10-02 DIAGNOSIS — M46.1 SACROILIITIS (HCC): ICD-10-CM

## 2024-10-02 LAB
ALBUMIN SERPL BCG-MCNC: 4 G/DL (ref 3.5–5)
ALP SERPL-CCNC: 83 U/L (ref 34–104)
ALT SERPL W P-5'-P-CCNC: 17 U/L (ref 7–52)
ANION GAP SERPL CALCULATED.3IONS-SCNC: 5 MMOL/L (ref 4–13)
AST SERPL W P-5'-P-CCNC: 20 U/L (ref 13–39)
BASOPHILS # BLD AUTO: 0.06 THOUSANDS/ΜL (ref 0–0.1)
BASOPHILS NFR BLD AUTO: 1 % (ref 0–1)
BILIRUB SERPL-MCNC: 0.37 MG/DL (ref 0.2–1)
BUN SERPL-MCNC: 18 MG/DL (ref 5–25)
CALCIUM SERPL-MCNC: 8.7 MG/DL (ref 8.4–10.2)
CHLORIDE SERPL-SCNC: 103 MMOL/L (ref 96–108)
CHOLEST SERPL-MCNC: 174 MG/DL
CO2 SERPL-SCNC: 32 MMOL/L (ref 21–32)
CREAT SERPL-MCNC: 0.79 MG/DL (ref 0.6–1.3)
EOSINOPHIL # BLD AUTO: 0.16 THOUSAND/ΜL (ref 0–0.61)
EOSINOPHIL NFR BLD AUTO: 2 % (ref 0–6)
ERYTHROCYTE [DISTWIDTH] IN BLOOD BY AUTOMATED COUNT: 13.7 % (ref 11.6–15.1)
EST. AVERAGE GLUCOSE BLD GHB EST-MCNC: 123 MG/DL
FERRITIN SERPL-MCNC: 20 NG/ML (ref 11–307)
GFR SERPL CREATININE-BSD FRML MDRD: 78 ML/MIN/1.73SQ M
GLUCOSE P FAST SERPL-MCNC: 94 MG/DL (ref 65–99)
HBA1C MFR BLD: 5.9 %
HCT VFR BLD AUTO: 41.2 % (ref 34.8–46.1)
HDLC SERPL-MCNC: 80 MG/DL
HGB BLD-MCNC: 12.9 G/DL (ref 11.5–15.4)
IMM GRANULOCYTES # BLD AUTO: 0.01 THOUSAND/UL (ref 0–0.2)
IMM GRANULOCYTES NFR BLD AUTO: 0 % (ref 0–2)
IRON SATN MFR SERPL: 23 % (ref 15–50)
IRON SERPL-MCNC: 77 UG/DL (ref 50–212)
LDLC SERPL CALC-MCNC: 82 MG/DL (ref 0–100)
LYMPHOCYTES # BLD AUTO: 2 THOUSANDS/ΜL (ref 0.6–4.47)
LYMPHOCYTES NFR BLD AUTO: 27 % (ref 14–44)
MCH RBC QN AUTO: 29.6 PG (ref 26.8–34.3)
MCHC RBC AUTO-ENTMCNC: 31.3 G/DL (ref 31.4–37.4)
MCV RBC AUTO: 95 FL (ref 82–98)
MONOCYTES # BLD AUTO: 0.57 THOUSAND/ΜL (ref 0.17–1.22)
MONOCYTES NFR BLD AUTO: 8 % (ref 4–12)
NEUTROPHILS # BLD AUTO: 4.57 THOUSANDS/ΜL (ref 1.85–7.62)
NEUTS SEG NFR BLD AUTO: 62 % (ref 43–75)
NONHDLC SERPL-MCNC: 94 MG/DL
NRBC BLD AUTO-RTO: 0 /100 WBCS
PLATELET # BLD AUTO: 219 THOUSANDS/UL (ref 149–390)
PMV BLD AUTO: 10.6 FL (ref 8.9–12.7)
POTASSIUM SERPL-SCNC: 4.6 MMOL/L (ref 3.5–5.3)
PROT SERPL-MCNC: 6.8 G/DL (ref 6.4–8.4)
RBC # BLD AUTO: 4.36 MILLION/UL (ref 3.81–5.12)
SODIUM SERPL-SCNC: 140 MMOL/L (ref 135–147)
TIBC SERPL-MCNC: 329 UG/DL (ref 250–450)
TRIGL SERPL-MCNC: 59 MG/DL
TSH SERPL DL<=0.05 MIU/L-ACNC: 1.85 UIU/ML (ref 0.45–4.5)
UIBC SERPL-MCNC: 252 UG/DL (ref 155–355)
WBC # BLD AUTO: 7.37 THOUSAND/UL (ref 4.31–10.16)

## 2024-10-02 PROCEDURE — 83540 ASSAY OF IRON: CPT

## 2024-10-02 PROCEDURE — 85025 COMPLETE CBC W/AUTO DIFF WBC: CPT

## 2024-10-02 PROCEDURE — 82728 ASSAY OF FERRITIN: CPT

## 2024-10-02 PROCEDURE — 80061 LIPID PANEL: CPT

## 2024-10-02 PROCEDURE — 83036 HEMOGLOBIN GLYCOSYLATED A1C: CPT

## 2024-10-02 PROCEDURE — 80053 COMPREHEN METABOLIC PANEL: CPT

## 2024-10-02 PROCEDURE — 83550 IRON BINDING TEST: CPT

## 2024-10-02 PROCEDURE — 36415 COLL VENOUS BLD VENIPUNCTURE: CPT

## 2024-10-02 PROCEDURE — 84443 ASSAY THYROID STIM HORMONE: CPT

## 2024-10-02 PROCEDURE — 99214 OFFICE O/P EST MOD 30 MIN: CPT | Performed by: STUDENT IN AN ORGANIZED HEALTH CARE EDUCATION/TRAINING PROGRAM

## 2024-10-02 NOTE — PATIENT COMMUNICATION
Pt is scheduled for LESI with Dr Gaona on 10/24/24    Pt reports she is not diabetic and does not take prescription blood thinners or aspirin    Pt given instructions in office and via myc message    Have you completed PT/HEP/Chiro in the past 6 months for dedicated area? Per chart pt completed PT with [facility] for LBP from 10/19/23 thru 12/27/23 -- since that time pt has had pain injection for relief  If yes, how long did you complete?  What was the frequency?  Did it provide relief?  If no, reason therapy was not completed?

## 2024-10-08 ENCOUNTER — OFFICE VISIT (OUTPATIENT)
Dept: FAMILY MEDICINE CLINIC | Facility: CLINIC | Age: 64
End: 2024-10-08
Payer: COMMERCIAL

## 2024-10-08 VITALS
HEIGHT: 62 IN | BODY MASS INDEX: 39.01 KG/M2 | HEART RATE: 76 BPM | SYSTOLIC BLOOD PRESSURE: 110 MMHG | OXYGEN SATURATION: 98 % | RESPIRATION RATE: 18 BRPM | WEIGHT: 212 LBS | DIASTOLIC BLOOD PRESSURE: 70 MMHG

## 2024-10-08 DIAGNOSIS — G47.00 INSOMNIA, UNSPECIFIED TYPE: ICD-10-CM

## 2024-10-08 DIAGNOSIS — K21.9 GASTROESOPHAGEAL REFLUX DISEASE WITHOUT ESOPHAGITIS: ICD-10-CM

## 2024-10-08 DIAGNOSIS — Z12.11 SCREEN FOR COLON CANCER: ICD-10-CM

## 2024-10-08 DIAGNOSIS — E66.01 OBESITY, MORBID (HCC): ICD-10-CM

## 2024-10-08 DIAGNOSIS — F41.0 GENERALIZED ANXIETY DISORDER WITH PANIC ATTACKS: ICD-10-CM

## 2024-10-08 DIAGNOSIS — K04.7 DENTAL INFECTION: ICD-10-CM

## 2024-10-08 DIAGNOSIS — M81.0 AGE-RELATED OSTEOPOROSIS WITHOUT CURRENT PATHOLOGICAL FRACTURE: Primary | ICD-10-CM

## 2024-10-08 DIAGNOSIS — M54.50 ACUTE LEFT-SIDED LOW BACK PAIN, UNSPECIFIED WHETHER SCIATICA PRESENT: ICD-10-CM

## 2024-10-08 DIAGNOSIS — F41.1 GENERALIZED ANXIETY DISORDER WITH PANIC ATTACKS: ICD-10-CM

## 2024-10-08 DIAGNOSIS — Z00.00 ANNUAL PHYSICAL EXAM: ICD-10-CM

## 2024-10-08 DIAGNOSIS — I10 PRIMARY HYPERTENSION: ICD-10-CM

## 2024-10-08 DIAGNOSIS — Z00.00 HEALTHCARE MAINTENANCE: ICD-10-CM

## 2024-10-08 DIAGNOSIS — R73.01 IFG (IMPAIRED FASTING GLUCOSE): ICD-10-CM

## 2024-10-08 DIAGNOSIS — D50.9 IRON DEFICIENCY ANEMIA, UNSPECIFIED IRON DEFICIENCY ANEMIA TYPE: ICD-10-CM

## 2024-10-08 DIAGNOSIS — F33.1 MODERATE EPISODE OF RECURRENT MAJOR DEPRESSIVE DISORDER (HCC): ICD-10-CM

## 2024-10-08 DIAGNOSIS — G43.719 INTRACTABLE CHRONIC MIGRAINE WITHOUT AURA AND WITHOUT STATUS MIGRAINOSUS: ICD-10-CM

## 2024-10-08 PROBLEM — R51.9 NONINTRACTABLE HEADACHE: Status: RESOLVED | Noted: 2020-06-23 | Resolved: 2024-10-08

## 2024-10-08 PROBLEM — M79.2 NEURALGIA: Status: RESOLVED | Noted: 2018-02-20 | Resolved: 2024-10-08

## 2024-10-08 PROCEDURE — 99214 OFFICE O/P EST MOD 30 MIN: CPT | Performed by: NURSE PRACTITIONER

## 2024-10-08 PROCEDURE — 99397 PER PM REEVAL EST PAT 65+ YR: CPT | Performed by: NURSE PRACTITIONER

## 2024-10-08 PROCEDURE — 96372 THER/PROPH/DIAG INJ SC/IM: CPT | Performed by: NURSE PRACTITIONER

## 2024-10-08 RX ORDER — OXYCODONE AND ACETAMINOPHEN 5; 325 MG/1; MG/1
1 TABLET ORAL EVERY 4 HOURS PRN
Qty: 20 TABLET | Refills: 0 | Status: SHIPPED | OUTPATIENT
Start: 2024-10-08

## 2024-10-08 RX ORDER — AMOXICILLIN 500 MG/1
500 TABLET, FILM COATED ORAL 2 TIMES DAILY
Qty: 20 TABLET | Refills: 0 | Status: SHIPPED | OUTPATIENT
Start: 2024-10-08 | End: 2024-10-18

## 2024-10-08 NOTE — ADDENDUM NOTE
Addended by: NAZ SANTOS on: 10/8/2024 01:50 PM     Modules accepted: Orders     Written/documentated by Arben Durand, acting as a scribe in Dr. Hannah Diane presence. COVID-19 Screening:  Do you have any fevers, chills, nausea, vomiting or diarrhea? No  Do you have a cough, shortness or breath, or respiratory symptoms? No  Have you been in contact (within 6 feet) of a patient with a laboratory confirmed coronavirus diagnosis? No  Have you traveled out of the country in the last 14 days? No         Chief Complaint   Patient presents with   â¢ Consultation     Bilateral toenails   â¢ Office Visit       :     SUBJECTIVE:  Jodi Ashford is a 59year old male presenting for evaluation of the bilateral toenails. The patient is not diabetic. He was referred by his PCP, Dr. Riana Hartmann. His mother had diabetes. Location: Bilateral toenails, namely the right hallux, medial border toenail  Pain scale: Variable according the patient: Right hallux pain has been persistent for about 2 months now  Duration/Onset: 2 months  Nature/Type:Sharp pain. Occasional tingling   Course: Constant  Aggravating factors: Prolonged periods without nail care, walking  Treatments: At risk foot care  Shoe Wear:   Nader Acre  Gait: Normal  Guest: None    Past Surgical Hx:         ROS  Eye Problem(s):negative  ENT Problem(s):negative  Cardiovascular problem(s):negative  Respiratory problem(s):negative  Gastro-intestinal problem(s):negative GI  Genito-urinary problem(s):negative  Musculoskeletal problem(s):See HPI  Integumentary problem(s):See HPI  Neurological problem(s):negative  Psychiatric problem(s):negative  Endocrine problem(s):negative  Hematologic and/or Lymphatic problem(s):negative      No past medical history on file. ALLERGIES:  No Known Allergies     Current Outpatient Medications   Medication Sig Dispense Refill   â¢ VITAMIN D, ERGOCALCIFEROL, PO      â¢ pravastatin (PRAVACHOL) 10 MG tablet Take 1 tablet by mouth every evening.  90 tablet 1   â¢ PARoxetine (PAXIL) 10 MG tablet Take 1 tablet by mouth every morning. 90 tablet 1   â¢ terbinafine (LAMISIL AT) 1 % cream Apply topically 2 times daily. 30 g 3   â¢ phenobarbital (LUMINAL) 64.8 MG tablet Take 1 tablet by mouth daily (before breakfast) AND 2 tablets every evening. 270 tablet 1   â¢ terazosin (HYTRIN) 1 MG capsule TOME 1 CAPSULA CADA NOCHE  90 capsule 3   â¢ nabumetone (RELAFEN) 500 MG tablet Take 1 tablet by mouth 2 times daily (with meals). 60 tablet 0   â¢ PROBIOTIC PRODUCT PO      â¢ levothyroxine 112 MCG tablet Take 1 tablet by mouth daily. 30 tablet 3   â¢ pravastatin (PRAVACHOL) 20 MG tablet Take 1 tablet by mouth daily. 30 tablet 11     No current facility-administered medications for this visit. 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     Family History   Problem Relation Age of Onset   â¢ Leukemia Mother    â¢ Diabetes Mother    â¢ Hyperlipidemia Mother    â¢ Hypertension Mother    â¢ Diabetes Sister    â¢ Obesity Sister    â¢ Alcohol Abuse Brother        Social History     Socioeconomic History   â¢ Marital status: /Civil Union     Spouse name: Not on file   â¢ Number of children: Not on file   â¢ Years of education: Not on file   â¢ Highest education level: Not on file   Occupational History   â¢ Not on file   Social "Needs   â¢ Financial resource strain: Not on file   â¢ Food insecurity     Worry: Not on file     Inability: Not on file   â¢ Transportation needs     Medical: Not on file     Non-medical: Not on file   Tobacco Use   â¢ Smoking status: Never Smoker   â¢ Smokeless tobacco: Never Used   Substance and Sexual Activity   â¢ Alcohol use: Never     Frequency: Never   â¢ Drug use: Never   â¢ Sexual activity: Not on file   Lifestyle   â¢ Physical activity     Days per week: Not on file     Minutes per session: Not on file   â¢ Stress: Not on file   Relationships   â¢ Social connections     Talks on phone: Not on file     Gets together: Not on file     Attends Restorationism service: Not on file     Active member of club or organization: Not on file     Attends meetings of clubs or organizations: Not on file     Relationship status: Not on file   â¢ Intimate partner violence     Fear of current or ex partner: Not on file     Emotionally abused: Not on file     Physically abused: Not on file     Forced sexual activity: Not on file   Other Topics Concern   â¢ Not on file   Social History Narrative   â¢ Not on file       OBJECTIVE:  Recent Vitals     11/3/2020   0814 Most Recent Value     Weight: 90.3 kg (199 lb) 90.3 kg (199 lb) Â as of 11/3/2020     Body Mass Index:  38.22 kg/mÂ²Abnormal    5' 0.5"" (1.537 m) Â as of 11/3/2020   90.3 kg (199 lb) Â as of 11/3/2020     BP: 128/70 128/70 Â as of 11/3/2020     Heart Rate: 86 86 Â as of 11/3/2020     Temp: 97 Â°F (36.1 Â°C) 97 Â°F (36.1 Â°C) Â as of 11/3/2020     Resp: 16 16 Â as of 11/3/2020     Height: 5' 0.5\"" (1.537 m) 5' 0.5\"" (1.537 m) Â as of 11/3/2020       General: Physical exam demonstrates alert and oriented 59year old male in no acute distress with good historian with adequate lower extremity hygiene.      Constitutional/appearance: No acute distress, normal appearance; well-developed    Psych: Normal attention and normal perception: Normal mood with normal affect: Normal speech: Cooperative: Normal " thought content; no signs of cognitive or memory impairment    HENT: Normocephalic, atraumatic    Eyes: Pupils equally round and reactive to light, extraocular muscles intact; no foreign objects or discharge noted    Neck: Normal range of motion without pain: No rigidity noted    Lymphatics: No signs of edema or lymphatic disruption for the right or left lower extremities. Normal lymphatics for the upper extremity bilaterally Constitutional/appearance: No acute distress, normal appearance; well-developed    Vascular: Femoral pulses 2/4 bilaterally. Pedal pulses, including dorsalis pedis and posterior tibial pulses, 2/4 bilaterally. Capillary fill time is less than 3 seconds, digits 1 through 5 of the lower extremities bilaterally. Negative varicosities/negative Homans sign bilaterally. Negative edema to the lower extremity/negative lymphedema, bilateral lower extremities. Neurological: Epicritic sensation is intact to the level of the digits, 1-5 bilaterally. [place] 5.07 monofilament is within normal limits to the level of the digits bilaterally. Negative clonus bilaterally. Negative Babinski bilaterally. Deep tendon reflexes for both the Achilles and patellar reflexes are 2/4, bilateral lower extremities. Dermatological: Inspection of the skin demonstrates no sign of open lesions bilaterally. No subcutaneous nodules are noted bilaterally. Skin is warm to warm from distal to proximal from the level of the digits to the level of the lower legs bilaterally. Palpation of the skin demonstrates no indurations, no significant tightening bilaterally. Nails are within normal limits bilaterally. No atrophic changes to the skin noted bilaterally. Fungal Toe nails: Yellow discoloration with thickness and dystrophy of the right hallux, pain to palpation of toenails.   There is minor periungual redness around base of the affected toenails with periungual debris and brittle texture    Musculoskeletal:  Deformities: Dorsally contracted digit(s): Second through fifth digits including the metatarsophalangeal joints and interdigital and phalangeal joints with prominent eminence and with + pain to palpation.    Range of motion: ROM 1st MPJ restricted with forefoot loaded   Muscle mass: normal  Muscle strength: 5/5 bilateral    IMAGING: I independently reviewed the images below:    LABORATORY DATA:  Lab Services on 06/09/2020   Component Date Value Ref Range Status   â¢ TSH (WITH REFLEX TO FREE T4) 06/09/2020 5.00* 0.30 - 4.82 m[iU]/L Final   â¢ PHENOBARBITAL 06/09/2020 29.3  15.0 - 40.0 mcg/mL Final   â¢ ALBUMIN, SERUM 06/09/2020 4.3  3.6 - 5.1 g/dL Final   â¢ ALKALINE PHOSPHATASE([id number]) 06/09/2020 99  45 - 115 U/L Final   â¢ ALANINE AMINOTRANSFERASE(SGPT) 06/09/2020 18  10 - 35 U/L Final   â¢ ASPARTATE AMINOTRNSFRASE(SGOT) 06/09/2020 9  9 - 37 U/L Final   â¢ BILIRUBIN, TOTAL 06/09/2020 0.3  0.0 - 1.0 mg/dL Final   â¢ BLOOD UREA NITROGEN 06/09/2020 12  6 - 27 mg/dL Final   â¢ CALCIUM, SERUM 06/09/2020 9.2  8.6 - 10.6 mg/dL Final   â¢ CHLORIDE, SERUM 06/09/2020 106  96 - 107 mmol/L Final   â¢ CO2 VENOUS 06/09/2020 26  22 - 32 mmol/L Final   â¢ CREATININE, SERUM 06/09/2020 0.7  0.6 - 1.6 mg/dL Final   â¢ GLUCOSE, FASTING 06/09/2020 99  60 - 100 mg/dL Final   â¢ K (POTASSIUM, SERUM) 06/09/2020 3.7  3.5 - 5.3 mmol/L Final   â¢ NA (SODIUM, SERUM) 06/09/2020 141  136 - 146 mmol/L Final   â¢ PROTEIN, TOTAL SERUM 06/09/2020 6.5  6.2 - 8.1 g/dL Final   â¢ EGFR AFRICAN AMERICAN-R 06/09/2020 >60  >60 mL/min/[1.73m2] Final   â¢ EGFR NON--R 06/09/2020 >60  >60 mL/min/[1.73m2] Final   â¢ CHOLESTEROL, TOTAL 06/09/2020 146  140 - 200 mg/dL Final   â¢ HDL CHOLESTEROL 06/09/2020 44  >40 mg/dL Final   â¢ LDL CHOL, CALCULATED 06/09/2020 88  0 - 100 mg/dL Final   â¢ TRIGLYCERIDES 06/09/2020 63  0 - 200 mg/dL Final   â¢ FREE T4 06/09/2020 1.28  0.78 - 2.19 ng/dL Final   ]    ASSESSMENT     Â· Hammer Digit Syndrome second through fifth digits bilaterally  Â· Onychomycosis, right hallux  Â· Gastrosoleus equinus    PLAN   Discussed findings, pathology and necessary testing. Developed a comprehensive wound management plan:    Discussion hammertoe:  I discussed the painful hammertoes and how over time and with weightbearing stress on the affected joint can lead to inflammation and pain. We also discussed the biomechanics of how this occurs and what can lead to the toes over time becoming more malaligned. How there are deforming forces due to gait and stance that activate thus aggravate different muscles and tendons to malfunction. I discussed how joint deviation and chronic swelling can weaken and damage the joint and cartilage, which may lead to arthritic pain and pain with ROM. I explained to the patient that there are various options regarding treatment, including gel digital toe aids, orthotic therapy, accommodative shoes, NSAIDs, and surgery. I recommend patient first trial the non-operative treatments prior to surgery, but ultimately surgery may be needed to correct the bony deformity and realign and decompress the joint. Discussion arthritis:  --Patient does appear to have arthritic changes throughout multiple joints. I discussed how arthritis can affect multiple joints and or single joints and can be due to multiple factors including chronic use or trauma. The painful and loss of function caused by arthritis and how over stress on the affected joint can lead to inflammation and pain. I discussed how joint deviation, exostosis and chronic swelling can weaken and damage the joint and cartilage, which may lead to arthritic pain and pain with ROM. --I explained to the patient that there are various options regarding treatment, including shoegear modification, relacing shoes to avoid pressure on the dorsal midfoot, orthotic therapy, cortisone injections, NSAIDs, and surgery.     --I recommend patient first trial the non-operative treatments prior to surgery, but ultimately surgery may be needed to correct the bony deformity and realign and decompress the joint. Possible surgical options could be exostectomy, decompression osteotomy, joint salvage, chondroplasty, nerve decompressions or fusions. Â· Examine patient discuss treatment options including conservative or surgical treatment  Â· The risks, benefits and possible complications of procedure were discussed. Patient relates that, due to the failure of conservative means, patient desires to proceed with procedure. The possible complications of the office procedure were discussed and include but are not limited to possible infection, residual pain or numbness or bleeding, recurrence of the condition, failure of implant, need for further treatment, complications of anesthesia, possibility of complications of tourniquet/digital tourniquet usage, possible wound dehiscence, residual swelling. The possible benefits were all discussed as well, though no guarantees are given. Advised the patient injections may initially hurt more prior to resolution of pain. Â· I  discussed the painful hammertoe(s) and how over stress on the affected joint can lead to inflammation and pain. I discussed how joint deviation and chronic swelling can weaken and damage the joint and cartilage, which may lead to arthritic pain and pain with ROM and bony deformities that can be painful with shoes. I explained to the patient that there are various options regarding treatment, including digital aids, orthotic therapy, shoegear modifications cortisone injections, NSAIDs, and surgery. I recommend patient first trial the non-operative treatments prior to surgery, but ultimately surgery may be needed to correct the bony deformity and realign and decompress the joint and correct the deformities.   Perform sharp debridement and partial avulsion of the bilateral halluces, medial borders, to tolerance of the patient. These were removed in total and patient relate immediate reduction pressure and irritation at that site. I discussed with patient the need to continue to keep this area dressed with a dry dressing due to microbleed at the site. This area was cleansed and dressed patient was given instructions on care. Â· The patient was educated on fungal nails and treatment options, including periodic debridements, oral and topical medications, and surgical options. I explained to the patient that it is sometimes difficult to attain a complete cure with fungal nails based on the deformity of the nail plate/base. We discussed appropriate foot care and shoe. All patient's questions were answered. Â· Currently patient is wearing 2500 [email or web address] gear and while these are okay I advised patient on several options to provide greater support for the foot and ankle. Â· Put into patient's AVS the procedure of a nail matrixectomy for the patient to consider. We placed on the AVS the patient may require matrixectomy in the future. Â· The patient to follow up in 3-3.5 months time for reevaluation. Patient should call sooner with any new issues. Scribe: Electronically signed: Jennifer Zendejas has scribed for Dr. Ivy Trujillo 12/24/2020  I have reviewed and edited the progress note and agree with what has been scribed. Electronically signed by: Mariama Alvarez DPM

## 2024-10-08 NOTE — PROGRESS NOTES
Adult Annual Physical  Name: Caryn Velasquez      : 1960      MRN: [id number]  Encounter Provider: GWEN Black  Encounter Date: 10/8/2024   Encounter department: [facility] [address]    Assessment & Plan  Dental infection  Patient's dentist is currently on vacation.  Will give amoxicillin and Percocet as needed.  Orders:  •  amoxicillin (AMOXIL) 500 MG tablet; Take 1 tablet (500 mg total) by mouth 2 (two) times a day for 10 days  •  oxyCODONE-acetaminophen (Percocet) 5-325 mg per tablet; Take 1 tablet by mouth every 4 (four) hours as needed for moderate pain Max Daily Amount: 6 tablets    Screen for colon cancer    Orders:  •  Cologuard    Healthcare maintenance    Orders:  •  CBC and differential; Future  •  Comprehensive metabolic panel; Future  •  Hemoglobin A1C; Future  •  Lipid panel; Future  •  TSH, 3rd generation with Free T4 reflex; Future    IFG (impaired fasting glucose)    Orders:  •  Hemoglobin A1C; Future    Primary hypertension  Blood pressure is very well-managed with current medications.       Intractable chronic migraine without aura and without status migrainosus  Well-managed with current medications.       Gastroesophageal reflux disease without esophagitis  Continue on pantoprazole daily.       Age-related osteoporosis without current pathological fracture  Continue on Prolia every 6 months.       Moderate episode of recurrent major depressive disorder (HCC)  Doing well with sertraline daily.         Generalized anxiety disorder with panic attacks  Doing well with sertraline daily.       Iron deficiency anemia, unspecified iron deficiency anemia type  Much better with Vitron-C.       Acute left-sided low back pain, unspecified whether sciatica present  Continue care with spine and pain.       Insomnia, unspecified type  Continue on Seroquel.       Obesity, morbid (HCC)  Advised weight loss with diet and exercise.          Annual physical  exam         Immunizations and preventive care screenings were discussed with patient today. Appropriate education was printed on patient's after visit summary.    Counseling:  Exercise: the importance of regular exercise/physical activity was discussed. Recommend exercise 3-5 times per week for at least 30 minutes.          History of Present Illness     Adult Annual Physical:  Patient presents for annual physical. Patient presents for annual physical. .     Diet and Physical Activity:  - Diet/Nutrition: well balanced diet.  - Exercise: walking.    General Health:  - Sleep: sleeps well.  - Hearing: decreased hearing bilateral ears.  - Vision: wears glasses and goes for regular eye exams.  - Dental: regular dental visits.    /GYN Health:    - Menopause: postmenopausal.     Review of Systems   Constitutional:  Negative for chills and fever.   HENT:  Positive for dental problem. Negative for ear pain and sore throat.    Eyes:  Negative for pain and visual disturbance.   Respiratory:  Negative for cough and shortness of breath.    Cardiovascular:  Negative for chest pain and palpitations.   Gastrointestinal:  Negative for abdominal pain, diarrhea, nausea and vomiting.   Genitourinary:  Negative for dysuria and hematuria.   Musculoskeletal:  Positive for back pain. Negative for arthralgias.   Skin:  Negative for color change and rash.   Neurological:  Positive for headaches. Negative for dizziness, seizures, syncope and light-headedness.   Psychiatric/Behavioral:  Negative for dysphoric mood and sleep disturbance. The patient is not nervous/anxious.    All other systems reviewed and are negative.    Current Outpatient Medications on File Prior to Visit   Medication Sig Dispense Refill   • Botox 200 units SOLR      • busPIRone (BUSPAR) 10 mg tablet Take 1 tablet (10 mg total) by mouth 2 (two) times a day 180 tablet 1   • Cholecalciferol 25 MCG (1000 UT) capsule Take 1 capsule by mouth daily     • clobetasol (TEMOVATE)  0.05 % ointment      • gabapentin (NEURONTIN) 300 mg capsule Take 1 capsule (300 mg total) by mouth 2 (two) times a day 180 capsule 1   • Galcanezumab-gnlm (Emgality) 120 MG/ML SOAJ USE 1 INJECTION UNDER THE SKIN EVERY MONTH 3 mL 3   • Iron-Vitamin C 100-250 MG TABS Take 1 tablet by mouth daily     • ketoconazole (NIZORAL) 2 % cream      • lisinopril (ZESTRIL) 5 mg tablet Take 1 tablet (5 mg total) by mouth daily 100 tablet 1   • Multiple Vitamin (MULTI-VITAMIN DAILY) TABS Take 1 tablet by mouth daily     • ondansetron (ZOFRAN-ODT) 4 mg disintegrating tablet PLACE 1 TABLET ON THE TONGUE EVERY 6 HOURS AS NEEDED FOR NAUSEA OR VOMITING 30 tablet 5   • pantoprazole (PROTONIX) 40 mg tablet TAKE 1 TABLET TWICE A  tablet 3   • propranolol (INDERAL) 60 mg tablet TAKE 1 TABLET TWICE A  tablet 1   • QUEtiapine (SEROquel) 25 mg tablet Take 1 tablet (25 mg total) by mouth daily at bedtime 90 tablet 1   • rizatriptan (MAXALT-MLT) 10 mg disintegrating tablet Take at the onset of migraine; if symptoms continue or return, may take another dose at least 2 hours after first dose. Take no more than 2 doses in a day. 12 tablet 5   • rosuvastatin (CRESTOR) 10 MG tablet TAKE 1 TABLET DAILY 90 tablet 1   • sertraline (ZOLOFT) 100 mg tablet Take 1 tablet (100 mg total) by mouth daily 90 tablet 1   • tacrolimus (PROTOPIC) 0.1 % ointment      • triamcinolone (KENALOG) 0.1 % cream APPLY TOPICALLY TWICE A DAY 80 g 8   • albuterol (Ventolin HFA) 90 mcg/act inhaler Inhale 2 puffs every 6 (six) hours as needed for wheezing (Patient not taking: Reported on 10/2/2024) 18 g 0   • denosumab (PROLIA) 60 mg/mL Inject 1 mL (60 mg total) under the skin once for 1 dose 1 mL 0   • [DISCONTINUED] fluticasone (FLONASE) 50 mcg/act nasal spray SPRAY 1 SPRAY INTO EACH NOSTRIL EVERY DAY (Patient not taking: Reported on 10/2/2024) 16 mL 1   • [DISCONTINUED] fluticasone 50 mcg/actuation diskus inhaler INHALE 1 PUFF 2 TIMES A DAY RINSE MOUTH AFTER  "USE. (Patient not taking: Reported on 10/2/2024) 60 each 0   • [DISCONTINUED] lidocaine (XYLOCAINE) 5 % ointment Apply topically 2 (two) times a day as needed for moderate pain (Patient not taking: Reported on 10/2/2024) 35.44 g 1   • [DISCONTINUED] methocarbamol (ROBAXIN) 500 mg tablet Take 1 tablet (500 mg total) by mouth 4 (four) times a day (Patient not taking: Reported on 10/2/2024) 20 tablet 0   • [DISCONTINUED] oxyCODONE-acetaminophen (Percocet) 5-325 mg per tablet Take 1 tablet by mouth every 4 (four) hours as needed for moderate pain Max Daily Amount: 6 tablets (Patient not taking: Reported on 10/2/2024) 20 tablet 0     No current facility-administered medications on file prior to visit.        Objective     /70 (BP Location: Left arm, Patient Position: Sitting)   Pulse 76   Resp 18   Ht 5' 2\" (1.575 m)   Wt 96.2 kg (212 lb)   SpO2 98%   BMI 38.78 kg/m²     Physical Exam  Vitals and nursing note reviewed.   Constitutional:       General: She is not in acute distress.     Appearance: She is well-developed.   HENT:      Head: Normocephalic and atraumatic.      Right Ear: Tympanic membrane normal.      Left Ear: Tympanic membrane normal.      Nose: No congestion.      Mouth/Throat:      Pharynx: No oropharyngeal exudate.   Eyes:      Conjunctiva/sclera: Conjunctivae normal.   Cardiovascular:      Rate and Rhythm: Normal rate and regular rhythm.      Heart sounds: No murmur heard.  Pulmonary:      Effort: Pulmonary effort is normal. No respiratory distress.      Breath sounds: Normal breath sounds.   Abdominal:      Palpations: Abdomen is soft.      Tenderness: There is no abdominal tenderness.   Musculoskeletal:         General: No swelling.      Cervical back: Neck supple.   Skin:     General: Skin is warm and dry.      Capillary Refill: Capillary refill takes less than 2 seconds.   Neurological:      Mental Status: She is alert and oriented to person, place, and time.   Psychiatric:         Mood " "and Affect: Mood normal.     Administrative Statements   I have spent a total time of 20 minutes in caring for this patient on the day of the visit/encounter including Counseling / Coordination of care, Documenting in the medical record, Reviewing / ordering tests, medicine, procedures  , and Obtaining or reviewing history  .  Answers submitted by the patient for this visit:  Medicare Annual Wellness Visit (Submitted on 10/7/2024)  How would you rate your overall health?: good  Compared to last year, how is your physical health?: slightly better  In general, how satisfied are you with your life?: very satisfied  Compared to last year, how is your eyesight?: slightly worse  Compared to last year, how is your hearing?: much worse  Compared to last year, how is your emotional/mental health?: same  How often is anger a problem for you?: never, rarely  How often do you feel unusually tired/fatigued?: sometimes  In the past 7 days, how much pain have you experienced?: a lot  If you answered \"some\" or \"a lot\", please rate the severity of your pain on a scale of 1 to 10 (1 being the least severe pain and 10 being the most intense pain).: 7/10  In the past 6 months, have you lost or gained 10 pounds without trying?: Yes  Additional Comments: Gained about 10 lb  One or more falls in the last year: No  In the past 6 months, have you accidentally leaked urine?: Yes  Do you have trouble with the stairs inside or outside your home?: Yes  Does your home have working smoke alarms?: Yes  Does your home have a carbon monoxide monitor?: Yes  Which safety hazards (if any) have you experienced in your home? Please select all that apply.: none  How would you describe your current diet? Please select all that apply.: Regular  In addition to prescription medications, are you taking any over-the-counter supplements?: Yes  If yes, what supplements are you taking?: ca, fe, b12,  Can you manage your medications?: Yes  Are you currently taking " any opioid medications?: No  Can you walk and transfer into and out of your bed and chair?: Yes  Can you dress and groom yourself?: Yes  Can you bathe or shower yourself?: Yes  Can you feed yourself?: Yes  Can you do your laundry/ housekeeping?: Yes  Can you manage your money, pay your bills, and track your expenses?: Yes  Can you make your own meals?: Yes  Can you do your own shopping?: Yes  Within the last 12 months, have you had any hospitalizations or Emergency Department visits?: No  Do you have a living will?: Yes  Do you have a Durable POA (Power of ) for healthcare decisions?: Yes  Do you have an Advanced Directive for end of life decisions?: Yes  How often have you used an illegal drug (including marijuana) or a prescription medication for non-medical reasons in the past year?: never  What is the typical number of drinks you consume in a day?: 0  What is the typical number of drinks you consume in a week?: 0  How often did you have a drink containing alcohol in the past year?: never  How many drinks did you have on a typical day  when you were drinking in the past year?: 0  How often did you have 6 or more drinks on one occasion in the past year?: never

## 2024-10-16 ENCOUNTER — OFFICE VISIT (OUTPATIENT)
Dept: NEUROLOGY | Facility: CLINIC | Age: 64
End: 2024-10-16
Payer: COMMERCIAL

## 2024-10-16 VITALS
OXYGEN SATURATION: 97 % | SYSTOLIC BLOOD PRESSURE: 122 MMHG | HEIGHT: 62 IN | HEART RATE: 60 BPM | WEIGHT: 216.8 LBS | DIASTOLIC BLOOD PRESSURE: 70 MMHG | BODY MASS INDEX: 39.9 KG/M2

## 2024-10-16 DIAGNOSIS — G43.719 INTRACTABLE CHRONIC MIGRAINE WITHOUT AURA AND WITHOUT STATUS MIGRAINOSUS: ICD-10-CM

## 2024-10-16 DIAGNOSIS — G47.33 OSA ON CPAP: Primary | ICD-10-CM

## 2024-10-16 PROCEDURE — 99204 OFFICE O/P NEW MOD 45 MIN: CPT | Performed by: PSYCHIATRY & NEUROLOGY

## 2024-10-16 NOTE — PROGRESS NOTES
Neurology Ambulatory Visit  Name: Caryn Velasquez       : 1960       MRN: [id number]   Encounter Provider: Mike Mosley MD   Encounter Date: 10/16/2024  Encounter department: [facility]      Caryn Velasquez is a 63 y.o. female.       Assessment & Plan  CATHRYN on CPAP    Orders:    Ambulatory Referral to Sleep Medicine; Future  Patient's sleep study results from 10/27/2016 was reviewed with her she did have mild sleep apnea and her CPAP results from 2016 were reviewed with her she did well on CPAP of 6 cm of water pressure and her recent CPAP download from 2024 to 10/15/2024 was reviewed she is on auto CPAP with an AHI of 3.8 and compliance greater than 4 hours at 97.8% and average use of 8 hours and 13 minutes.    Given that patient has had fluctuation of her weight and could have lost 10 to 15 pounds and her sleep study has been almost more than 8 years back I would repeat a split-night sleep study to evaluate for her sleep apnea and for optimal CPAP pressure, she will call me after the test to discuss the results until then she will continue with her current auto CPAP.    She does follow-up with our department for her migraine headaches for Botox.    She was advised to keep a blood pressure cholesterol sugar and weight under control.    Patient was advised to continue with current CPAP pressure, he was also advised to maintain ideal body weight, avoid alcohol, muscle relaxers, pain killers and sleeping aids, he was advised to preferably sleep in lateral recumbent position, avoid driving and operating machinery if feeling drowsy or tired, clean the CPAP machine as per the [employer] company instructions, go to the hospital if has any worsening symptoms and call me.  And to follow up with his other physicians and to see me back in 3 to 4 months or sooner if needed.  Intractable chronic migraine without aura and without status migrainosus       Management as per her neurologist  she is on Botox      Subjective:  Chief Complaint   Patient presents with    Sleep Apnea       HISTORY OF PRESENT ILLNESS    63-year-old female with past medical history of chronic migraine headaches and sleep apnea on CPAP who is here for evaluation of sleep apnea she had seen me more than 5 years ago she has mild obstructive sleep apnea and is currently on auto CPAP she tells me that he usually goes to sleep by midnight and gets up in the morning around 9 AM and has no difficulty in going to sleep or staying asleep her ESS is 2, denies any drowsiness while driving she has been using the CPAP machine and over the last several years she has lost about 10 to 20 pounds she thinks she could have gained back a few and was recently told by her [employer] company that she may need to have a repeat sleep study and reevaluation for her sleep apnea, no narcolepsy or cataplexy symptoms no other complaints.           Past Medical History:    Past Medical History:   Diagnosis Date    Anxiety     Arthritis     Attention and concentration deficit     Blurred vision     Chronic pain     Chronic pain syndrome 01/20/2016    CPAP (continuous positive airway pressure) dependence     CTS (carpal tunnel syndrome) > 6 years    Left wrist    Depression     Difficulty walking > 6 years    Left nerve leg pain    Diplopia     Dyspepsia     Gastric ulcer     Gastritis     GERD (gastroesophageal reflux disease)     Head injury > 25 years    Fell off 12 foot retaining wall    Headache(784.0)     Headache, tension-type > 1 year    Pain behind left eyeAnd stiff neck with pressure    History of transfusion 2005    Hypertension     Insomnia     Irritable bowel syndrome     Memory loss     Migraines     Osteopenia     Peripheral neuropathy 3/14/2012    Left thigh, left abdomen, left prudential nerve    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    Presence of neurostimulator     Pudendal neuralgia    Pudendal neuralgia     Shingles > 30 years ago    Left  facial nerve    Sleep apnea     Sleep apnea, obstructive     Spinal stenosis     Starting and stopping of urinary stream during micturition     Syncope > 10 years    When I get up quickly    Trigeminal neuralgia > 30 years    Associated with shingles on left face    Urinary incontinence     Wears eyeglasses      Past Surgical History:   Procedure Laterality Date     SECTION       SECTION      CHOLECYSTECTOMY      COLONOSCOPY      GALLBLADDER SURGERY      GASTRIC BYPASS  2004    HYSTERECTOMY  2012    INSERT / REPLACE PERIPHERAL NEUROSTIMULATOR PULSE GENERATOR /       MASS EXCISION Right 2021    Procedure: EXCISION  BIOPSY LESION/MASS LOWER NECK;  Surgeon: Brennon Browning DO;  Location: [facility];  Service: General    OTHER SURGICAL HISTORY      Reimplantatin at [facility]     MI INS/RPLC PERPH SAC/GSTRC NPG/RCVR PCKT CRTJ&CONN Left 2023    Procedure: PLACEMENT OF PERMANENT LEADWIRE AND PG FOR SNM AT S3 FORAMEN, COMPLEX PROGRAMMING OF PULSE GENERATOR;  Surgeon: Jess Shaw MD;  Location: [facility];  Service: UroGynecology           MI INSJ/RPLCMT SPINAL NPG/RCVR POCKET CRTJ&CONNJ Right 04/15/2020    Procedure: REPLACEMENT IMPLANTABLE PULSE GENERATOR FOR DORSAL SPINAL COLUMN STIMULATOR,  RIGHT BUTTOCKS;  Surgeon: Fili Mckeon MD;  Location: [facility];  Service: Neurosurgery    RADIOFREQUENCY ABLATION NERVES      SPINAL CORD STIMULATOR IMPLANT      TRIGGER POINT INJECTION      URETER SURGERY      URETHRAL FISTULA REPAIR      US GUIDED THYROID BIOPSY  2020    WRIST ARTHROSCOPY      with internal fixation        Family History:  Family History   Problem Relation Age of Onset    Other Mother         Back Disorder     Cirrhosis Mother     Crohn's disease Mother     Lupus Mother         Systemic Lupus Erythematous     Depression Mother     Dementia Mother         Caused by liver disease    Neuropathy Mother         Fibromyalgia    Hypertension Father      Heart disease Father     Depression Sister     No Known Problems Daughter     Other Brother         Liver Transplant     Cirrhosis Brother     Dementia Brother     Crohn's disease Brother     No Known Problems Maternal Aunt     No Known Problems Paternal Aunt     No Known Problems Cousin     No Known Problems Cousin     No Known Problems Cousin     No Known Problems Cousin     No Known Problems Cousin     Seizures Neg Hx     Breast cancer Neg Hx        Social History:  Social History     Tobacco Use    Smoking status: Never    Smokeless tobacco: Never   Vaping Use    Vaping status: Never Used   Substance Use Topics    Alcohol use: No    Drug use: Yes     Types: Marijuana     Comment: Capsules used for chronic pain.  Medical      Living situation:    Work:      Allergies:  Allergies   Allergen Reactions    Morphine Nausea Only and Abdominal Pain     SEVERE N/V    Cat Hair Extract Nasal Congestion     Cat Dander    Dog Epithelium Nasal Congestion    Other Other (See Comments)     Dogs- sneezing  Crab Meat- GI intolerance       Medications:    Current Outpatient Medications:     amoxicillin (AMOXIL) 500 MG tablet, Take 1 tablet (500 mg total) by mouth 2 (two) times a day for 10 days, Disp: 20 tablet, Rfl: 0    Botox 200 units SOLR, , Disp: , Rfl:     busPIRone (BUSPAR) 10 mg tablet, Take 1 tablet (10 mg total) by mouth 2 (two) times a day, Disp: 180 tablet, Rfl: 1    Cholecalciferol 25 MCG (1000 UT) capsule, Take 1 capsule by mouth daily, Disp: , Rfl:     clobetasol (TEMOVATE) 0.05 % ointment, if needed, Disp: , Rfl:     denosumab (PROLIA) 60 mg/mL, Inject 1 mL (60 mg total) under the skin once for 1 dose (Patient taking differently: Inject 60 mg under the skin every 6 (six) months), Disp: 1 mL, Rfl: 0    gabapentin (NEURONTIN) 300 mg capsule, Take 1 capsule (300 mg total) by mouth 2 (two) times a day, Disp: 180 capsule, Rfl: 1    Galcanezumab-gnlm (Emgality) 120 MG/ML SOAJ, USE 1 INJECTION UNDER THE SKIN EVERY MONTH,  "Disp: 3 mL, Rfl: 3    Iron-Vitamin C 100-250 MG TABS, Take 1 tablet by mouth daily, Disp: , Rfl:     ketoconazole (NIZORAL) 2 % cream, , Disp: , Rfl:     lisinopril (ZESTRIL) 5 mg tablet, Take 1 tablet (5 mg total) by mouth daily, Disp: 100 tablet, Rfl: 1    Multiple Vitamin (MULTI-VITAMIN DAILY) TABS, Take 1 tablet by mouth daily, Disp: , Rfl:     ondansetron (ZOFRAN-ODT) 4 mg disintegrating tablet, PLACE 1 TABLET ON THE TONGUE EVERY 6 HOURS AS NEEDED FOR NAUSEA OR VOMITING, Disp: 30 tablet, Rfl: 5    oxyCODONE-acetaminophen (Percocet) 5-325 mg per tablet, Take 1 tablet by mouth every 4 (four) hours as needed for moderate pain Max Daily Amount: 6 tablets, Disp: 20 tablet, Rfl: 0    pantoprazole (PROTONIX) 40 mg tablet, TAKE 1 TABLET TWICE A DAY, Disp: 180 tablet, Rfl: 3    propranolol (INDERAL) 60 mg tablet, TAKE 1 TABLET TWICE A DAY, Disp: 180 tablet, Rfl: 1    QUEtiapine (SEROquel) 25 mg tablet, Take 1 tablet (25 mg total) by mouth daily at bedtime, Disp: 90 tablet, Rfl: 1    rizatriptan (MAXALT-MLT) 10 mg disintegrating tablet, Take at the onset of migraine; if symptoms continue or return, may take another dose at least 2 hours after first dose. Take no more than 2 doses in a day., Disp: 12 tablet, Rfl: 5    rosuvastatin (CRESTOR) 10 MG tablet, TAKE 1 TABLET DAILY, Disp: 90 tablet, Rfl: 1    sertraline (ZOLOFT) 100 mg tablet, Take 1 tablet (100 mg total) by mouth daily, Disp: 90 tablet, Rfl: 1    tacrolimus (PROTOPIC) 0.1 % ointment, if needed, Disp: , Rfl:     triamcinolone (KENALOG) 0.1 % cream, APPLY TOPICALLY TWICE A DAY (Patient taking differently: Apply 1 Application topically if needed), Disp: 80 g, Rfl: 8    albuterol (Ventolin HFA) 90 mcg/act inhaler, Inhale 2 puffs every 6 (six) hours as needed for wheezing (Patient not taking: Reported on 10/2/2024), Disp: 18 g, Rfl: 0    Objective:  /70 (BP Location: Left arm, Patient Position: Sitting, Cuff Size: Large)   Pulse 60   Ht 5' 2\" (1.575 m)   Wt " 98.3 kg (216 lb 12.8 oz)   SpO2 97%   BMI 39.65 kg/m²      Labs  I have reviewed pertinent labs:  CBC:   Lab Results   Component Value Date    WBC 7.37 10/02/2024    RBC 4.36 10/02/2024    HGB 12.9 10/02/2024    HCT 41.2 10/02/2024    MCV 95 10/02/2024     10/02/2024    MCH 29.6 10/02/2024    MCHC 31.3 (L) 10/02/2024    RDW 13.7 10/02/2024    MPV 10.6 10/02/2024    NEUTROABS 4.57 10/02/2024     CMP:   Lab Results   Component Value Date    SODIUM 140 10/02/2024    K 4.6 10/02/2024     10/02/2024    CO2 32 10/02/2024    AGAP 5 10/02/2024    BUN 18 10/02/2024    CREATININE 0.79 10/02/2024    GLUC 82 08/26/2024    GLUF 94 10/02/2024    CALCIUM 8.7 10/02/2024    AST 20 10/02/2024    ALT 17 10/02/2024    ALKPHOS 83 10/02/2024    TP 6.8 10/02/2024    ALB 4.0 10/02/2024    TBILI 0.37 10/02/2024    EGFR 78 10/02/2024              General Exam  GENERAL APPEARANCE:  No distress, alert, interactive and cooperative.  CARDIOVASCULAR: Warm and well perfused  LUNGS: normal work of breathing on room air  EXTREMITIES: no peripheral edema     Neurologic Exam  MENTAL STATE:  Orientation was normal to time, place and person without aphasia or apraxia. Recent and remote memory was intact.  Attention span and concentration were normal. Language testing was normal for comprehension, repetition, expression, and naming.     CRANIAL NERVES:  CN 2       visual fields full to confrontation.  CN 3, 4, 6  Pupils round, 4 mm in diameter, equally reactive to light. Lids symmetric; no ptosis. EOMs normal alignment, full range. No nystagmus.  CN 5  Facial sensation intact bilaterally.  CN 7  Normal and symmetric facial strength.   CN 8  Hearing intact to finger rub bilaterally.  CN 9  Palate elevates symmetrically.  CN 11  Normal strength of shoulder shrug and neck turning.  CN 12  Tongue midline, with normal bulk and strength.     MOTOR:  Motor exam was normal. Muscle bulk and tone were normal in both upper and lower extremities.  Muscle strength was 5/5 in distal and proximal muscles in both upper and lower extremities. No fasciculations, tremor or other abnormal movements were present.     GAIT:  Gait was unremarkable  Oropharynx classification is 2/3.  Chest is clear to auscultate bilaterally.  ROS:  Review of Systems   Constitutional:  Negative for appetite change, fatigue and fever.   HENT: Negative.  Negative for hearing loss, tinnitus, trouble swallowing and voice change.    Eyes: Negative.  Negative for photophobia, pain and visual disturbance.   Respiratory: Negative.  Negative for shortness of breath.    Cardiovascular: Negative.  Negative for palpitations.   Gastrointestinal: Negative.  Negative for nausea and vomiting.   Endocrine: Negative.  Negative for cold intolerance.   Genitourinary: Negative.  Negative for dysuria, frequency and urgency.   Musculoskeletal:  Negative for back pain, gait problem, myalgias, neck pain and neck stiffness.   Skin: Negative.  Negative for rash.   Allergic/Immunologic: Negative.    Neurological: Negative.  Negative for dizziness, tremors, seizures, syncope, facial asymmetry, speech difficulty, weakness, light-headedness, numbness and headaches.   Hematological: Negative.  Does not bruise/bleed easily.   Psychiatric/Behavioral: Negative.  Negative for confusion, hallucinations and sleep disturbance.        I have reviewed the past medical history, surgical history, social and family history, current medications, allergies vitals, review of systems, and updated this information as appropriate today.    Administrative Statements   The total amount of time spent with the patient and on chart review and documentation was 30 minutes. Issues addressed during this clinic visit included overall management, medication counseling or monitoring, and counseling and coordination of care.         Mike Mosley MD

## 2024-10-16 NOTE — ASSESSMENT & PLAN NOTE
Orders:    Ambulatory Referral to Sleep Medicine; Future  Patient's sleep study results from 10/27/2016 was reviewed with her she did have mild sleep apnea and her CPAP results from 12/18/2016 were reviewed with her she did well on CPAP of 6 cm of water pressure and her recent CPAP download from 7/18/2024 to 10/15/2024 was reviewed she is on auto CPAP with an AHI of 3.8 and compliance greater than 4 hours at 97.8% and average use of 8 hours and 13 minutes.    Given that patient has had fluctuation of her weight and could have lost 10 to 15 pounds and her sleep study has been almost more than 8 years back I would repeat a split-night sleep study to evaluate for her sleep apnea and for optimal CPAP pressure, she will call me after the test to discuss the results until then she will continue with her current auto CPAP.    She does follow-up with our department for her migraine headaches for Botox.    She was advised to keep a blood pressure cholesterol sugar and weight under control.    Patient was advised to continue with current CPAP pressure, he was also advised to maintain ideal body weight, avoid alcohol, muscle relaxers, pain killers and sleeping aids, he was advised to preferably sleep in lateral recumbent position, avoid driving and operating machinery if feeling drowsy or tired, clean the CPAP machine as per the [employer] company instructions, go to the hospital if has any worsening symptoms and call me.  And to follow up with his other physicians and to see me back in 3 to 4 months or sooner if needed.

## 2024-10-17 ENCOUNTER — TELEPHONE (OUTPATIENT)
Dept: NEUROLOGY | Facility: CLINIC | Age: 64
End: 2024-10-17

## 2024-10-17 NOTE — TELEPHONE ENCOUNTER
Botox number of units: 200  Botox quantity: 1  Arrived at what location: [place]  Botox at Correct Administering Location: Yes  NDC number:3151-3797-32  Lot number: [id number]  Expiration Date: 03/30/2027  Appt notes indicate correct medication: Yes

## 2024-10-24 ENCOUNTER — HOSPITAL ENCOUNTER (OUTPATIENT)
Dept: RADIOLOGY | Facility: CLINIC | Age: 64
End: 2024-10-24
Payer: COMMERCIAL

## 2024-10-24 VITALS
TEMPERATURE: 98.4 F | DIASTOLIC BLOOD PRESSURE: 75 MMHG | SYSTOLIC BLOOD PRESSURE: 107 MMHG | HEART RATE: 62 BPM | RESPIRATION RATE: 20 BRPM | OXYGEN SATURATION: 95 %

## 2024-10-24 DIAGNOSIS — M54.16 LUMBAR RADICULOPATHY: ICD-10-CM

## 2024-10-24 PROCEDURE — 62323 NJX INTERLAMINAR LMBR/SAC: CPT | Performed by: STUDENT IN AN ORGANIZED HEALTH CARE EDUCATION/TRAINING PROGRAM

## 2024-10-24 RX ORDER — METHYLPREDNISOLONE ACETATE 80 MG/ML
80 INJECTION, SUSPENSION INTRA-ARTICULAR; INTRALESIONAL; INTRAMUSCULAR; PARENTERAL; SOFT TISSUE ONCE
Status: COMPLETED | OUTPATIENT
Start: 2024-10-24 | End: 2024-10-24

## 2024-10-24 RX ADMIN — IOHEXOL 1 ML: 300 INJECTION, SOLUTION INTRAVENOUS at 10:17

## 2024-10-24 RX ADMIN — METHYLPREDNISOLONE ACETATE 80 MG: 80 INJECTION, SUSPENSION INTRA-ARTICULAR; INTRALESIONAL; INTRAMUSCULAR; SOFT TISSUE at 10:17

## 2024-10-24 NOTE — INTERVAL H&P NOTE
Update: (This section must be completed if the H&P was completed greater than 24 hrs to procedure or admission)    H&P reviewed. After examining the patient, I find no changed to the H&P since it had been written.    Patient re-evaluated. Accept as history and physical.    Aki Gaona MD/October 24, 2024/9:52 AM

## 2024-10-24 NOTE — INTERVAL H&P NOTE
Update: (This section must be completed if the H&P was completed greater than 24 hrs to procedure or admission)    H&P reviewed. After examining the patient, I find no changed to the H&P since it had been written.    Patient re-evaluated. Accept as history and physical.    Aki Gaona MD/October 24, 2024/9:46 AM

## 2024-10-24 NOTE — DISCHARGE INSTR - LAB
Epidural Steroid Injection   WHAT YOU NEED TO KNOW:   An epidural steroid injection (TREVIN) is a procedure to inject steroid medicine into the epidural space. The epidural space is between your spinal cord and vertebrae. Steroids reduce inflammation and fluid buildup in your spine that may be causing pain. You may be given pain medicine along with the steroids.          ACTIVITY  Do not drive or operate machinery today.  No strenuous activity today - bending, lifting, etc.  You may resume normal activites starting tomorrow - start slowly and as tolerated.  You may shower today, but no tub baths or hot tubs.  You may have numbness for several hours from the local anesthetic. Please use caution and common sense, especially with weight-bearing activities.    CARE OF THE INJECTION SITE  If you have soreness or pain, apply ice to the area today (20 minutes on/20 minutes off).  Starting tomorrow, you may use warm, moist heat or ice if needed.  You may have an increase or change in your discomfort for 36-48 hours after your treatment.  Apply ice and continue with any pain medication you have been prescribed.  Notify the [facility] if you have any of the following: redness, drainage, swelling, headache, stiff neck or fever above 100°F.    SPECIAL INSTRUCTIONS  Our office will contact you in approximately 14 days for a progress report.    MEDICATIONS  Continue to take all routine medications.  Our office may have instructed you to hold some medications.    As no general anesthesia was used in today's procedure, you should not experience any side effects related to anesthesia.     If you are diabetic, the steroids used in today's injection may temporarily increase your blood sugar levels after the first few days after your injection. Please keep a close eye on your sugars and alert the doctor who manages your diabetes if your sugars are significantly high from your baseline or you are symptomatic.     If you have a  problem specifically related to your procedure, please call our office at [phone].  Problems not related to your procedure should be directed to your primary care physician.

## 2024-10-25 LAB — COLOGUARD RESULT REPORTABLE: NEGATIVE

## 2024-10-27 DIAGNOSIS — Z00.6 ENCOUNTER FOR EXAMINATION FOR NORMAL COMPARISON OR CONTROL IN CLINICAL RESEARCH PROGRAM: ICD-10-CM

## 2024-10-28 ENCOUNTER — APPOINTMENT (OUTPATIENT)
Dept: LAB | Facility: HOSPITAL | Age: 64
End: 2024-10-28
Attending: PATHOLOGY

## 2024-10-28 ENCOUNTER — TRANSCRIBE ORDERS (OUTPATIENT)
Dept: SLEEP CENTER | Facility: CLINIC | Age: 64
End: 2024-10-28

## 2024-10-28 DIAGNOSIS — G47.33 OSA ON CPAP: Primary | ICD-10-CM

## 2024-10-28 DIAGNOSIS — Z00.6 ENCOUNTER FOR EXAMINATION FOR NORMAL COMPARISON OR CONTROL IN CLINICAL RESEARCH PROGRAM: ICD-10-CM

## 2024-10-28 PROCEDURE — 36415 COLL VENOUS BLD VENIPUNCTURE: CPT

## 2024-11-05 LAB
APOB+LDLR+PCSK9 GENE MUT ANL BLD/T: NOT DETECTED
BRCA1+BRCA2 DEL+DUP + FULL MUT ANL BLD/T: NOT DETECTED
MLH1+MSH2+MSH6+PMS2 GN DEL+DUP+FUL M: NOT DETECTED

## 2024-11-08 ENCOUNTER — TELEPHONE (OUTPATIENT)
Dept: PAIN MEDICINE | Facility: CLINIC | Age: 64
End: 2024-11-08

## 2024-11-08 DIAGNOSIS — R51.9 CHRONIC DAILY HEADACHE: ICD-10-CM

## 2024-11-08 RX ORDER — PROPRANOLOL HYDROCHLORIDE 60 MG/1
TABLET ORAL
Qty: 180 TABLET | Refills: 1 | Status: SHIPPED | OUTPATIENT
Start: 2024-11-08

## 2024-11-25 ENCOUNTER — TELEPHONE (OUTPATIENT)
Dept: NEUROLOGY | Facility: CLINIC | Age: 64
End: 2024-11-25

## 2024-11-30 DIAGNOSIS — G43.709 CHRONIC MIGRAINE WITHOUT AURA WITHOUT STATUS MIGRAINOSUS, NOT INTRACTABLE: ICD-10-CM

## 2024-12-02 ENCOUNTER — OFFICE VISIT (OUTPATIENT)
Dept: PAIN MEDICINE | Facility: CLINIC | Age: 64
End: 2024-12-02
Payer: COMMERCIAL

## 2024-12-02 VITALS
HEART RATE: 69 BPM | DIASTOLIC BLOOD PRESSURE: 78 MMHG | BODY MASS INDEX: 39.88 KG/M2 | SYSTOLIC BLOOD PRESSURE: 117 MMHG | WEIGHT: 216.71 LBS | HEIGHT: 62 IN

## 2024-12-02 DIAGNOSIS — Z96.89 S/P INSERTION OF SPINAL CORD STIMULATOR: ICD-10-CM

## 2024-12-02 DIAGNOSIS — G89.4 CHRONIC PAIN SYNDROME: Primary | ICD-10-CM

## 2024-12-02 DIAGNOSIS — M48.061 STENOSIS OF LATERAL RECESS OF LUMBAR SPINE: ICD-10-CM

## 2024-12-02 DIAGNOSIS — M54.16 LUMBAR RADICULOPATHY: ICD-10-CM

## 2024-12-02 PROCEDURE — 99214 OFFICE O/P EST MOD 30 MIN: CPT

## 2024-12-02 RX ORDER — METHYLPREDNISOLONE 4 MG/1
TABLET ORAL
Qty: 1 EACH | Refills: 0 | Status: SHIPPED | OUTPATIENT
Start: 2024-12-02

## 2024-12-02 RX ORDER — HYDROCODONE BITARTRATE AND ACETAMINOPHEN 5; 325 MG/1; MG/1
1 TABLET ORAL EVERY 8 HOURS PRN
Qty: 20 TABLET | Refills: 0 | Status: SHIPPED | OUTPATIENT
Start: 2024-12-02

## 2024-12-02 NOTE — H&P (VIEW-ONLY)
Pain Medicine Follow-Up Note    Assessment:  1. Chronic pain syndrome    2. Lumbar radiculopathy    3. S/P insertion of spinal cord stimulator    4. Stenosis of lateral recess of lumbar spine        Plan:  Orders Placed This Encounter   Procedures    FL spine and pain procedure     Standing Status:   Future     Expected Date:   12/2/2024     Expiration Date:   12/2/2028     Reason for Exam::   right TFESI  L3-L4, L4-L5     Anticoagulant hold needed?:   no       New Medications Ordered This Visit   Medications    HYDROcodone-acetaminophen (NORCO) 5-325 mg per tablet     Sig: Take 1 tablet by mouth every 8 (eight) hours as needed for pain Max Daily Amount: 3 tablets     Dispense:  20 tablet     Refill:  0    methylPREDNISolone 4 MG tablet therapy pack     Sig: Use as directed on package     Dispense:  1 each     Refill:  0       My impressions and treatment recommendations were discussed in detail with the patient who verbalized understanding and had no further questions.      Patient returns to the office following up after having a lumbar epidural steroid injection at L3-L4 on 10/24/2024.  Patient is reporting that the injection helped her back but she still continues to have right leg pain.  I recommend the patient have a second epidural from a alternate approach, a right transforaminal epidural steroid injection at L3-L4, L4-L5. Complete risks and benefits including bleeding, infection, tissue reaction, nerve injury and allergic reaction were discussed. The approach was demonstrated using models and literature was provided. Verbal and written consent was obtained.    Patient also concerned that she will be traveling out of the [place] and will not have access to any pain medication.  I will provide the patient hydrocodone/acetaminophen 5/325 mg tablet she may take 1 tablet every 8 hours as needed for severe pain dispense 20 tablets.  Patient understands that this is a one-time prescription and is not meant to be  refilled.    [place] Prescription Drug Monitoring Program report was reviewed and was appropriate     Follow-up is planned in 4 weeks after injection time or sooner as warranted.  Discharge instructions were provided. I personally saw and examined the patient and I agree with the above discussed plan of care.    History of Present Illness:    Caryn Velasquez is a 64 y.o. female who presents to [facility] for interval re-evaluation of the above stated pain complaints. The patient has a past medical and chronic pain history as outlined in the assessment section. She was last seen on 10/24/2024.    At today's visit patient states that their pain symptoms are worse with a pain score of 5/10 on the verbal numeric pain scale.  The patient's pain is worse at no specific time.  The patient's pain is intermittent in nature.  And the quality of the patient's pain is described as sharp and shooting.  The patient's pain is located in the right posterior thigh as well as radiating down her right anterior thigh crossing over at the knee.  Patient is currently using gabapentin prescribed through another provider for pain.    Other than as stated above, the patient denies any interval changes in medications, medical condition, mental condition, symptoms, or allergies since the last office visit.         Review of Systems:    Review of Systems   Respiratory:  Negative for shortness of breath.    Cardiovascular:  Negative for chest pain.   Gastrointestinal:  Negative for constipation, diarrhea, nausea and vomiting.   Musculoskeletal:  Positive for gait problem. Negative for arthralgias, joint swelling and myalgias.        Pain in Extremity Right leg    Skin:  Negative for rash.   Neurological:  Negative for dizziness, seizures and weakness.   All other systems reviewed and are negative.        Past Medical History:   Diagnosis Date    Anemia     Anxiety     Arthritis     Attention and concentration  deficit     Blurred vision     Chronic pain     Chronic pain syndrome 2016    CPAP (continuous positive airway pressure) dependence     CTS (carpal tunnel syndrome) > 6 years    Left wrist    Depression     Difficulty walking > 6 years    Left nerve leg pain    Diplopia     Dyspepsia     Gastric ulcer     Gastritis     GERD (gastroesophageal reflux disease)     Head injury > 25 years    Fell off 12 foot retaining wall    Headache(784.0)     Headache, tension-type > 1 year    Pain behind left eyeAnd stiff neck with pressure    History of transfusion     Hypertension     Inflammatory bowel disease 28    Insomnia     Irritable bowel syndrome     Kidney stone     Memory loss     Migraines     Obesity     Osteopenia     Peripheral neuropathy 3/14/2012    Left thigh, left abdomen, left prudential nerve    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    Presence of neurostimulator     Pudendal neuralgia    Pudendal neuralgia     Shingles > 30 years ago    Left facial nerve    Sleep apnea     Sleep apnea, obstructive     Spinal stenosis     Starting and stopping of urinary stream during micturition     Syncope > 10 years    When I get up quickly    Trigeminal neuralgia > 30 years    Associated with shingles on left face    Urinary incontinence     Visual impairment     Wears eyeglasses        Past Surgical History:   Procedure Laterality Date     SECTION       SECTION      CHOLECYSTECTOMY      COLONOSCOPY      GALLBLADDER SURGERY      GASTRIC BYPASS  2004    HYSTERECTOMY  2012    INSERT / REPLACE PERIPHERAL NEUROSTIMULATOR PULSE GENERATOR /       MASS EXCISION Right 2021    Procedure: EXCISION  BIOPSY LESION/MASS LOWER NECK;  Surgeon: Brennon Browning DO;  Location: [facility];  Service: General    OTHER SURGICAL HISTORY      Reimplantatin at [facility]     CT INS/RPLC PERPH SAC/GSTRC NPG/RCVR PCKT CRTJ&CONN Left 2023    Procedure: PLACEMENT OF PERMANENT LEADWIRE AND  PG FOR SNM AT S3 FORAMEN, COMPLEX PROGRAMMING OF PULSE GENERATOR;  Surgeon: Jess Shaw MD;  Location: [facility];  Service: UroGynecology           AR INSJ/RPLCMT SPINAL NPG/RCVR POCKET CRTJ&CONNJ Right 04/15/2020    Procedure: REPLACEMENT IMPLANTABLE PULSE GENERATOR FOR DORSAL SPINAL COLUMN STIMULATOR,  RIGHT BUTTOCKS;  Surgeon: Fili Mckeon MD;  Location: [facility];  Service: Neurosurgery    RADIOFREQUENCY ABLATION NERVES      SPINAL CORD STIMULATOR IMPLANT  2015    TRIGGER POINT INJECTION      URETER SURGERY      URETHRAL FISTULA REPAIR      US GUIDED THYROID BIOPSY  09/23/2020    WRIST ARTHROSCOPY      with internal fixation        Family History   Problem Relation Age of Onset    Cirrhosis Mother     Crohn's disease Mother     Lupus Mother         Systemic Lupus Erythematous     Depression Mother     Dementia Mother         Caused by liver disease    Neuropathy Mother         Fibromyalgia    Hypertension Father     Heart disease Father     Depression Sister     No Known Problems Daughter     Cirrhosis Brother     Dementia Brother     Crohn's disease Brother     No Known Problems Maternal Aunt     No Known Problems Paternal Aunt     No Known Problems Cousin     No Known Problems Cousin     No Known Problems Cousin     No Known Problems Cousin     No Known Problems Cousin     Dementia Brother         Caused by liver disease    Seizures Neg Hx     Breast cancer Neg Hx        Social History     Occupational History    Occupation: retired   Tobacco Use    Smoking status: Never    Smokeless tobacco: Never   Vaping Use    Vaping status: Never Used   Substance and Sexual Activity    Alcohol use: No    Drug use: Yes     Types: Marijuana     Comment: Capsules used for chronic pain.  Medical    Sexual activity: Not Currently     Partners: Male     Birth control/protection: None         Current Outpatient Medications:     albuterol (Ventolin HFA) 90 mcg/act inhaler, Inhale 2 puffs every 6 (six) hours as needed for  wheezing, Disp: 18 g, Rfl: 0    Botox 200 units SOLR, , Disp: , Rfl:     busPIRone (BUSPAR) 10 mg tablet, Take 1 tablet (10 mg total) by mouth 2 (two) times a day, Disp: 180 tablet, Rfl: 1    Cholecalciferol 25 MCG (1000 UT) capsule, Take 1 capsule by mouth daily, Disp: , Rfl:     clobetasol (TEMOVATE) 0.05 % ointment, if needed, Disp: , Rfl:     gabapentin (NEURONTIN) 300 mg capsule, Take 1 capsule (300 mg total) by mouth 2 (two) times a day, Disp: 180 capsule, Rfl: 1    HYDROcodone-acetaminophen (NORCO) 5-325 mg per tablet, Take 1 tablet by mouth every 8 (eight) hours as needed for pain Max Daily Amount: 3 tablets, Disp: 20 tablet, Rfl: 0    Iron-Vitamin C 100-250 MG TABS, Take 1 tablet by mouth daily, Disp: , Rfl:     ketoconazole (NIZORAL) 2 % cream, , Disp: , Rfl:     lisinopril (ZESTRIL) 5 mg tablet, Take 1 tablet (5 mg total) by mouth daily, Disp: 100 tablet, Rfl: 1    methylPREDNISolone 4 MG tablet therapy pack, Use as directed on package, Disp: 1 each, Rfl: 0    Multiple Vitamin (MULTI-VITAMIN DAILY) TABS, Take 1 tablet by mouth daily, Disp: , Rfl:     ondansetron (ZOFRAN-ODT) 4 mg disintegrating tablet, PLACE 1 TABLET ON THE TONGUE EVERY 6 HOURS AS NEEDED FOR NAUSEA OR VOMITING, Disp: 30 tablet, Rfl: 5    pantoprazole (PROTONIX) 40 mg tablet, TAKE 1 TABLET TWICE A DAY, Disp: 180 tablet, Rfl: 3    propranolol (INDERAL) 60 mg tablet, TAKE 1 TABLET TWICE A DAY, Disp: 180 tablet, Rfl: 1    QUEtiapine (SEROquel) 25 mg tablet, Take 1 tablet (25 mg total) by mouth daily at bedtime, Disp: 90 tablet, Rfl: 1    rizatriptan (MAXALT-MLT) 10 mg disintegrating tablet, Take at the onset of migraine; if symptoms continue or return, may take another dose at least 2 hours after first dose. Take no more than 2 doses in a day., Disp: 12 tablet, Rfl: 5    rosuvastatin (CRESTOR) 10 MG tablet, TAKE 1 TABLET DAILY, Disp: 90 tablet, Rfl: 1    sertraline (ZOLOFT) 100 mg tablet, Take 1 tablet (100 mg total) by mouth daily, Disp: 90  "tablet, Rfl: 1    tacrolimus (PROTOPIC) 0.1 % ointment, if needed, Disp: , Rfl:     triamcinolone (KENALOG) 0.1 % cream, APPLY TOPICALLY TWICE A DAY, Disp: 80 g, Rfl: 8    ciclopirox (PENLAC) 8 % solution, , Disp: , Rfl:     denosumab (PROLIA) 60 mg/mL, Inject 1 mL (60 mg total) under the skin once for 1 dose, Disp: 1 mL, Rfl: 0    Emgality 120 MG/ML SOAJ, USE 1 INJECTION UNDER THE SKIN EVERY MONTH, Disp: 3 mL, Rfl: 3    nitrofurantoin (MACROBID) 100 mg capsule, Take 1 capsule (100 mg total) by mouth 2 (two) times a day for 5 days, Disp: 10 capsule, Rfl: 0    oxyCODONE-acetaminophen (Percocet) 5-325 mg per tablet, Take 1 tablet by mouth every 4 (four) hours as needed for moderate pain Max Daily Amount: 6 tablets, Disp: 20 tablet, Rfl: 0  No current facility-administered medications for this visit.    Allergies   Allergen Reactions    Morphine Nausea Only and Abdominal Pain     SEVERE N/V    Cat Hair Extract Nasal Congestion     Cat Dander    Dog Epithelium Nasal Congestion    Other Other (See Comments)     Dogs- sneezing  Crab Meat- GI intolerance       Physical Exam:    /78 (Patient Position: Sitting, Cuff Size: Large)   Pulse 69   Ht 5' 2\" (1.575 m)   Wt 98.3 kg (216 lb 11.4 oz)   BMI 39.64 kg/m²     Constitutional:normal, well developed, well nourished, alert, in no distress and non-toxic and no overt pain behavior.  Eyes:anicteric  HEENT:grossly intact  Neck:supple, symmetric, trachea midline and no masses   Pulmonary:even and unlabored  Cardiovascular:No edema or pitting edema present  Skin:Normal without rashes or lesions and well hydrated  Psychiatric:Mood and affect appropriate  Neurologic:Cranial Nerves II-XII grossly intact  Musculoskeletal:antalgic gait      Imaging  FL spine and pain procedure    (Results Pending)         Orders Placed This Encounter   Procedures    FL spine and pain procedure       This document was created using speech voice recognition software.   Grammatical errors, random " word insertions, pronoun errors, and incomplete sentences are an occasional consequence of this system due to software limitations, ambient noise, and hardware issues.   Any formal questions or concerns about content, text, or information contained within the body of this dictation should be directly addressed to the provider for clarification.

## 2024-12-02 NOTE — PROGRESS NOTES
Pain Medicine Follow-Up Note    Assessment:  1. Chronic pain syndrome    2. Lumbar radiculopathy    3. S/P insertion of spinal cord stimulator    4. Stenosis of lateral recess of lumbar spine        Plan:  Orders Placed This Encounter   Procedures    FL spine and pain procedure     Standing Status:   Future     Expected Date:   12/2/2024     Expiration Date:   12/2/2028     Reason for Exam::   right TFESI  L3-L4, L4-L5     Anticoagulant hold needed?:   no       New Medications Ordered This Visit   Medications    HYDROcodone-acetaminophen (NORCO) 5-325 mg per tablet     Sig: Take 1 tablet by mouth every 8 (eight) hours as needed for pain Max Daily Amount: 3 tablets     Dispense:  20 tablet     Refill:  0    methylPREDNISolone 4 MG tablet therapy pack     Sig: Use as directed on package     Dispense:  1 each     Refill:  0       My impressions and treatment recommendations were discussed in detail with the patient who verbalized understanding and had no further questions.      Patient returns to the office following up after having a lumbar epidural steroid injection at L3-L4 on 10/24/2024.  Patient is reporting that the injection helped her back but she still continues to have right leg pain.  I recommend the patient have a second epidural from a alternate approach, a right transforaminal epidural steroid injection at L3-L4, L4-L5. Complete risks and benefits including bleeding, infection, tissue reaction, nerve injury and allergic reaction were discussed. The approach was demonstrated using models and literature was provided. Verbal and written consent was obtained.    Patient also concerned that she will be traveling out of the country and will not have access to any pain medication.  I will provide the patient hydrocodone/acetaminophen 5/325 mg tablet she may take 1 tablet every 8 hours as needed for severe pain dispense 20 tablets.  Patient understands that this is a one-time prescription and is not meant to be  refilled.    [place] Prescription Drug Monitoring Program report was reviewed and was appropriate     Follow-up is planned in 4 weeks after injection time or sooner as warranted.  Discharge instructions were provided. I personally saw and examined the patient and I agree with the above discussed plan of care.    History of Present Illness:    Caryn Velasquez is a 64 y.o. female who presents to [facility] for interval re-evaluation of the above stated pain complaints. The patient has a past medical and chronic pain history as outlined in the assessment section. She was last seen on 10/24/2024.    At today's visit patient states that their pain symptoms are worse with a pain score of 5/10 on the verbal numeric pain scale.  The patient's pain is worse at no specific time.  The patient's pain is intermittent in nature.  And the quality of the patient's pain is described as sharp and shooting.  The patient's pain is located in the right posterior thigh as well as radiating down her right anterior thigh crossing over at the knee.  Patient is currently using gabapentin prescribed through another provider for pain.    Other than as stated above, the patient denies any interval changes in medications, medical condition, mental condition, symptoms, or allergies since the last office visit.         Review of Systems:    Review of Systems   Respiratory:  Negative for shortness of breath.    Cardiovascular:  Negative for chest pain.   Gastrointestinal:  Negative for constipation, diarrhea, nausea and vomiting.   Musculoskeletal:  Positive for gait problem. Negative for arthralgias, joint swelling and myalgias.        Pain in Extremity Right leg    Skin:  Negative for rash.   Neurological:  Negative for dizziness, seizures and weakness.   All other systems reviewed and are negative.        Past Medical History:   Diagnosis Date    Anemia     Anxiety     Arthritis     Attention and concentration  deficit     Blurred vision     Chronic pain     Chronic pain syndrome 2016    CPAP (continuous positive airway pressure) dependence     CTS (carpal tunnel syndrome) > 6 years    Left wrist    Depression     Difficulty walking > 6 years    Left nerve leg pain    Diplopia     Dyspepsia     Gastric ulcer     Gastritis     GERD (gastroesophageal reflux disease)     Head injury > 25 years    Fell off 12 foot retaining wall    Headache(784.0)     Headache, tension-type > 1 year    Pain behind left eyeAnd stiff neck with pressure    History of transfusion     Hypertension     Inflammatory bowel disease 30    Insomnia     Irritable bowel syndrome     Kidney stone     Memory loss     Migraines     Obesity     Osteopenia     Peripheral neuropathy 3/14/2012    Left thigh, left abdomen, left prudential nerve    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    Presence of neurostimulator     Pudendal neuralgia    Pudendal neuralgia     Shingles > 30 years ago    Left facial nerve    Sleep apnea     Sleep apnea, obstructive     Spinal stenosis     Starting and stopping of urinary stream during micturition     Syncope > 10 years    When I get up quickly    Trigeminal neuralgia > 30 years    Associated with shingles on left face    Urinary incontinence     Visual impairment     Wears eyeglasses        Past Surgical History:   Procedure Laterality Date     SECTION       SECTION      CHOLECYSTECTOMY      COLONOSCOPY      GALLBLADDER SURGERY      GASTRIC BYPASS  2004    HYSTERECTOMY  2012    INSERT / REPLACE PERIPHERAL NEUROSTIMULATOR PULSE GENERATOR /       MASS EXCISION Right 2021    Procedure: EXCISION  BIOPSY LESION/MASS LOWER NECK;  Surgeon: Brennon Browning DO;  Location: [facility];  Service: General    OTHER SURGICAL HISTORY      Reimplantatin at [facility]     NM INS/RPLC PERPH SAC/GSTRC NPG/RCVR PCKT CRTJ&CONN Left 2023    Procedure: PLACEMENT OF PERMANENT LEADWIRE AND  PG FOR SNM AT S3 FORAMEN, COMPLEX PROGRAMMING OF PULSE GENERATOR;  Surgeon: Jess Shaw MD;  Location: [facility];  Service: UroGynecology           UT INSJ/RPLCMT SPINAL NPG/RCVR POCKET CRTJ&CONNJ Right 04/15/2020    Procedure: REPLACEMENT IMPLANTABLE PULSE GENERATOR FOR DORSAL SPINAL COLUMN STIMULATOR,  RIGHT BUTTOCKS;  Surgeon: Fili Mckeon MD;  Location: [facility];  Service: Neurosurgery    RADIOFREQUENCY ABLATION NERVES      SPINAL CORD STIMULATOR IMPLANT  2015    TRIGGER POINT INJECTION      URETER SURGERY      URETHRAL FISTULA REPAIR      US GUIDED THYROID BIOPSY  09/23/2020    WRIST ARTHROSCOPY      with internal fixation        Family History   Problem Relation Age of Onset    Cirrhosis Mother     Crohn's disease Mother     Lupus Mother         Systemic Lupus Erythematous     Depression Mother     Dementia Mother         Caused by liver disease    Neuropathy Mother         Fibromyalgia    Hypertension Father     Heart disease Father     Depression Sister     No Known Problems Daughter     Cirrhosis Brother     Dementia Brother     Crohn's disease Brother     No Known Problems Maternal Aunt     No Known Problems Paternal Aunt     No Known Problems Cousin     No Known Problems Cousin     No Known Problems Cousin     No Known Problems Cousin     No Known Problems Cousin     Dementia Brother         Caused by liver disease    Seizures Neg Hx     Breast cancer Neg Hx        Social History     Occupational History    Occupation: retired   Tobacco Use    Smoking status: Never    Smokeless tobacco: Never   Vaping Use    Vaping status: Never Used   Substance and Sexual Activity    Alcohol use: No    Drug use: Yes     Types: Marijuana     Comment: Capsules used for chronic pain.  Medical    Sexual activity: Not Currently     Partners: Male     Birth control/protection: None         Current Outpatient Medications:     albuterol (Ventolin HFA) 90 mcg/act inhaler, Inhale 2 puffs every 6 (six) hours as needed for  wheezing, Disp: 18 g, Rfl: 0    Botox 200 units SOLR, , Disp: , Rfl:     busPIRone (BUSPAR) 10 mg tablet, Take 1 tablet (10 mg total) by mouth 2 (two) times a day, Disp: 180 tablet, Rfl: 1    Cholecalciferol 25 MCG (1000 UT) capsule, Take 1 capsule by mouth daily, Disp: , Rfl:     clobetasol (TEMOVATE) 0.05 % ointment, if needed, Disp: , Rfl:     gabapentin (NEURONTIN) 300 mg capsule, Take 1 capsule (300 mg total) by mouth 2 (two) times a day, Disp: 180 capsule, Rfl: 1    HYDROcodone-acetaminophen (NORCO) 5-325 mg per tablet, Take 1 tablet by mouth every 8 (eight) hours as needed for pain Max Daily Amount: 3 tablets, Disp: 20 tablet, Rfl: 0    Iron-Vitamin C 100-250 MG TABS, Take 1 tablet by mouth daily, Disp: , Rfl:     ketoconazole (NIZORAL) 2 % cream, , Disp: , Rfl:     lisinopril (ZESTRIL) 5 mg tablet, Take 1 tablet (5 mg total) by mouth daily, Disp: 100 tablet, Rfl: 1    methylPREDNISolone 4 MG tablet therapy pack, Use as directed on package, Disp: 1 each, Rfl: 0    Multiple Vitamin (MULTI-VITAMIN DAILY) TABS, Take 1 tablet by mouth daily, Disp: , Rfl:     ondansetron (ZOFRAN-ODT) 4 mg disintegrating tablet, PLACE 1 TABLET ON THE TONGUE EVERY 6 HOURS AS NEEDED FOR NAUSEA OR VOMITING, Disp: 30 tablet, Rfl: 5    pantoprazole (PROTONIX) 40 mg tablet, TAKE 1 TABLET TWICE A DAY, Disp: 180 tablet, Rfl: 3    propranolol (INDERAL) 60 mg tablet, TAKE 1 TABLET TWICE A DAY, Disp: 180 tablet, Rfl: 1    QUEtiapine (SEROquel) 25 mg tablet, Take 1 tablet (25 mg total) by mouth daily at bedtime, Disp: 90 tablet, Rfl: 1    rizatriptan (MAXALT-MLT) 10 mg disintegrating tablet, Take at the onset of migraine; if symptoms continue or return, may take another dose at least 2 hours after first dose. Take no more than 2 doses in a day., Disp: 12 tablet, Rfl: 5    rosuvastatin (CRESTOR) 10 MG tablet, TAKE 1 TABLET DAILY, Disp: 90 tablet, Rfl: 1    sertraline (ZOLOFT) 100 mg tablet, Take 1 tablet (100 mg total) by mouth daily, Disp: 90  "tablet, Rfl: 1    tacrolimus (PROTOPIC) 0.1 % ointment, if needed, Disp: , Rfl:     triamcinolone (KENALOG) 0.1 % cream, APPLY TOPICALLY TWICE A DAY, Disp: 80 g, Rfl: 8    ciclopirox (PENLAC) 8 % solution, , Disp: , Rfl:     denosumab (PROLIA) 60 mg/mL, Inject 1 mL (60 mg total) under the skin once for 1 dose, Disp: 1 mL, Rfl: 0    Emgality 120 MG/ML SOAJ, USE 1 INJECTION UNDER THE SKIN EVERY MONTH, Disp: 3 mL, Rfl: 3    nitrofurantoin (MACROBID) 100 mg capsule, Take 1 capsule (100 mg total) by mouth 2 (two) times a day for 5 days, Disp: 10 capsule, Rfl: 0    oxyCODONE-acetaminophen (Percocet) 5-325 mg per tablet, Take 1 tablet by mouth every 4 (four) hours as needed for moderate pain Max Daily Amount: 6 tablets, Disp: 20 tablet, Rfl: 0  No current facility-administered medications for this visit.    Allergies   Allergen Reactions    Morphine Nausea Only and Abdominal Pain     SEVERE N/V    Cat Hair Extract Nasal Congestion     Cat Dander    Dog Epithelium Nasal Congestion    Other Other (See Comments)     Dogs- sneezing  Crab Meat- GI intolerance       Physical Exam:    /78 (Patient Position: Sitting, Cuff Size: Large)   Pulse 69   Ht 5' 2\" (1.575 m)   Wt 98.3 kg (216 lb 11.4 oz)   BMI 39.64 kg/m²     Constitutional:normal, well developed, well nourished, alert, in no distress and non-toxic and no overt pain behavior.  Eyes:anicteric  HEENT:grossly intact  Neck:supple, symmetric, trachea midline and no masses   Pulmonary:even and unlabored  Cardiovascular:No edema or pitting edema present  Skin:Normal without rashes or lesions and well hydrated  Psychiatric:Mood and affect appropriate  Neurologic:Cranial Nerves II-XII grossly intact  Musculoskeletal:antalgic gait      Imaging  FL spine and pain procedure    (Results Pending)         Orders Placed This Encounter   Procedures    FL spine and pain procedure       This document was created using speech voice recognition software.   Grammatical errors, random " word insertions, pronoun errors, and incomplete sentences are an occasional consequence of this system due to software limitations, ambient noise, and hardware issues.   Any formal questions or concerns about content, text, or information contained within the body of this dictation should be directly addressed to the provider for clarification.

## 2024-12-03 ENCOUNTER — TELEMEDICINE (OUTPATIENT)
Dept: PSYCHIATRY | Facility: CLINIC | Age: 64
End: 2024-12-03
Payer: COMMERCIAL

## 2024-12-03 ENCOUNTER — PATIENT MESSAGE (OUTPATIENT)
Dept: PAIN MEDICINE | Facility: CLINIC | Age: 64
End: 2024-12-03

## 2024-12-03 ENCOUNTER — OFFICE VISIT (OUTPATIENT)
Dept: FAMILY MEDICINE CLINIC | Facility: CLINIC | Age: 64
End: 2024-12-03
Payer: COMMERCIAL

## 2024-12-03 VITALS
OXYGEN SATURATION: 100 % | TEMPERATURE: 97.7 F | SYSTOLIC BLOOD PRESSURE: 112 MMHG | WEIGHT: 215 LBS | BODY MASS INDEX: 39.56 KG/M2 | HEIGHT: 62 IN | DIASTOLIC BLOOD PRESSURE: 72 MMHG | HEART RATE: 62 BPM

## 2024-12-03 DIAGNOSIS — F41.0 GENERALIZED ANXIETY DISORDER WITH PANIC ATTACKS: ICD-10-CM

## 2024-12-03 DIAGNOSIS — F33.1 MODERATE EPISODE OF RECURRENT MAJOR DEPRESSIVE DISORDER (HCC): Primary | ICD-10-CM

## 2024-12-03 DIAGNOSIS — G47.00 INSOMNIA, UNSPECIFIED TYPE: ICD-10-CM

## 2024-12-03 DIAGNOSIS — R30.9 PAIN WITH URINATION: Primary | ICD-10-CM

## 2024-12-03 DIAGNOSIS — R39.9 UTI SYMPTOMS: ICD-10-CM

## 2024-12-03 DIAGNOSIS — F41.1 GENERALIZED ANXIETY DISORDER WITH PANIC ATTACKS: ICD-10-CM

## 2024-12-03 LAB
BACTERIA UR QL AUTO: ABNORMAL /HPF
BILIRUB UR QL STRIP: NEGATIVE
CLARITY UR: CLEAR
COLOR UR: YELLOW
GLUCOSE UR STRIP-MCNC: NEGATIVE MG/DL
HGB UR QL STRIP.AUTO: ABNORMAL
KETONES UR STRIP-MCNC: NEGATIVE MG/DL
LEUKOCYTE ESTERASE UR QL STRIP: ABNORMAL
MUCOUS THREADS UR QL AUTO: ABNORMAL
NITRITE UR QL STRIP: NEGATIVE
NON-SQ EPI CELLS URNS QL MICRO: ABNORMAL /HPF
PH UR STRIP.AUTO: 7.5 [PH]
PROT UR STRIP-MCNC: ABNORMAL MG/DL
RBC #/AREA URNS AUTO: ABNORMAL /HPF
RENAL EPI CELLS #/AREA URNS HPF: PRESENT /[HPF]
SL AMB  POCT GLUCOSE, UA: ABNORMAL
SL AMB LEUKOCYTE ESTERASE,UA: 15
SL AMB POCT BILIRUBIN,UA: ABNORMAL
SL AMB POCT BLOOD,UA: ABNORMAL
SL AMB POCT CLARITY,UA: CLEAR
SL AMB POCT COLOR,UA: YELLOW
SL AMB POCT KETONES,UA: ABNORMAL
SL AMB POCT NITRITE,UA: ABNORMAL
SL AMB POCT PH,UA: 7
SL AMB POCT SPECIFIC GRAVITY,UA: 1.01
SL AMB POCT URINE PROTEIN: 100
SL AMB POCT UROBILINOGEN: 0.2
SP GR UR STRIP.AUTO: 1.02 (ref 1–1.03)
UROBILINOGEN UR STRIP-ACNC: <2 MG/DL
WBC #/AREA URNS AUTO: ABNORMAL /HPF

## 2024-12-03 PROCEDURE — 99214 OFFICE O/P EST MOD 30 MIN: CPT | Performed by: STUDENT IN AN ORGANIZED HEALTH CARE EDUCATION/TRAINING PROGRAM

## 2024-12-03 PROCEDURE — 81002 URINALYSIS NONAUTO W/O SCOPE: CPT | Performed by: STUDENT IN AN ORGANIZED HEALTH CARE EDUCATION/TRAINING PROGRAM

## 2024-12-03 PROCEDURE — 87086 URINE CULTURE/COLONY COUNT: CPT | Performed by: STUDENT IN AN ORGANIZED HEALTH CARE EDUCATION/TRAINING PROGRAM

## 2024-12-03 PROCEDURE — 87186 SC STD MICRODIL/AGAR DIL: CPT | Performed by: STUDENT IN AN ORGANIZED HEALTH CARE EDUCATION/TRAINING PROGRAM

## 2024-12-03 PROCEDURE — 90833 PSYTX W PT W E/M 30 MIN: CPT | Performed by: STUDENT IN AN ORGANIZED HEALTH CARE EDUCATION/TRAINING PROGRAM

## 2024-12-03 PROCEDURE — 87077 CULTURE AEROBIC IDENTIFY: CPT | Performed by: STUDENT IN AN ORGANIZED HEALTH CARE EDUCATION/TRAINING PROGRAM

## 2024-12-03 PROCEDURE — 81001 URINALYSIS AUTO W/SCOPE: CPT | Performed by: STUDENT IN AN ORGANIZED HEALTH CARE EDUCATION/TRAINING PROGRAM

## 2024-12-03 RX ORDER — GALCANEZUMAB 120 MG/ML
INJECTION, SOLUTION SUBCUTANEOUS
Qty: 3 ML | Refills: 3 | Status: SHIPPED | OUTPATIENT
Start: 2024-12-03

## 2024-12-03 RX ORDER — NITROFURANTOIN 25; 75 MG/1; MG/1
100 CAPSULE ORAL 2 TIMES DAILY
Qty: 10 CAPSULE | Refills: 0 | Status: SHIPPED | OUTPATIENT
Start: 2024-12-03 | End: 2024-12-04 | Stop reason: SDUPTHER

## 2024-12-03 RX ORDER — CICLOPIROX 80 MG/ML
SOLUTION TOPICAL
COMMUNITY
Start: 2024-12-02
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
## 2024-12-03 NOTE — BH TREATMENT PLAN
"Treatment Plan done but not signed at time of office visit due to:  Plan reviewed with the patient during the virtual visit and verbal consent given.    TREATMENT PLAN (Medication Management Only)        [facility]    Name and Date of Birth:  Caryn Velasquez 64 y.o. 1960  Date of Treatment Plan: December 3, 2024  Diagnosis/Diagnoses:    1. Moderate episode of recurrent major depressive disorder (HCC)    2. Generalized anxiety disorder with panic attacks    3. Insomnia, unspecified type      Strengths/Personal Resources for Self-Care: \"supportive family, hobbies and travelling\".  Area/Areas of need (in own words): anxiety symptoms, depression  1. Long Term Goal: improve control of anxiety and prevent panic attacks; maintain mood stability  Target Date:6 months - 6/3/2025  Person/Persons responsible for completion of goal: Caryn  2.  Short Term Objective (s) - How will we reach this goal?:   A. Provider new recommended medication/dosage changes and/or continue medication(s): continue current medications as prescribed.  B. Attend psychotherapy regularly.  C. N/A.  Target Date:6 months - 6/3/2025  Person/Persons Responsible for Completion of Goal: Caryn  Progress Towards Goals: stable, continuing treatment  Treatment Modality: medication management every 6 months, referral for individual psychotherapy  Review due 180 days from date of this plan: 6 months - 6/3/2025  Expected length of service: maintenance  My Physician/PA/NP and I have developed this plan together and I agree to work on the goals and objectives. I understand the treatment goals that were developed for my treatment.      "

## 2024-12-03 NOTE — PATIENT COMMUNICATION
Pt is scheduled for TFESI with Dr Gaona on 1/9/25     Pt reports she is not diabetic and does not take prescription blood thinners or aspirin     Pt given instructions in office and via myc message     Have you completed PT/HEP/Chiro in the past 6 months for dedicated area? Per chart pt completed PT with [facility] for LBP from 10/19/23 thru 12/27/23 -- since that time pt has had pain injections for relief  If yes, how long did you complete?  What was the frequency?  Did it provide relief?  If no, reason therapy was not completed?
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
## 2024-12-03 NOTE — BH CRISIS PLAN
"Client Name: Caryn Velasquez       Client YOB: 1960    [employer] Safety Plan      Creation Date: 2/5/24 Update Date: 12/3/24   Created By: Padmaja Flowers MD Last Updated By: Padmaja Flowers MD      Step 1: Warning Signs:   Warning Signs   \"nervousness\"; \"feeling anxious\"; \"hyperventilation\"            Step 2: Internal Coping Strategies:   Internal Coping Strategies   \"breathing techniques\"   \"taking a break\"   \"going for a walk\"   \"listening to audio books\"            Step 3: People and social settings that provide distraction:   Name Contact Information   My sister Contact saved in phone   My  Contact saved in phone    Places   \"the park\"           Step 4: People whom I can ask for help during a crisis:      Name Contact Information    My  Contact saved in phone      Step 5: Professionals or agencies I can contact during a crisis:      [facility]/Agency Name Phone Emergency Contact    Padmaja Flowers -[phone]       Local Emergency Department Emergency Department Phone Emergency Department Address    [facility] [phone] [address] [place] [id number]        Crisis Phone Numbers:   Suicide Prevention Lifeline: Call or Text  809 Crisis Text Line: Text HOME to 245-510   Please note: Some [facility] do not have a separate number for Child/Adolescent specific crisis. If your county is not listed under Child/Adolescent, please call the adult number for your county      Adult Crisis Numbers: Child/Adolescent Crisis Numbers   [facility]: [phone] [facility]: [phone]   [facility]: [phone] [facility]: [phone]   [facility]: [phone] [place]: [phone]   [facility]: [phone] [place] [facility]: [phone]   [place] [facility]: [phone]   [facility]: [phone]   [facility]: [phone]   [facility]: [phone] (daytime) [phone] (after hours, " weekends, holidays)      Step 6: Making the environment safer (plan for lethal means safety):   Patient did not identify any lethal methods: Yes     Optional: What is most important to me and worth living for?      [place] Safety Plan. Virginia Herring and Pasquale Weber. Used with permission of the authors.

## 2024-12-04 ENCOUNTER — PROCEDURE VISIT (OUTPATIENT)
Dept: NEUROLOGY | Facility: CLINIC | Age: 64
End: 2024-12-04
Payer: COMMERCIAL

## 2024-12-04 VITALS
HEIGHT: 62 IN | TEMPERATURE: 98 F | WEIGHT: 215 LBS | HEART RATE: 67 BPM | SYSTOLIC BLOOD PRESSURE: 122 MMHG | DIASTOLIC BLOOD PRESSURE: 70 MMHG | OXYGEN SATURATION: 98 % | BODY MASS INDEX: 39.56 KG/M2

## 2024-12-04 DIAGNOSIS — R39.9 UTI SYMPTOMS: ICD-10-CM

## 2024-12-04 DIAGNOSIS — R30.9 PAIN WITH URINATION: ICD-10-CM

## 2024-12-04 DIAGNOSIS — G43.709 CHRONIC MIGRAINE WITHOUT AURA WITHOUT STATUS MIGRAINOSUS, NOT INTRACTABLE: Primary | ICD-10-CM

## 2024-12-04 PROCEDURE — 64615 CHEMODENERV MUSC MIGRAINE: CPT | Performed by: PHYSICIAN ASSISTANT

## 2024-12-04 RX ORDER — NITROFURANTOIN 25; 75 MG/1; MG/1
100 CAPSULE ORAL 2 TIMES DAILY
Qty: 10 CAPSULE | Refills: 0 | Status: SHIPPED | OUTPATIENT
Start: 2024-12-04 | End: 2024-12-09

## 2024-12-04 NOTE — PROGRESS NOTES
Universal Protocol   Consent: Verbal consent obtained. Written consent obtained.  Risks and benefits: risks, benefits and alternatives were discussed  Consent given by: patient  Patient understanding: patient states understanding of the procedure being performed  Patient consent: the patient's understanding of the procedure matches consent given  Procedure consent: procedure consent matches procedure scheduled      Chemodenervation     Date/Time  12/4/2024 10:00 AM     Performed by  Shilpi Nicholas PA-C   Authorized by  Shilpi Nicholas PA-C     Pre-procedure details      Prepped With: Alcohol     Procedure details      Position:  Upright   Botox      Botox Type:  Type A    Brand:  Botox    mL's of Botulinum Toxin:  200    Final Concentration per CC:  100 units    Needle Gauge:  30 G 2.5 inch   Procedures      Botox Procedures: chronic headache      Indications: migraines     Injection Location      Head / Face:  L superior trapezius, R superior trapezius, L superior cervical paraspinal, R superior cervical paraspinal, L , R , procerus, L temporalis, R temporalis, R frontalis, L frontalis, R medial occipitalis and L medial occipitalis    L  injection amount:  5 unit(s)    R  injection amount:  5 unit(s)    L lateral frontalis:  5 unit(s)    R lateral frontalis:  5 unit(s)    L medial frontalis:  5 unit(s)    R medial frontalis:  5 unit(s)    L temporalis injection amount:  20 unit(s)    R temporalis injection amount:  20 unit(s)    Procerus injection amount:  5 unit(s)    L medial occipitalis injection amount:  15 unit(s)    R medial occipitalis injection amount:  15 unit(s)    L superior cervical paraspinal injection amount:  10 unit(s)    R superior cervical paraspinal injection amount:  10 unit(s)    L superior trapezius injection amount:  15 unit(s)    R superior trapezius injection amount:  15 unit(s)   Total Units      Total units used:  200    Total units discarded:  0  "  Post-procedure details      Chemodenervation:  Chronic migraine    Facial Nerve Location::  Bilateral facial nerve    Patient tolerance of procedure:  Tolerated well, no immediate complications   Comments       45 extra units L frontal, scalp and occipital regions, all medically necessary.       Blood pressure 122/70, pulse 67, temperature 98 °F (36.7 °C), temperature source Temporal, height 5' 2\" (1.575 m), weight 97.5 kg (215 lb), SpO2 98%, not currently breastfeeding.    "

## 2024-12-05 ENCOUNTER — RESULTS FOLLOW-UP (OUTPATIENT)
Dept: FAMILY MEDICINE CLINIC | Facility: CLINIC | Age: 64
End: 2024-12-05

## 2024-12-05 LAB — BACTERIA UR CULT: ABNORMAL

## 2024-12-05 NOTE — PATIENT INSTRUCTIONS
Opioid Safety   AMBULATORY CARE:   Opioid safety  includes the correct use, storage, and disposal of opioids. Examples of opioid medicines to treat pain include oxycodone, morphine, fentanyl, and codeine.   Call your local emergency number (911 in the [place]), or have someone else call if:   You have a seizure.    You cannot be woken.    You have trouble staying awake and your breathing is slow or shallow.     Your speech is slurred, or you are confused.    You are dizzy or stumble when you walk.    Call your doctor, or have someone close to you call if:   You are extremely drowsy, or you have trouble staying awake or speaking.    You have pale or clammy skin.    You have blue fingernails or lips.    Your heartbeat is slower than normal.    You cannot stop vomiting.    You have questions or concerns about your condition or care.    Use opioids safely:   Take prescribed opioids exactly as directed.  Opioids come with directions based on the kind of opioid and how it is given. Do not take more than the recommended amount, or for longer than needed.    Do not give opioids to others or take opioids that belong to someone else.  Misuse of opioids can lead to an addiction or overdose.    Do not mix opioids with other medicines or alcohol.  The combination can cause an overdose, or lead to a coma.    Do not drive or operate heavy machinery after you take the opioid.  Your provider or pharmacist can tell you how long to wait after a dose before you do these activities.    Talk to your healthcare provider if you have any side effects.  He or she can help you prevent or relieve side effects. Side effects include nausea, sleepiness, itching, and trouble thinking clearly.    Manage constipation:  Constipation is the most common side effect of opioid medicine. Constipation is when you have hard, dry bowel movements, or you go longer than usual between bowel movements. Tell your healthcare provider about all changes in your bowel  movements while you are taking opioids. He or she may recommend laxative medicine to help you have a bowel movement. He or she may also change the kind of opioid you are taking, or change when you take it. The following are more ways you can prevent or relieve constipation:  Drink liquids as directed.  You may need to drink extra liquids to help soften and move your bowels. Ask how much liquid to drink each day and which liquids are best for you.    Eat high-fiber foods.  This may help decrease constipation by adding bulk to your bowel movements. High-fiber foods include fruits, vegetables, whole-grain breads and cereals, and beans. Your healthcare provider or dietitian can help you create a high-fiber meal plan. Your provider may also recommend a fiber supplement if you cannot get enough fiber from food.         Exercise regularly.  Regular physical activity can help stimulate your intestines. Walking is a good exercise to prevent or relieve constipation. Ask which exercises are best for you.         Schedule a time each day to have a bowel movement.  This may help train your body to have regular bowel movements. Bend forward while you are on the toilet to help move the bowel movement out. Sit on the toilet for at least 10 minutes, even if you do not have a bowel movement.    Store opioids safely:   Store opioids where others cannot easily get them.  Keep them in a locked cabinet or secure area. Do not  keep them in a purse or other bag you carry with you. A person may be looking for something else and find the opioids.    Make sure opioids are stored out of the reach of children.  A child can easily overdose on opioids. Opioids may look like candy to a small child.    The best way to dispose of opioids:  The laws vary by country and area. In the [place], the best way is to return the opioids through a take-back program. This program is offered by the [place] Drug Enforcement Agency (SHIRA). The following are  options for using the program:  Take the opioids to a SHIRA collection site.  The site is often a law enforcement center. Call your local law enforcement center for scheduled take-back days in your area. You will be given information on where to go if the collection site is in a different location.    Take the opioids to an approved pharmacy or hospital.  A pharmacy or hospital may be set up as a collection site. You will need to ask if it is a SHIRA collection site if you were not directed there. A pharmacy or doctor's office may not be able to take back opioids unless it is a SHIRA site.    Use a mail-back system.  This means you are given containers to put the opioids into. You will then mail them in the containers.    Use a take-back drop box.  This is a place to leave the opioids at any time. People and animals will not be able to get into the box. Your local law enforcement agency can tell you where to find a drop box in your area.    Other safe ways to dispose of opioids:  The medicine may come with disposal instructions. The instructions may vary depending on the brand of medicine you are using. Instructions may come in a Medication Guide, but not every medicine has one. You may instead get instructions from your pharmacy or doctor. Follow instructions carefully. The following are general guidelines to follow:  Find out if you can flush the opioid.  Some opioids can be flushed down the toilet or poured into the sink. You will need to contact authorities in your area to see if this is an option for you. The FDA also offers a list of medicines that are safe to flush down the toilet. You can check the list if you cannot get the information for your local area.    Ask your waste management company about rules for putting opioids in the trash.  The company will be able to give you specific directions. Scratch out personal information on the original medicine label so it cannot be read. Then put it in the trash. Do not  label the trash or put any information on it about the opioids. It should look like regular household trash so no one is tempted to look for the opioids. Keep the trash out of the reach of children and animals. Always make sure trash is secure.    Talk to officials if you live in a facility.  If you live in a nursing home or assisted living center, talk to an official. The person will know the rules for your area.    Other ways to manage pain:   Ask your healthcare provider about non-opioid medicines to control pain.  Nonprescription medicines include NSAIDs (such as ibuprofen) and acetaminophen. Prescription medicines include muscle relaxers, antidepressants, and steroids.    Pain may be managed without any medicines.  Some ways to relieve pain include massage, aromatherapy, or meditation. Physical or occupational therapy may also help.    For more information:   Drug Enforcement Administration  [address] [place] [address]  Phone: 8- 604 - 609-9017  Web Address: https://www.[employer].gov/drug_disposal/     Food and Drug Administration  [address] [place] [address]  Phone: 9- 437 - 397-7597  Web Address: http://www.fda.gov    Follow up with your doctor or pain specialist as directed:  You may need to have your dose adjusted. Your doctor or pain specialist can also help you find ways to manage pain without opioids. Write down your questions so you remember to ask them during your visits.  © Copyright [employer] 2022 Information is for End User's use only and may not be sold, redistributed or otherwise used for commercial purposes. All illustrations and images included in CareNotes® are the copyrighted property of [employer]M., Inc[employer] or [employer]  The above information is an  only. It is not intended as medical advice for individual conditions or treatments. Talk to your doctor, nurse or pharmacist before following any medical regimen to see  if it is safe and effective for you.   Epidural Steroid Injection, Ambulatory Care   GENERAL INFORMATION:   What do I need to know about an epidural steroid injection?  An epidural steroid injection (TREVIN) is a procedure to inject steroid medicine into the epidural space. The epidural space is between your spinal cord and vertebrae. Steroids reduce inflammation and fluid buildup in your spine that may be causing pain. You may be given pain medicine along with the steroids.   How do I prepare for an TREVIN?  Your healthcare provider will talk to you about how to prepare for your procedure. He will tell you what medicines to take or not take on the day of your procedure. You may need to stop taking blood thinners or other medicines several days before your procedure. You may need to adjust any diabetes medicine you take on the day of your procedure. Steroid medicine can increase your blood sugar level.   What will happen during an TREVIN?   You will be given medicine to numb the procedure area. You will be awake for the procedure, but you will not feel pain. You may also be given medicine to help you relax during the procedure. Contrast liquid will be used to help your healthcare provider see the area better. Tell the healthcare provider if you have ever had an allergic reaction to contrast liquid.    Your healthcare provider may place the needle into your neck area, middle of your back, or tailbone area. He may inject the medicine next to the nerves that are causing your pain. He may instead inject the medicine into a larger area of the epidural space. This helps the medicine spread to more nerves. Your healthcare provider will use a fluoroscope to help guide the needle to the right place. A fluoroscope is a type of x-ray. After the procedure, a bandage will be placed over the injection site to prevent infection.  What are the risks of an TREVIN?  You may have temporary or permanent nerve damage or paralysis. You may have  bleeding or develop a serious infection, such as meningitis (swelling of the brain coverings). An abscess may also develop. You may need surgery to fix the abscess. You may have a seizure, anxiety, or trouble sleeping. If you are a man, you may have temporary erectile dysfunction (not able to have an erection).   CARE AGREEMENT:   You have the right to help plan your care. Learn about your health condition and how it may be treated. Discuss treatment options with your caregivers to decide what care you want to receive. You always have the right to refuse treatment. The above information is an  only. It is not intended as medical advice for individual conditions or treatments. Talk to your doctor, nurse or pharmacist before following any medical regimen to see if it is safe and effective for you.  © 2014 [employer]. Information is for End User's use only and may not be sold, redistributed or otherwise used for commercial purposes. All illustrations and images included in CareNotes® are the copyrighted property of [employer].D.A[employer]M., Inc. or [employer].

## 2024-12-10 DIAGNOSIS — E78.2 MIXED HYPERLIPIDEMIA: ICD-10-CM

## 2024-12-11 RX ORDER — ROSUVASTATIN CALCIUM 10 MG/1
10 TABLET, COATED ORAL DAILY
Qty: 90 TABLET | Refills: 1 | Status: SHIPPED | OUTPATIENT
Start: 2024-12-11

## 2024-12-12 ENCOUNTER — HOSPITAL ENCOUNTER (OUTPATIENT)
Dept: RADIOLOGY | Facility: CLINIC | Age: 64
End: 2024-12-12
Payer: COMMERCIAL

## 2024-12-12 VITALS
HEART RATE: 60 BPM | DIASTOLIC BLOOD PRESSURE: 75 MMHG | SYSTOLIC BLOOD PRESSURE: 116 MMHG | RESPIRATION RATE: 20 BRPM | OXYGEN SATURATION: 95 %

## 2024-12-12 DIAGNOSIS — M54.16 LUMBAR RADICULOPATHY: ICD-10-CM

## 2024-12-12 PROCEDURE — 64483 NJX AA&/STRD TFRM EPI L/S 1: CPT | Performed by: STUDENT IN AN ORGANIZED HEALTH CARE EDUCATION/TRAINING PROGRAM

## 2024-12-12 PROCEDURE — 64484 NJX AA&/STRD TFRM EPI L/S EA: CPT | Performed by: STUDENT IN AN ORGANIZED HEALTH CARE EDUCATION/TRAINING PROGRAM

## 2024-12-12 RX ORDER — METHYLPREDNISOLONE ACETATE 80 MG/ML
80 INJECTION, SUSPENSION INTRA-ARTICULAR; INTRALESIONAL; INTRAMUSCULAR; PARENTERAL; SOFT TISSUE ONCE
Status: COMPLETED | OUTPATIENT
Start: 2024-12-12 | End: 2024-12-12

## 2024-12-12 RX ORDER — BUPIVACAINE HCL/PF 2.5 MG/ML
2 VIAL (ML) INJECTION ONCE
Status: COMPLETED | OUTPATIENT
Start: 2024-12-12 | End: 2024-12-12

## 2024-12-12 RX ADMIN — METHYLPREDNISOLONE ACETATE 80 MG: 80 INJECTION, SUSPENSION INTRA-ARTICULAR; INTRALESIONAL; INTRAMUSCULAR; SOFT TISSUE at 11:57

## 2024-12-12 RX ADMIN — BUPIVACAINE HYDROCHLORIDE 2 ML: 2.5 INJECTION, SOLUTION EPIDURAL; INFILTRATION; INTRACAUDAL at 11:57

## 2024-12-12 RX ADMIN — IOHEXOL 1 ML: 300 INJECTION, SOLUTION INTRAVENOUS at 11:57

## 2024-12-12 NOTE — DISCHARGE INSTR - LAB
Epidural Steroid Injection   WHAT YOU NEED TO KNOW:   An epidural steroid injection (TREVIN) is a procedure to inject steroid medicine into the epidural space. The epidural space is between your spinal cord and vertebrae. Steroids reduce inflammation and fluid buildup in your spine that may be causing pain. You may be given pain medicine along with the steroids.          ACTIVITY  Do not drive or operate machinery today.  No strenuous activity today - bending, lifting, etc.  You may resume normal activites starting tomorrow - start slowly and as tolerated.  You may shower today, but no tub baths or hot tubs.  You may have numbness for several hours from the local anesthetic. Please use caution and common sense, especially with weight-bearing activities.    CARE OF THE INJECTION SITE  If you have soreness or pain, apply ice to the area today (20 minutes on/20 minutes off).  Starting tomorrow, you may use warm, moist heat or ice if needed.  You may have an increase or change in your discomfort for 36-48 hours after your treatment.  Apply ice and continue with any pain medication you have been prescribed.  Notify the [facility] if you have any of the following: redness, drainage, swelling, headache, stiff neck or fever above 100°F.    SPECIAL INSTRUCTIONS  Our office will contact you in approximately 14 days for a progress report.    MEDICATIONS  Continue to take all routine medications.  Our office may have instructed you to hold some medications.    As no general anesthesia was used in today's procedure, you should not experience any side effects related to anesthesia.     If you are diabetic, the steroids used in today's injection may temporarily increase your blood sugar levels after the first few days after your injection. Please keep a close eye on your sugars and alert the doctor who manages your diabetes if your sugars are significantly high from your baseline or you are symptomatic.     If you have a  problem specifically related to your procedure, please call our office at [phone].  Problems not related to your procedure should be directed to your primary care physician.

## 2024-12-12 NOTE — INTERVAL H&P NOTE
Update: (This section must be completed if the H&P was completed greater than 24 hrs to procedure or admission)    H&P reviewed. After examining the patient, I find no changed to the H&P since it had been written.    Patient re-evaluated. Accept as history and physical.    Aki Gaona MD/December 12, 2024/11:45 AM

## 2024-12-16 ENCOUNTER — TELEPHONE (OUTPATIENT)
Age: 64
End: 2024-12-16

## 2024-12-16 NOTE — TELEPHONE ENCOUNTER
S/w pt. States Increased right leg pain since 2/12 TFESI. States she has tried tylenol and topicals, ice and heat without relief.    Pt states hx of bleeding so avoids NSAIDs.  Pt has hydrocodone on hand. Was saving to use on vacation if she needs. Advised she try one tonight and see if things calm down.  Advised will f/u tomorrow for update.  Pt is aware can take 1-2 weeks for pain relief.

## 2024-12-18 NOTE — TELEPHONE ENCOUNTER
FYI:  Please review below messages regarding TFESI on 12/12  S/W pt today.  States she did take NORCO, had pills from 12/2 Rx  States it did help and she is taking at least one per day  Has 12 pills left  States right leg pain still present, but denies any new symptoms, red flag symptoms  Advised to give injection time to work and to CB Friday or Monday if not improve  Did tell pt it can take that full 2 weeks for benefit  Pt is just concerned due to leaving for vacation for Xmas next week

## 2024-12-23 DIAGNOSIS — K21.9 GASTROESOPHAGEAL REFLUX DISEASE WITHOUT ESOPHAGITIS: ICD-10-CM

## 2024-12-23 RX ORDER — PANTOPRAZOLE SODIUM 40 MG/1
40 TABLET, DELAYED RELEASE ORAL 2 TIMES DAILY
Qty: 180 TABLET | Refills: 1 | Status: SHIPPED | OUTPATIENT
Start: 2024-12-23

## 2025-01-05 ENCOUNTER — HOSPITAL ENCOUNTER (EMERGENCY)
Facility: HOSPITAL | Age: 65
Discharge: HOME | End: 2025-01-05
Attending: STUDENT IN AN ORGANIZED HEALTH CARE EDUCATION/TRAINING PROGRAM
Payer: COMMERCIAL

## 2025-01-05 ENCOUNTER — APPOINTMENT (EMERGENCY)
Dept: CT IMAGING | Facility: HOSPITAL | Age: 65
End: 2025-01-05
Payer: COMMERCIAL

## 2025-01-05 VITALS
SYSTOLIC BLOOD PRESSURE: 157 MMHG | HEART RATE: 86 BPM | OXYGEN SATURATION: 96 % | RESPIRATION RATE: 22 BRPM | DIASTOLIC BLOOD PRESSURE: 93 MMHG | TEMPERATURE: 97.6 F

## 2025-01-05 DIAGNOSIS — M54.50 LOW BACK PAIN: Primary | ICD-10-CM

## 2025-01-05 PROCEDURE — 96372 THER/PROPH/DIAG INJ SC/IM: CPT

## 2025-01-05 PROCEDURE — 72131 CT LUMBAR SPINE W/O DYE: CPT

## 2025-01-05 PROCEDURE — 99284 EMERGENCY DEPT VISIT MOD MDM: CPT

## 2025-01-05 PROCEDURE — 99284 EMERGENCY DEPT VISIT MOD MDM: CPT | Performed by: STUDENT IN AN ORGANIZED HEALTH CARE EDUCATION/TRAINING PROGRAM

## 2025-01-05 RX ORDER — KETOROLAC TROMETHAMINE 30 MG/ML
30 INJECTION, SOLUTION INTRAMUSCULAR; INTRAVENOUS ONCE
Status: COMPLETED | OUTPATIENT
Start: 2025-01-05 | End: 2025-01-05

## 2025-01-05 RX ORDER — OXYCODONE HYDROCHLORIDE 5 MG/1
5 TABLET ORAL ONCE
Refills: 0 | Status: COMPLETED | OUTPATIENT
Start: 2025-01-05 | End: 2025-01-05

## 2025-01-05 RX ORDER — PREDNISONE 10 MG/1
TABLET ORAL
Qty: 30 TABLET | Refills: 0 | Status: SHIPPED | OUTPATIENT
Start: 2025-01-05 | End: 2025-01-17 | Stop reason: ALTCHOICE

## 2025-01-05 RX ORDER — PREDNISONE 10 MG/1
TABLET ORAL
Qty: 30 TABLET | Refills: 0 | Status: SHIPPED | OUTPATIENT
Start: 2025-01-05 | End: 2025-01-05

## 2025-01-05 RX ORDER — CYCLOBENZAPRINE HCL 10 MG
10 TABLET ORAL 2 TIMES DAILY PRN
Qty: 20 TABLET | Refills: 0 | Status: SHIPPED | OUTPATIENT
Start: 2025-01-05 | End: 2025-01-17 | Stop reason: SDUPTHER

## 2025-01-05 RX ORDER — CYCLOBENZAPRINE HCL 10 MG
10 TABLET ORAL 2 TIMES DAILY PRN
Qty: 20 TABLET | Refills: 0 | Status: SHIPPED | OUTPATIENT
Start: 2025-01-05 | End: 2025-01-05

## 2025-01-05 RX ORDER — PREDNISONE 20 MG/1
40 TABLET ORAL ONCE
Status: COMPLETED | OUTPATIENT
Start: 2025-01-05 | End: 2025-01-05

## 2025-01-05 RX ORDER — CYCLOBENZAPRINE HCL 10 MG
10 TABLET ORAL ONCE
Status: COMPLETED | OUTPATIENT
Start: 2025-01-05 | End: 2025-01-05

## 2025-01-05 RX ADMIN — OXYCODONE HYDROCHLORIDE 5 MG: 5 TABLET ORAL at 14:49

## 2025-01-05 RX ADMIN — KETOROLAC TROMETHAMINE 30 MG: 30 INJECTION, SOLUTION INTRAMUSCULAR at 14:51

## 2025-01-05 RX ADMIN — CYCLOBENZAPRINE 10 MG: 10 TABLET, FILM COATED ORAL at 14:49

## 2025-01-05 RX ADMIN — PREDNISONE 40 MG: 20 TABLET ORAL at 14:49

## 2025-01-05 NOTE — DISCHARGE INSTRUCTIONS
Continues over-the-counter medication as needed for discomfort.  He given medication for breakthrough pain.  You can take the medications as prescribed.    Follow-up with your provider for reevaluation.    Return for any new or worsening symptoms.

## 2025-01-06 ENCOUNTER — OFFICE VISIT (OUTPATIENT)
Dept: FAMILY MEDICINE CLINIC | Facility: CLINIC | Age: 65
End: 2025-01-06
Payer: COMMERCIAL

## 2025-01-06 VITALS
SYSTOLIC BLOOD PRESSURE: 122 MMHG | OXYGEN SATURATION: 97 % | HEART RATE: 74 BPM | HEIGHT: 62 IN | BODY MASS INDEX: 39.71 KG/M2 | WEIGHT: 215.8 LBS | DIASTOLIC BLOOD PRESSURE: 70 MMHG

## 2025-01-06 DIAGNOSIS — M54.16 LUMBAR RADICULOPATHY: Primary | ICD-10-CM

## 2025-01-06 PROCEDURE — 99214 OFFICE O/P EST MOD 30 MIN: CPT | Performed by: NURSE PRACTITIONER

## 2025-01-06 PROCEDURE — 96372 THER/PROPH/DIAG INJ SC/IM: CPT | Performed by: NURSE PRACTITIONER

## 2025-01-06 RX ORDER — KETOROLAC TROMETHAMINE 30 MG/ML
60 INJECTION, SOLUTION INTRAMUSCULAR; INTRAVENOUS ONCE
Status: COMPLETED | OUTPATIENT
Start: 2025-01-06 | End: 2025-01-06

## 2025-01-06 RX ADMIN — KETOROLAC TROMETHAMINE 60 MG: 30 INJECTION, SOLUTION INTRAMUSCULAR; INTRAVENOUS at 11:54

## 2025-01-06 NOTE — ASSESSMENT & PLAN NOTE
Patient with positive straight leg raise on left.  Will obtain MRI lumbar spine to assess.  Patient does have an appointment with pain management in 2 days.  Toradol shot given today.  Continue with prednisone and Flexeril as prescribed from ER.  Patient does have oxycodone to use as needed.  Orders:    MRI lumbar spine wo contrast; Future    ketorolac (TORADOL) 60 mg/2 mL IM injection 60 mg

## 2025-01-06 NOTE — PROGRESS NOTES
"Name: Caryn Velasquez      : 1960      MRN: [id number]  Encounter Provider: GWEN Black  Encounter Date: 2025   Encounter department: [facility] [address]  :  Assessment & Plan  Lumbar radiculopathy  Patient with positive straight leg raise on left.  Will obtain MRI lumbar spine to assess.  Patient does have an appointment with pain management in 2 days.  Toradol shot given today.  Continue with prednisone and Flexeril as prescribed from ER.  Patient does have oxycodone to use as needed.  Orders:    MRI lumbar spine wo contrast; Future    ketorolac (TORADOL) 60 mg/2 mL IM injection 60 mg           History of Present Illness     Patient presents for ER follow up from yesterday. Patient fell on her butt last week while in [place]. Having pain in her left buttocks, down into groin. Having some burning pain into leg.   Pain is 5/10. 10/10 when she got back.       Review of Systems   Constitutional:  Negative for chills and fever.   HENT:  Negative for ear pain and sore throat.    Eyes:  Negative for pain and visual disturbance.   Respiratory:  Negative for cough and shortness of breath.    Cardiovascular:  Negative for chest pain and palpitations.   Gastrointestinal:  Negative for abdominal pain and vomiting.   Genitourinary:  Negative for dysuria and hematuria.   Musculoskeletal:  Positive for back pain and gait problem. Negative for arthralgias.   Skin:  Negative for color change and rash.   Neurological:  Positive for numbness. Negative for seizures and syncope.   All other systems reviewed and are negative.      Objective   /70 (BP Location: Right arm, Patient Position: Sitting, Cuff Size: Standard)   Pulse 74   Ht 5' 2\" (1.575 m)   Wt 97.9 kg (215 lb 12.8 oz)   SpO2 97%   BMI 39.47 kg/m²      Physical Exam  Vitals and nursing note reviewed.   Constitutional:       General: She is not in acute distress.     Appearance: She is well-developed.   HENT:     "  Head: Normocephalic and atraumatic.   Cardiovascular:      Rate and Rhythm: Normal rate and regular rhythm.      Heart sounds: No murmur heard.  Pulmonary:      Effort: Pulmonary effort is normal. No respiratory distress.      Breath sounds: Normal breath sounds.   Musculoskeletal:         General: No swelling.      Cervical back: Neck supple.      Lumbar back: Positive left straight leg raise test.   Skin:     General: Skin is warm and dry.      Capillary Refill: Capillary refill takes less than 2 seconds.   Neurological:      Mental Status: She is alert.   Psychiatric:         Mood and Affect: Mood normal.

## 2025-01-07 ENCOUNTER — TELEPHONE (OUTPATIENT)
Dept: PAIN MEDICINE | Facility: CLINIC | Age: 65
End: 2025-01-07

## 2025-01-07 NOTE — TELEPHONE ENCOUNTER
Spoke to patient about her upcoming appointment with S&P. Appointment was rescheduled for 1/17 with MG due to SP having no openings until February. As per Dr. Gaona, patient should have MRI of LS before coming in for an appointment. Patient had a few questions about her MRI and medication. Certess message sent answering her questions. Patient should follow up with PCP for refill of pain medication, and MRI should cover the areas not seen in the CT.

## 2025-01-14 NOTE — ED PROVIDER NOTES
Time reflects when diagnosis was documented in both MDM as applicable and the Disposition within this note       Time User Action Codes Description Comment    1/5/2025  3:49 PM Rebecca Sahu Add [M54.50] Low back pain           ED Disposition       ED Disposition   Discharge    Condition   Stable    Date/Time   Sun Jan 5, 2025  3:49 PM    Comment   Caryn Velasquez discharge to home/self care.                   Assessment & Plan       Medical Decision Making  Differential sciatica, lumbar strain, hardware concern.    Patient is a well-appearing 64-year-old female presenting emerged.  No acute respiratory distress and vital signs unremarkable.  Patient had a CT performed with no acute findings noted aside chronic burns noted.  Discussed her need to follow-up with her specialist in regards to the imaging today.  Patient prescribed additional medications to help with fluid.  Discussed return precautions.  Disposition discharge.      Amount and/or Complexity of Data Reviewed  Independent Historian: bertin  External Data Reviewed: labs, radiology and notes.  Radiology: ordered.    Risk  Prescription drug management.             Medications   ketorolac (TORADOL) injection 30 mg (30 mg Intramuscular Given 1/5/25 1451)   oxyCODONE (ROXICODONE) IR tablet 5 mg (5 mg Oral Given 1/5/25 1449)   predniSONE tablet 40 mg (40 mg Oral Given 1/5/25 1449)   cyclobenzaprine (FLEXERIL) tablet 10 mg (10 mg Oral Given 1/5/25 1449)       ED Risk Strat Scores                          SBIRT 20yo+      Flowsheet Row Most Recent Value   Initial Alcohol Screen: US AUDIT-C     1. How often do you have a drink containing alcohol? 0 Filed at: 01/05/2025 1352   2. How many drinks containing alcohol do you have on a typical day you are drinking?  0 Filed at: 01/05/2025 1352   3b. FEMALE Any Age, or MALE 65+: How often do you have 4 or more drinks on one occassion? 0 Filed at: 01/05/2025 1352   Audit-C Score 0 Filed at: 01/05/2025 1352   YONNY: How many  times in the past year have you...    Used an illegal drug or used a prescription medication for non-medical reasons? Never Filed at: 2025 0552                            History of Present Illness       Chief Complaint   Patient presents with    Back Pain     Pt c/o pain from L buttocks down leg that began on Thursday after being knocked over while walking through a crowd. Pt took OxyContin at 1130 this morning with minimal relief.        Past Medical History:   Diagnosis Date    Anemia     Anxiety     Arthritis     Attention and concentration deficit     Blurred vision     Chronic pain     Chronic pain syndrome 2016    CPAP (continuous positive airway pressure) dependence     CTS (carpal tunnel syndrome) > 6 years    Left wrist    Depression     Difficulty walking > 6 years    Left nerve leg pain    Diplopia     Dyspepsia     Gastric ulcer     Gastritis     GERD (gastroesophageal reflux disease)     Head injury > 25 years    Fell off 12 foot retaining wall    Headache(784.0)     Headache, tension-type > 1 year    Pain behind left eyeAnd stiff neck with pressure    History of transfusion     Hypertension     Inflammatory bowel disease 30    Insomnia     Irritable bowel syndrome     Kidney stone     Memory loss     Migraines     Obesity     Osteopenia     Peripheral neuropathy 3/14/2012    Left thigh, left abdomen, left prudential nerve    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    Presence of neurostimulator     Pudendal neuralgia    Pudendal neuralgia     Shingles > 30 years ago    Left facial nerve    Sleep apnea     Sleep apnea, obstructive     Spinal stenosis     Starting and stopping of urinary stream during micturition     Syncope > 10 years    When I get up quickly    Trigeminal neuralgia > 30 years    Associated with shingles on left face    Urinary incontinence     Visual impairment     Wears eyeglasses       Past Surgical History:   Procedure Laterality Date     SECTION        SECTION      CHOLECYSTECTOMY      COLONOSCOPY      GALLBLADDER SURGERY  1990    GASTRIC BYPASS  2004    HYSTERECTOMY  2012    INSERT / REPLACE PERIPHERAL NEUROSTIMULATOR PULSE GENERATOR /       MASS EXCISION Right 2021    Procedure: EXCISION  BIOPSY LESION/MASS LOWER NECK;  Surgeon: Brennon Browning DO;  Location: [facility];  Service: General    OTHER SURGICAL HISTORY      Reimplantatin at [facility]     IL INS/RPLC PERPH SAC/GSTRC NPG/RCVR PCKT CRTJ&CONN Left 2023    Procedure: PLACEMENT OF PERMANENT LEADWIRE AND PG FOR SNM AT S3 FORAMEN, COMPLEX PROGRAMMING OF PULSE GENERATOR;  Surgeon: Jess Shaw MD;  Location: [facility];  Service: UroGynecology           IL INSJ/RPLCMT SPINAL NPG/RCVR POCKET CRTJ&CONNJ Right 04/15/2020    Procedure: REPLACEMENT IMPLANTABLE PULSE GENERATOR FOR DORSAL SPINAL COLUMN STIMULATOR,  RIGHT BUTTOCKS;  Surgeon: Fili Mckeon MD;  Location: [facility];  Service: Neurosurgery    RADIOFREQUENCY ABLATION NERVES      SPINAL CORD STIMULATOR IMPLANT      TRIGGER POINT INJECTION      URETER SURGERY      URETHRAL FISTULA REPAIR      US GUIDED THYROID BIOPSY  2020    WRIST ARTHROSCOPY      with internal fixation       Family History   Problem Relation Age of Onset    Cirrhosis Mother     Crohn's disease Mother     Lupus Mother         Systemic Lupus Erythematous     Depression Mother     Dementia Mother         Caused by liver disease    Neuropathy Mother         Fibromyalgia    Hypertension Father     Heart disease Father     Depression Sister     No Known Problems Daughter     Cirrhosis Brother     Dementia Brother     Crohn's disease Brother     No Known Problems Maternal Aunt     No Known Problems Paternal Aunt     No Known Problems Cousin     No Known Problems Cousin     No Known Problems Cousin     No Known Problems Cousin     No Known Problems Cousin     Dementia Brother         Caused by liver disease    Seizures Neg Hx     Breast cancer Neg  Hx       Social History     Tobacco Use    Smoking status: Never    Smokeless tobacco: Never   Vaping Use    Vaping status: Never Used   Substance Use Topics    Alcohol use: No    Drug use: Yes     Types: Marijuana     Comment: Capsules used for chronic pain.  Medical      E-Cigarette/Vaping    E-Cigarette Use Never User       E-Cigarette/Vaping Substances    Nicotine No     THC No     CBD No     Flavoring No     Other No     Unknown No       I have reviewed and agree with the history as documented.     HPI    Patient is a 64-year-old female present emerged department for low back pain and buttock pain.  The buttock pain radiates down her leg.  It is left-sided.  Patient did have a jarring discomfort when walking through a crowd.  She did recently have a long car ride and flight.  She took over-the-counter medications as well as prescribed without much relief.  Denies any perianal numbness or loss of bowel bladder.  Patient has known history of prior back problems requiring a pain stimulator amongst other surgery and injections.    Review of Systems   Constitutional:  Negative for chills and fever.   HENT:  Negative for ear pain and sore throat.    Eyes:  Negative for pain and visual disturbance.   Respiratory:  Negative for cough and shortness of breath.    Cardiovascular:  Negative for chest pain and palpitations.   Gastrointestinal:  Negative for abdominal pain and vomiting.   Genitourinary:  Negative for dysuria and hematuria.   Musculoskeletal:  Positive for back pain. Negative for arthralgias.   Skin:  Negative for color change and rash.   Neurological:  Negative for seizures and syncope.   All other systems reviewed and are negative.          Objective       ED Triage Vitals   Temperature Pulse Blood Pressure Respirations SpO2 Patient Position - Orthostatic VS   01/05/25 1347 01/05/25 1347 01/05/25 1347 01/05/25 1347 01/05/25 1347 01/05/25 1347   97.6 °F (36.4 °C) 86 157/93 22 96 % Sitting      Temp Source  Heart Rate Source BP Location FiO2 (%) Pain Score    01/05/25 1347 01/05/25 1347 01/05/25 1347 -- 01/05/25 1449    Temporal Monitor Left arm  10 - Worst Possible Pain      Vitals      Date and Time Temp Pulse SpO2 Resp BP Pain Score FACES Pain Rating User   01/05/25 1451 -- -- -- -- -- 10 - Worst Possible Pain -- EN   01/05/25 1449 -- -- -- -- -- 10 - Worst Possible Pain -- EN   01/05/25 1347 97.6 °F (36.4 °C) 86 96 % 22 157/93 -- -- MS            Physical Exam  Vitals and nursing note reviewed.   Constitutional:       General: She is not in acute distress.     Appearance: She is well-developed.   HENT:      Head: Normocephalic and atraumatic.   Eyes:      Conjunctiva/sclera: Conjunctivae normal.   Cardiovascular:      Rate and Rhythm: Normal rate and regular rhythm.      Heart sounds: No murmur heard.  Pulmonary:      Effort: Pulmonary effort is normal. No respiratory distress.      Breath sounds: Normal breath sounds.   Abdominal:      Palpations: Abdomen is soft.      Tenderness: There is no abdominal tenderness.   Musculoskeletal:         General: Tenderness present. No swelling.      Cervical back: Neck supple.      Comments: Left buttock and iliac crest tenderness.  Lower extremities are neurovascular intact. Strength, sensation and range of motion intact in bilateral lower extremities.  +2 DTRs of patella and Achilles tendons bilaterally. No perianal numbness, urinary retention or loss of bowel.     Skin:     General: Skin is warm and dry.      Capillary Refill: Capillary refill takes less than 2 seconds.   Neurological:      Mental Status: She is alert.   Psychiatric:         Mood and Affect: Mood normal.         Results Reviewed       None            CT spine lumbar without contrast   Final Interpretation by pSencer Lopez DO (01/05 1436)      No acute osseous abnormality.      Exaggerated lumbar lordosis with spondylolisthesis at L3-4 and L4-5 and mild scoliosis. Multilevel degenerative change  with annular bulging, disc protrusions and posterior element hypertrophic changes. Moderate tricompartmental canal stenosis is most    pronounced at the L2-3 and L3-4 levels with multilevel foraminal narrowing, worst at L3-4 on the right.      Workstation performed: [id number]             Procedures    ED Medication and Procedure Management   Prior to Admission Medications   Prescriptions Last Dose Informant Patient Reported? Taking?   Botox 200 units SOLR  Self Yes No   Cholecalciferol 25 MCG (1000 UT) capsule  Self Yes No   Sig: Take 1 capsule by mouth daily   Emgality 120 MG/ML SOAJ   No No   Sig: USE 1 INJECTION UNDER THE SKIN EVERY MONTH   HYDROcodone-acetaminophen (NORCO) 5-325 mg per tablet   No No   Sig: Take 1 tablet by mouth every 8 (eight) hours as needed for pain Max Daily Amount: 3 tablets   Iron-Vitamin C 100-250 MG TABS  Self Yes No   Sig: Take 1 tablet by mouth daily   Multiple Vitamin (MULTI-VITAMIN DAILY) TABS  Self Yes No   Sig: Take 1 tablet by mouth daily   QUEtiapine (SEROquel) 25 mg tablet  Self No No   Sig: Take 1 tablet (25 mg total) by mouth daily at bedtime   albuterol (Ventolin HFA) 90 mcg/act inhaler  Self No No   Sig: Inhale 2 puffs every 6 (six) hours as needed for wheezing   busPIRone (BUSPAR) 10 mg tablet  Self No No   Sig: Take 1 tablet (10 mg total) by mouth 2 (two) times a day   ciclopirox (PENLAC) 8 % solution   Yes No   clobetasol (TEMOVATE) 0.05 % ointment  Self Yes No   Sig: if needed   denosumab (PROLIA) 60 mg/mL  Self No No   Sig: Inject 1 mL (60 mg total) under the skin once for 1 dose   gabapentin (NEURONTIN) 300 mg capsule  Self No No   Sig: Take 1 capsule (300 mg total) by mouth 2 (two) times a day   ketoconazole (NIZORAL) 2 % cream  Self Yes No   lisinopril (ZESTRIL) 5 mg tablet  Self No No   Sig: Take 1 tablet (5 mg total) by mouth daily   methylPREDNISolone 4 MG tablet therapy pack   No No   Sig: Use as directed on package   ondansetron (ZOFRAN-ODT) 4 mg disintegrating  tablet  Self No No   Sig: PLACE 1 TABLET ON THE TONGUE EVERY 6 HOURS AS NEEDED FOR NAUSEA OR VOMITING   oxyCODONE-acetaminophen (Percocet) 5-325 mg per tablet  Self No No   Sig: Take 1 tablet by mouth every 4 (four) hours as needed for moderate pain Max Daily Amount: 6 tablets   pantoprazole (PROTONIX) 40 mg tablet   No No   Sig: TAKE 1 TABLET TWICE A DAY   propranolol (INDERAL) 60 mg tablet  Self No No   Sig: TAKE 1 TABLET TWICE A DAY   rizatriptan (MAXALT-MLT) 10 mg disintegrating tablet  Self No No   Sig: Take at the onset of migraine; if symptoms continue or return, may take another dose at least 2 hours after first dose. Take no more than 2 doses in a day.   rosuvastatin (CRESTOR) 10 MG tablet   No No   Sig: TAKE 1 TABLET DAILY   sertraline (ZOLOFT) 100 mg tablet  Self No No   Sig: Take 1 tablet (100 mg total) by mouth daily   tacrolimus (PROTOPIC) 0.1 % ointment  Self Yes No   Sig: if needed   triamcinolone (KENALOG) 0.1 % cream  Self No No   Sig: APPLY TOPICALLY TWICE A DAY      Facility-Administered Medications: None     Discharge Medication List as of 1/5/2025  3:52 PM        START taking these medications    Details   cyclobenzaprine (FLEXERIL) 10 mg tablet Take 1 tablet (10 mg total) by mouth 2 (two) times a day as needed for muscle spasms, Starting Sun 1/5/2025, Normal      predniSONE 10 mg tablet Multiple Dosages:Starting Sun 1/5/2025, Until Tue 1/7/2025 at 2359, THEN Starting Wed 1/8/2025, Until Fri 1/10/2025 at 2359, THEN Starting Sat 1/11/2025, Until Mon 1/13/2025 at 2359, THEN Starting Tue 1/14/2025, Until Thu 1/16/2025 at 2359Take 4 tab lets (40 mg total) by mouth daily for 3 days, THEN 3 tablets (30 mg total) daily for 3 days, THEN 2 tablets (20 mg total) daily for 3 days, THEN 1 tablet (10 mg total) daily for 3 days., Normal           CONTINUE these medications which have NOT CHANGED    Details   albuterol (Ventolin HFA) 90 mcg/act inhaler Inhale 2 puffs every 6 (six) hours as needed for  wheezing, Starting Thu 1/19/2023, Normal      Botox 200 units SOLR Starting Mon 1/10/2022, Historical Med      busPIRone (BUSPAR) 10 mg tablet Take 1 tablet (10 mg total) by mouth 2 (two) times a day, Starting Thu 8/22/2024, Until Tue 2/18/2025, Normal      Cholecalciferol 25 MCG (1000 UT) capsule Take 1 capsule by mouth daily, Historical Med      ciclopirox (PENLAC) 8 % solution Historical Med      clobetasol (TEMOVATE) 0.05 % ointment if needed, Starting Wed 4/24/2024, Historical Med      denosumab (PROLIA) 60 mg/mL Inject 1 mL (60 mg total) under the skin once for 1 dose, Starting Fri 2/2/2024, Normal      Emgality 120 MG/ML SOAJ USE 1 INJECTION UNDER THE SKIN EVERY MONTH, Normal      gabapentin (NEURONTIN) 300 mg capsule Take 1 capsule (300 mg total) by mouth 2 (two) times a day, Starting Thu 8/22/2024, Until Tue 2/18/2025, Normal      HYDROcodone-acetaminophen (NORCO) 5-325 mg per tablet Take 1 tablet by mouth every 8 (eight) hours as needed for pain Max Daily Amount: 3 tablets, Starting Mon 12/2/2024, Normal      Iron-Vitamin C 100-250 MG TABS Take 1 tablet by mouth daily, Historical Med      ketoconazole (NIZORAL) 2 % cream Historical Med      lisinopril (ZESTRIL) 5 mg tablet Take 1 tablet (5 mg total) by mouth daily, Starting Wed 9/11/2024, Normal      methylPREDNISolone 4 MG tablet therapy pack Use as directed on package, Normal      Multiple Vitamin (MULTI-VITAMIN DAILY) TABS Take 1 tablet by mouth daily, Historical Med      ondansetron (ZOFRAN-ODT) 4 mg disintegrating tablet PLACE 1 TABLET ON THE TONGUE EVERY 6 HOURS AS NEEDED FOR NAUSEA OR VOMITING, Normal      oxyCODONE-acetaminophen (Percocet) 5-325 mg per tablet Take 1 tablet by mouth every 4 (four) hours as needed for moderate pain Max Daily Amount: 6 tablets, Starting Tue 10/8/2024, Normal      pantoprazole (PROTONIX) 40 mg tablet TAKE 1 TABLET TWICE A DAY, Starting Mon 12/23/2024, Normal      propranolol (INDERAL) 60 mg tablet TAKE 1 TABLET TWICE A  DAY, Normal      QUEtiapine (SEROquel) 25 mg tablet Take 1 tablet (25 mg total) by mouth daily at bedtime, Starting Thu 8/22/2024, Until Tue 2/18/2025, Normal      rizatriptan (MAXALT-MLT) 10 mg disintegrating tablet Take at the onset of migraine; if symptoms continue or return, may take another dose at least 2 hours after first dose. Take no more than 2 doses in a day., Normal      rosuvastatin (CRESTOR) 10 MG tablet TAKE 1 TABLET DAILY, Starting Wed 12/11/2024, Normal      sertraline (ZOLOFT) 100 mg tablet Take 1 tablet (100 mg total) by mouth daily, Starting Thu 8/22/2024, Until Tue 2/18/2025, Normal      tacrolimus (PROTOPIC) 0.1 % ointment if needed, Starting Wed 4/24/2024, Historical Med      triamcinolone (KENALOG) 0.1 % cream APPLY TOPICALLY TWICE A DAY, Starting Mon 3/25/2024, Normal           No discharge procedures on file.  ED SEPSIS DOCUMENTATION   Time reflects when diagnosis was documented in both MDM as applicable and the Disposition within this note       Time User Action Codes Description Comment    1/5/2025  3:49 PM Rebecca Sahu [M54.50] Low back pain                  Rebecca Sahu DO  01/13/25 1529

## 2025-01-15 ENCOUNTER — HOSPITAL ENCOUNTER (OUTPATIENT)
Dept: MRI IMAGING | Facility: HOSPITAL | Age: 65
Discharge: HOME | End: 2025-01-15
Payer: COMMERCIAL

## 2025-01-15 DIAGNOSIS — M54.16 LUMBAR RADICULOPATHY: ICD-10-CM

## 2025-01-15 PROCEDURE — 72148 MRI LUMBAR SPINE W/O DYE: CPT

## 2025-01-15 NOTE — PROGRESS NOTES
Pain Medicine Follow-Up Note    Assessment:  1. Acute exacerbation of chronic low back pain    2. Left low back pain, unspecified chronicity, unspecified whether sciatica present    3. Lumbar radiculopathy    4. Lumbar spondylosis    5. Acute hip pain, left    6. Status post fall    7. Atrophy of muscle of back at lumbosacral level        Plan:  Orders Placed This Encounter   Procedures   • Ambulatory referral to Physical Therapy     Standing Status:   Future     Expiration Date:   1/17/2026     Referral Priority:   Routine     Referral Type:   Physical Therapy     Referral Reason:   Specialty Services Required     Requested Specialty:   Physical Therapy     Number of Visits Requested:   1     Expiration Date:   1/17/2026       New Medications Ordered This Visit   Medications   • cyclobenzaprine (FLEXERIL) 10 mg tablet     Sig: Take 1 tablet (10 mg total) by mouth 2 (two) times a day as needed for muscle spasms     Dispense:  60 tablet     Refill:  0       My impressions and treatment recommendations were discussed in detail with the patient who verbalized understanding and had no further questions.      Patient follows up in the office after sustaining a fall while on vacation in [place] approximately 2 weeks ago.  Patient states that she landed on her left buttock on top of her spinal cord stimulator battery.  Patient continues to have severe left low back left buttock pain.  Patient states that she primarily has been staying home and not being active due to the pain.  Patient had MRI done on 1/15/2025 reviewed in detail.  Patient does have a new left-sided foraminal disc protrusion which technically correlates with the patient's pain pattern however patient states that her medial thigh pain is from a old nerve injury, her biggest concern is her left buttock pain.  Patient does have significant muscle atrophy throughout her low back.  Advised the patient that she may benefit from aqua therapy, patient is requesting  land-based physical therapy due to lack of access to aqua therapy.  Patient encouraged to be active to regain muscle.  Patient's left buttock negative for erythema, negative for hematoma, area is soft but tender to touch.  On exam patient had significant pain with compression of her left hip.  Pain was along the iliac crest I will get a x-ray of her left hip.     Patient just completed 10-day prednisone taper, patient reports improvement of pending using cyclobenzaprine 10 mg tablet patient takes 1 tablet up to twice a day I will provide patient a refill of this medication.  Advised the patient that if it causes too much sedation to contact the office for a lower dose.  Patient to follow-up after completion of physical therapy.    Follow-up is planned in 8 weeks time or sooner as warranted.  Discharge instructions were provided. I personally saw and examined the patient and I agree with the above discussed plan of care.    History of Present Illness:    Caryn Velasquez is a 64 y.o. female who presents to [facility] for interval re-evaluation of the above stated pain complaints. The patient has a past medical and chronic pain history as outlined in the assessment section. She was last seen on 12/12/2024.    At today's visit patient states that their pain symptoms are worse with a pain score of 6/10 on the verbal numeric pain scale.  The patient's pain is worse all the time.  The patient's pain is constant in nature.  And the quality of the patient's pain is described as  burning, dull-aching, sharp, throbbing, shooting, numbness, and pins-and-needles..  The patient's pain is located in the mid to left low back, left buttock, left medial thigh as well as radiating down the left lateral leg.    Other than as stated above, the patient denies any interval changes in medications, medical condition, mental condition, symptoms, or allergies since the last office visit.         Review of  Systems:    Review of Systems   Respiratory:  Negative for shortness of breath.    Cardiovascular:  Negative for chest pain.   Gastrointestinal:  Negative for constipation, diarrhea, nausea and vomiting.   Musculoskeletal:  Positive for gait problem. Negative for arthralgias, joint swelling and myalgias.        Pain in Extremity left buttocks   Skin:  Negative for rash.   Neurological:  Negative for dizziness, seizures and weakness.   All other systems reviewed and are negative.        Past Medical History:   Diagnosis Date   • Anemia    • Anxiety    • Arthritis    • Attention and concentration deficit    • Blurred vision    • Chronic pain    • Chronic pain syndrome 01/20/2016   • CPAP (continuous positive airway pressure) dependence    • CTS (carpal tunnel syndrome) > 6 years    Left wrist   • Depression    • Difficulty walking > 6 years    Left nerve leg pain   • Diplopia    • Dyspepsia    • Gastric ulcer    • Gastritis    • GERD (gastroesophageal reflux disease)    • Head injury > 25 years    Fell off 12 foot retaining wall   • Headache(784.0)    • Headache, tension-type > 1 year    Pain behind left eyeAnd stiff neck with pressure   • History of transfusion 2005   • Hypertension    • Inflammatory bowel disease 30   • Insomnia    • Irritable bowel syndrome    • Kidney stone    • Memory loss    • Migraines    • Obesity    • Osteopenia    • Peripheral neuropathy 3/14/2012    Left thigh, left abdomen, left prudential nerve   • 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   • Presence of neurostimulator     Pudendal neuralgia   • Pudendal neuralgia    • Shingles > 30 years ago    Left facial nerve   • Sleep apnea    • Sleep apnea, obstructive    • Spinal stenosis    • Starting and stopping of urinary stream during micturition    • Syncope > 10 years    When I get up quickly   • Trigeminal neuralgia > 30 years    Associated with shingles on left face   • Urinary incontinence    • Visual impairment    • Wears eyeglasses         Past Surgical History:   Procedure Laterality Date   •  SECTION     •  SECTION     • CHOLECYSTECTOMY     • COLONOSCOPY     • GALLBLADDER SURGERY     • GASTRIC BYPASS     • HYSTERECTOMY  2012   • INSERT / REPLACE PERIPHERAL NEUROSTIMULATOR PULSE GENERATOR /      • MASS EXCISION Right 2021    Procedure: EXCISION  BIOPSY LESION/MASS LOWER NECK;  Surgeon: Brennon Browning DO;  Location: [facility];  Service: General   • OTHER SURGICAL HISTORY      Reimplantatin at [facility]    • LA INS/RPLC PERPH SAC/GSTRC NPG/RCVR PCKT CRTJ&CONN Left 2023    Procedure: PLACEMENT OF PERMANENT LEADWIRE AND PG FOR SNM AT S3 FORAMEN, COMPLEX PROGRAMMING OF PULSE GENERATOR;  Surgeon: Jess Shaw MD;  Location: [facility];  Service: UroGynecology          • LA INSJ/RPLCMT SPINAL NPG/RCVR POCKET CRTJ&CONNJ Right 04/15/2020    Procedure: REPLACEMENT IMPLANTABLE PULSE GENERATOR FOR DORSAL SPINAL COLUMN STIMULATOR,  RIGHT BUTTOCKS;  Surgeon: Fili Mckeon MD;  Location: [facility];  Service: Neurosurgery   • RADIOFREQUENCY ABLATION NERVES     • SPINAL CORD STIMULATOR IMPLANT     • TRIGGER POINT INJECTION     • URETER SURGERY     • URETHRAL FISTULA REPAIR     • US GUIDED THYROID BIOPSY  2020   • WRIST ARTHROSCOPY      with internal fixation        Family History   Problem Relation Age of Onset   • Cirrhosis Mother    • Crohn's disease Mother    • Lupus Mother         Systemic Lupus Erythematous    • Depression Mother    • Dementia Mother         Caused by liver disease   • Neuropathy Mother         Fibromyalgia   • Hypertension Father    • Heart disease Father    • Depression Sister    • No Known Problems Daughter    • Cirrhosis Brother    • Dementia Brother    • Crohn's disease Brother    • No Known Problems Maternal Aunt    • No Known Problems Paternal Aunt    • No Known Problems Cousin    • No Known Problems Cousin    • No Known Problems Cousin    • No Known Problems  Cousin    • No Known Problems Cousin    • Dementia Brother         Caused by liver disease   • Seizures Neg Hx    • Breast cancer Neg Hx        Social History     Occupational History   • Occupation: retired   Tobacco Use   • Smoking status: Never   • Smokeless tobacco: Never   Vaping Use   • Vaping status: Never Used   Substance and Sexual Activity   • Alcohol use: No   • Drug use: Yes     Types: Marijuana     Comment: Capsules used for chronic pain.  Medical   • Sexual activity: Not Currently     Partners: Male     Birth control/protection: None         Current Outpatient Medications:   •  albuterol (Ventolin HFA) 90 mcg/act inhaler, Inhale 2 puffs every 6 (six) hours as needed for wheezing, Disp: 18 g, Rfl: 0  •  Botox 200 units SOLR, , Disp: , Rfl:   •  busPIRone (BUSPAR) 10 mg tablet, Take 1 tablet (10 mg total) by mouth 2 (two) times a day, Disp: 180 tablet, Rfl: 1  •  Cholecalciferol 25 MCG (1000 UT) capsule, Take 1 capsule by mouth daily, Disp: , Rfl:   •  ciclopirox (PENLAC) 8 % solution, , Disp: , Rfl:   •  clobetasol (TEMOVATE) 0.05 % ointment, if needed, Disp: , Rfl:   •  cyclobenzaprine (FLEXERIL) 10 mg tablet, Take 1 tablet (10 mg total) by mouth 2 (two) times a day as needed for muscle spasms, Disp: 60 tablet, Rfl: 0  •  Emgality 120 MG/ML SOAJ, USE 1 INJECTION UNDER THE SKIN EVERY MONTH, Disp: 3 mL, Rfl: 3  •  gabapentin (NEURONTIN) 300 mg capsule, Take 1 capsule (300 mg total) by mouth 2 (two) times a day, Disp: 180 capsule, Rfl: 1  •  Iron-Vitamin C 100-250 MG TABS, Take 1 tablet by mouth daily, Disp: , Rfl:   •  ketoconazole (NIZORAL) 2 % cream, , Disp: , Rfl:   •  lisinopril (ZESTRIL) 5 mg tablet, Take 1 tablet (5 mg total) by mouth daily, Disp: 100 tablet, Rfl: 1  •  Multiple Vitamin (MULTI-VITAMIN DAILY) TABS, Take 1 tablet by mouth daily, Disp: , Rfl:   •  ondansetron (ZOFRAN-ODT) 4 mg disintegrating tablet, PLACE 1 TABLET ON THE TONGUE EVERY 6 HOURS AS NEEDED FOR NAUSEA OR VOMITING, Disp: 30  "tablet, Rfl: 5  •  oxyCODONE-acetaminophen (Percocet) 5-325 mg per tablet, Take 1 tablet by mouth every 4 (four) hours as needed for moderate pain Max Daily Amount: 6 tablets, Disp: 20 tablet, Rfl: 0  •  pantoprazole (PROTONIX) 40 mg tablet, TAKE 1 TABLET TWICE A DAY, Disp: 180 tablet, Rfl: 1  •  propranolol (INDERAL) 60 mg tablet, TAKE 1 TABLET TWICE A DAY, Disp: 180 tablet, Rfl: 1  •  QUEtiapine (SEROquel) 25 mg tablet, Take 1 tablet (25 mg total) by mouth daily at bedtime, Disp: 90 tablet, Rfl: 1  •  rizatriptan (MAXALT-MLT) 10 mg disintegrating tablet, Take at the onset of migraine; if symptoms continue or return, may take another dose at least 2 hours after first dose. Take no more than 2 doses in a day., Disp: 12 tablet, Rfl: 5  •  rosuvastatin (CRESTOR) 10 MG tablet, TAKE 1 TABLET DAILY, Disp: 90 tablet, Rfl: 1  •  sertraline (ZOLOFT) 100 mg tablet, Take 1 tablet (100 mg total) by mouth daily, Disp: 90 tablet, Rfl: 1  •  tacrolimus (PROTOPIC) 0.1 % ointment, if needed, Disp: , Rfl:   •  triamcinolone (KENALOG) 0.1 % cream, APPLY TOPICALLY TWICE A DAY, Disp: 80 g, Rfl: 8  •  denosumab (PROLIA) 60 mg/mL, Inject 1 mL (60 mg total) under the skin once for 1 dose, Disp: 1 mL, Rfl: 0    Allergies   Allergen Reactions   • Morphine Nausea Only and Abdominal Pain     SEVERE N/V   • Cat Hair Extract Nasal Congestion     Cat Dander   • Dog Epithelium Nasal Congestion   • Other Other (See Comments)     Dogs- sneezing  Crab Meat- GI intolerance       Physical Exam:    Ht 5' 2\" (1.575 m)   Wt 97.9 kg (215 lb 13.3 oz)   BMI 39.48 kg/m²     Constitutional:normal, well developed, well nourished, alert, in no distress and non-toxic and no overt pain behavior. and obese  Eyes:anicteric  HEENT:grossly intact  Neck:supple, symmetric, trachea midline and no masses   Pulmonary:even and unlabored  Cardiovascular:No edema or pitting edema present  Skin:Normal without rashes or lesions and well hydrated  Psychiatric:Mood and affect " appropriate  Neurologic:Cranial Nerves II-XII grossly intact  Musculoskeletal:antalgic and ambulates with cane tenderness to palpation over left buttock, and negative for erythema      Orders Placed This Encounter   Procedures   • Ambulatory referral to Physical Therapy       This document was created using speech voice recognition software.   Grammatical errors, random word insertions, pronoun errors, and incomplete sentences are an occasional consequence of this system due to software limitations, ambient noise, and hardware issues.   Any formal questions or concerns about content, text, or information contained within the body of this dictation should be directly addressed to the provider for clarification.

## 2025-01-17 ENCOUNTER — OFFICE VISIT (OUTPATIENT)
Dept: PAIN MEDICINE | Facility: CLINIC | Age: 65
End: 2025-01-17

## 2025-01-17 ENCOUNTER — RESULTS FOLLOW-UP (OUTPATIENT)
Dept: PAIN MEDICINE | Facility: CLINIC | Age: 65
End: 2025-01-17

## 2025-01-17 ENCOUNTER — APPOINTMENT (OUTPATIENT)
Dept: RADIOLOGY | Facility: CLINIC | Age: 65
End: 2025-01-17
Payer: COMMERCIAL

## 2025-01-17 VITALS — HEIGHT: 62 IN | WEIGHT: 215.83 LBS | BODY MASS INDEX: 39.72 KG/M2

## 2025-01-17 DIAGNOSIS — Z91.81 STATUS POST FALL: ICD-10-CM

## 2025-01-17 DIAGNOSIS — M54.50 LEFT LOW BACK PAIN, UNSPECIFIED CHRONICITY, UNSPECIFIED WHETHER SCIATICA PRESENT: ICD-10-CM

## 2025-01-17 DIAGNOSIS — M47.816 LUMBAR SPONDYLOSIS: ICD-10-CM

## 2025-01-17 DIAGNOSIS — M25.552 ACUTE HIP PAIN, LEFT: ICD-10-CM

## 2025-01-17 DIAGNOSIS — M62.5A2 ATROPHY OF MUSCLE OF BACK AT LUMBOSACRAL LEVEL: ICD-10-CM

## 2025-01-17 DIAGNOSIS — M54.50 ACUTE EXACERBATION OF CHRONIC LOW BACK PAIN: Primary | ICD-10-CM

## 2025-01-17 DIAGNOSIS — G89.29 ACUTE EXACERBATION OF CHRONIC LOW BACK PAIN: Primary | ICD-10-CM

## 2025-01-17 DIAGNOSIS — M54.16 LUMBAR RADICULOPATHY: ICD-10-CM

## 2025-01-17 PROCEDURE — 73502 X-RAY EXAM HIP UNI 2-3 VIEWS: CPT

## 2025-01-17 RX ORDER — CYCLOBENZAPRINE HCL 10 MG
10 TABLET ORAL 2 TIMES DAILY PRN
Qty: 60 TABLET | Refills: 0 | Status: SHIPPED | OUTPATIENT
Start: 2025-01-17

## 2025-01-17 NOTE — PATIENT INSTRUCTIONS
Core Strengthening Exercises   WHAT YOU NEED TO KNOW:   What do I need to know about core strengthening exercises?  Core strengthening exercises help heal and strengthen muscles of your hips, back, and abdomen to prevent reinjury. They are beginning exercises to help support your spine. Ask your healthcare provider if you need to see a physical therapist for more advanced exercises.  Do the exercises on a mat or firm surface  (not on a bed) to support your spine and avoid low back pain.     Do the exercises in the same order every time  to train your muscles to work together. Your healthcare provider will show you how to perform these exercises. Do them every day, or as directed by your healthcare provider.     Move slowly and smoothly.  Avoid fast or jerky motions.     Stop if you feel pain.  It is normal to feel some discomfort at first. Regular exercise will help decrease your discomfort over time.  How do I perform core strengthening exercises safely?  Hold each exercise for 5 seconds. When you can do the exercise without pain for 5 seconds, increase your hold to 10 to 15 seconds. When you can do the exercise without pain for 10 to 15 seconds, add the next exercise. Increase the time you hold each exercise, or repeat the exercises as directed. As you do each exercise, breathe normally. Do not hold your breath.  Abdominal bracing:  Lie on your back with your knees bent and feet flat on the floor. Place your arms in a relaxed position beside your body. Pull your belly button in toward your spine. Do not flatten or arch your back. Tighten the abdominal muscles below your belly button. Hold for 5 seconds. Begin all of your exercises with abdominal bracing. You can also practice abdominal bracing throughout the day while you are sitting or standing.           Bridging:  Lie on your back with your knees bent and feet flat on the floor. Rest your arms at your side. Tighten your buttocks, and then lift your hips 1 inch  off the floor. Hold for 5 seconds. When you can do this exercise without pain for 10 seconds, increase the distance you lift your hips. A good goal is to be able to lift your hips so that your shoulders, hips, and knees are in a straight line.           Curl up:  Lie on your back with your knees bent and feet flat on the floor. Place your hands, palms down, underneath the curve in your lower back. Next, with your elbows on the floor, lift your shoulders and chest 2 to 3 inches. Keep your head in line with your shoulders. Hold this position for 5 seconds. When you can do this exercise without pain for 10 to 15 seconds, you may add a rotation. While your shoulders and chest are lifted off the ground, turn slightly to the left and hold. Repeat on the other side.           Dead bug:  Lie on your back with your knees bent and feet flat on the floor. Place your arms in a relaxed position beside your body. Begin with abdominal bracing. Next, raise one leg, keeping your knee bent. Hold for 5 seconds. Repeat with the other leg. When you can do this exercise without pain for 10 to 15 seconds, you may raise one straight leg and hold. Repeat with the other leg.           Quadruped:  Place your hands and knees on the floor. Keep your wrists directly below your shoulders and your knees directly below your hips. Pull your belly button in toward your spine. Do not flatten or arch your back. Tighten your abdominal muscles below your belly button. Hold for 5 seconds. When you can do this exercise without pain for 10 to 15 seconds, you may extend one arm and hold. Repeat on the other side.           Side Bridge:      Standing side bridge:  Stand next to a wall and extend one arm toward the wall. Place your palm flat on the wall with your fingers pointing upward. Begin with abdominal bracing. Next, without moving your feet, slowly bend your arm to 90 degrees. Hold for 5 seconds. Repeat on the other side. When you can do this exercise  without pain for 10 to 15 seconds, you may do the bent leg side bridge on the floor.           Bent leg side bridge:  Lie on one side with your legs, hips, and shoulders in a straight line. Prop yourself up onto your forearm so your elbow is directly below your shoulder. Bend your knees back to 90 degrees. Begin with abdominal bracing. Next, lift your hips and balance yourself on your forearm and knees. Hold for 5 seconds. Repeat on the other side. When you can do this exercise without pain for 10 to 15 seconds, you may do the straight leg side bridge on the floor.           Straight leg side bridge:  Lie on one side with your legs, hips, and shoulders in a straight line. Prop yourself up onto your forearm so your elbow is directly below your shoulder. Begin with abdominal bracing. Lift your hips off the floor and balance yourself on your forearm and the outside of your flexed foot. Do not let your ankle bend sideways. Hold for 5 seconds. Repeat on the other side. When you can do this exercise without pain for 10 to 15 seconds, ask your healthcare provider for more advanced exercises.       When should I contact my healthcare provider?   Your pain becomes worse.    You have new pain.    You have questions or concerns about your condition, care, or exercise program.  CARE AGREEMENT:   You have the right to help plan your care. Learn about your health condition and how it may be treated. Discuss treatment options with your caregivers to decide what care you want to receive. You always have the right to refuse treatment. The above information is an  only. It is not intended as medical advice for individual conditions or treatments. Talk to your doctor, nurse or pharmacist before following any medical regimen to see if it is safe and effective for you.  © 2016 [employer]. Information is for End User's use only and may not be sold, redistributed or otherwise used for commercial purposes.  All illustrations and images included in CareNotes® are the copyrighted property of [employer]. or [employer].     Muscle Spasm   WHAT YOU NEED TO KNOW:   A muscle spasm is a sudden contraction of any muscle or group of muscles. A muscle cramp is a painful muscle spasm. Muscle cramps commonly occur after intense exercise or during pregnancy. They may also be caused by certain medications, dehydration, low calcium or magnesium levels, or another medical condition.   DISCHARGE INSTRUCTIONS:   Medicines:  You may need the following:  NSAIDs  help decrease swelling and pain or fever. This medicine is available with or without a doctor's order. NSAIDs can cause stomach bleeding or kidney problems in certain people. If you take blood thinner medicine, always ask your healthcare provider if NSAIDs are safe for you. Always read the medicine label and follow directions.    Take your medicine as directed.  Contact your healthcare provider if you think your medicine is not helping or if you have side effects. Tell him of her if you are allergic to any medicine. Keep a list of the medicines, vitamins, and herbs you take. Include the amounts, and when and why you take them. Bring the list or the pill bottles to follow-up visits. Carry your medicine list with you in case of an emergency.    Follow up with your healthcare provider as directed:  You may need other tests or treatment. You may also be referred to a physical therapist or other specialist. Write down your questions so you remember to ask them during your visits.   Self-care:   Stretch  your muscle to help relieve the cramp. It may be helpful to keep your muscle in the stretched position until the cramp is gone.     Apply heat  to help decrease pain and muscle spasms. Apply heat on the area for 20 to 30 minutes every 2 hours for as many days as directed.     Apply ice  to help decrease swelling and pain. Ice may also help prevent tissue damage. Use an  ice pack, or put crushed ice in a plastic bag. Cover it with a towel and place it on your muscle for 15 to 20 minutes every hour or as directed.    Drink more liquids  to help prevent muscle cramps caused by dehydration. Sports drinks may help replace electrolytes you lose through sweat during exercise. Ask your healthcare provider how much liquid to drink each day and which liquids are best for you.     Eat healthy foods , such as fruits, vegetables, whole grains, low-fat dairy products, and lean proteins (meat, beans, and fish). If you are pregnant, ask your healthcare provider about foods that are high in magnesium and sodium. They may help to relieve cramps during pregnancy.     Massage your muscle  to help relieve the cramp.     Take frequent deep breaths  until the cramp feels better. Lie down while you take the deep breaths so you do not get dizzy or lightheaded.    Contact your healthcare provider if:   You have signs of dehydration, such as a headache, dark yellow urine, dry eyes or mouth, or a fast heartbeat.     You have questions or concerns about your condition or care.    Return to the emergency department if:   You have warmth, swelling, or redness in the cramping muscle.     You have frequent or unrelieved muscle cramps in several different muscles.     You have muscle cramps with numbness, tingling, and burning in your hands and feet.    © Copyright [employer] 2022 Information is for End User's use only and may not be sold, redistributed or otherwise used for commercial purposes. All illustrations and images included in CareNotes® are the copyrighted property of [employer] or [employer]  The above information is an  only. It is not intended as medical advice for individual conditions or treatments. Talk to your doctor, nurse or pharmacist before following any medical regimen to see if it is safe and effective for you.

## 2025-01-23 ENCOUNTER — HOSPITAL ENCOUNTER (OUTPATIENT)
Dept: SLEEP CENTER | Facility: CLINIC | Age: 65
Discharge: HOME | End: 2025-01-23
Payer: COMMERCIAL

## 2025-01-23 DIAGNOSIS — G47.33 OSA ON CPAP: ICD-10-CM

## 2025-01-23 PROCEDURE — 95810 POLYSOM 6/> YRS 4/> PARAM: CPT

## 2025-01-23 PROCEDURE — 95810 POLYSOM 6/> YRS 4/> PARAM: CPT | Performed by: STUDENT IN AN ORGANIZED HEALTH CARE EDUCATION/TRAINING PROGRAM

## 2025-01-24 NOTE — PROGRESS NOTES
Sleep Study Documentation    Pre-Sleep Study       Sleep testing procedure explained to patient:YES    Patient napped prior to study:NO    Caffeine:Dayshift worker after 12PM.  Caffeine use:NO    Alcohol:Dayshift workers after 5PM: Alcohol use:NO    Typical day for patient:YES       Study Documentation    Sleep Study Indications: CATHRYN    Sleep Study: Diagnostic   Snore:Severe  Supplemental O2: no    O2 flow rate (L/min) range   O2 flow rate (L/min) final   Minimum SaO2 83  Baseline SaO2 93    Mode of Therapy:    EKG abnormalities: no     EEG abnormalities: no    Were abnormal behaviors in sleep observed:NO    Is Total Sleep Study Recording Time < 2 hours: N/A    Is Total Sleep Study Recording Time > 2 hours but study is incomplete: N/A    Is Total Sleep Study Recording Time 6 hours or more but sleep was not obtained: NO    Patient classification: employed       Post-Sleep Study    Medication used at bedtime or during sleep study:YES prescription sleep aid    Patient reports time it took to fall asleep:greater than 60 minutes    Patient reports waking up during study:3 or more times.  Patient reports returning to sleep in 10 to 30 minutes.    Patient reports sleeping 6 to 8 hours without dreaming.    Does the Patient feel this is a typical night of sleep:worse than usual    Patient rated sleepiness: Somewhat sleepy or tired    PAP treatment:no.

## 2025-01-27 ENCOUNTER — DOCUMENTATION (OUTPATIENT)
Dept: SLEEP CENTER | Facility: CLINIC | Age: 65
End: 2025-01-27

## 2025-01-27 NOTE — PROGRESS NOTES
Patient with recent in-lab diagnostic sleep study.  Sleep study revealed severe obstructive sleep apnea.    Treatment with auto CPAP therapy is recommended.    I have messaged ordering clinician with sleep study results.

## 2025-01-28 ENCOUNTER — TELEMEDICINE (OUTPATIENT)
Dept: PSYCHIATRY | Facility: CLINIC | Age: 65
End: 2025-01-28
Payer: COMMERCIAL

## 2025-01-28 DIAGNOSIS — F33.1 MODERATE EPISODE OF RECURRENT MAJOR DEPRESSIVE DISORDER (HCC): Primary | ICD-10-CM

## 2025-01-28 DIAGNOSIS — F41.1 GENERALIZED ANXIETY DISORDER WITH PANIC ATTACKS: ICD-10-CM

## 2025-01-28 DIAGNOSIS — M79.2 NEURALGIA: ICD-10-CM

## 2025-01-28 DIAGNOSIS — F41.0 GENERALIZED ANXIETY DISORDER WITH PANIC ATTACKS: ICD-10-CM

## 2025-01-28 DIAGNOSIS — R51.9 NONINTRACTABLE HEADACHE, UNSPECIFIED CHRONICITY PATTERN, UNSPECIFIED HEADACHE TYPE: ICD-10-CM

## 2025-01-28 DIAGNOSIS — G47.33 OSA (OBSTRUCTIVE SLEEP APNEA): Primary | ICD-10-CM

## 2025-01-28 DIAGNOSIS — G47.00 INSOMNIA, UNSPECIFIED TYPE: ICD-10-CM

## 2025-01-28 PROCEDURE — 99214 OFFICE O/P EST MOD 30 MIN: CPT | Performed by: STUDENT IN AN ORGANIZED HEALTH CARE EDUCATION/TRAINING PROGRAM

## 2025-01-28 PROCEDURE — 90833 PSYTX W PT W E/M 30 MIN: CPT | Performed by: STUDENT IN AN ORGANIZED HEALTH CARE EDUCATION/TRAINING PROGRAM

## 2025-01-28 RX ORDER — QUETIAPINE FUMARATE 25 MG/1
25 TABLET, FILM COATED ORAL
Qty: 90 TABLET | Refills: 1 | Status: SHIPPED | OUTPATIENT
Start: 2025-01-28 | End: 2025-07-27

## 2025-01-28 RX ORDER — SERTRALINE HYDROCHLORIDE 100 MG/1
100 TABLET, FILM COATED ORAL DAILY
Qty: 90 TABLET | Refills: 1 | Status: SHIPPED | OUTPATIENT
Start: 2025-01-28 | End: 2025-07-27

## 2025-01-28 RX ORDER — BUSPIRONE HYDROCHLORIDE 10 MG/1
10 TABLET ORAL 2 TIMES DAILY
Qty: 180 TABLET | Refills: 1 | Status: SHIPPED | OUTPATIENT
Start: 2025-01-28 | End: 2025-07-27

## 2025-01-28 RX ORDER — GABAPENTIN 300 MG/1
300 CAPSULE ORAL 2 TIMES DAILY
Qty: 180 CAPSULE | Refills: 1 | Status: SHIPPED | OUTPATIENT
Start: 2025-01-28 | End: 2025-07-27
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
## 2025-01-28 NOTE — PROGRESS NOTES
Discussed with patient sleep study results she has severe obstructive sleep apnea she is on her old CPAP machine and prescription for a new auto CPAP and supplies given to the patient

## 2025-01-29 ENCOUNTER — EVALUATION (OUTPATIENT)
Dept: PHYSICAL THERAPY | Facility: CLINIC | Age: 65
End: 2025-01-29
Payer: COMMERCIAL

## 2025-01-29 DIAGNOSIS — M54.16 LUMBAR RADICULOPATHY: ICD-10-CM

## 2025-01-29 DIAGNOSIS — M54.50 LEFT LOW BACK PAIN, UNSPECIFIED CHRONICITY, UNSPECIFIED WHETHER SCIATICA PRESENT: Primary | ICD-10-CM

## 2025-01-29 DIAGNOSIS — M47.816 LUMBAR SPONDYLOSIS: ICD-10-CM

## 2025-01-29 PROCEDURE — 97110 THERAPEUTIC EXERCISES: CPT | Performed by: PHYSICAL THERAPIST

## 2025-01-29 PROCEDURE — 97161 PT EVAL LOW COMPLEX 20 MIN: CPT | Performed by: PHYSICAL THERAPIST
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
## 2025-01-30 ENCOUNTER — OFFICE VISIT (OUTPATIENT)
Dept: PHYSICAL THERAPY | Facility: CLINIC | Age: 65
End: 2025-01-30
Payer: COMMERCIAL

## 2025-01-30 DIAGNOSIS — M54.50 LEFT LOW BACK PAIN, UNSPECIFIED CHRONICITY, UNSPECIFIED WHETHER SCIATICA PRESENT: Primary | ICD-10-CM

## 2025-01-30 DIAGNOSIS — M47.816 LUMBAR SPONDYLOSIS: ICD-10-CM

## 2025-01-30 DIAGNOSIS — M54.16 LUMBAR RADICULOPATHY: ICD-10-CM

## 2025-01-30 PROCEDURE — 97112 NEUROMUSCULAR REEDUCATION: CPT

## 2025-01-30 PROCEDURE — 97110 THERAPEUTIC EXERCISES: CPT

## 2025-01-30 NOTE — PROGRESS NOTES
Daily Note     Today's date: 2025  Patient name: Caryn Velasquez  : 1960  MRN: [id number]  Referring provider: Tami Azar CRNP  Dx:   Encounter Diagnosis     ICD-10-CM    1. Left low back pain, unspecified chronicity, unspecified whether sciatica present  M54.50       2. Lumbar spondylosis  M47.816       3. Lumbar radiculopathy  M54.16           Start Time: 1159          Subjective: Patient reported she has started her walking program ~ 1 mile without difficulty.       Objective: See treatment diary below      Assessment: Tolerated treatment  without limitations in TE performance.  Patient appears very motivated to return to [facility]. . Patient exhibited good technique with therapeutic exercises and would benefit from continued PT      Plan: Continue per plan of care.  Progress treatment as tolerated.       Precautions: LBP Left    [email or web address]  Access Code: [email or web address]      POC expires Unit limit Auth Expiration date PT/OT/ST + Visit Limit?   3-29-25 3 na 60 pcy                           Visit/Unit Tracking  AUTH Status:  Date              na Used 1 2              Remaining                          Manuals                                                                  Neuro Re-Ed                          HL TB Hip abd Grn x 20 GTB  20x           TB Clamshells Grn x 20 GTB  20x                         HL TA marching  20x                        Bridging w/ dorothy add  :03  20x            Bridging w/ TB abd   GTB  20x                        LEG press             Calf press              TG L10  20x2                                     Ther Ex             bike  10'           Elliptical                           Fig 4 IR/ER stretch 20s x 3 ea bilat :20  3x each                        TA knee-chest on physioball  10x2                                                               Prone press ups To end x 10  To end   15x  Arm P!                                                   Ther Activity                                       Gait Training                                         Modalities

## 2025-02-04 ENCOUNTER — OFFICE VISIT (OUTPATIENT)
Dept: PHYSICAL THERAPY | Facility: CLINIC | Age: 65
End: 2025-02-04
Payer: COMMERCIAL

## 2025-02-04 DIAGNOSIS — M54.50 LEFT LOW BACK PAIN, UNSPECIFIED CHRONICITY, UNSPECIFIED WHETHER SCIATICA PRESENT: Primary | ICD-10-CM

## 2025-02-04 DIAGNOSIS — M47.816 LUMBAR SPONDYLOSIS: ICD-10-CM

## 2025-02-04 DIAGNOSIS — M54.16 LUMBAR RADICULOPATHY: ICD-10-CM

## 2025-02-04 PROCEDURE — 97112 NEUROMUSCULAR REEDUCATION: CPT

## 2025-02-04 PROCEDURE — 97110 THERAPEUTIC EXERCISES: CPT

## 2025-02-04 NOTE — PROGRESS NOTES
Daily Note     Today's date: 2025  Patient name: Caryn Velasuqez  : 1960  MRN: [id number]  Referring provider: Tami Azar CRNP  Dx:   Encounter Diagnosis     ICD-10-CM    1. Left low back pain, unspecified chronicity, unspecified whether sciatica present  M54.50       2. Lumbar spondylosis  M47.816       3. Lumbar radiculopathy  M54.16                      Subjective: Patient noted she appreciated last visit and today's challenges.       Objective: See treatment diary below      Assessment: Tolerated treatment  and progressions without limitations in performance. . Patient demonstrated fatigue post treatment, exhibited good technique with therapeutic exercises, and would benefit from continued PT      Plan: Continue per plan of care.  Progress treatment as tolerated.       Precautions: LBP Left    [email or web address]  Access Code: [email or web address]      POC expires Unit limit Auth Expiration date PT/OT/ST + Visit Limit?   3-29-25 3 na 60 pcy                           Visit/Unit Tracking  AUTH Status:  Date             na Used 1 2 3             Remaining                          Manuals                                                                 Neuro Re-Ed                          HL TB Hip abd Grn x 20 GTB  20x BTB  20x           TB Clamshells Grn x 20 GTB  20x  BTB  20x                       HL TA marching  20x 1.5#  20x                       Bridging w/ dorothy add  :03  20x  :03  20x          Bridging w/ TB abd   GTB  20x BTB  20x                       LEG press   55#  10x3          Calf press    55#  10x3  vc's          TG L10  20x2 20x2                                    Ther Ex             bike  10' 10'          Elliptical                           Fig 4 IR/ER stretch 20s x 3 ea bilat :20  3x each B  :20  3x each                       TA knee-chest on physioball  10x2 OPB  10x2                                                              Prone press ups To end x  10  To end   15x  Arm P! @ tx end  5x3                                                  Ther Activity                                       Gait Training                                         Modalities

## 2025-02-06 ENCOUNTER — OFFICE VISIT (OUTPATIENT)
Dept: PHYSICAL THERAPY | Facility: CLINIC | Age: 65
End: 2025-02-06
Payer: COMMERCIAL

## 2025-02-06 DIAGNOSIS — M54.50 LEFT LOW BACK PAIN, UNSPECIFIED CHRONICITY, UNSPECIFIED WHETHER SCIATICA PRESENT: Primary | ICD-10-CM

## 2025-02-06 DIAGNOSIS — M47.816 LUMBAR SPONDYLOSIS: ICD-10-CM

## 2025-02-06 DIAGNOSIS — M54.16 LUMBAR RADICULOPATHY: ICD-10-CM

## 2025-02-06 PROCEDURE — 97112 NEUROMUSCULAR REEDUCATION: CPT

## 2025-02-06 PROCEDURE — 97110 THERAPEUTIC EXERCISES: CPT

## 2025-02-06 NOTE — PROGRESS NOTES
"Daily Note     Today's date: 2025  Patient name: Caryn Velasquez  : 1960  MRN: [id number]  Referring provider: Tami Azar CRNP  Dx:   Encounter Diagnosis     ICD-10-CM    1. Left low back pain, unspecified chronicity, unspecified whether sciatica present  M54.50       2. Lumbar spondylosis  M47.816       3. Lumbar radiculopathy  M54.16                      Subjective: \"I do feel much better than before I came to therapy\".  Patient indicated she was challenged by last visit and today's progressions.  Patient reported she is very motivated to return to \[facility]\"".      Objective: See treatment diary below      Assessment: Tolerated treatment  and progressions without exercise limitations or reported exacerbation of lumbar pain symptoms . Decreased vc's required today for technique and pacing. Patient demonstrated fatigue post treatment, exhibited good technique with therapeutic exercises, and would benefit from continued PT      Plan: Continue per plan of care.  Progress treatment as tolerated.       Precautions: LBP Left    [email or web address]  Access Code: [email or web address]      POC expires Unit limit Auth Expiration date PT/OT/ST + Visit Limit?   3-29-25 3 na 60 pcy                           Visit/Unit Tracking  AUTH Status:  Date            na Used 1 2 3 4  FOTO            Remaining                          Manuals  2                                                               Neuro Re-Ed                          HL TB Hip abd Grn x 20 GTB  20x BTB  20x  BTB  20x         TB Clamshells Grn x 20 GTB  20x  BTB  20x BTB  20x                      HL TA marching  20x 1.5#  20x 1.5#  20x         TA knee/hip ext    1.5#  20x         Bridging w/ dorothy add  :03  20x  :03  20x :03  20x W/ roll at feet        Bridging w/ TB abd   GTB  20x BTB  20x BTB  20x                      LEG press   55#  10x3 65#  15x2         Calf press    55#  10x3  vc's 65#  15x2         TG L10  20x2 " "20x2 ~                      Seated TA RPB TB lat pull, rows and B shld ext    NV         Seated TA: alt hip flexion and LAQ    NV         Ther Ex             bike  10' 10' 10'         Elliptical                           Fig 4 IR/ER stretch 20s x 3 ea bilat :20  3x each B  :20  3x each B  :20  3x each         B Hamstring Stretch    :20  3x         TA knee-chest on physioball  10x2 OPB  10x2 OPB  20x                      [place] walk outs: 4 way    8 lbs  5 laps each         [place] B LE PRE's 4 way    NV                      Prone press ups To end x 10  To end   15x  Arm P! @ tx end  5x3  5x3         Sit to stand 20\" box arms extended    NV                                   Ther Activity                                       Gait Training                                         Modalities                                                "

## 2025-02-12 ENCOUNTER — OFFICE VISIT (OUTPATIENT)
Dept: PHYSICAL THERAPY | Facility: CLINIC | Age: 65
End: 2025-02-12
Payer: COMMERCIAL

## 2025-02-12 DIAGNOSIS — M47.816 LUMBAR SPONDYLOSIS: ICD-10-CM

## 2025-02-12 DIAGNOSIS — M54.16 LUMBAR RADICULOPATHY: ICD-10-CM

## 2025-02-12 DIAGNOSIS — M54.50 LEFT LOW BACK PAIN, UNSPECIFIED CHRONICITY, UNSPECIFIED WHETHER SCIATICA PRESENT: Primary | ICD-10-CM

## 2025-02-12 PROCEDURE — 97110 THERAPEUTIC EXERCISES: CPT | Performed by: PHYSICAL THERAPIST

## 2025-02-12 PROCEDURE — 97112 NEUROMUSCULAR REEDUCATION: CPT | Performed by: PHYSICAL THERAPIST

## 2025-02-12 NOTE — PROGRESS NOTES
Daily Note     Today's date: 2025  Patient name: Caryn Velasquez  : 1960  MRN: [id number]  Referring provider: Tami Azar CRNP  Dx:   Encounter Diagnosis     ICD-10-CM    1. Left low back pain, unspecified chronicity, unspecified whether sciatica present  M54.50       2. Lumbar spondylosis  M47.816       3. 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     This causes a couple  days of being very uncomfortable  -  making sitting at home challenging    Objective: See treatment diary below      Assessment: Tolerated treatment with modifications well overall -      Plan: Continue per plan of care.  Progress treatment as tolerated.    Resume full program and additions as noted next session if patient is feeling better.     Precautions: LBP Left    [email or web address]  Access Code: [email or web address]      POC expires Unit limit Auth Expiration date PT/OT/ST + Visit Limit?   3-29-25 3 na 60 pcy                           Visit/Unit Tracking  AUTH Status:  Date  2 2.12          na Used 1 2 3 4  FOTO 5           Remaining                          Manuals  2 2.12                                                              Neuro Re-Ed                          HL TB Hip abd Grn x 20 GTB  20x BTB  20x  BTB  20x Btb x 20         TB Clamshells Grn x 20 GTB  20x  BTB  20x BTB  20x Btb x 20         HL TA w/ roll     5s x 20         HL TA marching  20x 1.5#  20x 1.5#  20x 20x         TA knee/hip ext    1.5#  20x 20x        Bridging w/ dorothy add  :03  20x  :03  20x :03  20x W/ roll at feet        Bridging w/ TB abd   GTB  20x BTB  20x BTB  20x Btb x 20                     LEG press   55#  10x3 65#  15x2 65#  15 x 2        Calf press    55#  10x3  vc's 65#  15x2  "65# 15 x 2         TG L10  20x2 20x2 ~                      Seated TA RPB TB lat pull, rows and B shld ext    NV         Seated TA: alt hip flexion and LAQ    NV         Ther Ex             bike  10' 10' 10' 10'         Elliptical                           Fig 4 IR/ER stretch 20s x 3 ea bilat :20  3x each B  :20  3x each B  :20  3x each 20s x 3 ea bilat        B Hamstring Stretch    :20  3x 20s x 3         TA knee-chest on physioball  10x2 OPB  10x2 OPB  20x Gpb x 20                      [place] walk outs: 4 way    8 lbs  5 laps each 10#  x 5 ea         Oral B LE PRE's 4 way    NV                      Prone press ups To end x 10  To end   15x  Arm P! @ tx end  5x3  5x3 5 x 3         Sit to stand 20\" box arms extended    NV                                                             Ther Activity                                       Gait Training                                         Modalities                                                "

## 2025-02-14 ENCOUNTER — OFFICE VISIT (OUTPATIENT)
Dept: PHYSICAL THERAPY | Facility: CLINIC | Age: 65
End: 2025-02-14
Payer: COMMERCIAL

## 2025-02-14 DIAGNOSIS — M47.816 LUMBAR SPONDYLOSIS: ICD-10-CM

## 2025-02-14 DIAGNOSIS — M54.50 LEFT LOW BACK PAIN, UNSPECIFIED CHRONICITY, UNSPECIFIED WHETHER SCIATICA PRESENT: Primary | ICD-10-CM

## 2025-02-14 DIAGNOSIS — M54.16 LUMBAR RADICULOPATHY: ICD-10-CM

## 2025-02-14 PROCEDURE — 97110 THERAPEUTIC EXERCISES: CPT | Performed by: PHYSICAL THERAPIST

## 2025-02-14 NOTE — PROGRESS NOTES
"Daily Note     Today's date: 2025  Patient name: Caryn Velasquez  : 1960  MRN: [id number]  Referring provider: Tami Azar CRNP  Dx:   Encounter Diagnosis     ICD-10-CM    1. Left low back pain, unspecified chronicity, unspecified whether sciatica present  M54.50       2. Lumbar spondylosis  M47.816       3. 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     Objective: See treatment diary below      Assessment: Tolerated treatment with additions well today     Plan: Continue per plan of care.  Progress treatment as tolerated.        Precautions: LBP Left    [email or web address]  Access Code: [email or web address]      POC expires Unit limit Auth Expiration date PT/OT/ST + Visit Limit?   3-29-25 3 na 60 pcy                           Visit/Unit Tracking  AUTH Status:  Date . 2.12 2.14         na Used 1 2 3 4  FOTO 5 6          Remaining                          Manuals . 2 2.12 2.14                                                             Neuro Re-Ed                          HL TB Hip abd Grn x 20 GTB  20x BTB  20x  BTB  20x Btb x 20  Btb x 20        TB Clamshells Grn x 20 GTB  20x  BTB  20x BTB  20x Btb x 20  Btb x 20        HL TA w/ roll     5s x 20  5s x 20       HL TA marching  20x 1.5#  20x 1.5#  20x 20x  20x        TA knee/hip ext    1.5#  20x 20x 20x        Bridging w/ dorothy add  :03  20x  :03  20x :03  20x W/ roll at feet        Bridging w/ TB abd   GTB  20x BTB  20x BTB  20x Btb x 20 Btb x 20                     LEG press   55#  10x3 65#  15x2 65#  15 x 2 65#  15 x 2       Calf press    55#  10x3  vc's 65#  15x2 65# 15 x 2  65#  15 x 2        TG L10  20x2 20x2 ~                      Seated TA RPB TB lat pull, rows and B shld ext    NV  Blue web x 20 ea       Seated TA: alt hip flexion and LAQ    NV  20x ea  " "rpb       Ther Ex             bike  10' 10' 10' 10'  10'        Elliptical                           Fig 4 IR/ER stretch 20s x 3 ea bilat :20  3x each B  :20  3x each B  :20  3x each 20s x 3 ea bilat 20s x 3 ea bilat       B Hamstring Stretch    :20  3x 20s x 3  20s x 3        TA knee-chest on physioball  10x2 OPB  10x2 OPB  20x Gpb x 20  Opb x 20                     Dereje walk outs: 4 way    8 lbs  5 laps each 10#  x 5 ea  10# x 5 ea        Dereje B LE PRE's 4 way    NV                      Prone press ups To end x 10  To end   15x  Arm P! @ tx end  5x3  5x3 5 x 3  5 x 3       Sit to stand 20\" box arms extended    NV  20x                                                            Ther Activity                                       Gait Training                                         Modalities                                                "

## 2025-02-18 ENCOUNTER — OFFICE VISIT (OUTPATIENT)
Dept: PHYSICAL THERAPY | Facility: CLINIC | Age: 65
End: 2025-02-18
Payer: COMMERCIAL

## 2025-02-18 DIAGNOSIS — M54.16 LUMBAR RADICULOPATHY: ICD-10-CM

## 2025-02-18 DIAGNOSIS — M47.816 LUMBAR SPONDYLOSIS: ICD-10-CM

## 2025-02-18 DIAGNOSIS — M54.50 LEFT LOW BACK PAIN, UNSPECIFIED CHRONICITY, UNSPECIFIED WHETHER SCIATICA PRESENT: Primary | ICD-10-CM

## 2025-02-18 PROCEDURE — 97110 THERAPEUTIC EXERCISES: CPT

## 2025-02-18 PROCEDURE — 97112 NEUROMUSCULAR REEDUCATION: CPT

## 2025-02-18 NOTE — PROGRESS NOTES
Daily Note     Today's date: 2025  Patient name: Caryn Velasquez  : 1960  MRN: [id number]  Referring provider: Tami Azar CRNP  Dx:   Encounter Diagnosis     ICD-10-CM    1. Left low back pain, unspecified chronicity, unspecified whether sciatica present  M54.50       2. Lumbar spondylosis  M47.816       3. Lumbar radiculopathy  M54.16                      Subjective: SPR=4/10 of left groin and buttock region.       Objective: See treatment diary below      Assessment: Tolerated treatment  demonstrating good understanding of benefits of consistent TE-patient appears very motivated to return to [facility] . Patient demonstrated fatigue post treatment, exhibited good technique with therapeutic exercises, and would benefit from continued PT      Plan: Continue per plan of care.  Progress treatment as tolerated.       Precautions: LBP Left    [email or web address]  Access Code: [email or web address]      POC expires Unit limit Auth Expiration date PT/OT/ST + Visit Limit?   3-29-25 3 na 60 pcy                           Visit/Unit Tracking  AUTH Status:  Date  2 2 2.12 2.14         na Used 1 2 3 4  FOTO 5 6 7         Remaining                          Manuals 1. 2/ 2.12 2.14                                                             Neuro Re-Ed                          HL TB Hip abd Grn x 20 GTB  20x BTB  20x  BTB  20x Btb x 20  Btb x 20  BTB  20x      TB Clamshells Grn x 20 GTB  20x  BTB  20x BTB  20x Btb x 20  Btb x 20  BTB  20x      HL TA w/ roll     5s x 20  5s x 20 :05  20x      HL TA marching  20x 1.5#  20x 1.5#  20x 20x  20x  1.5#  20x      TA knee/hip ext    1.5#  20x 20x 20x  1.5#  20x      Bridging w/ dorothy add  :03  20x  :03  20x :03  20x W/ roll at feet  :03  20x      Bridging w/ TB abd   GTB  20x BTB  20x BTB  20x Btb x 20 Btb x 20  BTB  20x                   LEG press   55#  10x3 65#  15x2 65#  15 x 2 65#  15 x 2 75#  15x2      Calf press    55#  10x3  vc's  "65#  15x2 65# 15 x 2  65#  15 x 2  75#  15x2      TG L10  20x2 20x2 ~                      Seated TA RPB TB lat pull, rows and B shld ext    NV  Blue web x 20 ea Blue web  20x each      Seated TA: alt hip flexion and B LAQ    NV  20x ea  rpb RPB  1.5#  20x each      Ther Ex             bike  10' 10' 10' 10'  10'  10'+      Elliptical                           Fig 4 IR/ER stretch 20s x 3 ea bilat :20  3x each B  :20  3x each B  :20  3x each 20s x 3 ea bilat 20s x 3 ea bilat :20  3x each      B Hamstring Stretch    :20  3x 20s x 3  20s x 3  :20  3x      TA knee-chest on physioball  10x2 OPB  10x2 OPB  20x Gpb x 20  Opb x 20  OPB  20x                   [place] walk outs: 4 way    8 lbs  5 laps each 10#  x 5 ea  10# x 5 ea  12 lbs  5 laps each      [place] B LE PRE's 4 way    NV                      Prone press ups To end x 10  To end   15x  Arm P! @ tx end  5x3  5x3 5 x 3  5 x 3 5x3      Sit to stand 20\" box arms extended    NV  20x                                                            Ther Activity                                       Gait Training                                         Modalities                                                  "

## 2025-02-20 ENCOUNTER — OFFICE VISIT (OUTPATIENT)
Dept: PHYSICAL THERAPY | Facility: CLINIC | Age: 65
End: 2025-02-20
Payer: COMMERCIAL

## 2025-02-20 DIAGNOSIS — M54.16 LUMBAR RADICULOPATHY: ICD-10-CM

## 2025-02-20 DIAGNOSIS — M47.816 LUMBAR SPONDYLOSIS: ICD-10-CM

## 2025-02-20 DIAGNOSIS — M54.50 LEFT LOW BACK PAIN, UNSPECIFIED CHRONICITY, UNSPECIFIED WHETHER SCIATICA PRESENT: Primary | ICD-10-CM

## 2025-02-20 LAB

## 2025-02-20 PROCEDURE — 97112 NEUROMUSCULAR REEDUCATION: CPT

## 2025-02-20 PROCEDURE — 97110 THERAPEUTIC EXERCISES: CPT

## 2025-02-20 NOTE — PROGRESS NOTES
Daily Note     Today's date: 2025  Patient name: Caryn Velasquez  : 1960  MRN: [id number]  Referring provider: Tami Azar CRNP  Dx:   Encounter Diagnosis     ICD-10-CM    1. Left low back pain, unspecified chronicity, unspecified whether sciatica present  M54.50       2. Lumbar spondylosis  M47.816       3. Lumbar radiculopathy  M54.16                      Subjective: SPR=0/10.      Objective: See treatment diary below      Assessment: Patient is very motivated, appeared challenged by today's program and tolerated treatment well. Patient demonstrated fatigue post treatment, exhibited good technique with therapeutic exercises, and would benefit from continued PT      Plan: Continue per plan of care.  Progress treatment as tolerated.       Precautions: LBP Left    [email or web address]  Access Code: [email or web address]      POC expires Unit limit Auth Expiration date PT/OT/ST + Visit Limit?   3-29-25 3 na 60 pcy                           Visit/Unit Tracking  AUTH Status:  Date 1. 2 2 2.12 2.14        na Used 1 2 3 4  FOTO 5 6 7 8        Remaining                          Manuals 1. 2 2/ 2.12 2.14                                                            Neuro Re-Ed                          HL TB Hip abd Grn x 20 GTB  20x BTB  20x  BTB  20x Btb x 20  Btb x 20  BTB  20x BTB  20x     TB Clamshells Grn x 20 GTB  20x  BTB  20x BTB  20x Btb x 20  Btb x 20  BTB  20x BTB  20x     HL TA w/ roll     5s x 20  5s x 20 :05  20x :05  20x     HL TA marching  20x 1.5#  20x 1.5#  20x 20x  20x  1.5#  20x 1.5#  20x     TA knee/hip ext    1.5#  20x 20x 20x  1.5#  20x 1.5#  20x     Bridging w/ dorothy add  :03  20x  :03  20x :03  20x W/ roll at feet  :03  20x :03  20x w/ roll @ feet     Bridging w/ TB abd   GTB  20x BTB  20x BTB  20x Btb x 20 Btb x 20  BTB  20x BTB  20x                  LEG press   55#  10x3 65#  15x2 65#  15 x 2 65#  15 x 2 75#  15x2 85#  15x2     Calf press     "55#  10x3  vc's 65#  15x2 65# 15 x 2  65#  15 x 2  75#  15x2 85#  15x2     TG L10  20x2 20x2 ~                      Seated TA RPB TB lat pull, rows and B shld ext    NV  Blue web x 20 ea Blue web  20x each Black web  20x each     Seated TA: alt hip flexion and B LAQ    NV  20x ea  rpb RPB  1.5#  20x each RPB  1.5#  20x each     Ther Ex             bike  10' 10' 10' 10'  10'  10'+ ROM  10'     Elliptical                           Fig 4 IR/ER stretch 20s x 3 ea bilat :20  3x each B  :20  3x each B  :20  3x each 20s x 3 ea bilat 20s x 3 ea bilat :20  3x each :20  3x each     B Hamstring Stretch    :20  3x 20s x 3  20s x 3  :20  3x :20  3x     TA knee-chest on physioball  10x2 OPB  10x2 OPB  20x Gpb x 20  Opb x 20  OPB  20x OPB  1.5#  20x                  Dereje walk outs: 4 way    8 lbs  5 laps each 10#  x 5 ea  10# x 5 ea  12 lbs  5 laps each 12 lbs  5 laps  each     [place] B LE PRE's 4 way    NV                      Prone press ups To end x 10  To end   15x  Arm P! @ tx end  5x3  5x3 5 x 3  5 x 3 5x3 5x3     Sit to stand 20\" box arms extended    NV  20x   20x                                                         Ther Activity                                       Gait Training                                         Modalities                                                    "

## 2025-02-21 ENCOUNTER — APPOINTMENT (OUTPATIENT)
Dept: PHYSICAL THERAPY | Facility: CLINIC | Age: 65
End: 2025-02-21
Payer: COMMERCIAL

## 2025-02-25 ENCOUNTER — OFFICE VISIT (OUTPATIENT)
Dept: PHYSICAL THERAPY | Facility: CLINIC | Age: 65
End: 2025-02-25
Payer: COMMERCIAL

## 2025-02-25 DIAGNOSIS — M54.16 LUMBAR RADICULOPATHY: ICD-10-CM

## 2025-02-25 DIAGNOSIS — M47.816 LUMBAR SPONDYLOSIS: ICD-10-CM

## 2025-02-25 DIAGNOSIS — M54.50 LEFT LOW BACK PAIN, UNSPECIFIED CHRONICITY, UNSPECIFIED WHETHER SCIATICA PRESENT: Primary | ICD-10-CM

## 2025-02-25 PROCEDURE — 97112 NEUROMUSCULAR REEDUCATION: CPT

## 2025-02-25 PROCEDURE — 97110 THERAPEUTIC EXERCISES: CPT

## 2025-02-25 NOTE — PROGRESS NOTES
Daily Note     Today's date: 2025  Patient name: Carny Velasquez  : 1960  MRN: [id number]  Referring provider: Tami Azar CRNP  Dx:   Encounter Diagnosis     ICD-10-CM    1. Left low back pain, unspecified chronicity, unspecified whether sciatica present  M54.50       2. Lumbar spondylosis  M47.816       3. Lumbar radiculopathy  M54.16           Start Time: 1030          Subjective: Patient denied any changes in lumbar discomfort; SPR=0/10.  Patient reported being challenged by current program.      Objective: See treatment diary below      Assessment: Tolerated treatment well. Patient demonstrated fatigue post treatment, exhibited good technique with therapeutic exercises, and would benefit from continued PT      Plan: Continue per plan of care.      Precautions: LBP Left    [email or web address]  Access Code: [email or web address]      POC expires Unit limit Auth Expiration date PT/OT/ST + Visit Limit?   3-29-25 3 na 60 pcy                           Visit/Unit Tracking  AUTH Status:  Date 1. 2 2 2.12 2.14       na Used 1 2 3 4  FOTO 5 6 7 8 9       Remaining                          Manuals 1. 2 2/ 2.12 2.14                                                           Neuro Re-Ed                          HL TB Hip abd Grn x 20 GTB  20x BTB  20x  BTB  20x Btb x 20  Btb x 20  BTB  20x BTB  20x BTB  20x    TB Clamshells Grn x 20 GTB  20x  BTB  20x BTB  20x Btb x 20  Btb x 20  BTB  20x BTB  20x BTB  20x    HL TA w/ roll     5s x 20  5s x 20 :05  20x :05  20x :05  20x    HL TA marching  20x 1.5#  20x 1.5#  20x 20x  20x  1.5#  20x 1.5#  20x 2#  20x    TA knee/hip ext    1.5#  20x 20x 20x  1.5#  20x 1.5#  20x 2#  20x    Bridging w/ dorothy add  :03  20x  :03  20x :03  20x W/ roll at feet  :03  20x :03  20x w/ roll @ feet :03  20x     Bridging w/ TB abd   GTB  20x BTB  20x BTB  20x Btb x 20 Btb x 20  BTB  20x BTB  20x BTB  20x                 LEG press    "55#  10x3 65#  15x2 65#  15 x 2 65#  15 x 2 75#  15x2 85#  15x2 95#  20x3    Calf press    55#  10x3  vc's 65#  15x2 65# 15 x 2  65#  15 x 2  75#  15x2 85#  15x2 95#  20x3    TG L10  20x2 20x2 ~                      Seated TA RPB TB lat pull, rows and B shld ext    NV  Blue web x 20 ea Blue web  20x each Black web  20x each Blackweb  20x each    Seated TA: alt hip flexion and B LAQ    NV  20x ea  rpb RPB  1.5#  20x each RPB  1.5#  20x each RPB  2#  20x each    Ther Ex             bike  10' 10' 10' 10'  10'  10'+ ROM  10' Rom  10'    Elliptical                           Fig 4 IR/ER stretch 20s x 3 ea bilat :20  3x each B  :20  3x each B  :20  3x each 20s x 3 ea bilat 20s x 3 ea bilat :20  3x each :20  3x each :20  3x each    B Hamstring Stretch    :20  3x 20s x 3  20s x 3  :20  3x :20  3x :20  3x     TA knee-chest on physioball  10x2 OPB  10x2 OPB  20x Gpb x 20  Opb x 20  OPB  20x OPB  1.5#  20x OPB  2#  20x                 [place] walk outs: 4 way    8 lbs  5 laps each 10#  x 5 ea  10# x 5 ea  12 lbs  5 laps each 12 lbs  5 laps  each 13 lbs  5 laps    [place] B LE PRE's 4 way    NV                      Prone press ups To end x 10  To end   15x  Arm P! @ tx end  5x3  5x3 5 x 3  5 x 3 5x3 5x3 5x3    Sit to stand 20\" box arms extended    NV  20x   20x 3# foam  20x                                                        Ther Activity                                       Gait Training                                         Modalities                                                      "

## 2025-02-26 LAB

## 2025-02-27 ENCOUNTER — OFFICE VISIT (OUTPATIENT)
Dept: PHYSICAL THERAPY | Facility: CLINIC | Age: 65
End: 2025-02-27
Payer: COMMERCIAL

## 2025-02-27 DIAGNOSIS — M47.816 LUMBAR SPONDYLOSIS: ICD-10-CM

## 2025-02-27 DIAGNOSIS — M54.16 LUMBAR RADICULOPATHY: ICD-10-CM

## 2025-02-27 DIAGNOSIS — M54.50 LEFT LOW BACK PAIN, UNSPECIFIED CHRONICITY, UNSPECIFIED WHETHER SCIATICA PRESENT: Primary | ICD-10-CM

## 2025-02-27 PROCEDURE — 97112 NEUROMUSCULAR REEDUCATION: CPT | Performed by: PHYSICAL THERAPIST

## 2025-02-27 PROCEDURE — 97110 THERAPEUTIC EXERCISES: CPT | Performed by: PHYSICAL THERAPIST

## 2025-02-27 NOTE — PROGRESS NOTES
Daily Note     Today's date: 2025  Patient name: Caryn Velasquez  : 1960  MRN: [id number]  Referring provider: Tami Azar CRNP  Dx:   Encounter Diagnosis     ICD-10-CM    1. Left low back pain, unspecified chronicity, unspecified whether sciatica present  M54.50       2. Lumbar spondylosis  M47.816       3. Lumbar radiculopathy  M54.16                      Subjective: Patient reports that she may have overdone it carrying groceries up steps yesterday and subsequently today - her right hip joint is achey    reports no aggravation with exercises today       Objective: See treatment diary below      Assessment: Tolerated treatment well.  She demonstrates nice independence with program      Plan: Continue per plan of care.      Precautions: LBP Left    [email or web address]  Access Code: [email or web address]      POC expires Unit limit Auth Expiration date PT/OT/ST + Visit Limit?   3-29-25 3 na 60 pcy                           Visit/Unit Tracking  AUTH Status:  Date 1. 2 2 2.12 2.14      na Used 1 2 3 4  FOTO 5 6 7 8 9 10      Remaining                          Manuals 1. 2 2/ 2.12 2.14                                                          Neuro Re-Ed                          HL TB Hip abd Grn x 20 GTB  20x BTB  20x  BTB  20x Btb x 20  Btb x 20  BTB  20x BTB  20x BTB  20x Btb x 20   TB Clamshells Grn x 20 GTB  20x  BTB  20x BTB  20x Btb x 20  Btb x 20  BTB  20x BTB  20x BTB  20x Btb x 20    HL TA w/ roll     5s x 20  5s x 20 :05  20x :05  20x :05  20x 5s x 29   HL TA marching  20x 1.5#  20x 1.5#  20x 20x  20x  1.5#  20x 1.5#  20x 2#  20x 2# x 20   TA knee/hip ext    1.5#  20x 20x 20x  1.5#  20x 1.5#  20x 2#  20x 2# x 20   Bridging w/ dorothy add  :03  20x  :03  20x :03  20x W/ roll at feet  :03  20x :03  20x w/ roll @ feet :03  20x  3s x 20   Bridging w/ TB abd   GTB  20x BTB  20x BTB  20x Btb x 20 Btb x 20  BTB  20x BTB  20x BTB  20x Btb x 20  "                LEG press   55#  10x3 65#  15x2 65#  15 x 2 65#  15 x 2 75#  15x2 85#  15x2 95#  20x3 105# 20 x 3   Calf press    55#  10x3  vc's 65#  15x2 65# 15 x 2  65#  15 x 2  75#  15x2 85#  15x2 95#  20x3 105# 20 x 3    TG L10  20x2 20x2 ~                      Seated TA RPB TB lat pull, rows and B shld ext    NV  Blue web x 20 ea Blue web  20x each Black web  20x each Blackweb  20x each Blk web x 20 ea   Seated TA: alt hip flexion and B LAQ    NV  20x ea  rpb RPB  1.5#  20x each RPB  1.5#  20x each RPB  2#  20x each RPB 2# x 20 ea w/ OH bolster   Ther Ex             bike  10' 10' 10' 10'  10'  10'+ ROM  10' Rom  10' ROM 10'    Elliptical                           Fig 4 IR/ER stretch 20s x 3 ea bilat :20  3x each B  :20  3x each B  :20  3x each 20s x 3 ea bilat 20s x 3 ea bilat :20  3x each :20  3x each :20  3x each 20s x 3   B Hamstring Stretch    :20  3x 20s x 3  20s x 3  :20  3x :20  3x :20  3x  20s x 3   TA knee-chest on physioball  10x2 OPB  10x2 OPB  20x Gpb x 20  Opb x 20  OPB  20x OPB  1.5#  20x OPB  2#  20x Opb x 20 2#                Dereje walk outs: 4 way    8 lbs  5 laps each 10#  x 5 ea  10# x 5 ea  12 lbs  5 laps each 12 lbs  5 laps  each 13 lbs  5 laps 13#  x 5 laps   [place] B LE PRE's 4 way    NV                      Prone press ups To end x 10  To end   15x  Arm P! @ tx end  5x3  5x3 5 x 3  5 x 3 5x3 5x3 5x3 5 x  3   Sit to stand 20\" box arms extended    NV  20x   20x 3# foam  20x 5# foam x 20                                                        Ther Activity                                       Gait Training                                         Modalities                                                      "

## 2025-03-04 ENCOUNTER — OFFICE VISIT (OUTPATIENT)
Dept: PHYSICAL THERAPY | Facility: CLINIC | Age: 65
End: 2025-03-04
Payer: COMMERCIAL

## 2025-03-04 DIAGNOSIS — M54.16 LUMBAR RADICULOPATHY: ICD-10-CM

## 2025-03-04 DIAGNOSIS — M54.50 LEFT LOW BACK PAIN, UNSPECIFIED CHRONICITY, UNSPECIFIED WHETHER SCIATICA PRESENT: Primary | ICD-10-CM

## 2025-03-04 DIAGNOSIS — M47.816 LUMBAR SPONDYLOSIS: ICD-10-CM

## 2025-03-04 PROCEDURE — 97112 NEUROMUSCULAR REEDUCATION: CPT

## 2025-03-04 PROCEDURE — 97110 THERAPEUTIC EXERCISES: CPT

## 2025-03-04 NOTE — PROGRESS NOTES
Daily Note     Today's date: 3/4/2025  Patient name: Caryn Velasquez  : 1960  MRN: [id number]  Referring provider: Tami Azar CRNP  Dx:   Encounter Diagnosis     ICD-10-CM    1. Left low back pain, unspecified chronicity, unspecified whether sciatica present  M54.50       2. Lumbar spondylosis  M47.816       3. Lumbar radiculopathy  M54.16                      Subjective: Upon presentation, patient reported right hip flexor/groin discomfort.  Post discussion, patient indicated she is doing mor      Objective: See treatment diary below      Assessment: Tolerated treatment well. Patient demonstrated fatigue post treatment and exhibited good technique with therapeutic exercises      Plan: Continue per plan of care.  Progress treatment as tolerated.       Precautions: LBP Left    [email or web address]  Access Code: [email or web address]      POC expires Unit limit Auth Expiration date PT/OT/ST + Visit Limit?   3-29-25 3 na 60 pcy                           Visit/Unit Tracking  AUTH Status:  Date 1.29 1/30 2/4 2/6 2.12 2.14 2/18 2/20 2/25 2/27 3/4    na Used 1 2 3 4  FOTO 5 6 7 8 9 10 11     Remaining                          Manuals 3/4   2/6 2.12 2.14                                                          Neuro Re-Ed                          HL TB Hip abd BTB  20x   BTB  20x Btb x 20  Btb x 20  BTB  20x BTB  20x BTB  20x Btb x 20   TB Clamshells B  BTB  20x    BTB  20x Btb x 20  Btb x 20  BTB  20x BTB  20x BTB  20x Btb x 20    HL TA w/ roll :05  20x     5s x 20  5s x 20 :05  20x :05  20x :05  20x 5s x 29   HL TA marching 3#  20x    1.5#  20x 20x  20x  1.5#  20x 1.5#  20x 2#  20x 2# x 20   TA knee/hip ext 3#  10x2   1.5#  20x 20x 20x  1.5#  20x 1.5#  20x 2#  20x 2# x 20   Bridging w/ dorothy add :03  20x   :03  20x W/ roll at feet  :03  20x :03  20x w/ roll @ feet :03  20x  3s x 20   Bridging w/ TB abd  BTB  20x   BTB  20x Btb x 20 Btb x 20  BTB  20x BTB  20x BTB  20x Btb x 20                "  LEG press 115#  20x3   65#  15x2 65#  15 x 2 65#  15 x 2 75#  15x2 85#  15x2 95#  20x3 105# 20 x 3   Calf press  115#  20x3   65#  15x2 65# 15 x 2  65#  15 x 2  75#  15x2 85#  15x2 95#  20x3 105# 20 x 3    TG L10    ~                      Seated TA RPB TB lat pull, rows and B shld ext Black web  20x each   NV  Blue web x 20 ea Blue web  20x each Black web  20x each Blackweb  20x each Blk web x 20 ea   Seated TA: alt hip flexion and B LAQ RPB  3#  20x each  W/ OH bolster   NV  20x ea  rpb RPB  1.5#  20x each RPB  1.5#  20x each RPB  2#  20x each RPB 2# x 20 ea w/ OH bolster   Ther Ex             bike Rom  10'   10' 10'  10'  10'+ ROM  10' Rom  10' ROM 10'    Elliptical                           Fig 4 IR/ER stretch B  :20  3x each   B  :20  3x each 20s x 3 ea bilat 20s x 3 ea bilat :20  3x each :20  3x each :20  3x each 20s x 3   B Hamstring Stretch :20  3x each   :20  3x 20s x 3  20s x 3  :20  3x :20  3x :20  3x  20s x 3   TA knee-chest on physioball Opb  3#  20x   OPB  20x Gpb x 20  Opb x 20  OPB  20x OPB  1.5#  20x OPB  2#  20x Opb x 20 2#   B Juan stretch             Dereje walk outs: 4 way 13 lbs  5 laps each   8 lbs  5 laps each 10#  x 5 ea  10# x 5 ea  12 lbs  5 laps each 12 lbs  5 laps  each 13 lbs  5 laps 13#  x 5 laps   Dereje B LE PRE's 4 way    NV                      Prone press ups 5x3   5x3 5 x 3  5 x 3 5x3 5x3 5x3 5 x  3   Sit to stand 20\" box arms extended Foam  5#  20x   NV  20x   20x 3# foam  20x 5# foam x 20                                                        Ther Activity                                       Gait Training                                       Modalities                                                        "

## 2025-03-05 ENCOUNTER — PROCEDURE VISIT (OUTPATIENT)
Dept: NEUROLOGY | Facility: CLINIC | Age: 65
End: 2025-03-05
Payer: COMMERCIAL

## 2025-03-05 VITALS — SYSTOLIC BLOOD PRESSURE: 126 MMHG | TEMPERATURE: 97.8 F | DIASTOLIC BLOOD PRESSURE: 62 MMHG

## 2025-03-05 DIAGNOSIS — G43.709 CHRONIC MIGRAINE WITHOUT AURA WITHOUT STATUS MIGRAINOSUS, NOT INTRACTABLE: Primary | ICD-10-CM

## 2025-03-05 PROCEDURE — 64615 CHEMODENERV MUSC MIGRAINE: CPT | Performed by: PHYSICIAN ASSISTANT

## 2025-03-05 NOTE — PROGRESS NOTES
Universal Protocol   Consent: Verbal consent obtained. Written consent obtained.  Risks and benefits: risks, benefits and alternatives were discussed  Consent given by: patient  Patient understanding: patient states understanding of the procedure being performed  Patient consent: the patient's understanding of the procedure matches consent given  Procedure consent: procedure consent matches procedure scheduled      Chemodenervation     Date/Time  3/5/2025 10:30 AM     Performed by  Shilpi Nicholas PA-C   Authorized by  Shilpi Nicholas PA-C     Pre-procedure details      Prepped With: Alcohol     Procedure details      Position:  Upright   Botox      Botox Type:  Type A    Brand:  Botox    mL's of Botulinum Toxin:  200    Final Concentration per CC:  100 units    Needle Gauge:  30 G 2.5 inch   Procedures      Botox Procedures: chronic headache      Indications: migraines     Injection Location      Head / Face:  L superior trapezius, R superior trapezius, L superior cervical paraspinal, R superior cervical paraspinal, L , R , procerus, L temporalis, R temporalis, R frontalis, L frontalis, R medial occipitalis and L medial occipitalis    L  injection amount:  5 unit(s)    R  injection amount:  5 unit(s)    L lateral frontalis:  5 unit(s)    R lateral frontalis:  5 unit(s)    L medial frontalis:  5 unit(s)    R medial frontalis:  5 unit(s)    L temporalis injection amount:  20 unit(s)    R temporalis injection amount:  20 unit(s)    Procerus injection amount:  5 unit(s)    L medial occipitalis injection amount:  15 unit(s)    R medial occipitalis injection amount:  15 unit(s)    L superior cervical paraspinal injection amount:  10 unit(s)    R superior cervical paraspinal injection amount:  10 unit(s)    L superior trapezius injection amount:  15 unit(s)    R superior trapezius injection amount:  15 unit(s)   Total Units      Total units used:  200    Total units discarded:  0    Post-procedure details      Chemodenervation:  Chronic migraine    Facial Nerve Location::  Bilateral facial nerve    Patient tolerance of procedure:  Tolerated well, no immediate complications   Comments        45 extra units L frontal, scalp and occipital regions, all medically necessary.       Blood pressure 126/62, temperature 97.8 °F (36.6 °C), temperature source Temporal, not currently breastfeeding.

## 2025-03-06 ENCOUNTER — OFFICE VISIT (OUTPATIENT)
Dept: PHYSICAL THERAPY | Facility: CLINIC | Age: 65
End: 2025-03-06
Payer: COMMERCIAL

## 2025-03-06 DIAGNOSIS — M47.816 LUMBAR SPONDYLOSIS: ICD-10-CM

## 2025-03-06 DIAGNOSIS — M54.50 LEFT LOW BACK PAIN, UNSPECIFIED CHRONICITY, UNSPECIFIED WHETHER SCIATICA PRESENT: Primary | ICD-10-CM

## 2025-03-06 DIAGNOSIS — M54.16 LUMBAR RADICULOPATHY: ICD-10-CM

## 2025-03-06 PROCEDURE — 97112 NEUROMUSCULAR REEDUCATION: CPT

## 2025-03-06 PROCEDURE — 97110 THERAPEUTIC EXERCISES: CPT

## 2025-03-06 NOTE — PROGRESS NOTES
Daily Note     Today's date: 3/6/2025  Patient name: Caryn Velasquez  : 1960  MRN: [id number]  Referring provider: Tami Azar CRNP  Dx:   Encounter Diagnosis     ICD-10-CM    1. Left low back pain, unspecified chronicity, unspecified whether sciatica present  M54.50       2. Lumbar spondylosis  M47.816       3. Lumbar radiculopathy  M54.16           Start Time: 1031          Subjective: Patient continues to report right groin discomfort that worsens on days after therapy.  Patient denied pain during TE performance, except Dereje specifically, B sidestepping.  Patient noted slight decline in symptoms post prone extension TE.      Objective: See treatment diary below      Assessment: Tolerated treatment fair. Treatment was modified to tolerance. Patient exhibited good technique with therapeutic exercises and would benefit from continued PT      Plan: Continue per plan of care.  Progress treatment as tolerated.       Precautions: LBP Left    [email or web address]  Access Code: [email or web address]      POC expires Unit limit Auth Expiration date PT/OT/ST + Visit Limit?   3-29-25 3 na 60 pcy                           Visit/Unit Tracking  AUTH Status:  Date  2.12 2.14 2/18 2/20 2/25 2/27 3/4 3/6   na Used 1 2 3 4  FOTO 5 6 7 8 9 10 11 12    Remaining                          Manuals 3/4 3/6  2/6 2.12 2.14                                                          Neuro Re-Ed                          HL TB Hip abd BTB  20x BTB  20x  BTB  20x Btb x 20  Btb x 20  BTB  20x BTB  20x BTB  20x Btb x 20   TB Clamshells B  BTB  20x  B   BTB  20x   BTB  20x Btb x 20  Btb x 20  BTB  20x BTB  20x BTB  20x Btb x 20    HL TA w/ roll :05  20x  :05  20x    5s x 20  5s x 20 :05  20x :05  20x :05  20x 5s x 29   HL TA marching 3#  20x  NP  1.5#  20x 20x  20x  1.5#  20x 1.5#  20x 2#  20x 2# x 20   TA knee/hip ext 3#  10x2 NP  1.5#  20x 20x 20x  1.5#  20x 1.5#  20x 2#  20x 2# x 20   Bridging w/  "dorothy add :03  20x :03  20x  :03  20x W/ roll at feet  :03  20x :03  20x w/ roll @ feet :03  20x  3s x 20   Bridging w/ TB abd  BTB  20x BTB  20x  BTB  20x Btb x 20 Btb x 20  BTB  20x BTB  20x BTB  20x Btb x 20                 LEG press 115#  20x3 105#  20x3  65#  15x2 65#  15 x 2 65#  15 x 2 75#  15x2 85#  15x2 95#  20x3 105# 20 x 3   Calf press  115#  20x3 105#  20x3  65#  15x2 65# 15 x 2  65#  15 x 2  75#  15x2 85#  15x2 95#  20x3 105# 20 x 3    TG L10    ~                      Seated TA RPB TB lat pull, rows and B shld ext Black web  20x each Black web  20x each  NV  Blue web x 20 ea Blue web  20x each Black web  20x each Blackweb  20x each Blk web x 20 ea   Seated TA: alt hip flexion and B LAQ RPB  3#  20x each  W/ OH bolster Held  NV  20x ea  rpb RPB  1.5#  20x each RPB  1.5#  20x each RPB  2#  20x each RPB 2# x 20 ea w/ OH bolster   Ther Ex             bike Rom  10' ROM  x10'  10' 10'  10'  10'+ ROM  10' Rom  10' ROM 10'    Elliptical                           Fig 4 IR/ER stretch B  :20  3x each B  :20  3x each  B  :20  3x each 20s x 3 ea bilat 20s x 3 ea bilat :20  3x each :20  3x each :20  3x each 20s x 3   B Hamstring Stretch :20  3x each :20  3x each  :20  3x 20s x 3  20s x 3  :20  3x :20  3x :20  3x  20s x 3   TA knee-chest on physioball Opb  3#  20x Held  OPB  20x Gpb x 20  Opb x 20  OPB  20x OPB  1.5#  20x OPB  2#  20x Opb x 20 2#   B Juan Reyez walk outs: 4 way 13 lbs  5 laps each 10lbs  5 laps each  8 lbs  5 laps each 10#  x 5 ea  10# x 5 ea  12 lbs  5 laps each 12 lbs  5 laps  each 13 lbs  5 laps 13#  x 5 laps   Dereje B LE PRE's 4 way    NV                      Prone press ups 5x3 5x3  5x3 5 x 3  5 x 3 5x3 5x3 5x3 5 x  3   Sit to stand 20\" box arms extended Foam  5#  20x Foam  0#  20x  NV  20x   20x 3# foam  20x 5# foam x 20    Prone GS  :03  20x                                                               Ther Activity                                       Gait Training      "                                  Modalities

## 2025-03-10 DIAGNOSIS — I10 ESSENTIAL HYPERTENSION: ICD-10-CM

## 2025-03-10 RX ORDER — LISINOPRIL 5 MG/1
5 TABLET ORAL DAILY
Qty: 90 TABLET | Refills: 1 | Status: SHIPPED | OUTPATIENT
Start: 2025-03-10

## 2025-03-11 ENCOUNTER — OFFICE VISIT (OUTPATIENT)
Dept: PHYSICAL THERAPY | Facility: CLINIC | Age: 65
End: 2025-03-11
Payer: COMMERCIAL

## 2025-03-11 DIAGNOSIS — M47.816 LUMBAR SPONDYLOSIS: ICD-10-CM

## 2025-03-11 DIAGNOSIS — M54.50 LEFT LOW BACK PAIN, UNSPECIFIED CHRONICITY, UNSPECIFIED WHETHER SCIATICA PRESENT: Primary | ICD-10-CM

## 2025-03-11 DIAGNOSIS — M54.16 LUMBAR RADICULOPATHY: ICD-10-CM

## 2025-03-11 PROCEDURE — 97110 THERAPEUTIC EXERCISES: CPT

## 2025-03-11 PROCEDURE — 97112 NEUROMUSCULAR REEDUCATION: CPT

## 2025-03-11 NOTE — PROGRESS NOTES
"Daily Note     Today's date: 3/11/2025  Patient name: Caryn Velasquez  : 1960  MRN: [id number]  Referring provider: Tami Azar CRNP  Dx:   Encounter Diagnosis     ICD-10-CM    1. Left low back pain, unspecified chronicity, unspecified whether sciatica present  M54.50       2. Lumbar radiculopathy  M54.16       3. Lumbar spondylosis  M47.816                      Subjective: SPR=0/10.  Patient's chief complaint is deconditioning; \"I think it's just muscle soreness from not working out\".      Objective: See treatment diary below      Assessment: Tolerated treatment  continuing to exhibit core and gluteal/hip weakness . Improved FOTO score today. Patient demonstrated fatigue post treatment, exhibited good technique with therapeutic exercises, and would benefit from continued PT      Plan: Continue per plan of care.  Progress treatment as tolerated.       Precautions: LBP Left    [email or web address]  Access Code: [email or web address]      POC expires Unit limit Auth Expiration date PT/OT/ST + Visit Limit?   3-29-25 3 na 60 pcy                           Visit/Unit Tracking  AUTH Status:  Date 3/11    2.12 2.14 2/18 2/20 2/25 2/27 3/4 3/6   na Used 13    5 6 7 8 9 10 11 12    Remaining                          Manuals 3/4 3/6 3/11   2.14                                                          Neuro Re-Ed                          HL TB Hip abd BTB  20x BTB  20x BTB  20x   Btb x 20  BTB  20x BTB  20x BTB  20x Btb x 20   TB Clamshells B  BTB  20x  B   BTB  20x  B  BTB  20x   Btb x 20  BTB  20x BTB  20x BTB  20x Btb x 20    HL TA w/ roll :05  20x  :05  20x  :05  20x   5s x 20 :05  20x :05  20x :05  20x 5s x 29   HL TA marching 3#  20x  NP 3#  20x   20x  1.5#  20x 1.5#  20x 2#  20x 2# x 20   TA knee/hip ext 3#  10x2 NP 3#  10x2   20x  1.5#  20x 1.5#  20x 2#  20x 2# x 20   Bridging w/ dorothy add :03  20x :03  20x :03  20x    :03  20x :03  20x w/ roll @ feet :03  20x  3s x 20   Bridging w/ TB abd " " BTB  20x BTB  20x BTB  20x   Btb x 20  BTB  20x BTB  20x BTB  20x Btb x 20                 LEG press 115#  20x3 105#  20x3 105#  20x3   65#  15 x 2 75#  15x2 85#  15x2 95#  20x3 105# 20 x 3   Calf press  115#  20x3 105#  20x3 105#  20x3   65#  15 x 2  75#  15x2 85#  15x2 95#  20x3 105# 20 x 3    TG L10                          Seated TA RPB TB lat pull, rows and B shld ext Black web  20x each Black web  20x each Black Web  20x each   Blue web x 20 ea Blue web  20x each Black web  20x each Blackweb  20x each Blk web x 20 ea   Seated TA: alt hip flexion and B LAQ RPB  3#  20x each  W/ OH bolster Held RPB  3#  20x each no UE A   20x ea  rpb RPB  1.5#  20x each RPB  1.5#  20x each RPB  2#  20x each RPB 2# x 20 ea w/ OH bolster   Ther Ex             bike Rom  10' ROM  x10' ROM  10'   10'  10'+ ROM  10' Rom  10' ROM 10'    Elliptical                           Fig 4 IR/ER stretch B  :20  3x each B  :20  3x each B  :20  3x each   20s x 3 ea bilat :20  3x each :20  3x each :20  3x each 20s x 3   B Hamstring Stretch :20  3x each :20  3x each B  :20  3x    20s x 3  :20  3x :20  3x :20  3x  20s x 3   TA knee-chest on physioball Opb  3#  20x Held OPB  3#  20x   Opb x 20  OPB  20x OPB  1.5#  20x OPB  2#  20x Opb x 20 2#   B Juan stretch             Dereje walk outs: 4 way 13 lbs  5 laps each 10lbs  5 laps each 10 lbs  5 laps each   10# x 5 ea  12 lbs  5 laps each 12 lbs  5 laps  each 13 lbs  5 laps 13#  x 5 laps   Dereje VINCENT PRE's 4 way                          Prone press ups 5x3 5x3 5x3   5 x 3 5x3 5x3 5x3 5 x  3   Sit to stand 20\" box arms extended Foam  5#  20x Foam  0#  20x Foam  5#  10x2   20x   20x 3# foam  20x 5# foam x 20    Prone GS  :03  20x :03  20x                                                              Ther Activity                                       Gait Training                                       Modalities                                                            "

## 2025-03-13 ENCOUNTER — OFFICE VISIT (OUTPATIENT)
Dept: PHYSICAL THERAPY | Facility: CLINIC | Age: 65
End: 2025-03-13
Payer: COMMERCIAL

## 2025-03-13 DIAGNOSIS — M54.50 LEFT LOW BACK PAIN, UNSPECIFIED CHRONICITY, UNSPECIFIED WHETHER SCIATICA PRESENT: Primary | ICD-10-CM

## 2025-03-13 DIAGNOSIS — M54.16 LUMBAR RADICULOPATHY: ICD-10-CM

## 2025-03-13 DIAGNOSIS — M47.816 LUMBAR SPONDYLOSIS: ICD-10-CM

## 2025-03-13 PROCEDURE — 97112 NEUROMUSCULAR REEDUCATION: CPT | Performed by: PHYSICAL THERAPIST

## 2025-03-13 PROCEDURE — 97110 THERAPEUTIC EXERCISES: CPT | Performed by: PHYSICAL THERAPIST

## 2025-03-13 NOTE — PROGRESS NOTES
"Daily Note     Today's date: 3/13/2025  Patient name: Caryn Velasquez  : 1960  MRN: [id number]  Referring provider: Tami Azar CRNP  Dx:   Encounter Diagnosis     ICD-10-CM    1. Left low back pain, unspecified chronicity, unspecified whether sciatica present  M54.50       2. Lumbar radiculopathy  M54.16       3. Lumbar spondylosis  M47.816                      Subjective: patient reports that she is feeling much better overall - the spell of increased pain/ symptoms has passed and she is \"back to baseline\"       Objective: See treatment diary below      Assessment: Tolerated treatment well overall    Plan: Continue per plan of care.  Progress treatment as tolerated.       Precautions: LBP Left    [email or web address]  Access Code: [email or web address]      POC expires Unit limit Auth Expiration date PT/OT/ST + Visit Limit?   3-29-25 3 na 60 pcy                           Visit/Unit Tracking  AUTH Status:  Date 3/11 3/13   2.12 2.14 2/18 2/20 2/25 2/27 3/4 3/6   na Used 13 14   5 6 7 8 9 10 11 12    Remaining                          Manuals 3/4 3/6 3/11 3/13                                                               Neuro Re-Ed                          HL TB Hip abd BTB  20x BTB  20x BTB  20x Btb x 20         TB Clamshells B  BTB  20x  B   BTB  20x  B  BTB  20x Btb x 20         HL TA w/ roll :05  20x  :05  20x  :05  20x 5 s x20         HL TA marching 3#  20x  NP 3#  20x 3# x 20         TA knee/hip ext 3#  10x2 NP 3#  10x2 3# x 20         Bridging w/ dorothy add :03  20x :03  20x :03  20x 3s x 20         Bridging w/ TB abd  BTB  20x BTB  20x BTB  20x Btb x 20                       LEG press 115#  20x3 105#  20x3 105#  20x3 115#  20x 3         Calf press  115#  20x3 105#  20x3 105#  20x3 115# 20x 3                                   Seated TA RPB TB lat pull, rows and B shld ext Black web  20x each Black web  20x each Black Web  20x each Black web 20 x          Seated TA: alt hip flexion and B LAQ " "RPB  3#  20x each  W/ OH bolster Held RPB  3#  20x each no UE A RPB  3# x 20 ea no UE A w/ ball toss          Ther Ex             bike Rom  10' ROM  x10' ROM  10' ROM 10'          Elliptical                           Fig 4 IR/ER stretch B  :20  3x each B  :20  3x each B  :20  3x each 20s x 3 bilat         B Hamstring Stretch :20  3x each :20  3x each B  :20  3x  20s x 3 bilat         TA knee-chest on physioball Opb  3#  20x Held OPB  3#  20x Opb 3# x 20          B Juan stretch             [place] walk outs: 4 way 13 lbs  5 laps each 10lbs  5 laps each 10 lbs  5 laps each 10#  5 ea  w/ 3# on le's         Dereje B LE PRE's 4 way                          Prone press ups 5x3 5x3 5x3 5 x 3          Sit to stand 20\" box arms extended Foam  5#  20x Foam  0#  20x Foam  5#  10x2 Foam 5# 10 x2         Prone GS  :03  20x :03  20x 3s x 20                                                              Ther Activity                                       Gait Training                                       Modalities                                                            "

## 2025-03-18 ENCOUNTER — OFFICE VISIT (OUTPATIENT)
Dept: PHYSICAL THERAPY | Facility: CLINIC | Age: 65
End: 2025-03-18
Payer: COMMERCIAL

## 2025-03-18 DIAGNOSIS — M54.50 LEFT LOW BACK PAIN, UNSPECIFIED CHRONICITY, UNSPECIFIED WHETHER SCIATICA PRESENT: Primary | ICD-10-CM

## 2025-03-18 DIAGNOSIS — M47.816 LUMBAR SPONDYLOSIS: ICD-10-CM

## 2025-03-18 DIAGNOSIS — M54.16 LUMBAR RADICULOPATHY: ICD-10-CM

## 2025-03-18 PROCEDURE — 97112 NEUROMUSCULAR REEDUCATION: CPT

## 2025-03-18 PROCEDURE — 97110 THERAPEUTIC EXERCISES: CPT

## 2025-03-18 NOTE — PROGRESS NOTES
Daily Note     Today's date: 3/18/2025  Patient name: Caryn Velasquez  : 1960  MRN: [id number]  Referring provider: Tami Azar CRNP  Dx:   Encounter Diagnosis     ICD-10-CM    1. Left low back pain, unspecified chronicity, unspecified whether sciatica present  M54.50       2. Lumbar radiculopathy  M54.16       3. Lumbar spondylosis  M47.816                      Subjective: Patient offered no complaints.      Objective: See treatment diary below      Assessment: Tolerated treatment well. Patient demonstrated fatigue post treatment, exhibited good technique with therapeutic exercises, and would benefit from continued PT      Plan: Continue per plan of care.  Progress treatment as tolerated.       Precautions: LBP Left    [email or web address]  Access Code: [email or web address]      POC expires Unit limit Auth Expiration date PT/OT/ST + Visit Limit?   3-29-25 3 na 60 pcy                           Visit/Unit Tracking  AUTH Status:  Date 3/11 3/13 3/18    2/18 2/20 2/25 2/27 3/4 3/6   na Used 13 14 5    7 8 9 10 11 12    Remaining                          Manuals 3/4 3/6 3/11 3/13 3/18                                                              Neuro Re-Ed                          HL TB Hip abd BTB  20x BTB  20x BTB  20x Btb x 20 BTB  20x        TB Clamshells B  BTB  20x  B   BTB  20x  B  BTB  20x Btb x 20 BTB  20x        HL TA w/ roll :05  20x  :05  20x  :05  20x 5 s x20 :05  20x        HL TA marching 3#  20x  NP 3#  20x 3# x 20 3#  20x        TA knee/hip ext 3#  10x2 NP 3#  10x2 3# x 20 3#  20x        Bridging w/ dorothy add :03  20x :03  20x :03  20x 3s x 20 :03  20x        Bridging w/ TB abd  BTB  20x BTB  20x BTB  20x Btb x 20  BTB  20x                     LEG press 115#  20x3 105#  20x3 105#  20x3 115#  20x 3 115 lbs  20x2        Calf press  115#  20x3 105#  20x3 105#  20x3 115# 20x 3 115 lbs  20x2                                  Seated TA RPB TB lat pull, rows and B shld ext Black web  20x each Black  "web  20x each Black Web  20x each Black web 20 x  Black web  20x        Seated TA: alt hip flexion and B LAQ RPB  3#  20x each  W/ OH bolster Held RPB  3#  20x each no UE A RPB  3# x 20 ea no UE A w/ ball toss  RPB  3#  20x each        Ther Ex             bike Rom  10' ROM  x10' ROM  10' ROM 10'  ROM  x10'        Elliptical                           Fig 4 IR/ER stretch B  :20  3x each B  :20  3x each B  :20  3x each 20s x 3 bilat :20  3x each        B Hamstring Stretch :20  3x each :20  3x each B  :20  3x  20s x 3 bilat :20  3x        TA knee-chest on physioball Opb  3#  20x Held OPB  3#  20x Opb 3# x 20  OPB        B Juan Reyez walk outs: 4 way 13 lbs  5 laps each 10lbs  5 laps each 10 lbs  5 laps each 10#  5 ea  w/ 3# on le's 10 lbs  5 laps        Dereje VINCENT PRE's 4 way                          Prone press ups 5x3 5x3 5x3 5 x 3  5x3        Sit to stand 20\" box arms extended Foam  5#  20x Foam  0#  20x Foam  5#  10x2 Foam 5# 10 x2 Foam  5#  10x2        Prone GS  :03  20x :03  20x 3s x 20  :03  20x                                                            Ther Activity                                       Gait Training                                       Modalities                                                              "

## 2025-03-20 ENCOUNTER — OFFICE VISIT (OUTPATIENT)
Dept: PHYSICAL THERAPY | Facility: CLINIC | Age: 65
End: 2025-03-20
Payer: COMMERCIAL

## 2025-03-20 DIAGNOSIS — M54.50 LEFT LOW BACK PAIN, UNSPECIFIED CHRONICITY, UNSPECIFIED WHETHER SCIATICA PRESENT: Primary | ICD-10-CM

## 2025-03-20 DIAGNOSIS — M54.16 LUMBAR RADICULOPATHY: ICD-10-CM

## 2025-03-20 DIAGNOSIS — M47.816 LUMBAR SPONDYLOSIS: ICD-10-CM

## 2025-03-20 PROCEDURE — 97110 THERAPEUTIC EXERCISES: CPT | Performed by: PHYSICAL THERAPIST

## 2025-03-20 PROCEDURE — 97112 NEUROMUSCULAR REEDUCATION: CPT | Performed by: PHYSICAL THERAPIST

## 2025-03-20 NOTE — PROGRESS NOTES
Daily Note     Today's date: 3/20/2025  Patient name: Caryn Velasquez  : 1960  MRN: [id number]  Referring provider: Tami Azar CRNP  Dx:   Encounter Diagnosis     ICD-10-CM    1. Left low back pain, unspecified chronicity, unspecified whether sciatica present  M54.50       2. Lumbar radiculopathy  M54.16       3. Lumbar spondylosis  M47.816                      Subjective: Patient feels that the hip flexor stretch off the table is aggravating her right side causing some discomfort in right LS region   after getting off recumbent bike to warm up and standing erect - she felt right buttock- le - calf region.    Patient reports feeling much better after extension press ups and running thru her program    Objective: See treatment diary below      Assessment: Tolerated treatment well after initial flare up following recumbent bike    appears to be able to resolve symptoms with extension bias press ups       Plan: Continue per plan of care.  Progress treatment as tolerated.       Precautions: LBP Left    [email or web address]  Access [email or web address]: [email or web address]      POC expires Unit limit Auth Expiration date PT/OT/ST + Visit Limit?   3-29-25 3 na 60 pcy                           Visit/Unit Tracking  AUTH Status:  Date 3/11 3/13 3/18 3/20   2/18 2/20 2/25 2/27 3/4 3/6   na Used 13 14 15 16   7 8 9 10 11 12    Remaining                          Manuals 3/4 3/6 3/11 3/13 3/18 3/20                                                             Neuro Re-Ed                          HL TB Hip abd BTB  20x BTB  20x BTB  20x Btb x 20 BTB  20x Btb x 20       TB Clamshells B  BTB  20x  B   BTB  20x  B  BTB  20x Btb x 20 BTB  20x Btb x 20       HL TA w/ roll :05  20x  :05  20x  :05  20x 5 s x20 :05  20x 5s x 20       HL TA marching 3#  20x  NP 3#  20x 3# x 20 3#  20x 3# x 20       TA knee/hip ext 3#  10x2 NP 3#  10x2 3# x 20 3#  20x 3# x 20        Bridging w/ dorothy add :03  20x :03  20x :03  20x 3s x 20 :03  20x 3s x 20      "  Bridging w/ TB abd  BTB  20x BTB  20x BTB  20x Btb x 20  BTB  20x Btb x20                     LEG press 115#  20x3 105#  20x3 105#  20x3 115#  20x 3 115 lbs  20x2 115#  20 x 2       Calf press  115#  20x3 105#  20x3 105#  20x3 115# 20x 3 115 lbs  20x2 115#  20x 2                                 Seated TA RPB TB lat pull, rows and B shld ext Black web  20x each Black web  20x each Black Web  20x each Black web 20 x  Black web  20x        Seated TA: alt hip flexion and B LAQ RPB  3#  20x each  W/ OH bolster Held RPB  3#  20x each no UE A RPB  3# x 20 ea no UE A w/ ball toss  RPB  3#  20x each        Ther Ex             bike Rom  10' ROM  x10' ROM  10' ROM 10'  ROM  x10' ROM 10'        Elliptical                           Fig 4 IR/ER stretch B  :20  3x each B  :20  3x each B  :20  3x each 20s x 3 bilat :20  3x each 20s x 3 ea        B Hamstring Stretch :20  3x each :20  3x each B  :20  3x  20s x 3 bilat :20  3x 20s x 3        TA knee-chest on physioball Opb  3#  20x Held OPB  3#  20x Opb 3# x 20  OPB        B Juan stretch             Dereje walk outs: 4 way 13 lbs  5 laps each 10lbs  5 laps each 10 lbs  5 laps each 10#  5 ea  w/ 3# on le's 10 lbs  5 laps 10# x 5 ea        Dereje B MELISA PRE's 4 way                          Prone press ups 5x3 5x3 5x3 5 x 3  5x3 5 x 3       Sit to stand 20\" box arms extended Foam  5#  20x Foam  0#  20x Foam  5#  10x2 Foam 5# 10 x2 Foam  5#  10x2        Prone GS  :03  20x :03  20x 3s x 20  :03  20x 3s x 20                                                            Ther Activity                                       Gait Training                                       Modalities                                                              "

## 2025-03-24 NOTE — PROGRESS NOTES
Pain Medicine Follow-Up Note    Assessment:  1. Sacroiliitis (HCC)    2. Left low back pain, unspecified chronicity, unspecified whether sciatica present    3. Chronic bilateral low back pain, unspecified whether sciatica present    4. Lumbar spondylosis    5. Obesity, morbid (HCC)    6. Atrophy of muscle of back at lumbosacral level        Plan:  Orders Placed This Encounter   Procedures   • [place] spine and pain procedure     Standing Status:   Future     Expected Date:   3/25/2025     Expiration Date:   3/25/2029     Reason for Exam::   right SI joint injection     Anticoagulant hold needed?:   no       New Medications Ordered This Visit   Medications   • methylPREDNISolone 4 MG tablet therapy pack     Sig: Use as directed on package     Dispense:  1 each     Refill:  0   • cyclobenzaprine (FLEXERIL) 10 mg tablet     Sig: Take 1 tablet (10 mg total) by mouth 2 (two) times a day as needed for muscle spasms     Dispense:  60 tablet     Refill:  0       My impressions and treatment recommendations were discussed in detail with the patient who verbalized understanding and had no further questions.      The patient returns to the office with worsening bilateral low back pain right greater than the left patient states that her pain starts in the right low back and goes down into her right buttock and posterior thigh.  On exam the patient does have right-sided sacroiliitis which is reproducible with multiple provocative maneuvers including Milton's maneuver, Gaenslen's tests, and pelvic distraction's test.  I recommend that the patient have a right SI joint injection. Complete risks and benefits including bleeding, infection, tissue reaction, nerve injury and allergic reaction were discussed. The approach was demonstrated using models and literature was provided. Verbal and written consent was obtained.    Patient states she continues to use cyclobenzaprine, I will prescribe a Medrol dose steroid pack to reduce her  inflammation while awaiting a SI joint injection.  I advised that the patient continue with physical therapy for sacroiliitis.  Patient also reports she will make lifestyle changes due to her recently gaining approximately 30 pounds over the last year.    Follow-up is planned in 4 weeks after injection time or sooner as warranted.  Discharge instructions were provided. I personally saw and examined the patient and I agree with the above discussed plan of care.    History of Present Illness:    Caryn Velasquez is a 64 y.o. female who presents to [facility] for interval re-evaluation of the above stated pain complaints. The patient has a past medical and chronic pain history as outlined in the assessment section. She was last seen on 1/17/2025.    At today's visit patient states that their pain symptoms are worse with a pain score of 6-7/10 on the verbal numeric pain scale.  The patient's pain is worse whenever changing positions.  The patient's pain is constant and intermittently worse in nature.  And the quality of the patient's pain is described as burning, dull-aching, sharp, throbbing, shooting, numbness.  The patient's pain is located in the left anterior thigh (chronic from hysterectomy surgery) and bilateral low back, right buttock and right posterior thigh.  Patient reports she continues to use cyclobenzaprine 10 mg twice daily as needed for pain/muscle spasms.      Other than as stated above, the patient denies any interval changes in medications, medical condition, mental condition, symptoms, or allergies since the last office visit.         Review of Systems:    Review of Systems   Respiratory:  Negative for shortness of breath.    Cardiovascular:  Negative for chest pain.   Gastrointestinal:  Positive for nausea. Negative for constipation, diarrhea and vomiting.   Musculoskeletal:  Positive for arthralgias and gait problem. Negative for joint swelling and myalgias.   Skin:   Negative for rash.   Neurological:  Negative for dizziness, seizures and weakness.   All other systems reviewed and are negative.        Past Medical History:   Diagnosis Date   • Anemia    • Anxiety    • Arthritis    • Attention and concentration deficit    • Blurred vision    • Chronic pain    • Chronic pain syndrome 2016   • CPAP (continuous positive airway pressure) dependence    • CTS (carpal tunnel syndrome) > 6 years    Left wrist   • Depression    • Difficulty walking > 6 years    Left nerve leg pain   • Diplopia    • Dyspepsia    • Gastric ulcer    • Gastritis    • GERD (gastroesophageal reflux disease)    • Head injury > 25 years    Fell off 12 foot retaining wall   • Headache(784.0)    • Headache, tension-type > 1 year    Pain behind left eyeAnd stiff neck with pressure   • History of transfusion    • Hypertension    • Inflammatory bowel disease    • Insomnia    • Irritable bowel syndrome    • Kidney stone    • Memory loss    • Migraines    • Obesity    • Osteopenia    • Peripheral neuropathy 3/14/2012    Left thigh, left abdomen, left prudential nerve   • 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   • Presence of neurostimulator     Pudendal neuralgia   • Pudendal neuralgia    • Shingles > 30 years ago    Left facial nerve   • Sleep apnea    • Sleep apnea, obstructive    • Spinal stenosis    • Starting and stopping of urinary stream during micturition    • Syncope > 10 years    When I get up quickly   • Trigeminal neuralgia > 30 years    Associated with shingles on left face   • Urinary incontinence    • Visual impairment    • Wears eyeglasses        Past Surgical History:   Procedure Laterality Date   •  SECTION     •  SECTION     • CHOLECYSTECTOMY     • COLONOSCOPY     • GALLBLADDER SURGERY     • GASTRIC BYPASS     • HYSTERECTOMY  2012   • INSERT / REPLACE PERIPHERAL NEUROSTIMULATOR PULSE GENERATOR /      • MASS EXCISION Right 2021     Procedure: EXCISION  BIOPSY LESION/MASS LOWER NECK;  Surgeon: Brennon Browning DO;  Location: [facility];  Service: General   • OTHER SURGICAL HISTORY  2012    Reimplantatin at [facility]    • MS INS/RPLC PERPH SAC/GSTRC NPG/RCVR PCKT CRTJ&CONN Left 2/23/2023    Procedure: PLACEMENT OF PERMANENT LEADWIRE AND PG FOR SNM AT S3 FORAMEN, COMPLEX PROGRAMMING OF PULSE GENERATOR;  Surgeon: Jess Shaw MD;  Location: [facility];  Service: UroGynecology          • MS INSJ/RPLCMT SPINAL NPG/RCVR POCKET CRTJ&CONNJ Right 04/15/2020    Procedure: REPLACEMENT IMPLANTABLE PULSE GENERATOR FOR DORSAL SPINAL COLUMN STIMULATOR,  RIGHT BUTTOCKS;  Surgeon: Fili Mckeon MD;  Location: [facility];  Service: Neurosurgery   • RADIOFREQUENCY ABLATION NERVES     • SPINAL CORD STIMULATOR IMPLANT  2015   • TRIGGER POINT INJECTION     • URETER SURGERY     • URETHRAL FISTULA REPAIR     • US GUIDED THYROID BIOPSY  09/23/2020   • WRIST ARTHROSCOPY      with internal fixation        Family History   Problem Relation Age of Onset   • Cirrhosis Mother    • Crohn's disease Mother    • Lupus Mother         Systemic Lupus Erythematous    • Depression Mother    • Dementia Mother         Caused by liver disease   • Neuropathy Mother         Fibromyalgia   • Hypertension Father    • Heart disease Father    • Depression Sister    • No Known Problems Daughter    • Cirrhosis Brother    • Dementia Brother    • Crohn's disease Brother    • No Known Problems Maternal Aunt    • No Known Problems Paternal Aunt    • No Known Problems Cousin    • No Known Problems Cousin    • No Known Problems Cousin    • No Known Problems Cousin    • No Known Problems Cousin    • Dementia Brother         Caused by liver disease   • Seizures Neg Hx    • Breast cancer Neg Hx        Social History     Occupational History   • Occupation: retired   Tobacco Use   • Smoking status: Never   • Smokeless tobacco: Never   Vaping Use   • Vaping status: Never Used   Substance and Sexual Activity    • Alcohol use: No   • Drug use: Yes     Types: Marijuana     Comment: Capsules used for chronic pain.  Medical   • Sexual activity: Not Currently     Partners: Male     Birth control/protection: None         Current Outpatient Medications:   •  albuterol (Ventolin HFA) 90 mcg/act inhaler, Inhale 2 puffs every 6 (six) hours as needed for wheezing, Disp: 18 g, Rfl: 0  •  Botox 200 units SOLR, , Disp: , Rfl:   •  busPIRone (BUSPAR) 10 mg tablet, Take 1 tablet (10 mg total) by mouth 2 (two) times a day, Disp: 180 tablet, Rfl: 1  •  Cholecalciferol 25 MCG (1000 UT) capsule, Take 1 capsule by mouth daily, Disp: , Rfl:   •  ciclopirox (PENLAC) 8 % solution, , Disp: , Rfl:   •  clobetasol (TEMOVATE) 0.05 % ointment, if needed, Disp: , Rfl:   •  cyclobenzaprine (FLEXERIL) 10 mg tablet, Take 1 tablet (10 mg total) by mouth 2 (two) times a day as needed for muscle spasms, Disp: 60 tablet, Rfl: 0  •  Emgality 120 MG/ML SOAJ, USE 1 INJECTION UNDER THE SKIN EVERY MONTH, Disp: 3 mL, Rfl: 3  •  gabapentin (NEURONTIN) 300 mg capsule, Take 1 capsule (300 mg total) by mouth 2 (two) times a day, Disp: 180 capsule, Rfl: 1  •  Iron-Vitamin C 100-250 MG TABS, Take 1 tablet by mouth daily, Disp: , Rfl:   •  ketoconazole (NIZORAL) 2 % cream, , Disp: , Rfl:   •  lisinopril (ZESTRIL) 5 mg tablet, TAKE 1 TABLET DAILY, Disp: 90 tablet, Rfl: 1  •  methylPREDNISolone 4 MG tablet therapy pack, Use as directed on package, Disp: 1 each, Rfl: 0  •  Multiple Vitamin (MULTI-VITAMIN DAILY) TABS, Take 1 tablet by mouth daily, Disp: , Rfl:   •  ondansetron (ZOFRAN-ODT) 4 mg disintegrating tablet, PLACE 1 TABLET ON THE TONGUE EVERY 6 HOURS AS NEEDED FOR NAUSEA OR VOMITING, Disp: 30 tablet, Rfl: 5  •  pantoprazole (PROTONIX) 40 mg tablet, TAKE 1 TABLET TWICE A DAY, Disp: 180 tablet, Rfl: 1  •  propranolol (INDERAL) 60 mg tablet, TAKE 1 TABLET TWICE A DAY, Disp: 180 tablet, Rfl: 1  •  QUEtiapine (SEROquel) 25 mg tablet, Take 1 tablet (25 mg total) by mouth  "daily at bedtime, Disp: 90 tablet, Rfl: 1  •  rizatriptan (MAXALT-MLT) 10 mg disintegrating tablet, Take at the onset of migraine; if symptoms continue or return, may take another dose at least 2 hours after first dose. Take no more than 2 doses in a day., Disp: 12 tablet, Rfl: 5  •  rosuvastatin (CRESTOR) 10 MG tablet, TAKE 1 TABLET DAILY, Disp: 90 tablet, Rfl: 1  •  sertraline (ZOLOFT) 100 mg tablet, Take 1 tablet (100 mg total) by mouth daily, Disp: 90 tablet, Rfl: 1  •  tacrolimus (PROTOPIC) 0.1 % ointment, if needed, Disp: , Rfl:   •  triamcinolone (KENALOG) 0.1 % cream, APPLY TOPICALLY TWICE A DAY, Disp: 80 g, Rfl: 8  •  denosumab (PROLIA) 60 mg/mL, Inject 1 mL (60 mg total) under the skin once for 1 dose, Disp: 1 mL, Rfl: 0  •  oxyCODONE-acetaminophen (Percocet) 5-325 mg per tablet, Take 1 tablet by mouth every 4 (four) hours as needed for moderate pain Max Daily Amount: 6 tablets, Disp: 20 tablet, Rfl: 0    Allergies   Allergen Reactions   • Morphine Nausea Only and Abdominal Pain     SEVERE N/V   • Cat Dander Nasal Congestion     Cat Dander   • Dog Epithelium Nasal Congestion   • Other Other (See Comments)     Dogs- sneezing  Crab Meat- GI intolerance       Physical Exam:    Ht 5' 2\" (1.575 m)   Wt 99.8 kg (220 lb)   BMI 40.24 kg/m²     Constitutional:normal, well developed, well nourished, alert, in no distress and non-toxic and no overt pain behavior. and obese  Eyes:anicteric  HEENT:grossly intact  Neck:supple, symmetric, trachea midline and no masses   Pulmonary:even and unlabored  Cardiovascular:No edema or pitting edema present  Skin:Normal without rashes or lesions and well hydrated  Psychiatric:Mood and affect appropriate  Neurologic:Cranial Nerves II-XII grossly intact  Musculoskeletal:antalgic gait    Lumbar Spine Exam    Appearance:  Normal lordosis  Palpation/Tenderness:  right lumbar paraspinal tenderness  right sacroiliac joint tenderness  right piriformis tenderness  Special Tests:  Right " Straight Leg Test:  negative  Right Milton's Maneuver:  positive  Right Gaenslen's Test:  positive  Right Pelvic Distraction Test:  positive  Right leg logroll: Positive posteriorly        Imaging  FL spine and pain procedure    (Results Pending)         Orders Placed This Encounter   Procedures   • [place] spine and pain procedure       This document was created using speech voice recognition software.   Grammatical errors, random word insertions, pronoun errors, and incomplete sentences are an occasional consequence of this system due to software limitations, ambient noise, and hardware issues.   Any formal questions or concerns about content, text, or information contained within the body of this dictation should be directly addressed to the provider for clarification.

## 2025-03-24 NOTE — H&P (VIEW-ONLY)
Pain Medicine Follow-Up Note    Assessment:  1. Sacroiliitis (HCC)    2. Left low back pain, unspecified chronicity, unspecified whether sciatica present    3. Chronic bilateral low back pain, unspecified whether sciatica present    4. Lumbar spondylosis    5. Obesity, morbid (HCC)    6. Atrophy of muscle of back at lumbosacral level        Plan:  Orders Placed This Encounter   Procedures   • FL spine and pain procedure     Standing Status:   Future     Expected Date:   3/25/2025     Expiration Date:   3/25/2029     Reason for Exam::   right SI joint injection     Anticoagulant hold needed?:   no       New Medications Ordered This Visit   Medications   • methylPREDNISolone 4 MG tablet therapy pack     Sig: Use as directed on package     Dispense:  1 each     Refill:  0   • cyclobenzaprine (FLEXERIL) 10 mg tablet     Sig: Take 1 tablet (10 mg total) by mouth 2 (two) times a day as needed for muscle spasms     Dispense:  60 tablet     Refill:  0       My impressions and treatment recommendations were discussed in detail with the patient who verbalized understanding and had no further questions.      The patient returns to the office with worsening bilateral low back pain right greater than the left patient states that her pain starts in the right low back and goes down into her right buttock and posterior thigh.  On exam the patient does have right-sided sacroiliitis which is reproducible with multiple provocative maneuvers including Milton's maneuver, Gaenslen's tests, and pelvic distraction's test.  I recommend that the patient have a right SI joint injection. Complete risks and benefits including bleeding, infection, tissue reaction, nerve injury and allergic reaction were discussed. The approach was demonstrated using models and literature was provided. Verbal and written consent was obtained.    Patient states she continues to use cyclobenzaprine, I will prescribe a Medrol dose steroid pack to reduce her  inflammation while awaiting a SI joint injection.  I advised that the patient continue with physical therapy for sacroiliitis.  Patient also reports she will make lifestyle changes due to her recently gaining approximately 30 pounds over the last year.    Follow-up is planned in 4 weeks after injection time or sooner as warranted.  Discharge instructions were provided. I personally saw and examined the patient and I agree with the above discussed plan of care.    History of Present Illness:    Caryn Velasquez is a 64 y.o. female who presents to [facility] for interval re-evaluation of the above stated pain complaints. The patient has a past medical and chronic pain history as outlined in the assessment section. She was last seen on 1/17/2025.    At today's visit patient states that their pain symptoms are worse with a pain score of 6-7/10 on the verbal numeric pain scale.  The patient's pain is worse whenever changing positions.  The patient's pain is constant and intermittently worse in nature.  And the quality of the patient's pain is described as burning, dull-aching, sharp, throbbing, shooting, numbness.  The patient's pain is located in the left anterior thigh (chronic from hysterectomy surgery) and bilateral low back, right buttock and right posterior thigh.  Patient reports she continues to use cyclobenzaprine 10 mg twice daily as needed for pain/muscle spasms.      Other than as stated above, the patient denies any interval changes in medications, medical condition, mental condition, symptoms, or allergies since the last office visit.         Review of Systems:    Review of Systems   Respiratory:  Negative for shortness of breath.    Cardiovascular:  Negative for chest pain.   Gastrointestinal:  Positive for nausea. Negative for constipation, diarrhea and vomiting.   Musculoskeletal:  Positive for arthralgias and gait problem. Negative for joint swelling and myalgias.   Skin:   Negative for rash.   Neurological:  Negative for dizziness, seizures and weakness.   All other systems reviewed and are negative.        Past Medical History:   Diagnosis Date   • Anemia    • Anxiety    • Arthritis    • Attention and concentration deficit    • Blurred vision    • Chronic pain    • Chronic pain syndrome 2016   • CPAP (continuous positive airway pressure) dependence    • CTS (carpal tunnel syndrome) > 6 years    Left wrist   • Depression    • Difficulty walking > 6 years    Left nerve leg pain   • Diplopia    • Dyspepsia    • Gastric ulcer    • Gastritis    • GERD (gastroesophageal reflux disease)    • Head injury > 25 years    Fell off 12 foot retaining wall   • Headache(784.0)    • Headache, tension-type > 1 year    Pain behind left eyeAnd stiff neck with pressure   • History of transfusion    • Hypertension    • Inflammatory bowel disease    • Insomnia    • Irritable bowel syndrome    • Kidney stone    • Memory loss    • Migraines    • Obesity    • Osteopenia    • Peripheral neuropathy 3/14/2012    Left thigh, left abdomen, left prudential nerve   • 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   • Presence of neurostimulator     Pudendal neuralgia   • Pudendal neuralgia    • Shingles > 30 years ago    Left facial nerve   • Sleep apnea    • Sleep apnea, obstructive    • Spinal stenosis    • Starting and stopping of urinary stream during micturition    • Syncope > 10 years    When I get up quickly   • Trigeminal neuralgia > 30 years    Associated with shingles on left face   • Urinary incontinence    • Visual impairment    • Wears eyeglasses        Past Surgical History:   Procedure Laterality Date   •  SECTION     •  SECTION     • CHOLECYSTECTOMY     • COLONOSCOPY     • GALLBLADDER SURGERY     • GASTRIC BYPASS     • HYSTERECTOMY  2012   • INSERT / REPLACE PERIPHERAL NEUROSTIMULATOR PULSE GENERATOR /      • MASS EXCISION Right 2021     Procedure: EXCISION  BIOPSY LESION/MASS LOWER NECK;  Surgeon: Brennon Browning DO;  Location: [facility];  Service: General   • OTHER SURGICAL HISTORY  2012    Reimplantatin at [facility]    • [place] INS/RPLC PERPH SAC/GSTRC NPG/RCVR PCKT CRTJ&CONN Left 2/23/2023    Procedure: PLACEMENT OF PERMANENT LEADWIRE AND PG FOR SNM AT S3 FORAMEN, COMPLEX PROGRAMMING OF PULSE GENERATOR;  Surgeon: Jess Shaw MD;  Location: [facility];  Service: UroGynecology          • [place] INSJ/RPLCMT SPINAL NPG/RCVR POCKET CRTJ&CONNJ Right 04/15/2020    Procedure: REPLACEMENT IMPLANTABLE PULSE GENERATOR FOR DORSAL SPINAL COLUMN STIMULATOR,  RIGHT BUTTOCKS;  Surgeon: Fili Mckeon MD;  Location: [facility];  Service: Neurosurgery   • RADIOFREQUENCY ABLATION NERVES     • SPINAL CORD STIMULATOR IMPLANT  2015   • TRIGGER POINT INJECTION     • URETER SURGERY     • URETHRAL FISTULA REPAIR     • US GUIDED THYROID BIOPSY  09/23/2020   • WRIST ARTHROSCOPY      with internal fixation        Family History   Problem Relation Age of Onset   • Cirrhosis Mother    • Crohn's disease Mother    • Lupus Mother         Systemic Lupus Erythematous    • Depression Mother    • Dementia Mother         Caused by liver disease   • Neuropathy Mother         Fibromyalgia   • Hypertension Father    • Heart disease Father    • Depression Sister    • No Known Problems Daughter    • Cirrhosis Brother    • Dementia Brother    • Crohn's disease Brother    • No Known Problems Maternal Aunt    • No Known Problems Paternal Aunt    • No Known Problems Cousin    • No Known Problems Cousin    • No Known Problems Cousin    • No Known Problems Cousin    • No Known Problems Cousin    • Dementia Brother         Caused by liver disease   • Seizures Neg Hx    • Breast cancer Neg Hx        Social History     Occupational History   • Occupation: retired   Tobacco Use   • Smoking status: Never   • Smokeless tobacco: Never   Vaping Use   • Vaping status: Never Used   Substance and Sexual Activity    • Alcohol use: No   • Drug use: Yes     Types: Marijuana     Comment: Capsules used for chronic pain.  Medical   • Sexual activity: Not Currently     Partners: Male     Birth control/protection: None         Current Outpatient Medications:   •  albuterol (Ventolin HFA) 90 mcg/act inhaler, Inhale 2 puffs every 6 (six) hours as needed for wheezing, Disp: 18 g, Rfl: 0  •  Botox 200 units SOLR, , Disp: , Rfl:   •  busPIRone (BUSPAR) 10 mg tablet, Take 1 tablet (10 mg total) by mouth 2 (two) times a day, Disp: 180 tablet, Rfl: 1  •  Cholecalciferol 25 MCG (1000 UT) capsule, Take 1 capsule by mouth daily, Disp: , Rfl:   •  ciclopirox (PENLAC) 8 % solution, , Disp: , Rfl:   •  clobetasol (TEMOVATE) 0.05 % ointment, if needed, Disp: , Rfl:   •  cyclobenzaprine (FLEXERIL) 10 mg tablet, Take 1 tablet (10 mg total) by mouth 2 (two) times a day as needed for muscle spasms, Disp: 60 tablet, Rfl: 0  •  Emgality 120 MG/ML SOAJ, USE 1 INJECTION UNDER THE SKIN EVERY MONTH, Disp: 3 mL, Rfl: 3  •  gabapentin (NEURONTIN) 300 mg capsule, Take 1 capsule (300 mg total) by mouth 2 (two) times a day, Disp: 180 capsule, Rfl: 1  •  Iron-Vitamin C 100-250 MG TABS, Take 1 tablet by mouth daily, Disp: , Rfl:   •  ketoconazole (NIZORAL) 2 % cream, , Disp: , Rfl:   •  lisinopril (ZESTRIL) 5 mg tablet, TAKE 1 TABLET DAILY, Disp: 90 tablet, Rfl: 1  •  methylPREDNISolone 4 MG tablet therapy pack, Use as directed on package, Disp: 1 each, Rfl: 0  •  Multiple Vitamin (MULTI-VITAMIN DAILY) TABS, Take 1 tablet by mouth daily, Disp: , Rfl:   •  ondansetron (ZOFRAN-ODT) 4 mg disintegrating tablet, PLACE 1 TABLET ON THE TONGUE EVERY 6 HOURS AS NEEDED FOR NAUSEA OR VOMITING, Disp: 30 tablet, Rfl: 5  •  pantoprazole (PROTONIX) 40 mg tablet, TAKE 1 TABLET TWICE A DAY, Disp: 180 tablet, Rfl: 1  •  propranolol (INDERAL) 60 mg tablet, TAKE 1 TABLET TWICE A DAY, Disp: 180 tablet, Rfl: 1  •  QUEtiapine (SEROquel) 25 mg tablet, Take 1 tablet (25 mg total) by mouth  "daily at bedtime, Disp: 90 tablet, Rfl: 1  •  rizatriptan (MAXALT-MLT) 10 mg disintegrating tablet, Take at the onset of migraine; if symptoms continue or return, may take another dose at least 2 hours after first dose. Take no more than 2 doses in a day., Disp: 12 tablet, Rfl: 5  •  rosuvastatin (CRESTOR) 10 MG tablet, TAKE 1 TABLET DAILY, Disp: 90 tablet, Rfl: 1  •  sertraline (ZOLOFT) 100 mg tablet, Take 1 tablet (100 mg total) by mouth daily, Disp: 90 tablet, Rfl: 1  •  tacrolimus (PROTOPIC) 0.1 % ointment, if needed, Disp: , Rfl:   •  triamcinolone (KENALOG) 0.1 % cream, APPLY TOPICALLY TWICE A DAY, Disp: 80 g, Rfl: 8  •  denosumab (PROLIA) 60 mg/mL, Inject 1 mL (60 mg total) under the skin once for 1 dose, Disp: 1 mL, Rfl: 0  •  oxyCODONE-acetaminophen (Percocet) 5-325 mg per tablet, Take 1 tablet by mouth every 4 (four) hours as needed for moderate pain Max Daily Amount: 6 tablets, Disp: 20 tablet, Rfl: 0    Allergies   Allergen Reactions   • Morphine Nausea Only and Abdominal Pain     SEVERE N/V   • Cat Dander Nasal Congestion     Cat Dander   • Dog Epithelium Nasal Congestion   • Other Other (See Comments)     Dogs- sneezing  Crab Meat- GI intolerance       Physical Exam:    Ht 5' 2\" (1.575 m)   Wt 99.8 kg (220 lb)   BMI 40.24 kg/m²     Constitutional:normal, well developed, well nourished, alert, in no distress and non-toxic and no overt pain behavior. and obese  Eyes:anicteric  HEENT:grossly intact  Neck:supple, symmetric, trachea midline and no masses   Pulmonary:even and unlabored  Cardiovascular:No edema or pitting edema present  Skin:Normal without rashes or lesions and well hydrated  Psychiatric:Mood and affect appropriate  Neurologic:Cranial Nerves II-XII grossly intact  Musculoskeletal:antalgic gait    Lumbar Spine Exam    Appearance:  Normal lordosis  Palpation/Tenderness:  right lumbar paraspinal tenderness  right sacroiliac joint tenderness  right piriformis tenderness  Special Tests:  Right " Straight Leg Test:  negative  Right Milton's Maneuver:  positive  Right Gaenslen's Test:  positive  Right Pelvic Distraction Test:  positive  Right leg logroll: Positive posteriorly        Imaging  FL spine and pain procedure    (Results Pending)         Orders Placed This Encounter   Procedures   • FL spine and pain procedure       This document was created using speech voice recognition software.   Grammatical errors, random word insertions, pronoun errors, and incomplete sentences are an occasional consequence of this system due to software limitations, ambient noise, and hardware issues.   Any formal questions or concerns about content, text, or information contained within the body of this dictation should be directly addressed to the provider for clarification.

## 2025-03-25 ENCOUNTER — OFFICE VISIT (OUTPATIENT)
Dept: PAIN MEDICINE | Facility: CLINIC | Age: 65
End: 2025-03-25
Payer: COMMERCIAL

## 2025-03-25 ENCOUNTER — OFFICE VISIT (OUTPATIENT)
Dept: PHYSICAL THERAPY | Facility: CLINIC | Age: 65
End: 2025-03-25
Payer: COMMERCIAL

## 2025-03-25 VITALS — WEIGHT: 220 LBS | BODY MASS INDEX: 40.48 KG/M2 | HEIGHT: 62 IN

## 2025-03-25 DIAGNOSIS — M54.16 LUMBAR RADICULOPATHY: ICD-10-CM

## 2025-03-25 DIAGNOSIS — M54.50 CHRONIC BILATERAL LOW BACK PAIN, UNSPECIFIED WHETHER SCIATICA PRESENT: ICD-10-CM

## 2025-03-25 DIAGNOSIS — M47.816 LUMBAR SPONDYLOSIS: ICD-10-CM

## 2025-03-25 DIAGNOSIS — E66.01 OBESITY, MORBID (HCC): ICD-10-CM

## 2025-03-25 DIAGNOSIS — M54.50 LEFT LOW BACK PAIN, UNSPECIFIED CHRONICITY, UNSPECIFIED WHETHER SCIATICA PRESENT: Primary | ICD-10-CM

## 2025-03-25 DIAGNOSIS — M54.50 LEFT LOW BACK PAIN, UNSPECIFIED CHRONICITY, UNSPECIFIED WHETHER SCIATICA PRESENT: ICD-10-CM

## 2025-03-25 DIAGNOSIS — G89.29 CHRONIC BILATERAL LOW BACK PAIN, UNSPECIFIED WHETHER SCIATICA PRESENT: ICD-10-CM

## 2025-03-25 DIAGNOSIS — M62.5A2 ATROPHY OF MUSCLE OF BACK AT LUMBOSACRAL LEVEL: ICD-10-CM

## 2025-03-25 DIAGNOSIS — M46.1 SACROILIITIS (HCC): Primary | ICD-10-CM

## 2025-03-25 PROCEDURE — 97112 NEUROMUSCULAR REEDUCATION: CPT

## 2025-03-25 PROCEDURE — 97110 THERAPEUTIC EXERCISES: CPT

## 2025-03-25 PROCEDURE — 99214 OFFICE O/P EST MOD 30 MIN: CPT

## 2025-03-25 RX ORDER — CYCLOBENZAPRINE HCL 10 MG
10 TABLET ORAL 2 TIMES DAILY PRN
Qty: 60 TABLET | Refills: 0 | Status: SHIPPED | OUTPATIENT
Start: 2025-03-25

## 2025-03-25 RX ORDER — CYCLOBENZAPRINE HCL 10 MG
10 TABLET ORAL 2 TIMES DAILY PRN
Qty: 60 TABLET | Refills: 0 | Status: SHIPPED | OUTPATIENT
Start: 2025-03-25 | End: 2025-03-25

## 2025-03-25 RX ORDER — METHYLPREDNISOLONE 4 MG/1
TABLET ORAL
Qty: 1 EACH | Refills: 0 | Status: SHIPPED | OUTPATIENT
Start: 2025-03-25

## 2025-03-25 NOTE — PATIENT INSTRUCTIONS
Sacroiliac Joint Injection   WHAT YOU NEED TO KNOW:   What do I need to know about a sacroiliac joint injection?  A sacroiliac (SI) joint injection is done to diagnose or treat pain from sacroiliac joint syndrome. The pain caused by this syndrome may be felt in your lower back, buttocks, groin, and your thigh.   How do I prepare for an SI injection?  Your healthcare provider will ask you to not take any pain medicine the day of the injection. Ask him if there are any other medicines you should not take. You will need to find someone to drive you home after your procedure.  What will happen during the SI injection?  You will be awake for your injection. You may be given calming medicine if you are anxious.  You will lie on your stomach with a pillow under your abdomen. Your healthcare provider will give you an injection of medicine to numb the area. He may use an x-ray, ultrasound, or CT scan to find the area to inject. You may also be given an injection of contrast material to help your SI joint show up better. Then, your healthcare provider will inject local anesthesia, antiinflammatory medicine, or both into your SI joint.    Healthcare providers will watch you closely for any problems for up to 30 minutes after your injection. Your healthcare provider will check to see if your pain decreases after the injection.    What are the risks of an SI injection?  You may have some weakness for a short time after your injection. The SI injection can cause bleeding, infection, and pain. It can also cause temporary weakness in your leg and problems urinating. You may have an allergic reaction to the medicine that is injected into your SI joint. Your pain may return and you may need more treatment.  CARE AGREEMENT:   You have the right to help plan your care. Learn about your health condition and how it may be treated. Discuss treatment options with your healthcare providers to decide what care you want to receive. You always  have the right to refuse treatment. The above information is an  only. It is not intended as medical advice for individual conditions or treatments. Talk to your doctor, nurse or pharmacist before following any medical regimen to see if it is safe and effective for you.  © Copyright [employer] 2022 Information is for End User's use only and may not be sold, redistributed or otherwise used for commercial purposes. All illustrations and images included in CareNotes® are the copyrighted property of [employer].A[employer]M., Inc. or [employer]

## 2025-03-25 NOTE — PROGRESS NOTES
Daily Note     Today's date: 3/25/2025  Patient name: Caryn Velasquez  : 1960  MRN: [id number]  Referring provider: Tami Azar CRNP  Dx:   Encounter Diagnosis     ICD-10-CM    1. Left low back pain, unspecified chronicity, unspecified whether sciatica present  M54.50       2. Lumbar radiculopathy  M54.16       3. Lumbar spondylosis  M47.816                      Subjective: SPR=0/10.  Patient indicated when she sits for period of time with/without her seat cushion, she reports experiencing sharp pain located over central lower spine (indicating S1-S2 region) going down her right LE.  Patient also reports numbness of left groin.  Patient contributes some of her symptoms to weight gain which she states she is actively addressing.       Objective: See treatment diary below      Assessment: Tolerated treatment  with aggravation of lumbar/radicular symptoms post 10 minutes on recumbent bike for gentle ROM . Modified treatment to tolerance with resolution of back symptoms by treatment end. Patient exhibited good technique with therapeutic exercises      Plan: Continue per plan of care.      Precautions: LBP Left    [email or web address]  Access Code: [email or web address]      POC expires Unit limit Auth Expiration date PT/OT/ST + Visit Limit?   3-29-25 3 na 60 pcy                           Visit/Unit Tracking  AUTH Status:  Date 3/11 3/13 3/18 3/20 3/25     2/27 3/4 3/6   na Used 13 14 15 16 17     10 11 12    Remaining                          Manuals 3/4 3/6 3/11 3/13 3/18 3/20 3/25                                                            Neuro Re-Ed                          HL TB Hip abd BTB  20x BTB  20x BTB  20x Btb x 20 BTB  20x Btb x 20 BTB  20x      TB Clamshells B  BTB  20x  B   BTB  20x  B  BTB  20x Btb x 20 BTB  20x Btb x 20 BTB  20x      HL TA w/ roll :05  20x  :05  20x  :05  20x 5 s x20 :05  20x 5s x 20 :05  20x      HL TA marching 3#  20x  NP 3#  20x 3# x 20 3#  20x 3# x 20       TA knee/hip  "ext 3#  10x2 NP 3#  10x2 3# x 20 3#  20x 3# x 20        Bridging w/ dorothy add :03  20x :03  20x :03  20x 3s x 20 :03  20x 3s x 20       Bridging w/ TB abd  BTB  20x BTB  20x BTB  20x Btb x 20  BTB  20x Btb x20                     LEG press 115#  20x3 105#  20x3 105#  20x3 115#  20x 3 115 lbs  20x2 115#  20 x 2 held      Calf press  115#  20x3 105#  20x3 105#  20x3 115# 20x 3 115 lbs  20x2 115#  20x 2 held                                Seated TA RPB TB lat pull, rows and B shld ext Black web  20x each Black web  20x each Black Web  20x each Black web 20 x  Black web  20x        Seated TA: alt hip flexion and B LAQ RPB  3#  20x each  W/ OH bolster Held RPB  3#  20x each no UE A RPB  3# x 20 ea no UE A w/ ball toss  RPB  3#  20x each held       Ther Ex             bike Rom  10' ROM  x10' ROM  10' ROM 10'  ROM  x10' ROM 10'  Rom  X10'  P! post      Elliptical                           Fig 4 IR/ER stretch B  :20  3x each B  :20  3x each B  :20  3x each 20s x 3 bilat :20  3x each 20s x 3 ea  :20  3x each      B Hamstring Stretch :20  3x each :20  3x each B  :20  3x  20s x 3 bilat :20  3x 20s x 3  :20  3x      TA knee-chest on physioball Opb  3#  20x Held OPB  3#  20x Opb 3# x 20  OPB        B Juan stretch             Dereje walk outs: 4 way 13 lbs  5 laps each 10lbs  5 laps each 10 lbs  5 laps each 10#  5 ea  w/ 3# on le's 10 lbs  5 laps 10# x 5 ea        Dereje B LE PRE's 4 way                          Prone press ups 5x3 5x3 5x3 5 x 3  5x3 5 x 3 5x4      Sit to stand 20\" box arms extended Foam  5#  20x Foam  0#  20x Foam  5#  10x2 Foam 5# 10 x2 Foam  5#  10x2        Prone GS  :03  20x :03  20x 3s x 20  :03  20x 3s x 20  :03  20x                                                          Ther Activity                                       Gait Training                                       Modalities                                                                "

## 2025-03-26 ENCOUNTER — PATIENT MESSAGE (OUTPATIENT)
Dept: PAIN MEDICINE | Facility: CLINIC | Age: 65
End: 2025-03-26

## 2025-03-26 NOTE — PATIENT COMMUNICATION
Pt is scheduled for sij injection with Dr Gaona on 4/17/25    Pt is not diabetic and injection type does not require med holds    Pt given instructions in office and via myc message    Have you completed PT/HEP/Chiro in the past 6 months for dedicated area? Current PT with [facility], also in 2023 - has also had injections for pain relief  If yes, how long did you complete?  What was the frequency?  Did it provide relief?  If no, reason therapy was not completed?

## 2025-03-27 ENCOUNTER — OFFICE VISIT (OUTPATIENT)
Dept: BARIATRICS | Facility: CLINIC | Age: 65
End: 2025-03-27
Payer: COMMERCIAL

## 2025-03-27 ENCOUNTER — APPOINTMENT (OUTPATIENT)
Dept: PHYSICAL THERAPY | Facility: CLINIC | Age: 65
End: 2025-03-27
Payer: COMMERCIAL

## 2025-03-27 VITALS
RESPIRATION RATE: 12 BRPM | WEIGHT: 226 LBS | SYSTOLIC BLOOD PRESSURE: 122 MMHG | OXYGEN SATURATION: 96 % | BODY MASS INDEX: 38.58 KG/M2 | DIASTOLIC BLOOD PRESSURE: 80 MMHG | HEIGHT: 64 IN | HEART RATE: 70 BPM

## 2025-03-27 DIAGNOSIS — G58.8 PUDENDAL NEURALGIA: ICD-10-CM

## 2025-03-27 DIAGNOSIS — R63.5 WEIGHT GAIN: ICD-10-CM

## 2025-03-27 DIAGNOSIS — Z98.84 WEIGHT GAIN FOLLOWING GASTRIC BYPASS SURGERY: ICD-10-CM

## 2025-03-27 DIAGNOSIS — R63.5 WEIGHT GAIN FOLLOWING GASTRIC BYPASS SURGERY: ICD-10-CM

## 2025-03-27 DIAGNOSIS — Z90.3 POSTGASTRECTOMY MALABSORPTION: Primary | ICD-10-CM

## 2025-03-27 DIAGNOSIS — Z48.815 ENCOUNTER FOR SURGICAL AFTERCARE FOLLOWING SURGERY OF DIGESTIVE SYSTEM: ICD-10-CM

## 2025-03-27 DIAGNOSIS — G47.33 OSA (OBSTRUCTIVE SLEEP APNEA): ICD-10-CM

## 2025-03-27 DIAGNOSIS — K91.2 POSTGASTRECTOMY MALABSORPTION: Primary | ICD-10-CM

## 2025-03-27 DIAGNOSIS — E66.812 OBESITY, CLASS II, BMI 35-39.9: ICD-10-CM

## 2025-03-27 PROCEDURE — 99204 OFFICE O/P NEW MOD 45 MIN: CPT | Performed by: PHYSICIAN ASSISTANT

## 2025-03-27 RX ORDER — TIRZEPATIDE 2.5 MG/.5ML
2.5 INJECTION, SOLUTION SUBCUTANEOUS WEEKLY
Qty: 2 ML | Refills: 0 | Status: SHIPPED | OUTPATIENT
Start: 2025-03-27 | End: 2025-04-24

## 2025-03-27 NOTE — PATIENT INSTRUCTIONS
Zepbound Instructions:    - Begin Zepbound 2.5 mg subcutaneously once a week. Dose changes may occur after 4 doses if medication is tolerated. You will be assessed prior to each dose change to make sure you are tolerating the medication well.  - Please message me when you have 2 pens left from the prescription so there are no lapses in treatment.  - If you have been off the medication for more than 14 days please contact the office as you will need to restart the titration at the starting dose again to avoid significant side effects or adverse events.  - Visit [email or web address] for further information/injection instructions.   -Please eat small frequent meals to help reduce nausea. Lemon water and saltine crackers may help with this.   - Side effects of Zepbound discussed: nausea, vomiting, diarrhea, and constipation. If you experience fever, nausea/vomiting, and pain radiating to your back this may be a sign of pancreatitis. Please go to the emergency room if this occurs.  - If on oral birth control a 2nd method of birth control is recommended during the 1st 8 weeks of therapy and for 4 weeks after any dosage change.   - Patient understands the side effects of the medication and proper administration. Patient agrees with the treatment plan and all questions were answered.      GOALS:   Continued/Maintain healthy weight loss with good nutrition intakes.  Adequate hydration with at least 64oz. fluid intake.  Normal vitamin and mineral levels.  Exercise as tolerated.  [facility] [place]   Pelvic Floor  - [facility]    Follow-up in 1 year. We kindly ask that your arrive 15 minutes before your scheduled appointment time with your provider to allow our staff to room you, get your vital signs and update your chart.    Follow diet as discussed.      Get lab work done in the next 2 weeks.  You have been given a lab slip today.  Please call the office if you need a replacement.  It is  recommended to check with your insurance BEFORE getting labs done to make sure they are covered by your policy.  Also, please check with your PCP and other providers before getting labs to avoid duplicate labs. Make sure to HOLD any multivitamins that may contain biotin and any biotin supplements FOR 5 DAYS before any labs since it can affect the results.    Follow vitamin and mineral recommendations as reviewed with you.    Call our office if you have any problems with abdominal pain especially associated with fever, chills, nausea, vomiting or any other concerns.    All  Post-bariatric surgery patients should be aware that very small quantities of any alcohol can cause impairment and it is very possible not to feel the effect. The effect can be in the system for several hours.  It is also a stomach irritant.     It is advised to AVOID alcohol, Nonsteroidal antiinflammatory drugs (NSAIDS) and nicotine of all forms . Any of these can cause stomach irritation/pain.

## 2025-03-27 NOTE — ASSESSMENT & PLAN NOTE
-s/p Venessa-En-Y Gastric Bypass with Dr. Mendez in 2010. Patient is struggling with weight regain and ready to get back on track.  They will work on diet and lifestyle changes - especially 30/60 rule, avoid mindless snacking, increase protein and fiber, avoid sugary drinks, avoid meal skipping, track/log, and exercise. Recommend consult with surgical RD, LCSW, and MWM. UGI to r/o GGF and evaluate anatomy.     PT referral for pelvic floor PT asap.    Will trial Zepbound. Patient denies personal history of pancreatitis. Patient also denies personal and family history of medullary thyroid cancer and multiple endocrine neoplasia type 2 (MEN 2 tumor).     Initial: 308lbs  Current: 226lbs  Dexter: 158lbs  Current BMI is Body mass index is 38.79 kg/m².    Tolerating a regular diet-yes  Eating at least 60 grams of protein per day-yes  Following 30/60 minute rule with liquids-yes  Drinking at least 64 ounces of fluid per day-no  Drinking carbonated beverages-no  Sufficient exercise-yes  Using NSAIDs regularly-no  Using nicotine-no  Using alcohol-no. Advised about the risks of alcohol s/p bariatric surgery and recommend avoiding all alcohol

## 2025-03-27 NOTE — ASSESSMENT & PLAN NOTE
-Uses CPAP; would greatly benefit from GLP-1  -Encouraged consistent use of CPAP and follow up with sleep med as directed

## 2025-03-27 NOTE — ASSESSMENT & PLAN NOTE
-At risk for malabsorption of vitamins/minerals secondary to malabsorption and restriction of intake from bariatric surgery  -NOT Currently taking adequate postop bariatric surgery vitamin supplementation: Vitamin D, VITRON C, OTC MVI and calcium unsure amounts- change to Andry MVI with iron and calcium citrate 500mg TID    -Obtain CBC/Metabolic panel  -Patient received education about the importance of adhering to a lifelong supplementation regimen to avoid vitamin/mineral deficiencies

## 2025-03-27 NOTE — PROGRESS NOTES
Assessment/Plan:    Encounter for surgical aftercare following surgery of digestive system  -s/p Venessa-En-Y Gastric Bypass with Dr. Mendez in 2010. Patient is struggling with weight regain and ready to get back on track.  They will work on diet and lifestyle changes - especially 30/60 rule, avoid mindless snacking, increase protein and fiber, avoid sugary drinks, avoid meal skipping, track/log, and exercise. Recommend consult with surgical RD, LCSW, and MWM. UGI to r/o GGF and evaluate anatomy.     PT referral for pelvic floor PT asap.    Will trial Zepbound. Patient denies personal history of pancreatitis. Patient also denies personal and family history of medullary thyroid cancer and multiple endocrine neoplasia type 2 (MEN 2 tumor).     Initial: 308lbs  Current: 226lbs  Dexter: 158lbs  Current BMI is Body mass index is 38.79 kg/m².    Tolerating a regular diet-yes  Eating at least 60 grams of protein per day-yes  Following 30/60 minute rule with liquids-yes  Drinking at least 64 ounces of fluid per day-no  Drinking carbonated beverages-no  Sufficient exercise-yes  Using NSAIDs regularly-no  Using nicotine-no  Using alcohol-no. Advised about the risks of alcohol s/p bariatric surgery and recommend avoiding all alcohol      Postgastrectomy malabsorption  -At risk for malabsorption of vitamins/minerals secondary to malabsorption and restriction of intake from bariatric surgery  -NOT Currently taking adequate postop bariatric surgery vitamin supplementation: Vitamin D, VITRON C, OTC MVI and calcium unsure amounts- change to Andry MVI with iron and calcium citrate 500mg TID    -Obtain CBC/Metabolic panel  -Patient received education about the importance of adhering to a lifelong supplementation regimen to avoid vitamin/mineral deficiencies         CATHRYN (obstructive sleep apnea)  -Uses CPAP; would greatly benefit from GLP-1  -Encouraged consistent use of CPAP and follow up with sleep med as directed        Diagnoses and  all orders for this visit:    Postgastrectomy malabsorption  -     FL UPPER GI UGI; Future  -     Folate; Future  -     Iron Panel (Includes Ferritin, Iron Sat%, Iron, and TIBC); Future  -     Vitamin A; Future  -     PTH, intact; Future  -     Vitamin B1, whole blood; Future  -     Vitamin B12; Future  -     Vitamin D 25 hydroxy; Future  -     Zinc; Future    BMI 39.0-39.9,adult  -     Ambulatory Referral to Weight Management    Encounter for surgical aftercare following surgery of digestive system    CATHRYN (obstructive sleep apnea)  -     tirzepatide (Zepbound) 2.5 mg/0.5 mL auto-injector; Inject 0.5 mL (2.5 mg total) under the skin once a week for 28 days    Weight gain    Weight gain following gastric bypass surgery  -     FL UPPER GI UGI; Future    Obesity, Class II, BMI 35-39.9  -     tirzepatide (Zepbound) 2.5 mg/0.5 mL auto-injector; Inject 0.5 mL (2.5 mg total) under the skin once a week for 28 days    Pudendal neuralgia  -     Ambulatory Referral to Physical Therapy; Future          Subjective:      Patient ID: Caryn Velasquez is a 64 y.o. female.    -s/p Venessa-En-Y Gastric Bypass with Dr. Mendez in 2010. Presents to the office today to establish care and for concerns of weight regain. Tolerating diet without issues; denies N/V, dysphagia, reflux. Daily BM's.     She lost 150lbs s/p surgery and she maintained her weight around 185lbs for many years until last several years regained to about 200lbs and in the last year she has regained about 25lbs. Now struggling to lose weight.     Limited mobility d/t severe pudendal neuralgia- has sacral stimulator and spinal stimulator and was getting frequent pudendal nerve blocks.    Avoids ETOH, no smoking, avoids NSAIDS.     just retired and he has been baking. Has 2 adult kids - both live in [place]. Her daughter is PCP provider.  She is retired/disabled for 10 years - ureter injury during FILIBERTO - severe pudendal neuralgia. She used to work at  - lived in  "[place].     Diet Recall:   B - 2 poached eggs  L - leftovers from dinner  D - salmon and asparagus or chicken and veggies or green salad  HS - Kind protein bar or Yasso mint bars    Fluids - 1 large cup coffee w/ milk and cold foam, diet iced tea    The following portions of the patient's history were reviewed and updated as appropriate: allergies, current medications, past family history, past medical history, past social history, past surgical history and problem list.    Review of Systems   Constitutional:  Negative for chills and fever. Unexpected weight change: planned weight loss.  HENT:  Negative for trouble swallowing.    Respiratory:  Negative for cough and shortness of breath.    Cardiovascular:  Negative for chest pain and palpitations.   Gastrointestinal:  Negative for abdominal pain, constipation, diarrhea, nausea and vomiting.   Genitourinary:  Positive for pelvic pain.   Musculoskeletal:  Positive for arthralgias and back pain.   Neurological:  Negative for dizziness.   Psychiatric/Behavioral:          Denies anxiety and depression         Objective:      /80 (BP Location: Left arm, Patient Position: Sitting, Cuff Size: Large)   Pulse 70   Resp 12   Ht 5' 4\" (1.626 m)   Wt 103 kg (226 lb)   SpO2 96%   BMI 38.79 kg/m²     Colonoscopy-Cologard completed        Physical Exam  Vitals reviewed.   Constitutional:       General: She is not in acute distress.     Appearance: She is well-developed.   HENT:      Head: Normocephalic and atraumatic.   Eyes:      General: No scleral icterus.  Cardiovascular:      Rate and Rhythm: Normal rate and regular rhythm.   Pulmonary:      Effort: Pulmonary effort is normal. No respiratory distress.   Abdominal:      General: There is no distension.      Palpations: Abdomen is soft.   Musculoskeletal:      Comments: Walks with cane     Skin:     General: Skin is warm and dry.   Neurological:      Mental Status: She is alert.   Psychiatric:         Mood and " Affect: Mood normal.         Behavior: Behavior normal.           BARRIERS: none identified    GOALS:   Continued/Maintain healthy weight loss with good nutrition intakes.  Adequate hydration with at least 64oz. fluid intake.  Normal vitamin and mineral levels.  Exercise as tolerated.  [facility] - Arvind Bailey in [place]   Pelvic Floor  - Elif Joseph    Follow-up in 1 year. We kindly ask that your arrive 15 minutes before your scheduled appointment time with your provider to allow our staff to room you, get your vital signs and update your chart.    Follow diet as discussed.      Get lab work done in the next 2 weeks.  You have been given a lab slip today.  Please call the office if you need a replacement.  It is recommended to check with your insurance BEFORE getting labs done to make sure they are covered by your policy.  Also, please check with your PCP and other providers before getting labs to avoid duplicate labs. Make sure to HOLD any multivitamins that may contain biotin and any biotin supplements FOR 5 DAYS before any labs since it can affect the results.    Follow vitamin and mineral recommendations as reviewed with you.    Call our office if you have any problems with abdominal pain especially associated with fever, chills, nausea, vomiting or any other concerns.    All  Post-bariatric surgery patients should be aware that very small quantities of any alcohol can cause impairment and it is very possible not to feel the effect. The effect can be in the system for several hours.  It is also a stomach irritant.     It is advised to AVOID alcohol, Nonsteroidal antiinflammatory drugs (NSAIDS) and nicotine of all forms . Any of these can cause stomach irritation/pain.

## 2025-04-01 ENCOUNTER — APPOINTMENT (OUTPATIENT)
Dept: LAB | Facility: HOSPITAL | Age: 65
End: 2025-04-01
Payer: COMMERCIAL

## 2025-04-01 DIAGNOSIS — R73.01 IFG (IMPAIRED FASTING GLUCOSE): ICD-10-CM

## 2025-04-01 DIAGNOSIS — Z90.3 POSTGASTRECTOMY MALABSORPTION: ICD-10-CM

## 2025-04-01 DIAGNOSIS — K91.2 POSTGASTRECTOMY MALABSORPTION: ICD-10-CM

## 2025-04-01 DIAGNOSIS — Z00.00 HEALTHCARE MAINTENANCE: ICD-10-CM

## 2025-04-01 LAB
25(OH)D3 SERPL-MCNC: 42.9 NG/ML (ref 30–100)
ALBUMIN SERPL BCG-MCNC: 4 G/DL (ref 3.5–5)
ALP SERPL-CCNC: 77 U/L (ref 34–104)
ALT SERPL W P-5'-P-CCNC: 14 U/L (ref 7–52)
ANION GAP SERPL CALCULATED.3IONS-SCNC: 5 MMOL/L (ref 4–13)
AST SERPL W P-5'-P-CCNC: 15 U/L (ref 13–39)
BASOPHILS # BLD AUTO: 0.08 THOUSANDS/ÂΜL (ref 0–0.1)
BASOPHILS NFR BLD AUTO: 1 % (ref 0–1)
BILIRUB SERPL-MCNC: 0.41 MG/DL (ref 0.2–1)
BUN SERPL-MCNC: 24 MG/DL (ref 5–25)
CALCIUM SERPL-MCNC: 8.8 MG/DL (ref 8.4–10.2)
CHLORIDE SERPL-SCNC: 102 MMOL/L (ref 96–108)
CHOLEST SERPL-MCNC: 180 MG/DL (ref ?–200)
CO2 SERPL-SCNC: 31 MMOL/L (ref 21–32)
CREAT SERPL-MCNC: 0.89 MG/DL (ref 0.6–1.3)
EOSINOPHIL # BLD AUTO: 0.22 THOUSAND/ÂΜL (ref 0–0.61)
EOSINOPHIL NFR BLD AUTO: 3 % (ref 0–6)
ERYTHROCYTE [DISTWIDTH] IN BLOOD BY AUTOMATED COUNT: 12.5 % (ref 11.6–15.1)
EST. AVERAGE GLUCOSE BLD GHB EST-MCNC: 120 MG/DL
FERRITIN SERPL-MCNC: 25 NG/ML (ref 11–307)
FOLATE SERPL-MCNC: >22.3 NG/ML
GFR SERPL CREATININE-BSD FRML MDRD: 67 ML/MIN/1.73SQ M
GLUCOSE P FAST SERPL-MCNC: 97 MG/DL (ref 65–99)
HBA1C MFR BLD: 5.8 %
HCT VFR BLD AUTO: 42.3 % (ref 34.8–46.1)
HDLC SERPL-MCNC: 84 MG/DL
HGB BLD-MCNC: 13.3 G/DL (ref 11.5–15.4)
IMM GRANULOCYTES # BLD AUTO: 0.04 THOUSAND/UL (ref 0–0.2)
IMM GRANULOCYTES NFR BLD AUTO: 1 % (ref 0–2)
IRON SATN MFR SERPL: 28 % (ref 15–50)
IRON SERPL-MCNC: 91 UG/DL (ref 50–212)
LDLC SERPL CALC-MCNC: 85 MG/DL (ref 0–100)
LYMPHOCYTES # BLD AUTO: 2.01 THOUSANDS/ÂΜL (ref 0.6–4.47)
LYMPHOCYTES NFR BLD AUTO: 26 % (ref 14–44)
MCH RBC QN AUTO: 30.4 PG (ref 26.8–34.3)
MCHC RBC AUTO-ENTMCNC: 31.4 G/DL (ref 31.4–37.4)
MCV RBC AUTO: 97 FL (ref 82–98)
MONOCYTES # BLD AUTO: 0.48 THOUSAND/ÂΜL (ref 0.17–1.22)
MONOCYTES NFR BLD AUTO: 6 % (ref 4–12)
NEUTROPHILS # BLD AUTO: 4.83 THOUSANDS/ÂΜL (ref 1.85–7.62)
NEUTS SEG NFR BLD AUTO: 63 % (ref 43–75)
NONHDLC SERPL-MCNC: 96 MG/DL
NRBC BLD AUTO-RTO: 0 /100 WBCS
PLATELET # BLD AUTO: 240 THOUSANDS/UL (ref 149–390)
PMV BLD AUTO: 10.9 FL (ref 8.9–12.7)
POTASSIUM SERPL-SCNC: 4.3 MMOL/L (ref 3.5–5.3)
PROT SERPL-MCNC: 7.1 G/DL (ref 6.4–8.4)
PTH-INTACT SERPL-MCNC: 50.8 PG/ML (ref 12–88)
RBC # BLD AUTO: 4.37 MILLION/UL (ref 3.81–5.12)
SODIUM SERPL-SCNC: 138 MMOL/L (ref 135–147)
TIBC SERPL-MCNC: 322 UG/DL (ref 250–450)
TRANSFERRIN SERPL-MCNC: 230 MG/DL (ref 203–362)
TRIGL SERPL-MCNC: 54 MG/DL (ref ?–150)
TSH SERPL DL<=0.05 MIU/L-ACNC: 1.72 UIU/ML (ref 0.45–4.5)
UIBC SERPL-MCNC: 231 UG/DL (ref 155–355)
VIT B12 SERPL-MCNC: 1855 PG/ML (ref 180–914)
WBC # BLD AUTO: 7.66 THOUSAND/UL (ref 4.31–10.16)

## 2025-04-01 PROCEDURE — 82306 VITAMIN D 25 HYDROXY: CPT

## 2025-04-01 PROCEDURE — 82746 ASSAY OF FOLIC ACID SERUM: CPT

## 2025-04-01 PROCEDURE — 84630 ASSAY OF ZINC: CPT

## 2025-04-01 PROCEDURE — 83036 HEMOGLOBIN GLYCOSYLATED A1C: CPT

## 2025-04-01 PROCEDURE — 82607 VITAMIN B-12: CPT

## 2025-04-01 PROCEDURE — 82728 ASSAY OF FERRITIN: CPT

## 2025-04-01 PROCEDURE — 83550 IRON BINDING TEST: CPT

## 2025-04-01 PROCEDURE — 84425 ASSAY OF VITAMIN B-1: CPT

## 2025-04-01 PROCEDURE — 83970 ASSAY OF PARATHORMONE: CPT

## 2025-04-01 PROCEDURE — 84443 ASSAY THYROID STIM HORMONE: CPT

## 2025-04-01 PROCEDURE — 85025 COMPLETE CBC W/AUTO DIFF WBC: CPT

## 2025-04-01 PROCEDURE — 84590 ASSAY OF VITAMIN A: CPT

## 2025-04-01 PROCEDURE — 83540 ASSAY OF IRON: CPT

## 2025-04-01 PROCEDURE — 80061 LIPID PANEL: CPT

## 2025-04-01 PROCEDURE — 36415 COLL VENOUS BLD VENIPUNCTURE: CPT

## 2025-04-01 PROCEDURE — 80053 COMPREHEN METABOLIC PANEL: CPT

## 2025-04-02 ENCOUNTER — RESULTS FOLLOW-UP (OUTPATIENT)
Dept: BARIATRICS | Facility: CLINIC | Age: 65
End: 2025-04-02

## 2025-04-02 DIAGNOSIS — Z90.3 POSTGASTRECTOMY MALABSORPTION: Primary | ICD-10-CM

## 2025-04-02 DIAGNOSIS — R79.0 LOW FERRITIN: ICD-10-CM

## 2025-04-02 DIAGNOSIS — K91.2 POSTGASTRECTOMY MALABSORPTION: Primary | ICD-10-CM

## 2025-04-02 NOTE — RESULT ENCOUNTER NOTE
Please let Caryn know about her labs, thank you:    -Mildly elevated B12 - safe, no changes    -Ferritin/iron stores trending up but still mildly low - include 1 VITRON C iron daily if tolerated - repeat in 6 months

## 2025-04-03 ENCOUNTER — HOSPITAL ENCOUNTER (OUTPATIENT)
Dept: RADIOLOGY | Facility: HOSPITAL | Age: 65
End: 2025-04-03
Payer: COMMERCIAL

## 2025-04-03 DIAGNOSIS — K91.2 POSTGASTRECTOMY MALABSORPTION: ICD-10-CM

## 2025-04-03 DIAGNOSIS — Z98.84 WEIGHT GAIN FOLLOWING GASTRIC BYPASS SURGERY: ICD-10-CM

## 2025-04-03 DIAGNOSIS — R63.5 WEIGHT GAIN FOLLOWING GASTRIC BYPASS SURGERY: ICD-10-CM

## 2025-04-03 DIAGNOSIS — E66.812 OBESITY, CLASS II, BMI 35-39.9: ICD-10-CM

## 2025-04-03 DIAGNOSIS — G47.33 OSA (OBSTRUCTIVE SLEEP APNEA): ICD-10-CM

## 2025-04-03 DIAGNOSIS — Z90.3 POSTGASTRECTOMY MALABSORPTION: ICD-10-CM

## 2025-04-03 PROCEDURE — 74240 X-RAY XM UPR GI TRC 1CNTRST: CPT

## 2025-04-03 RX ORDER — TIRZEPATIDE 2.5 MG/.5ML
2.5 INJECTION, SOLUTION SUBCUTANEOUS WEEKLY
Qty: 6 ML | Refills: 1 | Status: SHIPPED | OUTPATIENT
Start: 2025-04-03 | End: 2025-09-18

## 2025-04-04 LAB
VIT B1 BLD-SCNC: 113.6 NMOL/L (ref 66.5–200)
ZINC SERPL-MCNC: 56 UG/DL (ref 44–115)

## 2025-04-07 ENCOUNTER — CLINICAL SUPPORT (OUTPATIENT)
Dept: BARIATRICS | Facility: CLINIC | Age: 65
End: 2025-04-07

## 2025-04-07 DIAGNOSIS — Z98.84 BARIATRIC SURGERY STATUS: Primary | ICD-10-CM

## 2025-04-07 DIAGNOSIS — F41.8 DEPRESSION WITH ANXIETY: ICD-10-CM

## 2025-04-07 LAB — VIT A SERPL-MCNC: 56.7 UG/DL (ref 22–69.5)

## 2025-04-07 PROCEDURE — RECHECK

## 2025-04-07 NOTE — PROGRESS NOTES
Post op support. S/P Venessa-En-Y Gastric Bypass with Dr. Mendez in 2010. Patient recently transferred into our program and had recent visit with JAREN Day 3/27/25. Patient was referred to BOB/SABRA/MARLEEN at that time. Patient also ordered UGI- completed 4/3 and prescribed Zepbound-  Patient sees Dr. Flowers with [facility]- prescribed Zoloft, Seroquel, Buspar for MDD and YANY. To discuss with her psychiatrist as weight gain being a side effect for Seroquel as well as the Gabapentin that she is on as well.   semi retired- works 20 hours per week in the last few months. He likes to to cook and bake and has been doing that more since he has been home more. The extra weight has been causing a lot of pain.   Patient reports that after having bariatric surgery she was having to have iron infusions every 6 weeks due to heavy bleeding. She had a hysterectomy due to this and her ureter was damaged and nerve damage and continues to struggle with chronic pain. 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 Has been getting transvaginal injections every 3 weeks and her urogynocologist just retired, so she is not sure who she is going to follow up with now. Her life completely changed after this. Can't sit comfortably or stand comfortably. Has not been able to do things that she used to enjoy. Started trying to make adjustments to be able to do embroidery and paint again. Currently taking a oil painting class at [facility]- struggles with this because it is out of her comfort zone and does not think that she is good at it.  IDANIA passed away 6 years ago, has always been very close with her in laws. Her MIL still living and lives in [place].   Patient lives with her - her daughter lives in [place] and is a PCP, and her son lives in [place] [facility] [place] as well and is a restoration . He just got  in June of last year.  Patient typically stays up late then then sleeps in the morning- messes with her routine for her meals- typically only eating 2  meals and grazing late at night. To work on having 3 meals per day. To to work on routine for meals and sleep. Patient drinks 32-64 oz unsweetened iced tea, 10 oz  coffee. Patient unaware that the iced tea was caffenated- to switch to decaf.  Patient lost her dad when she 11 suddenly due to a heart attack, he mom had to go to work at that time after being a stay at home mom. Her mom passed away 20 years ago. Her brother passed away 13 years ago- he had a liver transplant and then his kidneys failed. He was living with them at the time. Patient is an identical twin- she lives in [place].   Does not feel that she has processed all of her past traumas. Gave resources for outpatient therapy.  Discussed how food can be a negative coping skill- avoidance. Be mindful of this. Be mindful of triggers- increase positive coping skills. Patient to follow up with BOB next week, Kandice in 2 weeks, and SABRA in one month.  Deena Carranza LCSW

## 2025-04-08 ENCOUNTER — OFFICE VISIT (OUTPATIENT)
Dept: FAMILY MEDICINE CLINIC | Facility: CLINIC | Age: 65
End: 2025-04-08
Payer: COMMERCIAL

## 2025-04-08 VITALS
OXYGEN SATURATION: 98 % | HEIGHT: 64 IN | DIASTOLIC BLOOD PRESSURE: 78 MMHG | BODY MASS INDEX: 37.94 KG/M2 | RESPIRATION RATE: 18 BRPM | WEIGHT: 222.2 LBS | HEART RATE: 68 BPM | SYSTOLIC BLOOD PRESSURE: 120 MMHG

## 2025-04-08 DIAGNOSIS — L85.3 DRY SKIN: ICD-10-CM

## 2025-04-08 DIAGNOSIS — I10 PRIMARY HYPERTENSION: ICD-10-CM

## 2025-04-08 DIAGNOSIS — F33.1 MODERATE EPISODE OF RECURRENT MAJOR DEPRESSIVE DISORDER (HCC): ICD-10-CM

## 2025-04-08 DIAGNOSIS — F41.0 GENERALIZED ANXIETY DISORDER WITH PANIC ATTACKS: ICD-10-CM

## 2025-04-08 DIAGNOSIS — D50.9 IRON DEFICIENCY ANEMIA, UNSPECIFIED IRON DEFICIENCY ANEMIA TYPE: ICD-10-CM

## 2025-04-08 DIAGNOSIS — F41.1 GENERALIZED ANXIETY DISORDER WITH PANIC ATTACKS: ICD-10-CM

## 2025-04-08 DIAGNOSIS — E66.01 OBESITY, MORBID (HCC): ICD-10-CM

## 2025-04-08 DIAGNOSIS — Z78.0 POSTMENOPAUSAL: ICD-10-CM

## 2025-04-08 DIAGNOSIS — Z12.31 ENCOUNTER FOR SCREENING MAMMOGRAM FOR MALIGNANT NEOPLASM OF BREAST: ICD-10-CM

## 2025-04-08 DIAGNOSIS — N39.45 CONTINUOUS LEAKAGE OF URINE: ICD-10-CM

## 2025-04-08 DIAGNOSIS — E78.2 MIXED HYPERLIPIDEMIA: ICD-10-CM

## 2025-04-08 DIAGNOSIS — M54.16 LUMBAR RADICULOPATHY: Primary | ICD-10-CM

## 2025-04-08 DIAGNOSIS — K21.9 GASTROESOPHAGEAL REFLUX DISEASE WITHOUT ESOPHAGITIS: ICD-10-CM

## 2025-04-08 DIAGNOSIS — M81.0 AGE-RELATED OSTEOPOROSIS WITHOUT CURRENT PATHOLOGICAL FRACTURE: ICD-10-CM

## 2025-04-08 DIAGNOSIS — G47.33 OSA ON CPAP: ICD-10-CM

## 2025-04-08 DIAGNOSIS — M46.1 SACROILIITIS (HCC): ICD-10-CM

## 2025-04-08 PROCEDURE — 99214 OFFICE O/P EST MOD 30 MIN: CPT | Performed by: NURSE PRACTITIONER

## 2025-04-08 RX ORDER — AMMONIUM LACTATE 12 G/100G
LOTION TOPICAL 2 TIMES DAILY PRN
Qty: 225 G | Refills: 1 | Status: SHIPPED | OUTPATIENT
Start: 2025-04-08

## 2025-04-08 NOTE — ASSESSMENT & PLAN NOTE
Patient's previous uro-GYN retired, will refer to Dr. Tracey  Orders:    Ambulatory Referral to Urogynecology; Future

## 2025-04-08 NOTE — PROGRESS NOTES
Name: Caryn Velasquez      : 1960      MRN: [id number]  Encounter Provider: GWEN Black  Encounter Date: 2025   Encounter department: [facility] [address]  :  Assessment & Plan  Lumbar radiculopathy  Continue care with pain management.  Okay to resume physical therapy after injection.  Orders:    Ambulatory Referral to Physical Therapy; Future    Dry skin  Will trial ammonium lactate.  Will send to dermatology.  Advised to get second opinion.  Orders:    ammonium lactate (LAC-HYDRIN) 12 % lotion; Apply topically 2 (two) times a day as needed for dry skin    Postmenopausal    Orders:    DXA bone density spine hip and pelvis; Future    Encounter for screening mammogram for malignant neoplasm of breast    Orders:    Mammo screening bilateral w 3d and cad; Future    Primary hypertension  Blood pressure is well-controlled with lisinopril and propranolol.       CATHRYN on CPAP  Continues CPAP.       Gastroesophageal reflux disease without esophagitis  Continues with pantoprazole twice daily.       Age-related osteoporosis without current pathological fracture  Continues on Prolia.  Due for DEXA scan, ordered.       Continuous leakage of urine  Patient's previous uro-GYN retired, will refer to Dr. Tracey  Orders:    Ambulatory Referral to Urogynecology; Future    Moderate episode of recurrent major depressive disorder (HCC)  Doing well with Zoloft daily.         Generalized anxiety disorder with panic attacks  Doing okay with Zoloft, Seroquel and BuSpar.       Iron deficiency anemia, unspecified iron deficiency anemia type  Continues on Vitron-C.         Sacroiliitis (HCC)  Continue care with pain.       Mixed hyperlipidemia  Continue on rosuvastatin daily.  Lipid panel reviewed with patient and within normal limits.       Obesity, morbid (HCC)  Patient is waiting for prior Auth from weight management on [employer].                History of Present Illness   Patient  "presents for routine follow up. She did gain 20 lbs and has worsening back pain. She did recently see WM and starting on zepbound after PA. She has an SI joint injection scheduled for right side. She has had worsening right leg pain/numbness/weakness. She is also concerned with rash on her left foot. She saw derm several times and they refused to do a biopsy. This has been going on for 35 years. She has tried multiple creams.       Review of Systems   Constitutional:  Negative for chills and fever.   HENT:  Negative for ear pain and sore throat.    Eyes:  Negative for pain and visual disturbance.   Respiratory:  Negative for cough and shortness of breath.    Cardiovascular:  Negative for chest pain and palpitations.   Gastrointestinal:  Negative for abdominal pain and vomiting.   Genitourinary:  Positive for difficulty urinating. Negative for dysuria and hematuria.   Musculoskeletal:  Positive for back pain. Negative for arthralgias.   Skin:  Positive for rash. Negative for color change.   Neurological:  Negative for dizziness, seizures, syncope, light-headedness and headaches.   Psychiatric/Behavioral:  Negative for dysphoric mood and sleep disturbance. The patient is nervous/anxious.    All other systems reviewed and are negative.      Objective   /78 (BP Location: Right arm, Patient Position: Sitting) Comment: verbalized reading to patient  Pulse 68   Resp 18   Ht 5' 4\" (1.626 m)   Wt 101 kg (222 lb 3.2 oz)   SpO2 98%   BMI 38.14 kg/m²      Physical Exam  Vitals and nursing note reviewed.   Constitutional:       General: She is not in acute distress.     Appearance: She is well-developed. She is obese.   HENT:      Head: Normocephalic and atraumatic.   Eyes:      Conjunctiva/sclera: Conjunctivae normal.   Cardiovascular:      Rate and Rhythm: Normal rate and regular rhythm.      Heart sounds: No murmur heard.  Pulmonary:      Effort: Pulmonary effort is normal. No respiratory distress.      Breath " sounds: Normal breath sounds.   Abdominal:      Palpations: Abdomen is soft.      Tenderness: There is no abdominal tenderness.   Musculoskeletal:         General: No swelling.      Cervical back: Neck supple.   Skin:     General: Skin is warm and dry.      Capillary Refill: Capillary refill takes less than 2 seconds.   Neurological:      Mental Status: She is alert and oriented to person, place, and time.   Psychiatric:         Mood and Affect: Mood normal.

## 2025-04-08 NOTE — ASSESSMENT & PLAN NOTE
Continue care with pain management.  Okay to resume physical therapy after injection.  Orders:    Ambulatory Referral to Physical Therapy; Future

## 2025-04-09 ENCOUNTER — TELEPHONE (OUTPATIENT)
Dept: FAMILY MEDICINE CLINIC | Facility: CLINIC | Age: 65
End: 2025-04-09

## 2025-04-09 NOTE — TELEPHONE ENCOUNTER
Called patient and n/a lmov for a call back. Was calling patient to give her the number to Dr Mccurdy [phone] to make an apt

## 2025-04-09 NOTE — TELEPHONE ENCOUNTER
----- Message from GWEN Garsia sent at 4/8/2025  3:37 PM EDT -----  Please get her scheduled with gloria, Dr. Mccurdy.  ----- Message -----  From: Vika Mcucrdy MD  Sent: 4/8/2025   3:29 PM EDT  To: GWEN Black    We'd be happy to see her. Not sure if we'd offer a biopsy, would depend on the evaluation  ----- Message -----  From: GWEN Black  Sent: 4/8/2025   1:50 PM EDT  To: Vika Mccurdy MD    Hi Dr. Mccurdy,  Could you look at this patient's foot under media? This has been chronic issue for her. She has been seeing [facility] and they declined to do a biopsy.   Thank you! Jazmin

## 2025-04-11 NOTE — PROGRESS NOTES
"Bariatric Nutrition Assessment & Education Note  s/p Venessa-En-Y Gastric Bypass with Dr. Mendez in 2010   CC: Regain    Height: 5'4\"    Current Weight: 226.6#   BMI: 38.9  Weight Prior to Weight Loss Surgery: 308  Weight in (lb) to have BMI = 25: 145.5  JANUSZ:158# - Maintained at 185# for several years  Regain: Slowly increased with 30# gain past year with increased pain and other life changes     Energy Requirements  Meg Chen Equation:    BMR= 1550 calories  Weight Maintenance 1860 calories  Estimated calories for weight loss 1360  ( 1# per wk wt loss - sedentary )  Estimated protein needs 66-99 grams(1.0-1.5 gms/kg BMI at 25 )   Estimated fluid needs 66-77 oz (30-35 ml/kg BMI at 25)        Vitamin Regimen:  Vitron C, Calcium, Prolia(shots), Vitamin D3,  B12 but stopped d/t elevated level and was OTC Multi  Rec Bariatric multi with iron - continue calcium and extra iron    Diet and exercise:    Tolerating a regular diet - Yes  3 meals: yes  Snacking/grazing  Volume: 1-1.25 cups  Eating at least 60 grams of protein per day- Yes  Protein drinks: No  Following 30/60 minute rule with liquids-yes  more 30/30  Eating/drinking: chewing food well- sipping fluids Yes  Drinking at least 64 ounces of fluid per day-Yes  Drinking carbonated beverages-no  Food logging - Uses [employer]   GI discomforts None  Exercise: Limited d/t pain issues since [facility] in 2012   Other: Limited mobility d/t severe pudendal neuralgia- has sacral stimulator and spinal stimulator and was getting frequent pudendal nerve blocks.   just retired and he has been baking and doing most of the cooking.  Pt is retired/disabled for 10 years -Has 2 adult kids       Diet Recall:   B - 2 poached eggs  L - leftovers from dinner  D - salmon and asparagus or chicken and veggies or green salad  HS - Kind protein bar or Yasso mint bars  Fluids - 1 large cup coffee w/ milk and cold foam, diet iced tea       Visit Summary 4/14/2025  15-20 years post op. " Transferred to our office for post op care and support. Main concern is regain. Pt  150# s/p surgery and able to maintained  her weight around 185#. Attributes weight gain to having to retired after post op complications from her hysterectomy. Also changes in her diet since  retired and does most of the cooking. Also reports excess HS eating as well as changes in her meal choices. Has seen  Kandice and awaiting approval for Zepbound. Today reviewed Back on Track Booklet and focused on post op guidelines for optimal use of her tool. Reviewed energy needs and macros. Pt already familiar with [employer] for food logging. Questions/concerns addressed during discussion. Pt receptive          Goals  Keep to post op guidelines  Maintain protein (70-80 grams)  Increase hydration ( 64 oz ) (decrease caffeine)  Avoid grazing  Volume at 1.25 c per meals - Macro goals as discussed  Rec Bariatric multi with iron - continue calcium and extra iron  Increase physical activity and include exercise as able  F/U RD as needed    Time Spent: 60 minutes

## 2025-04-14 ENCOUNTER — TELEPHONE (OUTPATIENT)
Dept: BARIATRICS | Facility: CLINIC | Age: 65
End: 2025-04-14

## 2025-04-14 ENCOUNTER — CLINICAL SUPPORT (OUTPATIENT)
Dept: BARIATRICS | Facility: CLINIC | Age: 65
End: 2025-04-14

## 2025-04-14 VITALS — HEIGHT: 64 IN | WEIGHT: 226.6 LBS | BODY MASS INDEX: 38.68 KG/M2

## 2025-04-14 DIAGNOSIS — K91.2 POSTSURGICAL MALABSORPTION: Primary | ICD-10-CM

## 2025-04-14 PROCEDURE — RECHECK

## 2025-04-14 NOTE — TELEPHONE ENCOUNTER
Pt has been notified of denial.  Has there been an appeal?  If not, she would like to know next steps.  Please call patient to advise.    no

## 2025-04-17 ENCOUNTER — HOSPITAL ENCOUNTER (OUTPATIENT)
Dept: RADIOLOGY | Facility: CLINIC | Age: 65
Discharge: HOME | End: 2025-04-17
Payer: COMMERCIAL

## 2025-04-17 VITALS
HEART RATE: 61 BPM | DIASTOLIC BLOOD PRESSURE: 73 MMHG | TEMPERATURE: 97.2 F | RESPIRATION RATE: 20 BRPM | SYSTOLIC BLOOD PRESSURE: 113 MMHG | OXYGEN SATURATION: 97 %

## 2025-04-17 DIAGNOSIS — M46.1 SACROILIITIS (HCC): ICD-10-CM

## 2025-04-17 PROCEDURE — 27096 INJECT SACROILIAC JOINT: CPT | Performed by: STUDENT IN AN ORGANIZED HEALTH CARE EDUCATION/TRAINING PROGRAM

## 2025-04-17 RX ORDER — ROPIVACAINE HYDROCHLORIDE 2 MG/ML
1 INJECTION, SOLUTION EPIDURAL; INFILTRATION; PERINEURAL ONCE
Status: COMPLETED | OUTPATIENT
Start: 2025-04-17 | End: 2025-04-17

## 2025-04-17 RX ORDER — METHYLPREDNISOLONE ACETATE 40 MG/ML
40 INJECTION, SUSPENSION INTRA-ARTICULAR; INTRALESIONAL; INTRAMUSCULAR; PARENTERAL; SOFT TISSUE ONCE
Status: COMPLETED | OUTPATIENT
Start: 2025-04-17 | End: 2025-04-17

## 2025-04-17 RX ADMIN — ROPIVACAINE HYDROCHLORIDE 1 ML: 2 INJECTION, SOLUTION EPIDURAL; INFILTRATION at 09:31

## 2025-04-17 RX ADMIN — IOHEXOL 0.5 ML: 300 INJECTION, SOLUTION INTRAVENOUS at 09:30

## 2025-04-17 RX ADMIN — METHYLPREDNISOLONE ACETATE 40 MG: 40 INJECTION, SUSPENSION INTRA-ARTICULAR; INTRALESIONAL; INTRAMUSCULAR; SOFT TISSUE at 09:31

## 2025-04-17 NOTE — TELEPHONE ENCOUNTER
Did an appeal over the phone ID# [id number].  Zepbound Approved 3/18/25-4/17/26.  Patient co-pay $0.  Patient notified via [employer].

## 2025-04-17 NOTE — DISCHARGE INSTR - LAB

## 2025-04-17 NOTE — INTERVAL H&P NOTE
Update: (This section must be completed if the H&P was completed greater than 24 hrs to procedure or admission)    H&P reviewed. After examining the patient, I find no changed to the H&P since it had been written.    Patient re-evaluated. Accept as history and physical.    Aki Gaona MD/April 17, 2025/9:22 AM

## 2025-04-21 ENCOUNTER — OFFICE VISIT (OUTPATIENT)
Dept: BARIATRICS | Facility: CLINIC | Age: 65
End: 2025-04-21
Payer: COMMERCIAL

## 2025-04-21 VITALS
HEIGHT: 63 IN | HEART RATE: 77 BPM | OXYGEN SATURATION: 99 % | SYSTOLIC BLOOD PRESSURE: 120 MMHG | BODY MASS INDEX: 39.95 KG/M2 | WEIGHT: 225.5 LBS | DIASTOLIC BLOOD PRESSURE: 80 MMHG

## 2025-04-21 DIAGNOSIS — E66.01 OBESITY, MORBID (HCC): Primary | ICD-10-CM

## 2025-04-21 DIAGNOSIS — G47.33 OSA ON CPAP: ICD-10-CM

## 2025-04-21 PROCEDURE — 99214 OFFICE O/P EST MOD 30 MIN: CPT

## 2025-04-21 NOTE — ASSESSMENT & PLAN NOTE
- Discussed options of Conservative Program and the role of weight loss medications.  - Patient is interested in pursuing Conservative Program and follow up visits with medical weight management provider.  - Explained the importance of making lifestyle changes in addition to starting any anti-obesity medications.   - Initial weight loss goal of 5-10% weight loss for improved health. Weight loss can improve patient's co-morbid conditions and/or prevent weight-related complications.  - Weight is not at goal and patient has been unable to achieve a meaningful weight loss above 5% using various programs and tools for more than 6 months  - Labs reviewed from 4/1/2025    General Recommendations:  Nutrition:  Eat breakfast daily.  Do not skip meals.      Food log (ie.) www."[employer].com, sparkpeople.com, loseit.com, calorieking.com, etc.     Practice mindful eating.  Be sure to set aside time to eat, eat slowly, and savor your food.     Hydration:    At least 64oz of water daily.  No sugar sweetened beverages.  No juice (eat the fruit instead).     Exercise:  Studies have shown that the ideal exercise goal is somewhere between 150 to 300 minutes of moderate intensity exercise a week.  Start with exercising 10 minutes every other day and gradually increase physical activity with a goal of at least 150 minutes of moderate intensity exercise a week, divided over at least 3 days a week.  An example of this would be exercising 30 minutes a day, 5 days a week.  Resistance training can increase muscle mass and increase our resting metabolic rate.   FULL BODY resistance training is recommended 2-3 times a week.  Do not do this on consecutive days to allow for muscle recovery.     Aim for a bare minimum 5000 steps, even on days you do not exercise.     Monitoring:   Weigh yourself daily.  If this causes undue stress, then just weigh yourself once a week.  Weigh yourself the same time of the day with the same amount of clothing  on.  Preferably this should be done after waking up, before you eat, and with no clothing or minimal clothing on.     Specific Goals:  Discussed her lifestyle and nutrition and she had met with our surgical RD and is following her recommendations to maintain her diet and protein intake.   She is so far tolerating the zepbound. Educated her on the medication and side effects.  Patient denies personal history of pancreatitis. Patient also denies personal and family history of medullary thyroid cancer and multiple endocrine neoplasia type 2 (MEN 2 tumor).

## 2025-04-21 NOTE — PROGRESS NOTES
Assessment/Plan:    Obesity, morbid (HCC)  - Discussed options of Conservative Program and the role of weight loss medications.  - Patient is interested in pursuing Conservative Program and follow up visits with medical weight management provider.  - Explained the importance of making lifestyle changes in addition to starting any anti-obesity medications.   - Initial weight loss goal of 5-10% weight loss for improved health. Weight loss can improve patient's co-morbid conditions and/or prevent weight-related complications.  - Weight is not at goal and patient has been unable to achieve a meaningful weight loss above 5% using various programs and tools for more than 6 months  - Labs reviewed from 4/1/2025    General Recommendations:  Nutrition:  Eat breakfast daily.  Do not skip meals.      Food log (ie.) [email or web address].[email or web address], sparkpeople.com, loseit.com, calorieking.com, etc.     Practice mindful eating.  Be sure to set aside time to eat, eat slowly, and savor your food.     Hydration:    At least 64oz of water daily.  No sugar sweetened beverages.  No juice (eat the fruit instead).     Exercise:  Studies have shown that the ideal exercise goal is somewhere between 150 to 300 minutes of moderate intensity exercise a week.  Start with exercising 10 minutes every other day and gradually increase physical activity with a goal of at least 150 minutes of moderate intensity exercise a week, divided over at least 3 days a week.  An example of this would be exercising 30 minutes a day, 5 days a week.  Resistance training can increase muscle mass and increase our resting metabolic rate.   FULL BODY resistance training is recommended 2-3 times a week.  Do not do this on consecutive days to allow for muscle recovery.     Aim for a bare minimum 5000 steps, even on days you do not exercise.     Monitoring:   Weigh yourself daily.  If this causes undue stress, then just weigh yourself once a week.  Weigh yourself the same time of  the day with the same amount of clothing on.  Preferably this should be done after waking up, before you eat, and with no clothing or minimal clothing on.     Specific Goals:  Discussed her lifestyle and nutrition and she had met with our surgical RD and is following her recommendations to maintain her diet and protein intake.   She is so far tolerating the zepbound. Educated her on the medication and side effects.  Patient denies personal history of pancreatitis. Patient also denies personal and family history of medullary thyroid cancer and multiple endocrine neoplasia type 2 (MEN 2 tumor).          Caryn was seen today for consult.    Diagnoses and all orders for this visit:    Obesity, morbid (HCC)    CATHRYN on CPAP           Total time spent reviewing chart, interviewing patient, examining patient, discussing plan, answering all questions, and documentin min, with >50% face-to-face time spent counseling patient on nonsurgical interventions for the treatment of excess weight. Discussed in detail nonsurgical options including intensive lifestyle intervention program, very low-calorie diet program and conservative program.  Discussed the role of weight loss medications.  Counseled patient on diet behavior and exercise modification for weight loss.    Follow up in approximately 3 months with Non-Surgical Physician/Advanced Practitioner.    Subjective:   Chief Complaint   Patient presents with    Consult     +CATHRYN Cpap wears nightly.       Patient ID: Caryn Velasquez  is a 64 y.o. female with excess weight/obesity here to pursue weight management.  Previous notes and records have been reviewed.    Past Medical History:   Diagnosis Date    Anemia     Anxiety     Arthritis     Attention and concentration deficit     Blurred vision     Chronic pain     Chronic pain syndrome 2016    CPAP (continuous positive airway pressure) dependence     CTS (carpal tunnel syndrome) > 6 years    Left wrist    Depression      Difficulty walking > 6 years    Left nerve leg pain    Diplopia     Dyspepsia     Gastric ulcer     Gastritis     GERD (gastroesophageal reflux disease)     Head injury > 25 years    Fell off 12 foot retaining wall    Headache(784.0)     Headache, tension-type > 1 year    Pain behind left eyeAnd stiff neck with pressure    History of transfusion 2005    Hypertension     Inflammatory bowel disease 28    Insomnia     Irritable bowel syndrome     Kidney stone     Memory loss     Migraines     Obesity     Osteopenia     Peripheral neuropathy 3/14/2012    Left thigh, left abdomen, left prudential nerve    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    Presence of neurostimulator     Pudendal neuralgia    Pudendal neuralgia     Shingles > 30 years ago    Left facial nerve    Sleep apnea     Sleep apnea, obstructive     Spinal stenosis     Starting and stopping of urinary stream during micturition     Syncope > 10 years    When I get up quickly    Trigeminal neuralgia > 30 years    Associated with shingles on left face    Urinary incontinence     Visual impairment     Wears eyeglasses      Past Surgical History:   Procedure Laterality Date     SECTION       SECTION      CHOLECYSTECTOMY      COLONOSCOPY      GALLBLADDER SURGERY      GASTRIC BYPASS      HYSTERECTOMY  2012    INSERT / REPLACE PERIPHERAL NEUROSTIMULATOR PULSE GENERATOR /       MASS EXCISION Right 2021    Procedure: EXCISION  BIOPSY LESION/MASS LOWER NECK;  Surgeon: Brennon Browning DO;  Location: [facility];  Service: General    OTHER SURGICAL HISTORY      Reimplantatin at [facility]     WA INS/RPLC PERPH SAC/GSTRC NPG/RCVR PCKT CRTJ&CONN Left 2023    Procedure: PLACEMENT OF PERMANENT LEADWIRE AND PG FOR SNM AT S3 FORAMEN, COMPLEX PROGRAMMING OF PULSE GENERATOR;  Surgeon: Jess Shaw MD;  Location: [facility];  Service: UroGynecology           WA INSJ/RPLCMT SPINAL NPG/RCVR POCKET CRTJ&CONNJ Right  04/15/2020    Procedure: REPLACEMENT IMPLANTABLE PULSE GENERATOR FOR DORSAL SPINAL COLUMN STIMULATOR,  RIGHT BUTTOCKS;  Surgeon: Fili Mckeon MD;  Location: [facility];  Service: Neurosurgery    RADIOFREQUENCY ABLATION NERVES      SPINAL CORD STIMULATOR IMPLANT  2015    TRIGGER POINT INJECTION      URETER SURGERY      URETHRAL FISTULA REPAIR      US GUIDED THYROID BIOPSY  09/23/2020    WRIST ARTHROSCOPY      with internal fixation        HPI:  Wt Readings from Last 20 Encounters:   04/21/25 102 kg (225 lb 8 oz)   04/14/25 103 kg (226 lb 9.6 oz)   04/08/25 101 kg (222 lb 3.2 oz)   03/27/25 103 kg (226 lb)   03/25/25 99.8 kg (220 lb)   01/17/25 97.9 kg (215 lb 13.3 oz)   01/06/25 97.9 kg (215 lb 12.8 oz)   12/04/24 97.5 kg (215 lb)   12/03/24 97.5 kg (215 lb)   12/02/24 98.3 kg (216 lb 11.4 oz)   10/16/24 98.3 kg (216 lb 12.8 oz)   10/08/24 96.2 kg (212 lb)   10/02/24 95.7 kg (211 lb)   08/26/24 97.3 kg (214 lb 9.6 oz)   07/30/24 92.1 kg (203 lb)   05/15/24 92.1 kg (203 lb)   04/08/24 92.3 kg (203 lb 6.4 oz)   04/02/24 92.5 kg (204 lb)   12/26/23 95.9 kg (211 lb 6.4 oz)   12/11/23 91.5 kg (201 lb 12.8 oz)       Patient presents today to medical weight management office for consult. s/p Venessa-En-Y Gastric Bypass with Dr. Mendez in 2010. Patient is struggling with weight regain and ready to get back on track. She was started on zepbound 2.5mg and has been tolerating well with no side effects.   308 lbs prior to her wgt loss surgery in 2010.  She lost 150lbs s/p surgery and she maintained her weight around 185lbs for many years until last several years regained to about 200lbs and in the last year she has regained about 25lbs. Now struggling to lose weight.   Limited mobility d/t severe pudendal neuralgia- has sacral stimulator and spinal stimulator and was getting frequent pudendal nerve blocks.   She was started on zepbound 2.5mg and started the shot today so she isnt sure how its going other than good so far.      Starting MWM weight: 226 lbs 3/27/2025 (start of zepbound)  Starting BMI: 38  Goal weight: 200 to start     Obesity/Excess Weight:  Severity: Very Severe  Onset:  Last few years     Modifiers: Diet and Exercise, Commercial Weight Loss Programs-ie. Weight Watchers, Britta Jose De Jesus, Nutrisystem, etc., and Prescription Weight Loss Medications  Contributing factors: Poor Food Choices, Insufficient Physical Activity, Stress/Emotional Eating, Lack of knowledge of appropriate lifestyle changes, and Insufficient time to make appropriate lifestyle changes  Associated symptoms: comorbid conditions, fatigue, increased joint pain, decreased exercise capacity, body image issues, decreased self esteem, decreased mobility, depression, inability to do certain activities, and clothes do not fit    Diet Recall:   B - 2 poached eggs  L - leftovers from dinner  D - salmon and asparagus or chicken and veggies or green salad  HS - Kind protein bar or [employer] mint bars     Fluids - 1 large cup coffee w/ milk and cold foam, diet iced tea  Exercise: sacral injection recently will be walking once she is cleared   Occupation: retired   Sleep: 5-6 hours   STOP bang: CATHRYN    Colonoscopy: utd  Mammogram: utd    The following portions of the patient's history were reviewed and updated as appropriate: allergies, current medications, past family history, past medical history, past social history, past surgical history, and problem list.    Family History   Problem Relation Age of Onset    Cirrhosis Mother     Crohn's disease Mother     Lupus Mother         Systemic Lupus Erythematous     Depression Mother     Dementia Mother         Caused by liver disease    Neuropathy Mother         Fibromyalgia    Hypertension Father     Heart disease Father     Depression Sister     No Known Problems Daughter     Cirrhosis Brother     Dementia Brother     Crohn's disease Brother     No Known Problems Maternal Aunt     No Known Problems Paternal Aunt     No Known  "Problems Cousin     No Known Problems Cousin     No Known Problems Cousin     No Known Problems Cousin     No Known Problems Cousin     Dementia Brother         Caused by liver disease    Seizures Neg Hx     Breast cancer Neg Hx         Review of Systems   Constitutional:  Negative for fatigue.   HENT:  Negative for sore throat.    Respiratory:  Negative for cough and shortness of breath.    Cardiovascular:  Negative for chest pain, palpitations and leg swelling.   Gastrointestinal:  Negative for abdominal pain, constipation, diarrhea, nausea and vomiting.   Genitourinary:  Negative for dysuria.   Musculoskeletal:  Negative for arthralgias and back pain.   Skin:  Negative for rash.   Neurological:  Negative for headaches.   Psychiatric/Behavioral:  Negative for dysphoric mood. The patient is not nervous/anxious.        Objective:  /80 (BP Location: Left arm, Patient Position: Sitting, Cuff Size: Large)   Pulse 77   Ht 5' 3\" (1.6 m)   Wt 102 kg (225 lb 8 oz)   SpO2 99%   BMI 39.95 kg/m²     Physical Exam  Vitals and nursing note reviewed.   Constitutional:       Appearance: Normal appearance. She is obese.   HENT:      Head: Normocephalic.   Pulmonary:      Effort: Pulmonary effort is normal.   Neurological:      Mental Status: She is oriented to person, place, and time.   Psychiatric:         Mood and Affect: Mood normal.         Behavior: Behavior normal.         Thought Content: Thought content normal.         Judgment: Judgment normal.              Labs and Imaging  Recent labs and imaging have been personally reviewed.  Lab Results   Component Value Date    WBC 7.66 04/01/2025    HGB 13.3 04/01/2025    HCT 42.3 04/01/2025    MCV 97 04/01/2025     04/01/2025     Lab Results   Component Value Date    SODIUM 138 04/01/2025    K 4.3 04/01/2025     04/01/2025    CO2 31 04/01/2025    AGAP 5 04/01/2025    BUN 24 04/01/2025    CREATININE 0.89 04/01/2025    GLUC 82 08/26/2024    GLUF 97 04/01/2025 "    CALCIUM 8.8 04/01/2025    AST 15 04/01/2025    ALT 14 04/01/2025    ALKPHOS 77 04/01/2025    TP 7.1 04/01/2025    TBILI 0.41 04/01/2025    EGFR 67 04/01/2025     Lab Results   Component Value Date    HGBA1C 5.8 (H) 04/01/2025     Lab Results   Component Value Date    YAG5SSUKPQYY 1.716 04/01/2025     Lab Results   Component Value Date    CHOLESTEROL 180 04/01/2025     Lab Results   Component Value Date    HDL 84 04/01/2025     Lab Results   Component Value Date    TRIG 54 04/01/2025     Lab Results   Component Value Date    LDLCALC 85 04/01/2025

## 2025-04-22 ENCOUNTER — TELEPHONE (OUTPATIENT)
Age: 65
End: 2025-04-22

## 2025-04-22 ENCOUNTER — TELEMEDICINE (OUTPATIENT)
Dept: PSYCHIATRY | Facility: CLINIC | Age: 65
End: 2025-04-22
Payer: COMMERCIAL

## 2025-04-22 DIAGNOSIS — F33.1 MODERATE EPISODE OF RECURRENT MAJOR DEPRESSIVE DISORDER (HCC): Primary | ICD-10-CM

## 2025-04-22 DIAGNOSIS — G43.709 CHRONIC MIGRAINE WITHOUT AURA WITHOUT STATUS MIGRAINOSUS, NOT INTRACTABLE: ICD-10-CM

## 2025-04-22 DIAGNOSIS — F41.0 GENERALIZED ANXIETY DISORDER WITH PANIC ATTACKS: ICD-10-CM

## 2025-04-22 DIAGNOSIS — G47.00 INSOMNIA, UNSPECIFIED TYPE: ICD-10-CM

## 2025-04-22 DIAGNOSIS — F41.1 GENERALIZED ANXIETY DISORDER WITH PANIC ATTACKS: ICD-10-CM

## 2025-04-22 PROCEDURE — 90833 PSYTX W PT W E/M 30 MIN: CPT | Performed by: STUDENT IN AN ORGANIZED HEALTH CARE EDUCATION/TRAINING PROGRAM

## 2025-04-22 PROCEDURE — 99214 OFFICE O/P EST MOD 30 MIN: CPT | Performed by: STUDENT IN AN ORGANIZED HEALTH CARE EDUCATION/TRAINING PROGRAM

## 2025-04-22 NOTE — ASSESSMENT & PLAN NOTE
- Continue Zoloft 100 mg po daily for anxiety and depression  - Continue Buspar 10 mg BID for anxiety  - Continue Neurontin 300 mg BID for pain and anxiety  - Continue Seroquel 25 mg nightly (could be tapered off as tolerated to minimize polypharmacy when the patient agrees)   - Pending therapy

## 2025-04-22 NOTE — TELEPHONE ENCOUNTER
Patient called and requested to speak with office regarding a form that needs to be filled out. She said that she cannot find it. Writer transferred her to office staff.

## 2025-04-22 NOTE — PSYCH
MEDICATION MANAGEMENT NOTE    Name: Caryn Velasquez      : 1960      MRN: [id number]  Encounter Provider: Padmaja Flowers MD  Encounter Date: 2025   Encounter department: [facility]    Insurance: Payor: [facility] / Plan: [employer] / Product Type: [facility] /      Reason for Visit:   Chief Complaint   Patient presents with    Virtual Regular Visit    Medication Management    Depression    Anxiety   :  Assessment & Plan  Moderate episode of recurrent major depressive disorder (HCC)  - Continue Zoloft 100 mg po daily for anxiety and depression  - Continue Buspar 10 mg BID for anxiety  - Continue Neurontin 300 mg BID for pain and anxiety  - Continue Seroquel 25 mg nightly (could be tapered off as tolerated to minimize polypharmacy when the patient agrees)   - Pending therapy       Generalized anxiety disorder with panic attacks  - Management as per principal problem       Insomnia, unspecified type  - Management as per principal problem           Treatment Recommendations:  A 65 y/o  female,  (two adult children 31 and 33 y/o), domiciled w/ , retired teacher, w/ PMH of multiple medical conditions including obesity, HTN, HLD, GERD, post-gastrectomy malabsorption, CATHRYN (on CPAP), migraine, neuralgia, dysesthesia of multiple sites, lumbar radiculopathy, OA of L shoulder, cervicalgia, mild cognitive impairment w/ memory loss, BRIANA, TIA (2.5 yrs ago), abnormal sleep deprived EEG, 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 12 foot retaining wall more than 25 yrs ago), long-term use of opiate analgesic, BRIANA, vit D def and PPH of depression and anxiety, recent ED visit on 2021 due to worsening of depression and SI lead to inpatient psychiatric admission at [facility] (for 11 days) and then to the PHP (finished on 3/11/2021), one prior SA (~40 yrs ago via OD), no h/o self-injurious behavior, on Xanax 1 mg HS, Buspar 10 mg BID, Neurontin 300 mg  BID, Seroquel 50 mg nightly, Zoloft 100 mg daily, who presented to the mental health clinic for the initial intake and psychiatric evaluation on 3/12/2021. Presented w/ increased depression and anxiety over past year in the context of chronic pain, multiple medical conditions, and isolation due to COVID-19 pandemic, which has markedly improved since recent inpatient hospitalization and finishing PHP. Denied SI/HI, intent or plan upon direct inquiry at this time. PHQ-9: 7; YANY-7: 1. Her current presentation meets criteria for MDD and YANY w/ panic attacks. Maintained on Zoloft 100 mg daily, Seroquel 50 mg nightly, Buspar 10 mg BID and Neurontin 300 mg BID, and Xanax was tapered off successfully as tolerated; started individual therapy. PHQ-9: 1; YANY-7: 1 on 6/28/2021. Will follow with her PCP regarding fixing her CPAP. Upon f/u on 5/5/22, the patient remained stable and noted that she finished individual therapy a couple of month ago, and has good support system. Maintained on the same regimen. Upon f/u on 1/12/23, presented w/ stable mood and slight increased anxiety in the context of marital conflicts; maintained on the same regimen and referred for individual psychotherapy. On 9/29/23, presented with improved anxiety and stable mood issa since started weight watcher program and lost 30 lbs over 6 months. Seroquel decreased to 25 mg nightly; other meds maintained the same dose.     - Pain management as per primary medical team  - f/u w/ PCP regarding fixing her CPAP machine and w/u for frequent falls  - f/u with neurology regarding recent weakness in LE and memory issues as well as recent frequent falls  - Continue Zoloft 100 mg po daily for anxiety and depression  - Continue Buspar 10 mg BID for anxiety  - Continue Neurontin 300 mg BID for pain and anxiety  - Continue Seroquel 25 mg nightly (could be tapered off as tolerated to minimize polypharmacy when the patient agrees)   - Pending therapy  - Educated about  healthy life style, risk of falls/sedation and addiction. Patient was receptive to education.  - The patient has enough medication supply until the next appointment.  - RTC in 11 weeks  - The patient was educated about 24 hour and weekend coverage for urgent situations accessed by calling [facility] main practice number  - Patient was educated to call [facility] Suicide Prevention [facility] (7-753-581-TALK [[phone]]) for behavioral crisis at anytime or 911 for any safety concerns, or go to nearest ER if the symptoms become overwhelming or unmanageable.  Educated about diagnosis and treatment modalities. Verbalizes understanding and agreement with the treatment plan.  Discussed self monitoring of symptoms, and symptom monitoring tools.  Discussed medications and if treatment adjustment was needed or desired.  Aware of 24 hour and weekend coverage for urgent situations accessed by calling [facility] main practice number  I am scheduling this patient out for greater than 3 months: No    Medications Risks/Benefits:      Risks, Benefits And Possible Side Effects Of Medications:    Risks, benefits, and possible side effects of medications explained to Caryn and she (or legal representative) verbalizes understanding and agreement for treatment.    Controlled Medication Discussion:     Not applicable      History of Present Illness     The patient was visited for Virtual Regular Visit, Medication Management, Depression, and Anxiety. Presented calm, and cooperative. Reported feeling fine. She noted that she had sacral pain, and received an injection on the right side about a week ago, and has been feeling better. She has started the weight management program about a month ago and has been started on Zepbound. She noted that she groined back 25 lbs since last June and has pain started re-emerging. She noted that some days she felt depressed issa before the injection due to her pain  level, but has been feeling better since received the injection. She enjoyed the water class classes and decided to consider oil painting, but reportedly has not been good at it, and has decided to take another water coloring class. She has been active in community garden, and started growing vegetables in her balcony inside.  Denied any changes in sleep, appetite, concentration, energy level, or daily activities.  Denied feelings of anhedonia, hopelessness, helplessness, worthlessness or guilt and appeared to be future oriented. She is going to a family cruise in July and a trans-atlantic cruise in November for 15 days (flying to [facility] and then will visit [place], [place], [place]), but noted that she is anxious about the trip. There was no thought constriction related to death.  Denied SI/HI, intent or plan upon direct inquiry at this time. No intense anxiety sxs, specific phobia and reported only one panic attack (could not recall the trigger) since last visit. Denied AV/H.  Endorsed good compliance with the medications and denied any side effects. Denied smoking cigarettes, binge drinking alcohol or other illicit substance use.    Given this presentation, medications are maintained at the same dosage.  Pending individual therapy.  The patient was educated to call 911 or go to the nearest emergency room if the symptoms become overwhelming or unable to remain in control. Verbalized understanding and agreed to seek help in case of distress or concern for safety.    Review Of Systems: A review of systems is obtained and is negative except for the pertinent positives listed in HPI/Subjective above.      Current Rating Scores:     Current PHQ-9   PHQ-2/9 Depression Screening    Little interest or pleasure in doing things: 0 - not at all  Feeling down, depressed, or hopeless: 1 - several days  Trouble falling or staying asleep, or sleeping too much: 0 - not at all  Feeling tired or having little energy: 1 - several  days  Poor appetite or overeatin - more than half the days  Feeling bad about yourself - or that you are a failure or have let yourself or your family down: 1 - several days  Trouble concentrating on things, such as reading the newspaper or watching television: 0 - not at all  Moving or speaking so slowly that other people could have noticed. Or the opposite - being so fidgety or restless that you have been moving around a lot more than usual: 0 - not at all  Thoughts that you would be better off dead, or of hurting yourself in some way: 0 - not at all  PHQ-9 Score: 5  PHQ-9 Interpretation: Mild depression       Current YANY-7   YANY-7 Flowsheet Screening      Flowsheet Row Most Recent Value   Over the last two weeks, how often have you been bothered by the following problems?     Feeling nervous, anxious, or on edge 1    Not being able to stop or control worrying 1    Worrying too much about different things 1    Trouble relaxing  0    Being so restless that it's hard to sit still 0    Becoming easily annoyed or irritable  0    Feeling afraid as if something awful might happen 1    How difficult have these problems made it for you to do your work, take care of things at home, or get along with other people?  Somewhat difficult    YANY Score  4             Areas of Improvement: reviewed in HPI/Subjective Section and reviewed in Assessment and Plan Section    Past Medical History:   Diagnosis Date    Anemia     Anxiety     Arthritis     Attention and concentration deficit     Blurred vision     Chronic pain     Chronic pain syndrome 2016    CPAP (continuous positive airway pressure) dependence     CTS (carpal tunnel syndrome) > 6 years    Left wrist    Depression     Difficulty walking > 6 years    Left nerve leg pain    Diplopia     Dyspepsia     Gastric ulcer     Gastritis     GERD (gastroesophageal reflux disease)     Head injury > 25 years    Fell off 12 foot retaining wall    Headache(784.0)     Headache,  tension-type > 1 year    Pain behind left eyeAnd stiff neck with pressure    History of transfusion 2005    Hypertension     Inflammatory bowel disease 28    Insomnia     Irritable bowel syndrome     Kidney stone     Memory loss     Migraines     Obesity     Osteopenia     Peripheral neuropathy 3/14/2012    Left thigh, left abdomen, left prudential nerve    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    Presence of neurostimulator     Pudendal neuralgia    Pudendal neuralgia     Shingles > 30 years ago    Left facial nerve    Sleep apnea     Sleep apnea, obstructive     Spinal stenosis     Starting and stopping of urinary stream during micturition     Syncope > 10 years    When I get up quickly    Trigeminal neuralgia > 30 years    Associated with shingles on left face    Urinary incontinence     Visual impairment     Wears eyeglasses      Past Surgical History:   Procedure Laterality Date     SECTION       SECTION      CHOLECYSTECTOMY      COLONOSCOPY      GALLBLADDER SURGERY      GASTRIC BYPASS  2004    HYSTERECTOMY  2012    INSERT / REPLACE PERIPHERAL NEUROSTIMULATOR PULSE GENERATOR /       MASS EXCISION Right 2021    Procedure: EXCISION  BIOPSY LESION/MASS LOWER NECK;  Surgeon: Brennon Browning DO;  Location: [facility];  Service: General    OTHER SURGICAL HISTORY      Reimplantatin at [facility]     ND INS/RPLC PERPH SAC/GSTRC NPG/RCVR PCKT CRTJ&CONN Left 2023    Procedure: PLACEMENT OF PERMANENT LEADWIRE AND PG FOR SNM AT S3 FORAMEN, COMPLEX PROGRAMMING OF PULSE GENERATOR;  Surgeon: Jess Shaw MD;  Location: [facility];  Service: UroGynecology           ND INSJ/RPLCMT SPINAL NPG/RCVR POCKET CRTJ&CONNJ Right 04/15/2020    Procedure: REPLACEMENT IMPLANTABLE PULSE GENERATOR FOR DORSAL SPINAL COLUMN STIMULATOR,  RIGHT BUTTOCKS;  Surgeon: Fili Mckeon MD;  Location: [facility];  Service: Neurosurgery    RADIOFREQUENCY ABLATION NERVES      SPINAL CORD STIMULATOR  IMPLANT  2015    TRIGGER POINT INJECTION      URETER SURGERY      URETHRAL FISTULA REPAIR      US GUIDED THYROID BIOPSY  09/23/2020    WRIST ARTHROSCOPY      with internal fixation      Allergies:   Allergies   Allergen Reactions    Morphine Nausea Only and Abdominal Pain     SEVERE N/V    Cat Dander Nasal Congestion     Cat Dander    Dog Epithelium Nasal Congestion    Other Other (See Comments)     Dogs- sneezing  Crab Meat- GI intolerance       Current Outpatient Medications   Medication Instructions    albuterol (Ventolin HFA) 90 mcg/act inhaler 2 puffs, Inhalation, Every 6 hours PRN    ammonium lactate (LAC-HYDRIN) 12 % lotion Topical, 2 times daily PRN    Botox 200 units SOLR No dose, route, or frequency recorded.    busPIRone (BUSPAR) 10 mg, Oral, 2 times daily    Cholecalciferol 25 MCG (1000 UT) capsule 1 capsule, Daily    ciclopirox (PENLAC) 8 % solution     clobetasol (TEMOVATE) 0.05 % ointment As needed    cyclobenzaprine (FLEXERIL) 10 mg, Oral, 2 times daily PRN    denosumab (PROLIA) 60 mg, Subcutaneous, Once    Emgality 120 MG/ML SOAJ USE 1 INJECTION UNDER THE SKIN EVERY MONTH    gabapentin (NEURONTIN) 300 mg, Oral, 2 times daily    Iron-Vitamin C 100-250 MG TABS 1 tablet, Daily    ketoconazole (NIZORAL) 2 % cream     lisinopril (ZESTRIL) 5 mg, Oral, Daily    methylPREDNISolone 4 MG tablet therapy pack Use as directed on package    Multiple Vitamin (MULTI-VITAMIN DAILY) TABS 1 tablet, Daily    ondansetron (ZOFRAN-ODT) 4 mg disintegrating tablet PLACE 1 TABLET ON THE TONGUE EVERY 6 HOURS AS NEEDED FOR NAUSEA OR VOMITING    pantoprazole (PROTONIX) 40 mg, 2 times daily    propranolol (INDERAL) 60 mg tablet TAKE 1 TABLET TWICE A DAY    QUEtiapine (SEROQUEL) 25 mg, Oral, Daily at bedtime    rizatriptan (MAXALT-MLT) 10 mg disintegrating tablet Take at the onset of migraine; if symptoms continue or return, may take another dose at least 2 hours after first dose. Take no more than 2 doses in a day.     rosuvastatin (CRESTOR) 10 mg, Oral, Daily    sertraline (ZOLOFT) 100 mg, Oral, Daily    tacrolimus (PROTOPIC) 0.1 % ointment As needed    triamcinolone (KENALOG) 0.1 % cream 1 Application, Topical, 2 times daily    Zepbound 2.5 mg, Subcutaneous, Weekly        Substance Abuse History:    Tobacco, Alcohol and Drug Use History     Tobacco Use    Smoking status: Never    Smokeless tobacco: Never   Vaping Use    Vaping status: Never Used   Substance Use Topics    Alcohol use: No    Drug use: Yes     Types: Marijuana     Comment: Capsules used for chronic pain.  Medical          Social History:    Social History     Socioeconomic History    Marital status: /Civil Union     Spouse name: Not on file    Number of children: Not on file    Years of education: Not on file    Highest education level: Not on file   Occupational History    Occupation: retired   Other Topics Concern    Not on file   Social History Narrative    Lives in [place], with . Previously worked as a teacher.         Family Psychiatric History:     Family History   Problem Relation Age of Onset    Cirrhosis Mother     Crohn's disease Mother     Lupus Mother         Systemic Lupus Erythematous     Depression Mother     Dementia Mother         Caused by liver disease    Neuropathy Mother         Fibromyalgia    Hypertension Father     Heart disease Father     Depression Sister     No Known Problems Daughter     Cirrhosis Brother     Dementia Brother     Crohn's disease Brother     No Known Problems Maternal Aunt     No Known Problems Paternal Aunt     No Known Problems Cousin     No Known Problems Cousin     No Known Problems Cousin     No Known Problems Cousin     No Known Problems Cousin     Dementia Brother         Caused by liver disease    Seizures Neg Hx     Breast cancer Neg Hx        Medical History Reviewed by provider this encounter:  Tobacco  Allergies  Meds  Problems  Med Hx  Surg Hx  Fam Hx          Objective   There  "were no vitals taken for this visit.     Mental Status Evaluation:  Appearance and attitude: appeared as stated age, cooperative and attentive, casually dressed, with good hygiene  Eye contact: good  Motor Function: within normal limits, No PMA/PMR  Gait/station: Not observed  Speech: normal for rate, rhythm, volume, latency, amount  Language: No overt abnormality  Mood/affect: \"good\" / Affect was euthymic, reactive, in full range, normal intensity and mood congruent  Thought Processes: sequential and goal-directed  Thought content: denied suicidal ideations or homicidal ideations, no overt delusions elicited, negative thoughts, ruminations  Associations: intact associations  Perceptual disturbances: denies Auditory/Visual/Tactile Hallucinations  Orientation: oriented to time, person, place and to the situational context  Cognitive Function: intact  Attention/Concentration: attention span and concentration are age appropriate  Memory: recent and remote memory grossly intact  Intellect: average  Fund of knowledge: aware of current events, aware of past history, and vocabulary average  Impulse control: good  Insight/judgment: fair/good          Laboratory Results: I have personally reviewed all pertinent laboratory/tests results    Last Visit Labs:   Appointment on 04/01/2025   Component Date Value    WBC 04/01/2025 7.66     RBC 04/01/2025 4.37     Hemoglobin 04/01/2025 13.3     Hematocrit 04/01/2025 42.3     MCV 04/01/2025 97     MCH 04/01/2025 30.4     MCHC 04/01/2025 31.4     RDW 04/01/2025 12.5     MPV 04/01/2025 10.9     Platelets 04/01/2025 240     nRBC 04/01/2025 0     Segmented % 04/01/2025 63     Immature Grans % 04/01/2025 1     Lymphocytes % 04/01/2025 26     Monocytes % 04/01/2025 6     Eosinophils Relative 04/01/2025 3     Basophils Relative 04/01/2025 1     Absolute Neutrophils 04/01/2025 4.83     Absolute Immature Grans 04/01/2025 0.04     Absolute Lymphocytes 04/01/2025 2.01     Absolute Monocytes " 04/01/2025 0.48     Eosinophils Absolute 04/01/2025 0.22     Basophils Absolute 04/01/2025 0.08     Sodium 04/01/2025 138     Potassium 04/01/2025 4.3     Chloride 04/01/2025 102     CO2 04/01/2025 31     ANION GAP 04/01/2025 5     BUN 04/01/2025 24     Creatinine 04/01/2025 0.89     Glucose, Fasting 04/01/2025 97     Calcium 04/01/2025 8.8     AST 04/01/2025 15     ALT 04/01/2025 14     Alkaline Phosphatase 04/01/2025 77     Total Protein 04/01/2025 7.1     Albumin 04/01/2025 4.0     Total Bilirubin 04/01/2025 0.41     eGFR 04/01/2025 67     Hemoglobin A1C 04/01/2025 5.8 (H)     EAG 04/01/2025 120     Cholesterol 04/01/2025 180     Triglycerides 04/01/2025 54     HDL, Direct 04/01/2025 84     LDL Calculated 04/01/2025 85     Non-HDL-Chol (CHOL-HDL) 04/01/2025 96     TSH 3RD GENERATON 04/01/2025 1.716     Folate 04/01/2025 >22.3     Vitamin A 04/01/2025 56.7     PTH 04/01/2025 50.8     Vitamin B1, Whole Blood 04/01/2025 113.6     Vitamin B-12 04/01/2025 1,855 (H)     Vit D, 25-Hydroxy 04/01/2025 42.9     Zinc 04/01/2025 56     Iron Saturation 04/01/2025 28     TIBC 04/01/2025 322     Iron 04/01/2025 91     Transferrin 04/01/2025 230     UIBC 04/01/2025 231     Ferritin 04/01/2025 25        Suicide/Homicide Risk Assessment:    Risk of Harm to Self:  The following ratings are based on assessment at the time of the interview  Based on today's assessment, Caryn presents the following risk of harm to self: low    Risk of Harm to Others:  The following ratings are based on assessment at the time of the interview  Based on today's assessment, Caryn presents the following risk of harm to others: low    The following interventions are recommended: Continue medication management. No other intervention changes indicated at this time.    Psychotherapy Provided:     Individual psychotherapy provided: Yes    Counseling was provided during the session today for 16 minutes.  Psychoeducation provided to the patient and was  educated about the importance of compliance with the medications and psychiatric treatment  Solution Focused Brief Therapy (SFBT) provided  Patient's emotions were validated and specific labeled praise provided.   Smoot suggestions were offered in a supportive non-critical way.   Cognitive Behavioral Therapy and supportive expressive interventions    Treatment Plan:    Completed and signed during the session: Not applicable - Treatment Plan not due at this session.    Goals: Progress towards Treatment Plan goals - Yes, progressing, as evidenced by subjective findings in HPI/Subjective Section and in Assessment and Plan Section    Depression Follow-up Plan Completed: Yes    Note Share:    This note was shared with patient.    Administrative Statements   Administrative Statements   Encounter provider Pdamaja Flowers MD    The Patient is located at Home and in the following state in which I hold an active license [place].    The patient was identified by name and date of birth. Caryn Velasquez was informed that this is a telemedicine visit and that the visit is being conducted through the Epic Embedded platform. She agrees to proceed..  My office door was closed. No one else was in the room.  She acknowledged consent and understanding of privacy and security of the video platform. The patient has agreed to participate and understands they can discontinue the visit at any time.    I have spent a total time of 24 minutes in caring for this patient on the day of the visit/encounter including Counseling / Coordination of care and Obtaining or reviewing history  , not including the time spent for establishing the audio/video connection.    Visit Time  Visit Start Time: 1:19 PM  Visit Stop Time: 1:43 PM  Total Visit Duration:  24 minutes    Padmaja Flowers MD 04/22/25    This note was completed in part utilizing Dragon dictation Software. Grammatical, translation, syntax errors, random word insertions, spelling mistakes, and  incomplete sentences may be an occasional consequence of this system secondary to software limitations with voice recognition, ambient noise, and hardware issues. If you have any questions or concerns about the content, text, or information contained within the body of this dictation, please contact the provider for clarification.

## 2025-04-23 RX ORDER — RIZATRIPTAN BENZOATE 10 MG/1
TABLET, ORALLY DISINTEGRATING ORAL
Qty: 12 TABLET | Refills: 0 | Status: SHIPPED | OUTPATIENT
Start: 2025-04-23

## 2025-05-07 DIAGNOSIS — R51.9 CHRONIC DAILY HEADACHE: ICD-10-CM

## 2025-05-07 NOTE — PROGRESS NOTES
Post op support. Has been struggling with constipation since starting the 4 mg of the zepbound. Started taking miralax to help and encouraged her to increase her fluids. 64 oz diet iced tea , 12 oz decaf coffee,  8 oz zero lemonade- switch to decaf tea to help with fluids. Her kids are coming for the weekend, so has been busy getting ready for that. Struggles with expressing her emotions- suppresses and then gets angry. Her daughter is the exact opposite- very opinionated and sets boundaries to allow her voice to be heard. Patient ends up in the middle with her  and son on the other end. Becomes very stressful because patient has struggled to be able to express how she feels. Expresses being frustrated with her daughter's behavior, but also seems to envy her. Patient to follow up with LCSW next month.  Deena Carranza, SABRA

## 2025-05-08 ENCOUNTER — CLINICAL SUPPORT (OUTPATIENT)
Dept: BARIATRICS | Facility: CLINIC | Age: 65
End: 2025-05-08

## 2025-05-08 VITALS — BODY MASS INDEX: 38.69 KG/M2 | WEIGHT: 218.4 LBS

## 2025-05-08 DIAGNOSIS — F41.8 DEPRESSION WITH ANXIETY: ICD-10-CM

## 2025-05-08 DIAGNOSIS — Z98.84 BARIATRIC SURGERY STATUS: Primary | ICD-10-CM

## 2025-05-08 PROCEDURE — RECHECK

## 2025-05-08 RX ORDER — PROPRANOLOL HYDROCHLORIDE 60 MG/1
60 TABLET ORAL 2 TIMES DAILY
Qty: 180 TABLET | Refills: 1 | Status: SHIPPED | OUTPATIENT
Start: 2025-05-08

## 2025-05-19 ENCOUNTER — TELEMEDICINE (OUTPATIENT)
Dept: NEUROLOGY | Facility: CLINIC | Age: 65
End: 2025-05-19
Payer: COMMERCIAL

## 2025-05-19 DIAGNOSIS — G43.709 CHRONIC MIGRAINE WITHOUT AURA WITHOUT STATUS MIGRAINOSUS, NOT INTRACTABLE: Primary | ICD-10-CM

## 2025-05-19 PROCEDURE — 99212 OFFICE O/P EST SF 10 MIN: CPT | Performed by: PHYSICIAN ASSISTANT

## 2025-05-19 RX ORDER — LANOLIN ALCOHOL/MO/W.PET/CERES
CREAM (GRAM) TOPICAL
Qty: 30 TABLET | Refills: 5 | Status: CANCELLED | OUTPATIENT
Start: 2025-05-19

## 2025-05-19 NOTE — PROGRESS NOTES
Virtual Regular VisitName: Caryn Velasquez      : 1960      MRN: [id number]  Encounter Provider: Shilpi Nicholas PA-C  Encounter Date: 2025   Encounter department: NEUROLOGY [facility]  :  Assessment & Plan  Chronic migraine without aura without status migrainosus, not intractable  Continue Botox injections.  Since starting botox, the patient reports greater than 7 days of migraine relief from baseline, correlated with headache diary, decreased abortive medication use and decreased ER visits.    Continue emgality once monthly.    Prn maxalt.           The patient should not hesitate to call me prior to her follow up with any questions or concerns.    History of Present Illness     HPI     Ms. Caryn Velasquez is a very pleasant 65 yo female here for follow up for migraines.    Botox reauth, says she started taking Zepbound 3 weeks ago and she said she is getting more headaches with that, she is getting them 2 times a week.    She has pudendal neuralgia and has a stimulator follows with  urogynecologist.  -- She had a hysterectomy in  which was complicated by ureteral tear. Since then has had back/ buttock pain and incontinence. She is now s/p SECOND stim placement 23 for urge incontinence and she has noticed improvement.     She sees us for chronic migraines which have been reduced with botox. Since starting botox, the patient reports greater than 7 days of migraine relief from baseline, correlated with headache diary, decreased abortive medication use and decreased ER visits.  Actually a combination of the botox and emgality works better than either therapy alone. She denies s/e to either therapy.     Past meds tried/failed:  Depakote, propranolol, duloxetine, trazodone, lisinopril, topiramate, gabapentin, pregabalin, venlafaxine and amitriptyline would be contraindicated due to interaction with duloxetine, aimovig - would avoid at this time due to history of constipation       How often do the headaches occur?  As of 5/19/25-- increased slightly with Zepbound- 2 per week approximately; maxalt helps and no s/e  2023-- Currently 1-2 times per week. Prior to the botox and Emgality it was nearly daily.     Are you ever headache free?   Yes     Aura/Warning and how long does it last?  No aura. Warning signing - intense nausea     Certain time of the day?   Mid-afternoon.      How long do the headaches last?   The rest of the day.      Where is your headache located?   Bifrontal L>R and pain in eye.     Describe your usual headache?  Throbbing, pressure     What is the intensity of pain?   Mild headaches: 3-4/10  Moderate to severe headaches: 6-7/10     Associated symptoms:   [] Decreased appetite  [x] Nausea   []Vomiting   []Diarrhea  [x] Photophobia   [x]Phonophobia       []Osmophobia  [] Lacrimation   [x] rhinorrhea    [x] Flushing of face   [x] Stiff or sore neck   [x] Dizziness  [x] light headed  [x] Problems with concentration   [] Blurred vision   [] Change in pupil size     [] Ptosis  []Facial droop     [] Hands or feet tingle or feel numb/paresthesias  []Tinnitus   []Insomnia  [] Worse with lying down  [x] better with lying down  [x] Prefer to be in a cool, quiet, dark room     Do headaches get worse with  [] coughing   []sneezing []bending  []exertion      Number of days missed per month because of headaches:  Work (or school) days: Retired  Social or Family activities: estimates missing 25% of events per monthly      Headache triggers:      - decaf iced coffee with dairy cream     Alternative therapies used in the past for headaches?  [] Daith piercing  [] Massage  [] physical therapy  []Acupuncture  []Acupressure []Chiropractor  []Yoga  []biofeedback  [] TPI  [x]Botox  []Epidural injections  [x]CBD/THC -     How many caffeine products to drink a day? Iced tea - 60 oz   How much water to drink a day? None      Sleep:  How many hours do you sleep a night on average?  10 hours  Do  you feel well rested when you wake up? No   Any history of CATHRYN? Yes, wears CPAP machine.      Mood:  Any history of anxiety or depression? Yes especially in the beginning of COVID period. It is now much better.   Do you follow with psychology or psychiatry? Was following with counseling just recently stopped     Dietary:  Not following a particular diet. No skipping meals      Jaw:  History of TMJ, grinding. She is currently wearing mouth piece.      She has a family history of migraines and her twin sister, who currently lives in [place].  She likes to visit her when possible.     Diagnostics:  - MRI brain with without contrast 08/07/2020: Normal MRI of the brain.  No pathologic intracranial enhancement.  - MRI brain neuroquant  01/25/2019   1.  Normal MRI of the brain  2.  Neuroquant was performed and is a normal study; Does not support neurodegeneration.  - MRI brain neuroquant 07/26/2016: No acute disease.  Neuro Quant imaging normal, no indication of Alzheimer's type, medial temporal lobe neuro degenerative disease      Review of Systems   Constitutional:  Negative for appetite change, fatigue and fever.   HENT: Negative.  Negative for hearing loss, tinnitus, trouble swallowing and voice change.    Eyes: Negative.  Negative for photophobia, pain and visual disturbance.   Respiratory: Negative.  Negative for shortness of breath.    Cardiovascular: Negative.  Negative for palpitations.   Gastrointestinal: Negative.  Negative for nausea and vomiting.   Endocrine: Negative.  Negative for cold intolerance.   Genitourinary: Negative.  Negative for dysuria, frequency and urgency.   Musculoskeletal:  Negative for back pain, gait problem, myalgias, neck pain and neck stiffness.   Skin: Negative.  Negative for rash.   Allergic/Immunologic: Negative.    Neurological:  Positive for headaches. Negative for dizziness, tremors, seizures, syncope, facial asymmetry, speech difficulty, weakness, light-headedness and numbness.    Hematological: Negative.  Does not bruise/bleed easily.   Psychiatric/Behavioral: Negative.  Negative for confusion, hallucinations and sleep disturbance.    The following portions of the patient's history were reviewed and updated as appropriate: allergies, current medications/ medication history, past family history, past medical history, past social history, past surgical history and problem list.    Review of systems was reviewed and otherwise unremarkable from a neurological perspective.      Objective   There were no vitals taken for this visit.    Physical Exam  Neurological exam:  On neurologic exam, the patient is alert and oriented to time and place. Speech is fluent and articulate, and the patient follows commands appropriately. Judgment and affect appear normal.  Extraocular muscles are intact without nystagmus. Face is symmetric. Hearing is intact bilaterally. Motor examination reveals adequate range of motion.      Administrative Statements   Encounter provider Shilpi Nicholas PA-C    The Patient is located at Home and in the following state in which I hold an active license [place].    The patient was identified by name and date of birth. Caryn Velasquez was informed that this is a telemedicine visit and that the visit is being conducted through the Epic Embedded platform. She agrees to proceed..  My office door was closed. No one else was in the room.  She acknowledged consent and understanding of privacy and security of the video platform. The patient has agreed to participate and understands they can discontinue the visit at any time.    I have spent a total time of 10 minutes in caring for this patient on the day of the visit/encounter including Risks and benefits of tx options, Instructions for management, Patient and family education, Importance of tx compliance, Risk factor reductions, Impressions, Counseling / Coordination of care, Documenting in the medical record, Reviewing/placing orders in the  medical record (including tests, medications, and/or procedures), and Obtaining or reviewing history  , not including the time spent for establishing the audio/video connection.

## 2025-05-20 NOTE — ASSESSMENT & PLAN NOTE
Continue Botox injections.  Since starting botox, the patient reports greater than 7 days of migraine relief from baseline, correlated with headache diary, decreased abortive medication use and decreased ER visits.    Continue emgality once monthly.    Prn maxalt.

## 2025-05-28 ENCOUNTER — TELEPHONE (OUTPATIENT)
Dept: NEUROLOGY | Facility: CLINIC | Age: 65
End: 2025-05-28

## 2025-05-28 NOTE — TELEPHONE ENCOUNTER
Botox number of units: 200  Botox quantity: 1  Arrived at what location: CV  Botox at Correct Administering Location: yes  NDC number: 5415-6655-19  Lot number: [id number]  Expiration Date: 10/2027  Appt notes indicate correct medication: yes

## 2025-05-29 ENCOUNTER — TELEPHONE (OUTPATIENT)
Age: 65
End: 2025-05-29

## 2025-05-29 NOTE — TELEPHONE ENCOUNTER
Please assist with printing script from pt's chart in media from [facility] and having Dr. Mosley  fill it out/sign it and fax back to [facility].     Dr. Mosley: please fill out script/sign.

## 2025-05-29 NOTE — TELEPHONE ENCOUNTER
Jonathan from  [employer]  called in requesting an updated script from Dr. Mosley for the patient's c-pap machine.   Representative stated they Have Fax the request a few times but they have not received a response  .    CB : [phone]  [employer]AX: [phone]

## 2025-05-29 NOTE — TELEPHONE ENCOUNTER
[facility] called in to confirm if we received a requesting that was sent via [employer] on 5/23/25 for the patient cpap supplies . I advised the caller to please fax the script over to our central fax # .

## 2025-06-02 NOTE — TELEPHONE ENCOUNTER
Printed and had Dr. Mosley complete [employer] form. Scanned into media and faxed to number provided on form .

## 2025-06-05 ENCOUNTER — PROCEDURE VISIT (OUTPATIENT)
Dept: NEUROLOGY | Facility: CLINIC | Age: 65
End: 2025-06-05
Payer: COMMERCIAL

## 2025-06-05 VITALS
SYSTOLIC BLOOD PRESSURE: 110 MMHG | DIASTOLIC BLOOD PRESSURE: 70 MMHG | TEMPERATURE: 97.8 F | OXYGEN SATURATION: 95 % | HEART RATE: 75 BPM

## 2025-06-05 DIAGNOSIS — G43.709 CHRONIC MIGRAINE WITHOUT AURA WITHOUT STATUS MIGRAINOSUS, NOT INTRACTABLE: Primary | ICD-10-CM

## 2025-06-05 PROCEDURE — 64615 CHEMODENERV MUSC MIGRAINE: CPT | Performed by: PHYSICIAN ASSISTANT

## 2025-06-05 NOTE — PROGRESS NOTES
Universal Protocol   Consent: Verbal consent obtained. Written consent obtained  Risks and benefits: risks, benefits and alternatives were discussed  Consent given by: patient  Patient understanding: patient states understanding of the procedure being performed  Patient consent: the patient's understanding of the procedure matches consent given  Procedure consent: procedure consent matches procedure scheduled      Chemodenervation     Date/Time  6/5/2025 3:00 PM     Performed by  Shilpi Nicholas PA-C   Authorized by  Shilpi Nicholas PA-C     Pre-procedure details      Prepped With: Alcohol     Procedure details      Position:  Upright   Botox      Botox Type:  Type A    Brand:  Botox    mL's of Botulinum Toxin:  200    Final Concentration per CC:  100 units    Needle Gauge:  30 G 2.5 inch   Procedures      Botox Procedures: chronic headache      Indications: migraines     Injection Location      Head / Face:  L superior trapezius, R superior trapezius, L superior cervical paraspinal, R superior cervical paraspinal, L , R , procerus, L temporalis, R temporalis, R frontalis, L frontalis, R medial occipitalis and L medial occipitalis    L  injection amount:  5 unit(s)    R  injection amount:  5 unit(s)    L lateral frontalis:  5 unit(s)    R lateral frontalis:  5 unit(s)    L medial frontalis:  5 unit(s)    R medial frontalis:  5 unit(s)    L temporalis injection amount:  20 unit(s)    R temporalis injection amount:  20 unit(s)    Procerus injection amount:  5 unit(s)    L medial occipitalis injection amount:  15 unit(s)    R medial occipitalis injection amount:  15 unit(s)    L superior cervical paraspinal injection amount:  10 unit(s)    R superior cervical paraspinal injection amount:  10 unit(s)    L superior trapezius injection amount:  15 unit(s)    R superior trapezius injection amount:  15 unit(s)   Total Units      Total units used:  200    Total units discarded:  0    Post-procedure details      Chemodenervation:  Chronic migraine    Facial Nerve Location::  Bilateral facial nerve    Patient tolerance of procedure:  Tolerated well, no immediate complications   Comments       45 extra units L frontal, scalp and occipital regions, all medically necessary.     Blood pressure 110/70, pulse 75, temperature 97.8 °F (36.6 °C), temperature source Temporal, SpO2 95%, not currently breastfeeding.

## 2025-06-06 DIAGNOSIS — E78.2 MIXED HYPERLIPIDEMIA: ICD-10-CM

## 2025-06-06 RX ORDER — ROSUVASTATIN CALCIUM 10 MG/1
10 TABLET, COATED ORAL DAILY
Qty: 90 TABLET | Refills: 1 | Status: SHIPPED | OUTPATIENT
Start: 2025-06-06

## 2025-06-20 DIAGNOSIS — K21.9 GASTROESOPHAGEAL REFLUX DISEASE WITHOUT ESOPHAGITIS: ICD-10-CM

## 2025-06-20 RX ORDER — PANTOPRAZOLE SODIUM 40 MG/1
40 TABLET, DELAYED RELEASE ORAL 2 TIMES DAILY
Qty: 180 TABLET | Refills: 1 | Status: SHIPPED | OUTPATIENT
Start: 2025-06-20

## 2025-06-21 DIAGNOSIS — G43.709 CHRONIC MIGRAINE WITHOUT AURA WITHOUT STATUS MIGRAINOSUS, NOT INTRACTABLE: ICD-10-CM

## 2025-06-23 RX ORDER — RIZATRIPTAN BENZOATE 10 MG/1
TABLET, ORALLY DISINTEGRATING ORAL
Qty: 12 TABLET | Refills: 1 | Status: SHIPPED | OUTPATIENT
Start: 2025-06-23

## 2025-07-02 NOTE — PROGRESS NOTES
"Post op support. Has been really enjoying gardening- has 4 plots at the [facility]. Has been \"obsessed\" with it and loving it. Has started to eat some of the vegetables that she has harvested. Had her first yellow squash of the year. Has been having 3 meals per day. Has been making healthier choices and her appetite has reduced since starting the Zepbound- continues on the 2.5. Her pain has reduced as well which has helped her mood. Has been working on setting boundaries with her kids- wants to stop being in the middle of their fights. Drinking 8 oz of zero lemonade, 64 oz water- infused with fruit, occasionally decaf coffee.   Deena Carranza, JAZMÍNW    " Spiritual Care Assessment/Progress Note  [facility]      NAME: Kelvin Sandhoff      MRN: [id number]  AGE: 37 y.o.  SEX: male  [employer] Affiliation: No Moravian   Language: English     11/15/2021     Total Time (in minutes): 11     Spiritual Assessment begun in [facility] 2 MED TELE through conversation with:         [x]Patient        [] Family    [] Friend(s)        Reason for Consult: Initial/Spiritual assessment, patient floor     Spiritual beliefs: (Please include comment if needed)     [x] Identifies with a alessandro tradition: Jain        [] Supported by a alessandro community:            [] Claims no spiritual orientation:           [] Seeking spiritual identity:                [] Adheres to an individual form of spirituality:           [] Not able to assess:                           Identified resources for coping:      [] Prayer                               [] Music                  [] Guided Imagery     [x] Family/friends                 [] Pet visits     [] Devotional reading                         [] Unknown     [] Other:                                               Interventions offered during this visit: (See comments for more details)    Patient Interventions: Affirmation of emotions/emotional suffering, Affirmation of alessandro, Catharsis/review of pertinent events in supportive environment, Coping skills reviewed/reinforced, Initial/Spiritual assessment, patient floor           Plan of Care:     [] Support spiritual and/or cultural needs    [] Support AMD and/or advance care planning process      [] Support grieving process   [] Coordinate Rites and/or Rituals    [] Coordination with community clergy   [] No spiritual needs identified at this time   [] Detailed Plan of Care below (See Comments)  [] Make referral to Music Therapy  [] Make referral to Pet Therapy     [] Make referral to Addiction services  [] Make referral to [facility]  [] Make referral to Spiritual Care Partner  [] No future visits requested        [x] Contact Spiritual Care for further referrals     Comments:     Initial Spiritual Care Assessment visit for Mr. Obrien in 2141. Reviewed the chart before visit. Patient was alert, no family was present during the visit. Patient shared about how challenging it is. He shared his family support is strong. He is believer but he does not attend any Religion per him. Advised of  availability.       [id number] [facility] Kyle Slaughter,RobertoM, [address] Provider   Paging Service [phone])

## 2025-07-03 ENCOUNTER — CLINICAL SUPPORT (OUTPATIENT)
Dept: BARIATRICS | Facility: CLINIC | Age: 65
End: 2025-07-03

## 2025-07-03 VITALS — BODY MASS INDEX: 37.55 KG/M2 | WEIGHT: 212 LBS

## 2025-07-03 DIAGNOSIS — Z98.84 BARIATRIC SURGERY STATUS: Primary | ICD-10-CM

## 2025-07-03 DIAGNOSIS — F41.8 DEPRESSION WITH ANXIETY: ICD-10-CM

## 2025-07-03 PROCEDURE — RECHECK

## 2025-07-08 ENCOUNTER — TELEMEDICINE (OUTPATIENT)
Dept: PSYCHIATRY | Facility: CLINIC | Age: 65
End: 2025-07-08
Payer: COMMERCIAL

## 2025-07-08 DIAGNOSIS — R51.9 NONINTRACTABLE HEADACHE, UNSPECIFIED CHRONICITY PATTERN, UNSPECIFIED HEADACHE TYPE: ICD-10-CM

## 2025-07-08 DIAGNOSIS — F41.0 GENERALIZED ANXIETY DISORDER WITH PANIC ATTACKS: ICD-10-CM

## 2025-07-08 DIAGNOSIS — M79.2 NEURALGIA: ICD-10-CM

## 2025-07-08 DIAGNOSIS — F41.1 GENERALIZED ANXIETY DISORDER WITH PANIC ATTACKS: ICD-10-CM

## 2025-07-08 DIAGNOSIS — F33.1 MODERATE EPISODE OF RECURRENT MAJOR DEPRESSIVE DISORDER (HCC): Primary | ICD-10-CM

## 2025-07-08 DIAGNOSIS — G47.00 INSOMNIA, UNSPECIFIED TYPE: ICD-10-CM

## 2025-07-08 PROCEDURE — 99214 OFFICE O/P EST MOD 30 MIN: CPT | Performed by: STUDENT IN AN ORGANIZED HEALTH CARE EDUCATION/TRAINING PROGRAM

## 2025-07-08 PROCEDURE — 90833 PSYTX W PT W E/M 30 MIN: CPT | Performed by: STUDENT IN AN ORGANIZED HEALTH CARE EDUCATION/TRAINING PROGRAM

## 2025-07-08 RX ORDER — QUETIAPINE FUMARATE 25 MG/1
25 TABLET, FILM COATED ORAL
Qty: 90 TABLET | Refills: 1 | Status: SHIPPED | OUTPATIENT
Start: 2025-07-08 | End: 2026-01-04

## 2025-07-08 RX ORDER — SERTRALINE HYDROCHLORIDE 100 MG/1
100 TABLET, FILM COATED ORAL DAILY
Qty: 90 TABLET | Refills: 1 | Status: SHIPPED | OUTPATIENT
Start: 2025-07-08 | End: 2026-01-04

## 2025-07-08 RX ORDER — BUSPIRONE HYDROCHLORIDE 10 MG/1
10 TABLET ORAL 2 TIMES DAILY
Qty: 180 TABLET | Refills: 1 | Status: SHIPPED | OUTPATIENT
Start: 2025-07-08 | End: 2026-01-04

## 2025-07-08 RX ORDER — GABAPENTIN 300 MG/1
300 CAPSULE ORAL 2 TIMES DAILY
Qty: 180 CAPSULE | Refills: 1 | Status: SHIPPED | OUTPATIENT
Start: 2025-07-08 | End: 2026-01-04

## 2025-07-08 NOTE — BH TREATMENT PLAN
"Treatment Plan done but not signed at time of office visit due to:  Plan reviewed with the patient during the virtual visit and verbal consent given.    TREATMENT PLAN (Medication Management Only)        [facility]    Name and Date of Birth:  Caryn Velasquez 64 y.o. 1960  Date of Treatment Plan: July 8, 2025  Diagnosis/Diagnoses:    1. Moderate episode of recurrent major depressive disorder (HCC)    2. Generalized anxiety disorder with panic attacks    3. Insomnia, unspecified type    4. Neuralgia    5. Nonintractable headache, unspecified chronicity pattern, unspecified headache type      Strengths/Personal Resources for Self-Care: \"supportive family, hobbies and travelling\".  Area/Areas of need (in own words): anxiety symptoms, depression  1. Long Term Goal: improve control of anxiety and prevent panic attacks; maintain mood stability  Target Date:6 months - 1/8/2026  Person/Persons responsible for completion of goal: Caryn  2.  Short Term Objective (s) - How will we reach this goal?:   A. Provider new recommended medication/dosage changes and/or continue medication(s): continue current medications as prescribed.  B. Attend psychotherapy regularly.  C. N/A.  Target Date:6 months - 1/8/2026  Person/Persons Responsible for Completion of Goal: Caryn  Progress Towards Goals: stable, continuing treatment  Treatment Modality: medication management every 6 months, referral for individual psychotherapy  Review due 180 days from date of this plan: 6 months - 1/8/2026  Expected length of service: maintenance  My Physician/PA/NP and I have developed this plan together and I agree to work on the goals and objectives. I understand the treatment goals that were developed for my treatment.      "

## 2025-07-08 NOTE — PSYCH
MEDICATION MANAGEMENT NOTE    Name: Caryn Velasquez      : 1960      MRN: [id number]  Encounter Provider: Padmaja Flowers MD  Encounter Date: 2025   Encounter department: [facility]    Insurance: Payor: [employer] / Plan: [employer] / Product Type: [facility] /      Reason for Visit:   Chief Complaint   Patient presents with    Virtual Regular Visit    Medication Management    Depression    Anxiety    Insomnia   :  Assessment & Plan  Moderate episode of recurrent major depressive disorder (HCC)    Orders:    QUEtiapine (SEROquel) 25 mg tablet; Take 1 tablet (25 mg total) by mouth daily at bedtime    sertraline (ZOLOFT) 100 mg tablet; Take 1 tablet (100 mg total) by mouth daily    Generalized anxiety disorder with panic attacks    Orders:    busPIRone (BUSPAR) 10 mg tablet; Take 1 tablet (10 mg total) by mouth 2 (two) times a day    Insomnia, unspecified type         Neuralgia    Orders:    gabapentin (NEURONTIN) 300 mg capsule; Take 1 capsule (300 mg total) by mouth 2 (two) times a day    Nonintractable headache, unspecified chronicity pattern, unspecified headache type    Orders:    gabapentin (NEURONTIN) 300 mg capsule; Take 1 capsule (300 mg total) by mouth 2 (two) times a day        Treatment Recommendations:  A 65 y/o  female,  (two adult children 31 and 31 y/o), domiciled w/ , retired teacher, w/ PMH of multiple medical conditions including obesity, HTN, HLD, GERD, post-gastrectomy malabsorption, CATHRYN (on CPAP), migraine, neuralgia, dysesthesia of multiple sites, lumbar radiculopathy, OA of L shoulder, cervicalgia, mild cognitive impairment w/ memory loss, BRIANA, TIA (2.5 yrs ago), abnormal sleep deprived EEG, 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 12 foot retaining wall more than 25 yrs ago), long-term use of opiate analgesic, BIRANA, vit D def and PPH of depression and anxiety, recent ED visit on 2021 due to worsening of depression  and SI lead to inpatient psychiatric admission at [facility] (for 11 days) and then to the PHP (finished on 3/11/2021), one prior SA (~40 yrs ago via OD), no h/o self-injurious behavior, on Xanax 1 mg HS, Buspar 10 mg BID, Neurontin 300 mg BID, Seroquel 50 mg nightly, Zoloft 100 mg daily, who presented to the mental health clinic for the initial intake and psychiatric evaluation on 3/12/2021. Presented w/ increased depression and anxiety over past year in the context of chronic pain, multiple medical conditions, and isolation due to COVID-19 pandemic, which has markedly improved since recent inpatient hospitalization and finishing PHP. Denied SI/HI, intent or plan upon direct inquiry at this time. PHQ-9: 7; YANY-7: 1. Her current presentation meets criteria for MDD and YANY w/ panic attacks. Maintained on Zoloft 100 mg daily, Seroquel 50 mg nightly, Buspar 10 mg BID and Neurontin 300 mg BID, and Xanax was tapered off successfully as tolerated; started individual therapy. PHQ-9: 1; YANY-7: 1 on 6/28/2021. Will follow with her PCP regarding fixing her CPAP. Upon f/u on 5/5/22, the patient remained stable and noted that she finished individual therapy a couple of month ago, and has good support system. Maintained on the same regimen. Upon f/u on 1/12/23, presented w/ stable mood and slight increased anxiety in the context of marital conflicts; maintained on the same regimen and referred for individual psychotherapy. On 9/29/23, presented with improved anxiety and stable mood issa since started weight watcher program and lost 30 lbs over 6 months. Seroquel decreased to 25 mg nightly; other meds maintained the same dose.     - Pain management as per primary medical team  - f/u w/ PCP regarding fixing her CPAP machine and w/u for frequent falls  - f/u with neurology regarding recent weakness in LE and memory issues as well as recent frequent falls  - Continue Zoloft 100 mg po daily for anxiety and depression  - Continue  Buspar 10 mg BID for anxiety  - Continue Neurontin 300 mg BID for pain and anxiety  - Continue Seroquel 25 mg nightly (could be tapered off as tolerated to minimize polypharmacy when the patient agrees)   - Pending therapy  - Educated about healthy life style, risk of falls/sedation and addiction. Patient was receptive to education.  - Medications sent to the patient's pharmacy for 90 day supply    - RTC in 9 weeks  - The patient was educated about 24 hour and weekend coverage for urgent situations accessed by calling [facility] main practice number  - Patient was educated to call [place] Suicide Prevention [facility] (9-231-300-SUZP [0078]) for behavioral crisis at anytime or 911 for any safety concerns, or go to nearest ER if the symptoms become overwhelming or unmanageable.  Educated about diagnosis and treatment modalities. Verbalizes understanding and agreement with the treatment plan.  Discussed self monitoring of symptoms, and symptom monitoring tools.  Discussed medications and if treatment adjustment was needed or desired.  Aware of 24 hour and weekend coverage for urgent situations accessed by calling [facility] main practice number  I am scheduling this patient out for greater than 3 months: No    Medications Risks/Benefits:      Risks, Benefits And Possible Side Effects Of Medications:    Risks, benefits, and possible side effects of medications explained to Caryn and she (or legal representative) verbalizes understanding and agreement for treatment.    Controlled Medication Discussion:     Not applicable      History of Present Illness     The patient was visited for Virtual Regular Visit, Medication Management, Depression, Anxiety, and Insomnia. Presented calm, and cooperative. Reported feeling good. They had a great time on 4th of July weekend, and watched the fireworks and celebrated her  66th birthday. She noted that she lost almost 15 lbs since  "was started on Zepbound and reportedly her pain has been more manageable, and is \"minimal during the end of the day\". She is \"obsessed with gardening\" and noted that connection to nature is very helpful. She has been using the new CPAP since January and has been sleeping between 8-9 hours and feels refreshed waking up in the morning. She only had \"one anxiety episode\" since last visit related to the stressors related to daughter and sons, and stated: \"I need to stop be the person who fixes the problems\". She expatiated on her relationship with her kids issa her daughter. She is going on a 1 week [place] [facility] in 2 weeks, and is anxious about the conflicts with the family, and it makes her upset. Denied any changes in appetite, concentration, energy level, or daily activities.  Denied feelings of anhedonia, hopelessness, helplessness, worthlessness or guilt and appeared to be future oriented.  There was no thought constriction related to death.  Denied SI/HI, intent or plan upon direct inquiry at this time. No intense anxiety sxs, specific phobia or full blown panic attacks reported. Denied AV/H.  Endorsed good compliance with the medications and denied any side effects. Denied smoking cigarettes, binge drinking alcohol or other illicit substance use.    Given this presentation, medications are maintained at the same dosage.  The patient was educated to call 911 or go to the nearest emergency room if the symptoms become overwhelming or unable to remain in control. Verbalized understanding and agreed to seek help in case of distress or concern for safety.    Review Of Systems: A review of systems is obtained and is negative except for the pertinent positives listed in HPI/Subjective above.      Current Rating Scores:       Areas of Improvement: reviewed in HPI/Subjective Section and reviewed in Assessment and Plan Section    Past Medical History[1]  Past Surgical History[2]  Allergies: Allergies[3]    Current " Outpatient Medications   Medication Instructions    albuterol (Ventolin HFA) 90 mcg/act inhaler 2 puffs, Inhalation, Every 6 hours PRN    ammonium lactate (LAC-HYDRIN) 12 % lotion Topical, 2 times daily PRN    Botox 200 units SOLR No dose, route, or frequency recorded.    busPIRone (BUSPAR) 10 mg, Oral, 2 times daily    Cholecalciferol 25 MCG (1000 UT) capsule 1 capsule, Daily    ciclopirox (PENLAC) 8 % solution     clobetasol (TEMOVATE) 0.05 % ointment As needed    cyclobenzaprine (FLEXERIL) 10 mg, Oral, 2 times daily PRN    denosumab (PROLIA) 60 mg, Subcutaneous, Once    Emgality 120 MG/ML SOAJ USE 1 INJECTION UNDER THE SKIN EVERY MONTH    gabapentin (NEURONTIN) 300 mg, Oral, 2 times daily    Iron-Vitamin C 100-250 MG TABS 1 tablet, Daily    ketoconazole (NIZORAL) 2 % cream     lisinopril (ZESTRIL) 5 mg, Oral, Daily    Multiple Vitamin (MULTI-VITAMIN DAILY) TABS 1 tablet, Daily    ondansetron (ZOFRAN-ODT) 4 mg disintegrating tablet PLACE 1 TABLET ON THE TONGUE EVERY 6 HOURS AS NEEDED FOR NAUSEA OR VOMITING    pantoprazole (PROTONIX) 40 mg, 2 times daily    propranolol (INDERAL) 60 mg, 2 times daily    QUEtiapine (SEROQUEL) 25 mg, Oral, Daily at bedtime    rizatriptan (MAXALT-MLT) 10 mg disintegrating tablet TAKE AS INSTRUCTED BY YOUR PRESCRIBER    rosuvastatin (CRESTOR) 10 mg, Oral, Daily    sertraline (ZOLOFT) 100 mg, Oral, Daily    tacrolimus (PROTOPIC) 0.1 % ointment As needed    triamcinolone (KENALOG) 0.1 % cream 1 Application, Topical, 2 times daily    Zepbound 2.5 mg, Subcutaneous, Weekly       Substance Abuse History:    Tobacco, Alcohol and Drug Use History     Tobacco Use    Smoking status: Never    Smokeless tobacco: Never   Vaping Use    Vaping status: Never Used   Substance Use Topics    Alcohol use: No    Drug use: Yes     Types: Marijuana     Comment: Capsules used for chronic pain.  Medical          Social History:    Social History     Socioeconomic History    Marital status: /Civil Union  "    Spouse name: Not on file    Number of children: Not on file    Years of education: Not on file    Highest education level: Not on file   Occupational History    Occupation: retired   Other Topics Concern    Not on file   Social History Narrative    Lives in [place], with . Previously worked as a teacher.         Family Psychiatric History:     Family History[4]    Medical History Reviewed by provider this encounter:  Tobacco  Allergies  Meds  Problems  Med Hx  Surg Hx  Fam Hx          Objective   There were no vitals taken for this visit.     Mental Status Evaluation:  Appearance and attitude: appeared as stated age, cooperative and attentive, casually dressed, with good hygiene  Eye contact: good  Motor Function: within normal limits, No PMA/PMR  Gait/station: Not observed  Speech: normal for rate, rhythm, volume, latency, amount  Language: No overt abnormality  Mood/affect: \"good\" / Affect was euthymic, reactive, in full range, normal intensity and mood congruent  Thought Processes: sequential and goal-directed  Thought content: denied suicidal ideations or homicidal ideations, no overt delusions elicited, negative thinking, ruminating thoughts  Associations: intact associations  Perceptual disturbances: denies Auditory/Visual/Tactile Hallucinations  Orientation: oriented to time, person, place and to the situational context  Cognitive Function: intact  Attention/Concentration: attention span and concentration are age appropriate  Memory: recent and remote memory grossly intact  Intellect: average  Fund of knowledge: aware of current events, aware of past history, and vocabulary average  Impulse control: good  Insight/judgment: fair/good          Laboratory Results: I have personally reviewed all pertinent laboratory/tests results    Last Visit Labs:   No visits with results within 1 Month(s) from this visit.   Latest known visit with results is:   Appointment on 04/01/2025   Component " Date Value    WBC 04/01/2025 7.66     RBC 04/01/2025 4.37     Hemoglobin 04/01/2025 13.3     Hematocrit 04/01/2025 42.3     MCV 04/01/2025 97     MCH 04/01/2025 30.4     MCHC 04/01/2025 31.4     RDW 04/01/2025 12.5     MPV 04/01/2025 10.9     Platelets 04/01/2025 240     nRBC 04/01/2025 0     Segmented % 04/01/2025 63     Immature Grans % 04/01/2025 1     Lymphocytes % 04/01/2025 26     Monocytes % 04/01/2025 6     Eosinophils Relative 04/01/2025 3     Basophils Relative 04/01/2025 1     Absolute Neutrophils 04/01/2025 4.83     Absolute Immature Grans 04/01/2025 0.04     Absolute Lymphocytes 04/01/2025 2.01     Absolute Monocytes 04/01/2025 0.48     Eosinophils Absolute 04/01/2025 0.22     Basophils Absolute 04/01/2025 0.08     Sodium 04/01/2025 138     Potassium 04/01/2025 4.3     Chloride 04/01/2025 102     CO2 04/01/2025 31     ANION GAP 04/01/2025 5     BUN 04/01/2025 24     Creatinine 04/01/2025 0.89     Glucose, Fasting 04/01/2025 97     Calcium 04/01/2025 8.8     AST 04/01/2025 15     ALT 04/01/2025 14     Alkaline Phosphatase 04/01/2025 77     Total Protein 04/01/2025 7.1     Albumin 04/01/2025 4.0     Total Bilirubin 04/01/2025 0.41     eGFR 04/01/2025 67     Hemoglobin A1C 04/01/2025 5.8 (H)     EAG 04/01/2025 120     Cholesterol 04/01/2025 180     Triglycerides 04/01/2025 54     HDL, Direct 04/01/2025 84     LDL Calculated 04/01/2025 85     Non-HDL-Chol (CHOL-HDL) 04/01/2025 96     TSH 3RD GENERATION 04/01/2025 1.716     Folate 04/01/2025 >22.3     Vitamin A 04/01/2025 56.7     PTH 04/01/2025 50.8     Vitamin B1, Whole Blood 04/01/2025 113.6     Vitamin B-12 04/01/2025 1,855 (H)     Vit D, 25-Hydroxy 04/01/2025 42.9     Zinc 04/01/2025 56     Iron Saturation 04/01/2025 28     TIBC 04/01/2025 322     Iron 04/01/2025 91     Transferrin 04/01/2025 230     UIBC 04/01/2025 231     Ferritin 04/01/2025 25        Suicide/Homicide Risk Assessment:    Risk of Harm to Self:  The following ratings are based on  assessment at the time of the interview  Based on today's assessment, Caryn presents the following risk of harm to self: low    Risk of Harm to Others:  The following ratings are based on assessment at the time of the interview  Based on today's assessment, Caryn presents the following risk of harm to others: low    The following interventions are recommended: Continue medication management. No other intervention changes indicated at this time.    Psychotherapy Provided:     Individual psychotherapy provided: Yes    Counseling was provided during the session today for 16 minutes.  Psychoeducation provided to the patient and was educated about the importance of compliance with the medications and psychiatric treatment  Solution Focused Brief Therapy (SFBT) provided  Patient's emotions were validated and specific labeled praise provided.   [place] suggestions were offered in a supportive non-critical way.   Cognitive Behavioral Therapy and supportive expressive interventions    Treatment Plan:    Completed and signed during the session: Yes - with Caryn.    Goals: Progress towards Treatment Plan goals - Yes, progressing, as evidenced by subjective findings in HPI/Subjective Section and in Assessment and Plan Section    Depression Follow-up Plan Completed: Yes    Note Share:    This note was shared with patient.    Administrative Statements   Administrative Statements   Encounter provider Padmaja Flowers MD    The Patient is located at Home and in the following state in which I hold an active license [place].    The patient was identified by name and date of birth. Caryn Velasquez was informed that this is a telemedicine visit and that the visit is being conducted through the Epic Embedded platform. She agrees to proceed..  My office door was closed. No one else was in the room.  She acknowledged consent and understanding of privacy and security of the video platform. The patient has agreed to participate and understands  they can discontinue the visit at any time.    I have spent a total time of 25 minutes in caring for this patient on the day of the visit/encounter including Risks and benefits of tx options, Counseling / Coordination of care, and Obtaining or reviewing history  , not including the time spent for establishing the audio/video connection.    Visit Time  Visit Start Time: 2:19 PM  Visit Stop Time: 2:44 PM  Total Visit Duration: 25 minutes        Padmaja Flowers MD 07/08/25         [1]   Past Medical History:  Diagnosis Date    Anemia     Anxiety     Arthritis     Attention and concentration deficit     Blurred vision     Chronic pain     Chronic pain syndrome 01/20/2016    CPAP (continuous positive airway pressure) dependence     CTS (carpal tunnel syndrome) > 6 years    Left wrist    Depression     Difficulty walking > 6 years    Left nerve leg pain    Diplopia     Dyspepsia     Gastric ulcer     Gastritis     GERD (gastroesophageal reflux disease)     Head injury > 25 years    Fell off 12 foot retaining wall    Headache(784.0)     Headache, tension-type > 1 year    Pain behind left eyeAnd stiff neck with pressure    History of transfusion 2005    Hypertension     Inflammatory bowel disease 28    Insomnia     Irritable bowel syndrome     Kidney stone     Memory loss     Migraines     Obesity     Osteopenia     Peripheral neuropathy 3/14/2012    Left thigh, left abdomen, left prudential nerve    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    Presence of neurostimulator     Pudendal neuralgia    Pudendal neuralgia     Shingles > 30 years ago    Left facial nerve    Sleep apnea     Sleep apnea, obstructive     Spinal stenosis     Starting and stopping of urinary stream during micturition     Syncope > 10 years    When I get up quickly    Trigeminal neuralgia > 30 years    Associated with shingles on left face    Urinary incontinence     Visual impairment     Wears eyeglasses    [2]   Past Surgical History:  Procedure  Laterality Date     SECTION       SECTION      CHOLECYSTECTOMY      COLONOSCOPY      GALLBLADDER SURGERY  1990    GASTRIC BYPASS  2004    HYSTERECTOMY  2012    INSERT / REPLACE PERIPHERAL NEUROSTIMULATOR PULSE GENERATOR /       MASS EXCISION Right 2021    Procedure: EXCISION  BIOPSY LESION/MASS LOWER NECK;  Surgeon: Brennon Browning DO;  Location: [facility];  Service: General    OTHER SURGICAL HISTORY      Reimplantatin at [facility]     OK INS/RPLC PERPH SAC/GSTRC NPG/RCVR PCKT CRTJ&CONN Left 2023    Procedure: PLACEMENT OF PERMANENT LEADWIRE AND PG FOR SNM AT S3 FORAMEN, COMPLEX PROGRAMMING OF PULSE GENERATOR;  Surgeon: Jess Shaw MD;  Location: [facility];  Service: UroGynecology           OK INSJ/RPLCMT SPINAL NPG/RCVR POCKET CRTJ&CONNJ Right 04/15/2020    Procedure: REPLACEMENT IMPLANTABLE PULSE GENERATOR FOR DORSAL SPINAL COLUMN STIMULATOR,  RIGHT BUTTOCKS;  Surgeon: Fili Mckeon MD;  Location: [facility];  Service: Neurosurgery    RADIOFREQUENCY ABLATION NERVES      SPINAL CORD STIMULATOR IMPLANT      TRIGGER POINT INJECTION      URETER SURGERY      URETHRAL FISTULA REPAIR      US GUIDED THYROID BIOPSY  2020    WRIST ARTHROSCOPY      with internal fixation    [3]   Allergies  Allergen Reactions    Morphine Nausea Only and Abdominal Pain     SEVERE N/V    Cat Dander Nasal Congestion     Cat Dander    Dog Epithelium Nasal Congestion    Other Other (See Comments)     Dogs- sneezing  Crab Meat- GI intolerance   [4]   Family History  Problem Relation Name Age of Onset    Cirrhosis Mother Dalila Roth     Crohn's disease Mother Dalila Roth     Lupus Mother Dalila Roth         Systemic Lupus Erythematous     Depression Mother Dalila Roth     Dementia Mother Dalila Roth         Caused by liver disease    Neuropathy Mother Dalila Roth         Fibromyalgia    Hypertension Father Dad     Heart disease Father Dad     Depression Sister hailey     No Known Problems  Daughter fabio     Cirrhosis Brother Eneas     Dementia Brother Eneas     Crohn's disease Brother Ebenezer     No Known Problems Maternal Aunt debbie     No Known Problems Paternal Aunt pat     No Known Problems Cousin aleksandr     No Known Problems Cousin denver     No Known Problems Cousin zuleyma     No Known Problems Cousin franklyn     No Known Problems Cousin den     Dementia Brother Braydon Beyer         Caused by liver disease    Seizures Neg Hx      Breast cancer Neg Hx

## 2025-07-17 ENCOUNTER — OFFICE VISIT (OUTPATIENT)
Dept: BARIATRICS | Facility: CLINIC | Age: 65
End: 2025-07-17
Payer: COMMERCIAL

## 2025-07-17 VITALS
HEIGHT: 63 IN | HEART RATE: 77 BPM | SYSTOLIC BLOOD PRESSURE: 106 MMHG | BODY MASS INDEX: 37.39 KG/M2 | OXYGEN SATURATION: 98 % | WEIGHT: 211 LBS | DIASTOLIC BLOOD PRESSURE: 84 MMHG

## 2025-07-17 DIAGNOSIS — G47.33 OSA ON CPAP: ICD-10-CM

## 2025-07-17 DIAGNOSIS — E66.01 OBESITY, MORBID (HCC): Primary | ICD-10-CM

## 2025-07-17 PROCEDURE — 99214 OFFICE O/P EST MOD 30 MIN: CPT

## 2025-07-17 RX ORDER — TIRZEPATIDE 5 MG/.5ML
5 INJECTION, SOLUTION SUBCUTANEOUS WEEKLY
Qty: 6 ML | Refills: 1 | Status: SHIPPED | OUTPATIENT
Start: 2025-07-17 | End: 2025-07-17

## 2025-07-17 RX ORDER — TIRZEPATIDE 5 MG/.5ML
5 INJECTION, SOLUTION SUBCUTANEOUS WEEKLY
Qty: 6 ML | Refills: 1 | Status: SHIPPED | OUTPATIENT
Start: 2025-07-17 | End: 2025-07-29 | Stop reason: SDUPTHER

## 2025-07-17 NOTE — ASSESSMENT & PLAN NOTE
- Patient is pursuing Conservative Program and follow up visits with medical weight management provider  - Initial weight loss goal of 5-10% weight loss for improved health. Weight loss can improve patient's co-morbid conditions and/or prevent weight-related complications.  - Explained the importance of continuing lifestyle changes in addition to any anti-obesity medications.   - Labs reviewed from 4/2025    General Recommendations:  Nutrition:  Eat breakfast daily.  Do not skip meals.      Food log (ie.) www.Prospectvision.com, sparkpeople.com, loseit.com, calorieking.com, etc.     Practice mindful eating.  Be sure to set aside time to eat, eat slowly, and savor your food.     Hydration:    At least 64oz of water daily.  No sugar sweetened beverages.  No juice (eat the fruit instead).     Exercise:  Studies have shown that the ideal exercise goal is somewhere between 150 to 300 minutes of moderate intensity exercise a week.  Start with exercising 10 minutes every other day and gradually increase physical activity with a goal of at least 150 minutes of moderate intensity exercise a week, divided over at least 3 days a week.  An example of this would be exercising 30 minutes a day, 5 days a week.  Resistance training can increase muscle mass and increase our resting metabolic rate.   FULL BODY resistance training is recommended 2-3 times a week.  Do not do this on consecutive days to allow for muscle recovery.     Aim for a bare minimum 5000 steps, even on days you do not exercise.     Monitoring:   Weigh yourself daily.  If this causes undue stress, then just weigh yourself once a week.  Weigh yourself the same time of the day with the same amount of clothing on.  Preferably this should be done after waking up, before you eat, and with no clothing or minimal clothing on.     Specific Goals:  Discussed her lifestyle and nutrition and she had met with our surgical RD and is following her recommendations to maintain her  diet and protein intake.   She is so far tolerating the zepbound and will increase to 5 mg. Educated her on the medication and side effects.  Patient denies personal history of pancreatitis. Patient also denies personal and family history of medullary thyroid cancer and multiple endocrine neoplasia type 2 (MEN 2 tumor).

## 2025-07-17 NOTE — PROGRESS NOTES
Assessment/Plan:     Obesity, morbid (HCC)  - Patient is pursuing Conservative Program and follow up visits with medical weight management provider  - Initial weight loss goal of 5-10% weight loss for improved health. Weight loss can improve patient's co-morbid conditions and/or prevent weight-related complications.  - Explained the importance of continuing lifestyle changes in addition to any anti-obesity medications.   - Labs reviewed from 4/2025    General Recommendations:  Nutrition:  Eat breakfast daily.  Do not skip meals.      Food log (ie.) www.myfitnesspal.com, sparkpeople.com, loseit.com, calorieking.com, etc.     Practice mindful eating.  Be sure to set aside time to eat, eat slowly, and savor your food.     Hydration:    At least 64oz of water daily.  No sugar sweetened beverages.  No juice (eat the fruit instead).     Exercise:  Studies have shown that the ideal exercise goal is somewhere between 150 to 300 minutes of moderate intensity exercise a week.  Start with exercising 10 minutes every other day and gradually increase physical activity with a goal of at least 150 minutes of moderate intensity exercise a week, divided over at least 3 days a week.  An example of this would be exercising 30 minutes a day, 5 days a week.  Resistance training can increase muscle mass and increase our resting metabolic rate.   FULL BODY resistance training is recommended 2-3 times a week.  Do not do this on consecutive days to allow for muscle recovery.     Aim for a bare minimum 5000 steps, even on days you do not exercise.     Monitoring:   Weigh yourself daily.  If this causes undue stress, then just weigh yourself once a week.  Weigh yourself the same time of the day with the same amount of clothing on.  Preferably this should be done after waking up, before you eat, and with no clothing or minimal clothing on.     Specific Goals:  Discussed her lifestyle and nutrition and she had met with our surgical RD and is  following her recommendations to maintain her diet and protein intake.   She is so far tolerating the zepbound and will increase to 5 mg. Educated her on the medication and side effects.  Patient denies personal history of pancreatitis. Patient also denies personal and family history of medullary thyroid cancer and multiple endocrine neoplasia type 2 (MEN 2 tumor).          Caryn was seen today for follow-up.    Diagnoses and all orders for this visit:    Obesity, morbid (HCC)  -     Discontinue: tirzepatide (Zepbound) 5 mg/0.5 mL auto-injector; Inject 0.5 mL (5 mg total) under the skin once a week  -     tirzepatide (Zepbound) 5 mg/0.5 mL auto-injector; Inject 0.5 mL (5 mg total) under the skin once a week    CATHRYN on CPAP  -     Discontinue: tirzepatide (Zepbound) 5 mg/0.5 mL auto-injector; Inject 0.5 mL (5 mg total) under the skin once a week  -     tirzepatide (Zepbound) 5 mg/0.5 mL auto-injector; Inject 0.5 mL (5 mg total) under the skin once a week        Total time spent reviewing chart, interviewing patient, examining patient, discussing plan, answering all questions, and documentin minutes with >50% face-to-face time with the patient.    Follow up in approximately 3 months with Non-Surgical Physician/Advanced Practitioner.    Subjective:   Chief Complaint   Patient presents with    Follow-up       Patient ID: Caryn Velasquez  is a 64 y.o. female with excess weight/obesity here to pursue weight management.  Patient is pursuing Conservative Program.   Most recent notes and records were reviewed.    HPI    Wt Readings from Last 20 Encounters:   25 95.7 kg (211 lb)   25 96.2 kg (212 lb)   25 99.1 kg (218 lb 6.4 oz)   25 102 kg (225 lb 8 oz)   25 103 kg (226 lb 9.6 oz)   25 101 kg (222 lb 3.2 oz)   25 103 kg (226 lb)   25 99.8 kg (220 lb)   25 97.9 kg (215 lb 13.3 oz)   25 97.9 kg (215 lb 12.8 oz)   24 97.5 kg (215 lb)   24 97.5 kg (215  lb)   12/02/24 98.3 kg (216 lb 11.4 oz)   10/16/24 98.3 kg (216 lb 12.8 oz)   10/08/24 96.2 kg (212 lb)   10/02/24 95.7 kg (211 lb)   08/26/24 97.3 kg (214 lb 9.6 oz)   07/30/24 92.1 kg (203 lb)   05/15/24 92.1 kg (203 lb)   04/08/24 92.3 kg (203 lb 6.4 oz)       Patient presents today to medical weight management office for follow up. s/p Venessa-En-Y Gastric Bypass with Dr. Mendez in 2010. Patient is struggling with weight regain and ready to get back on track. She was started on zepbound 2.5mg and has been tolerating well with no side effects.   308 lbs prior to her wgt loss surgery in 2010.  She lost 150lbs s/p surgery and she maintained her weight around 185lbs for many years until last several years regained to about 200lbs and in the last year she has regained about 25lbs. Now struggling to lose weight.   Limited mobility d/t severe pudendal neuralgia- has sacral stimulator and spinal stimulator and was getting frequent pudendal nerve blocks.   She was started on zepbound 2.5mg and started the shot today so she isnt sure how its going other than good so far.     Weight loss medication and dose: Zepbound 2.5 mg   Started weight and date: 226 lbs 3/27/2025 (start of zepbound)   Current weight: 211 lbs   Difference: -15 lbs     Starting BMI: 38  Current BMI: 37    Diet Recall:   B - 2 poached eggs  L - leftovers from dinner  D - salmon and asparagus or chicken and veggies or green salad  HS - Kind protein bar or [employer] mint bars     Fluids - 1 large cup coffee w/ milk and cold foam, diet iced tea  Exercise: sacral injection recently will be walking once she is cleared   Occupation: retired   Sleep: 5-6 hours   STOP bang: CATHRYN     Colonoscopy: utd  Mammogram: utd      The following portions of the patient's history were reviewed and updated as appropriate: allergies, current medications, past family history, past medical history, past social history, past surgical history, and problem list.    Family History[1]  "    Review of Systems   Constitutional:  Negative for fatigue.   HENT:  Negative for sore throat.    Respiratory:  Negative for cough and shortness of breath.    Cardiovascular:  Negative for chest pain, palpitations and leg swelling.   Gastrointestinal:  Negative for abdominal pain, constipation, diarrhea, nausea and vomiting.   Genitourinary:  Negative for dysuria.   Musculoskeletal:  Negative for arthralgias and back pain.   Skin:  Negative for rash.   Neurological:  Negative for headaches.   Psychiatric/Behavioral:  Negative for dysphoric mood. The patient is not nervous/anxious.        Objective:  /84 (BP Location: Left arm, Patient Position: Sitting, Cuff Size: Large)   Pulse 77   Ht 5' 3\" (1.6 m)   Wt 95.7 kg (211 lb)   SpO2 98%   BMI 37.38 kg/m²     Physical Exam  Vitals and nursing note reviewed.   Constitutional:       Appearance: Normal appearance. She is obese.   HENT:      Head: Normocephalic.   Pulmonary:      Effort: Pulmonary effort is normal.     Neurological:      Mental Status: She is oriented to person, place, and time.     Psychiatric:         Mood and Affect: Mood normal.         Behavior: Behavior normal.         Thought Content: Thought content normal.         Judgment: Judgment normal.            Labs   Most recent labs reviewed   Lab Results   Component Value Date    SODIUM 138 04/01/2025    K 4.3 04/01/2025     04/01/2025    CO2 31 04/01/2025    AGAP 5 04/01/2025    BUN 24 04/01/2025    CREATININE 0.89 04/01/2025    GLUC 82 08/26/2024    GLUF 97 04/01/2025    CALCIUM 8.8 04/01/2025    AST 15 04/01/2025    ALT 14 04/01/2025    ALKPHOS 77 04/01/2025    TP 7.1 04/01/2025    TBILI 0.41 04/01/2025    EGFR 67 04/01/2025     Lab Results   Component Value Date    HGBA1C 5.8 (H) 04/01/2025     Lab Results   Component Value Date    BBJ9KIIYZEJY 1.716 04/01/2025     Lab Results   Component Value Date    CHOLESTEROL 180 04/01/2025     Lab Results   Component Value Date    HDL 84 " 04/01/2025     Lab Results   Component Value Date    TRIG 54 04/01/2025     Lab Results   Component Value Date    LDLCALC 85 04/01/2025          [1]   Family History  Problem Relation Name Age of Onset    Cirrhosis Mother Dalila Roth     Crohn's disease Mother Dalila Roth     Lupus Mother Dalila Roth         Systemic Lupus Erythematous     Depression Mother Dalila Roth     Dementia Mother Dalila Roth         Caused by liver disease    Neuropathy Mother Dalila Roth         Fibromyalgia    Hypertension Father Dad     Heart disease Father Dad     Depression Sister hailey     No Known Problems Daughter fabio     Cirrhosis Brother Eneas     Dementia Brother Eneas     Crohn's disease Brother Ebenezer     No Known Problems Maternal Aunt debbie     No Known Problems Paternal Aunt pat     No Known Problems Cousin aleksandr     No Known Problems Cousin denver     No Known Problems Cousin zuleyma     No Known Problems Cousin franklyn     No Known Problems Cousin den     Dementia Brother Enamilcar Beyer         Caused by liver disease    Seizures Neg Hx      Breast cancer Neg Hx

## 2025-07-29 ENCOUNTER — TELEPHONE (OUTPATIENT)
Age: 65
End: 2025-07-29

## 2025-07-29 DIAGNOSIS — E66.01 OBESITY, MORBID (HCC): ICD-10-CM

## 2025-07-29 DIAGNOSIS — G47.33 OSA ON CPAP: ICD-10-CM

## 2025-07-29 RX ORDER — TIRZEPATIDE 5 MG/.5ML
5 INJECTION, SOLUTION SUBCUTANEOUS WEEKLY
Qty: 6 ML | Refills: 1 | Status: SHIPPED | OUTPATIENT
Start: 2025-07-29 | End: 2026-01-13

## 2025-07-31 ENCOUNTER — HOSPITAL ENCOUNTER (OUTPATIENT)
Age: 65
Discharge: HOME | End: 2025-07-31
Payer: COMMERCIAL

## 2025-07-31 VITALS — BODY MASS INDEX: 37.39 KG/M2 | WEIGHT: 211 LBS | HEIGHT: 63 IN

## 2025-07-31 DIAGNOSIS — Z12.31 ENCOUNTER FOR SCREENING MAMMOGRAM FOR MALIGNANT NEOPLASM OF BREAST: ICD-10-CM

## 2025-07-31 PROCEDURE — 77067 SCR MAMMO BI INCL CAD: CPT

## 2025-07-31 PROCEDURE — 77063 BREAST TOMOSYNTHESIS BI: CPT

## 2025-08-07 DIAGNOSIS — H91.93 DECREASED HEARING OF BOTH EARS: Primary | ICD-10-CM

## 2025-08-13 ENCOUNTER — TELEPHONE (OUTPATIENT)
Dept: NEUROLOGY | Facility: CLINIC | Age: 65
End: 2025-08-13

## (undated) DEVICE — PENCIL ELECTROSURG E-Z CLEAN -0035H

## (undated) DEVICE — PLUMEPEN PRO 10FT

## (undated) DEVICE — 3M™ IOBAN™ 2 ANTIMICROBIAL INCISE DRAPE 6650EZ: Brand: IOBAN™ 2

## (undated) DEVICE — CHLORAPREP HI-LITE 26ML ORANGE

## (undated) DEVICE — LIGHT HANDLE COVER SLEEVE DISP BLUE STELLAR

## (undated) DEVICE — BETHLEHEM UNIVERSAL MINOR GEN: Brand: CARDINAL HEALTH

## (undated) DEVICE — ELECTRODE BLADE MOD E-Z CLEAN 2.5IN 6.4CM -0012M

## (undated) DEVICE — DRAPE SHEET THREE QUARTER

## (undated) DEVICE — SUT VICRYL 3-0 CT-1 27 IN J258H

## (undated) DEVICE — GAUZE SPONGES,16 PLY: Brand: CURITY

## (undated) DEVICE — PROGRAMMER PATIENT THERAPHY MANAGER RS2

## (undated) DEVICE — PARACERVICAL/PUDENDAL BLOCK TRAY - 10 ML CONTROL SYRINGE

## (undated) DEVICE — DRAPE C-ARM X-RAY

## (undated) DEVICE — SUT MONOCRYL 4-0 PS-2 18 IN Y496G

## (undated) DEVICE — 3M™ IOBAN™ 2 ANTIMICROBIAL INCISE DRAPE 6640EZ: Brand: IOBAN™ 2

## (undated) DEVICE — ABDOMINAL PAD: Brand: DERMACEA

## (undated) DEVICE — SUT SILK 2-0 SH 30 IN K833H

## (undated) DEVICE — GLOVE INDICATOR PI UNDERGLOVE SZ 7.5 BLUE

## (undated) DEVICE — ALL PURPOSE SPONGES,NONWOVEN, 4 PLY: Brand: CURITY

## (undated) DEVICE — REMOTE CONTROL: Brand: AXONICS

## (undated) DEVICE — CHARGING SYSTEM: Brand: AXONICS

## (undated) DEVICE — NEEDLE 25G X 1 1/2

## (undated) DEVICE — POOLE SUCTION HANDLE: Brand: CARDINAL HEALTH

## (undated) DEVICE — NEEDLE 22 G X 1 1/2 SAFETY

## (undated) DEVICE — 3M™ TEGADERM™ TRANSPARENT FILM DRESSING FRAME STYLE, 1628, 6 IN X 8 IN (15 CM X 20 CM), 10/CT 8CT/CASE: Brand: 3M™ TEGADERM™

## (undated) DEVICE — PAD GROUNDING ADULT

## (undated) DEVICE — INTENDED FOR TISSUE SEPARATION, AND OTHER PROCEDURES THAT REQUIRE A SHARP SURGICAL BLADE TO PUNCTURE OR CUT.: Brand: BARD-PARKER SAFETY BLADES SIZE 15, STERILE

## (undated) DEVICE — GLOVE SRG BIOGEL 7

## (undated) DEVICE — DRAPE EQUIPMENT RF WAND

## (undated) DEVICE — LEAD IMPLANT KIT: Brand: AXONICS

## (undated) DEVICE — ADHESIVE SKIN HIGH VISCOSITY EXOFIN 1ML

## (undated) DEVICE — INTENDED FOR TISSUE SEPARATION, AND OTHER PROCEDURES THAT REQUIRE A SHARP SURGICAL BLADE TO PUNCTURE OR CUT.: Brand: BARD-PARKER SAFETY BLADES SIZE 10, STERILE

## (undated) DEVICE — GLOVE SRG BIOGEL ECLIPSE 7

## (undated) DEVICE — Device

## (undated) DEVICE — SPONGE SCRUB 4 PCT CHLORHEXIDINE

## (undated) DEVICE — SUT MONOCRYL PLUS 4-0 PS-2 18 IN MCP496G

## (undated) DEVICE — 3000CC GUARDIAN II: Brand: GUARDIAN

## (undated) DEVICE — TUBING SUCTION 5MM X 12 FT

## (undated) DEVICE — SCD SEQUENTIAL COMPRESSION COMFORT SLEEVE MEDIUM KNEE LENGTH: Brand: KENDALL SCD

## (undated) DEVICE — SUT MONOCRYL 4-0 PS-2 27 IN Y426H

## (undated) DEVICE — ANTIBACTERIAL VIOLET BRAIDED (POLYGLACTIN 910), SYNTHETIC ABSORBABLE SUTURE: Brand: COATED VICRYL

## (undated) DEVICE — GLOVE INDICATOR PI UNDERGLOVE SZ 7 BLUE

## (undated) DEVICE — 3M™ STERI-STRIP™ REINFORCED ADHESIVE SKIN CLOSURES, R1547, 1/2 IN X 4 IN (12 MM X 100 MM), 6 STRIPS/ENVELOPE: Brand: 3M™ STERI-STRIP™

## (undated) DEVICE — SUT VICRYL 3-0 SH 27 IN J416H

## (undated) DEVICE — 3M™ TEGADERM™ TRANSPARENT FILM DRESSING FRAME STYLE, 1626W, 4 IN X 4-3/4 IN (10 CM X 12 CM), 50/CT 4CT/CASE: Brand: 3M™ TEGADERM™